# Patient Record
Sex: FEMALE | Race: WHITE | NOT HISPANIC OR LATINO | Employment: OTHER | URBAN - METROPOLITAN AREA
[De-identification: names, ages, dates, MRNs, and addresses within clinical notes are randomized per-mention and may not be internally consistent; named-entity substitution may affect disease eponyms.]

---

## 2017-01-04 ENCOUNTER — ALLSCRIPTS OFFICE VISIT (OUTPATIENT)
Dept: OTHER | Facility: OTHER | Age: 72
End: 2017-01-04

## 2017-01-05 ENCOUNTER — APPOINTMENT (OUTPATIENT)
Dept: LAB | Facility: HOSPITAL | Age: 72
End: 2017-01-05
Payer: MEDICARE

## 2017-01-05 DIAGNOSIS — N39.0 URINARY TRACT INFECTION: ICD-10-CM

## 2017-01-05 PROCEDURE — 87086 URINE CULTURE/COLONY COUNT: CPT

## 2017-01-06 LAB — BACTERIA UR CULT: NORMAL

## 2017-01-12 ENCOUNTER — GENERIC CONVERSION - ENCOUNTER (OUTPATIENT)
Dept: OTHER | Facility: OTHER | Age: 72
End: 2017-01-12

## 2017-02-28 ENCOUNTER — ALLSCRIPTS OFFICE VISIT (OUTPATIENT)
Dept: OTHER | Facility: OTHER | Age: 72
End: 2017-02-28

## 2017-02-28 DIAGNOSIS — E03.9 HYPOTHYROIDISM: ICD-10-CM

## 2017-02-28 DIAGNOSIS — M81.0 AGE-RELATED OSTEOPOROSIS WITHOUT CURRENT PATHOLOGICAL FRACTURE: ICD-10-CM

## 2017-02-28 DIAGNOSIS — G20 PARKINSON'S DISEASE (HCC): ICD-10-CM

## 2017-02-28 DIAGNOSIS — F06.30 MOOD DISORDER DUE TO KNOWN PHYSIOLOGICAL CONDITION: ICD-10-CM

## 2017-02-28 DIAGNOSIS — E78.5 HYPERLIPIDEMIA: ICD-10-CM

## 2017-03-08 ENCOUNTER — ALLSCRIPTS OFFICE VISIT (OUTPATIENT)
Dept: OTHER | Facility: OTHER | Age: 72
End: 2017-03-08

## 2017-04-04 ENCOUNTER — HOSPITAL ENCOUNTER (OUTPATIENT)
Dept: RADIOLOGY | Facility: MEDICAL CENTER | Age: 72
Discharge: HOME | End: 2017-04-04
Payer: MEDICARE

## 2017-04-04 ENCOUNTER — ALLSCRIPTS OFFICE VISIT (OUTPATIENT)
Dept: OTHER | Facility: OTHER | Age: 72
End: 2017-04-04

## 2017-04-04 ENCOUNTER — TRANSCRIBE ORDERS (OUTPATIENT)
Dept: ADMINISTRATIVE | Facility: HOSPITAL | Age: 72
End: 2017-04-04

## 2017-04-04 DIAGNOSIS — R05.9 COUGH: ICD-10-CM

## 2017-04-04 PROCEDURE — 71020 HB CHEST X-RAY 2VW FRONTAL&LATL: CPT

## 2017-04-06 ENCOUNTER — GENERIC CONVERSION - ENCOUNTER (OUTPATIENT)
Dept: OTHER | Facility: OTHER | Age: 72
End: 2017-04-06

## 2017-05-31 ENCOUNTER — ALLSCRIPTS OFFICE VISIT (OUTPATIENT)
Dept: OTHER | Facility: OTHER | Age: 72
End: 2017-05-31

## 2017-06-14 ENCOUNTER — GENERIC CONVERSION - ENCOUNTER (OUTPATIENT)
Dept: OTHER | Facility: OTHER | Age: 72
End: 2017-06-14

## 2017-09-25 ENCOUNTER — ALLSCRIPTS OFFICE VISIT (OUTPATIENT)
Dept: OTHER | Facility: OTHER | Age: 72
End: 2017-09-25

## 2017-09-26 DIAGNOSIS — R73.9 HYPERGLYCEMIA: ICD-10-CM

## 2017-09-26 DIAGNOSIS — G20 PARKINSON'S DISEASE (HCC): ICD-10-CM

## 2017-09-26 DIAGNOSIS — R04.2 HEMOPTYSIS: ICD-10-CM

## 2017-09-26 DIAGNOSIS — R26.9 ABNORMALITY OF GAIT AND MOBILITY: ICD-10-CM

## 2017-09-26 DIAGNOSIS — E78.5 HYPERLIPIDEMIA: ICD-10-CM

## 2017-09-26 DIAGNOSIS — J40 BRONCHITIS: ICD-10-CM

## 2017-09-26 DIAGNOSIS — M81.0 AGE-RELATED OSTEOPOROSIS WITHOUT CURRENT PATHOLOGICAL FRACTURE: ICD-10-CM

## 2017-09-26 DIAGNOSIS — E03.9 HYPOTHYROIDISM: ICD-10-CM

## 2017-09-26 DIAGNOSIS — R05.9 COUGH: ICD-10-CM

## 2017-09-26 DIAGNOSIS — J30.9 ALLERGIC RHINITIS: ICD-10-CM

## 2017-09-26 DIAGNOSIS — R06.2 WHEEZING: ICD-10-CM

## 2017-09-26 DIAGNOSIS — R32 URINARY INCONTINENCE: ICD-10-CM

## 2017-09-29 ENCOUNTER — HOSPITAL ENCOUNTER (OUTPATIENT)
Dept: RADIOLOGY | Facility: HOSPITAL | Age: 72
Discharge: HOME | End: 2017-09-29
Payer: MEDICARE

## 2017-09-29 ENCOUNTER — GENERIC CONVERSION - ENCOUNTER (OUTPATIENT)
Dept: FAMILY MEDICINE CLINIC | Facility: CLINIC | Age: 72
End: 2017-09-29

## 2017-09-29 ENCOUNTER — GENERIC CONVERSION - ENCOUNTER (OUTPATIENT)
Dept: OTHER | Facility: OTHER | Age: 72
End: 2017-09-29

## 2017-09-29 DIAGNOSIS — R04.2 HEMOPTYSIS: ICD-10-CM

## 2017-09-29 DIAGNOSIS — R05.9 COUGH: ICD-10-CM

## 2017-09-29 DIAGNOSIS — R06.2 WHEEZING: ICD-10-CM

## 2017-09-29 DIAGNOSIS — J40 BRONCHITIS: ICD-10-CM

## 2017-09-29 PROCEDURE — 71020 HB CHEST X-RAY 2VW FRONTAL&LATL: CPT

## 2017-10-03 ENCOUNTER — GENERIC CONVERSION - ENCOUNTER (OUTPATIENT)
Dept: OTHER | Facility: OTHER | Age: 72
End: 2017-10-03

## 2017-10-06 ENCOUNTER — TRANSCRIBE ORDERS (OUTPATIENT)
Dept: LAB | Facility: CLINIC | Age: 72
End: 2017-10-06

## 2017-10-06 ENCOUNTER — GENERIC CONVERSION - ENCOUNTER (OUTPATIENT)
Dept: OTHER | Facility: OTHER | Age: 72
End: 2017-10-06

## 2017-10-06 ENCOUNTER — APPOINTMENT (OUTPATIENT)
Dept: LAB | Facility: CLINIC | Age: 72
End: 2017-10-06
Payer: MEDICARE

## 2017-10-06 DIAGNOSIS — G20 PARKINSON'S DISEASE (HCC): ICD-10-CM

## 2017-10-06 DIAGNOSIS — J30.9 ALLERGIC RHINITIS: ICD-10-CM

## 2017-10-06 DIAGNOSIS — R26.9 ABNORMALITY OF GAIT AND MOBILITY: ICD-10-CM

## 2017-10-06 DIAGNOSIS — E78.5 HYPERLIPIDEMIA: ICD-10-CM

## 2017-10-06 DIAGNOSIS — R32 URINARY INCONTINENCE: ICD-10-CM

## 2017-10-06 DIAGNOSIS — M81.0 AGE-RELATED OSTEOPOROSIS WITHOUT CURRENT PATHOLOGICAL FRACTURE: ICD-10-CM

## 2017-10-06 DIAGNOSIS — E03.9 HYPOTHYROIDISM: ICD-10-CM

## 2017-10-06 DIAGNOSIS — R73.9 HYPERGLYCEMIA: ICD-10-CM

## 2017-10-06 DIAGNOSIS — R05.9 COUGH: ICD-10-CM

## 2017-10-06 LAB
25(OH)D3 SERPL-MCNC: 29.3 NG/ML (ref 30–100)
ALBUMIN SERPL BCP-MCNC: 3.5 G/DL (ref 3.5–5)
ALP SERPL-CCNC: 86 U/L (ref 46–116)
ALT SERPL W P-5'-P-CCNC: 32 U/L (ref 12–78)
ANION GAP SERPL CALCULATED.3IONS-SCNC: 6 MMOL/L (ref 4–13)
AST SERPL W P-5'-P-CCNC: 24 U/L (ref 5–45)
BILIRUB SERPL-MCNC: 0.39 MG/DL (ref 0.2–1)
BUN SERPL-MCNC: 15 MG/DL (ref 5–25)
CALCIUM SERPL-MCNC: 9.2 MG/DL (ref 8.3–10.1)
CHLORIDE SERPL-SCNC: 106 MMOL/L (ref 100–108)
CHOLEST SERPL-MCNC: 146 MG/DL (ref 50–200)
CO2 SERPL-SCNC: 29 MMOL/L (ref 21–32)
CREAT SERPL-MCNC: 0.69 MG/DL (ref 0.6–1.3)
ERYTHROCYTE [DISTWIDTH] IN BLOOD BY AUTOMATED COUNT: 13.8 % (ref 11.6–15.1)
EST. AVERAGE GLUCOSE BLD GHB EST-MCNC: 117 MG/DL
GFR SERPL CREATININE-BSD FRML MDRD: 86 ML/MIN/1.73SQ M
GLUCOSE P FAST SERPL-MCNC: 126 MG/DL (ref 65–99)
HBA1C MFR BLD: 5.7 % (ref 4.2–6.3)
HCT VFR BLD AUTO: 48.1 % (ref 34.8–46.1)
HDLC SERPL-MCNC: 62 MG/DL (ref 40–60)
HGB BLD-MCNC: 16.3 G/DL (ref 11.5–15.4)
LDLC SERPL CALC-MCNC: 57 MG/DL (ref 0–100)
MCH RBC QN AUTO: 31.8 PG (ref 26.8–34.3)
MCHC RBC AUTO-ENTMCNC: 33.9 G/DL (ref 31.4–37.4)
MCV RBC AUTO: 94 FL (ref 82–98)
PLATELET # BLD AUTO: 323 THOUSANDS/UL (ref 149–390)
PMV BLD AUTO: 10.9 FL (ref 8.9–12.7)
POTASSIUM SERPL-SCNC: 4 MMOL/L (ref 3.5–5.3)
PROT SERPL-MCNC: 7.4 G/DL (ref 6.4–8.2)
RBC # BLD AUTO: 5.12 MILLION/UL (ref 3.81–5.12)
SODIUM SERPL-SCNC: 141 MMOL/L (ref 136–145)
T4 FREE SERPL-MCNC: 1.09 NG/DL (ref 0.76–1.46)
TRIGL SERPL-MCNC: 134 MG/DL
TSH SERPL DL<=0.05 MIU/L-ACNC: 3.24 UIU/ML (ref 0.36–3.74)
WBC # BLD AUTO: 9.71 THOUSAND/UL (ref 4.31–10.16)

## 2017-10-06 PROCEDURE — 80061 LIPID PANEL: CPT

## 2017-10-06 PROCEDURE — 85027 COMPLETE CBC AUTOMATED: CPT

## 2017-10-06 PROCEDURE — 84439 ASSAY OF FREE THYROXINE: CPT

## 2017-10-06 PROCEDURE — 84443 ASSAY THYROID STIM HORMONE: CPT

## 2017-10-06 PROCEDURE — 80053 COMPREHEN METABOLIC PANEL: CPT

## 2017-10-06 PROCEDURE — 83036 HEMOGLOBIN GLYCOSYLATED A1C: CPT

## 2017-10-06 PROCEDURE — 82306 VITAMIN D 25 HYDROXY: CPT

## 2017-10-06 PROCEDURE — 36415 COLL VENOUS BLD VENIPUNCTURE: CPT

## 2017-10-07 ENCOUNTER — GENERIC CONVERSION - ENCOUNTER (OUTPATIENT)
Dept: OTHER | Facility: OTHER | Age: 72
End: 2017-10-07

## 2017-11-01 ENCOUNTER — ALLSCRIPTS OFFICE VISIT (OUTPATIENT)
Dept: OTHER | Facility: OTHER | Age: 72
End: 2017-11-01

## 2017-11-02 ENCOUNTER — GENERIC CONVERSION - ENCOUNTER (OUTPATIENT)
Dept: OTHER | Facility: OTHER | Age: 72
End: 2017-11-02

## 2017-11-07 ENCOUNTER — ALLSCRIPTS OFFICE VISIT (OUTPATIENT)
Dept: OTHER | Facility: OTHER | Age: 72
End: 2017-11-07

## 2017-11-07 ENCOUNTER — GENERIC CONVERSION - ENCOUNTER (OUTPATIENT)
Dept: OTHER | Facility: OTHER | Age: 72
End: 2017-11-07

## 2017-11-08 ENCOUNTER — GENERIC CONVERSION - ENCOUNTER (OUTPATIENT)
Dept: OTHER | Facility: OTHER | Age: 72
End: 2017-11-08

## 2017-11-30 ENCOUNTER — LAB REQUISITION (OUTPATIENT)
Dept: LAB | Facility: HOSPITAL | Age: 72
End: 2017-11-30
Payer: MEDICARE

## 2017-11-30 ENCOUNTER — ALLSCRIPTS OFFICE VISIT (OUTPATIENT)
Dept: OTHER | Facility: OTHER | Age: 72
End: 2017-11-30

## 2017-11-30 DIAGNOSIS — M54.9 DORSALGIA: ICD-10-CM

## 2017-11-30 LAB
BILIRUB UR QL STRIP: NEGATIVE
CLARITY UR: NORMAL
COLOR UR: YELLOW
GLUCOSE (HISTORICAL): NEGATIVE
HGB UR QL STRIP.AUTO: NORMAL
KETONES UR STRIP-MCNC: NEGATIVE MG/DL
LEUKOCYTE ESTERASE UR QL STRIP: NORMAL
NITRITE UR QL STRIP: POSITIVE
PH UR STRIP.AUTO: 6.5 [PH]
PROT UR STRIP-MCNC: 30 MG/DL
SP GR UR STRIP.AUTO: 1.02
UROBILINOGEN UR QL STRIP.AUTO: 0.2

## 2017-11-30 PROCEDURE — 87077 CULTURE AEROBIC IDENTIFY: CPT | Performed by: FAMILY MEDICINE

## 2017-11-30 PROCEDURE — 87086 URINE CULTURE/COLONY COUNT: CPT | Performed by: FAMILY MEDICINE

## 2017-11-30 PROCEDURE — 87186 SC STD MICRODIL/AGAR DIL: CPT | Performed by: FAMILY MEDICINE

## 2017-12-02 LAB — BACTERIA UR CULT: ABNORMAL

## 2017-12-05 NOTE — PROGRESS NOTES
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)    Plan  Acute UTI, SocHx: Never a smoker    · Ciprofloxacin HCl - 500 MG Oral Tablet; Take 1 every 12 hours  Backache, Hematuria, Urinary incontinence    · (1) URINE CULTURE; Source:Urine, Clean Catch; Status: In Progress - Specimen/Data  Collected,Retrospective By Protocol Authorization;   Done: 38RWZ1780  Urinary incontinence    · Urine Dip Automated- POC; Status:Complete - Retrospective By Protocol Authorization;    Done: 42ZZY2293 09:11AM    Discussion/Summary    1  Acute UTI: Started on ciprofloxacin 500 mg twice a day for 5 days  FOLLOW UP ON THE URINE CULTURE  Patient was asking for test of cure, explained to the patient is sound like a simple UTI, patient has no UTI for the last 12 months, if patient symptoms did not improved on the current medication then we may consider repeat urine dip and urine culture  Patient off her prophylaxis medication from the Urology  Patient was asking about Premarin cream that was suggested by her urologist before, explained to the patient and her  there is no need to use it right now since patient was not using it for many years, and her frequency of UTI is much less now, if there is more recurrent UTI then we may consider it on regular basis  2  Parkinson's disease: Patient had her DPs replaced, she has regular follow-up with Neurology  3  Mood disorder: Has regular follow-up with psychiatrist, she was started on Glycopyrrolate and patient  noticed that she is more drowsy and less active, explained to the patient to discuss with Dr Antonella Cardozo about decreasing the medication is slightly  Possible side effects of new medications were reviewed with the patient/guardian today  The treatment plan was reviewed with the patient/guardian  The patient/guardian understands and agrees with the treatment plan     Self Referrals: No Neuro      Chief Complaint  pt complaining of burning when urinating, and usually cant make it to the bathroom in the morning  History of Present Illness  HPI: Patient was urine frequency dysuria for the last 3 days, more incontinence, no fever no chills no flank pain no nausea or vomiting  Patient is more drowsy and sleepy more than usual lately after she started on this new medication by her psychiatrist       Review of Systems    Constitutional: No fever, no chills, feels well, no tiredness, no recent weight gain or loss  ENT: no ear ache, no loss of hearing, no nosebleeds or nasal discharge, no sore throat or hoarseness  Gastrointestinal: no complaints of abdominal pain, no constipation, no nausea or diarrhea, no vomiting, no bloody stools  Genitourinary: dysuria, but no pelvic pain, no incontinence and no dysmenorrhea  Musculoskeletal: no complaints of arthralgia, no myalgia, no joint swelling or stiffness, no limb pain or swelling  Neurological: no headache, no numbness, no tingling, no confusion, no dizziness and no fainting  Active Problems    1  Abnormal EKG (794 31) (R94 31)   2  Abnormal gait (781 2) (R26 9)   3  Acid reflux disease (530 81) (K21 9)   4  Allergic rhinitis (477 9) (J30 9)   5  Backache (724 5) (M54 9)   6  DYANA (generalized anxiety disorder) (300 02) (F41 1)   7  Hallucinations (780 1) (R44 3)   8  Hematuria (599 70) (R31 9)   9  Hyperglycemia (790 29) (R73 9)   10  Hyperlipidemia (272 4) (E78 5)   11  Hypothyroidism (244 9) (E03 9)   12  Lower back pain (724 2) (M54 5)   13  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)   14  Multiple joint pain (719 49) (M25 50)   15  Neoplasm of uncertain behavior of skin (238 2) (D48 5)   16  Osteoporosis (733 00) (M81 0)   17   Parkinson's disease (332 0) (Emily Evans) 18  Positive depression screening (796 4) (Z13 89)   19  Preoperative clearance (V72 84) (Z01 818)   20  Reactive confusion (298 2) (F44 89)   21  Sialorrhea (527 7) (K11 7)   22  Social phobia (300 23) (F40 10)   23  Spinal stenosis (724 00) (M48 00)   24  Urinary incontinence (788 30) (R32)    Past Medical History    1  History of Abdominal pain, acute, left lower quadrant (789 04,338 19) (R10 32)   2  History of Abdominal pain, suprapubic (789 09) (R10 2)   3  History of Acute UTI (599 0) (N39 0)   4  History of Ankle Sprain (845 00)   5  History of Anxiety (300 00) (F41 9)   6  History of Burning with urination (788 1) (R30 0)   7  History of Dysuria (788 1) (R30 0)   8  History of Foot Pain (Soft Tissue) (729 5)   9  History of Groin pain (789 09) (R10 30)   10  History of Hip pain, unspecified laterality   11  History of abscess of skin and subcutaneous tissue (V13 3) (Z87 2)   12  History of bursitis (V13 59) (Z87 39)   13  History of conjunctivitis (V12 49) (Z86 69)   14  History of constipation (V12 79) (Z87 19)   15  History of depression (V11 8) (Z86 59)   16  History of esophageal reflux (V12 79) (Z87 19)   17  History of falling (V15 88) (Z91 81)   18  History of hypothyroidism (V12 29) (Z86 39)   19  History of influenza (V12 09) (Z87 09)   20  History of influenza vaccination (V49 89) (Z92 29)   21  History of low back pain (V13 59) (Z87 39)   22  History of thyroid disease (V12 29) (Z86 39)   23  History of Leg abrasion (916 0) (S80 819A)   24  History of Neck pain (723 1) (M54 2)   25  Parkinson's disease (332 0) (G20)   26  History of Rectal pain (569 42) (K62 89)   27  History of Symptoms involving urinary system (788 99) (R39 9)   28  History of Urinary Tract Infection (V13 02)  Active Problems And Past Medical History Reviewed: The active problems and past medical history were reviewed and updated today  Family History  Mother    1   Family history of Acute Myocardial Infarction (V17 3) 2  Family history of diabetes mellitus (V18 0) (Z83 3)  Father    3  Family history of Stroke Syndrome (V17 1)  Family History    4  Family history of heart disease (V17 49) (Z82 49)   5  Family history of hypertension (V17 49) (Z82 49)   6  Family history of thyroid disease (V18 19) (Z83 49)  Family History Reviewed: The family history was reviewed and updated today  Social History    · Denied: History of Drug use   ·    · Never a smoker   · Never Drank Alcohol  The social history was reviewed and updated today  The social history was reviewed and is unchanged  Surgical History    1  History of Brain Surgery   2  History of Diagnostic Cystoscopy   3  History of Laminectomy Decompress, Facetectomy, Foraminotomy Lumbar Seg   4  History of Tonsillectomy   5  History of Total Hip Replacement  Surgical History Reviewed: The surgical history was reviewed and updated today  Current Meds   1  [employer] Vitamin C 1000 MG TABS; Take 1 tablet daily Recorded   2  All Day Calcium TB24 Recorded   3  ALPRAZolam 0 5 MG Oral Tablet; take 4 tablets daily as directed; Therapy: 95SIJ5129 to (Evaluate:30Apr2018)  Requested for: 16XTG3693; Last   Rx:01Nov2017 Ordered   4  Aspirin 81 MG TABS; Therapy: (Recorded:43Zem0454) to Recorded   5  Azelastine HCl - 0 15 % Nasal Solution; USE 2 SPRAYS IN EACH NOSTRIL ONCE   DAILY AT BEDTIME; Therapy: 02HAC1505 to (Last Rx:04Apr2017)  Requested for: 87Hrx4621 Ordered   6  BusPIRone HCl - 15 MG Oral Tablet; Take one half a tablet twice daily for one week and   then increase to a full tablet twice daily; Therapy: 17NZL5618 to (Last Rx:01Nov2017)  Requested for: 21BJW2468 Ordered   7  Carbidopa-Levodopa  MG Oral Tablet; one tab twice  a day; Therapy: 20Gra1296 to (Evaluate:24Apr2016)  Requested for: 19MHF3125; Last   Rx:25Jan2016 Ordered   8  Cranberry 125 MG Oral Tablet Recorded   9  [employer] Vitamin D CAPS Recorded   10   Daily Value Multivitamin TABS; Take 1 tablet daily Recorded   11  Donepezil HCl - 10 MG Oral Tablet; Take 1 tablet by mouth at bedtime  Requested for:    18SSS3997; Last Rx:93Lnm1391 Ordered   12  Doxycycline Hyclate 100 MG Oral Tablet; TAKE 1 TABLET Twice daily until gone; Therapy: 20DQY4893 to (Evaluate:10Oct2017)  Requested for: 79GDQ5702; Last    Rx:00Qxp7963 Ordered   13  DULoxetine HCl - 20 MG Oral Capsule Delayed Release Particles; TAKE 1 CAPSULE    ORALLY TWICE  DAILY (DEPRESSION) (DO NOT CRUSH) (DO NOT OPEN CAPSULE); Therapy: 92PMN9316 to 96 681145)  Requested for: 26SHY8708; Last    Rx:01Nov2017 Ordered   14  Glycopyrrolate 1 MG Oral Tablet; TAKE 1 TABLET 3 TIMES DAILY; Therapy: 81COW1413 to (22 063203)  Requested for: 47HLL5654; Last    Rx:44Ivi0141 Ordered   15  Levothyroxine Sodium 75 MCG Oral Tablet; take 1 tablet every day; Therapy: 25JCV1363 to (22 156654)  Requested for: 05ZEK6042; Last    Rx:23Njg8019 Ordered   16  Lidocaine Viscous 2 % Mouth/Throat Solution; Swish and spit 1-2 teaspoons 4 times a    day when necessary mouth and throat pain; Therapy: 70DNE1447 to (Last Rx:06Oct2017)  Requested for: 35SXI8703 Ordered   17  Montelukast Sodium 10 MG Oral Tablet; Take 1 tablet daily; Therapy: 18RPX0490 to (Last Carole Little)  Requested for: 43Zda8527 Ordered   18  Myrbetriq 25 MG Oral Tablet Extended Release 24 Hour; Therapy: 58KWP2571 to Recorded   19  Nystatin 994656 UNIT/ML Mouth/Throat Suspension; PLACE 1ML TO INSIDE OF EACH    CHEEK 4 TIMES DAILY; Therapy: 51HQO0425 to (Last Rx:20Srq1260)  Requested for: 60Gse1946 Ordered   20  Premarin 0 625 MG/GM Vaginal Cream; Insert one gram intra-vaginally every day for 2    weeks, then twice weekly Recorded   21  Promethazine HCl - 6 25 MG/5ML Oral Syrup; 1 teaspoon every 4 hours and 2 teaspoons    before bed as needed for cough; Therapy: 54GPA4570 to (Last Carole Little)  Requested for: 57Tsy2327 Ordered   22   RaNITidine HCl - 150 MG Oral Capsule; TAKE 1 CAPSULE AT BEDTIME Recorded   23  Simvastatin 20 MG Oral Tablet; Therapy: 49PFG2241 to (Last NM:00SAO7905)  Requested for: 82IEV4608 Ordered   24  Trospium Chloride ER 60 MG Oral Capsule Extended Release 24 Hour; Therapy: 15INI7276 to Recorded    The medication list was reviewed and updated today  Allergies    1  Gabapentin TABS   2  Erythromycin TABS   3  Levaquin TABS   4  Mirtazapine TABS   5  Paxil TABS   6  Sulfa Drugs   7  Tricyclic Antidepressants   8  Wellbutrin TABS   9  Keflex TABS    Vitals   Recorded: 73QJZ2524 09:00AM   Heart Rate 65   Systolic 623   Diastolic 70   Height 5 ft    Weight 156 lb    BMI Calculated 30 47   BSA Calculated 1 68     Physical Exam    Constitutional   General appearance: No acute distress, well appearing and well nourished  Eyes   Conjunctiva and lids: No swelling, erythema or discharge  Ears, Nose, Mouth, and Throat   External inspection of ears and nose: Normal     Abdomen   Abdomen: Non-tender, no masses  Liver and spleen: No hepatomegaly or splenomegaly  Musculoskeletal   Gait and station: Abnormal   In a wheelchair  Digits and nails: Normal without clubbing or cyanosis  Skin   Skin and subcutaneous tissue: Normal without rashes or lesions  Psychiatric   Orientation to person, place, and time: Normal     Mood and affect: Abnormal   Flat, sleepy, tired very slow          Results/Data  Urine Dip Automated- POC 26NNX4558 09:11AM Mira Kim     Test Name Result Flag Reference   Color Yellow     Clarity Transparent     Leukocytes moderate     Nitrite positive     Blood trace     Bilirubin negative     Urobilinogen 0 2     Protein 30     Ph 6 5     Specific Gravity 1 020     Ketone negative     Glucose negative         Future Appointments    Date/Time Provider Specialty Site   01/24/2018 03:30 PM Melisa Galeas MD Psychiatry [facility] [address] [place] [address]     Signatures   Electronically signed by : Marielos Ly MD; Nov 30 2017  9:45AM EST                       (Author)

## 2018-01-09 NOTE — MISCELLANEOUS
Message  Physical therapist from home care called and patient would benefit from speech therapy for some dysphagia   I gave him permission to get the speech therapist and OT and if needed      Signatures   Electronically signed by : Jenifer Menchaca DO; Dec  6 2016 12:33PM EST                       (Author)

## 2018-01-09 NOTE — MISCELLANEOUS
Message   Recorded as Task   Date: 08/24/2016 04:57 PM, Created By: Anitra Torres   Task Name: Call Back   Assigned To: Sergo Mccoy   Regarding Patient: Alejandro Garcia, Status: Active   CommentKyla Anna - 24 Aug 2016 4:57 PM     TASK CREATED    Pls call Wilma Ortega she has increased mental conditions, he does not know where this is heading  21  or [phone]   spoke with her   the past few days she has been very confused and disoriented with visual hallucinations and delusional thinking  I am recommending urinalysis and basic blood work  I referred her to Dr Paulina Choudhary for the test      Plan  Hypothyroidism    · Levothyroxine Sodium 75 MCG Oral Tablet;  Take 1 tablet daily    Signatures   Electronically signed by : Hermelindo Hernandez MD; Aug 24 2016  5:20PM EST                       (Author)

## 2018-01-09 NOTE — RESULT NOTES
Verified Results  (1) CBC/ PLT (NO DIFF) 22DBO2641 12:14PM Nicolás Sink Order Number: [id number]     Test Name Result Flag Reference   HEMATOCRIT 48 1 % H 34 8-46 1   HEMOGLOBIN 16 3 g/dL H 11 5-15 4   MCHC 33 9 g/dL  31 4-37 4   MCH 31 8 pg  26 8-34 3   MCV 94 fL  82-98   PLATELET COUNT 289 Thousands/uL  149-390   RBC COUNT 5 12 Million/uL  3 81-5 12   RDW 13 8 %  11 6-15 1   WBC COUNT 9 71 Thousand/uL  4 31-10 16   MPV 10 9 fL  8 9-12 7     (1) COMPREHENSIVE METABOLIC PANEL 19ZIF6103 71:74ST Nicolás Sink Order Number: [id number]     Test Name Result Flag Reference   SODIUM 141 mmol/L  136-145   POTASSIUM 4 0 mmol/L  3 5-5 3   CHLORIDE 106 mmol/L  100-108   CARBON DIOXIDE 29 mmol/L  21-32   ANION GAP (CALC) 6 mmol/L  4-13   BLOOD UREA NITROGEN 15 mg/dL  5-25   CREATININE 0 69 mg/dL  0 60-1 30   Standardized to IDMS reference method   CALCIUM 9 2 mg/dL  8 3-10 1   BILI, TOTAL 0 39 mg/dL  0 20-1 00   ALK PHOSPHATAS 86 U/L     ALT (SGPT) 32 U/L  12-78   Specimen collection should occur prior to Sulfasalazine and/or Sulfapyridine administration due to the potential for falsely depressed results  AST(SGOT) 24 U/L  5-45   Specimen collection should occur prior to Sulfasalazine administration due to the potential for falsely depressed results  ALBUMIN 3 5 g/dL  3 5-5 0   TOTAL PROTEIN 7 4 g/dL  6 4-8 2   eGFR 86 ml/min/1 73sq m     National Kidney Disease Education Program recommendations are as follows:  GFR calculation is accurate only with a steady state creatinine  Chronic Kidney disease less than 60 ml/min/1 73 sq  meters  Kidney failure less than 15 ml/min/1 73 sq  meters  GLUCOSE FASTING 126 mg/dL H 65-99   Specimen collection should occur prior to Sulfasalazine administration due to the potential for falsely depressed results  Specimen collection should occur prior to Sulfapyridine administration due to the potential for falsely elevated results       (1) HEMOGLOBIN A1C 18ART1368 12:14PM Renae Mcgee Order Number: [id number]     Test Name Result Flag Reference   HEMOGLOBIN A1C 5 7 %  4 2-6 3   EST  AVG  GLUCOSE 117 mg/dl       (1) LIPID PANEL, FASTING 28KQD3124 12:14PM Renae Mcgee Order Number: [id number]     Test Name Result Flag Reference   CHOLESTEROL 146 mg/dL     HDL,DIRECT 62 mg/dL H 40-60   Specimen collection should occur prior to Metamizole administration due to the potential for falsley depressed results  LDL CHOLESTEROL CALCULATED 57 mg/dL  0-100   Triglyceride:        Normal <150 mg/dl   Borderline High 150-199 mg/dl   High 200-499 mg/dl   Very High >499 mg/dl      Cholesterol:       Desirable <200 mg/dl    Borderline High 200-239 mg/dl    High >239 mg/dl      HDL Cholesterol:       High>59 mg/dL    Low <41 mg/dL      This screening LDL is a calculated result  It does not have the accuracy of the Direct Measured LDL in the monitoring of patients with hyperlipidemia and/or statin therapy  Direct Measure LDL (JGK679) must be ordered separately in these patients  TRIGLYCERIDES 134 mg/dL  <=150   Specimen collection should occur prior to N-Acetylcysteine or Metamizole administration due to the potential for falsely depressed results  (1) TSH 06Oct2017 12:14PM Renae Mcgee Order Number: [id number]     Test Name Result Flag Reference   TSH 3 240 uIU/mL  0 358-3 740   Patients undergoing fluorescein dye angiography may retain small amounts of fluorescein in the body for 48-72 hours post procedure  Samples containing fluorescein can produce falsely depressed TSH values  If the patient had this procedure,a specimen should be resubmitted post fluorescein clearance            The recommended reference ranges for TSH during pregnancy are as follows:  First trimester 0 1 to 2 5 uIU/mL  Second trimester  0 2 to 3 0 uIU/mL  Third trimester 0 3 to 3 0 uIU/m     (1) T4, FREE 61ZQE5390 12: 14PM [facility] Order Number: [id number]     Test Name Result Flag Reference   T4,FREE 1 09 ng/dL  0 76-1 46   Specimen collection should occur prior to Sulfasalazine administration due to the potential for falsely elevated results  (1) VITAMIN D 25-HYDROXY 06Oct2017 12:14PM [facility] Order Number: [id number]     Test Name Result Flag Reference   VIT D 25-HYDROX 29 3 ng/mL L 30 0-100 0   This assay is a certified procedure of the CDC Vitamin D Standardization Certification Program (VDSCP)     Deficiency <20ng/ml   Insufficiency 20-30ng/ml   Sufficient  ng/ml     *Patients undergoing fluorescein dye angiography may retain small amounts of fluorescein in the body for 48-72 hours post procedure  Samples containing fluorescein can produce falsely elevated Vitamin D values  If the patient had this procedure, a specimen should be resubmitted post fluorescein clearance

## 2018-01-10 NOTE — RESULT NOTES
Message   pt still has pseudomonas bacteria in her urine need another course of abx and need to fu with urology    need to be started on ciprofloxacin  500 mg bid x 7 days         Verified Results  (1) URINE CULTURE 99Mmb5268 01:30PM Fernando Curran   [facility] Order Number: [id number]     Test Name Result Flag Reference   CLINICAL REPORT (Report)     Test:        Urine culture  Specimen Type:   Urine  Specimen Date:   12/5/2016 1:30 PM  Result Date:    12/8/2016 9:09 AM  Result Status:   Final result  Resulting Lab:   [facility] [address] [place] [id number]            Tel: [phone]      CULTURE                                       ------------------                                   >100,000 cfu/ml Pseudomonas aeruginosa      SUSCEPTIBILITY                                   ------------------                                                    Pseudomonas aeruginosa  METHOD              TARYN  -------------------------------  -------------------------  AZTREONAM ($$$)          <=8 ug/ml   Susceptible  CEFEPIME ($)           <=8 ug/ml   Susceptible  CEFTAZIDIME ($$)         8 ug/ml    Susceptible  CIPROFLOXACIN ($)         <=1 00 ug/ml Susceptible  GENTAMICIN ($$)          <=4 ug/ml   Susceptible  IMIPENEM             <=4 ug/ml   Susceptible  LEVOFLOXACIN ($)         <=2 00 ug/ml Susceptible  MEROPENEM ($$)          <=4 00 ug/ml Susceptible  PIPERACILLIN + TAZOBACTAM ($$$)  <=16 ug/ml  Susceptible  TICARCILLIN/K CLAVULANATE     64 ug/ml   Susceptible  TOBRAMYCIN ($)          <=4 ug/ml   Susceptible       Plan  Acute UTI    · Amoxicillin-Pot Clavulanate 500-125 MG Oral Tablet

## 2018-01-10 NOTE — MISCELLANEOUS
Message   Recorded as Task   Date: 08/29/2016 10:08 AM, Created By: Juliette Ness   Task Name: Call Back   Assigned To:  Sergo Mccoy   Regarding Patient: Niki Contreras, Status: Active   CommentGeorges Mountain - 29 Aug 2016 10:08 AM     TASK CREATED    pt is getting worse, fell yesterday, pls call, she is also starting nitrofuratoin 100mg 2 xday for upcoming drs appt, pls check urine & labs         Signatures   Electronically signed by : Vi Cornelius MD; Aug 29 2016  5:06PM EST                       (Author)

## 2018-01-10 NOTE — PSYCH
Psych Med Mgmt    Appearance: adequate hygiene and grooming, demonstrated behavior psychomotor retardation and good eye contact  Observed mood: anxious  Observed mood: affect was flat  Speech:  hypophonic  Thought processes: coherent/organized  Hallucinations: visual hallucinations  Thought Content: no delusions  Abnormal Thoughts: The patient has no suicidal thoughts and no homicidal thoughts  Treatment Recommendations: same meds and RTO in 6 month  Risks, Benefits And Possible Side Effects Of Medications: Risks, benefits, and possible side effects of medications explained to patient and patient verbalizes understanding  She reports normal appetite, decreased energy, no weight change and normal number of sleep hours  The patient was seen for continuing care and pharmacotherapy accompanied by her   There has been no significant change in her clinical status and she continues to have anxiety and visual hallucinations  Her neurologist is started her on Aricept but after taking it for 1 or 2 nights, she complained of more anxiety and insomnia and has not been taking it  Assessment    1  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)    Plan    1  ALPRAZolam 0 5 MG Oral Tablet; take 4 tablets daily as directed    2  BuPROPion HCl ER (XL) 300 MG Oral Tablet Extended Release 24 Hour; take 1   tablet by mouth daily   3  DULoxetine HCl - 20 MG Oral Capsule Delayed Release Particles; TAKE 1   CAPSULE ORALLY TWICE  DAILY (DEPRESSION) (DO NOT CRUSH) (DO NOT OPEN   CAPSULE)    Review of Systems    Constitutional: feeling tired  Cardiovascular: no complaints of slow or fast heart rate, no chest pain, no palpitations  Respiratory: no complaints of shortness of breath, no wheezing, no dyspnea on exertion  Gastrointestinal: no complaints of abdominal pain, no constipation, no nausea, no diarrhea, no vomiting     Genitourinary: no complaints of dysuria, no incontinence, no pelvic pain, no urinary frequency  Integumentary: no complaints of skin rash, no itching, no dry skin  Neurological: confusion and muscle rigidity  Active Problems    1  Abdominal pain, acute, left lower quadrant (789 04,338 19) (R10 32)   2  Abdominal pain, suprapubic (789 09) (R10 2)   3  Abnormal gait (781 2) (R26 9)   4  Acid reflux disease (530 81) (K21 9)   5  Alopecia (704 00) (L65 9)   6  Anxiety (300 00) (F41 9)   7  Backache (724 5) (M54 9)   8  Chronic constipation (564 00) (K59 09)   9  Dysuria (788 1) (R30 0)   10  Foot Pain (Soft Tissue) (729 5)   11  Groin pain (789 09) (R10 30)   12  Hematuria (599 70) (R31 9)   13  History of urinary tract infection (V13 02) (Z87 440)   14  Hyperglycemia (790 29) (R73 9)   15  Hyperlipidemia (272 4) (E78 5)   16  Hypothyroidism (244 9) (E03 9)   17  Lower back pain (724 2) (M54 5)   18  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)   19  Multiple joint pain (719 49) (M25 50)   20  Osteoporosis (733 00) (M81 0)   21  Parkinson's disease (332 0) (Ayaan Cidra)   22  Reactive confusion (298 2) (F44 89)   23  Rectal pain (569 42) (K62 89)   24  Spinal stenosis (724 00) (M48 00)   25  Symptoms involving urinary system (788 99) (R39 9)   26  Tendonitis (726 90) (M77 9)   27  Urinary frequency (788 41) (R35 0)   28  Urinary hesitancy (788 64) (R39 11)   29  Urinary incontinence (788 30) (R32)   30  Urinary retention (788 20) (R33 9)   31  Urinary urgency (788 63) (R39 15)   32  Vertigo (780 4) (R42)    Past Medical History    1  History of Ankle Sprain (845 00)   2  History of Hip pain, unspecified laterality   3  History of bursitis (V13 59) (Z87 39)   4  History of conjunctivitis (V12 49) (Z86 69)   5  History of constipation (V12 79) (Z87 19)   6  History of falling (V15 88) (Z91 81)   7  History of hypothyroidism (V12 29) (Z86 39)   8  History of influenza (V12 09) (Z87 09)   9   History of urinary tract infection (V13 02) (Z87 440)   10  History of Neck pain (723 1) (M54 2)   11  Parkinson's disease (332 0) (G20)   12  History of Urinary Tract Infection (V13 02)    Allergies    1  Gabapentin TABS   2  Erythromycin TABS   3  Levaquin TABS   4  Mirtazapine TABS   5  Paxil TABS   6  Sulfa Drugs   7  Tricyclic Antidepressants   8  Wellbutrin TABS   9  Keflex TABS    Current Meds   1  All Day Calcium TB24 Recorded   2  ALPRAZolam 0 5 MG Oral Tablet; take 4 tablets daily as directed; Therapy: 89FNC6833 to (Evaluate:24Apr2016)  Requested for: 76SEL5118; Last   Rx:25Jan2016 Ordered   3  Aspirin 81 MG TABS; Therapy: (Recorded:69Mxq7645) to Recorded   4  BuPROPion HCl ER (XL) 300 MG Oral Tablet Extended Release 24 Hour; take 1 tablet by   mouth daily; Therapy: 51NAG7365 to (Evaluate:19Jan2017)  Requested for: 07CAK5183; Last   Rx:25Jan2016 Ordered   5  Carbidopa-Levodopa  MG Oral Tablet; one tab twice  a day; Therapy: 28Rld0467 to (Evaluate:24Apr2016)  Requested for: 12OEF4795; Last   Rx:25Jan2016 Ordered   6  [employer] Stool Softener CAPS Recorded   7  [employer] Vitamin D CAPS Recorded   8  Daily Value Multivitamin TABS; Take 1 tablet daily Recorded   9  DULoxetine HCl - 20 MG Oral Capsule Delayed Release Particles; TAKE 1 CAPSULE   ORALLY TWICE  DAILY (DEPRESSION) (DO NOT CRUSH) (DO NOT OPEN CAPSULE); Therapy: 41ZZR4389 to (Evaluate:19Jan2017)  Requested for: 38YOY9881; Last   Rx:25Jan2016 Ordered   10  Lactulose 10 GM/15ML Oral Solution; TAKE 1 TBSP Twice daily; Therapy: 47NHT7811 to (Ephraim Oh)  Requested for: 19ZWK6725; Last    Rx:31Mar2016 Ordered   11  Levothyroxine Sodium 75 MCG Oral Tablet; Take 1 tablet daily; Therapy: 91OKD8706 to (Evaluate:03Jul2016)  Requested for: 54WZP5244; Last    Rx:09Jul2015 Ordered   12  Methenamine Hippurate 1 GM Oral Tablet; Therapy: 06FJW1676 to (Evaluate:03Jan2015) Recorded   13  Omeprazole 20 MG Oral Capsule Delayed Release; take 1 capsule daily;     Therapy: 86DJF8832 to (Evaluate:15Nov2016)  Requested for: 60NZV1357; Last    Rx:72Nwj5557 Ordered   14  Pyridium 200 MG Oral Tablet; Take 1 tablet once daily; Therapy: 64SBE3737 to (Last Rx:06Jun2016)  Requested for: 06Jun2016 Ordered   15  Simvastatin 20 MG Oral Tablet; Therapy: 29XZX6545 to (Last DO:16DAQ6471)  Requested for: 78INF8622 Ordered    Family Psych History  Mother    1  Family history of Acute Myocardial Infarction (V17 3)   2  Family history of diabetes mellitus (V18 0) (Z83 3)  Father    3  Family history of Stroke Syndrome (V17 1)    Social History    · Never A Smoker   · Never Drank Alcohol    End of Encounter Meds    1  Omeprazole 20 MG Oral Capsule Delayed Release; take 1 capsule daily; Therapy: 57YFS5021 to (Evaluate:15Nov2016)  Requested for: 60DHX5609; Last   Rx:06Jdf1406 Ordered    2  ALPRAZolam 0 5 MG Oral Tablet; take 4 tablets daily as directed; Therapy: 01BIH2209 to (Evaluate:07Jan2017)  Requested for: 97LDN5263; Last   Rx:45Mir7752 Ordered    3  Lactulose 10 GM/15ML Oral Solution; TAKE 1 TBSP Twice daily; Therapy: 54TQI8545 to (Jet Robles)  Requested for: 28QIQ1256; Last   Rx:31Mar2016 Ordered    4  Pyridium 200 MG Oral Tablet; Take 1 tablet once daily; Therapy: 52GZV0149 to (Last Rx:06Jun2016)  Requested for: 06Jun2016 Ordered    5  Levothyroxine Sodium 75 MCG Oral Tablet; Take 1 tablet daily; Therapy: 04KJF3986 to (Evaluate:78Spu3760)  Requested for: 46ITS7874; Last   Rx:43Aqa0134 Ordered    6  BuPROPion HCl ER (XL) 300 MG Oral Tablet Extended Release 24 Hour; take 1 tablet by   mouth daily; Therapy: 09XRR8407 to (Evaluate:07Jan2017)  Requested for: 77DYN9837; Last   Rx:48Nhk7654 Ordered   7  DULoxetine HCl - 20 MG Oral Capsule Delayed Release Particles; TAKE 1 CAPSULE   ORALLY TWICE  DAILY (DEPRESSION) (DO NOT CRUSH) (DO NOT OPEN CAPSULE); Therapy: 20WMC8776 to (Evaluate:06Jul2017)  Requested for: 77APA8235; Last   Rx:01Mhg2907 Ordered    8   Carbidopa-Levodopa  MG Oral Tablet; one tab twice  a day; Therapy: 21Apr2011 to (Evaluate:24Apr2016)  Requested for: 19RUQ4710; Last   Rx:25Jan2016 Ordered    9  All Day Calcium TB24 Recorded   10  Aspirin 81 MG TABS; Therapy: (Recorded:19Ebm0784) to Recorded   11  [employer] Stool Softener CAPS Recorded   12  [employer] Vitamin D CAPS Recorded   13  Daily Value Multivitamin TABS; Take 1 tablet daily Recorded   14  Methenamine Hippurate 1 GM Oral Tablet; Therapy: 53PCY7634 to (Evaluate:03Jan2015) Recorded   15  Simvastatin 20 MG Oral Tablet;     Therapy: 49AGT1240 to (Last Rx:81Zlx4479)  Requested for: 43SRZ0121 Ordered    Signatures   Electronically signed by : Rodriguez Jane MD; Jul 11 2016  4:04PM EST                       (Author)

## 2018-01-10 NOTE — MISCELLANEOUS
Message  I spoke with the patient's  who informed me that the patient is doing better since she started nitrofurantoin that I prescribed on November 5  I informed them that urine culture was positive but sensitive to nitrofurantoin        Signatures   Electronically signed by : RENETTA Grissom ; Nov 10 2016 12:24PM EST                       (Author)

## 2018-01-10 NOTE — RESULT NOTES
Verified Results  (1) URINE CULTURE 08Apr2016 11:13AM Daniel Jimmy Order Number: [id number]     Test Name Result Flag Reference   CLINICAL REPORT (Report)     Test:        Urine culture  Specimen Source:  Urine, Unspecified Source  Specimen Type:   Urine  Specimen Date:   4/8/2016 11:13 AM  Result Date:    4/9/2016 4:13 PM  Result Status:   Final result  Resulting Lab:   [place] [address]            Tel: [phone]                 CULTURE                                       ------------------                                   50,000-59,000 cfu/ml Mixed Contaminants X4

## 2018-01-11 NOTE — RESULT NOTES
Message   NO GROWTH ON THE RECENT CULTURE      Verified Results  (1) URINE CULTURE 18WQO4439 09:23PM [facility] Order Number: [id number]     Test Name Result Flag Reference   CLINICAL REPORT (Report)     Test:        Urine culture  Specimen Type:   Urine  Specimen Date:   1/5/2017 9:23 PM  Result Date:    1/6/2017 5:55 PM  Result Status:   Final result  Resulting Lab:   [address] [address]            Tel: [phone]      CULTURE                                       ------------------                                   No Growth <1000 cfu/mL

## 2018-01-12 NOTE — PSYCH
Psych Med Mgmt    Appearance: was calm and cooperative, demonstrated behavior psychomotor retardation and good eye contact  Treatment Recommendations: The patient was started on buspirone  I also recommended glycopyrrolate for sialorrhea provided that her neurologist approves  I also recommended that the patient increases her level of physical activity due to progressive functional decline as a result of her Parkinson's disease  Increased socialization was also recommended  Risks, Benefits And Possible Side Effects Of Medications: Risks, benefits, and possible side effects of medications explained to patient and patient verbalizes understanding  She reports normal appetite, decreased energy, no weight change and normal number of sleep hours  seen for mood disorder  She has recently developed social anxiety and fear of having to go to bathroom while out of the house and is constantly worried about just about everything  Has had some Scientologist preoccupation  Generally sleeps well  Has had more functional decline due to PD  A very disturbing symptom is sialorrhea  Assessment    1  Social phobia (300 23) (F40 10)   2  DYANA (generalized anxiety disorder) (300 02) (F41 1)   3  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)   4  Parkinson's disease (332 0) (G20)   5  Sialorrhea (527 7) (K11 7)    Plan    1  BusPIRone HCl - 15 MG Oral Tablet; Take one half a tablet twice daily for one week   and then increase to a full tablet twice daily    2  DULoxetine HCl - 20 MG Oral Capsule Delayed Release Particles; TAKE 1   CAPSULE ORALLY TWICE  DAILY (DEPRESSION) (DO NOT CRUSH) (DO NOT OPEN   CAPSULE)    3  ALPRAZolam 0 5 MG Oral Tablet; take 4 tablets daily as directed    Review of Systems    Constitutional: feeling tired  Cardiovascular: no complaints of slow or fast heart rate, no chest pain, no palpitations     Respiratory: no complaints of shortness of breath, no wheezing, no dyspnea on exertion  Gastrointestinal: excessive drooling  Genitourinary: incontinence and urinary frequency  Musculoskeletal: no complaints of arthralgia, no myalgias, no limb pain, no joint stiffness  Integumentary: no complaints of skin rash, no itching, no dry skin  Neurological: as noted in HPI  Active Problems    1  Abnormal gait (781 2) (R26 9)   2  Acid reflux disease (530 81) (K21 9)   3  Acute cystitis with hematuria (595 0) (N30 01)   4  Allergic rhinitis (477 9) (J30 9)   5  Alopecia (704 00) (L65 9)   6  Backache (724 5) (M54 9)   7  Cough (786 2) (R05)   8  Flu vaccine need (V04 81) (Z23)   9  Hallucinations (780 1) (R44 3)   10  Hematuria (599 70) (R31 9)   11  History of urinary tract infection (V13 02) (Z87 440)   12  Hyperglycemia (790 29) (R73 9)   13  Hyperlipidemia (272 4) (E78 5)   14  Hypothyroidism (244 9) (E03 9)   15  Lower back pain (724 2) (M54 5)   16  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)   17  Multiple joint pain (719 49) (M25 50)   18  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,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,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)   19  Need for pneumococcal vaccine (V03 82) (Z23)   20  Neoplasm of uncertain behavior of skin (238 2) (D48 5)   21  Osteoporosis (733 00) (M81 0)   22  Parkinson's disease (332 0) (G20)   23  Reactive confusion (298 2) (F44 89)   24  Spinal stenosis (724 00) (M48 00)   25  Ulcer mouth (528 9) (K12 1)   26  Urinary incontinence (788 30) (R32)   27  Urinary urgency (788 63) (R39 15)   28  Vertigo (780 4) (R42)    Past Medical History    1  History of Abdominal pain, acute, left lower quadrant (789 04,338 19) (R10 32)   2  History of Abdominal pain, suprapubic (789 09) (R10 2)   3  History of Acute UTI (599 0) (N39 0)   4  History of Ankle Sprain (845 00)   5  History of Anxiety (300 00) (F41 9)   6  History of Burning with urination (788 1) (R30 0)   7  History of Dysuria (788 1) (R30 0)   8  History of Foot Pain (Soft Tissue) (729 5)   9   History of Groin pain (789 09) (R10 30)   10  History of Hip pain, unspecified laterality   11  History of abscess of skin and subcutaneous tissue (V13 3) (Z87 2)   12  History of bursitis (V13 59) (Z87 39)   13  History of conjunctivitis (V12 49) (Z86 69)   14  History of constipation (V12 79) (Z87 19)   15  History of depression (V11 8) (Z86 59)   16  History of esophageal reflux (V12 79) (Z87 19)   17  History of falling (V15 88) (Z91 81)   18  History of hypothyroidism (V12 29) (Z86 39)   19  History of influenza (V12 09) (Z87 09)   20  History of low back pain (V13 59) (Z87 39)   21  History of thyroid disease (V12 29) (Z86 39)   22  History of urinary tract infection (V13 02) (Z87 440)   23  History of Leg abrasion (916 0) (S80 819A)   24  History of Neck pain (723 1) (M54 2)   25  Parkinson's disease (332 0) (G20)   26  History of Rectal pain (569 42) (K62 89)   27  History of Symptoms involving urinary system (788 99) (R39 9)   28  History of Urinary Tract Infection (V13 02)    Allergies    1  Gabapentin TABS   2  Erythromycin TABS   3  Levaquin TABS   4  Mirtazapine TABS   5  Paxil TABS   6  Sulfa Drugs   7  Tricyclic Antidepressants   8  Wellbutrin TABS   9  Keflex TABS    Current Meds   1  [employer] Vitamin C 1000 MG TABS; Take 1 tablet daily Recorded   2  All Day Calcium TB24 Recorded   3  ALPRAZolam 0 5 MG Oral Tablet; take 4 tablets daily as directed; Therapy: 77TKB4128 to (Evaluate:27Nov2017)  Requested for: 75OHD2393; Last   Rx:59Wps1829 Ordered   4  Aspirin 81 MG TABS; Therapy: (Recorded:78Lvp9394) to Recorded   5  Azelastine HCl - 0 15 % Nasal Solution; USE 2 SPRAYS IN EACH NOSTRIL ONCE DAILY   AT BEDTIME; Therapy: 50TTZ8622 to (Last Rx:04Apr2017)  Requested for: 04Apr2017 Ordered   6  Carbidopa-Levodopa  MG Oral Tablet; one tab twice  a day; Therapy: 21Apr2011 to (Evaluate:24Apr2016)  Requested for: 65TVI5503; Last   Rx:25Jan2016 Ordered   7  Cranberry 125 MG Oral Tablet Recorded   8   [employer] Vitamin D CAPS Recorded   9  Daily Value Multivitamin TABS; Take 1 tablet daily Recorded   10  Donepezil HCl - 10 MG Oral Tablet; Take 1 tablet by mouth at bedtime  Requested for:    99CLE0722; Last Rx:62Cnr0038 Ordered   11  Doxycycline Hyclate 100 MG Oral Tablet; TAKE 1 TABLET Twice daily until gone; Therapy: 88RSD0211 to (Evaluate:10Oct2017)  Requested for: 58OGT5869; Last    Rx:76Rwp2022 Ordered   12  DULoxetine HCl - 20 MG Oral Capsule Delayed Release Particles; TAKE 1 CAPSULE    ORALLY TWICE  DAILY (DEPRESSION) (DO NOT CRUSH) (DO NOT OPEN CAPSULE); Therapy: 27DSO7386 to (Baldev Mccormack)  Requested for: 19QIM5499; Last    Rx:73Sml1981 Ordered   13  Levothyroxine Sodium 75 MCG Oral Tablet; take 1 tablet every day; Therapy: 23UVA6721 to [phone])  Requested for: 92HKU6451; Last    Rx:73Dre5582 Ordered   14  Lidocaine Viscous 2 % Mouth/Throat Solution; Swish and spit 1-2 teaspoons 4 times a    day when necessary mouth and throat pain; Therapy: 57SHZ9964 to (Last Rx:08Cuc6643)  Requested for: 76WAT5109 Ordered   15  Montelukast Sodium 10 MG Oral Tablet; Take 1 tablet daily; Therapy: 38CKD5649 to (Benji Yarbrough)  Requested for: 60JPG0673 Ordered   16  Myrbetriq 25 MG Oral Tablet Extended Release 24 Hour; Therapy: 95XNL3326 to Recorded   17  Nystatin 831950 UNIT/ML Mouth/Throat Suspension; PLACE 1ML TO INSIDE OF EACH    CHEEK 4 TIMES DAILY; Therapy: 97PWT0020 to (Last Rx:78Bik6404)  Requested for: 87Dos1902 Ordered   18  Premarin 0 625 MG/GM Vaginal Cream; Insert one gram intra-vaginally every day for 2    weeks, then twice weekly Recorded   19  Promethazine HCl - 6 25 MG/5ML Oral Syrup; 1 teaspoon every 4 hours and 2 teaspoons    before bed as needed for cough; Therapy: 36KRA0909 to (Last Elton Yarbrough)  Requested for: 53Dfz6064 Ordered   20  RaNITidine HCl - 150 MG Oral Capsule; TAKE 1 CAPSULE AT BEDTIME Recorded   21  Simvastatin 20 MG Oral Tablet;     Therapy: 46KTT1525 to (Last TP:10YIC4761)  Requested for: 95IOS3026 Ordered   22  Trospium Chloride ER 60 MG Oral Capsule Extended Release 24 Hour; Therapy: 04NEF2261 to Recorded    Family Psych History  Mother    1  Family history of Acute Myocardial Infarction (V17 3)   2  Family history of diabetes mellitus (V18 0) (Z83 3)  Father    3  Family history of Stroke Syndrome (V17 1)  Family History    4  Family history of heart disease (V17 49) (Z82 49)   5  Family history of hypertension (V17 49) (Z82 49)   6  Family history of thyroid disease (V18 19) (Z80 46)    Social History    · Denied: History of Drug use   ·    · Never A Smoker   · Never Drank Alcohol    End of Encounter Meds    1  Azelastine HCl - 0 15 % Nasal Solution (Astepro); USE 2 SPRAYS IN EACH NOSTRIL   ONCE DAILY AT BEDTIME; Therapy: 79SYA5825 to (Last Rx:04Apr2017)  Requested for: 88Ybf9917 Ordered   2  Montelukast Sodium 10 MG Oral Tablet (Singulair); Take 1 tablet daily; Therapy: 54IUU0185 to (Last Michael Alias)  Requested for: 63Lwg6071 Ordered    3  Promethazine HCl - 6 25 MG/5ML Oral Syrup; 1 teaspoon every 4 hours and 2 teaspoons   before bed as needed for cough; Therapy: 31ABH8712 to (Last Michael Alias)  Requested for: 68Wbw6166 Ordered    4  BusPIRone HCl - 15 MG Oral Tablet; Take one half a tablet twice daily for one week and   then increase to a full tablet twice daily; Therapy: 68MBA4078 to (Last Rx:01Nov2017)  Requested for: 40GWF4712 Ordered    5  Levothyroxine Sodium 75 MCG Oral Tablet; take 1 tablet every day; Therapy: 83CFH9800 to (Evaluate:07Jan2018)  Requested for: 04BUY7094; Last   Rx:57Kkm5452 Ordered    6  DULoxetine HCl - 20 MG Oral Capsule Delayed Release Particles; TAKE 1 CAPSULE   ORALLY TWICE  DAILY (DEPRESSION) (DO NOT CRUSH) (DO NOT OPEN CAPSULE); Therapy: 86PAN8107 to 04 00 14 32 96)  Requested for: 77ZMV8580; Last   Rx:01Nov2017 Ordered    7   Doxycycline Hyclate 100 MG Oral Tablet; TAKE 1 TABLET Twice daily until gone; Therapy: 76VCF6709 to (Evaluate:10Oct2017)  Requested for: 35RMN7159; Last   Rx:14Qzg8571 Ordered    8  Carbidopa-Levodopa  MG Oral Tablet; one tab twice  a day; Therapy: 41Mze6494 to (Evaluate:24Apr2016)  Requested for: 07YRF7734; Last   Rx:25Jan2016 Ordered   9  Donepezil HCl - 10 MG Oral Tablet; Take 1 tablet by mouth at bedtime  Requested for:   74SGL3324; Last Rx:70Lur5283 Ordered    10  ALPRAZolam 0 5 MG Oral Tablet; take 4 tablets daily as directed; Therapy: 25RVF6565 to (Evaluate:30Apr2018)  Requested for: 30SRY2713; Last    Rx:01Nov2017 Ordered    11  Nystatin 645516 UNIT/ML Mouth/Throat Suspension; PLACE 1ML TO INSIDE OF EACH    CHEEK 4 TIMES DAILY; Therapy: 61KFG7005 to (Last Rx:66Iqf4404)  Requested for: 19Szp4546 Ordered    12  Lidocaine Viscous 2 % Mouth/Throat Solution; Swish and spit 1-2 teaspoons 4 times a    day when necessary mouth and throat pain; Therapy: 86IQG8767 to (Last Rx:02Jbz9657)  Requested for: 15UUA4130 Ordered    13  [employer] Vitamin C 1000 MG TABS; Take 1 tablet daily Recorded   14  All Day Calcium TB24 Recorded   15  Aspirin 81 MG TABS; Therapy: (Recorded:94Cfu2819) to Recorded   16  Cranberry 125 MG Oral Tablet Recorded   17  [employer] Vitamin D CAPS Recorded   18  Daily Value Multivitamin TABS; Take 1 tablet daily Recorded   19  Myrbetriq 25 MG Oral Tablet Extended Release 24 Hour; Therapy: 63EVA6442 to Recorded   20  Premarin 0 625 MG/GM Vaginal Cream; Insert one gram intra-vaginally every day for 2    weeks, then twice weekly Recorded   21  RaNITidine HCl - 150 MG Oral Capsule; TAKE 1 CAPSULE AT BEDTIME Recorded   22  Simvastatin 20 MG Oral Tablet; Therapy: 68GQL4505 to (Last UL:83ZGW2809)  Requested for: 47QQS2855 Ordered   23  Trospium Chloride ER 60 MG Oral Capsule Extended Release 24 Hour;     Therapy: 94MFT1459 to Recorded    Future Appointments    Date/Time Provider Specialty Site   11/08/2017 02:00 PM Ganesh Kwon,  [facility]     Signatures   Electronically signed by : Farnaz Castillo MD; Nov 1 2017  3:55PM EST                       (Author)

## 2018-01-12 NOTE — MISCELLANEOUS
Message  The patient has had falls and is quite confused and disoriented and hallucinates  She has a urological procedure on Wednesday and after that her  would like her to be admitted for further evaluation   The blood work and urinalysis were negative      Signatures   Electronically signed by : Giovanni Brunner MD; Aug 29 2016  5:05PM EST                       (Author)

## 2018-01-12 NOTE — PSYCH
Psych Med Mgmt    Appearance: adequate hygiene and grooming, demonstrated behavior psychomotor retardation and good eye contact  Observed mood: was dysphoric and depressed  Observed mood: affect was flat  Speech: speech soft and garbled   muffled  Thought processes: poverty of thought was observed  Hallucinations: no hallucinations present  Thought Content: no delusions  Abnormal Thoughts: The patient has no suicidal thoughts  Orientation: The patient is oriented to time  Recent and Remote Memory: short term memory impaired and long term memory intact  Attention Span And Concentration: concentration impaired  Insight: Limited insight  Muscle Strength And Tone  non-ambulatory rigid with EPS  The patient is experiencing no localized pain  She reports normal appetite, decreased energy, no weight change and normal number of sleep hours  seen for depression with   She was recently admitted to [facility] for a few days and was taken off of Wellbutrin  She was experiencing vivid visual hallucinations  She did improve and has been back home and seems to be at her baseline  Her muffled and weak voice really bothers her because it affects her communication  Assessment    1  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)    Active Problems    1  Abdominal pain, acute, left lower quadrant (789 04,338 19) (R10 32)   2  Abdominal pain, suprapubic (789 09) (R10 2)   3  Abnormal gait (781 2) (R26 9)   4  Acid reflux disease (530 81) (K21 9)   5  Alopecia (704 00) (L65 9)   6  Anxiety (300 00) (F41 9)   7  Backache (724 5) (M54 9)   8  Chronic constipation (564 00) (K59 09)   9  Dysuria (788 1) (R30 0)   10  Foot Pain (Soft Tissue) (729 5)   11  Groin pain (789 09) (R10 30)   12  Hematuria (599 70) (R31 9)   13  History of urinary tract infection (V13 02) (Z87 440)   14  Hyperglycemia (790 29) (R73 9)   15  Hyperlipidemia (272 4) (E78 5)   16   Hypothyroidism (244 9) (E03 9)   17  Lower back pain (724 2) (M54 5)   18  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)   19  Multiple joint pain (719 49) (M25 50)   20  Osteoporosis (733 00) (M81 0)   21  Parkinson's disease (332 0) (Clifton Chroman)   22  Reactive confusion (298 2) (F44 89)   23  Rectal pain (569 42) (K62 89)   24  Spinal stenosis (724 00) (M48 00)   25  Symptoms involving urinary system (788 99) (R39 9)   26  Tendonitis (726 90) (M77 9)   27  Urinary frequency (788 41) (R35 0)   28  Urinary hesitancy (788 64) (R39 11)   29  Urinary incontinence (788 30) (R32)   30  Urinary retention (788 20) (R33 9)   31  Urinary urgency (788 63) (R39 15)   32  Vertigo (780 4) (R42)    Past Medical History    1  History of Ankle Sprain (845 00)   2  History of Hip pain, unspecified laterality   3  History of bursitis (V13 59) (Z87 39)   4  History of conjunctivitis (V12 49) (Z86 69)   5  History of constipation (V12 79) (Z87 19)   6  History of falling (V15 88) (Z91 81)   7  History of hypothyroidism (V12 29) (Z86 39)   8  History of influenza (V12 09) (Z87 09)   9  History of urinary tract infection (V13 02) (Z87 440)   10  History of Neck pain (723 1) (M54 2)   11  Parkinson's disease (332 0) (G20)   12  History of Urinary Tract Infection (V13 02)    Allergies    1  Gabapentin TABS   2  Erythromycin TABS   3  Levaquin TABS   4  Mirtazapine TABS   5  Paxil TABS   6  Sulfa Drugs   7  Tricyclic Antidepressants   8  Wellbutrin TABS   9  Keflex TABS    Current Meds   1  All Day Calcium TB24 Recorded   2  ALPRAZolam 0 5 MG Oral Tablet; take 4 tablets daily as directed; Therapy: 90MAM6889 to (Evaluate:07Jan2017)  Requested for: 46OUY6003; Last   Rx:54Qak9561 Ordered   3  Aspirin 81 MG TABS; Therapy: (Recorded:18Zmc6335) to Recorded   4  Carbidopa-Levodopa  MG Oral Tablet; one tab twice  a day; Therapy: 21Apr2011 to (Evaluate:24Apr2016)  Requested for: 46OLJ0429; Last   Rx:25Jan2016 Ordered   5   [employer] Stool Softener CAPS Recorded   6  [employer] Vitamin D CAPS Recorded   7  Daily Value Multivitamin TABS; Take 1 tablet daily Recorded   8  DULoxetine HCl - 20 MG Oral Capsule Delayed Release Particles; TAKE 1 CAPSULE   ORALLY TWICE  DAILY (DEPRESSION) (DO NOT CRUSH) (DO NOT OPEN CAPSULE); Therapy: 41QRM8251 to (Evaluate:54Fie0970)  Requested for: 09TPY5058; Last   Rx:74Cqd0849 Ordered   9  Lactulose 10 GM/15ML Oral Solution; TAKE 1 TBSP Twice daily; Therapy: 72WNU4705 to (60 124 37 75)  Requested for: 09NNY3557; Last   Rx:31Mar2016 Ordered   10  Levothyroxine Sodium 75 MCG Oral Tablet; Take 1 tablet daily; Therapy: 36ROU5067 to (Evaluate:30Oct2016)  Requested for: 92Eqc0492; Last    Rx:56Cht2109 Ordered   11  Omeprazole 20 MG Oral Capsule Delayed Release; take 1 capsule daily; Therapy: 81BFX5407 to (Evaluate:42Pqn0994)  Requested for: 01HGD7271; Last    Rx:98Jyo6217 Ordered   12  Pyridium 200 MG Oral Tablet; Take 1 tablet once daily; Therapy: 37WOT1174 to (Last Rx:06Jun2016)  Requested for: 06Jun2016 Ordered   13  Simvastatin 20 MG Oral Tablet; Therapy: 21EQI6703 to (Last VH:11LFJ8109)  Requested for: 79BYX3290 Ordered    Family Psych History  Mother    1  Family history of Acute Myocardial Infarction (V17 3)   2  Family history of diabetes mellitus (V18 0) (Z83 3)  Father    3  Family history of Stroke Syndrome (V17 1)    Social History    · Never A Smoker   · Never Drank Alcohol    End of Encounter Meds    1  Omeprazole 20 MG Oral Capsule Delayed Release; take 1 capsule daily; Therapy: 60YMW4751 to (Evaluate:85Sdz9607)  Requested for: 88DNZ2038; Last   Rx:32Zmu5828 Ordered    2  ALPRAZolam 0 5 MG Oral Tablet; take 4 tablets daily as directed; Therapy: 67WWF7391 to (Evaluate:07Jan2017)  Requested for: 69KXK7377; Last   Rx:03Mjn8815 Ordered    3  Lactulose 10 GM/15ML Oral Solution; TAKE 1 TBSP Twice daily; Therapy: 61ZIV7262 to (60 124 37 75)  Requested for: 85DCU1209; Last   Rx:31Mar2016 Ordered    4  Pyridium 200 MG Oral Tablet; Take 1 tablet once daily; Therapy: 92GLV4130 to (Last Rx:06Jun2016)  Requested for: 06Jun2016 Ordered    5  Levothyroxine Sodium 75 MCG Oral Tablet; Take 1 tablet daily; Therapy: 26MWF1904 to (Evaluate:30Oct2016)  Requested for: 26Apd4418; Last   Rx:57Ifx9209 Ordered    6  DULoxetine HCl - 20 MG Oral Capsule Delayed Release Particles; TAKE 1 CAPSULE   ORALLY TWICE  DAILY (DEPRESSION) (DO NOT CRUSH) (DO NOT OPEN CAPSULE); Therapy: 88ALR3376 to (Evaluate:81Nmq1921)  Requested for: 04SQX7121; Last   Rx:69Yif0836 Ordered    7  Carbidopa-Levodopa  MG Oral Tablet; one tab twice  a day; Therapy: 21Apr2011 to (Evaluate:24Apr2016)  Requested for: 56URX2733; Last   Rx:25Jan2016 Ordered    8  All Day Calcium TB24 Recorded   9  Aspirin 81 MG TABS; Therapy: (Recorded:65Efz2010) to Recorded   10  CVS Stool Softener CAPS Recorded   11  CVS Vitamin D CAPS Recorded   12  Daily Value Multivitamin TABS; Take 1 tablet daily Recorded   13  Simvastatin 20 MG Oral Tablet;     Therapy: 04DST0556 to (Last Rx:87Agr7244)  Requested for: 49HKV6881 Ordered    Future Appointments    Date/Time Provider Specialty Site   09/22/2016 01:00 PM Anastacia Kwon DO [address] [place] [address]   01/04/2017 02:30 PM Hillary Engle MD Psychiatry [address]     Signatures   Electronically signed by : Marco Schmid MD; Sep 20 2016  3:01PM EST                       (Author)

## 2018-01-12 NOTE — MISCELLANEOUS
Message  Message Free Text Note Form: Patient  called on Sunday his wife c/o of urine urgency and frequency and her urine looks cloudy and foul smelling and some mental confusion  pt start taking cipro 500 mg this morning   pt was taking cipro 250 mg daily as prophylaxis except for the last week since she had UTI and was placed on Macrobid 100 mg x 7 days  advice  to cont cipro 500 mg BID x 3 days and then fu in the office if sx worsen   pt to fu with Urology for this recurrent multi organisms resistant UTI        Signatures   Electronically signed by : Marielos Ly MD; May 31 2016  8:08AM EST                       (Author)

## 2018-01-12 NOTE — PSYCH
Psych Med Mgmt    Appearance: demonstrated behavior psychomotor retardation  Observed mood: euthymic  Observed mood: affect was flat  Speech: speech soft   muffled  Thought processes: coherent/organized  Hallucinations: visual hallucinations  Thought Content: no delusions  Abnormal Thoughts: The patient has no suicidal thoughts  Orientation: The patient is oriented to person, place and time  Recent and Remote Memory: short term memory impaired and long term memory intact  Attention Span And Concentration: concentration impaired  Insight: Limited insight  Judgment: Her judgment was intact  Fund of knowledge: Patient displays adequate knowledge of current events  The patient is experiencing no localized pain  Risks, Benefits And Possible Side Effects Of Medications: Risks, benefits, and possible side effects of medications explained to patient and patient verbalizes understanding  She reports normal appetite, decreased energy, no weight change and decrease in number of sleep hours   The patient was seen for continuing care and pharmacotherapy accompanied by her   According to her , her cognitive decline continues to progress  At times she confabulates  Her short-term memory is poor  Her anxiety response well to alprazolam  She also complains of visual hallucinations at times seeing a cat running around  Assessment    1  Depression (311) (F32 9)   2  Backache (724 5) (M54 9)    Plan    1  ALPRAZolam 0 5 MG Oral Tablet; take 4 tablets daily as directed    2  BuPROPion HCl ER (XL) 300 MG Oral Tablet Extended Release 24 Hour; take 1   tablet by mouth daily   3  DULoxetine HCl - 20 MG Oral Capsule Delayed Release Particles; TAKE 1   CAPSULE ORALLY TWICE  DAILY (DEPRESSION) (DO NOT CRUSH) (DO NOT OPEN   CAPSULE)    4   From  Carbidopa-Levodopa  MG Oral Tablet  To Carbidopa-Levodopa    MG Oral Tablet one tab twice  a day    Review of Systems    Constitutional: No fever, no chills, feels well, no tiredness, no recent weight gain or loss  Cardiovascular: no complaints of slow or fast heart rate, no chest pain, no palpitations  Respiratory: no complaints of shortness of breath, no wheezing, no dyspnea on exertion  Gastrointestinal: constipation  Genitourinary: dysuria  Musculoskeletal: no complaints of arthralgia, no myalgias, no limb pain, no joint stiffness  Integumentary: no complaints of skin rash, no itching, no dry skin  Neurological: no complaints of headache, no confusion, no numbness, no dizziness  Active Problems    1  Abdominal pain, suprapubic (789 09) (R10 30)   2  Abnormal gait (781 2) (R26 9)   3  Alopecia (704 00) (L65 9)   4  Anxiety (300 00) (F41 9)   5  Backache (724 5) (M54 9)   6  Chronic constipation (564 00) (K59 00)   7  Depression (311) (F32 9)   8  Esophageal reflux (530 81) (K21 9)   9  Foot Pain (Soft Tissue) (729 5)   10  Groin pain (789 09) (R10 30)   11  Hyperglycemia (790 29) (R73 9)   12  Hyperlipidemia (272 4) (E78 5)   13  Hypothyroidism (244 9) (E03 9)   14  Lower back pain (724 2) (M54 5)   15  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)   16  Multiple joint pain (719 49) (M25 50)   17  Osteoporosis (733 00) (M81 0)   18  Parkinson's disease (332 0) (G20)   19  Pseudomonas infection (041 7) (B96 5)   20  Reactive confusion (298 2) (F44 89)   21  Rectal pain (569 42) (K62 89)   22  Spinal stenosis (724 00) (M48 00)   23  Symptoms involving urinary system (788 99) (R39 9)   24  Tendonitis (726 90) (M77 9)   25  Urinary hesitancy (788 64) (R39 11)   26  Urinary incontinence (788 30) (R32)   27  Urinary retention (788 20) (R33 9)   28  Urinary tract infection (599 0) (N39 0)   29  Vertigo (780 4) (R42)    Past Medical History    1  History of Ankle Sprain (845 00)   2  History of Hip pain, unspecified laterality   3  History of bursitis (V13 59) (Z87 39)   4   History of conjunctivitis (V12 49) (Z86 69)   5  History of constipation (V12 79) (Z87 19)   6  History of falling (V15 88) (Z91 81)   7  History of hematuria (V13 09) (Z87 448)   8  History of influenza (V12 09) (Z87 09)   9  History of Neck pain (723 1) (M54 2)   10  History of Urinary tract infection (599 0) (N39 0)   11  History of Urinary Tract Infection (V13 02)    Allergies    1  Gabapentin TABS   2  Erythromycin TABS   3  Levaquin TABS   4  Mirtazapine TABS   5  Paxil TABS   6  Sulfa Drugs   7  Tricyclic Antidepressants   8  Wellbutrin TABS   9  Keflex TABS    Current Meds   1  All Day Calcium TB24 Recorded   2  ALPRAZolam 0 5 MG Oral Tablet; take 4 tablets daily as directed; Therapy: 18SNV2882 to (Ladi Dueñas)  Requested for: 80Wuu9088; Last   Rx:96Bnp8401 Ordered   3  Aspirin 81 MG Oral Tablet; Therapy: (Recorded:96Gvy4111) to Recorded   4  BuPROPion HCl ER (XL) 300 MG Oral Tablet Extended Release 24 Hour; Therapy: 82LIZ8099 to (Evaluate:03Jan2015) Recorded   5  Carbidopa-Levodopa  MG Oral Tablet; Therapy: 83Prd7858 to (Last Rx:21Apr2011)  Requested for: 80Drz5714 Ordered   6  CVS Stool Softener CAPS Recorded   7  CVS Vitamin D CAPS Recorded   8  Daily Value Multivitamin TABS; Take 1 tablet daily Recorded   9  DULoxetine HCl - 20 MG Oral Capsule Delayed Release Particles; TAKE 1 CAPSULE   ORALLY TWICE  DAILY (DEPRESSION) (DO NOT CRUSH) (DO NOT OPEN CAPSULE); Therapy: 22IFY2001 to (Evaluate:40Lar9120) Recorded   10  Levothyroxine Sodium 75 MCG Oral Tablet; Take 1 tablet daily; Therapy: 80UOM3855 to (Evaluate:12Bqp2645)  Requested for: 18IPX3017; Last    Rx:12Mbj5157 Ordered   11  Methenamine Hippurate 1 GM Oral Tablet; Therapy: 23OIW1848 to (Evaluate:03Jan2015) Recorded   12  Nitrofurantoin Monohyd Macro 100 MG Oral Capsule; TAKE 1 CAPSULE TWICE DAILY    WITH FOOD; Therapy: 94WUG1550 to (Evaluate:25Jan2016)  Requested for: 14ERS1231; Last    Rx:18Jan2016 Ordered   13   Omeprazole 20 MG Oral Capsule Delayed Release; take 1 capsule daily; Therapy: 96BRD7860 to (Evaluate:05Jun2016)  Requested for: 93ZOQ9459; Last    Rx:58Npr1617 Ordered   14  Psyllium POWD Recorded   15  Simvastatin 20 MG Oral Tablet; Therapy: 20WZT5894 to (Last NA:56KMZ2002)  Requested for: 55VGM9653 Ordered    Family Psych History    1  Family history of Acute Myocardial Infarction (V17 3)   2  Family history of diabetes mellitus (V18 0) (Z83 3)    3  Family history of Stroke Syndrome (V17 1)    Social History    · Never A Smoker   · Never Drank Alcohol    End of Encounter Meds    1  ALPRAZolam 0 5 MG Oral Tablet; take 4 tablets daily as directed; Therapy: 11HJO7797 to (Evaluate:24Apr2016)  Requested for: 96CRS0513; Last   Rx:25Jan2016 Ordered    2  BuPROPion HCl ER (XL) 300 MG Oral Tablet Extended Release 24 Hour; take 1 tablet by   mouth daily; Therapy: 94DYH6709 to (Evaluate:19Jan2017)  Requested for: 60LAX6632; Last   Rx:25Jan2016 Ordered   3  DULoxetine HCl - 20 MG Oral Capsule Delayed Release Particles; TAKE 1 CAPSULE   ORALLY TWICE  DAILY (DEPRESSION) (DO NOT CRUSH) (DO NOT OPEN CAPSULE); Therapy: 65ZJY9251 to (Evaluate:19Jan2017)  Requested for: 60APQ7041; Last   Rx:25Jan2016 Ordered    4  Omeprazole 20 MG Oral Capsule Delayed Release; take 1 capsule daily; Therapy: 08UZU1575 to (Evaluate:05Jun2016)  Requested for: 92AHX9456; Last   Rx:48Qeg2135 Ordered    5  Levothyroxine Sodium 75 MCG Oral Tablet; Take 1 tablet daily; Therapy: 60SOX8444 to (Evaluate:66Mpf5439)  Requested for: 89PUW3379; Last   Rx:05Bnu3491 Ordered    6  Carbidopa-Levodopa  MG Oral Tablet; one tab twice  a day; Therapy: 23Vpl7157 to (Evaluate:24Apr2016)  Requested for: 86JDV0965; Last   Rx:25Jan2016 Ordered    7  Nitrofurantoin Monohyd Macro 100 MG Oral Capsule; TAKE 1 CAPSULE TWICE DAILY   WITH FOOD; Therapy: 65KTK8597 to (Evaluate:25Jan2016)  Requested for: 63AKG7075; Last   Rx:18Jan2016 Ordered    8   All Day Calcium TB24 Recorded   9  Aspirin 81 MG Oral Tablet; Therapy: (Recorded:64Cfm8742) to Recorded   10  CVS Stool Softener CAPS Recorded   11  CVS Vitamin D CAPS Recorded   12  Daily Value Multivitamin TABS; Take 1 tablet daily Recorded   13  Methenamine Hippurate 1 GM Oral Tablet; Therapy: 76PFK0513 to (Evaluate:03Jan2015) Recorded   14  Psyllium POWD Recorded   15  Simvastatin 20 MG Oral Tablet; Therapy: 70AOE1115 to (Last QU:25KKE6156)  Requested for: 66ZRR4004 Ordered    Future Appointments    Date/Time Provider Specialty Site   04/12/2016 09:30 AM RENETTA Nava   [address]     Signatures   Electronically signed by : Nasrin Dodson MD; Jan 25 2016  4:25PM EST                       (Author)

## 2018-01-13 NOTE — RESULT NOTES
Verified Results  (1) URINE CULTURE 20Jan2016 05:26PM Bettina Brandt     Test Name Result Flag Reference   CLINICAL REPORT (Report)     Test:        Urine culture  Specimen Type:   Urine  Specimen Date:   1/18/2016 8:57 PM  Result Date:    1/20/2016 5:26 PM  Result Status:   Final result  Resulting Lab:   [facility] [address] [address]            Tel: [phone]                 CULTURE                                       ------------------                                   50,000-59,000 cfu/ml Escherichia coli      SUSCEPTIBILITY                                   ------------------                                                       Escherichia coli  METHOD                 TARYN  -------------------------------------  -------------------------  AMPICILLIN ($$)             <=8 00 ug/ml Susceptible  AZTREONAM ($$$)             <=8 ug/ml   Susceptible  CEFAZOLIN ($)              <=8 00 ug/ml Susceptible  CIPROFLOXACIN ($)            >2 00 ug/ml  Resistant  GENTAMICIN ($$)             <=4 ug/ml   Susceptible  LEVOFLOXACIN ($)            >4 00 ug/ml  Resistant  PIPERACILLIN + TAZOBACTAM ($$$)     <=16 ug/ml  Susceptible  TETRACYCLINE              <=4 ug/ml   Susceptible  TOBRAMYCIN ($)             <=4 ug/ml   Susceptible  TRIMETHOPRIM + SULFAMETHOXAZOLE ($$$)  <=2/38 ug/ml Susceptible

## 2018-01-13 NOTE — PSYCH
Psych Med Mgmt    Appearance: was calm and cooperative, adequate hygiene and grooming and good eye contact  Observed mood: euthymic  Observed mood: affect was flat  Speech: decreased volume, but garbled   Muffled and disarticulate and whispering like  Thought processes: poverty of thought was observed  Hallucinations: visual hallucinations   Are less frequent than before  Thought Content: no delusions  Abnormal Thoughts: The patient has no suicidal thoughts  Orientation: The patient is oriented to time  Recent and Remote Memory: short term memory impaired and long term memory intact  Attention Span And Concentration: concentration impaired  Insight: Poor insight  Judgment: Her judgment was limited  The patient is experiencing no localized pain  She reports normal appetite, decreased energy, no weight change and normal number of sleep hours  seen for depression with  present  She was seen by VNA and determined not to need their services  Also OT had nothing to offer  She had her DBS settings adjusted by her neurologist  Bouts of depression are less frequent and she is more cooperative with her  when he tries to help her  Hallucinations are less frequent   is pleased with her progress  Assessment    1  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)    Plan    1  ALPRAZolam 0 5 MG Oral Tablet; take 4 tablets daily as directed    2  DULoxetine HCl - 20 MG Oral Capsule Delayed Release Particles; TAKE 1   CAPSULE ORALLY TWICE  DAILY (DEPRESSION) (DO NOT CRUSH) (DO NOT OPEN   CAPSULE)    3  Donepezil HCl - 10 MG Oral Tablet; Take 1 tablet by mouth at bedtime    Review of Systems    Constitutional: No fever, no chills, feels well, no tiredness, no recent weight gain or loss  Cardiovascular: no complaints of slow or fast heart rate, no chest pain, no palpitations     Respiratory: no complaints of shortness of breath, no wheezing, no dyspnea on exertion  Gastrointestinal: no complaints of abdominal pain, no constipation, no nausea, no diarrhea, no vomiting  Genitourinary: no complaints of dysuria, no incontinence, no pelvic pain, no urinary frequency  Musculoskeletal: all related to PD  Active Problems    1  Abdominal pain, acute, left lower quadrant (789 04,338 19) (R10 32)   2  Abdominal pain, suprapubic (789 09) (R10 2)   3  Abnormal gait (781 2) (R26 9)   4  Acid reflux disease (530 81) (K21 9)   5  Alopecia (704 00) (L65 9)   6  Anxiety (300 00) (F41 9)   7  Backache (724 5) (M54 9)   8  Chronic constipation (564 00) (K59 09)   9  Dysuria (788 1) (R30 0)   10  Foot Pain (Soft Tissue) (729 5)   11  Groin pain (789 09) (R10 30)   12  Hallucinations (780 1) (R44 3)   13  Hematuria (599 70) (R31 9)   14  History of urinary tract infection (V13 02) (Z87 440)   15  Hyperglycemia (790 29) (R73 9)   16  Hyperlipidemia (272 4) (E78 5)   17  Hypothyroidism (244 9) (E03 9)   18  Leg abrasion (916 0) (S80 819A)   19  Lower back pain (724 2) (M54 5)   20  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)   21  Multiple joint pain (719 49) (M25 50)   22  Osteoporosis (733 00) (M81 0)   23  Parkinson's disease (332 0) (G20)   24  Reactive confusion (298 2) (F44 89)   25  Rectal pain (569 42) (K62 89)   26  Spinal stenosis (724 00) (M48 00)   27  Symptoms involving urinary system (788 99) (R39 9)   28  Tendonitis (726 90) (M77 9)   29  Urinary frequency (788 41) (R35 0)   30  Urinary hesitancy (788 64) (R39 11)   31  Urinary incontinence (788 30) (R32)   32  Urinary retention (788 20) (R33 9)   33  Urinary urgency (788 63) (R39 15)   34  Vertigo (780 4) (R42)    Past Medical History    1  History of Ankle Sprain (845 00)   2  History of Hip pain, unspecified laterality   3  History of bursitis (V13 59) (Z87 39)   4  History of conjunctivitis (V12 49) (Z86 69)   5  History of constipation (V12 79) (Z87 19)   6   History of falling (V15 88) (Z91 81)   7  History of hypothyroidism (V12 29) (Z86 39)   8  History of influenza (V12 09) (Z87 09)   9  History of urinary tract infection (V13 02) (Z87 440)   10  History of Neck pain (723 1) (M54 2)   11  Parkinson's disease (332 0) (G20)   12  History of Urinary Tract Infection (V13 02)    Allergies    1  Gabapentin TABS   2  Erythromycin TABS   3  Levaquin TABS   4  Mirtazapine TABS   5  Paxil TABS   6  Sulfa Drugs   7  Tricyclic Antidepressants   8  Wellbutrin TABS   9  Keflex TABS    Current Meds   1  [facility] Vitamin C 1000 MG TABS; Take 1 tablet daily Recorded   2  All Day Calcium TB24 Recorded   3  ALPRAZolam 0 5 MG Oral Tablet; take 4 tablets daily as directed; Therapy: 68WJM8828 to (Evaluate:07Jan2017)  Requested for: 15TTB2494; Last   Rx:53Kma3332 Ordered   4  Aspirin 81 MG TABS; Therapy: (Recorded:18Ktz2831) to Recorded   5  Carbidopa-Levodopa  MG Oral Tablet; one tab twice  a day; Therapy: 43Efv0854 to (Evaluate:24Apr2016)  Requested for: 09FUM0414; Last   Rx:25Jan2016 Ordered   6  [facility] Stool Softener CAPS Recorded   7  [facility] Vitamin D CAPS Recorded   8  Daily Value Multivitamin TABS; Take 1 tablet daily Recorded   9  Donepezil HCl - 5 MG Oral Tablet; Take 1 tablet daily as directed Recorded   10  DULoxetine HCl - 20 MG Oral Capsule Delayed Release Particles; TAKE 1 CAPSULE    ORALLY TWICE  DAILY (DEPRESSION) (DO NOT CRUSH) (DO NOT OPEN CAPSULE); Therapy: 85FWE7073 to (Evaluate:70Mtp7365)  Requested for: 79YUI5753; Last    Rx:60Hth0592 Ordered   11  Lactulose 10 GM/15ML Oral Solution; TAKE 1 TBSP Twice daily; Therapy: 43AMI4345 to (Maria Esther Melissa)  Requested for: 74WZJ3865; Last    Rx:31Mar2016 Ordered   12  Levothyroxine Sodium 75 MCG Oral Tablet; Take 1 tablet daily; Therapy: 50APZ5805 to (Evaluate:30Oct2016)  Requested for: 06Ato7605; Last    Rx:30Slh1377 Ordered   13  Methenamine Mandelate 1 GM Oral Tablet Recorded   14  Pyridium 200 MG Oral Tablet;  Take 1 tablet once daily; Therapy: 21YYV0613 to (Last Rx:06Jun2016)  Requested for: 06Jun2016 Ordered   15  Ranitidine HCl - 150 MG Oral Capsule; TAKE 1 CAPSULE AT BEDTIME Recorded   16  Simvastatin 20 MG Oral Tablet; Therapy: 52WGO5349 to (Last EZ:93UGG3837)  Requested for: 33KKD2835 Ordered   17  Toviaz 8 MG Oral Tablet Extended Release 24 Hour Recorded    Family Psych History  Mother    1  Family history of Acute Myocardial Infarction (V17 3)   2  Family history of diabetes mellitus (V18 0) (Z83 3)  Father    3  Family history of Stroke Syndrome (V17 1)    Social History    · Never A Smoker   · Never Drank Alcohol    End of Encounter Meds    1  ALPRAZolam 0 5 MG Oral Tablet; take 4 tablets daily as directed; Therapy: 29YGR3483 to (Evaluate:09Apr2017)  Requested for: 36PFE1442; Last   Rx:11Oct2016 Ordered    2  Lactulose 10 GM/15ML Oral Solution; TAKE 1 TBSP Twice daily; Therapy: 65HAZ4268 to ([phone])  Requested for: 04NTT0567; Last   Rx:31Mar2016 Ordered    3  Pyridium 200 MG Oral Tablet; Take 1 tablet once daily; Therapy: 65ZIF9706 to (Last Rx:06Jun2016)  Requested for: 06Jun2016 Ordered    4  Levothyroxine Sodium 75 MCG Oral Tablet; Take 1 tablet daily; Therapy: 30PUX1160 to (Evaluate:30Oct2016)  Requested for: 62Xbu5511; Last   Rx:98Wau1309 Ordered    5  DULoxetine HCl - 20 MG Oral Capsule Delayed Release Particles; TAKE 1 CAPSULE   ORALLY TWICE  DAILY (DEPRESSION) (DO NOT CRUSH) (DO NOT OPEN CAPSULE); Therapy: 90HIU1657 to (Rick Trujillo)  Requested for: 75LLP4537; Last   Rx:11Oct2016 Ordered    6  Carbidopa-Levodopa  MG Oral Tablet; one tab twice  a day; Therapy: 21Apr2011 to (Evaluate:24Apr2016)  Requested for: 46GML6081; Last   Rx:25Jan2016 Ordered   7  Donepezil HCl - 10 MG Oral Tablet; Take 1 tablet by mouth at bedtime  Requested for:   40DHE4024; Last Rx:11Oct2016 Ordered    8  [employer] Vitamin C 1000 MG TABS; Take 1 tablet daily Recorded   9  All Day Calcium TB24 Recorded   10  Aspirin 81 MG TABS; Therapy: (Recorded:79Vdq3349) to Recorded   11  [facility] Stool Softener CAPS Recorded   12  [facility]S Vitamin D CAPS Recorded   13  Daily Value Multivitamin TABS; Take 1 tablet daily Recorded   14  Methenamine Mandelate 1 GM Oral Tablet Recorded   15  Ranitidine HCl - 150 MG Oral Capsule; TAKE 1 CAPSULE AT BEDTIME Recorded   16  Simvastatin 20 MG Oral Tablet; Therapy: 36WHO7797 to (Last IY:64DKY6083)  Requested for: 46CHM4982 Ordered   17   Toviaz 8 MG Oral Tablet Extended Release 24 Hour Recorded    Future Appointments    Date/Time Provider Specialty Site   01/04/2017 02:30 PM Clara Villa MD Psychiatry [address] [place] [address]     Signatures   Electronically signed by : Sonya Aguilar MD; Oct 11 2016  5:02PM EST                       (Author)

## 2018-01-13 NOTE — MISCELLANEOUS
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)   3  Abnormal gait (781 2) (R26 9)   4  Hyperlipidemia (272 4) (E78 5)   5  Hyperglycemia (790 29) (R73 9)   6  Hypothyroidism (244 9) (E03 9)   7  Parkinson's disease (332 0) (G20)   8  Reactive confusion (298 2) (F44 89)   9  Osteoporosis (733 00) (M81 0)   10  Acid reflux disease (530 81) (K21 9)   11  Anxiety (300 00) (F41 9)   12  Leg abrasion (916 0) (S80 819A)   13  Encounter for preventive health examination (V70 0) (Z00 00)    Plan  Abnormal gait, Acid reflux disease, Anxiety, Hallucinations, Hyperglycemia,  Hyperlipidemia, Hypothyroidism, Mood disorder due to a general medical condition,  Osteoporosis, Parkinson's disease, Reactive confusion    · Continue with our present treatment plan ; Status:Complete;   Done: 39XGX2250   Ordered; For:Abnormal gait, Acid reflux disease, Anxiety, Hallucinations, Hyperglycemia, Hyperlipidemia, Hypothyroidism, Mood disorder due to a general medical condition, Osteoporosis, Parkinson's disease, Reactive confusion; Ordered By:Satnam Kwon;   · Eat a low fat and low cholesterol diet ; Status:Complete;   Done: 59VLV6207   Ordered; For:Abnormal gait, Acid reflux disease, Anxiety, Hallucinations, Hyperglycemia, Hyperlipidemia, Hypothyroidism, Mood disorder due to a general medical condition, Osteoporosis, Parkinson's disease, Reactive confusion; Ordered By:Satnam Kwon;   · Follow-up PRN Evaluation and Treatment  Follow-up  Status: Complete  Done:  53Iyq3671   Ordered;  For: Abnormal gait, Acid reflux disease, Anxiety, Hallucinations, Hyperglycemia, Hyperlipidemia, Hypothyroidism, Mood disorder due to a general medical condition, Osteoporosis, Parkinson's disease, Reactive confusion; Ordered By: Milta Severin Performed:  Due: 64KGR9035  Acid reflux disease    · Omeprazole 20 MG Oral Capsule Delayed Release   Rx By: Dameon Garrett; Dispense: 30 Days ; #:30 Capsule; Refill: 5; For: Acid reflux disease; JOSE ANGEL = N; Verified Transmission to [facility] #[id number] Last Updated By: Rodger Wong; 9/22/2016 1:02:48 PM  Health Maintenance    · Fluzone High-Dose 0 5 ML Intramuscular Suspension Prefilled Syringe;  INJECT 0 5  ML Intramuscular; To Be Done: 30EOO1193   For: Health Maintenance; Ordered By:Satnam Kwon; Effective Date:22Sep2016   · Pneumo (Pneumovax); give now; To Be Done: 31UNI8197   For: Health Maintenance; Ordered By:Satnam Kwon; Effective Date:22Sep2016  Leg abrasion    · Td; Given office; To Be Done: 78KJJ1621   For: Leg abrasion; Ordered By:Satnam Kwon; Effective Date:22Sep2016    Discussion/Summary  Discussion Summary:   #1  Tremor/Parkinson's status post hospitalization  Patient follows up with neurology  #2  Mental status change/hallucination/anxiety mood disorder patient follows up with psychiatry  #3  Hypothyroidism, stable continue present therapy  #4  Hyperlipidemia, stable continue present therapy  #5  Osteoporosis, stable continue present therapy  #6  Abnormal gait/patient is wheelchair-bound at the present time  #7  Patient return at scheduled appointment in January, we will see sooner if needed  #8  Health maintenance, patient received a high dose influenza and Pneumovax today  #9  Leg abrasion, tetanus immunization was given  Medication SE Review and Pt Understands Tx: Possible side effects of new medications were reviewed with the patient/guardian today  The treatment plan was reviewed with the patient/guardian  The patient/guardian understands and agrees with the treatment plan      Chief Complaint  Chief Complaint Free Text Note Form: pt here for a hospital f/u to her Parkinsons ie : hallucinations, decreased strength, not able to follow instructions  Pt was admitted 8/31/16 and D/C'd 9/15/16   ak      History of Present Illness  TCM Communication [facility]: The patient is being contacted for follow-up after hospitalization and 9/22/16 AT 1:00 PM Sharla Huitron 25, DO  [facility]  The dates of hospitalization:, date of admission: 08/31/2016, date of discharge: 09/15/2016  Diagnosis: EXTREMITY WEAKNESS  She was discharged to home  She scheduled a follow up appointment  Communication performed and completed by Zeny Baker, 9/15/2016   HPI: Patient comes in for evaluation after hospitalization patient [facility] 8/41/16 discharge date/16/16  Patient went in for tremors visual hallucination mental status change  Patient does follow-up with neurologist and with Dr wagner, psychiatrist  Is pleasantly confused and wheelchair-bound at the present time  Patient's  brought her in today for follow-up also patient will be going for nursing home care while the family is on vacation  Form is completed  In addition patient's  and family wish a DO NOT RESUSCITATE order this was completed for her  Patient's  states she did have a pneumonia vaccine approximate 5 years ago but we will do flu and tetanus today      Review of Systems  Complete-Female:   Constitutional: No fever, no chills, feels well, no tiredness, no recent weight gain or weight loss  Eyes: No complaints of eye pain, no red eyes, no eyesight problems, no discharge, no dry eyes, no itching of eyes  ENT: no complaints of earache, no loss of hearing, no nose bleeds, no nasal discharge, no sore throat, no hoarseness  Cardiovascular: No complaints of slow heart rate, no fast heart rate, no chest pain, no palpitations, no leg claudication, no lower extremity edema  Respiratory: No complaints of shortness of breath, no wheezing, no cough, no SOB on exertion, no orthopnea, no PND  Gastrointestinal: No complaints of abdominal pain, no constipation, no nausea or vomiting, no diarrhea, no bloody stools     Genitourinary: No complaints of dysuria, no incontinence, no pelvic pain, no dysmenorrhea, no vaginal discharge or bleeding  Musculoskeletal: as noted in HPI  Integumentary: Patient has a minor abrasion right anterior tibial area  Neurological: as noted in HPI  Psychiatric: as noted in HPI  Endocrine: No complaints of proptosis, no hot flashes, no muscle weakness, no deepening of the voice, no feelings of weakness  Hematologic/Lymphatic: No complaints of swollen glands, no swollen glands in the neck, does not bleed easily, does not bruise easily  Active Problems     1  Abdominal pain, acute, left lower quadrant (789 04,338 19) (R10 32)   2  Abdominal pain, suprapubic (789 09) (R10 2)   3  Abnormal gait (781 2) (R26 9)   4  Acid reflux disease (530 81) (K21 9)   5  Alopecia (704 00) (L65 9)   6  Anxiety (300 00) (F41 9)   7  Backache (724 5) (M54 9)   8  Chronic constipation (564 00) (K59 09)   9  Dysuria (788 1) (R30 0)   10  Foot Pain (Soft Tissue) (729 5)   11  Groin pain (789 09) (R10 30)   12  Hematuria (599 70) (R31 9)   13  History of urinary tract infection (V13 02) (Z87 440)   14  Hyperglycemia (790 29) (R73 9)   15  Hyperlipidemia (272 4) (E78 5)   16  Hypothyroidism (244 9) (E03 9)   17  Lower back pain (724 2) (M54 5)   18  Multiple joint pain (719 49) (M25 50)   19  Osteoporosis (733 00) (M81 0)   20  Reactive confusion (298 2) (F44 89)   21  Rectal pain (569 42) (K62 89)   22  Spinal stenosis (724 00) (M48 00)   23  Symptoms involving urinary system (788 99) (R39 9)   24  Tendonitis (726 90) (M77 9)   25  Urinary frequency (788 41) (R35 0)   26  Urinary hesitancy (788 64) (R39 11)   27  Urinary incontinence (788 30) (R32)   28  Urinary retention (788 20) (R33 9)   29  Urinary urgency (788 63) (R39 15)   30  Vertigo (780 4) (R42)    Parkinson's disease (332 0) (G20)       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)          Past Medical History     1  History of Ankle Sprain (845 00)   2  History of Hip pain, unspecified laterality   3  History of bursitis (V13 59) (Z87 39)   4  History of conjunctivitis (V12 49) (Z86 69)   5  History of constipation (V12 79) (Z87 19)   6  History of falling (V15 88) (Z91 81)   7  History of hypothyroidism (V12 29) (Z86 39)   8  History of influenza (V12 09) (Z87 09)   9  History of urinary tract infection (V13 02) (Z87 440)   10  History of Neck pain (723 1) (M54 2)   11  Parkinson's disease (332 0) (G20)   12  History of Urinary Tract Infection (V13 02)    Parkinson's disease (332 0) (G20)     - [facility] neuro          Surgical History    1  History of Brain Surgery   2  History of Diagnostic Cystoscopy   3  History of Laminectomy Decompress, Facetectomy, Foraminotomy Lumbar Seg   4  History of Total Hip Replacement    Family History  Mother    1  Family history of Acute Myocardial Infarction (V17 3)   2  Family history of diabetes mellitus (V18 0) (Z83 3)  Father    3  Family history of Stroke Syndrome (V17 1)  Family History Reviewed: The family history was reviewed and updated today  Social History    · Never A Smoker   · Never Drank Alcohol    Current Meds   1  [employer] Vitamin C 1000 MG TABS; Take 1 tablet daily Recorded   2  All Day Calcium TB24 Recorded   3  ALPRAZolam 0 5 MG Oral Tablet; take 4 tablets daily as directed; Therapy: 85NEY7185 to (Evaluate:07Jan2017)  Requested for: 62PLB1535; Last   Rx:96Aik3269 Ordered   4  Aspirin 81 MG TABS; Therapy: (Recorded:81Sbx1124) to Recorded   5  BuPROPion HCl ER (XL) 300 MG Oral Tablet Extended Release 24 Hour; take 1 tablet by   mouth daily; Therapy: 25CWG6921 to (Evaluate:07Jan2017)  Requested for: 02IQN9562; Last   Rx:91Wjc4737 Ordered   6  Carbidopa-Levodopa  MG Oral Tablet; one tab twice  a day; Therapy: 21Apr2011 to (Evaluate:24Apr2016)  Requested for: 35TMM2882; Last   Rx:25Jan2016 Ordered   7  [facility] Stool Softener CAPS Recorded   8  [facility] Vitamin D CAPS Recorded   9  Daily Value Multivitamin TABS; Take 1 tablet daily Recorded   10  Donepezil HCl - 5 MG Oral Tablet; Take 1 tablet daily as directed Recorded   11  DULoxetine HCl - 20 MG Oral Capsule Delayed Release Particles; TAKE 1 CAPSULE    ORALLY TWICE  DAILY (DEPRESSION) (DO NOT CRUSH) (DO NOT OPEN CAPSULE); Therapy: 81HEW5107 to (Evaluate:42Eml6706)  Requested for: 04RBF6827; Last    Rx:11Bob7412 Ordered   12  Lactulose 10 GM/15ML Oral Solution; TAKE 1 TBSP Twice daily; Therapy: 78VYJ1272 to (Yahir Bower)  Requested for: 36WXM3914; Last    Rx:31Mar2016 Ordered   13  Levothyroxine Sodium 75 MCG Oral Tablet; Take 1 tablet daily; Therapy: 32IHU1907 to (Evaluate:30Oct2016)  Requested for: 18Zxi3495; Last    Rx:87Gao9123 Ordered   14  Methenamine Mandelate 1 GM Oral Tablet Recorded   15  Omeprazole 20 MG Oral Capsule Delayed Release; take 1 capsule daily; Therapy: 41OOJ2080 to (Evaluate:64Hbu1208)  Requested for: 20EFL0289; Last    Rx:96Dxr7516 Ordered   16  Pyridium 200 MG Oral Tablet; Take 1 tablet once daily; Therapy: 62SKW2875 to (Last Rx:06Jun2016)  Requested for: 06Jun2016 Ordered   17  Ranitidine HCl - 150 MG Oral Capsule; TAKE 1 CAPSULE AT BEDTIME Recorded   18  Simvastatin 20 MG Oral Tablet; Therapy: 61WDT0643 to (Last OJ:20PKH7690)  Requested for: 06ZHQ7314 Ordered   19  Toviaz 8 MG Oral Tablet Extended Release 24 Hour Recorded    Allergies    1  Gabapentin TABS   2  Erythromycin TABS   3  Levaquin TABS   4  Mirtazapine TABS   5  Paxil TABS   6  Sulfa Drugs   7  Tricyclic Antidepressants   8  Wellbutrin TABS   9  Keflex TABS    Vitals  Signs   Recorded: 58SXD0195 64:35VX   Systolic: 875  Diastolic: 60  Heart Rate: 80  Height: 5 ft 1 in  Weight: 135 lb 6 08 oz  BMI Calculated: 25 58  BSA Calculated: 1 59    Physical Exam    Constitutional   General appearance: No acute distress, well appearing and well nourished  Eyes   Conjunctiva and lids: No swelling, erythema or discharge      Ears, Nose, Mouth, and Throat Mucous membranes are moist    Pulmonary Respiratory effort: No increased work of breathing or signs of respiratory distress  Auscultation of lungs: Clear to auscultation  Cardiovascular   Palpation of heart: Normal PMI, no thrills  Auscultation of heart: Normal rate and rhythm, normal S1 and S2, without murmurs  Examination of extremities for edema and/or varicosities: Normal     Carotid pulses: Normal     Abdomen Soft nontender  Lymphatic   Palpation of lymph nodes in neck: No lymphadenopathy  Musculoskeletal   Gait and station: Abnormal   Wheelchair-bound  Skin Warm and dry  Neurologic Negative facial droop  Psychiatric   Orientation to person, place, and time: Abnormal   Pleasantly confused     Mood and affect: Normal          Future Appointments    Date/Time Provider Specialty Site   10/25/2016 02:50 PM Sesar Barrera MD Psychiatry [facility]   01/04/2017 02:30 PM Sesar Barrera MD Psychiatry [address]     Signatures   Electronically signed by : Gayle Sims DO; Sep 22 2016  1:36PM EST                       (Author)

## 2018-01-13 NOTE — MISCELLANEOUS
Message   Recorded as Task   Date: 11/07/2017 12:35 PM, Created By: Marilu Naqvi   Task Name: Med Renewal Request   Assigned To: [facility]   Regarding Patient: Karl Hope, Status: Active   CommentJadyn Briceño - 07 Nov 2017 12:35 PM     TASK CREATED  Caller: [facility], Pharmacist; Renew Medication  [facility] to make sure it was ok by you for pt to be taking Buspar while also taking Azilect that together can increase blood pressure  [facility] would like [facility] call [facility] and can be reached at [phone]  Thank you   Marilu Naqvi - 08 Nov 2017 10:18 AM     TASK EDITED  Pts  is calling again  He stated that [facility] not [facility] Bus[facility] due to also being on Azilect as well and the two meds together can increase blood pressure  [facility] to comfirm that its ok to dispense med  [phone]  Also her  said they spoke to her Neuro Doctor Dr Rosie Cary and he said it is ok if you would like to prescribe Glycopyrrolate for her drooling due to her Parkinson Disease  Pts  Harriet File can be reached at [phone] if you have any questions  Thank you   I spoke with the patient's   According to him, the neurologist does not have a problem with starting glycopyrrolate  I called in a prescription for 1 mg 3 times daily   Furthermore, the patient has been off of Azilect for years and instructed the pharmacist to fill the prescription For [address]    · Glycopyrrolate 1 MG Oral Tablet; TAKE 1 TABLET 3 TIMES DAILY    Signatures   Electronically signed by : Jony Pemberton MD; Nov 8 2017  5:22PM EST                       (Author)

## 2018-01-14 VITALS
SYSTOLIC BLOOD PRESSURE: 100 MMHG | HEIGHT: 60 IN | BODY MASS INDEX: 30.63 KG/M2 | HEART RATE: 65 BPM | WEIGHT: 156 LBS | DIASTOLIC BLOOD PRESSURE: 70 MMHG

## 2018-01-14 VITALS
HEIGHT: 61 IN | SYSTOLIC BLOOD PRESSURE: 110 MMHG | BODY MASS INDEX: 28.7 KG/M2 | DIASTOLIC BLOOD PRESSURE: 60 MMHG | WEIGHT: 152 LBS | HEART RATE: 80 BPM

## 2018-01-14 VITALS
WEIGHT: 157 LBS | HEART RATE: 88 BPM | SYSTOLIC BLOOD PRESSURE: 132 MMHG | TEMPERATURE: 97.5 F | BODY MASS INDEX: 29.66 KG/M2 | DIASTOLIC BLOOD PRESSURE: 64 MMHG

## 2018-01-14 VITALS
RESPIRATION RATE: 16 BRPM | TEMPERATURE: 97 F | HEART RATE: 84 BPM | WEIGHT: 150 LBS | OXYGEN SATURATION: 97 % | DIASTOLIC BLOOD PRESSURE: 68 MMHG | SYSTOLIC BLOOD PRESSURE: 112 MMHG | HEIGHT: 61 IN | BODY MASS INDEX: 28.32 KG/M2

## 2018-01-14 NOTE — RESULT NOTES
Verified Results  * XR CHEST PA & LATERAL 56Pwb9036 10:45AM Esther Aguirre Order Number: [id number]     Test Name Result Flag Reference   XR CHEST PA & LATERAL (Report)     CHEST      INDICATION: Cough and bronchitis  COMPARISON: 4/4/2017  VIEWS: Frontal and lateral projections     IMAGES: 3     FINDINGS: Bilateral deep brain stimulators again noted with the battery packs overlying the hemithoraces  Cardiomediastinal silhouette appears unremarkable  The lungs are clear  No pneumothorax or pleural effusion  Evidence of posterior spinal fusion of the lumbar spine with several healed right-sided rib fractures stable  No acute osseous abdomen  IMPRESSION:     No active pulmonary disease         Workstation performed: [id number]     Signed by:   Zora Skinner MD   10/3/17

## 2018-01-15 NOTE — PSYCH
Psych Med Mgmt    Appearance: demonstrated behavior psychomotor retardation and good eye contact  Observed mood: depressed and anxious  Observed mood: affect was blunted   able to smile  Speech: speech soft and garbled   muffled and slow  Thought processes: coherent/organized  Thought Content: no delusions  Abnormal Thoughts: The patient has no suicidal thoughts and no homicidal thoughts  Recent and Remote Memory: short term memory impaired and long term memory intact  Attention Span And Concentration: concentration impaired  Insight: Limited insight  Judgment: Her judgment was intact  The patient is experiencing no localized pain  She reports normal appetite, decreased energy, no weight change and normal number of sleep hours  seen for depression accompanied by her   No change in clinical status  Normal sleep and appetite  Receives speech and physical therapy at home  Under treatment for UTI  No psychotic symptoms  She has a number of vague somatic complaints  No behavioral problems  Her  is pleased with her progress both physically and psychiatrically  Assessment    1  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)   2  Anxiety (300 00) (F41 9)    Plan    1  ALPRAZolam 0 5 MG Oral Tablet; take 4 tablets daily as directed    2  DULoxetine HCl - 20 MG Oral Capsule Delayed Release Particles; TAKE 1   CAPSULE ORALLY TWICE  DAILY (DEPRESSION) (DO NOT CRUSH) (DO NOT OPEN   CAPSULE)    3  Donepezil HCl - 10 MG Oral Tablet; Take 1 tablet by mouth at bedtime    Review of Systems    Constitutional: feeling tired  Cardiovascular: no complaints of slow or fast heart rate, no chest pain, no palpitations  Respiratory: no complaints of shortness of breath, no wheezing, no dyspnea on exertion  Gastrointestinal: no complaints of abdominal pain, no constipation, no nausea, no diarrhea, no vomiting     Genitourinary: no complaints of dysuria, no incontinence, no pelvic pain, no urinary frequency  Integumentary: no complaints of skin rash, no itching, no dry skin  Neurological: Excessive drooling  Active Problems    1  Abnormal gait (781 2) (R26 9)   2  Acid reflux disease (530 81) (K21 9)   3  Acute cystitis with hematuria (595 0) (N30 01)   4  Acute UTI (599 0) (N39 0)   5  Alopecia (704 00) (L65 9)   6  Anxiety (300 00) (F41 9)   7  Backache (724 5) (M54 9)   8  Burning with urination (788 1) (R30 0)   9  Dysuria (788 1) (R30 0)   10  Foot Pain (Soft Tissue) (729 5)   11  Groin pain (789 09) (R10 30)   12  Hallucinations (780 1) (R44 3)   13  Hematuria (599 70) (R31 9)   14  History of urinary tract infection (V13 02) (Z87 440)   15  Hyperglycemia (790 29) (R73 9)   16  Hyperlipidemia (272 4) (E78 5)   17  Hypothyroidism (244 9) (E03 9)   18  Leg abrasion (916 0) (S80 819A)   19  Lower back pain (724 2) (M54 5)   20  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)   21  Multiple joint pain (719 49) (M25 50)   22  Osteoporosis (733 00) (M81 0)   23  Parkinson's disease (332 0) (G20)   24  Reactive confusion (298 2) (F44 89)   25  Rectal pain (569 42) (K62 89)   26  Recurrent UTI (599 0) (N39 0)   27  Skin abscess (682 9) (L02 91)   28  Spinal stenosis (724 00) (M48 00)   29  Symptoms involving urinary system (788 99) (R39 9)   30  Tendonitis (726 90) (M77 9)   31  Urinary frequency (788 41) (R35 0)   32  Urinary hesitancy (788 64) (R39 11)   33  Urinary incontinence (788 30) (R32)   34  Urinary retention (788 20) (R33 9)   35  Urinary urgency (788 63) (R39 15)   36  Vertigo (780 4) (R42)    Past Medical History    1  History of Abdominal pain, acute, left lower quadrant (789 04,338 19) (R10 32)   2  History of Abdominal pain, suprapubic (789 09) (R10 2)   3  History of Ankle Sprain (845 00)   4  History of Hip pain, unspecified laterality   5  History of bursitis (V13 59) (Z87 39)   6  History of conjunctivitis (V12 49) (Z86 69)   7   History of constipation (V12 79) (Z87 19)   8  History of falling (V15 88) (Z91 81)   9  History of hypothyroidism (V12 29) (Z86 39)   10  History of influenza (V12 09) (Z87 09)   11  History of urinary tract infection (V13 02) (Z87 440)   12  History of Neck pain (723 1) (M54 2)   13  Parkinson's disease (332 0) (G20)   14  History of Urinary Tract Infection (V13 02)    Allergies    1  Gabapentin TABS   2  Erythromycin TABS   3  Levaquin TABS   4  Mirtazapine TABS   5  Paxil TABS   6  Sulfa Drugs   7  Tricyclic Antidepressants   8  Wellbutrin TABS   9  Keflex TABS    Current Meds   1  [employer] Vitamin C 1000 MG TABS; Take 1 tablet daily Recorded   2  All Day Calcium TB24 Recorded   3  ALPRAZolam 0 5 MG Oral Tablet; take 4 tablets daily as directed; Therapy: 81WBK9052 to (Evaluate:91Gzj0369)  Requested for: 92XTB3628; Last   Rx:43Olp2568 Ordered   4  Aspirin 81 MG TABS; Therapy: (Recorded:19Dor6056) to Recorded   5  Carbidopa-Levodopa  MG Oral Tablet; one tab twice  a day; Therapy: 67Pig9602 to (Evaluate:86Rna7411)  Requested for: 14QWO7522; Last   Rx:25Jan2016 Ordered   6  Ciprofloxacin HCl - 500 MG Oral Tablet; take 1 tablet twice daily until finished; Therapy: 27EKS0919 to (Last Rx:46Idi0268)  Requested for: 03Azp7095 Ordered   7  [employer] Stool Softener CAPS Recorded   8  [employer] Vitamin D CAPS Recorded   9  Daily Value Multivitamin TABS; Take 1 tablet daily Recorded   10  Donepezil HCl - 10 MG Oral Tablet; Take 1 tablet by mouth at bedtime  Requested for:    94ZTV0800; Last Rx:37Zyi1373 Ordered   11  DULoxetine HCl - 20 MG Oral Capsule Delayed Release Particles; TAKE 1 CAPSULE    ORALLY TWICE  DAILY (DEPRESSION) (DO NOT CRUSH) (DO NOT OPEN CAPSULE); Therapy: 83ZIH4655 to (05 12 73 93 30)  Requested for: 53TTI5271; Last    Rx:63Ygt4269 Ordered   12  Lactulose 10 GM/15ML Oral Solution; TAKE 1 TBSP Twice daily;     Therapy: 62UCP6835 to (Bennett Miller)  Requested for: 35BVS5337; Last    Rx:31Mar2016 Ordered   13  Levothyroxine Sodium 75 MCG Oral Tablet; take 1 tablet every day; Therapy: 92XLB4185 to (Evaluate:15Apr2017)  Requested for: 19UBX0838; Last    Rx:17Oct2016 Ordered   14  Methenamine Mandelate 1 GM Oral Tablet Recorded   15  Mupirocin Calcium 2 % External Cream; APPLY TO AFFECTED AREA(S) 4 TIMES DAILY; Therapy: 59BSU2338 to (Last Rx:09Nov2016)  Requested for: 78GJQ4586 Ordered   16  Nitrofurantoin Monohyd Macro 100 MG Oral Capsule; TAKE 1 CAPSULE TWICE DAILY    WITH MEALS; Therapy: 76Lyr3440 to (Last Rx:22Mti7957)  Requested for: 41Vkc1370 Ordered   17  Nystatin-Triamcinolone 726272-2 5 UNIT/GM-% External Cream;    Therapy: 20Oct2016 to (Evaluate:16Nov2016) Recorded   18  Premarin 0 625 MG/GM Vaginal Cream;    Therapy: 78ZDL6931 to (Evaluate:50Lyy4945) Recorded   19  Pyridium 200 MG Oral Tablet; Take 1 tablet once daily; Therapy: 85YJK8239 to (Last Rx:06Jun2016)  Requested for: 06Jun2016 Ordered   20  RaNITidine HCl - 150 MG Oral Capsule; TAKE 1 CAPSULE AT BEDTIME Recorded   21  Simvastatin 20 MG Oral Tablet; Therapy: 02KWD1808 to (Last EO:14ZMC7878)  Requested for: 07HUU4725 Ordered   22  Toviaz 8 MG Oral Tablet Extended Release 24 Hour Recorded    Family Psych History  Mother    1  Family history of Acute Myocardial Infarction (V17 3)   2  Family history of diabetes mellitus (V18 0) (Z83 3)  Father    3  Family history of Stroke Syndrome (V17 1)    Social History    · Never A Smoker   · Never Drank Alcohol    End of Encounter Meds    1  Ciprofloxacin HCl - 500 MG Oral Tablet; take 1 tablet twice daily until finished; Therapy: 12AZL8438 to (Last Rx:87Pyw6788)  Requested for: 30Blr8772 Ordered    2  ALPRAZolam 0 5 MG Oral Tablet; take 4 tablets daily as directed; Therapy: 07ZVK7526 to (Evaluate:68Kmw4373)  Requested for: 49VEU4712; Last   Rx:04Jan2017 Ordered    3  Pyridium 200 MG Oral Tablet; Take 1 tablet once daily;    Therapy: 98ZWU6499 to (Last Rx:06Jun2016)  Requested for: 75PYB8522 Ordered    4  Mupirocin Calcium 2 % External Cream; APPLY TO AFFECTED AREA(S) 4 TIMES DAILY; Therapy: 70JZY5711 to (Last Rx:09Nov2016)  Requested for: 30HOB5255 Ordered    5  Nitrofurantoin Monohyd Macro 100 MG Oral Capsule (Macrobid); TAKE 1 CAPSULE   TWICE DAILY WITH MEALS; Therapy: 49Rmh8439 to (Last Rx:82Eht9720)  Requested for: 25Xcr2306 Ordered    6  Levothyroxine Sodium 75 MCG Oral Tablet; take 1 tablet every day; Therapy: 84MZO9745 to (Evaluate:15Apr2017)  Requested for: 31JFT0273; Last   Rx:17Oct2016 Ordered    7  DULoxetine HCl - 20 MG Oral Capsule Delayed Release Particles; TAKE 1 CAPSULE   ORALLY TWICE  DAILY (DEPRESSION) (DO NOT CRUSH) (DO NOT OPEN CAPSULE); Therapy: 75SBL7488 to ([place])  Requested for: 89RUY2943; Last   Rx:04Jan2017; Status: ACTIVE - Transmit to Pharmacy - Awaiting Verification Ordered    8  Carbidopa-Levodopa  MG Oral Tablet; one tab twice  a day; Therapy: 21Apr2011 to (Evaluate:24Apr2016)  Requested for: 36SEP6487; Last   Rx:25Jan2016 Ordered   9  Donepezil HCl - 10 MG Oral Tablet; Take 1 tablet by mouth at bedtime  Requested for:   10VZP8286; Last Rx:04Jan2017; Status: ACTIVE - Transmit to [facility]   Verification Ordered    10  Lactulose 10 GM/15ML Oral Solution; TAKE 1 TBSP Twice daily; Therapy: 07NKI1394 to (Verner Hatchet)  Requested for: 74PRT9388; Last    Rx:31Mar2016 Ordered    11  [employer] Vitamin C 1000 MG TABS; Take 1 tablet daily Recorded   12  All Day Calcium TB24 Recorded   13  Aspirin 81 MG TABS; Therapy: (Recorded:17Wmh7935) to Recorded   14  [employer] Stool Softener CAPS Recorded   15  [employer] Vitamin D CAPS Recorded   16  Daily Value Multivitamin TABS; Take 1 tablet daily Recorded   17  Methenamine Mandelate 1 GM Oral Tablet Recorded   18  Nystatin-Triamcinolone 165455-4 5 UNIT/GM-% External Cream;    Therapy: 20Oct2016 to (Evaluate:16Nov2016) Recorded   19   Premarin 0 625 MG/GM Vaginal Cream;    Therapy: 42CNS3884 to (Evaluate:06Huk7150) Recorded   20  RaNITidine HCl - 150 MG Oral Capsule; TAKE 1 CAPSULE AT BEDTIME Recorded   21  Simvastatin 20 MG Oral Tablet; Therapy: 02QZQ3207 to (Last EW:10RQN3107)  Requested for: 99UNV8602 Ordered   22   Toviaz 8 MG Oral Tablet Extended Release 24 Hour Recorded    Future Appointments    Date/Time Provider Specialty Site   02/28/2017 02:00 PM Maco Anderson, [address] [address]     Signatures   Electronically signed by : Melyssa Galdamez MD; Jan 4 2017  2:52PM EST                       (Author)

## 2018-01-15 NOTE — PSYCH
Psych Med Mgmt    Appearance: was calm and cooperative and demonstrated behavior psychomotor retardation  Observed mood: depressed and anxious  Observed mood: affect was blunted and affect was constricted  Speech: slowed, but garbled  Thought processes: coherent/organized  Hallucinations: no hallucinations present  Thought Content: no delusions  Abnormal Thoughts: The patient has no suicidal thoughts and no homicidal thoughts  Orientation: The patient is oriented to person, place and time  Recent and Remote Memory: short term memory impaired and long term memory intact  Attention Span And Concentration: concentration impaired  Insight: Limited insight  Judgment: Her judgment was intact  Treatment Recommendations: follow up in 3 months; discussed sleep hygiene with patient  She reports normal appetite, decreased energy, no weight change and decrease in number of sleep hours   Patient presents for follow-up accompanied by her   She reports that she is anxious and depressed lately because she has a uro-gyn procedure tomorrow  Patient states that her speech has been worsening, and she is drooling more  Experiencing some difficulty falling asleep at night because she takes naps during the day  Sometimes has difficulty feeding herself/ cutting food, and requires assistance from   Assessment    1  Anxiety (300 00) (F41 9)   2  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)    Review of Systems    Constitutional: No fever, no chills, feels well, no tiredness, no recent weight gain or loss  Cardiovascular: no complaints of slow or fast heart rate, no chest pain, no palpitations  Respiratory: no complaints of shortness of breath, no wheezing, no dyspnea on exertion  Gastrointestinal: no complaints of abdominal pain, no constipation, no nausea, no diarrhea, no vomiting  Genitourinary: uro-gyn procedure 3/9/17 and urinary frequency  Musculoskeletal: no complaints of arthralgia, no myalgias, no limb pain, no joint stiffness  Neurological: DBS, drooling  Active Problems    1  Abnormal gait (781 2) (R26 9)   2  Acid reflux disease (530 81) (K21 9)   3  Acute cystitis with hematuria (595 0) (N30 01)   4  Allergic rhinitis (477 9) (J30 9)   5  Alopecia (704 00) (L65 9)   6  Anxiety (300 00) (F41 9)   7  Backache (724 5) (M54 9)   8  Hallucinations (780 1) (R44 3)   9  Hematuria (599 70) (R31 9)   10  History of urinary tract infection (V13 02) (Z87 440)   11  Hyperglycemia (790 29) (R73 9)   12  Hyperlipidemia (272 4) (E78 5)   13  Hypothyroidism (244 9) (E03 9)   14  Lower back pain (724 2) (M54 5)   15  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)   16  Multiple joint pain (719 49) (M25 50)   17  Oral thrush (112 0) (B37 0)   18  Osteoporosis (733 00) (M81 0)   19  Parkinson's disease (332 0) (G20)   20  Reactive confusion (298 2) (F44 89)   21  Recurrent UTI (599 0) (N39 0)   22  Spinal stenosis (724 00) (M48 00)   23  Tendonitis (726 90) (M77 9)   24  Urinary frequency (788 41) (R35 0)   25  Urinary hesitancy (788 64) (R39 11)   26  Urinary incontinence (788 30) (R32)   27  Urinary retention (788 20) (R33 9)   28  Urinary urgency (788 63) (R39 15)   29  Vertigo (780 4) (R42)    Past Medical History    1  History of Abdominal pain, acute, left lower quadrant (789 04,338 19) (R10 32)   2  History of Abdominal pain, suprapubic (789 09) (R10 2)   3  History of Acute UTI (599 0) (N39 0)   4  History of Ankle Sprain (845 00)   5  History of Burning with urination (788 1) (R30 0)   6  History of Dysuria (788 1) (R30 0)   7  History of Foot Pain (Soft Tissue) (729 5)   8  History of Groin pain (789 09) (R10 30)   9  History of Hip pain, unspecified laterality   10  History of abscess of skin and subcutaneous tissue (V13 3) (Z87 2)   11  History of bursitis (V13 59) (Z87 39)   12   History of conjunctivitis (V12 49) (Z86 69)   13  History of constipation (V12 79) (Z87 19)   14  History of falling (V15 88) (Z91 81)   15  History of hypothyroidism (V12 29) (Z86 39)   16  History of influenza (V12 09) (Z87 09)   17  History of urinary tract infection (V13 02) (Z87 440)   18  History of Leg abrasion (916 0) (S80 819A)   19  History of Neck pain (723 1) (M54 2)   20  Parkinson's disease (332 0) (G20)   21  History of Rectal pain (569 42) (K62 89)   22  History of Symptoms involving urinary system (788 99) (R39 9)   23  History of Urinary Tract Infection (V13 02)    Allergies    1  Gabapentin TABS   2  Erythromycin TABS   3  Levaquin TABS   4  Mirtazapine TABS   5  Paxil TABS   6  Sulfa Drugs   7  Tricyclic Antidepressants   8  Wellbutrin TABS   9  Keflex TABS    Current Meds   1  [employer] Vitamin C 1000 MG TABS; Take 1 tablet daily Recorded   2  All Day Calcium TB24 Recorded   3  ALPRAZolam 0 5 MG Oral Tablet; take 4 tablets daily as directed; Therapy: 10GHR4311 to (Evaluate:80Jro7883)  Requested for: 79OKX3476; Last   Rx:04Jan2017 Ordered   4  Aspirin 81 MG TABS; Therapy: (Recorded:31Sjm4611) to Recorded   5  Carbidopa-Levodopa  MG Oral Tablet; one tab twice  a day; Therapy: 42Hcf6716 to (Evaluate:99Qsi6701)  Requested for: 50JRZ0245; Last   Rx:25Jan2016 Ordered   6  Cranberry 125 MG Oral Tablet Recorded   7  [employer] Stool Softener CAPS Recorded   8  [employer] Vitamin D CAPS Recorded   9  Daily Value Multivitamin TABS; Take 1 tablet daily Recorded   10  Donepezil HCl - 10 MG Oral Tablet; Take 1 tablet by mouth at bedtime  Requested for:    00LTN4003; Last Rx:04Jan2017 Ordered   11  DULoxetine HCl - 20 MG Oral Capsule Delayed Release Particles; TAKE 1 CAPSULE    ORALLY TWICE  DAILY (DEPRESSION) (DO NOT CRUSH) (DO NOT OPEN CAPSULE); Therapy: 73CWG5462 to ([place])  Requested for: 40KBY5519; Last    Rx:04Jan2017 Ordered   12  Levothyroxine Sodium 75 MCG Oral Tablet; take 1 tablet every day;     Therapy: 05OGA6387 to (Evaluate:15Apr2017)  Requested for: 28CYZ2461; Last    Rx:17Oct2016 Ordered   13  Nystatin 175649 UNIT/ML Mouth/Throat Suspension; PLACE 1ML TO INSIDE OF EACH    CHEEK 4 TIMES DAILY; Therapy: 14AMC9631 to (Last Rx:28Feb2017)  Requested for: 28Feb2017 Ordered   14  Premarin 0 625 MG/GM Vaginal Cream; Insert one gram intra-vaginally every day for 2    weeks, then twice weekly Recorded   15  RaNITidine HCl - 150 MG Oral Capsule; TAKE 1 CAPSULE AT BEDTIME Recorded   16  Simvastatin 20 MG Oral Tablet; Therapy: 35DJC6548 to (Last DW:46OLV3928)  Requested for: 85AMX5737 Ordered    Family Psych History  Mother    1  Family history of Acute Myocardial Infarction (V17 3)   2  Family history of diabetes mellitus (V18 0) (Z83 3)  Father    3  Family history of Stroke Syndrome (V17 1)    Social History    · Never A Smoker   · Never Drank Alcohol    End of Encounter Meds    1  ALPRAZolam 0 5 MG Oral Tablet; take 4 tablets daily as directed; Therapy: 15KDZ8669 to (Evaluate:15Zfq6242)  Requested for: 85PJG6764; Last   Rx:04Jan2017 Ordered    2  Levothyroxine Sodium 75 MCG Oral Tablet; take 1 tablet every day; Therapy: 58VLO7276 to (Evaluate:50Hzs7361)  Requested for: 09QLW2145; Last   Rx:17Oct2016 Ordered    3  DULoxetine HCl - 20 MG Oral Capsule Delayed Release Particles; TAKE 1 CAPSULE   ORALLY TWICE  DAILY (DEPRESSION) (DO NOT CRUSH) (DO NOT OPEN CAPSULE); Therapy: 03KLF3022 to (Evaluate:12Itt9866)  Requested for: 04SQS2884; Last   Rx:04Jan2017 Ordered    4  Nystatin 499360 UNIT/ML Mouth/Throat Suspension; PLACE 1ML TO INSIDE OF EACH   CHEEK 4 TIMES DAILY; Therapy: 92HQC4523 to (Last Rx:28Feb2017)  Requested for: 28Feb2017 Ordered    5  Carbidopa-Levodopa  MG Oral Tablet; one tab twice  a day; Therapy: 44Nuh5966 to (Evaluate:15Tbq7549)  Requested for: 51TLP3633; Last   Rx:25Jan2016 Ordered   6  Donepezil HCl - 10 MG Oral Tablet;  Take 1 tablet by mouth at bedtime  Requested for:   95LGV1266; Last ES:37CLL2696 Ordered    7  [employer] Vitamin C 1000 MG TABS; Take 1 tablet daily Recorded   8  All Day Calcium TB24 Recorded   9  Aspirin 81 MG TABS; Therapy: (Recorded:09Gbc8061) to Recorded   10  Cranberry 125 MG Oral Tablet Recorded   11  [employer] Stool Softener CAPS Recorded   12  [employer] Vitamin D CAPS Recorded   13  Daily Value Multivitamin TABS; Take 1 tablet daily Recorded   14  Premarin 0 625 MG/GM Vaginal Cream; Insert one gram intra-vaginally every day for 2    weeks, then twice weekly Recorded   15  RaNITidine HCl - 150 MG Oral Capsule; TAKE 1 CAPSULE AT BEDTIME Recorded   16  Simvastatin 20 MG Oral Tablet;     Therapy: 15DUG1370 to (Last Rx:94Jbz9079)  Requested for: 44HHO7667 Ordered    Future Appointments    Date/Time Provider Specialty Site   07/11/2017 02:00 PM Phill Obando, [address]     Signatures   Electronically signed by : Leatha Gillespie MD; Mar  8 2017  4:52PM EST                       (Author)

## 2018-01-15 NOTE — CONSULTS
Chief Complaint  pre-op evaluation change battery at [facility] on 11-9-17 EKG and labs done      History of Present Illness  HPI: Patient is having her deep brain stimulator battery changed for her Parkinson's at the nurse Surgical Unit at [facility] by Dr Ronnie Caraballo  Preop blood work from 11/06/2017 a chest x-ray and EKG were reviewed with the patient  Admission blood work from this office from 10/06/2017 was reviewed  Review of Systems    Constitutional: No fever, no chills, feels well, no tiredness, no recent weight gain or weight loss  Eyes: No complaints of eye pain, no red eyes, no eyesight problems, no discharge, no dry eyes, no itching of eyes  ENT: no complaints of earache, no loss of hearing, no nose bleeds, no nasal discharge, no sore throat, no hoarseness  Cardiovascular: No complaints of slow heart rate, no fast heart rate, no chest pain, no palpitations, no leg claudication, no lower extremity edema  Respiratory: No complaints of shortness of breath, no wheezing, no cough, no SOB on exertion, no orthopnea, no PND  Gastrointestinal: No complaints of abdominal pain, no constipation, no nausea or vomiting, no diarrhea, no bloody stools  Genitourinary: No complaints of dysuria, no incontinence, no pelvic pain, no dysmenorrhea, no vaginal discharge or bleeding  Musculoskeletal: No complaints of arthralgias, no myalgias, no joint swelling or stiffness, no limb pain or swelling  Integumentary: No complaints of skin rash or lesions, no itching, no skin wounds, no breast pain or lump  Neurological: as noted in HPI  Psychiatric: Not suicidal, no sleep disturbance, no anxiety or depression, no change in personality, no emotional problems  Endocrine: No complaints of proptosis, no hot flashes, no muscle weakness, no deepening of the voice, no feelings of weakness     Hematologic/Lymphatic: No complaints of swollen glands, no swollen glands in the neck, does not bleed easily, does not bruise easily  Active Problems    1  Abnormal gait (781 2) (R26 9)   2  Acid reflux disease (530 81) (K21 9)   3  Allergic rhinitis (477 9) (J30 9)   4  Backache (724 5) (M54 9)   5  Flu vaccine need (V04 81) (Z23)   6  DYANA (generalized anxiety disorder) (300 02) (F41 1)   7  Hallucinations (780 1) (R44 3)   8  Hematuria (599 70) (R31 9)   9  Hyperglycemia (790 29) (R73 9)   10  Hyperlipidemia (272 4) (E78 5)   11  Hypothyroidism (244 9) (E03 9)   12  Lower back pain (724 2) (M54 5)   13  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)   14  Multiple joint pain (719 49) (M25 50)   15  Neoplasm of uncertain behavior of skin (238 2) (D48 5)   16  Osteoporosis (733 00) (M81 0)   17  Parkinson's disease (332 0) (G20)   18  Positive depression screening (796 4) (Z13 89)   19  Reactive confusion (298 2) (F44 89)   20  Sialorrhea (527 7) (K11 7)   21  Social phobia (300 23) (F40 10)   22  Spinal stenosis (724 00) (M48 00)   23   Urinary incontinence (788 30) (R32)    Past Medical History    · History of Abdominal pain, acute, left lower quadrant (789 04,338 19) (R10 32)   · History of Abdominal pain, suprapubic (789 09) (R10 2)   · History of Acute UTI (599 0) (N39 0)   · History of Ankle Sprain (845 00)   · History of Anxiety (300 00) (F41 9)   · History of Burning with urination (788 1) (R30 0)   · History of Dysuria (788 1) (R30 0)   · History of Foot Pain (Soft Tissue) (729 5)   · History of Groin pain (789 09) (R10 30)   · History of Hip pain, unspecified laterality   · History of abscess of skin and subcutaneous tissue (V13 3) (Z87 2)   · History of bursitis (V13 59) (Z87 39)   · History of conjunctivitis (V12 49) (Z86 69)   · History of constipation (V12 79) (Z87 19)   · History of depression (V11 8) (Z86 59)   · History of esophageal reflux (V12 79) (Z87 19)   · History of falling (V15 88) (Z91 81)   · History of hypothyroidism (V12 29) (Z86 39)   · History of influenza (V12 09) (Z87 09)   · History of low back pain (V13 59) (Z87 39)   · History of thyroid disease (V12 29) (Z86 39)   · History of Leg abrasion (916 0) (S80 819A)   · History of Neck pain (723 1) (M54 2)   · Parkinson's disease (332 0) (G20)   · History of Rectal pain (569 42) (K62 89)   · History of Symptoms involving urinary system (788 99) (R39 9)   · History of Urinary Tract Infection (V13 02)    Surgical History    · History of Brain Surgery   · History of Diagnostic Cystoscopy   · History of Laminectomy Decompress, Facetectomy, Foraminotomy Lumbar Seg   · History of Tonsillectomy   · History of Total Hip Replacement    The surgical history was reviewed and updated today  Family History    · Family history of Acute Myocardial Infarction (V17 3)   · Family history of diabetes mellitus (V18 0) (Z83 3)    · Family history of Stroke Syndrome (V17 1)    · Family history of heart disease (V17 49) (Z82 49)   · Family history of hypertension (V17 49) (Z82 49)   · Family history of thyroid disease (V18 19) (Z83 49)    The family history was reviewed and updated today  Social History    · Denied: History of Drug use   ·    · Never A Smoker   · Never Drank Alcohol  The social history was reviewed and updated today  Current Meds   1  [employer] Vitamin C 1000 MG TABS; Take 1 tablet daily Recorded   2  All Day Calcium TB24 Recorded   3  ALPRAZolam 0 5 MG Oral Tablet; take 4 tablets daily as directed; Therapy: 55PGW9502 to (Evaluate:30Apr2018)  Requested for: 50LPJ4849; Last   Rx:01Nov2017 Ordered   4  Aspirin 81 MG TABS; Therapy: (Recorded:42Hmu4787) to Recorded   5  Azelastine HCl - 0 15 % Nasal Solution; USE 2 SPRAYS IN EACH NOSTRIL ONCE DAILY   AT BEDTIME; Therapy: 08KTG8582 to (Last Rx:04Apr2017)  Requested for: 04Apr2017 Ordered   6  BusPIRone HCl - 15 MG Oral Tablet;  Take one half a tablet twice daily for one week and   then increase to a full tablet twice daily; Therapy: 87VIQ8087 to (Last Rx:01Nov2017)  Requested for: 12HBX5525 Ordered   7  Carbidopa-Levodopa  MG Oral Tablet; one tab twice  a day; Therapy: 37Yue3965 to (Evaluate:24Apr2016)  Requested for: 35IMV5425; Last   Rx:25Jan2016 Ordered   8  Cranberry 125 MG Oral Tablet Recorded   9  [employer] Vitamin D CAPS Recorded   10  Daily Value Multivitamin TABS; Take 1 tablet daily Recorded   11  Donepezil HCl - 10 MG Oral Tablet; Take 1 tablet by mouth at bedtime  Requested for:    71HNH4964; Last Rx:72Izg2451 Ordered   12  Doxycycline Hyclate 100 MG Oral Tablet; TAKE 1 TABLET Twice daily until gone; Therapy: 44IHK6904 to (Evaluate:10Oct2017)  Requested for: 58BKO5569; Last    Rx:25Gcl9566 Ordered   13  DULoxetine HCl - 20 MG Oral Capsule Delayed Release Particles; TAKE 1 CAPSULE    ORALLY TWICE  DAILY (DEPRESSION) (DO NOT CRUSH) (DO NOT OPEN CAPSULE); Therapy: 49YWN3961 to [phone])  Requested for: 28VII6439; Last    Rx:01Nov2017 Ordered   14  Levothyroxine Sodium 75 MCG Oral Tablet; take 1 tablet every day; Therapy: 14KKI3758 to ([phone])  Requested for: 04APU7999; Last    Rx:25Cst4005 Ordered   15  Lidocaine Viscous 2 % Mouth/Throat Solution; Swish and spit 1-2 teaspoons 4 times a    day when necessary mouth and throat pain; Therapy: 62HZH0815 to (Last Rx:06Oct2017)  Requested for: 88OEX2157 Ordered   16  Montelukast Sodium 10 MG Oral Tablet; Take 1 tablet daily; Therapy: 49MQB8941 to (Last Donnita Person)  Requested for: 26Mvb2929 Ordered   17  Myrbetriq 25 MG Oral Tablet Extended Release 24 Hour; Therapy: 32SBY6394 to Recorded   18  Nystatin 361708 UNIT/ML Mouth/Throat Suspension; PLACE 1ML TO INSIDE OF EACH    CHEEK 4 TIMES DAILY; Therapy: 29ZLN0544 to (Last Rx:47Abs9055)  Requested for: 09Xcr0771 Ordered   19  Premarin 0 625 MG/GM Vaginal Cream; Insert one gram intra-vaginally every day for 2    weeks, then twice weekly Recorded   20   Promethazine HCl - 6 25 MG/5ML Oral Syrup; 1 teaspoon every 4 hours and 2 teaspoons    before bed as needed for cough; Therapy: 72GOR0837 to (Last Tabitha Robes)  Requested for: 91TPS8170 Ordered   21  RaNITidine HCl - 150 MG Oral Capsule; TAKE 1 CAPSULE AT BEDTIME Recorded   22  Simvastatin 20 MG Oral Tablet; Therapy: 97FPU0580 to (Last NW:14YTT9487)  Requested for: 87KIA9093 Ordered   23  Trospium Chloride ER 60 MG Oral Capsule Extended Release 24 Hour; Therapy: 90FCV6579 to Recorded    The medication list was reviewed and updated today  Allergies    1  Gabapentin TABS   2  Erythromycin TABS   3  Levaquin TABS   4  Mirtazapine TABS   5  Paxil TABS   6  Sulfa Drugs   7  Tricyclic Antidepressants   8  Wellbutrin TABS   9  Keflex TABS    Vitals  Signs    Heart Rate: 64  Respiration: 16  Systolic: 443  Diastolic: 80  Height: 5 ft   Weight: 156 lb   BMI Calculated: 30 47  BSA Calculated: 1 68    Physical Exam    Constitutional   General appearance: No acute distress, well appearing and well nourished  Eyes   Conjunctiva and lids: No swelling, erythema or discharge  Pupils and irises: Equal, round and reactive to light  Ears, Nose, Mouth, and Throat   External inspection of ears and nose: Normal     Otoscopic examination: Tympanic membranes translucent with normal light reflex  Canals patent without erythema  Oropharynx: Normal with no erythema, edema, exudate or lesions  Pulmonary   Respiratory effort: No increased work of breathing or signs of respiratory distress  Auscultation of lungs: Clear to auscultation  Cardiovascular   Palpation of heart: Normal PMI, no thrills  Auscultation of heart: Normal rate and rhythm, normal S1 and S2, without murmurs  Abdomen Soft nontender positive bowel sounds  Lymphatic   Palpation of lymph nodes in neck: No lymphadenopathy  Musculoskeletal   Gait and station: Abnormal   Wheelchair-bound  Skin Warm dry  Neurologic Positive parkinsonian face     Psychiatric   Mood and affect: Normal        Results/Data  PHQ-9 Adult Depression Screening 99RWQ7866 12:37PM Ganesh Kwon     Test Name Result Flag Reference   PHQ-9 Adult Depression Score 19     Over the last two weeks, how often have you been bothered by any of the following problems? Little interest or pleasure in doing things: Nearly every day - 3  Feeling down, depressed, or hopeless: Nearly every day - 3  Trouble falling or staying asleep, or sleeping too much: Several days - 1  Feeling tired or having little energy: More than half the days - 2  Poor appetite or over eating: More than half the days - 2  Feeling bad about yourself - or that you are a failure or have let yourself or your family down: More than half the days - 2  Trouble concentrating on things, such as reading the newspaper or watching television: Nearly every day - 3  Moving or speaking so slowly that other people could have noticed  Or the opposite -  being so fidgety or restless that you have been moving around a lot more than usual: Nearly every day - 3  Thoughts that you would be better off dead, or of hurting yourself in some way: Not at all - 0   PHQ-9 Adult Depression Screening Positive     PHQ-9 Difficulty Level Very difficult     PHQ-9 Severity      Moderately Severe Depression     No acute ischemia  Rhythm and rate: normal sinus rhythm  P-waves: TN interval is normal, but the P wave is normal  Q-waves are present in lead(s) III, AVF  The findings are consistent with a myocardial infarction of the inferior wall  ST segment: the ST segments are normal    T waves: normal    Comparison to prior ECGs: Compared to EKG 01/16/2013, Q-waves are new   there was an interval change in the ECG  Assessment    1  Abnormal EKG (794 31) (R94 31)   2  Preoperative clearance (V72 84) (Z01 818)   3  Encounter for preventive health examination (V70 0) (Z00 00)   4  Parkinson's disease (332 0) (G20)   · [facility] neuro, Dr Di Davidson   5   Positive depression screening (796 4) (Z13 89)   · Follows with Psychiatry, Dr Suzy Cole   6  Hypothyroidism (244 9) (E03 9)   7  Hyperlipidemia (272 4) (E78 5)   8  Hyperglycemia (790 29) (R73 9)   9  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)   10  Osteoporosis (733 00) (M81 0)    Plan  Abnormal EKG, Hyperglycemia, Hyperlipidemia, Hypothyroidism, Mood disorder due to a  general medical condition, Osteoporosis, Parkinson's disease, Positive depression  screening, Preoperative clearance    · Continue with our present treatment plan ; Status:Complete;   Done: 87VGR7520   · Follow-up visit in 6 months Evaluation and Treatment  Follow-up  Status: Hold For -  Scheduling  Requested for: 12RON1951   · (1) CBC/ PLT (NO DIFF); Status:Active; Requested for:07May2018;    · (1) COMPREHENSIVE METABOLIC PANEL; Status:Active; Requested for:07May2018;    · (1) HEMOGLOBIN A1C; Status:Active; Requested for:07May2018;    · (1) LIPID PANEL, FASTING; Status:Active; Requested for:07May2018;    · (1) TSH WITH FT4 REFLEX; Status:Active; Requested for:07May2018;    · (1) URINALYSIS (will reflex a microscopy if leukocytes, occult blood, protein or nitrites are  not within normal limits); Status:Active; Requested for:07May2018;    · (1) VITAMIN D 25-HYDROXY; Status:Active; Requested for:07May2018; Abnormal EKG, Parkinson's disease, Preoperative clearance    · 3 Pranav Zayas MD, [facility] (Cardiology) Co-Management  *  Status: Hold For - Scheduling   Requested for: 35JKG3924  are Referring to a non- Preferred Provider : Established Patient  Care Summary provided  : Yes    Discussion/Summary  Surgical Clearance: She is at a MODERATE risk from a cardiovascular standpoint at this time without any additional cardiac testing  Reevaluation needed, if she should present with symptoms prior to surgery/procedure  Comments:   Addendum 11/08/2017 cardiac clearance was given by Cardiology, Dr Sophie Chrery from the [address] [place] [address] group at [facility]    Attachments: Resting EKG is abnormal unchanged from previous new Q-waves in 3 and AVS with probable old inferior wall  1  Preoperative clearance, PTT, PT, BMP, CBC, chest x-ray, and EKG were reviewed preop clearance is held at the present time patient has a new change in EKG needs cardiac clearance before medical clearance can be given  Addendum 11/08/2017 Dr Dung Bella from [facility] group gave cardiac clearance patient may proceed with upcoming surgery  2  Parkinson's for battery change of deep brain stimulator on 11/09/2017 at Neurosurgery Department at [facility] by Dr Kat Segovia  3  Abnormal EKG patient does see Cardiology  Patient's EKG from [facility] showed Q-waves however patient stated this was done seated  EKG was completed in office and Q-waves are present 3 in AVF possible age-indeterminate inferior wall MI  EKG was repeated in this office and still shows Q in 3 and AVF possible age-indeterminate inferior wall MI new since 2013 will refer  Addendum 11/08/2017  According to Dr Dung Bella no real change from their last EKG patient may proceed with upcoming surgery  4  Hypothyroidism, stable continue present therapy  5  Hyperlipidemia, stable continue present therapy  6  Positive depression screen/mood disorder patient should follow up with Psychiatry  7  Osteoporosis patient has declined any DEXA scan to treatment  8  Hyperglycemia, diet-controlled  9  Formal follow-up in 6 months for office visit blood work, sooner if needed  The treatment plan was reviewed with the patient/guardian  The patient/guardian understands and agrees with the treatment plan     Self Referrals: No      End of Encounter Meds    1  Azelastine HCl - 0 15 % Nasal Solution (Astepro); USE 2 SPRAYS IN EACH NOSTRIL   ONCE DAILY AT BEDTIME; Therapy: 56OKL0858 to (Last Rx:04Apr2017)  Requested for: 04Apr2017 Ordered   2  Montelukast Sodium 10 MG Oral Tablet (Singulair); Take 1 tablet daily;    Therapy: 32NZO6575 to (Last Jaydea Daring)  Requested for: 92Qen8475 Ordered    3  BusPIRone HCl - 15 MG Oral Tablet; Take one half a tablet twice daily for one week and   then increase to a full tablet twice daily; Therapy: 36WPZ3721 to (Last Rx:01Nov2017)  Requested for: 06NLO3988 Ordered    4  Levothyroxine Sodium 75 MCG Oral Tablet; take 1 tablet every day; Therapy: 39HWB1742 to (Evaluate:07Jan2018)  Requested for: 34MKS1245; Last   Rx:84Ifu9972 Ordered    5  DULoxetine HCl - 20 MG Oral Capsule Delayed Release Particles; TAKE 1 CAPSULE   ORALLY TWICE  DAILY (DEPRESSION) (DO NOT CRUSH) (DO NOT OPEN CAPSULE); Therapy: 63ICP4446 to [phone] 12 [phone])  Requested for: 95NCG7944; Last   Rx:01Nov2017 Ordered    6  Carbidopa-Levodopa  MG Oral Tablet; one tab twice  a day; Therapy: 77Cil9034 to (Evaluate:24Apr2016)  Requested for: 53PQV7428; Last   Rx:25Jan2016 Ordered   7  Donepezil HCl - 10 MG Oral Tablet; Take 1 tablet by mouth at bedtime  Requested for:   61MNT4318; Last Rx:49Fzl1460 Ordered    8  ALPRAZolam 0 5 MG Oral Tablet; take 4 tablets daily as directed; Therapy: 00XKO8297 to (Evaluate:30Apr2018)  Requested for: 58ADE3110; Last   Rx:01Nov2017 Ordered    9  Promethazine HCl - 6 25 MG/5ML Oral Syrup; 1 teaspoon every 4 hours and 2 teaspoons   before bed as needed for cough; Therapy: 35ABH5775 to (Last Lera Daring)  Requested for: 85UWS2828 Ordered    10  Doxycycline Hyclate 100 MG Oral Tablet; TAKE 1 TABLET Twice daily until gone; Therapy: 14IWQ2116 to (Evaluate:10Oct2017)  Requested for: 21CIC8410; Last    Rx:54Kms6653 Ordered    11  Nystatin 797631 UNIT/ML Mouth/Throat Suspension; PLACE 1ML TO INSIDE OF EACH    CHEEK 4 TIMES DAILY; Therapy: 03LRG3882 to (Last Rx:42Cti4021)  Requested for: 78Ons3859 Ordered    12  Lidocaine Viscous 2 % Mouth/Throat Solution; Swish and spit 1-2 teaspoons 4 times a    day when necessary mouth and throat pain;     Therapy: 94RAB8494 to (Last Rx:06Oct2017) Requested for: 17JVD8949 Ordered    13  [employer] Vitamin C 1000 MG TABS; Take 1 tablet daily Recorded   14  All Day Calcium TB24 Recorded   15  Aspirin 81 MG TABS; Therapy: (Recorded:98Txr4792) to Recorded   16  Cranberry 125 MG Oral Tablet Recorded   17  [employer] Vitamin D CAPS Recorded   18  Daily Value Multivitamin TABS; Take 1 tablet daily Recorded   19  Myrbetriq 25 MG Oral Tablet Extended Release 24 Hour; Therapy: 63DKC3010 to Recorded   20  Premarin 0 625 MG/GM Vaginal Cream; Insert one gram intra-vaginally every day for 2    weeks, then twice weekly Recorded   21  RaNITidine HCl - 150 MG Oral Capsule; TAKE 1 CAPSULE AT BEDTIME Recorded   22  Simvastatin 20 MG Oral Tablet; Therapy: 75GAG5924 to (Last AX:17NFM1869)  Requested for: 76YVI0960 Ordered   23  Trospium Chloride ER 60 MG Oral Capsule Extended Release 24 Hour;     Therapy: 57FZL4320 to Recorded    Signatures   Electronically signed by : Linden Bauer DO; Nov 8 2017  9:54AM EST                       (Author)

## 2018-01-17 NOTE — PROGRESS NOTES
Chief Complaint  Pt's  dropped off urine specimen  States Pt has abdominal pressure and burining with urination despite being on Cipro currently  She still has 2 more days left on Cipro  Per DM specimen sent for culture   notified and we will contact him when results are back  --bb      Active Problems    1  Abnormal gait (781 2) (R26 9)   2  Acid reflux disease (530 81) (K21 9)   3  Acute cystitis with hematuria (595 0) (N30 01)   4  Acute UTI (599 0) (N39 0)   5  Alopecia (704 00) (L65 9)   6  Anxiety (300 00) (F41 9)   7  Backache (724 5) (M54 9)   8  Burning with urination (788 1) (R30 0)   9  Dysuria (788 1) (R30 0)   10  Foot Pain (Soft Tissue) (729 5)   11  Groin pain (789 09) (R10 30)   12  Hallucinations (780 1) (R44 3)   13  Hematuria (599 70) (R31 9)   14  History of urinary tract infection (V13 02) (Z87 440)   15  Hyperglycemia (790 29) (R73 9)   16  Hyperlipidemia (272 4) (E78 5)   17  Hypothyroidism (244 9) (E03 9)   18  Leg abrasion (916 0) (S80 819A)   19  Lower back pain (724 2) (M54 5)   20  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)   21  Multiple joint pain (719 49) (M25 50)   22  Osteoporosis (733 00) (M81 0)   23  Parkinson's disease (332 0) (G20)   24  Reactive confusion (298 2) (F44 89)   25  Rectal pain (569 42) (K62 89)   26  Recurrent UTI (599 0) (N39 0)   27  Skin abscess (682 9) (L02 91)   28  Spinal stenosis (724 00) (M48 00)   29  Symptoms involving urinary system (788 99) (R39 9)   30  Tendonitis (726 90) (M77 9)   31  Urinary frequency (788 41) (R35 0)   32  Urinary hesitancy (788 64) (R39 11)   33  Urinary incontinence (788 30) (R32)   34  Urinary retention (788 20) (R33 9)   35  Urinary urgency (788 63) (R39 15)   36  Vertigo (780 4) (R42)    Current Meds   1  [facility] 1000 MG TABS; Take 1 tablet daily Recorded   2  All Day Calcium TB24 Recorded   3  ALPRAZolam 0 5 MG Oral Tablet; take 4 tablets daily as directed;    Therapy: 35IWV8615 to (Evaluate:09Apr2017)  Requested for: 96NKR1757; Last   Rx:11Oct2016 Ordered   4  Aspirin 81 MG TABS; Therapy: (Recorded:88Fpk1364) to Recorded   5  Carbidopa-Levodopa  MG Oral Tablet; one tab twice  a day; Therapy: 91Hag8534 to (Evaluate:76Ild4577)  Requested for: 30AQQ9232; Last   Rx:36Pfe8326 Ordered   6  Ciprofloxacin HCl - 500 MG Oral Tablet; take 1 tablet twice daily until finished; Therapy: 26IVL3848 to (Last Rx:37Tta1453)  Requested for: 31Czo3861 Ordered   7  [employer]S Stool Softener CAPS Recorded   8  [employer] Vitamin D CAPS Recorded   9  Daily Value Multivitamin TABS; Take 1 tablet daily Recorded   10  Donepezil HCl - 10 MG Oral Tablet; Take 1 tablet by mouth at bedtime  Requested for:    37DBP0988; Last Rx:11Oct2016 Ordered   11  DULoxetine HCl - 20 MG Oral Capsule Delayed Release Particles; TAKE 1 CAPSULE    ORALLY TWICE  DAILY (DEPRESSION) (DO NOT CRUSH) (DO NOT OPEN CAPSULE); Therapy: 23FCM9458 to (Georg Ormond)  Requested for: 58XJA4301; Last    Rx:11Oct2016 Ordered   12  Lactulose 10 GM/15ML Oral Solution; TAKE 1 TBSP Twice daily; Therapy: 04IPF3428 to (Sven Marie)  Requested for: 80RDE9657; Last    Rx:31Mar2016 Ordered   13  Levothyroxine Sodium 75 MCG Oral Tablet; take 1 tablet every day; Therapy: 67BBF6233 to (Evaluate:15Apr2017)  Requested for: 69JWD2940; Last    Rx:77Wja5548 Ordered   14  Methenamine Mandelate 1 GM Oral Tablet Recorded   15  Mupirocin Calcium 2 % External Cream; APPLY TO AFFECTED AREA(S) 4 TIMES    DAILY; Therapy: 84PZE3603 to (Last Rx:09Nov2016)  Requested for: 10MDJ6299 Ordered   16  Nystatin-Triamcinolone 399561-3 0 UNIT/GM-% External Cream;    Therapy: 20Oct2016 to (Evaluate:16Nov2016) Recorded   17  Premarin 0 625 MG/GM Vaginal Cream;    Therapy: 64EQJ7771 to (Evaluate:56Ysv3649) Recorded   18  Pyridium 200 MG Oral Tablet; Take 1 tablet once daily; Therapy: 18HMC4715 to (Last Rx:06Jun2016)  Requested for: 06Jun2016 Ordered   19  RaNITidine HCl - 150 MG Oral Capsule; TAKE 1 CAPSULE AT BEDTIME Recorded   20  Simvastatin 20 MG Oral Tablet; Therapy: 84INB4055 to (Last UM:65NBU6819)  Requested for: 22KEI9361 Ordered   21  Toviaz 8 MG Oral Tablet Extended Release 24 Hour Recorded    Allergies    1  Gabapentin TABS   2  Erythromycin TABS   3  Levaquin TABS   4  Mirtazapine TABS   5  Paxil TABS   6  Sulfa Drugs   7  Tricyclic Antidepressants   8  Wellbutrin TABS   9  Keflex TABS    Assessment    1  Burning with urination (788 1) (R30 0)    Plan  Burning with urination, Dysuria, Symptoms involving urinary system    · (1) URINE CULTURE; Source:Urine, Clean Catch; Status: In Progress - Specimen/Data  Collected,Retrospective Authorization;   Done:  In [employer]    Date/Time Provider Specialty Site   02/28/2017 11:00 AM Daniel Rosario MD [address] [place] [address]   01/04/2017 02:30 PM Darren Olvera MD Psychiatry ST [address] [place] [address]     Signatures   Electronically signed by : Zeke Mayen, ; Dec 14 2016  1:05PM EST                       (Author)    Electronically signed by : Anthony Le MD; Dec 14 2016  2:47PM EST                       (Author)

## 2018-01-17 NOTE — RESULT NOTES
Message   pt urine culture grew E coli that is resistant to the Cipro she was taking    if pt sx persist pt need to fu with urology and need to be treated with Macrobid at the mean time      Verified Results  (1) URINE CULTURE [id number] 12:56PM Scott Burlesno    Order Number: [id number]     Test Name Result Flag Reference   CLINICAL REPORT (Report)     Test:        Urine culture  Specimen Type:   Urine  Specimen Date:   12/14/2016 12:56 PM  Result Date:    12/16/2016 4:14 PM  Result Status:   Final result  Resulting Lab:   [facility] [address] [facility] [address]            Tel: [phone]      CULTURE                                       ------------------                                   50,000-59,000 cfu/ml Escherichia coli      SUSCEPTIBILITY                                   ------------------                                                       Escherichia coli  METHOD                 TARYN  -------------------------------------  -------------------------  AMPICILLIN ($$)             <=8 00 ug/ml Susceptible  AZTREONAM ($$$)             <=8 ug/ml   Susceptible  CEFAZOLIN ($)              <=8 00 ug/ml Susceptible  CIPROFLOXACIN ($)            >2 00 ug/ml  Resistant  GENTAMICIN ($$)             <=4 ug/ml   Susceptible  LEVOFLOXACIN ($)            >4 00 ug/ml  Resistant  NITROFURANTOIN             <=32 ug/ml  Susceptible  PIPERACILLIN + TAZOBACTAM ($$$)     <=16 ug/ml  Susceptible  TETRACYCLINE              <=4 ug/ml   Susceptible  TOBRAMYCIN ($)             <=4 ug/ml   Susceptible  TRIMETHOPRIM + SULFAMETHOXAZOLE ($$$)  <=2/38 ug/ml Susceptible

## 2018-01-17 NOTE — PSYCH
Psych Med Mgmt    Appearance: adequate hygiene and grooming, demonstrated behavior psychomotor retardation and good eye contact  Observed mood: was dysphoric and anxious  Observed mood: affect was flat  Speech: monotonous, but speech soft, dysarthric and garbled  Thought processes: coherent/organized  Hallucinations: uncertain hallucinations  Thought Content: no delusions  Abnormal Thoughts: The patient has no suicidal thoughts and no homicidal thoughts  Orientation: The patient is oriented to person, place and time  Recent and Remote Memory: short term memory impaired and long term memory intact  Attention Span And Concentration: concentration intact  Insight: Limited insight  Judgment: Her judgment was intact  Muscle Strength And Tone  rigid with cogwheeling  Language: no difficulty naming common objects and no difficulty repeating a phrase  Fund of knowledge: Patient displays adequate fund of knowledge regarding vocabulary  The patient is experiencing no localized pain  Treatment Recommendations: same meds  RTO in 3 months  She reports normal appetite, normal energy level, no weight change and normal number of sleep hours  seen with  for mood disorder  No change in clinical status  according to her , she seems to decline slowly  she is anxious  Assessment    1  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)   2  Parkinson's disease (332 0) (G20)    Plan    1  ALPRAZolam 0 5 MG Oral Tablet; take 4 tablets daily as directed    2  DULoxetine HCl - 20 MG Oral Capsule Delayed Release Particles; TAKE 1   CAPSULE ORALLY TWICE  DAILY (DEPRESSION) (DO NOT CRUSH) (DO NOT OPEN   CAPSULE)    3  Donepezil HCl - 10 MG Oral Tablet; Take 1 tablet by mouth at bedtime    Review of Systems    Constitutional: No fever, no chills, feels well, no tiredness, no recent weight gain or loss     Cardiovascular: no complaints of slow or fast heart rate, no chest pain, no palpitations  Respiratory: no complaints of shortness of breath, no wheezing, no dyspnea on exertion  Gastrointestinal: no complaints of abdominal pain, no constipation, no nausea, no diarrhea, no vomiting  Active Problems    1  Abnormal gait (781 2) (R26 9)   2  Acid reflux disease (530 81) (K21 9)   3  Acute cystitis with hematuria (595 0) (N30 01)   4  Allergic rhinitis (477 9) (J30 9)   5  Alopecia (704 00) (L65 9)   6  Anxiety (300 00) (F41 9)   7  Backache (724 5) (M54 9)   8  Cough (786 2) (R05)   9  Hallucinations (780 1) (R44 3)   10  Hematuria (599 70) (R31 9)   11  History of urinary tract infection (V13 02) (Z87 440)   12  Hyperglycemia (790 29) (R73 9)   13  Hyperlipidemia (272 4) (E78 5)   14  Hypothyroidism (244 9) (E03 9)   15  Lower back pain (724 2) (M54 5)   16  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)   17  Multiple joint pain (719 49) (M25 50)   18  Neoplasm of uncertain behavior of skin (238 2) (D48 5)   19  Oral thrush (112 0) (B37 0)   20  Osteoporosis (733 00) (M81 0)   21  Parkinson's disease (332 0) (Jaret Beagle)   22  Reactive confusion (298 2) (F44 89)   23  Recurrent UTI (599 0) (N39 0)   24  Spinal stenosis (724 00) (M48 00)   25  Tendonitis (726 90) (M77 9)   26  Urinary frequency (788 41) (R35 0)   27  Urinary hesitancy (788 64) (R39 11)   28  Urinary incontinence (788 30) (R32)   29  Urinary retention (788 20) (R33 9)   30  Urinary urgency (788 63) (R39 15)   31  Vertigo (780 4) (R42)    Past Medical History    1  History of Abdominal pain, acute, left lower quadrant (789 04,338 19) (R10 32)   2  History of Abdominal pain, suprapubic (789 09) (R10 2)   3  History of Acute UTI (599 0) (N39 0)   4  History of Ankle Sprain (845 00)   5  History of Burning with urination (788 1) (R30 0)   6  History of Dysuria (788 1) (R30 0)   7  History of Foot Pain (Soft Tissue) (729 5)   8  History of Groin pain (789 09) (R10 30)   9   History of Hip pain, unspecified laterality   10  History of abscess of skin and subcutaneous tissue (V13 3) (Z87 2)   11  History of bursitis (V13 59) (Z87 39)   12  History of conjunctivitis (V12 49) (Z86 69)   13  History of constipation (V12 79) (Z87 19)   14  History of falling (V15 88) (Z91 81)   15  History of hypothyroidism (V12 29) (Z86 39)   16  History of influenza (V12 09) (Z87 09)   17  History of urinary tract infection (V13 02) (Z87 440)   18  History of Leg abrasion (916 0) (S80 819A)   19  History of Neck pain (723 1) (M54 2)   20  Parkinson's disease (332 0) (G20)   21  History of Rectal pain (569 42) (K62 89)   22  History of Symptoms involving urinary system (788 99) (R39 9)   23  History of Urinary Tract Infection (V13 02)    Allergies    1  Gabapentin TABS   2  Erythromycin TABS   3  Levaquin TABS   4  Mirtazapine TABS   5  Paxil TABS   6  Sulfa Drugs   7  Tricyclic Antidepressants   8  Wellbutrin TABS   9  Keflex TABS    Current Meds   1  [employer] Vitamin C 1000 MG TABS; Take 1 tablet daily Recorded   2  All Day Calcium TB24 Recorded   3  ALPRAZolam 0 5 MG Oral Tablet; take 4 tablets daily as directed; Therapy: 10PUC4106 to (Evaluate:24Rfh0918)  Requested for: 88CXW4767; Last   Rx:04Jan2017 Ordered   4  Aspirin 81 MG TABS; Therapy: (Recorded:45Ish5349) to Recorded   5  Azelastine HCl - 0 15 % Nasal Solution; USE 2 SPRAYS IN EACH NOSTRIL ONCE DAILY   AT BEDTIME; Therapy: 49MCH9868 to (Last Rx:04Apr2017)  Requested for: 04Apr2017 Ordered   6  Carbidopa-Levodopa  MG Oral Tablet; one tab twice  a day; Therapy: 21Apr2011 to (Evaluate:24Apr2016)  Requested for: 75CEE4942; Last   Rx:25Jan2016 Ordered   7  Cranberry 125 MG Oral Tablet Recorded   8  [employer] Stool Softener CAPS Recorded   9  [employer] Vitamin D CAPS Recorded   10  Daily Value Multivitamin TABS; Take 1 tablet daily Recorded   11  Donepezil HCl - 10 MG Oral Tablet;  Take 1 tablet by mouth at bedtime  Requested for:    77TXN6644; Last QO:99QEI6307 Ordered   12  DULoxetine HCl - 20 MG Oral Capsule Delayed Release Particles; TAKE 1 CAPSULE    ORALLY TWICE  DAILY (DEPRESSION) (DO NOT CRUSH) (DO NOT OPEN CAPSULE); Therapy: 66MGC7303 to (Parmjit Ro)  Requested for: 39GSL7176; Last    Rx:04Jan2017 Ordered   13  Levothyroxine Sodium 75 MCG Oral Tablet; take 1 tablet every day; Therapy: 07RJL2964 to (Evaluate:49Gak0201)  Requested for: 27Apr2017; Last    Rx:26Apr2017 Ordered   14  Nystatin 074658 UNIT/ML Mouth/Throat Suspension; PLACE 1ML TO INSIDE OF EACH    CHEEK 4 TIMES DAILY; Therapy: 91CMR8671 to (Last Rx:63Zmf4542)  Requested for: 02Tks3058 Ordered   15  Premarin 0 625 MG/GM Vaginal Cream; Insert one gram intra-vaginally every day for 2    weeks, then twice weekly Recorded   16  Promethazine HCl - 6 25 MG/5ML Oral Syrup; 2 teaspoons at bedtime only; Therapy: 49QUD8100 to (Last Rx:04Apr2017)  Requested for: 04Apr2017 Ordered   17  RaNITidine HCl - 150 MG Oral Capsule; TAKE 1 CAPSULE AT BEDTIME Recorded   18  Simvastatin 20 MG Oral Tablet; Therapy: 65PEA6520 to (Last AK:72VYG9511)  Requested for: 15XEI8804 Ordered    Family Psych History  Mother    1  Family history of Acute Myocardial Infarction (V17 3)   2  Family history of diabetes mellitus (V18 0) (Z83 3)  Father    3  Family history of Stroke Syndrome (V17 1)    Social History    · Never A Smoker   · Never Drank Alcohol    End of Encounter Meds    1  Azelastine HCl - 0 15 % Nasal Solution (Astepro); USE 2 SPRAYS IN EACH NOSTRIL   ONCE DAILY AT BEDTIME; Therapy: 89DCT4203 to (Last Rx:04Apr2017)  Requested for: 04Apr2017 Ordered    2  ALPRAZolam 0 5 MG Oral Tablet; take 4 tablets daily as directed; Therapy: 30IHM0830 to (Evaluate:27Nov2017)  Requested for: 36APZ2550; Last   Rx:05Goh9663 Ordered    3  Promethazine HCl - 6 25 MG/5ML Oral Syrup; 2 teaspoons at bedtime only; Therapy: 56TPW8390 to (Last Rx:04Apr2017)  Requested for: 04Apr2017 Ordered    4   Levothyroxine Sodium 75 MCG Oral Tablet; take 1 tablet every day; Therapy: 81IVU7704 to (Evaluate:29Cgv0999)  Requested for: 63Ucq8087; Last   Rx:26Apr2017 Ordered    5  DULoxetine HCl - 20 MG Oral Capsule Delayed Release Particles; TAKE 1 CAPSULE   ORALLY TWICE  DAILY (DEPRESSION) (DO NOT CRUSH) (DO NOT OPEN CAPSULE); Therapy: 70PLC4345 to (Vannesa Gaby)  Requested for: 00SEK0475; Last   Rx:31May2017 Ordered    6  Nystatin 309645 UNIT/ML Mouth/Throat Suspension; PLACE 1ML TO INSIDE OF EACH   CHEEK 4 TIMES DAILY; Therapy: 53FAT9207 to (Last Rx:95Pvj9190)  Requested for: 66Qnm8316 Ordered    7  Carbidopa-Levodopa  MG Oral Tablet; one tab twice  a day; Therapy: 39Skd3835 to (Evaluate:46Ysp9788)  Requested for: 17QFZ5375; Last   Rx:25Jan2016 Ordered   8  Donepezil HCl - 10 MG Oral Tablet; Take 1 tablet by mouth at bedtime  Requested for:   60VRV0555; Last Rx:31May2017 Ordered    9  [employer] Vitamin C 1000 MG TABS; Take 1 tablet daily Recorded   10  All Day Calcium TB24 Recorded   11  Aspirin 81 MG TABS; Therapy: (Recorded:27Qzy0239) to Recorded   12  Cranberry 125 MG Oral Tablet Recorded   13  [employer] Stool Softener CAPS Recorded   14  [employer] Vitamin D CAPS Recorded   15  Daily Value Multivitamin TABS; Take 1 tablet daily Recorded   16  Premarin 0 625 MG/GM Vaginal Cream; Insert one gram intra-vaginally every day for 2    weeks, then twice weekly Recorded   17  RaNITidine HCl - 150 MG Oral Capsule; TAKE 1 CAPSULE AT BEDTIME Recorded   18  Simvastatin 20 MG Oral Tablet;     Therapy: 29QPC3438 to (Last Rx:64Cim1224)  Requested for: 56ZWX9197 Ordered    Future Appointments    Date/Time Provider Specialty Site   07/11/2017 02:00 PM Juan Rhodes, [address] [place] [address]     Signatures   Electronically signed by : Hill Fisher MD; May 31 2017  3:26PM EST                       (Author)

## 2018-01-18 NOTE — RESULT NOTES
Verified Results  [facility] 10Jun2016 08:40AM Eloy Hayes Order Number: [id number]     Test Name Result Flag Reference   [facility] (Report)     RENAL ULTRASOUND     INDICATION: Recurrent episodes of spurring micturition and increased frequency  COMPARISON: Ultrasound 10/15/2013     TECHNIQUE:  Ultrasound of the retroperitoneum was performed with a curvilinear transducer utilizing volumetric sweeps and still imaging techniques  FINDINGS:     KIDNEYS:   Symmetric and normal size  Right kidney: 10 3 x 4 0 cm  Normal echogenicity and contour  No suspicious masses detected  No hydronephrosis  No shadowing calculi  No perinephric fluid collections  Left kidney: 11 4 x 5 4 cm  Normal echogenicity and contour  No suspicious masses detected  No hydronephrosis  No shadowing calculi  No perinephric fluid collections  URETERS:   Nonvisualized  BLADDER:    Partially distended  No focal thickening or mass lesions  IMPRESSION:     Normal         Workstation performed: [id number]     Signed by:    Danii Sanchez MD   6/13/16

## 2018-01-18 NOTE — PROGRESS NOTES
Assessment    1  Urinary tract infection (599 0) (N39 0)    Plan  Urinary tract infection    · (1) URINE CULTURE; Source:Urine, Clean Catch; Status: In Progress - Specimen/Data  Collected,Retrospective By Protocol Authorization;   Done: In Office Collection    Discussion/Summary    #1  Urinary tract infection-patient was given a prescription for Macrobid 100 mg one twice a day #20  She is to drop off her urine in 2 weeks to recheck urine  Possible side effects of new medications were reviewed with the patient/guardian today  The treatment plan was reviewed with the patient/guardian  The patient/guardian understands and agrees with the treatment plan      Chief Complaint  burning with urination, frequency, odor to urine - started yesterday  - [facility]      History of Present Illness  HPI: This is a 58-year-old female who comes in with symptoms of burning upon urination, frequency of urination and a strong odor associated with urine  Symptoms began yesterday  She has had numerous urinary tract infections and has had a workup which was negative  Her blood pressure today is 110/64 and her temperature is 98 0  She does not have any back pain or vaginal discharge  Review of Systems    Constitutional: No fever, no chills, feels well, no tiredness, no recent weight gain or loss  Cardiovascular: no complaints of slow or fast heart rate, no chest pain, no palpitations, no leg claudication or lower extremity edema  Respiratory: no complaints of shortness of breath, no wheezing, no dyspnea on exertion, no orthopnea or PND  Gastrointestinal: no complaints of abdominal pain, no constipation, no nausea or diarrhea, no vomiting, no bloody stools     Genitourinary: dysuria and Frequency and burning upon urination, but no complaints of dysuria, no incontinence, no pelvic pain, no dysmenorrhea, no vaginal discharge or abnormal vaginal bleeding, as noted in HPI, no pelvic pain, no vaginal discharge, no incontinence, no dysmenorrhea and no unexplained vaginal bleeding  Active Problems    1  Abdominal pain, suprapubic (789 09) (R10 30)   2  Abnormal gait (781 2) (R26 9)   3  Alopecia (704 00) (L65 9)   4  Anxiety (300 00) (F41 9)   5  Backache (724 5) (M54 9)   6  Chronic constipation (564 00) (K59 00)   7  Depression (311) (F32 9)   8  Esophageal reflux (530 81) (K21 9)   9  Foot Pain (Soft Tissue) (729 5)   10  Groin pain (789 09) (R10 30)   11  Hyperglycemia (790 29) (R73 9)   12  Hyperlipidemia (272 4) (E78 5)   13  Hypothyroidism (244 9) (E03 9)   14  Lower back pain (724 2) (M54 5)   15  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)   16  Multiple joint pain (719 49) (M25 50)   17  Osteoporosis (733 00) (M81 0)   18  Parkinson's disease (332 0) (G20)   19  Pseudomonas infection (041 7) (B96 5)   20  Reactive confusion (298 2) (F44 89)   21  Rectal pain (569 42) (K62 89)   22  Spinal stenosis (724 00) (M48 00)   23  Symptoms involving urinary system (788 99) (R39 9)   24  Tendonitis (726 90) (M77 9)   25  Urinary hesitancy (788 64) (R39 11)   26  Urinary incontinence (788 30) (R32)   27  Urinary retention (788 20) (R33 9)   28  Urinary tract infection (599 0) (N39 0)   29  Vertigo (780 4) (R42)    Past Medical History    1  History of Ankle Sprain (845 00)   2  History of Hip pain, unspecified laterality   3  History of bursitis (V13 59) (Z87 39)   4  History of conjunctivitis (V12 49) (Z86 69)   5  History of constipation (V12 79) (Z87 19)   6  History of falling (V15 88) (Z91 81)   7  History of hematuria (V13 09) (Z87 448)   8  History of influenza (V12 09) (Z87 09)   9  History of Neck pain (723 1) (M54 2)   10  History of Urinary tract infection (599 0) (N39 0)   11  History of Urinary Tract Infection (V13 02)    Family History    1  Family history of Acute Myocardial Infarction (V17 3)   2  Family history of diabetes mellitus (V18 0) (Z83 3)    3   Family history of Stroke Syndrome (V17 1)    Social History    · Never A Smoker   · Never Drank Alcohol    Surgical History    1  History of Brain Surgery   2  History of Diagnostic Cystoscopy   3  History of Laminectomy Decompress, Facetectomy, Foraminotomy Lumbar Seg   4  History of Total Hip Replacement    Current Meds   1  All Day Calcium TB24 Recorded   2  ALPRAZolam 0 5 MG Oral Tablet; take 4 tablets daily as directed; Therapy: 42DAA9469 to (Haylee Silverio)  Requested for: 71Bjg4733; Last   Rx:19Grx1275 Ordered   3  Aspirin 81 MG Oral Tablet; Therapy: (Recorded:32Hwb5869) to Recorded   4  BuPROPion HCl ER (XL) 300 MG Oral Tablet Extended Release 24 Hour; Therapy: 53DHE5287 to (Evaluate:03Jan2015) Recorded   5  Carbidopa-Levodopa  MG Oral Tablet; Therapy: 42Seh7882 to (Last Rx:21Apr2011)  Requested for: 51Mkn8860 Ordered   6  [employer] Stool Softener CAPS Recorded   7  [employer] Vitamin D CAPS Recorded   8  Daily Value Multivitamin TABS; Take 1 tablet daily Recorded   9  DULoxetine HCl - 20 MG Oral Capsule Delayed Release Particles; TAKE 1 CAPSULE   ORALLY TWICE  DAILY (DEPRESSION) (DO NOT CRUSH) (DO NOT OPEN CAPSULE); Therapy: 41OMW9780 to (Evaluate:34Cln3804) Recorded   10  Levothyroxine Sodium 75 MCG Oral Tablet; Take 1 tablet daily; Therapy: 57YLN3724 to (Evaluate:23Tff2313)  Requested for: 29SCL9503; Last    Rx:47Dky1297 Ordered   11  Methenamine Hippurate 1 GM Oral Tablet; Therapy: 27QFD7225 to (Evaluate:03Jan2015) Recorded   12  Omeprazole 20 MG Oral Capsule Delayed Release; take 1 capsule daily; Therapy: 04BIS1404 to (Evaluate:05Jun2016)  Requested for: 96JHQ9566; Last    Rx:19Rby8871 Ordered   13  Psyllium POWD Recorded   14  Simvastatin 20 MG Oral Tablet; Therapy: 70QIG2721 to (Last HD:62ZSD6580)  Requested for: 78SMV8009 Ordered    Allergies    1  Gabapentin TABS   2  Erythromycin TABS   3  Levaquin TABS   4  Mirtazapine TABS   5  Paxil TABS   6  Sulfa Drugs   7  Tricyclic Antidepressants   8  Wellbutrin TABS   9  Keflex TABS    Vitals   Recorded: 35YOK3448 02:01PM   Temperature 98 F, Oral   Heart Rate 88, L Radial   Pulse Quality Normal, L Radial   Systolic 899   Diastolic 64   Height Unobtainable Yes   Weight Unobtainable Yes     Physical Exam    Constitutional   General appearance: No acute distress, well appearing and well nourished  Pulmonary   Auscultation of lungs: Clear to auscultation  Cardiovascular   Auscultation of heart: Normal rate and rhythm, normal S1 and S2, without murmurs  Examination of extremities for edema and/or varicosities: Normal     Abdomen   Abdomen: Non-tender, no masses  Lymphatic   Palpation of lymph nodes in neck: No lymphadenopathy  Psychiatric   Orientation to person, place, and time: Normal     Mood and affect: Normal          Results/Data  [employer] - [employer]cul[employer] 20QJW2117 02:00PM User, Ahs     Test Name Result Flag Reference   SBIRT Screen - Tobacco Screening Result Negative         Future Appointments    Date/Time Provider Specialty Site   04/12/2016 09:30 AM RENETTA Baker   [address] [place] [address]   01/25/2016 04:50 PM Lakesha Kenyon MD Psychiatry [address]     Signatures   Electronically signed by : Tania Calderón [facility]; Jan 18 2016  2:16PM EST                       (Author)    Electronically signed by : RENETTA Leach ; Jan 19 2016  7:14AM EST

## 2018-01-18 NOTE — RESULT NOTES
Verified Results  * XR CHEST PA & LATERAL 38YYT7385 01:29PM Ksenia Riley Order Number: [id number]     Test Name Result Flag Reference   XR CHEST PA & LATERAL (Report)     CHEST     INDICATION: 61-year-old female, productive cough, shortness of breath   COMPARISON: 8/31/2016 chest x-ray     VIEWS: Frontal and lateral projections; 2 images     FINDINGS:   Deep brain stimulator unit's overlie the mid chest bilaterally  Typical leads extend beyond the field-of-view in the neck     The lungs are clear  No pleural effusions  The cardiomediastinal silhouette is unremarkable     Old right-sided rib fractures   Multilevel degenerative spondylosis   Evidence of previous lumbar surgery     Findings are stable       IMPRESSION:     No acute cardiopulmonary disease, stable        Workstation performed: [id number]     Signed by:   Katie Lai MD   4/6/17

## 2018-01-18 NOTE — RESULT NOTES
Verified Results  (1) URINE CULTURE 23SQT3109 09:14PM Rossana Hernandez     Test Name Result Flag Reference   CLINICAL REPORT (Report)     Test:        Urine culture  Specimen Type:   Urine  Specimen Date:   2/1/2016 9:14 PM  Result Date:    2/3/2016 4:56 PM  Result Status:   Final result  Resulting Lab:   [facility] [address] [address]            Tel: [phone]                 CULTURE                                       ------------------                                   60,000-69,000 cfu/ml Mixed Contaminants X6

## 2018-01-22 VITALS
SYSTOLIC BLOOD PRESSURE: 134 MMHG | TEMPERATURE: 97.5 F | HEART RATE: 84 BPM | RESPIRATION RATE: 20 BRPM | DIASTOLIC BLOOD PRESSURE: 74 MMHG

## 2018-01-22 VITALS
TEMPERATURE: 95.8 F | SYSTOLIC BLOOD PRESSURE: 114 MMHG | HEART RATE: 76 BPM | RESPIRATION RATE: 20 BRPM | DIASTOLIC BLOOD PRESSURE: 70 MMHG

## 2018-01-22 VITALS
DIASTOLIC BLOOD PRESSURE: 70 MMHG | HEART RATE: 76 BPM | TEMPERATURE: 95.8 F | RESPIRATION RATE: 20 BRPM | SYSTOLIC BLOOD PRESSURE: 114 MMHG

## 2018-01-22 VITALS
DIASTOLIC BLOOD PRESSURE: 80 MMHG | HEART RATE: 64 BPM | WEIGHT: 156 LBS | RESPIRATION RATE: 16 BRPM | SYSTOLIC BLOOD PRESSURE: 104 MMHG | HEIGHT: 60 IN | BODY MASS INDEX: 30.63 KG/M2

## 2018-01-24 ENCOUNTER — OFFICE VISIT (OUTPATIENT)
Dept: PSYCHIATRY | Facility: CLINIC | Age: 73
End: 2018-01-24
Payer: MEDICARE

## 2018-01-24 DIAGNOSIS — F06.31 MOOD DISORDER WITH MAJOR DEPRESSIVE-LIKE EPISODE DUE TO GENERAL MEDICAL CONDITION: Chronic | ICD-10-CM

## 2018-01-24 DIAGNOSIS — K11.7 SIALORRHEA: Primary | ICD-10-CM

## 2018-01-24 PROCEDURE — 99213 OFFICE O/P EST LOW 20 MIN: CPT | Performed by: PSYCHIATRY & NEUROLOGY

## 2018-01-24 RX ORDER — ALPRAZOLAM 0.25 MG/1
1 TABLET ORAL AS NEEDED
COMMUNITY
End: 2018-01-24 | Stop reason: SDUPTHER

## 2018-01-24 RX ORDER — GLYCOPYRROLATE 1 MG/1
1 TABLET ORAL 3 TIMES DAILY
COMMUNITY
End: 2018-01-24 | Stop reason: SDUPTHER

## 2018-01-24 RX ORDER — GLYCOPYRROLATE 1 MG/1
1 TABLET ORAL 3 TIMES DAILY
Qty: 90 TABLET | Refills: 2 | Status: SHIPPED | OUTPATIENT
Start: 2018-01-24 | End: 2018-02-20 | Stop reason: SDUPTHER

## 2018-01-24 RX ORDER — DULOXETINE 40 MG/1
40 CAPSULE, DELAYED RELEASE ORAL DAILY
Qty: 60 CAPSULE | Refills: 5 | Status: SHIPPED | OUTPATIENT
Start: 2018-01-24 | End: 2018-12-18 | Stop reason: ALTCHOICE

## 2018-01-24 RX ORDER — ALPRAZOLAM 0.25 MG/1
0.75 TABLET ORAL
Qty: 30 TABLET | Refills: 0 | Status: SHIPPED | OUTPATIENT
Start: 2018-01-24 | End: 2018-01-24 | Stop reason: SDUPTHER

## 2018-01-24 RX ORDER — ALPRAZOLAM 0.25 MG/1
0.25 TABLET ORAL 3 TIMES DAILY
Qty: 150 TABLET | Refills: 2 | Status: SHIPPED | OUTPATIENT
Start: 2018-01-24 | End: 2018-06-07

## 2018-01-24 RX ORDER — ALPRAZOLAM 0.25 MG/1
0.25 TABLET ORAL 2 TIMES DAILY PRN
Qty: 30 TABLET | Refills: 0 | Status: SHIPPED | OUTPATIENT
Start: 2018-01-24 | End: 2018-06-07

## 2018-01-24 RX ORDER — DULOXETINE 40 MG/1
1 CAPSULE, DELAYED RELEASE ORAL DAILY
COMMUNITY
End: 2018-01-24

## 2018-01-24 RX ORDER — ALPRAZOLAM 0.25 MG/1
0.25 TABLET ORAL AS NEEDED
Qty: 30 TABLET | Refills: 0 | Status: SHIPPED | OUTPATIENT
Start: 2018-01-24 | End: 2018-06-07

## 2018-01-24 RX ORDER — ALPRAZOLAM 0.25 MG/1
0.75 TABLET ORAL
Qty: 30 TABLET | Refills: 0 | Status: SHIPPED | OUTPATIENT
Start: 2018-01-24 | End: 2018-06-07

## 2018-01-24 RX ORDER — ALPRAZOLAM 0.25 MG/1
0.25 TABLET ORAL 3 TIMES DAILY
Qty: 30 TABLET | Refills: 0 | Status: SHIPPED | OUTPATIENT
Start: 2018-01-24 | End: 2018-01-24 | Stop reason: SDUPTHER

## 2018-01-24 NOTE — PSYCH
Subjective: no longer anxious  Buspar     Patient ID: Astrid Bhatia is a 74 y o  female  HPI ROS Appetite Changes and Sleep: increased appetite and decreased energy    Review Of Systems:     Mood Normal   Behavior Normal    Thought Content Normal   General Normal    Personality Normal   Other Psych Symptoms Normal   Constitutional Normal   ENT Normal   Cardiovascular Normal    Respiratory Normal    Gastrointestinal Normal   Genitourinary Normal    Musculoskeletal Negative   Integumentary Normal    Neurological Normal    Endocrine Normal    Other Symptoms Normal              Laboratory Results: No results found for this or any previous visit  Substance Abuse History:  History   Drug Use No       Family Psychiatric History:   Family History   Problem Relation Age of Onset    No Known Problems Mother     No Known Problems Father     No Known Problems Sister     No Known Problems Brother     No Known Problems Maternal Aunt     No Known Problems Paternal Aunt     No Known Problems Maternal Uncle     No Known Problems Paternal Uncle     No Known Problems Maternal Grandfather     No Known Problems Maternal Grandmother     No Known Problems Paternal Grandfather     No Known Problems Paternal Grandmother     No Known Problems Cousin     ADD / ADHD Neg Hx     Alcohol abuse Neg Hx     Anxiety disorder Neg Hx     Bipolar disorder Neg Hx     Dementia Neg Hx     Depression Neg Hx     Drug abuse Neg Hx     OCD Neg Hx     Paranoid behavior Neg Hx     Schizophrenia Neg Hx     Seizures Neg Hx     Self-Injury Neg Hx     Suicide Attempts Neg Hx            Social History     Social History    Marital status: /Civil Union     Spouse name: N/A    Number of children: N/A    Years of education: N/A     Occupational History    Not on file       Social History Main Topics    Smoking status: Never Smoker    Smokeless tobacco: Not on file      Comment: n/a    Alcohol use 0 6 oz/week     1 Glasses of wine per week      Comment: drinks one fourth of a glass of white wine once a week     Drug use: No    Sexual activity: Not Currently     Other Topics Concern    Not on file     Social History Narrative    No narrative on file     Social History     Social History Narrative    No narrative on file       Objective:       Mental status:  Appearance calm and cooperative , adequate hygiene and grooming and good eye contact    Mood euthymic   Affect affect was flat   Speech decreased volume   Thought Processes normal thought processes   Hallucinations no hallucinations present    Thought Content no delusions   Abnormal Thoughts no suicidal thoughts    Orientation  oriented to person and oriented to place   Remote Memory short term memory impaired and long term memory intact   Attention Span concentration impaired   Intellect Not Formally Assessed   Insight Limited insight   Judgement judgment was intact   Muscle Strength Decreased muscle strength   Language not assessed   Fund of Knowledge adequate fund of knowledge regarding vocabulary    Pain none   Pain Scale 0       Assessment/Plan:       Diagnoses and all orders for this visit:    Sialorrhea  -     glycopyrrolate (ROBINUL) 1 mg tablet; Take 1 tablet by mouth 3 (three) times a day    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  -     Discontinue: ALPRAZolam (XANAX) 0 25 mg tablet; Take 1 tablet by mouth 3 (three) times a day  -     Discontinue: ALPRAZolam (XANAX) 0 25 mg tablet; Take 3 tablets by mouth daily at bedtime  -     ALPRAZolam (XANAX) 0 25 mg tablet; Take 1 tablet by mouth 2 (two) times a day as needed for anxiety 1-2 tablets a night as needed  -     ALPRAZolam (XANAX) 0 25 mg tablet; Take 1 tablet by mouth as needed for anxiety Take 1 tablet 3 times daily, 3 tablets at bedtime  May take 2 extra during the night  as necessary  -     DULoxetine HCl 40 MG CPEP;  Take 1 capsule by mouth daily  - ALPRAZolam (XANAX) 0 25 mg tablet; Take 1 tablet by mouth 3 (three) times a day Take 1 tablet 3 times a day  May take 1 or 2 extra tablets during the night  -     ALPRAZolam (XANAX) 0 25 mg tablet; Take 3 tablets by mouth daily at bedtime    Other orders  -     Discontinue: glycopyrrolate (ROBINUL) 1 mg tablet; Take 1 mg by mouth 3 (three) times a day  -     Discontinue: ALPRAZolam (XANAX) 0 25 mg tablet; Take 1 tablet by mouth as needed for anxiety Take 1 tablet 3 times daily, 3 tablets at bedtime  May take 2 extra during the night  as necessary  -     Discontinue: DULoxetine HCl 40 MG CPEP;  Take 1 capsule by mouth daily            Treatment Recommendations- Risks Benefits      Immediate Medical/Psychiatric/Psychotherapy Treatments and Any Precautions: continued pharmacotherapy      Psychotherapy Provided: no  Goals discussed in session:yes    Counseling provided: yes

## 2018-01-26 DIAGNOSIS — F41.1 GAD (GENERALIZED ANXIETY DISORDER): Primary | ICD-10-CM

## 2018-01-28 RX ORDER — BUSPIRONE HYDROCHLORIDE 15 MG/1
TABLET ORAL
Qty: 60 TABLET | Refills: 0 | Status: SHIPPED | OUTPATIENT
Start: 2018-01-28 | End: 2018-01-30 | Stop reason: SDUPTHER

## 2018-01-30 DIAGNOSIS — F41.1 GAD (GENERALIZED ANXIETY DISORDER): ICD-10-CM

## 2018-01-30 RX ORDER — BUSPIRONE HYDROCHLORIDE 15 MG/1
TABLET ORAL
Qty: 60 TABLET | Refills: 0 | Status: SHIPPED | OUTPATIENT
Start: 2018-01-30 | End: 2018-04-02 | Stop reason: SDUPTHER

## 2018-02-20 DIAGNOSIS — K11.7 SIALORRHEA: ICD-10-CM

## 2018-02-21 RX ORDER — GLYCOPYRROLATE 1 MG/1
TABLET ORAL
Qty: 90 TABLET | Refills: 1 | Status: SHIPPED | OUTPATIENT
Start: 2018-02-21 | End: 2018-04-18 | Stop reason: SDUPTHER

## 2018-03-25 DIAGNOSIS — F03.90 DEMENTIA (HCC): Primary | ICD-10-CM

## 2018-03-26 RX ORDER — DONEPEZIL HYDROCHLORIDE 10 MG/1
TABLET, FILM COATED ORAL
Qty: 90 TABLET | Refills: 1 | Status: SHIPPED | OUTPATIENT
Start: 2018-03-26 | End: 2018-09-21 | Stop reason: SDUPTHER

## 2018-04-02 ENCOUNTER — TELEPHONE (OUTPATIENT)
Dept: PSYCHIATRY | Facility: CLINIC | Age: 73
End: 2018-04-02

## 2018-04-02 DIAGNOSIS — F41.1 GAD (GENERALIZED ANXIETY DISORDER): ICD-10-CM

## 2018-04-02 RX ORDER — BUSPIRONE HYDROCHLORIDE 15 MG/1
15 TABLET ORAL 2 TIMES DAILY
Qty: 60 TABLET | Refills: 5 | Status: SHIPPED | OUTPATIENT
Start: 2018-04-02 | End: 2018-06-07 | Stop reason: SDUPTHER

## 2018-04-18 DIAGNOSIS — K11.7 SIALORRHEA: ICD-10-CM

## 2018-04-18 RX ORDER — GLYCOPYRROLATE 1 MG/1
TABLET ORAL
Qty: 90 TABLET | Refills: 1 | Status: SHIPPED | OUTPATIENT
Start: 2018-04-18 | End: 2018-06-10 | Stop reason: SDUPTHER

## 2018-05-07 DIAGNOSIS — Z13.89 ENCOUNTER FOR SCREENING FOR OTHER DISORDER: ICD-10-CM

## 2018-05-07 DIAGNOSIS — G20 PARKINSON'S DISEASE (HCC): ICD-10-CM

## 2018-05-07 DIAGNOSIS — F06.30 MOOD DISORDER DUE TO KNOWN PHYSIOLOGICAL CONDITION: ICD-10-CM

## 2018-05-07 DIAGNOSIS — Z01.818 ENCOUNTER FOR OTHER PREPROCEDURAL EXAMINATION: ICD-10-CM

## 2018-05-07 DIAGNOSIS — R94.31 ABNORMAL ELECTROCARDIOGRAM: ICD-10-CM

## 2018-05-07 DIAGNOSIS — E03.9 HYPOTHYROIDISM: ICD-10-CM

## 2018-05-07 DIAGNOSIS — E78.5 HYPERLIPIDEMIA: ICD-10-CM

## 2018-05-07 DIAGNOSIS — R73.9 HYPERGLYCEMIA: ICD-10-CM

## 2018-05-07 DIAGNOSIS — M81.0 AGE-RELATED OSTEOPOROSIS WITHOUT CURRENT PATHOLOGICAL FRACTURE: ICD-10-CM

## 2018-05-26 ENCOUNTER — OFFICE VISIT (OUTPATIENT)
Dept: FAMILY MEDICINE CLINIC | Facility: CLINIC | Age: 73
End: 2018-05-26
Payer: MEDICARE

## 2018-05-26 VITALS — DIASTOLIC BLOOD PRESSURE: 68 MMHG | SYSTOLIC BLOOD PRESSURE: 120 MMHG | HEART RATE: 88 BPM

## 2018-05-26 DIAGNOSIS — H61.23 BILATERAL IMPACTED CERUMEN: ICD-10-CM

## 2018-05-26 DIAGNOSIS — H60.312 ACUTE DIFFUSE OTITIS EXTERNA OF LEFT EAR: Primary | ICD-10-CM

## 2018-05-26 PROCEDURE — 99214 OFFICE O/P EST MOD 30 MIN: CPT | Performed by: PHYSICIAN ASSISTANT

## 2018-05-26 PROCEDURE — 69209 REMOVE IMPACTED EAR WAX UNI: CPT | Performed by: PHYSICIAN ASSISTANT

## 2018-05-26 RX ORDER — CIPROFLOXACIN 250 MG/1
250 TABLET, FILM COATED ORAL EVERY 12 HOURS SCHEDULED
Qty: 14 TABLET | Refills: 0 | Status: SHIPPED | OUTPATIENT
Start: 2018-05-26 | End: 2018-06-02

## 2018-05-26 NOTE — ASSESSMENT & PLAN NOTE
Because patient's ear canals are so narrow and the left has significant infection IM treating patient with oral antibiotic Cipro as this is 1 of the few antibiotic she is able to tolerate twice daily for 7 days

## 2018-05-26 NOTE — PROGRESS NOTES
Assessment/Plan:    Acute diffuse otitis externa of left ear  Because patient's ear canals are so narrow and the left has significant infection IM treating patient with oral antibiotic Cipro as this is 1 of the few antibiotic she is able to tolerate twice daily for 7 days  Bilateral impacted cerumen  Bilateral ear lavage performed with 100% success  Diagnoses and all orders for this visit:    Acute diffuse otitis externa of left ear  -     ciprofloxacin (CIPRO) 250 mg tablet; Take 1 tablet (250 mg total) by mouth every 12 (twelve) hours for 7 days    Bilateral impacted cerumen  -     Ear cerumen removal          Subjective: Pt c/o having trouble hearing  Patient ID: Tawny Cooper is a 74 y o  female   brings in wife who has Parkinson's and is unable to respond verbally for the most part and is in a wheelchair  She started putting things in her left ear yesterday stating that she could not hear and then he helped her to try to get some wax out as well however it did not improve and actually hearing got worse  They are here today to have this evaluated and most likely needs a flush  She denies any ear pain  The following portions of the patient's history were reviewed and updated as appropriate: allergies, current medications, past family history, past medical history, past social history, past surgical history and problem list     Review of Systems   Constitutional: Negative  HENT: Positive for hearing loss  Negative for ear pain  Eyes: Negative  Respiratory: Negative  Cardiovascular: Negative  Gastrointestinal: Negative  Endocrine: Negative  Genitourinary: Negative  Musculoskeletal: Negative  Skin: Negative  Allergic/Immunologic: Negative  Neurological: Negative  Hematological: Negative  Psychiatric/Behavioral: Negative            Objective:      /68   Pulse 88   Ear cerumen removal  Date/Time: 5/26/2018 10:40 AM  Performed by: Helena Burleson Brandon Tovar by: Kenia George     Patient location:  Clinic  Other Assisting Provider: No    Consent:     Consent obtained:  Verbal    Consent given by:  Patient and spouse    Risks discussed:  Incomplete removal, pain and dizziness    Alternatives discussed:  No treatment  Procedure details:     Local anesthetic:  None    Location:  L ear and R ear    Procedure type: irrigation      Approach:  Natural orifice  Post-procedure details:     Complication:  None    Hearing quality:  Improved    Patient tolerance of procedure: Tolerated well, no immediate complications  Comments:      Large blood blister left can now with moderate edema erythema to canal and TM all visualized status post flush  Physical Exam   Constitutional: She is oriented to person, place, and time  She appears well-developed and well-nourished  No distress  HENT:   Head: Normocephalic and atraumatic  Right Ear: Hearing, tympanic membrane, external ear and ear canal normal    Left Ear: Tympanic membrane is erythematous  Bilateral ears impacted with cerumen  Left canal with large blood blister and erythema in canal and TM once cerumen was removed  Right with mild canal irritation  Eyes: Conjunctivae are normal  Right eye exhibits no discharge  Left eye exhibits no discharge  Neck: Neck supple  Carotid bruit is not present  Cardiovascular: Normal rate and regular rhythm  Pulmonary/Chest: Effort normal and breath sounds normal  No respiratory distress  Neurological: She is alert and oriented to person, place, and time  Skin: Skin is warm and dry  She is not diaphoretic  Psychiatric: She has a normal mood and affect  Judgment normal    Nursing note and vitals reviewed

## 2018-05-26 NOTE — PATIENT INSTRUCTIONS
Problem List Items Addressed This Visit        Nervous and Auditory    Acute diffuse otitis externa of left ear - Primary     Because patient's ear canals are so narrow and the left has significant infection IM treating patient with oral antibiotic Cipro as this is 1 of the few antibiotic she is able to tolerate twice daily for 7 days  Relevant Medications    ciprofloxacin (CIPRO) 250 mg tablet    Bilateral impacted cerumen     Bilateral ear lavage performed with 100% success           Relevant Orders    Ear cerumen removal

## 2018-06-07 ENCOUNTER — OFFICE VISIT (OUTPATIENT)
Dept: PSYCHIATRY | Facility: CLINIC | Age: 73
End: 2018-06-07
Payer: MEDICARE

## 2018-06-07 DIAGNOSIS — F41.1 GAD (GENERALIZED ANXIETY DISORDER): ICD-10-CM

## 2018-06-07 PROCEDURE — 99213 OFFICE O/P EST LOW 20 MIN: CPT | Performed by: PSYCHIATRY & NEUROLOGY

## 2018-06-07 RX ORDER — ALPRAZOLAM 0.5 MG/1
TABLET ORAL
Qty: 75 TABLET | Refills: 3
Start: 2018-06-07 | End: 2018-07-08 | Stop reason: SDUPTHER

## 2018-06-07 RX ORDER — BUSPIRONE HYDROCHLORIDE 15 MG/1
7.5 TABLET ORAL 2 TIMES DAILY
Qty: 60 TABLET | Refills: 2
Start: 2018-06-07 | End: 2018-08-13 | Stop reason: SDUPTHER

## 2018-06-07 NOTE — PSYCH
Patient ID: Pierre Quevedo is a 74 y o  female  HPI ROS Appetite Changes and Sleep: normal appetite, decreased energy, no weight change and normal number of sleep hours    Review Of Systems:     Mood Normal   Thought Content Normal   General Decreased Functioning   Gastrointestinal Normal   Neurological Normal        Laboratory Results: No results found for this or any previous visit  Subjective:    Progressive functional and cognitive decline  Totally dependent on  with no new problems  She is currently sleeping through the night  Drooling has diminished significantly and her  is pleased with the decrease in level of anxiety      Objective:   stable    Mental status:  Appearance adequate hygiene and grooming, demonstrated behavior psychomotor retardation and poor eye contact    Mood euthymic   Affect affect was flat   Speech Soft, Sparse and garbled   Thought Processes Poverty of thoughts   Hallucinations no hallucinations present    Thought Content no delusional thoughts verbalized   Abnormal Thoughts no suicidal thoughts  and no homicidal thoughts    Orientation Oriented to Place and person   Memory function Not tested   Attention Span concentration impaired   Intellect Not Formally Assessed   Insight Limited insight   Judgement judgment was intact   Muscle Strength Decreased muscle strength   Language no difficulty naming common objects and no difficulty repeating a phrase    Pain none       Assessment/Plan:       Diagnoses and all orders for this visit:    DYANA (generalized anxiety disorder)  -     busPIRone (BUSPAR) 15 mg tablet;  Take 0 5 tablets (7 5 mg total) by mouth 2 (two) times a day  -     ALPRAZolam (XANAX) 0 5 mg tablet; 0 5 twice daily +1 5 tablet at bedtime            Treatment Recommendations- Risks Benefits      Risks, Benefits And Possible Side Effects Of Medications:  Risks, benefits, and possible side effects of medications explained to patient and patient verbalizes understanding and The risks and benefits of medications and proposed treatment plan were discussed with the patient's caregiver/POA/guardian    The individual understands and agrees

## 2018-06-10 DIAGNOSIS — K11.7 SIALORRHEA: ICD-10-CM

## 2018-06-11 RX ORDER — GLYCOPYRROLATE 1 MG/1
TABLET ORAL
Qty: 90 TABLET | Refills: 1 | Status: SHIPPED | OUTPATIENT
Start: 2018-06-11 | End: 2018-08-05 | Stop reason: SDUPTHER

## 2018-07-08 DIAGNOSIS — F41.1 GAD (GENERALIZED ANXIETY DISORDER): ICD-10-CM

## 2018-07-09 RX ORDER — ALPRAZOLAM 0.5 MG/1
TABLET ORAL
Qty: 360 TABLET | Refills: 1 | Status: SHIPPED | OUTPATIENT
Start: 2018-07-09 | End: 2018-12-18 | Stop reason: SDUPTHER

## 2018-08-05 DIAGNOSIS — K11.7 SIALORRHEA: ICD-10-CM

## 2018-08-05 RX ORDER — GLYCOPYRROLATE 1 MG/1
TABLET ORAL
Qty: 90 TABLET | Refills: 1 | Status: SHIPPED | OUTPATIENT
Start: 2018-08-05 | End: 2018-10-02 | Stop reason: SDUPTHER

## 2018-08-06 ENCOUNTER — TELEPHONE (OUTPATIENT)
Dept: PSYCHIATRY | Facility: CLINIC | Age: 73
End: 2018-08-06

## 2018-08-09 ENCOUNTER — TELEPHONE (OUTPATIENT)
Dept: PSYCHIATRY | Facility: CLINIC | Age: 73
End: 2018-08-09

## 2018-08-13 DIAGNOSIS — F41.1 GAD (GENERALIZED ANXIETY DISORDER): ICD-10-CM

## 2018-08-13 RX ORDER — BUSPIRONE HYDROCHLORIDE 15 MG/1
7.5 TABLET ORAL 2 TIMES DAILY
Qty: 180 TABLET | Refills: 1
Start: 2018-08-13 | End: 2018-09-28 | Stop reason: SDUPTHER

## 2018-09-21 DIAGNOSIS — F03.90 DEMENTIA (HCC): ICD-10-CM

## 2018-09-24 RX ORDER — DONEPEZIL HYDROCHLORIDE 10 MG/1
TABLET, FILM COATED ORAL
Qty: 90 TABLET | Refills: 1 | Status: SHIPPED | OUTPATIENT
Start: 2018-09-24 | End: 2018-12-18 | Stop reason: SDUPTHER

## 2018-09-28 ENCOUNTER — TELEPHONE (OUTPATIENT)
Dept: PSYCHIATRY | Facility: CLINIC | Age: 73
End: 2018-09-28

## 2018-09-28 DIAGNOSIS — F41.1 GAD (GENERALIZED ANXIETY DISORDER): ICD-10-CM

## 2018-09-28 RX ORDER — BUSPIRONE HYDROCHLORIDE 15 MG/1
7.5 TABLET ORAL 2 TIMES DAILY
Qty: 180 TABLET | Refills: 1 | Status: SHIPPED | OUTPATIENT
Start: 2018-09-28 | End: 2018-11-26 | Stop reason: SDUPTHER

## 2018-10-02 DIAGNOSIS — K11.7 SIALORRHEA: ICD-10-CM

## 2018-10-02 RX ORDER — GLYCOPYRROLATE 1 MG/1
TABLET ORAL
Qty: 90 TABLET | Refills: 1 | Status: SHIPPED | OUTPATIENT
Start: 2018-10-02 | End: 2018-11-26 | Stop reason: SDUPTHER

## 2018-10-10 ENCOUNTER — IMMUNIZATION (OUTPATIENT)
Dept: FAMILY MEDICINE CLINIC | Facility: CLINIC | Age: 73
End: 2018-10-10
Payer: MEDICARE

## 2018-10-10 DIAGNOSIS — Z23 NEED FOR INFLUENZA VACCINATION: Primary | ICD-10-CM

## 2018-10-10 PROCEDURE — G0008 ADMIN INFLUENZA VIRUS VAC: HCPCS

## 2018-10-10 PROCEDURE — 90662 IIV NO PRSV INCREASED AG IM: CPT

## 2018-11-26 ENCOUNTER — TELEPHONE (OUTPATIENT)
Dept: PSYCHIATRY | Facility: CLINIC | Age: 73
End: 2018-11-26

## 2018-11-26 DIAGNOSIS — F41.1 GAD (GENERALIZED ANXIETY DISORDER): ICD-10-CM

## 2018-11-26 DIAGNOSIS — K11.7 SIALORRHEA: ICD-10-CM

## 2018-11-26 RX ORDER — BUSPIRONE HYDROCHLORIDE 15 MG/1
7.5 TABLET ORAL 2 TIMES DAILY
Qty: 180 TABLET | Refills: 0 | Status: SHIPPED | OUTPATIENT
Start: 2018-11-26 | End: 2018-12-18 | Stop reason: SDUPTHER

## 2018-11-26 RX ORDER — GLYCOPYRROLATE 1 MG/1
1 TABLET ORAL 3 TIMES DAILY
Qty: 90 TABLET | Refills: 0 | Status: SHIPPED | OUTPATIENT
Start: 2018-11-26 | End: 2018-12-18 | Stop reason: SDUPTHER

## 2018-12-18 ENCOUNTER — OFFICE VISIT (OUTPATIENT)
Dept: PSYCHIATRY | Facility: CLINIC | Age: 73
End: 2018-12-18
Payer: MEDICARE

## 2018-12-18 DIAGNOSIS — F32.3 MAJOR DEPRESSION WITH PSYCHOTIC FEATURES (HCC): Primary | ICD-10-CM

## 2018-12-18 DIAGNOSIS — F02.80 DEMENTIA ASSOCIATED WITH OTHER UNDERLYING DISEASE WITHOUT BEHAVIORAL DISTURBANCE (HCC): ICD-10-CM

## 2018-12-18 DIAGNOSIS — K11.7 SIALORRHEA: ICD-10-CM

## 2018-12-18 DIAGNOSIS — F41.1 GAD (GENERALIZED ANXIETY DISORDER): ICD-10-CM

## 2018-12-18 PROCEDURE — 99214 OFFICE O/P EST MOD 30 MIN: CPT | Performed by: NURSE PRACTITIONER

## 2018-12-18 RX ORDER — GLYCOPYRROLATE 1 MG/1
1 TABLET ORAL 3 TIMES DAILY
Qty: 270 TABLET | Refills: 2 | Status: SHIPPED | OUTPATIENT
Start: 2018-12-18 | End: 2019-09-17 | Stop reason: SDUPTHER

## 2018-12-18 RX ORDER — ESCITALOPRAM OXALATE 10 MG/1
5 TABLET ORAL DAILY
Qty: 30 TABLET | Refills: 2 | Status: SHIPPED | OUTPATIENT
Start: 2018-12-18 | End: 2019-03-13

## 2018-12-18 RX ORDER — ALPRAZOLAM 0.5 MG/1
0.5 TABLET ORAL 4 TIMES DAILY PRN
Qty: 360 TABLET | Refills: 1 | Status: SHIPPED | OUTPATIENT
Start: 2018-12-18 | End: 2019-08-24 | Stop reason: SDUPTHER

## 2018-12-18 RX ORDER — DULOXETIN HYDROCHLORIDE 20 MG/1
20 CAPSULE, DELAYED RELEASE ORAL DAILY
Qty: 14 CAPSULE | Refills: 0 | Status: SHIPPED | OUTPATIENT
Start: 2018-12-18 | End: 2019-02-21 | Stop reason: ALTCHOICE

## 2018-12-18 RX ORDER — BUSPIRONE HYDROCHLORIDE 15 MG/1
7.5 TABLET ORAL 2 TIMES DAILY
Qty: 180 TABLET | Refills: 1 | Status: SHIPPED | OUTPATIENT
Start: 2018-12-18 | End: 2019-01-02 | Stop reason: SDUPTHER

## 2018-12-18 RX ORDER — DONEPEZIL HYDROCHLORIDE 10 MG/1
10 TABLET, FILM COATED ORAL
Qty: 90 TABLET | Refills: 2 | Status: SHIPPED | OUTPATIENT
Start: 2018-12-18 | End: 2019-12-05 | Stop reason: SDUPTHER

## 2018-12-18 NOTE — PSYCH
PROGRESS NOTE        [facility]      Name and Date of Birth:  Ranjeet Girard 74 y o  1945    Date of Visit: 12/18/18    SUBJECTIVE:    Madan Miller presents today with her   She does suffer from Parkinson's disease which is advancing  There are times though, where I do understand what she is saying and I believe that she is looking for something to help with her energy, her interest and motivation  Her  tells me that she used to be a fairly added reader and now she does not read at all  Whether this is disease or whether the depression were both not sure  So, we are going to be decreasing Cymbalta to 20 mg daily for 2 weeks and stopping  We are adding Lexapro 5 mg daily for now and then up to 10 mg daily to see if we can get better capture to help reduce her depressive symptoms and help improve her quality of life  Will follow up in 6 weeks or sooner if necessary  She denies suicidal ideation, intent or plan at present, has no suicidal ideation, intent or plan at present  She denies any auditory hallucinations and visual hallucinations, denies any other delusional thinking, denies any delusional thinking  She denies any side effects from medications    HPI ROS Appetite Changes and Sleep: normal appetite, normal sleep    Review Of Systems:      Constitutional Negative   ENT Negative   Cardiovascular Negative   Respiratory As Noted in HPI   Gastrointestinal Negative   Genitourinary Negative   Musculoskeletal Negative   Integumentary Negative   Neurological Negative   Endocrine Negative   Other Symptoms Negative and None       Laboratory Results: No results found for this or any previous visit      Substance Abuse History:    History   Drug Use No       Family Psychiatric History:     Family History   Problem Relation Age of Onset    Heart attack Mother         Acute Myocardial Infarction (MI)    Diabetes Mother         Diabetes Mellitus    Stroke Father         Syndrome    No Known Problems Brother     No Known Problems Maternal Aunt     No Known Problems Paternal Aunt     No Known Problems Maternal Uncle     No Known Problems Paternal Uncle     No Known Problems Maternal Grandfather     No Known Problems Maternal Grandmother     No Known Problems Paternal Grandfather     No Known Problems Paternal Grandmother     No Known Problems Cousin     Heart disease Family     Hypertension Family     Thyroid disease Family     ADD / ADHD Neg Hx     Alcohol abuse Neg Hx     Anxiety disorder Neg Hx     Bipolar disorder Neg Hx     Dementia Neg Hx     Depression Neg Hx     Drug abuse Neg Hx     OCD Neg Hx     Paranoid behavior Neg Hx     Schizophrenia Neg Hx     Seizures Neg Hx     Self-Injury Neg Hx     Suicide Attempts Neg Hx        The following portions of the patient's history were reviewed and updated as appropriate: past family history, past medical history, past social history, past surgical history and problem list     Social History     Social History    Marital status: /Civil Union     Spouse name: N/A    Number of children: N/A    Years of education: N/A     Occupational History    Not on file       Social History Main Topics    Smoking status: Never Smoker    Smokeless tobacco: Never Used      Comment: n/a    Alcohol use 0 6 oz/week     1 Glasses of wine per week      Comment: drinks one fourth of a glass of white wine once a week  (Never drank alcohol per [employer])    Drug use: No    Sexual activity: Not Currently     Other Topics Concern    Not on file     Social History Narrative    No narrative on file     Social History     Social History Narrative    No narrative on file        Social History     Tobacco History     Smoking Status  Never Smoker    Smokeless Tobacco Use  Never Used    Tobacco Comment  n/a          Alcohol History     Alcohol Use Status  Yes Drinks/Week  1 Glasses of wine per week Amount  0 6 oz alcohol/wk Comment  drinks one fourth of a glass of white wine once a week  (Never drank alcohol per [employer])          Drug Use     Drug Use Status  No          Sexual Activity     Sexually Active  Not Currently          Activities of Daily Living    Not Asked               Additional Substance Use Detail     Questions Responses    Substance Use Assessment Denies substance use within the past 12 months    Alcohol Use Frequency Denies use in past 12 months    Cannabis frequency Denies use in past 12 months    Heroin Frequency Denies use in past 12 months    Cocaine frequency Denies past use in past 12 months    Crack Cocaine Frequency Denies use in past 12 months    Methamphetamine Frequency Denies use in past 12 months    Crack Cocaine Method Other    Crack Cocaine 1st Use denies    Narcotic Frequency Denies use in past 12 months    Benzodiazepine Frequency Denies use in past 12 months    Amphetamine frequency Denies use in past 12 months    Barbituate Frequency Denies use use in past 12 months    Inhalant frequency Denies use in past 12 months    Hallucinogen frequency Denies use in past 12 months    Ecstasy frequency Denies use past 12 months    Other drug frequency Denies use in past 12 months    Opiate frequency Denies use in past 12 months    Not reviewed              OBJECTIVE:     Mental Status Evaluation:    Appearance age appropriate, casually dressed   Behavior pleasant, cooperative   Speech normal volume, normal pitch   Mood Depressed   Affect Blunted   Thought Processes logical   Associations intact associations   Thought Content Normal   Perceptual Disturbances: Visual Hallucinations at times the   Abnormal Thoughts  Risk Potential Suicidal ideation - None  Homicidal ideation - None  Potential for aggression - No   Orientation oriented to person, place, time/date and situation   Memory recent and remote memory With cognitive decline   Cosciousness alert and awake   Attention Span attention span and concentration are age appropriate   Intellect Appears to be of Average Intelligence   Insight age appropriate and improved   Judgement Limited   Muscle Strength and  Gait Use wc   Language no difficulty naming common objects   Fund of Knowledge displays adequate knowledge of current events   Pain none   Pain Scale 2       Assessment/Plan:       Diagnoses and all orders for this visit:    Major depression with psychotic features (HCC)  -     DULoxetine (CYMBALTA) 20 mg capsule; Take 1 capsule (20 mg total) by mouth daily Take at noon for 2 weeks and stop  -     escitalopram (LEXAPRO) 10 mg tablet; Take 0 5 tablets (5 mg total) by mouth daily Take 1/2 tab in AM for 2 weeks then whole tab in AM    DYANA (generalized anxiety disorder)  -     ALPRAZolam (XANAX) 0 5 mg tablet; Take 1 tablet (0 5 mg total) by mouth 4 (four) times a day as needed for anxiety  -     busPIRone (BUSPAR) 15 mg tablet; Take 0 5 tablets (7 5 mg total) by mouth 2 (two) times a day    Dementia associated with other underlying disease without behavioral disturbance  -     donepezil (ARICEPT) 10 mg tablet; Take 1 tablet (10 mg total) by mouth daily at bedtime    Sialorrhea  -     glycopyrrolate (ROBINUL) 1 mg tablet; Take 1 tablet (1 mg total) by mouth 3 (three) times a day          Treatment Recommendations/Precautions:    Continue current medications:  Cymbalta 20 mg daily for 2 weeks and stop  Lexapro 5 mg daily for 2 weeks and increase to 10 mg daily next Aricept 10 mg at bedtime , Xanax 0 5 mg q i d  P r n  Follow-up in 6 weeks or sooner if necessary  Risks/Benefits      Risks, Benefits And Possible Side Effects Of Medications:    Risks, benefits, and possible side effects of medications explained to patient and patient verbalizes understanding and agreement for treatment      Controlled Medication Discussion:     No overuse or Abuse    Psychotherapy Provided:     Individual psychotherapy provided: No

## 2019-01-02 DIAGNOSIS — F41.1 GAD (GENERALIZED ANXIETY DISORDER): ICD-10-CM

## 2019-01-02 RX ORDER — BUSPIRONE HYDROCHLORIDE 15 MG/1
7.5 TABLET ORAL 2 TIMES DAILY
Qty: 180 TABLET | Refills: 0 | Status: SHIPPED | OUTPATIENT
Start: 2019-01-02 | End: 2019-03-13

## 2019-01-22 DIAGNOSIS — E03.9 HYPOTHYROIDISM, UNSPECIFIED TYPE: Primary | Chronic | ICD-10-CM

## 2019-01-22 DIAGNOSIS — R73.9 HYPERGLYCEMIA: ICD-10-CM

## 2019-01-22 DIAGNOSIS — K21.9 GASTROESOPHAGEAL REFLUX DISEASE WITHOUT ESOPHAGITIS: Chronic | ICD-10-CM

## 2019-01-22 DIAGNOSIS — M81.0 OSTEOPOROSIS WITHOUT CURRENT PATHOLOGICAL FRACTURE, UNSPECIFIED OSTEOPOROSIS TYPE: ICD-10-CM

## 2019-01-22 DIAGNOSIS — E78.5 HYPERLIPIDEMIA, UNSPECIFIED HYPERLIPIDEMIA TYPE: Chronic | ICD-10-CM

## 2019-01-22 RX ORDER — LEVOTHYROXINE SODIUM 0.07 MG/1
TABLET ORAL
Qty: 90 TABLET | Refills: 3 | OUTPATIENT
Start: 2019-01-22

## 2019-01-23 PROBLEM — J30.9 ALLERGIC RHINITIS: Status: ACTIVE | Noted: 2017-02-28

## 2019-01-23 NOTE — TELEPHONE ENCOUNTER
Pt's  notified and has scheduled appts with you for pt and himself for 2/21/19  Do you want to order her bloodwork now so it can be done before the appt?

## 2019-02-11 ENCOUNTER — APPOINTMENT (OUTPATIENT)
Dept: LAB | Facility: MEDICAL CENTER | Age: 74
End: 2019-02-11
Payer: MEDICARE

## 2019-02-11 DIAGNOSIS — E03.9 HYPOTHYROIDISM, UNSPECIFIED TYPE: ICD-10-CM

## 2019-02-11 DIAGNOSIS — R73.9 HYPERGLYCEMIA: ICD-10-CM

## 2019-02-11 DIAGNOSIS — E78.5 HYPERLIPIDEMIA, UNSPECIFIED HYPERLIPIDEMIA TYPE: ICD-10-CM

## 2019-02-11 DIAGNOSIS — M81.0 OSTEOPOROSIS WITHOUT CURRENT PATHOLOGICAL FRACTURE, UNSPECIFIED OSTEOPOROSIS TYPE: ICD-10-CM

## 2019-02-11 DIAGNOSIS — K21.9 GASTROESOPHAGEAL REFLUX DISEASE WITHOUT ESOPHAGITIS: ICD-10-CM

## 2019-02-11 LAB
25(OH)D3 SERPL-MCNC: 42.9 NG/ML (ref 30–100)
ALBUMIN SERPL BCP-MCNC: 3.5 G/DL (ref 3.5–5)
ALP SERPL-CCNC: 84 U/L (ref 46–116)
ALT SERPL W P-5'-P-CCNC: 22 U/L (ref 12–78)
ANION GAP SERPL CALCULATED.3IONS-SCNC: 4 MMOL/L (ref 4–13)
AST SERPL W P-5'-P-CCNC: 33 U/L (ref 5–45)
BACTERIA UR QL AUTO: ABNORMAL /HPF
BILIRUB SERPL-MCNC: 0.63 MG/DL (ref 0.2–1)
BILIRUB UR QL STRIP: NEGATIVE
BUN SERPL-MCNC: 18 MG/DL (ref 5–25)
CALCIUM SERPL-MCNC: 9.2 MG/DL (ref 8.3–10.1)
CAOX CRY URNS QL MICRO: ABNORMAL /HPF
CHLORIDE SERPL-SCNC: 104 MMOL/L (ref 100–108)
CHOLEST SERPL-MCNC: 129 MG/DL (ref 50–200)
CLARITY UR: ABNORMAL
CO2 SERPL-SCNC: 30 MMOL/L (ref 21–32)
COLOR UR: YELLOW
CREAT SERPL-MCNC: 0.78 MG/DL (ref 0.6–1.3)
ERYTHROCYTE [DISTWIDTH] IN BLOOD BY AUTOMATED COUNT: 13.4 % (ref 11.6–15.1)
EST. AVERAGE GLUCOSE BLD GHB EST-MCNC: 128 MG/DL
GFR SERPL CREATININE-BSD FRML MDRD: 75 ML/MIN/1.73SQ M
GLUCOSE P FAST SERPL-MCNC: 115 MG/DL (ref 65–99)
GLUCOSE UR STRIP-MCNC: NEGATIVE MG/DL
HBA1C MFR BLD: 6.1 % (ref 4.2–6.3)
HCT VFR BLD AUTO: 49.6 % (ref 34.8–46.1)
HDLC SERPL-MCNC: 42 MG/DL (ref 40–60)
HGB BLD-MCNC: 16.1 G/DL (ref 11.5–15.4)
HGB UR QL STRIP.AUTO: NEGATIVE
KETONES UR STRIP-MCNC: NEGATIVE MG/DL
LDLC SERPL CALC-MCNC: 58 MG/DL (ref 0–100)
LEUKOCYTE ESTERASE UR QL STRIP: ABNORMAL
MCH RBC QN AUTO: 31.1 PG (ref 26.8–34.3)
MCHC RBC AUTO-ENTMCNC: 32.5 G/DL (ref 31.4–37.4)
MCV RBC AUTO: 96 FL (ref 82–98)
NITRITE UR QL STRIP: NEGATIVE
NON-SQ EPI CELLS URNS QL MICRO: ABNORMAL /HPF
PH UR STRIP.AUTO: 6 [PH] (ref 4.5–8)
PLATELET # BLD AUTO: 250 THOUSANDS/UL (ref 149–390)
PMV BLD AUTO: 10.8 FL (ref 8.9–12.7)
POTASSIUM SERPL-SCNC: 4 MMOL/L (ref 3.5–5.3)
PROT SERPL-MCNC: 7.4 G/DL (ref 6.4–8.2)
PROT UR STRIP-MCNC: ABNORMAL MG/DL
RBC # BLD AUTO: 5.17 MILLION/UL (ref 3.81–5.12)
RBC #/AREA URNS AUTO: ABNORMAL /HPF
SODIUM SERPL-SCNC: 138 MMOL/L (ref 136–145)
SP GR UR STRIP.AUTO: 1.03 (ref 1–1.03)
T4 SERPL-MCNC: 10.6 UG/DL (ref 4.7–13.3)
TRIGL SERPL-MCNC: 147 MG/DL
TSH SERPL DL<=0.05 MIU/L-ACNC: 2.26 UIU/ML (ref 0.36–3.74)
UROBILINOGEN UR QL STRIP.AUTO: 0.2 E.U./DL
WBC # BLD AUTO: 6.08 THOUSAND/UL (ref 4.31–10.16)
WBC #/AREA URNS AUTO: ABNORMAL /HPF

## 2019-02-11 PROCEDURE — 81001 URINALYSIS AUTO W/SCOPE: CPT

## 2019-02-11 PROCEDURE — 80061 LIPID PANEL: CPT

## 2019-02-11 PROCEDURE — 36415 COLL VENOUS BLD VENIPUNCTURE: CPT

## 2019-02-11 PROCEDURE — 84443 ASSAY THYROID STIM HORMONE: CPT

## 2019-02-11 PROCEDURE — 84436 ASSAY OF TOTAL THYROXINE: CPT

## 2019-02-11 PROCEDURE — 80053 COMPREHEN METABOLIC PANEL: CPT

## 2019-02-11 PROCEDURE — 85027 COMPLETE CBC AUTOMATED: CPT

## 2019-02-11 PROCEDURE — 82306 VITAMIN D 25 HYDROXY: CPT

## 2019-02-11 PROCEDURE — 83036 HEMOGLOBIN GLYCOSYLATED A1C: CPT

## 2019-02-21 ENCOUNTER — OFFICE VISIT (OUTPATIENT)
Dept: FAMILY MEDICINE CLINIC | Facility: CLINIC | Age: 74
End: 2019-02-21
Payer: MEDICARE

## 2019-02-21 VITALS — HEART RATE: 80 BPM | SYSTOLIC BLOOD PRESSURE: 142 MMHG | DIASTOLIC BLOOD PRESSURE: 80 MMHG

## 2019-02-21 DIAGNOSIS — E03.9 HYPOTHYROIDISM, UNSPECIFIED TYPE: Primary | Chronic | ICD-10-CM

## 2019-02-21 DIAGNOSIS — F41.1 ANXIETY, GENERALIZED: ICD-10-CM

## 2019-02-21 DIAGNOSIS — M81.0 OSTEOPOROSIS WITHOUT CURRENT PATHOLOGICAL FRACTURE, UNSPECIFIED OSTEOPOROSIS TYPE: ICD-10-CM

## 2019-02-21 DIAGNOSIS — R73.9 HYPERGLYCEMIA: ICD-10-CM

## 2019-02-21 DIAGNOSIS — G20 PARKINSON DISEASE (HCC): Chronic | ICD-10-CM

## 2019-02-21 DIAGNOSIS — E78.5 HYPERLIPIDEMIA, UNSPECIFIED HYPERLIPIDEMIA TYPE: Chronic | ICD-10-CM

## 2019-02-21 DIAGNOSIS — F06.31 MOOD DISORDER WITH MAJOR DEPRESSIVE-LIKE EPISODE DUE TO GENERAL MEDICAL CONDITION: Chronic | ICD-10-CM

## 2019-02-21 DIAGNOSIS — Z00.00 HEALTH CARE MAINTENANCE: ICD-10-CM

## 2019-02-21 DIAGNOSIS — F32.3 MAJOR DEPRESSION WITH PSYCHOTIC FEATURES (HCC): ICD-10-CM

## 2019-02-21 PROBLEM — H61.23 BILATERAL IMPACTED CERUMEN: Status: RESOLVED | Noted: 2018-05-26 | Resolved: 2019-02-21

## 2019-02-21 PROCEDURE — 99214 OFFICE O/P EST MOD 30 MIN: CPT | Performed by: FAMILY MEDICINE

## 2019-02-21 PROCEDURE — G0439 PPPS, SUBSEQ VISIT: HCPCS | Performed by: FAMILY MEDICINE

## 2019-02-21 RX ORDER — LEVOTHYROXINE SODIUM 0.07 MG/1
75 TABLET ORAL DAILY
Qty: 90 TABLET | Refills: 3 | Status: SHIPPED | OUTPATIENT
Start: 2019-02-21 | End: 2019-11-13 | Stop reason: SDUPTHER

## 2019-02-21 NOTE — PROGRESS NOTES
Assessment and Plan:  1  Hypothyroidism, stable continue present therapy  2  Parkinson's disease, stable patient follows with Neurology  3  Hyperlipidemia, stable continue present therapy  4  Hyperglycemia diet controlled  5  Mood disorder/depression/anxiety, stable patient follows with Psychiatry  6  Gait disorder patient's wheelchair-bound  7  Osteoporosis,  patient declines further DEXA scan  8  Health care maintenance, Medicare questionnaire discussed see 2nd note of today  9  Patient return in 6 months for office visit blood work     Problem List Items Addressed This Visit        Endocrine    Hypothyroidism - Primary (Chronic)     Stable refill of levothyroxine given         Relevant Medications    levothyroxine 75 mcg tablet       Nervous and Auditory    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    Major depression with psychotic features ([address] )    Osteoporosis without current pathological fracture, unspecified osteoporosis type    Hyperlipidemia, unspecified hyperlipidemia type    Hyperglycemia    Anxiety, generalized    Health care maintenance              Subjective:      Patient ID: Portia Pollack is a 74 y o  female  CC:    Chief Complaint   Patient presents with    Follow-up     to review bw results    Constipation     only goes in little balls       HPI:    Patient is stable patient is wheelchair-bound this is not changed  Blood work was discussed with the patient and her   Patient did complain of some tongue pain  However  says she does see the dentist for this  Weight and height were unavailable as patient is wheelchair-bound      The following portions of the patient's history were reviewed and updated as appropriate: allergies, current medications, past family history, past medical history, past social history, past surgical history and problem list       Review of Systems   Constitutional: Negative  HENT:        HPI   Eyes: Negative  Respiratory: Negative  Cardiovascular: Negative  Gastrointestinal:        Patient has had chronic constipation for 30 years without change  Patient  declined screening colonoscopy   Endocrine: Negative  Genitourinary: Negative  Musculoskeletal:        Wheelchair-bound   Skin: Negative  Allergic/Immunologic: Negative  Neurological:        Wheelchair-bound   Hematological: Negative  Psychiatric/Behavioral: Negative  Data to review:       Objective:    Vitals:    02/21/19 1250   BP: 142/80   Pulse: 80        Physical Exam   Constitutional: She appears well-developed and well-nourished  HENT:   Head: Normocephalic and atraumatic  Right Ear: External ear normal    Left Ear: External ear normal    Nose: Nose normal    Mouth/Throat: Oropharynx is clear and moist  No oropharyngeal exudate  Eyes: Pupils are equal, round, and reactive to light  Conjunctivae are normal  No scleral icterus  Neck: Normal range of motion  Neck supple  No JVD present  No tracheal deviation present  No thyromegaly present  Cardiovascular: Normal rate, regular rhythm and normal heart sounds     Pulmonary/Chest: Effort normal and breath sounds normal    Abdominal: Soft  Bowel sounds are normal  There is no tenderness  Musculoskeletal: She exhibits no edema  Wheelchair-bound   Lymphadenopathy:     She has no cervical adenopathy  Neurological: She is alert  Positive confused positive wheelchair-bound   Skin: Skin is warm and dry  No rash noted  Psychiatric: She has a normal mood and affect

## 2019-02-21 NOTE — PROGRESS NOTES
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)     Stable continue present therapy         Relevant Orders    CBC    Comprehensive metabolic panel    Hemoglobin A1C    Lipid Panel with Direct LDL reflex    T4    TSH, 3rd generation with Free T4 reflex    Urinalysis with reflex to microscopic    Vitamin D 25 hydroxy    Parkinson disease (HCC) (Chronic)     Patient follows with Neurology         Relevant Orders    CBC    Comprehensive metabolic panel    Hemoglobin A1C    Lipid Panel with Direct LDL reflex    T4    TSH, 3rd generation with Free T4 reflex    Urinalysis with reflex to microscopic    Vitamin D 25 hydroxy       Musculoskeletal and Integument    Osteoporosis      declines DEXA scan         Relevant Orders    CBC    Comprehensive metabolic panel    Hemoglobin A1C    Lipid Panel with Direct LDL reflex    T4    TSH, 3rd generation with Free T4 reflex    Urinalysis with reflex to microscopic    Vitamin D 25 hydroxy       Other    Hyperlipidemia (Chronic)     Stable continue present therapy         Relevant Orders    CBC    Comprehensive metabolic panel    Hemoglobin A1C    Lipid Panel with Direct LDL reflex    T4    TSH, 3rd generation with Free T4 reflex    Urinalysis with reflex to microscopic    Vitamin D 25 hydroxy    Anxiety, generalized    Relevant Orders    CBC    Comprehensive metabolic panel    Hemoglobin A1C    Lipid Panel with Direct LDL reflex    T4    TSH, 3rd generation with Free T4 reflex    Urinalysis with reflex to microscopic    Vitamin D 25 hydroxy    Hyperglycemia     Diet controlled         Relevant Orders    CBC    Comprehensive metabolic panel    Hemoglobin A1C    Lipid Panel with Direct LDL reflex    T4    TSH, 3rd generation with Free T4 reflex    Urinalysis with reflex to microscopic    Vitamin D 25 hydroxy    Major depression with psychotic features (HCC)    Relevant Orders    CBC    Comprehensive metabolic panel    Hemoglobin A1C    Lipid Panel with Direct LDL reflex    T4    TSH, 3rd generation with Free T4 reflex    Urinalysis with reflex to microscopic    Vitamin D 25 hydroxy    Health care maintenance     Medicare questionnaire completed with help from              Health Maintenance Due   Topic Date Due    Medicare Annual Wellness Visit (AWV)  1945    CRC Screening: Colonoscopy  1945    BMI: Adult  04/11/1961    DTaP,Tdap,and Td Vaccines (1 - Tdap) 09/23/2016         HPI:  Astrid Bhatia is a 74 y o  female here for her Subsequent Wellness Visit      Patient Active Problem List   Diagnosis    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    Hypothyroidism    Hyperlipidemia    Parkinson disease ([address] )    GERD (gastroesophageal reflux disease)    Sialorrhea    Acute diffuse otitis externa of left ear    Allergic rhinitis    Anxiety, generalized    Hyperglycemia    Osteoporosis    Major depression with psychotic features ([facility] [address] )   [facility]     Past Medical History:   Diagnosis Date    Abdominal pain, suprapubic 07/03/2014    Resolved:  November 22, 2016    Abscess of skin and subcutaneous tissue 11/21/2016    Resolved:  February 28, 2017    Anxiety     Burning with urination 12/14/2016    Resolved:  February 28, 2017    Conjunctivitis 06/10/2013    Depression     Depression     Dysuria 06/06/2016    Resolved:  February 28 2017    Falling 10/18/2012    GERD (gastroesophageal reflux disease)     Groin pain 04/16/2015    Resolved:  February 28, 2017    Hyperlipidemia     Hypothyroid     Influenza 04/05/2013    Leg abrasion 09/22/2016    Resolved:  February 28, 2017    Parkinson's disease [facility])     Psychiatric illness     Rectal pain 10/17/2013    Resolved:  February 28, 2017    Thyroid disease      Past Surgical History:   Procedure Laterality Date    APPENDECTOMY      Age 4 or 3    CLOSED REDUCTION NASAL FRACTURE      CYSTOSCOPY  01/28/2014    RENETTA Hendricks   (Diagnostic)    DEEP BRAIN STIMULATOR PLACEMENT      LAMINECTOMY      Decompress, Facetectomy, Foraminotomy Lumbar Seg    ORIF HIP FRACTURE Left     About 3 years ago    TONSILLECTOMY       Family History   Problem Relation Age of Onset    Heart attack Mother         Acute Myocardial Infarction (MI)    Diabetes Mother         Diabetes Mellitus    Stroke Father         Syndrome    No Known Problems Brother     No Known Problems Maternal Aunt     No Known Problems Paternal Aunt     No Known Problems Maternal Uncle     No Known Problems Paternal Uncle     No Known Problems Maternal Grandfather     No Known Problems Maternal Grandmother     No Known Problems Paternal Grandfather     No Known Problems Paternal Grandmother     No Known Problems Cousin     Heart disease Family     Hypertension Family     Thyroid disease Family     ADD / ADHD Neg Hx     Alcohol abuse Neg Hx     Anxiety disorder Neg Hx     Bipolar disorder Neg Hx     Dementia Neg Hx     Depression Neg Hx     Drug abuse Neg Hx     OCD Neg Hx     Paranoid behavior Neg Hx     Schizophrenia Neg Hx     Seizures Neg Hx     Self-Injury Neg Hx     Suicide Attempts Neg Hx      Social History     Tobacco Use   Smoking Status Never Smoker   Smokeless Tobacco Never Used   Tobacco Comment    n/a     Social History     Substance and Sexual Activity   Alcohol Use Yes    Alcohol/week: 0 6 oz    Types: 1 Glasses of wine per week    Comment: drinks one fourth of a glass of white wine once a week  (Never drank alcohol per [employer])      Social History     Substance and Sexual Activity   Drug Use No       Current Outpatient Medications   Medication Sig Dispense Refill    acetaminophen (TYLENOL) 500 mg tablet Take 500 mg by mouth  One throughout the night as needed      ALPRAZolam (XANAX) 0 5 mg tablet Take 1 tablet (0 5 mg total) by mouth 4 (four) times a day as needed for anxiety 360 tablet 1    ascorbic acid (VITAMIN C) 500 mg tablet Take 500 mg by mouth 2 (two) times a day   aspirin 81 MG tablet Take 81 mg by mouth  Three times a week M/W/F      busPIRone (BUSPAR) 15 mg tablet Take 0 5 tablets (7 5 mg total) by mouth 2 (two) times a day 180 tablet 0    carbidopa-levodopa (SINEMET)  mg per tablet Take 1 tablet by mouth 2 (two) times a day   donepezil (ARICEPT) 10 mg tablet Take 1 tablet (10 mg total) by mouth daily at bedtime 90 tablet 2    escitalopram (LEXAPRO) 10 mg tablet Take 0 5 tablets (5 mg total) by mouth daily Take 1/2 tab in AM for 2 weeks then whole tab in AM 30 tablet 2    glycopyrrolate (ROBINUL) 1 mg tablet Take 1 tablet (1 mg total) by mouth 3 (three) times a day 270 tablet 2    levothyroxine 75 mcg tablet Take 1 tablet (75 mcg total) by mouth daily 90 tablet 3    Multiple Vitamin (MULTIVITAMIN) tablet Take 1 tablet by mouth daily   NON FORMULARY Take 1 tablet by mouth 2 (two) times a day  Calcium- D3-600mg      phenazopyridine (PYRIDIUM) 200 mg tablet Take 200 mg by mouth as needed for bladder spasms   Probiotic Product (PROBIOTIC DAILY PO) Take 1 tablet by mouth daily   ranitidine (ZANTAC) 150 mg tablet Take 150 mg by mouth daily   simvastatin (ZOCOR) 20 mg tablet Take 20 mg by mouth daily after dinner  5:00/5:30      Vitamin D, Cholecalciferol, 1000 UNITS CAPS Take 1 tablet by mouth daily  No current facility-administered medications for this visit  Allergies   Allergen Reactions    Gabapentin      Other reaction(s): Hypotension    Bupropion GI Intolerance and Other (See Comments)     Other reaction(s): Tremor    Cephalexin     Duloxetine      Other reaction(s): Other (see comments)  unkown  unknown      Erythromycin GI Intolerance    Methadone Hallucinations    Mirtazapine GI Intolerance    Paroxetine GI Intolerance    Sulfa Antibiotics     Tricyclic Antidepressants      Other reaction(s): Other    Acetazolamide Rash    Levofloxacin Anxiety and Other (See Comments)     Immunization History   Administered Date(s) Administered    Influenza Quadrivalent Preservative Free 3 years and older IM 10/03/2014, 10/21/2015    Influenza Split High Dose Preservative Free IM 09/22/2016, 10/10/2017    Influenza TIV (IM) 10/18/2012, 09/25/2013    Influenza, high dose seasonal 0 5 mL 10/10/2018    Pneumococcal Conjugate 13-Valent 10/06/2017    Pneumococcal Polysaccharide PPV23 09/22/2016    Td (adult), adsorbed 09/22/2016       Patient Care Team:  Priyanka Reyes MD as PCP - General (Family Medicine)    Medicare Screening Tests and Risk Assessments:  Naty Espinoza is here for her Subsequent Wellness visit  Health Risk Assessment:  Patient rates overall health as fair  Patient feels that their physical health rating is Slightly worse  Eyesight was rated as Same  Hearing was rated as Same  Patient feels that their emotional and mental health rating is Same  Pain experienced by patient in the last 7 days has been Some  Patient's pain rating has been 2/10  Patient states that she has experienced no weight loss or gain in last 6 months  Emotional/Mental Health:  Patient has been feeling nervous/anxious  PHQ-9 Depression Screening:    Frequency of the following problems over the past two weeks:      1  Little interest or pleasure in doing things: 0 - not at all      2   Feeling down, depressed, or hopeless: 0 - not at all  PHQ-2 Score: 0          Broken Bones/Falls: Fall Risk Assessment:    In the past year, patient has experienced: No history of falling in past year          Bladder/Bowel:  Patient has not leaked urine accidently in the last six months  Patient reports no loss of bowel control  Immunizations:  Patient has had a flu vaccination within the last year  Patient has received a pneumonia shot  Patient has not received a shingles shot  Patient has not received tetanus/diphtheria shot  Home Safety:  Patient has trouble with stairs inside or outside of their home  Patient currently reports that there are no safety hazards present in home, working smoke alarms, no working carbon monoxide detectors  (Additional Comments: Patient's wheelchair-bound instructed to get working carbon monoxide ID doctors)    Nutrition:  Current diet: Low Cholesterol with servings of the following:    Medications:  Patient is not currently taking any over-the-counter supplements  Patient is not able to manage medications  (Additional Comments: Patient's  takes care patient's medications)Lifestyle Choices:  Patient reports no tobacco use  Patient reports no alcohol use  Patient does not drive a vehicle  Patient wears seat belt  (Additional Comments: Wheelchair-bound)    Activities of Daily Living:  Unable to get out of bed by his or her self, unable to dress self, unable to make own meals, unable to do own shopping, unable to bathe self, unable to do laundry/housekeeping, unable to manage own money and other related tasksAdditional Comments: Patient has Parkinson's as wheelchair-bound patient's  helps her with all ADLs    Advanced Directives:  Patient has decided on a power of   Patient has spoken to designated power of   Patient has completed advanced directive          Preventative Screening/Counseling:      Cardiovascular:      General: Screening Current          Diabetes:      General: Screening Current          Colorectal Cancer:      General: Patient Declines      Comments: Offered colonoscopy patient  declined        Breast Cancer:      General: Patient Declines      Comments: I offered mammography patient  declined        Osteoporosis:      General: Patient Declines      Comments: Offer DEXA scan patient  declined        AAA:      General: Patient Declines          Glaucoma:      General: Patient Declines          HIV:      General: Patient Declines          Hepatitis C:      General: Patient Declines        Advanced Directives:   Patient has living will for healthcare, has durable POA for healthcare, patient has an advanced directive  Information on ACP and/or AD provided  No 5 wishes given  End of life assessment reviewed with patient  Provider agrees with end of life decisions

## 2019-03-13 ENCOUNTER — OFFICE VISIT (OUTPATIENT)
Dept: PSYCHIATRY | Facility: CLINIC | Age: 74
End: 2019-03-13
Payer: MEDICARE

## 2019-03-13 DIAGNOSIS — F41.1 GAD (GENERALIZED ANXIETY DISORDER): ICD-10-CM

## 2019-03-13 DIAGNOSIS — F32.3 MAJOR DEPRESSION WITH PSYCHOTIC FEATURES (HCC): ICD-10-CM

## 2019-03-13 PROCEDURE — 99214 OFFICE O/P EST MOD 30 MIN: CPT | Performed by: NURSE PRACTITIONER

## 2019-03-13 RX ORDER — BUSPIRONE HYDROCHLORIDE 15 MG/1
7.5 TABLET ORAL DAILY
Qty: 180 TABLET | Refills: 0 | OUTPATIENT
Start: 2019-03-13 | End: 2019-03-29 | Stop reason: SDUPTHER

## 2019-03-13 RX ORDER — ESCITALOPRAM OXALATE 10 MG/1
TABLET ORAL
Qty: 180 TABLET | Refills: 2 | Status: SHIPPED | OUTPATIENT
Start: 2019-03-13 | End: 2019-04-22

## 2019-03-13 NOTE — PSYCH
PROGRESS NOTE        Troy Gutiérrez      Name and Date of Birth:  Jennifer Blackman 74 y o  1945    Date of Visit: 03/13/19    SUBJECTIVE:    David Jeff continues with Parkinson's disease which is advancing  She comes here for treatment of major depressive disorder recurrent  Her  is asking if there is something we can give her to give her a little more energy and open her eyes during the day  I told him that the Lexapro that we just started is to help with depression and hopefully give her a little bit of energy but he is not seeing any improvement as of yet  So we will increase the dose up to 20 mg per day which I told him is the maximum and will eliminate some of the daytime medication which may be contributing to her  lethargy  We will reduce the dose of BuSpar and the dose of alprazolam and monitor  Will have her come back in 6 weeks or sooner if necessary  She denies suicidal ideation, intent or plan at present, has no suicidal ideation, intent or plan at present  She denies any auditory hallucinations and visual hallucinations, denies any other delusional thinking, denies any delusional thinking  She denies any side effects from medications    HPI ROS Appetite Changes and Sleep: normal appetite, normal sleep    Review Of Systems:      Constitutional Negative   ENT Negative   Cardiovascular Negative   Respiratory As Noted in HPI   Gastrointestinal Negative   Genitourinary Negative   Musculoskeletal Negative   Integumentary Negative   Neurological Negative   Endocrine Negative   Other Symptoms Negative and None       Laboratory Results: No results found for this or any previous visit      Substance Abuse History:    Social History     Substance and Sexual Activity   Drug Use No       Family Psychiatric History:     Family History   Problem Relation Age of Onset    Heart attack Mother         Acute Myocardial Infarction (MI)    Diabetes Mother         Diabetes Mellitus    Stroke Father         Syndrome    No Known Problems Brother     No Known Problems Maternal Aunt     No Known Problems Paternal Aunt     No Known Problems Maternal Uncle     No Known Problems Paternal Uncle     No Known Problems Maternal Grandfather     No Known Problems Maternal Grandmother     No Known Problems Paternal Grandfather     No Known Problems Paternal Grandmother     No Known Problems Cousin     Heart disease Family     Hypertension Family     Thyroid disease Family     ADD / ADHD Neg Hx     Alcohol abuse Neg Hx     Anxiety disorder Neg Hx     Bipolar disorder Neg Hx     Dementia Neg Hx     Depression Neg Hx     Drug abuse Neg Hx     OCD Neg Hx     Paranoid behavior Neg Hx     Schizophrenia Neg Hx     Seizures Neg Hx     Self-Injury Neg Hx     Suicide Attempts Neg Hx        The following portions of the patient's history were reviewed and updated as appropriate: past family history, past medical history, past social history, past surgical history and problem list     Social History     Socioeconomic History    Marital status: /Civil Union     Spouse name: Not on file    Number of children: Not on file    Years of education: Not on file    Highest education level: Not on file   Occupational History    Not on file   Social Needs    Financial resource strain: Not on file    Food insecurity:     Worry: Not on file     Inability: Not on file    Transportation needs:     Medical: Not on file     Non-medical: Not on file   Tobacco Use    Smoking status: Never Smoker    Smokeless tobacco: Never Used    Tobacco comment: n/a   Substance and Sexual Activity    Alcohol use:  Yes     Alcohol/week: 0 6 oz     Types: 1 Glasses of wine per week     Comment: drinks one fourth of a glass of white wine once a week  (Never drank alcohol per [employer])    Drug use: No    Sexual activity: Not Currently   Lifestyle    Physical activity:     Days per week: Not on file     Minutes per session: Not on file    Stress: Not on file   Relationships    Social connections:     Talks on phone: Not on file     Gets together: Not on file     Attends Ten[employer] service: Not on file     Active member of club or organization: Not on file     Attends meetings of clubs or organizations: Not on file     Relationship status: Not on file    Intimate partner violence:     Fear of current or ex partner: Not on file     Emotionally abused: Not on file     Physically abused: Not on file     Forced sexual activity: Not on file   Other Topics Concern    Not on file   Social History Narrative    Not on file     Social History     Social History Narrative    Not on file        Social History     Tobacco History     Smoking Status  Never Smoker    Smokeless Tobacco Use  Never Used    Tobacco Comment  n/a          Alcohol History     Alcohol Use Status  Yes Drinks/Week  1 Glasses of wine per week Amount  0 6 oz alcohol/wk Comment  drinks one fourth of a glass of white wine once a week  (Never drank alcohol per [employer])          Drug Use     Drug Use Status  No          Sexual Activity     Sexually Active  Not Currently          Activities of Daily Living    Not Asked               Additional Substance Use Detail     Questions Responses    Substance Use Assessment Denies substance use within the past 12 months    Alcohol Use Frequency Denies use in past 12 months    Cannabis frequency Denies use in past 12 months    Heroin Frequency Denies use in past 12 months    Cocaine frequency Denies past use in past 12 months    Crack Cocaine Frequency Denies use in past 12 months    Methamphetamine Frequency Denies use in past 12 months    Crack Cocaine Method Other    Crack Cocaine 1st Use denies    Narcotic Frequency Denies use in past 12 months    Benzodiazepine Frequency Denies use in past 12 months    Amphetamine frequency Denies use in past 12 months    Barbituate Frequency Denies use use in past 12 months    Inhalant frequency Denies use in past 12 months    Hallucinogen frequency Denies use in past 12 months    Ecstasy frequency Denies use past 12 months    Other drug frequency Denies use in past 12 months    Opiate frequency Denies use in past 12 months    Not reviewed  OBJECTIVE:     Mental Status Evaluation:    Appearance age appropriate, casually dressed   Behavior pleasant, cooperative   Speech normal volume, normal pitch   Mood Depressed   Affect Dull, Masklike   Thought Processes Slow   Associations Poor   Thought Content No hallucinations   Perceptual Disturbances: None   Abnormal Thoughts  Risk Potential Suicidal ideation - None  Homicidal ideation - None  Potential for aggression - No   Orientation oriented to person   Memory Poor   Cosciousness alert and awake   Attention Span Poor   Intellect Appears to be of Average Intelligence   Insight Limited    Judgement Limited   Muscle Strength and  Gait Poor  Has advanced Parkinson's                   Assessment/Plan:       Diagnoses and all orders for this visit:    Major depression with psychotic features ([facility] [address] )  -     escitalopram (LEXAPRO) 10 mg tablet; 1 BID 9AM and Noon)    DYANA (generalized anxiety disorder)          Treatment Recommendations/Precautions:    Continue current medications:  Increase Lexapro to 20 mg daily  Reduce BuSpar to 7 5 mg daily  Continue Aricept 10 mg daily  Reduce Alprazolam mostly to bedtime only    Risks/Benefits      Risks, Benefits And Possible Side Effects Of Medications:    Risks, benefits, and possible side effects of medications explained to patient and patient verbalizes understanding and agreement for treatment      Controlled Medication Discussion:     Not applicable    Psychotherapy Provided:     Individual psychotherapy provided: No

## 2019-03-17 DIAGNOSIS — F32.3 MAJOR DEPRESSION WITH PSYCHOTIC FEATURES (HCC): ICD-10-CM

## 2019-03-19 RX ORDER — ESCITALOPRAM OXALATE 10 MG/1
TABLET ORAL
Qty: 30 TABLET | Refills: 2 | OUTPATIENT
Start: 2019-03-19

## 2019-03-29 DIAGNOSIS — F41.1 GAD (GENERALIZED ANXIETY DISORDER): ICD-10-CM

## 2019-03-29 RX ORDER — BUSPIRONE HYDROCHLORIDE 15 MG/1
7.5 TABLET ORAL DAILY
Qty: 180 TABLET | Refills: 1 | Status: SHIPPED | OUTPATIENT
Start: 2019-03-29 | End: 2019-03-30 | Stop reason: SDUPTHER

## 2019-03-30 DIAGNOSIS — F41.1 GAD (GENERALIZED ANXIETY DISORDER): ICD-10-CM

## 2019-03-30 RX ORDER — BUSPIRONE HYDROCHLORIDE 7.5 MG/1
TABLET ORAL
Qty: 180 TABLET | Refills: 0 | OUTPATIENT
Start: 2019-03-30

## 2019-03-30 RX ORDER — BUSPIRONE HYDROCHLORIDE 15 MG/1
7.5 TABLET ORAL DAILY
Qty: 135 TABLET | Refills: 1 | Status: SHIPPED | OUTPATIENT
Start: 2019-03-30 | End: 2019-08-29

## 2019-04-22 ENCOUNTER — OFFICE VISIT (OUTPATIENT)
Dept: PSYCHIATRY | Facility: CLINIC | Age: 74
End: 2019-04-22
Payer: MEDICARE

## 2019-04-22 DIAGNOSIS — F32.3 MAJOR DEPRESSION WITH PSYCHOTIC FEATURES (HCC): ICD-10-CM

## 2019-04-22 PROCEDURE — 99213 OFFICE O/P EST LOW 20 MIN: CPT | Performed by: NURSE PRACTITIONER

## 2019-04-22 RX ORDER — ESCITALOPRAM OXALATE 20 MG/1
TABLET ORAL
Qty: 30 TABLET | Refills: 0
Start: 2019-04-22 | End: 2019-12-15 | Stop reason: SDUPTHER

## 2019-08-19 ENCOUNTER — APPOINTMENT (OUTPATIENT)
Dept: LAB | Facility: CLINIC | Age: 74
End: 2019-08-19
Payer: MEDICARE

## 2019-08-19 DIAGNOSIS — G20 PARKINSON DISEASE (HCC): ICD-10-CM

## 2019-08-19 DIAGNOSIS — E03.9 HYPOTHYROIDISM, UNSPECIFIED TYPE: ICD-10-CM

## 2019-08-19 DIAGNOSIS — F06.31 MOOD DISORDER WITH MAJOR DEPRESSIVE-LIKE EPISODE DUE TO GENERAL MEDICAL CONDITION: ICD-10-CM

## 2019-08-19 DIAGNOSIS — M81.0 OSTEOPOROSIS WITHOUT CURRENT PATHOLOGICAL FRACTURE, UNSPECIFIED OSTEOPOROSIS TYPE: ICD-10-CM

## 2019-08-19 DIAGNOSIS — F41.1 ANXIETY, GENERALIZED: ICD-10-CM

## 2019-08-19 DIAGNOSIS — R73.9 HYPERGLYCEMIA: ICD-10-CM

## 2019-08-19 DIAGNOSIS — E78.5 HYPERLIPIDEMIA, UNSPECIFIED HYPERLIPIDEMIA TYPE: ICD-10-CM

## 2019-08-19 DIAGNOSIS — F32.3 MAJOR DEPRESSION WITH PSYCHOTIC FEATURES (HCC): ICD-10-CM

## 2019-08-19 LAB
25(OH)D3 SERPL-MCNC: 33.4 NG/ML (ref 30–100)
ALBUMIN SERPL BCP-MCNC: 3.3 G/DL (ref 3.5–5)
ALP SERPL-CCNC: 84 U/L (ref 46–116)
ALT SERPL W P-5'-P-CCNC: 26 U/L (ref 12–78)
ANION GAP SERPL CALCULATED.3IONS-SCNC: 6 MMOL/L (ref 4–13)
AST SERPL W P-5'-P-CCNC: 29 U/L (ref 5–45)
BACTERIA UR QL AUTO: ABNORMAL /HPF
BILIRUB SERPL-MCNC: 0.62 MG/DL (ref 0.2–1)
BILIRUB UR QL STRIP: NEGATIVE
BUN SERPL-MCNC: 16 MG/DL (ref 5–25)
CALCIUM SERPL-MCNC: 9.3 MG/DL (ref 8.3–10.1)
CAOX CRY URNS QL MICRO: ABNORMAL /HPF
CHLORIDE SERPL-SCNC: 105 MMOL/L (ref 100–108)
CHOLEST SERPL-MCNC: 121 MG/DL (ref 50–200)
CLARITY UR: ABNORMAL
CO2 SERPL-SCNC: 28 MMOL/L (ref 21–32)
COLOR UR: ABNORMAL
CREAT SERPL-MCNC: 0.84 MG/DL (ref 0.6–1.3)
ERYTHROCYTE [DISTWIDTH] IN BLOOD BY AUTOMATED COUNT: 13.6 % (ref 11.6–15.1)
EST. AVERAGE GLUCOSE BLD GHB EST-MCNC: 137 MG/DL
GFR SERPL CREATININE-BSD FRML MDRD: 68 ML/MIN/1.73SQ M
GLUCOSE P FAST SERPL-MCNC: 162 MG/DL (ref 65–99)
GLUCOSE UR STRIP-MCNC: NEGATIVE MG/DL
HBA1C MFR BLD: 6.4 % (ref 4.2–6.3)
HCT VFR BLD AUTO: 49.4 % (ref 34.8–46.1)
HDLC SERPL-MCNC: 31 MG/DL (ref 40–60)
HGB BLD-MCNC: 16.3 G/DL (ref 11.5–15.4)
HGB UR QL STRIP.AUTO: NEGATIVE
KETONES UR STRIP-MCNC: NEGATIVE MG/DL
LDLC SERPL CALC-MCNC: 42 MG/DL (ref 0–100)
LEUKOCYTE ESTERASE UR QL STRIP: ABNORMAL
MCH RBC QN AUTO: 32.1 PG (ref 26.8–34.3)
MCHC RBC AUTO-ENTMCNC: 33 G/DL (ref 31.4–37.4)
MCV RBC AUTO: 97 FL (ref 82–98)
NITRITE UR QL STRIP: NEGATIVE
NON-SQ EPI CELLS URNS QL MICRO: ABNORMAL /HPF
PH UR STRIP.AUTO: 5.5 [PH]
PLATELET # BLD AUTO: 239 THOUSANDS/UL (ref 149–390)
PMV BLD AUTO: 11 FL (ref 8.9–12.7)
POTASSIUM SERPL-SCNC: 3.9 MMOL/L (ref 3.5–5.3)
PROT SERPL-MCNC: 7.2 G/DL (ref 6.4–8.2)
PROT UR STRIP-MCNC: ABNORMAL MG/DL
RBC # BLD AUTO: 5.08 MILLION/UL (ref 3.81–5.12)
RBC #/AREA URNS AUTO: ABNORMAL /HPF
SODIUM SERPL-SCNC: 139 MMOL/L (ref 136–145)
SP GR UR STRIP.AUTO: 1.02 (ref 1–1.03)
T4 SERPL-MCNC: 5.3 UG/DL (ref 4.7–13.3)
TRIGL SERPL-MCNC: 240 MG/DL
TSH SERPL DL<=0.05 MIU/L-ACNC: 2.79 UIU/ML (ref 0.36–3.74)
UROBILINOGEN UR QL STRIP.AUTO: 0.2 E.U./DL
WBC # BLD AUTO: 7.38 THOUSAND/UL (ref 4.31–10.16)
WBC #/AREA URNS AUTO: ABNORMAL /HPF
WBC CLUMPS # UR AUTO: PRESENT /UL

## 2019-08-19 PROCEDURE — 83036 HEMOGLOBIN GLYCOSYLATED A1C: CPT

## 2019-08-19 PROCEDURE — 82306 VITAMIN D 25 HYDROXY: CPT

## 2019-08-19 PROCEDURE — 36415 COLL VENOUS BLD VENIPUNCTURE: CPT

## 2019-08-19 PROCEDURE — 85027 COMPLETE CBC AUTOMATED: CPT

## 2019-08-19 PROCEDURE — 80053 COMPREHEN METABOLIC PANEL: CPT

## 2019-08-19 PROCEDURE — 84443 ASSAY THYROID STIM HORMONE: CPT

## 2019-08-19 PROCEDURE — 80061 LIPID PANEL: CPT

## 2019-08-19 PROCEDURE — 84436 ASSAY OF TOTAL THYROXINE: CPT

## 2019-08-19 PROCEDURE — 81001 URINALYSIS AUTO W/SCOPE: CPT

## 2019-08-23 DIAGNOSIS — F41.1 GAD (GENERALIZED ANXIETY DISORDER): ICD-10-CM

## 2019-08-24 DIAGNOSIS — F41.1 GAD (GENERALIZED ANXIETY DISORDER): ICD-10-CM

## 2019-08-24 RX ORDER — ALPRAZOLAM 0.5 MG/1
0.5 TABLET ORAL 4 TIMES DAILY PRN
Qty: 360 TABLET | OUTPATIENT
Start: 2019-08-24

## 2019-08-24 RX ORDER — ALPRAZOLAM 0.5 MG/1
0.5 TABLET ORAL 3 TIMES DAILY PRN
Qty: 270 TABLET | Refills: 0 | Status: ON HOLD | OUTPATIENT
Start: 2019-08-24 | End: 2020-02-04 | Stop reason: SDUPTHER

## 2019-08-29 ENCOUNTER — OFFICE VISIT (OUTPATIENT)
Dept: FAMILY MEDICINE CLINIC | Facility: CLINIC | Age: 74
End: 2019-08-29
Payer: MEDICARE

## 2019-08-29 VITALS
HEART RATE: 76 BPM | RESPIRATION RATE: 16 BRPM | WEIGHT: 160 LBS | BODY MASS INDEX: 31.25 KG/M2 | SYSTOLIC BLOOD PRESSURE: 122 MMHG | DIASTOLIC BLOOD PRESSURE: 64 MMHG

## 2019-08-29 DIAGNOSIS — E03.9 HYPOTHYROIDISM, UNSPECIFIED TYPE: Primary | Chronic | ICD-10-CM

## 2019-08-29 DIAGNOSIS — J30.9 ALLERGIC RHINITIS, UNSPECIFIED SEASONALITY, UNSPECIFIED TRIGGER: ICD-10-CM

## 2019-08-29 DIAGNOSIS — G20 PARKINSON DISEASE (HCC): Chronic | ICD-10-CM

## 2019-08-29 DIAGNOSIS — R05.3 CHRONIC COUGH: ICD-10-CM

## 2019-08-29 DIAGNOSIS — R73.9 HYPERGLYCEMIA: ICD-10-CM

## 2019-08-29 DIAGNOSIS — Z74.09 DECREASED AMBULATION STATUS: ICD-10-CM

## 2019-08-29 DIAGNOSIS — F06.31 MOOD DISORDER WITH MAJOR DEPRESSIVE-LIKE EPISODE DUE TO GENERAL MEDICAL CONDITION: Chronic | ICD-10-CM

## 2019-08-29 DIAGNOSIS — M81.0 OSTEOPOROSIS WITHOUT CURRENT PATHOLOGICAL FRACTURE, UNSPECIFIED OSTEOPOROSIS TYPE: ICD-10-CM

## 2019-08-29 DIAGNOSIS — E78.5 HYPERLIPIDEMIA, UNSPECIFIED HYPERLIPIDEMIA TYPE: Chronic | ICD-10-CM

## 2019-08-29 DIAGNOSIS — F32.3 MAJOR DEPRESSION WITH PSYCHOTIC FEATURES (HCC): ICD-10-CM

## 2019-08-29 PROBLEM — H60.312 ACUTE DIFFUSE OTITIS EXTERNA OF LEFT EAR: Status: RESOLVED | Noted: 2018-05-26 | Resolved: 2019-08-29

## 2019-08-29 PROCEDURE — 99214 OFFICE O/P EST MOD 30 MIN: CPT | Performed by: FAMILY MEDICINE

## 2019-08-29 NOTE — ASSESSMENT & PLAN NOTE
Patient borderline diabetic with hemoglobin A1c is 6 4  Peers 6 1  Patient to decrease her intake of sugar and carbohydrates

## 2019-08-29 NOTE — PROGRESS NOTES
Assessment and Plan:   1  Hypothyroid, stable continue present therapy  2  Allergic rhinitis /chronic cough  Check chest x-ray  Atrovent nasal spray as ordered  If not improved in 1-2 weeks return to office consideration be for pulmonary consultation   3  Mood disorder / depression, stable patient follows with Psychiatry  4  Parkinson's disease, wheelchair-bound, patient follows with Neurology  5  Hyperglycemia discussed with patient  hemoglobin A1c is 6 4   6 5 patient would be diabetic patient must decrease the intake of carbohydrates and sugar she will be come diabetic  6  Osteoporosis vitamin-D was normal   7   Patient to return in 6 months for office visit , blood work, and a [address] sooner if needed    Problem List Items Addressed This Visit        Endocrine    Hypothyroidism - Primary (Chronic)      Stable continue present therapy         Relevant Orders    CBC    Comprehensive metabolic panel    Hemoglobin A1C    Lipid Panel with Direct LDL reflex    T4    TSH, 3rd generation with Free T4 reflex    Urinalysis with reflex to microscopic       Respiratory    Allergic rhinitis      Atrovent nasal spray ordered         Relevant Orders    CBC    Comprehensive metabolic panel    Hemoglobin A1C    Lipid Panel with Direct LDL reflex    T4    TSH, 3rd generation with Free T4 reflex    Urinalysis with reflex to microscopic       Nervous and Auditory    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)      Follow-up with Psychiatry         Relevant Orders    CBC    Comprehensive metabolic panel    Hemoglobin A1C    Lipid Panel with Direct LDL reflex    T4    TSH, 3rd generation with Free T4 reflex    Urinalysis with reflex to microscopic    Parkinson disease (HCC) (Chronic)      Patient follows with neurologist         Relevant Orders    CBC    Comprehensive metabolic panel    Hemoglobin A1C    Lipid Panel with Direct LDL reflex    T4    TSH, 3rd generation with Free T4 reflex    Urinalysis with reflex to microscopic       Musculoskeletal and Integument    Osteoporosis       DEXA scan declined,  Vitamin-D normal            Other    Hyperlipidemia (Chronic)      Stable continue present therapy         Relevant Orders    CBC    Comprehensive metabolic panel    Hemoglobin A1C    Lipid Panel with Direct LDL reflex    T4    TSH, 3rd generation with Free T4 reflex    Urinalysis with reflex to microscopic    Hyperglycemia      Patient borderline diabetic with hemoglobin A1c is 6 4  Peers 6 1  Patient to decrease her intake of sugar and carbohydrates  Relevant Orders    CBC    Comprehensive metabolic panel    Hemoglobin A1C    Lipid Panel with Direct LDL reflex    T4    TSH, 3rd generation with Free T4 reflex    Urinalysis with reflex to microscopic    Major depression with psychotic features [facility])      Patient follow up with Psychiatry         Relevant Orders    CBC    Comprehensive metabolic panel    Hemoglobin A1C    Lipid Panel with Direct LDL reflex    T4    TSH, 3rd generation with Free T4 reflex    Urinalysis with reflex to microscopic    Chronic cough      Atrovent ordered as above  Chest x-ray ordered  Decreased ambulation status      Wheelchair-bound                      Diagnoses and all orders for this visit:    Hypothyroidism, unspecified type  -     CBC; Future  -     Comprehensive metabolic panel; Future  -     Hemoglobin A1C; Future  -     Lipid Panel with Direct LDL reflex; Future  -     T4; Future  -     TSH, 3rd generation with Free T4 reflex; Future  -     Urinalysis with reflex to microscopic; Future  -     Cancel: Vitamin D 25 hydroxy; Future    Allergic rhinitis, unspecified seasonality, unspecified trigger  -     CBC; Future  -     Comprehensive metabolic panel; Future  -     Hemoglobin A1C; Future  -     Lipid Panel with Direct LDL reflex; Future  -     T4; Future  -     TSH, 3rd generation with Free T4 reflex;  Future  -     Urinalysis with reflex to microscopic; Future  -     Cancel: Vitamin D 25 hydroxy; Future    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  -     CBC; Future  -     Comprehensive metabolic panel; Future  -     Hemoglobin A1C; Future  -     Lipid Panel with Direct LDL reflex; Future  -     T4; Future  -     TSH, 3rd generation with Free T4 reflex; Future  -     Urinalysis with reflex to microscopic; Future  -     Cancel: Vitamin D 25 hydroxy; Future    Parkinson disease ([place] [address] )  -     CBC; Future  -     Comprehensive metabolic panel; Future  -     Hemoglobin A1C; Future  -     Lipid Panel with Direct LDL reflex; Future  -     T4; Future  -     TSH, 3rd generation with Free T4 reflex; Future  -     Urinalysis with reflex to microscopic; Future  -     Cancel: Vitamin D 25 hydroxy; Future    Hyperlipidemia, unspecified hyperlipidemia type  -     CBC; Future  -     Comprehensive metabolic panel; Future  -     Hemoglobin A1C; Future  -     Lipid Panel with Direct LDL reflex; Future  -     T4; Future  -     TSH, 3rd generation with Free T4 reflex; Future  -     Urinalysis with reflex to microscopic; Future  -     Cancel: Vitamin D 25 hydroxy; Future    Major depression with psychotic features ([place] [address] )  -     CBC; Future  -     Comprehensive metabolic panel; Future  -     Hemoglobin A1C; Future  -     Lipid Panel with Direct LDL reflex; Future  -     T4; Future  -     TSH, 3rd generation with Free T4 reflex; Future  -     Urinalysis with reflex to microscopic; Future  -     Cancel: Vitamin D 25 hydroxy; Future    Hyperglycemia  -     CBC; Future  -     Comprehensive metabolic panel; Future  -     Hemoglobin A1C; Future  -     Lipid Panel with Direct LDL reflex; Future  -     T4; Future  -     TSH, 3rd generation with Free T4 reflex; Future  -     Urinalysis with reflex to microscopic; Future  -     Cancel: Vitamin D 25 hydroxy;  Future    Osteoporosis without current pathological fracture, unspecified osteoporosis type    Chronic cough    Decreased ambulation status              Subjective:      Patient ID: Mima Lane is a 66 y o  female  CC:    Chief Complaint   Patient presents with    Follow-up     pt here for a follow up and to review lab results  IAM Castellanos       HPI:     Patient has been stable with no new medical complaints or concerns  Patient is being seen with her   Patient's  states that she has a cough off and on for the past 3-4 months  He did bring this up with her neurologist   Neurologist hot was Parkinson's but no further workup has been done  Do patient and  deny chest pain shortness with wheeze or hemoptysis negative fever chills  No medication has been taken  Blood work was discussed with the patient and       The following portions of the patient's history were reviewed and updated as appropriate: allergies, current medications, past family history, past medical history, past social history, past surgical history and problem list       Review of Systems   Constitutional:         HPI   HENT: Negative  Eyes: Negative  Respiratory:         HPI   Cardiovascular: Negative  Gastrointestinal: Negative  Endocrine: Negative  Genitourinary: Negative  Musculoskeletal: Negative  Skin: Negative  Allergic/Immunologic: Positive for environmental allergies  Neurological:         Patient's wheelchair-bound, chronically   Hematological: Negative  Psychiatric/Behavioral: Negative  Data to review:       Objective:    Vitals:    08/29/19 1303   BP: 122/64   BP Location: Left arm   Patient Position: Sitting   Cuff Size: Large   Pulse: 76   Resp: 16   Weight: 72 6 kg (160 lb)        Physical Exam   Constitutional: She is oriented to person, place, and time  She appears well-developed and well-nourished  HENT:   Head: Normocephalic and atraumatic     Right Ear: External ear normal    Left Ear: External ear normal    Mouth/Throat: No oropharyngeal exudate  Positive allergic turbinates positive clear postnasal drip negative pharyngeal injection or exudate   Eyes: Pupils are equal, round, and reactive to light  Conjunctivae and EOM are normal  No scleral icterus  Neck: Neck supple  No JVD present  Cardiovascular: Normal rate, regular rhythm and normal heart sounds  Pulmonary/Chest: Effort normal and breath sounds normal    Abdominal: Soft  Bowel sounds are normal  There is no tenderness  Musculoskeletal: She exhibits no edema  Lymphadenopathy:     She has no cervical adenopathy  Neurological: She is alert and oriented to person, place, and time  No cranial nerve deficit  Wheelchair-bound   Skin: Skin is warm and dry  Psychiatric: She has a normal mood and affect

## 2019-08-29 NOTE — PATIENT INSTRUCTIONS
Complete chest x-ray, if cough not improved resolved 1-2 weeks return to office  Otherwise return in 6 months for office visit blood work    Patient to follow low sugar low carbohydrate diet

## 2019-09-13 ENCOUNTER — APPOINTMENT (OUTPATIENT)
Dept: RADIOLOGY | Facility: MEDICAL CENTER | Age: 74
End: 2019-09-13
Payer: MEDICARE

## 2019-09-13 DIAGNOSIS — J30.9 ALLERGIC RHINITIS, UNSPECIFIED SEASONALITY, UNSPECIFIED TRIGGER: ICD-10-CM

## 2019-09-13 DIAGNOSIS — R05.3 CHRONIC COUGH: ICD-10-CM

## 2019-09-13 PROCEDURE — 71046 X-RAY EXAM CHEST 2 VIEWS: CPT

## 2019-09-17 DIAGNOSIS — K11.7 SIALORRHEA: ICD-10-CM

## 2019-09-17 RX ORDER — GLYCOPYRROLATE 1 MG/1
1 TABLET ORAL 3 TIMES DAILY
Qty: 270 TABLET | Refills: 2 | Status: SHIPPED | OUTPATIENT
Start: 2019-09-17 | End: 2020-09-01

## 2019-10-21 ENCOUNTER — OFFICE VISIT (OUTPATIENT)
Dept: PSYCHIATRY | Facility: CLINIC | Age: 74
End: 2019-10-21
Payer: MEDICARE

## 2019-10-21 DIAGNOSIS — F33.1 MODERATE EPISODE OF RECURRENT MAJOR DEPRESSIVE DISORDER (HCC): Primary | ICD-10-CM

## 2019-10-21 PROCEDURE — 99213 OFFICE O/P EST LOW 20 MIN: CPT | Performed by: NURSE PRACTITIONER

## 2019-10-21 NOTE — PSYCH
PROGRESS NOTE        [facility]      Name and Date of Birth:  Karla Segura 66 y o  1945    Date of Visit: 10/21/19    SUBJECTIVE:    Lori Menezes presents today accompanied by her   She remains wheelchair-bound secondary to advanced Parkinson's disease  She still is able to communicate her wants and needs but most times, she answers with I do not know   I have encouraged her to make choices regarding her food choices waswhile she is able to do so  There are certain foods that she does not care for and certain foods that she really likes and when her  ask her which 1 she would like, she usually answers I do not know   So hopefully, she will start to advocate for her own wants and needs  Both she and her  are reporting that there is some daytime sedation noted  So this could be coming from the morning dose of Lexapro so we will move the morning dose to bedtime  Will have them return after the completion of winter since she is in a wheelchair all of the time  We will see her again in April  They can call in the meantime if needed  She denies suicidal ideation, intent or plan at present, has no suicidal ideation, intent or plan at present  She denies any auditory hallucinations and visual hallucinations, denies any other delusional thinking, denies any delusional thinking  She denies any side effects from medications    HPI ROS Appetite Changes and Sleep: normal appetite, normal sleep    Review Of Systems:      Constitutional Negative   ENT Negative   Cardiovascular Negative   Respiratory As Noted in HPI   Gastrointestinal Negative   Genitourinary Negative   Musculoskeletal Negative   Integumentary Negative   Neurological Negative   Endocrine Negative   Other Symptoms Negative and None       Laboratory Results: No results found for this or any previous visit      Substance Abuse History:    Social History Substance and Sexual Activity   Drug Use No       Family Psychiatric History:     Family History   Problem Relation Age of Onset    Heart attack Mother         Acute Myocardial Infarction (MI)    Diabetes Mother         Diabetes Mellitus    Stroke Father         Syndrome    No Known Problems Brother     No Known Problems Maternal Aunt     No Known Problems Paternal Aunt     No Known Problems Maternal Uncle     No Known Problems Paternal Uncle     No Known Problems Maternal Grandfather     No Known Problems Maternal Grandmother     No Known Problems Paternal Grandfather     No Known Problems Paternal Grandmother     No Known Problems Cousin     Heart disease Family     Hypertension Family     Thyroid disease Family     ADD / ADHD Neg Hx     Alcohol abuse Neg Hx     Anxiety disorder Neg Hx     Bipolar disorder Neg Hx     Dementia Neg Hx     Depression Neg Hx     Drug abuse Neg Hx     OCD Neg Hx     Paranoid behavior Neg Hx     Schizophrenia Neg Hx     Seizures Neg Hx     Self-Injury Neg Hx     Suicide Attempts Neg Hx        The following portions of the patient's history were reviewed and updated as appropriate: past family history, past medical history, past social history, past surgical history and problem list     Social History     Socioeconomic History    Marital status: /Civil Union     Spouse name: Not on file    Number of children: Not on file    Years of education: Not on file    Highest education level: Not on file   Occupational History    Not on file   Social Needs    Financial resource strain: Not on file    Food insecurity:     Worry: Not on file     Inability: Not on file    Transportation needs:     Medical: Not on file     Non-medical: Not on file   Tobacco Use    Smoking status: Never Smoker    Smokeless tobacco: Never Used    Tobacco comment: n/a   Substance and Sexual Activity    Alcohol use:  Yes     Alcohol/week: 1 0 standard drinks     Types: 1 Glasses of wine per week     Comment: drinks one fourth of a glass of white wine once a week  (Never drank alcohol per [employer])    Drug use: No    Sexual activity: Not Currently   Lifestyle    Physical activity:     Days per week: Not on file     Minutes per session: Not on file    Stress: Not on file   Relationships    Social connections:     Talks on phone: Not on file     Gets together: Not on file     Attends Buddhism service: Not on file     Active member of club or organization: Not on file     Attends meetings of clubs or organizations: Not on file     Relationship status: Not on file    Intimate partner violence:     Fear of current or ex partner: Not on file     Emotionally abused: Not on file     Physically abused: Not on file     Forced sexual activity: Not on file   Other Topics Concern    Not on file   Social History Narrative    Not on file     Social History     Social History Narrative    Not on file        Social History     Tobacco History     Smoking Status  Never Smoker    Smokeless Tobacco Use  Never Used    Tobacco Comment  n/a          Alcohol History     Alcohol Use Status  Yes Drinks/Week  1 Glasses of wine per week Amount  1 0 standard drinks of alcohol/wk Comment  drinks one fourth of a glass of white wine once a week  (Never drank alcohol per [employer])          Drug Use     Drug Use Status  No          Sexual Activity     Sexually Active  Not Currently          Activities of Daily Living    Not Asked               Additional Substance Use Detail     Questions Responses    Substance Use Assessment Denies substance use within the past 12 months    Alcohol Use Frequency Denies use in past 12 months    Cannabis frequency Denies use in past 12 months    Heroin Frequency Denies use in past 12 months    Cocaine frequency Denies past use in past 12 months    Crack Cocaine Frequency Denies use in past 12 months    Methamphetamine Frequency Denies use in past 12 months    Crack Cocaine Method Other    Crack Cocaine 1st Use denies    Narcotic Frequency Denies use in past 12 months    Benzodiazepine Frequency Denies use in past 12 months    Amphetamine frequency Denies use in past 12 months    Barbituate Frequency Denies use use in past 12 months    Inhalant frequency Denies use in past 12 months    Hallucinogen frequency Denies use in past 12 months    Ecstasy frequency Denies use past 12 months    Other drug frequency Denies use in past 12 months    Opiate frequency Denies use in past 12 months    Not reviewed  OBJECTIVE:     Mental Status Evaluation:    Appearance age appropriate, casually dressed   Behavior pleasant, cooperative   Speech normal volume, normal pitch   Mood Stable   Affect Constricted   Thought Processes Slowed   Associations More limited   Thought Content Normal   Perceptual Disturbances: None   Abnormal Thoughts  Risk Potential Suicidal ideation - None  Homicidal ideation - None  Potential for aggression - No   Orientation oriented to person, place, time/date and situation   Memory Fair   Cosciousness alert and awake   Attention Span Varies   Intellect Appears to be of Average Intelligence   Insight Limited   Judgement Limited   Muscle Strength and  Gait muscle strength and tone were normal   Language no difficulty naming common objects   Fund of Knowledge displays adequate knowledge of current events   Pain None   Pain Scale 0       Assessment/Plan:       There are no diagnoses linked to this encounter  Treatment Recommendations/Precautions:    Continue current medications:  Continue Lexapro 10 mg b i d  Noon and HS  Aricept 10 mg daily  Xanax 0 5 mg b i d  P r n  for anxiety  Most of the times, this is given at bedtime to help with sleep      Risks/Benefits      Risks, Benefits And Possible Side Effects Of Medications:    Risks, benefits, and possible side effects of medications explained to patient and patient verbalizes understanding and agreement for treatment      Controlled Medication Discussion:     Not applicable    Psychotherapy Provided:     Individual psychotherapy provided: No

## 2019-10-30 ENCOUNTER — IMMUNIZATIONS (OUTPATIENT)
Dept: FAMILY MEDICINE CLINIC | Facility: CLINIC | Age: 74
End: 2019-10-30
Payer: MEDICARE

## 2019-10-30 DIAGNOSIS — Z23 NEED FOR INFLUENZA VACCINATION: Primary | ICD-10-CM

## 2019-10-30 PROCEDURE — 90662 IIV NO PRSV INCREASED AG IM: CPT

## 2019-10-30 PROCEDURE — G0008 ADMIN INFLUENZA VIRUS VAC: HCPCS

## 2019-11-13 DIAGNOSIS — E03.9 HYPOTHYROIDISM, UNSPECIFIED TYPE: Chronic | ICD-10-CM

## 2019-11-13 RX ORDER — LEVOTHYROXINE SODIUM 0.07 MG/1
TABLET ORAL
Qty: 90 TABLET | Refills: 0 | Status: SHIPPED | OUTPATIENT
Start: 2019-11-13 | End: 2020-03-05

## 2019-12-05 DIAGNOSIS — F02.80 DEMENTIA ASSOCIATED WITH OTHER UNDERLYING DISEASE WITHOUT BEHAVIORAL DISTURBANCE (HCC): ICD-10-CM

## 2019-12-05 RX ORDER — DONEPEZIL HYDROCHLORIDE 10 MG/1
10 TABLET, FILM COATED ORAL
Qty: 90 TABLET | Refills: 2 | Status: SHIPPED | OUTPATIENT
Start: 2019-12-05 | End: 2020-02-04 | Stop reason: HOSPADM

## 2019-12-15 DIAGNOSIS — F32.3 MAJOR DEPRESSION WITH PSYCHOTIC FEATURES (HCC): ICD-10-CM

## 2019-12-15 RX ORDER — ESCITALOPRAM OXALATE 20 MG/1
TABLET ORAL
Qty: 90 TABLET | Refills: 2 | Status: SHIPPED | OUTPATIENT
Start: 2019-12-15 | End: 2020-04-15

## 2020-01-30 ENCOUNTER — APPOINTMENT (EMERGENCY)
Dept: CT IMAGING | Facility: HOSPITAL | Age: 75
DRG: 065 | End: 2020-01-30
Payer: MEDICARE

## 2020-01-30 ENCOUNTER — APPOINTMENT (INPATIENT)
Dept: CT IMAGING | Facility: HOSPITAL | Age: 75
DRG: 065 | End: 2020-01-30
Payer: MEDICARE

## 2020-01-30 ENCOUNTER — APPOINTMENT (EMERGENCY)
Dept: RADIOLOGY | Facility: HOSPITAL | Age: 75
DRG: 065 | End: 2020-01-30
Payer: MEDICARE

## 2020-01-30 ENCOUNTER — HOSPITAL ENCOUNTER (INPATIENT)
Facility: HOSPITAL | Age: 75
LOS: 5 days | Discharge: NURSING, REHAB OR LONG-TERM CARE | DRG: 065 | End: 2020-02-04
Attending: EMERGENCY MEDICINE | Admitting: HOSPITALIST
Payer: MEDICARE

## 2020-01-30 DIAGNOSIS — R53.1 ACUTE LEFT-SIDED WEAKNESS: ICD-10-CM

## 2020-01-30 DIAGNOSIS — R53.1 WEAKNESS: Primary | ICD-10-CM

## 2020-01-30 DIAGNOSIS — G20 PARKINSON DISEASE (HCC): Chronic | ICD-10-CM

## 2020-01-30 DIAGNOSIS — F41.1 GAD (GENERALIZED ANXIETY DISORDER): ICD-10-CM

## 2020-01-30 DIAGNOSIS — Z00.00 HEALTH CARE MAINTENANCE: ICD-10-CM

## 2020-01-30 DIAGNOSIS — R13.10 TROUBLE SWALLOWING: ICD-10-CM

## 2020-01-30 PROBLEM — K56.1 INTUSSUSCEPTION (HCC): Status: ACTIVE | Noted: 2020-01-30

## 2020-01-30 PROBLEM — R05.9 COUGH: Status: ACTIVE | Noted: 2020-01-30

## 2020-01-30 LAB
ALBUMIN SERPL BCP-MCNC: 3.3 G/DL (ref 3.5–5)
ALP SERPL-CCNC: 77 U/L (ref 46–116)
ALT SERPL W P-5'-P-CCNC: 13 U/L (ref 12–78)
ANION GAP SERPL CALCULATED.3IONS-SCNC: 7 MMOL/L (ref 4–13)
APTT PPP: 31 SECONDS (ref 23–37)
AST SERPL W P-5'-P-CCNC: 42 U/L (ref 5–45)
ATRIAL RATE: 73 BPM
BASOPHILS # BLD AUTO: 0.09 THOUSANDS/ΜL (ref 0–0.1)
BASOPHILS NFR BLD AUTO: 1 % (ref 0–1)
BILIRUB SERPL-MCNC: 0.7 MG/DL (ref 0.2–1)
BILIRUB UR QL STRIP: NEGATIVE
BUN SERPL-MCNC: 17 MG/DL (ref 5–25)
CALCIUM SERPL-MCNC: 9.5 MG/DL (ref 8.3–10.1)
CHLORIDE SERPL-SCNC: 103 MMOL/L (ref 100–108)
CLARITY UR: CLEAR
CO2 SERPL-SCNC: 30 MMOL/L (ref 21–32)
COLOR UR: YELLOW
CREAT SERPL-MCNC: 0.86 MG/DL (ref 0.6–1.3)
EOSINOPHIL # BLD AUTO: 0.08 THOUSAND/ΜL (ref 0–0.61)
EOSINOPHIL NFR BLD AUTO: 1 % (ref 0–6)
ERYTHROCYTE [DISTWIDTH] IN BLOOD BY AUTOMATED COUNT: 12.9 % (ref 11.6–15.1)
GFR SERPL CREATININE-BSD FRML MDRD: 66 ML/MIN/1.73SQ M
GLUCOSE SERPL-MCNC: 158 MG/DL (ref 65–140)
GLUCOSE UR STRIP-MCNC: NEGATIVE MG/DL
HCT VFR BLD AUTO: 48 % (ref 34.8–46.1)
HGB BLD-MCNC: 15.9 G/DL (ref 11.5–15.4)
HGB UR QL STRIP.AUTO: NEGATIVE
IMM GRANULOCYTES # BLD AUTO: 0.02 THOUSAND/UL (ref 0–0.2)
IMM GRANULOCYTES NFR BLD AUTO: 0 % (ref 0–2)
INR PPP: 1.04 (ref 0.84–1.19)
KETONES UR STRIP-MCNC: NEGATIVE MG/DL
LEUKOCYTE ESTERASE UR QL STRIP: NEGATIVE
LYMPHOCYTES # BLD AUTO: 2.01 THOUSANDS/ΜL (ref 0.6–4.47)
LYMPHOCYTES NFR BLD AUTO: 25 % (ref 14–44)
MCH RBC QN AUTO: 32.4 PG (ref 26.8–34.3)
MCHC RBC AUTO-ENTMCNC: 33.1 G/DL (ref 31.4–37.4)
MCV RBC AUTO: 98 FL (ref 82–98)
MONOCYTES # BLD AUTO: 0.62 THOUSAND/ΜL (ref 0.17–1.22)
MONOCYTES NFR BLD AUTO: 8 % (ref 4–12)
NEUTROPHILS # BLD AUTO: 5.35 THOUSANDS/ΜL (ref 1.85–7.62)
NEUTS SEG NFR BLD AUTO: 65 % (ref 43–75)
NITRITE UR QL STRIP: NEGATIVE
NRBC BLD AUTO-RTO: 0 /100 WBCS
NT-PROBNP SERPL-MCNC: 32 PG/ML
P AXIS: 62 DEGREES
PH UR STRIP.AUTO: 6.5 [PH]
PLATELET # BLD AUTO: 254 THOUSANDS/UL (ref 149–390)
PMV BLD AUTO: 10.9 FL (ref 8.9–12.7)
POTASSIUM SERPL-SCNC: 4.6 MMOL/L (ref 3.5–5.3)
PROCALCITONIN SERPL-MCNC: 0.07 NG/ML
PROT SERPL-MCNC: 7.4 G/DL (ref 6.4–8.2)
PROT UR STRIP-MCNC: NEGATIVE MG/DL
PROTHROMBIN TIME: 13.7 SECONDS (ref 11.6–14.5)
QRS AXIS: 35 DEGREES
QRSD INTERVAL: 66 MS
QT INTERVAL: 368 MS
QTC INTERVAL: 405 MS
RBC # BLD AUTO: 4.91 MILLION/UL (ref 3.81–5.12)
SODIUM SERPL-SCNC: 140 MMOL/L (ref 136–145)
SP GR UR STRIP.AUTO: 1.02 (ref 1–1.03)
T WAVE AXIS: 49 DEGREES
TROPONIN I SERPL-MCNC: <0.02 NG/ML
TSH SERPL DL<=0.05 MIU/L-ACNC: 2.99 UIU/ML (ref 0.36–3.74)
UROBILINOGEN UR QL STRIP.AUTO: 0.2 E.U./DL
VENTRICULAR RATE: 73 BPM
WBC # BLD AUTO: 8.17 THOUSAND/UL (ref 4.31–10.16)

## 2020-01-30 PROCEDURE — 99232 SBSQ HOSP IP/OBS MODERATE 35: CPT | Performed by: PHYSICIAN ASSISTANT

## 2020-01-30 PROCEDURE — 84484 ASSAY OF TROPONIN QUANT: CPT | Performed by: EMERGENCY MEDICINE

## 2020-01-30 PROCEDURE — 74177 CT ABD & PELVIS W/CONTRAST: CPT

## 2020-01-30 PROCEDURE — 99285 EMERGENCY DEPT VISIT HI MDM: CPT | Performed by: EMERGENCY MEDICINE

## 2020-01-30 PROCEDURE — 1123F ACP DISCUSS/DSCN MKR DOCD: CPT | Performed by: PSYCHIATRY & NEUROLOGY

## 2020-01-30 PROCEDURE — 81003 URINALYSIS AUTO W/O SCOPE: CPT | Performed by: EMERGENCY MEDICINE

## 2020-01-30 PROCEDURE — 99223 1ST HOSP IP/OBS HIGH 75: CPT | Performed by: INTERNAL MEDICINE

## 2020-01-30 PROCEDURE — 83880 ASSAY OF NATRIURETIC PEPTIDE: CPT | Performed by: EMERGENCY MEDICINE

## 2020-01-30 PROCEDURE — 74176 CT ABD & PELVIS W/O CONTRAST: CPT

## 2020-01-30 PROCEDURE — 99285 EMERGENCY DEPT VISIT HI MDM: CPT

## 2020-01-30 PROCEDURE — 99223 1ST HOSP IP/OBS HIGH 75: CPT | Performed by: PHYSICIAN ASSISTANT

## 2020-01-30 PROCEDURE — 36415 COLL VENOUS BLD VENIPUNCTURE: CPT | Performed by: EMERGENCY MEDICINE

## 2020-01-30 PROCEDURE — 71045 X-RAY EXAM CHEST 1 VIEW: CPT

## 2020-01-30 PROCEDURE — 80053 COMPREHEN METABOLIC PANEL: CPT | Performed by: EMERGENCY MEDICINE

## 2020-01-30 PROCEDURE — 93010 ELECTROCARDIOGRAM REPORT: CPT | Performed by: INTERNAL MEDICINE

## 2020-01-30 PROCEDURE — 93005 ELECTROCARDIOGRAM TRACING: CPT

## 2020-01-30 PROCEDURE — 85610 PROTHROMBIN TIME: CPT | Performed by: EMERGENCY MEDICINE

## 2020-01-30 PROCEDURE — 96361 HYDRATE IV INFUSION ADD-ON: CPT

## 2020-01-30 PROCEDURE — 84145 PROCALCITONIN (PCT): CPT | Performed by: INTERNAL MEDICINE

## 2020-01-30 PROCEDURE — 96360 HYDRATION IV INFUSION INIT: CPT

## 2020-01-30 PROCEDURE — 85730 THROMBOPLASTIN TIME PARTIAL: CPT | Performed by: EMERGENCY MEDICINE

## 2020-01-30 PROCEDURE — 70450 CT HEAD/BRAIN W/O DYE: CPT

## 2020-01-30 PROCEDURE — 85025 COMPLETE CBC W/AUTO DIFF WBC: CPT | Performed by: EMERGENCY MEDICINE

## 2020-01-30 PROCEDURE — 84443 ASSAY THYROID STIM HORMONE: CPT | Performed by: INTERNAL MEDICINE

## 2020-01-30 RX ORDER — SACCHAROMYCES BOULARDII 250 MG
250 CAPSULE ORAL 2 TIMES DAILY
Status: DISCONTINUED | OUTPATIENT
Start: 2020-01-30 | End: 2020-02-04 | Stop reason: HOSPADM

## 2020-01-30 RX ORDER — CARBIDOPA AND LEVODOPA 25; 100 MG/1; MG/1
0.5 TABLET, EXTENDED RELEASE ORAL 2 TIMES DAILY
Status: DISCONTINUED | OUTPATIENT
Start: 2020-01-30 | End: 2020-02-04 | Stop reason: HOSPADM

## 2020-01-30 RX ORDER — ESCITALOPRAM OXALATE 10 MG/1
10 TABLET ORAL DAILY
Status: DISCONTINUED | OUTPATIENT
Start: 2020-01-31 | End: 2020-02-04 | Stop reason: HOSPADM

## 2020-01-30 RX ORDER — HEPARIN SODIUM 5000 [USP'U]/ML
5000 INJECTION, SOLUTION INTRAVENOUS; SUBCUTANEOUS EVERY 8 HOURS SCHEDULED
Status: DISCONTINUED | OUTPATIENT
Start: 2020-01-30 | End: 2020-02-04 | Stop reason: HOSPADM

## 2020-01-30 RX ORDER — DONEPEZIL HYDROCHLORIDE 10 MG/1
10 TABLET, FILM COATED ORAL
Status: DISCONTINUED | OUTPATIENT
Start: 2020-01-30 | End: 2020-02-03

## 2020-01-30 RX ORDER — PRAVASTATIN SODIUM 40 MG
40 TABLET ORAL
Status: DISCONTINUED | OUTPATIENT
Start: 2020-01-30 | End: 2020-02-04 | Stop reason: HOSPADM

## 2020-01-30 RX ORDER — ALPRAZOLAM 0.5 MG/1
0.5 TABLET ORAL 3 TIMES DAILY PRN
Status: DISCONTINUED | OUTPATIENT
Start: 2020-01-30 | End: 2020-02-04 | Stop reason: HOSPADM

## 2020-01-30 RX ORDER — DEXTROSE, SODIUM CHLORIDE, AND POTASSIUM CHLORIDE 5; .9; .15 G/100ML; G/100ML; G/100ML
75 INJECTION INTRAVENOUS CONTINUOUS
Status: DISCONTINUED | OUTPATIENT
Start: 2020-01-30 | End: 2020-02-02

## 2020-01-30 RX ORDER — GLYCOPYRROLATE 1 MG/1
1 TABLET ORAL 3 TIMES DAILY
Status: DISCONTINUED | OUTPATIENT
Start: 2020-01-30 | End: 2020-02-04 | Stop reason: HOSPADM

## 2020-01-30 RX ORDER — FAMOTIDINE 20 MG/1
20 TABLET, FILM COATED ORAL
Status: DISCONTINUED | OUTPATIENT
Start: 2020-01-31 | End: 2020-02-04 | Stop reason: HOSPADM

## 2020-01-30 RX ORDER — LEVOTHYROXINE SODIUM 0.07 MG/1
75 TABLET ORAL
Status: DISCONTINUED | OUTPATIENT
Start: 2020-01-31 | End: 2020-02-04 | Stop reason: HOSPADM

## 2020-01-30 RX ORDER — ASPIRIN 81 MG/1
81 TABLET, CHEWABLE ORAL DAILY
Status: DISCONTINUED | OUTPATIENT
Start: 2020-01-31 | End: 2020-02-03

## 2020-01-30 RX ORDER — ACETAMINOPHEN 325 MG/1
500 TABLET ORAL EVERY 6 HOURS PRN
Status: DISCONTINUED | OUTPATIENT
Start: 2020-01-30 | End: 2020-02-04 | Stop reason: HOSPADM

## 2020-01-30 RX ADMIN — HEPARIN SODIUM 5000 UNITS: 5000 INJECTION INTRAVENOUS; SUBCUTANEOUS at 21:05

## 2020-01-30 RX ADMIN — PRAVASTATIN SODIUM 40 MG: 40 TABLET ORAL at 17:31

## 2020-01-30 RX ADMIN — Medication 250 MG: at 17:31

## 2020-01-30 RX ADMIN — SODIUM CHLORIDE 1000 ML: 0.9 INJECTION, SOLUTION INTRAVENOUS at 09:37

## 2020-01-30 RX ADMIN — GLYCOPYRROLATE 1 MG: 1 TABLET ORAL at 21:05

## 2020-01-30 RX ADMIN — IOHEXOL 100 ML: 350 INJECTION, SOLUTION INTRAVENOUS at 12:22

## 2020-01-30 RX ADMIN — HEPARIN SODIUM 5000 UNITS: 5000 INJECTION INTRAVENOUS; SUBCUTANEOUS at 17:37

## 2020-01-30 RX ADMIN — DONEPEZIL HYDROCHLORIDE 10 MG: 10 TABLET, FILM COATED ORAL at 21:05

## 2020-01-30 RX ADMIN — CARBIDOPA AND LEVODOPA 0.5 TABLET: 25; 100 TABLET, EXTENDED RELEASE ORAL at 21:05

## 2020-01-30 RX ADMIN — DEXTROSE, SODIUM CHLORIDE, AND POTASSIUM CHLORIDE 75 ML/HR: 5; .9; .15 INJECTION INTRAVENOUS at 18:19

## 2020-01-30 NOTE — H&P
H&P- Pasqual Nest 1945, 66 y o  female MRN: [id number]    [facility] [id number] Encounter: [id number]    Primary Care Provider: Azael Cruz DO   Date and time admitted to hospital: 1/30/2020  8:28 AM    * Weakness  Assessment & Plan  Generalized weakness for the last 4 days associated with cough, difficulty swallowing and shortness of breath  Rule out aspiration  NPO for now  Check swallow study  Check procalcitonin  Check TSH  Gentle IV hydration  Monitor CBC and vital signs closely  Dysphagia  Assessment & Plan  Patient with advanced Parkinson disease presents with dysphagia  Check swallow study  Appreciate Neuro eval   Continue same dose Sinemet for now  Cough  Assessment & Plan  Cough associated with shortness of breath for the last few days  Likely aspiration pneumonia in the setting of dysphagia  Parkinson disease [facility])  Assessment & Plan  Patient with advanced Parkinson's on Sinemet 5 times daily  Continue medication  Appreciate neurology consult  Intussusception [facility])  Assessment & Plan  CT evidence of small-bowel intussusception  Patient has normal bowel movement and passes gas freely  Continue to monitor  Patient NPO now for dysphagia  Awaiting surgical evaluation  Hypothyroidism  Assessment & Plan  Continue home dose Synthroid  Check TSH  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  Assessment & Plan  Continue citalopram 10 mg daily    VTE Prophylaxis: Heparin  / sequential compression device   Code Status:  Full code  POLST: There is no POLST form on file for this patient (pre-hospital)    Anticipated Length of Stay:  Patient will be admitted on an Inpatient basis with an anticipated length of stay of  greater than 2 midnights  Justification for Hospital Stay:  Generalized weakness    Total Time for Visit, including Counseling / Coordination of Care: 45 minutes    Greater than 50% of this total time spent on direct patient counseling and coordination of care  History of Present Illness:    Erin Callaway is a 66 y o  female who presents with generalized weakness and dysphagia for the last 4 days  Patient with known advanced Parkinson's disease presents to the ER with difficulty of swallowing and generalized body weakness associated with slurred speech for the last 4 days  Patient in the past had similar episodes associated with urinary tract infection  Patient also complains of cough associated with shortness of breath; cough is worse during feeding  Denies fever, chills, abdominal pain, dysuria, frequency, urgency, change in urinary color, bowel habit change  Past medical history also significant for hypothyroidism, depression and anxiety  Denies any recent change in medication, fall, loss of consciousness, seizure, focal neurologic deficit  Review of Systems:    Review of Systems   Constitutional: Positive for activity change, appetite change and fatigue  HENT: Negative  Eyes: Negative  Respiratory: Negative  Cardiovascular: Negative  Gastrointestinal: Negative  Endocrine: Negative  Genitourinary: Negative  Musculoskeletal: Negative  Neurological: Positive for weakness  Negative for dizziness, tremors, seizures, syncope, facial asymmetry, speech difficulty, light-headedness, numbness and headaches  Psychiatric/Behavioral: Negative          Past Medical and Surgical History:     Past Medical History:   Diagnosis Date    Abdominal pain, suprapubic 07/03/2014    Resolved:  November 22, 2016    Abscess of skin and subcutaneous tissue 11/21/2016    Resolved:  February 28, 2017    Anxiety     Burning with urination 12/14/2016    Resolved:  February 28, 2017    Conjunctivitis 06/10/2013    Depression     Depression     Dysuria 06/06/2016    Resolved:  February 28 2017    Falling 10/18/2012    GERD (gastroesophageal reflux disease)     Groin pain 04/16/2015    Resolved: February 28, 2017    Hyperlipidemia     Hypothyroid     Influenza 04/05/2013    Leg abrasion 09/22/2016    Resolved:  February 28, 2017    Parkinson's disease [facility])     Psychiatric illness     Rectal pain 10/17/2013    Resolved:  February 28, 2017    Thyroid disease        Past Surgical History:   Procedure Laterality Date    APPENDECTOMY      Age 4 or 3    CLOSED REDUCTION NASAL FRACTURE      CYSTOSCOPY  01/28/2014    RENETTA Limon   (Diagnostic)    DEEP BRAIN STIMULATOR PLACEMENT      LAMINECTOMY      Decompress, Facetectomy, Foraminotomy Lumbar Seg    ORIF HIP FRACTURE Left     About 3 years ago    TONSILLECTOMY         Meds/Allergies:    Prior to Admission medications    Medication Sig Start Date End Date Taking? Authorizing Provider   acetaminophen (TYLENOL) 500 mg tablet Take 500 mg by mouth  One throughout the night as needed    Historical Provider, MD   ALPCARLOS ENRIQUEZolam Ruth Lazo) 0 5 mg tablet Take 1 tablet (0 5 mg total) by mouth 3 (three) times a day as needed for anxiety 8/24/19   YOLANDA Manning   ascorbic acid (VITAMIN C) 500 mg tablet Take 500 mg by mouth 2 (two) times a day  Historical Provider, MD   aspirin 81 MG tablet Take 81 mg by mouth  Three times a week M/W/F    Historical Provider, MD   carbidopa-levodopa (SINEMET)  mg per tablet Take 1 tablet by mouth 2 (two) times a day  Historical Provider, MD   donepezil (ARICEPT) 10 mg tablet TAKE 1 TABLET (10 MG TOTAL) BY MOUTH DAILY AT BEDTIME 12/5/19   YOLANDA Rob   escitalopram (LEXAPRO) 20 mg tablet TAKE 1/2 TABLET BY MOUTH TWICE A DAY 12/15/19   YOLANDA Rob   glycopyrrolate (ROBINUL) 1 mg tablet TAKE 1 TABLET (1 MG TOTAL) BY MOUTH 3 (THREE) TIMES A DAY 9/17/19   YOLANDA Rob   levothyroxine 75 mcg tablet TAKE 1 TABLET EVERY DAY 11/13/19   Satnam Kwon DO   Multiple Vitamin (MULTIVITAMIN) tablet Take 1 tablet by mouth daily      Historical Provider, MD   NON FORMULARY Take 1 tablet by mouth 2 (two) times a day  Calcium- D3-600mg    Historical Provider, MD   phenazopyridine (PYRIDIUM) 200 mg tablet Take 200 mg by mouth as needed for bladder spasms  Historical Provider, MD   Probiotic Product (PROBIOTIC DAILY PO) Take 1 tablet by mouth daily  Historical Provider, MD   ranitidine (ZANTAC) 150 mg tablet Take 150 mg by mouth daily  Historical Provider, MD   simvastatin (ZOCOR) 20 mg tablet Take 20 mg by mouth daily after dinner  5:00/5:30    Historical Provider, MD   Vitamin D, Cholecalciferol, 1000 UNITS CAPS Take 1 tablet by mouth daily  Historical Provider, MD     I have reviewed home medications with patient personally  Allergies: Allergies   Allergen Reactions    Gabapentin      Other reaction(s): Hypotension    Bupropion GI Intolerance and Other (See Comments)     Other reaction(s): Tremor    Cephalexin     Duloxetine      Other reaction(s): Other (see comments)  unkown  unknown      Erythromycin GI Intolerance    Methadone Hallucinations    Mirtazapine GI Intolerance    Paroxetine GI Intolerance    Sulfa Antibiotics     Tricyclic Antidepressants      Other reaction(s):  Other    Acetazolamide Rash    Levofloxacin Anxiety and Other (See Comments)       Social History:     Substance Use History:   Social History     Substance and Sexual Activity   Alcohol Use Yes    Alcohol/week: 1 0 standard drinks    Types: 1 Glasses of wine per week    Comment: drinks one fourth of a glass of white wine once a week  (Never drank alcohol per [employer])     Social History     Tobacco Use   Smoking Status Never Smoker   Smokeless Tobacco Never Used   Tobacco Comment    n/a     Social History     Substance and Sexual Activity   Drug Use No       Family History:    non-contributory    Physical Exam:     Vitals:   Blood Pressure: 160/72 (01/30/20 1557)  Pulse: 81 (01/30/20 1557)  Temperature: 99 1 °F (37 3 °C) (01/30/20 0831)  Temp Source: Oral (01/30/20 5648)  Respirations: 18 (01/30/20 1557)  Weight - Scale: 88 6 kg (195 lb 5 2 oz) (01/30/20 0831)  SpO2: 95 % (01/30/20 1557)    Physical Exam    General appearance: alert, fatigued and cooperative  Skin: Skin color, texture, turgor normal  No rashes or lesions  Head: Normocephalic, without obvious abnormality, atraumatic  Eyes: conjunctivae/corneas clear  PERRL, EOM's intact  Lungs: clear to auscultation bilaterally  Heart: regular rate and rhythm, S1, S2 normal, no murmur, click, rub or gallop  Abdomen: soft, non-tender; bowel sounds normal; no masses,  no organomegaly  Back: symmetric, no curvature  ROM normal  No CVA tenderness  Extremities: extremities normal, atraumatic, no cyanosis or edema  Neurologic: Mental status: Alert, oriented, thought content appropriate  Psychiatric:  Flat affect    Additional Data:     Lab Results: I have personally reviewed pertinent reports  Results from last 7 days   Lab Units 01/30/20  0932   WBC Thousand/uL 8 17   HEMOGLOBIN g/dL 15 9*   HEMATOCRIT % 48 0*   PLATELETS Thousands/uL 254   NEUTROS PCT % 65   LYMPHS PCT % 25   MONOS PCT % 8   EOS PCT % 1     Results from last 7 days   Lab Units 01/30/20  0932   SODIUM mmol/L 140   POTASSIUM mmol/L 4 6   CHLORIDE mmol/L 103   CO2 mmol/L 30   BUN mg/dL 17   CREATININE mg/dL 0 86   ANION GAP mmol/L 7   CALCIUM mg/dL 9 5   ALBUMIN g/dL 3 3*   TOTAL BILIRUBIN mg/dL 0 70   ALK PHOS U/L 77   ALT U/L 13   AST U/L 42   GLUCOSE RANDOM mg/dL 158*     Results from last 7 days   Lab Units 01/30/20  1025   INR  1 04                   Imaging: I have personally reviewed pertinent reports  CT abdomen pelvis with contrast   ED Interpretation by Everardo Ramos DO (01/30 6434)   See below      Final Result by Bipin Park MD (01/30 1410)         1  Entero-enteral intussusception involving the jejunum, without evidence for obstruction    This most likely represents a transient phenomenon but appropriate clinical correlation and follow-up is advised  2   Diffuse hepatic steatosis  3   Diverticulosis without evidence for diverticulitis  4   Additional findings as noted  The [facility] for [facility] notification  Workstation performed: [id number]         XR chest 1 view portable   ED Interpretation by Gianluca Cameron DO (01/30 1135)   Abnormal   Increased markings - underinspired      CT head without contrast   ED Interpretation by Gianluca Cameron DO (01/30 1016)   See below      Final Result by Cleve Rodríguez MD (01/30 1011)      No acute intracranial abnormality  Deep brain stimulator leads are unremarkable in appearance  Cerebellar greater than cerebral volume loss  Mild cerebral chronic microangiopathic disease  Workstation performed: [id number]             EKG, Pathology, and Other Studies Reviewed on Admission: yes    [employer] / Epic Records Reviewed: Yes     ** Please Note: This note has been constructed using a voice recognition system   **

## 2020-01-30 NOTE — ASSESSMENT & PLAN NOTE
Patient with advanced Parkinson disease presents with dysphagia  Check swallow study  Appreciate Neuro eval   Continue same dose Sinemet for now

## 2020-01-30 NOTE — ASSESSMENT & PLAN NOTE
Patient with advanced Parkinson's on Sinemet 5 times daily  Continue medication  Appreciate neurology consult

## 2020-01-30 NOTE — CONSULTS
Consultation - Neurology   Ap Gaston 66 y o  female MRN: [id number]  Unit/Bed#: ED 22 Encounter: [id number]      Assessment/Plan   Assessment:  66year old female with Parkinson's disease status post deep brain stimulator placement, anxiety depression, GERD, hypothyroidism, hyperlipidemia who presents with generalized weakness, difficulty swallowing and complaints of shortness of breath  Plan:  1  Generalized weakness/fatigue/slurred speech and dysphagia    - Unclear etiology, although patient does appear more alert than she was for previous examiners per discussion with ED staff and review of chart     - No focal deficit to indicate stroke on exam  Would hold off on additional neuro-imaging at this time     - Recommend monitor for metabolic/infectious derangements  - Would continue with Sinemet 25/100 mg 1 tab BID as per home regimen  - Encourage patient's family to follow-up with primary neurologist      2  Possible entero-enteral intussusception    - Defer further work-up to primary team  ? Related to difficulty with appetite/swallowing recently  3  SOB    - Defer further work-up to primary team      History of Present Illness     Reason for Consult / Principal Problem: Parkinson's disease  Hx and PE limited by: Mental status, difficulty speaking  HPI: Ap Gaston is a 66 y o   female with Parkinson's disease s/p DBS placement, anxiety/depression, GERD, hypothyroid, HLD who presents with generalized weakness, difficulty swallowing  Per discussion with ED staff as well as chart review, patient has been having difficulty swallowing and also has been spitting out food  Per review of chart, patient has been wheelchair bound for about 10 years and is minimally verbal at baseline due to Parkinson's disease  Per discussion with patient's , the patient's baseline involves difficulty speaking, she is able to stand but cannot walk due to difficulty with balance   She is generally able to stand to transfer but over the past few days has had difficulty with transferring  Over the past 2-3 days has become very weak and poorly responsive  He notes that her speech had become so slurred that family could barely understand her  She does take Sinemet but he is unsure of the dosing  Per review of records, Sinemet 25/100 1 tab BID  Patient follows with neurology team in [place]  The patient notes upon this examiner entering the room that she has shortness of breath and that she feels as though she is not able to take a breath  She notes that she has had Parkinson's disease for many years and has been wheelchair-bound for about 10 years  Consult to neurology  Consult performed by: Aldo Page PA-C  Consult ordered by: Nile Butt DO          Review of Systems   Constitutional: Positive for fatigue  HENT: Positive for trouble swallowing  Respiratory: Positive for shortness of breath  Cardiovascular: Negative for chest pain  Neurological: Positive for weakness  Negative for dizziness and headaches  Psychiatric/Behavioral: Negative for confusion  Limited secondary to patient having some difficulty with speaking       Historical Information   Past Medical History:   Diagnosis Date    Abdominal pain, suprapubic 07/03/2014    Resolved:  November 22, 2016    Abscess of skin and subcutaneous tissue 11/21/2016    Resolved:  February 28, 2017    Anxiety     Burning with urination 12/14/2016    Resolved:  February 28, 2017    Conjunctivitis 06/10/2013    Depression     Depression     Dysuria 06/06/2016    Resolved:  February 28 2017    Falling 10/18/2012    GERD (gastroesophageal reflux disease)     Groin pain 04/16/2015    Resolved:  February 28, 2017    Hyperlipidemia     Hypothyroid     Influenza 04/05/2013    Leg abrasion 09/22/2016    Resolved:  February 28, 2017    Parkinson's disease [facility])     Psychiatric illness     Rectal pain 10/17/2013    Resolved:  February 28, 2017    Thyroid disease      Past Surgical History:   Procedure Laterality Date    APPENDECTOMY      Age 4 or 3    CLOSED REDUCTION NASAL FRACTURE      CYSTOSCOPY  01/28/2014    [facility] [place]   (Diagnostic)    DEEP BRAIN STIMULATOR PLACEMENT      LAMINECTOMY      Decompress, Facetectomy, Foraminotomy Lumbar Seg    ORIF HIP FRACTURE Left     About 3 years ago    TONSILLECTOMY       Social History   Social History     Substance and Sexual Activity   Alcohol Use Yes    Alcohol/week: 1 0 standard drinks    Types: 1 Glasses of wine per week    Comment: drinks one fourth of a glass of white wine once a week  (Never drank alcohol per [employer])     Social History     Substance and Sexual Activity   Drug Use No     Social History     Tobacco Use   Smoking Status Never Smoker   Smokeless Tobacco Never Used   Tobacco Comment    n/a     Family History:   Family History   Problem Relation Age of Onset    Heart attack Mother         Acute Myocardial Infarction (MI)    Diabetes Mother         Diabetes Mellitus    Stroke Father         Syndrome    No Known Problems Brother     No Known Problems Maternal Aunt     No Known Problems Paternal Aunt     No Known Problems Maternal Uncle     No Known Problems Paternal Uncle     No Known Problems Maternal Grandfather     No Known Problems Maternal Grandmother     No Known Problems Paternal Grandfather     No Known Problems Paternal Grandmother     No Known Problems Cousin     Heart disease Family     Hypertension Family     Thyroid disease Family     ADD / ADHD Neg Hx     Alcohol abuse Neg Hx     Anxiety disorder Neg Hx     Bipolar disorder Neg Hx     Dementia Neg Hx     Depression Neg Hx     Drug abuse Neg Hx     OCD Neg Hx     Paranoid behavior Neg Hx     Schizophrenia Neg Hx     Seizures Neg Hx     Self-Injury Neg Hx     Suicide Attempts Neg Hx        Review of previous medical records was completed   Patient with a history of Parkinson's disease s/p DBS placement  Per review of records, patient has been wheelchair-bound for about 10 years and follows with Neurology team in [place]  No prior neurology notes available for review  Meds/Allergies   Scheduled Meds:  Continuous Infusions:  No current facility-administered medications for this encounter  PRN Meds:  Allergies   Allergen Reactions    Gabapentin      Other reaction(s): Hypotension    Bupropion GI Intolerance and Other (See Comments)     Other reaction(s): Tremor    Cephalexin     Duloxetine      Other reaction(s): Other (see comments)  unkown  unknown      Erythromycin GI Intolerance    Methadone Hallucinations    Mirtazapine GI Intolerance    Paroxetine GI Intolerance    Sulfa Antibiotics     Tricyclic Antidepressants      Other reaction(s): Other    Acetazolamide Rash    Levofloxacin Anxiety and Other (See Comments)       Objective   Vitals:Blood pressure 145/85, pulse 80, temperature 99 1 °F (37 3 °C), temperature source Oral, resp  rate 18, weight 88 6 kg (195 lb 5 2 oz), SpO2 95 %  ,Body mass index is 38 15 kg/m²  No intake or output data in the 24 hours ending 01/30/20 1416    Invasive Devices: Invasive Devices     Peripheral Intravenous Line            Peripheral IV 01/30/20 Left Forearm less than 1 day                Physical Exam   Constitutional: She is oriented to person, place, and time  She appears well-developed and well-nourished  No distress  HENT:   Head: Normocephalic and atraumatic  Moderate hypoimimia noted  Eyes: Pupils are equal, round, and reactive to light  Conjunctivae are normal  Right eye exhibits no discharge  Left eye exhibits no discharge  No scleral icterus  Neck: Normal range of motion  Neck supple  Cardiovascular: Normal rate and regular rhythm  Pulmonary/Chest: Effort normal    Diffuse wheezes   Abdominal: Soft  She exhibits no distension  Musculoskeletal: She exhibits no edema     Neurological: She is alert and oriented to person, place, and time  Reflex Scores:       Bicep reflexes are 2+ on the right side and 3+ on the left side  Brachioradialis reflexes are 2+ on the right side and 3+ on the left side  Patellar reflexes are 1+ on the right side and 1+ on the left side  Achilles reflexes are 0 on the right side and 0 on the left side  Skin: Skin is warm and dry  No rash noted  She is not diaphoretic  No erythema  No pallor  Psychiatric: She has a normal mood and affect  Her behavior is normal      Neurologic Exam     Mental Status   Oriented to person, place, and time  Oriented to person  Oriented to place  Oriented to time  Speech dysarthric and mumbling at times but generally able to understand patient  She is alert and oriented to person, place, time  She is able to discuss her diagnosis of Parkinson's disease and able to make this examiner aware of her shortness of breath  Cranial Nerves     CN III, IV, VI   Pupils are equal, round, and reactive to light  Right pupil: Size: 3 mm  Shape: regular  Reactivity: brisk  Consensual response: intact  Left pupil: Size: 3 mm  Shape: regular  Reactivity: brisk  Consensual response: intact  Conjugate gaze: present    CN VII   Right facial weakness: none  Left facial weakness: none    CN XII   Tongue: not atrophic  Fasciculations: absent  Tongue deviation: none    Motor Exam   Muscle bulk: normal  Overall muscle tone: increased  Right arm tone: increased  Left arm tone: increased  Right leg tone: normal  Left leg tone: normalAble to move all extremities and follow commands  Able to lift B/L LE antigravity off the bed and maintain strength without drifting        Sensory Exam   Right arm light touch: normal  Left arm light touch: normal  Right leg light touch: normal  Left leg light touch: normal  Limited assessment secondary to mental status     Gait, Coordination, and Reflexes     Tremor   Resting tremor: present  Intention tremor: absent  Action tremor: absent    Reflexes   Right brachioradialis: 2+  Left brachioradialis: 3+  Right biceps: 2+  Left biceps: 3+  Right patellar: 1+  Left patellar: 1+  Right achilles: 0  Left achilles: 0  Right plantar: normal  Left plantar: normalGait deferred as patient is nonambulatory for many years          Lab Results:   Recent Results (from the past 24 hour(s))   CBC and differential    Collection Time: 01/30/20  9:32 AM   Result Value Ref Range    WBC 8 17 4 31 - 10 16 Thousand/uL    RBC 4 91 3 81 - 5 12 Million/uL    Hemoglobin 15 9 (H) 11 5 - 15 4 g/dL    Hematocrit 48 0 (H) 34 8 - 46 1 %    MCV 98 82 - 98 fL    MCH 32 4 26 8 - 34 3 pg    MCHC 33 1 31 4 - 37 4 g/dL    RDW 12 9 11 6 - 15 1 %    MPV 10 9 8 9 - 12 7 fL    Platelets 090 506 - 542 Thousands/uL    nRBC 0 /100 WBCs    Neutrophils Relative 65 43 - 75 %    Immat GRANS % 0 0 - 2 %    Lymphocytes Relative 25 14 - 44 %    Monocytes Relative 8 4 - 12 %    Eosinophils Relative 1 0 - 6 %    Basophils Relative 1 0 - 1 %    Neutrophils Absolute 5 35 1 85 - 7 62 Thousands/µL    Immature Grans Absolute 0 02 0 00 - 0 20 Thousand/uL    Lymphocytes Absolute 2 01 0 60 - 4 47 Thousands/µL    Monocytes Absolute 0 62 0 17 - 1 22 Thousand/µL    Eosinophils Absolute 0 08 0 00 - 0 61 Thousand/µL    Basophils Absolute 0 09 0 00 - 0 10 Thousands/µL   Comprehensive metabolic panel    Collection Time: 01/30/20  9:32 AM   Result Value Ref Range    Sodium 140 136 - 145 mmol/L    Potassium 4 6 3 5 - 5 3 mmol/L    Chloride 103 100 - 108 mmol/L    CO2 30 21 - 32 mmol/L    ANION GAP 7 4 - 13 mmol/L    BUN 17 5 - 25 mg/dL    Creatinine 0 86 0 60 - 1 30 mg/dL    Glucose 158 (H) 65 - 140 mg/dL    Calcium 9 5 8 3 - 10 1 mg/dL    AST 42 5 - 45 U/L    ALT 13 12 - 78 U/L    Alkaline Phosphatase 77 46 - 116 U/L    Total Protein 7 4 6 4 - 8 2 g/dL    Albumin 3 3 (L) 3 5 - 5 0 g/dL    Total Bilirubin 0 70 0 20 - 1 00 mg/dL    eGFR 66 ml/min/1 73sq m   Troponin I    Collection Time: 01/30/20  9:32 AM   Result Value Ref Range    Troponin I <0 02 <=0 04 ng/mL   NT-BNP PRO    Collection Time: 01/30/20  9:32 AM   Result Value Ref Range    NT-proBNP 32 <450 pg/mL   ECG 12 lead    Collection Time: 01/30/20  9:33 AM   Result Value Ref Range    Ventricular Rate 73 BPM    Atrial Rate 73 BPM    OH Interval  ms    QRSD Interval 66 ms    QT Interval 368 ms    QTC Interval 405 ms    P Axis 62 degrees    QRS Axis 35 degrees    T Wave [place] 49 degrees   UA (URINE) with reflex to Scope    Collection Time: 01/30/20 10:17 AM   Result Value Ref Range    Color, UA Yellow     Clarity, UA Clear     Specific Gravity, UA 1 020 1 003 - 1 030    pH, UA 6 5 4 5, 5 0, 5 5, 6 0, 6 5, 7 0, 7 5, 8 0    Leukocytes, UA Negative Negative    Nitrite, UA Negative Negative    Protein, UA Negative Negative mg/dl    Glucose, UA Negative Negative mg/dl    Ketones, UA Negative Negative mg/dl    Urobilinogen, UA 0 2 0 2, 1 0 E U /dl E U /dl    Bilirubin, UA Negative Negative    Blood, UA Negative Negative   Protime-INR    Collection Time: 01/30/20 10:25 AM   Result Value Ref Range    Protime 13 7 11 6 - 14 5 seconds    INR 1 04 0 84 - 1 19   APTT    Collection Time: 01/30/20 10:25 AM   Result Value Ref Range    PTT 31 23 - 37 seconds       Imaging Studies: I have personally reviewed pertinent reports  and I have personally reviewed pertinent films in PACS  CTH- No acute intracranial abnormality  Deep brain stimulator leads are unremarkable in appearance  Cerebellar greater than cerebral volume loss  Mild cerebral chronic microangiopathic disease  CT abdomen and pelvis with contrast- Entero-enteral intussusception involving the jejunum, without evidence for obstruction  This most likely represents a transient phenomenon but appropriate clinical correlation and follow-up is advised  Diffuse hepatic steatosis  Diverticulosis without evidence for diverticulitis

## 2020-01-30 NOTE — ASSESSMENT & PLAN NOTE
CT evidence of small-bowel intussusception  Patient has normal bowel movement and passes gas freely  Continue to monitor  Patient NPO now for dysphagia  Awaiting surgical evaluation

## 2020-01-30 NOTE — ED PROVIDER NOTES
History  Chief Complaint   Patient presents with    Weakness - Generalized     Per ems pt  has been having generalized weakness for the passed three days  Per ems pt  is not very verbal and only asnwers yes and no questions  Pt  has parkinsons  biba for weakness   told EMS pt has been declining for a few days and today  was unable to get her to stand she was so weak  Has noted that she seems to be having trouble swallowing and has been spitting out food   noted the last time she was like this she had a UTI  No reported f/c/s, no cough/congestion, no v/d, no changes in urination  +anorexia  Has had some medication changes  Family has been decreasing her dose of alprazolam      History provided by:  Patient, spouse and EMS personnel   used: No    Malaise - 7 years or greater   Severity:  Severe  Onset quality:  Gradual  Timing:  Constant  Progression:  Worsening  Chronicity:  Recurrent  Context: change in medication    Context: not recent infection and not stress    Relieved by:  Nothing  Worsened by:  Nothing  Ineffective treatments:  None tried  Associated symptoms: anorexia and difficulty walking    Associated symptoms: no cough, no diarrhea, no fever, no foul-smelling urine and no shortness of breath    Risk factors: neurologic disease        Prior to Admission Medications   Prescriptions Last Dose Informant Patient Reported? Taking? ALPRAZolam (XANAX) 0 5 mg tablet   No No   Sig: Take 1 tablet (0 5 mg total) by mouth 3 (three) times a day as needed for anxiety   Multiple Vitamin (MULTIVITAMIN) tablet  Spouse/Significant Other Yes No   Sig: Take 1 tablet by mouth daily  NON FORMULARY  Spouse/Significant Other Yes No   Sig: Take 1 tablet by mouth 2 (two) times a day  Calcium- D3-600mg   Probiotic Product (PROBIOTIC DAILY PO)  Spouse/Significant Other Yes No   Sig: Take 1 tablet by mouth daily     Vitamin D, Cholecalciferol, 1000 UNITS CAPS Spouse/Significant Other Yes No   Sig: Take 1 tablet by mouth daily  acetaminophen (TYLENOL) 500 mg tablet  Spouse/Significant Other Yes No   Sig: Take 500 mg by mouth  One throughout the night as needed   ascorbic acid (VITAMIN C) 500 mg tablet  Spouse/Significant Other Yes No   Sig: Take 500 mg by mouth 2 (two) times a day  aspirin 81 MG tablet  Spouse/Significant Other Yes No   Sig: Take 81 mg by mouth  Three times a week M/W/F   carbidopa-levodopa (SINEMET)  mg per tablet  Spouse/Significant Other Yes No   Sig: Take 1 tablet by mouth 2 (two) times a day  donepezil (ARICEPT) 10 mg tablet   No No   Sig: TAKE 1 TABLET (10 MG TOTAL) BY MOUTH DAILY AT BEDTIME   escitalopram (LEXAPRO) 20 mg tablet   No No   Sig: TAKE 1/2 TABLET BY MOUTH TWICE A DAY   glycopyrrolate (ROBINUL) 1 mg tablet   No No   Sig: TAKE 1 TABLET (1 MG TOTAL) BY MOUTH 3 (THREE) TIMES A DAY   levothyroxine 75 mcg tablet   No No   Sig: TAKE 1 TABLET EVERY DAY   phenazopyridine (PYRIDIUM) 200 mg tablet  Spouse/Significant Other Yes No   Sig: Take 200 mg by mouth as needed for bladder spasms  ranitidine (ZANTAC) 150 mg tablet  Spouse/Significant Other Yes No   Sig: Take 150 mg by mouth daily  simvastatin (ZOCOR) 20 mg tablet  Spouse/Significant Other Yes No   Sig: Take 20 mg by mouth daily after dinner   5:00/5:30      Facility-Administered Medications: None       Past Medical History:   Diagnosis Date    Abdominal pain, suprapubic 07/03/2014    Resolved:  November 22, 2016    Abscess of skin and subcutaneous tissue 11/21/2016    Resolved:  February 28, 2017    Anxiety     Burning with urination 12/14/2016    Resolved:  February 28, 2017    Conjunctivitis 06/10/2013    Depression     Depression     Dysuria 06/06/2016    Resolved:  February 28 2017    Falling 10/18/2012    GERD (gastroesophageal reflux disease)     Groin pain 04/16/2015    Resolved:  February 28, 2017    Hyperlipidemia     Hypothyroid     Influenza 04/05/2013  Leg abrasion 09/22/2016    Resolved:  February 28, 2017    [facility]'s disease [facility])     Psychiatric illness     Rectal pain 10/17/2013    Resolved:  February 28, 2017    Thyroid disease        Past Surgical History:   Procedure Laterality Date    APPENDECTOMY      Age 4 or 3    BACK SURGERY      BRAIN SURGERY      CLOSED REDUCTION NASAL FRACTURE      CYSTOSCOPY  01/28/2014    [facility] [facility]  RENETTA Goode   (Diagnostic)    DEEP BRAIN STIMULATOR PLACEMENT      EYE SURGERY      FRACTURE SURGERY      JOINT REPLACEMENT      LAMINECTOMY      Decompress, Facetectomy, Foraminotomy Lumbar Seg    ORIF HIP FRACTURE Left     About 3 years ago    TONSILLECTOMY         Family History   Problem Relation Age of Onset    Heart attack Mother         Acute Myocardial Infarction (MI)    Diabetes Mother         Diabetes Mellitus    Stroke Father         Syndrome    No Known Problems Brother     No Known Problems Maternal Aunt     No Known Problems Paternal Aunt     No Known Problems Maternal Uncle     No Known Problems Paternal Uncle     No Known Problems Maternal Grandfather     No Known Problems Maternal Grandmother     No Known Problems Paternal Grandfather     No Known Problems Paternal Grandmother     No Known Problems Cousin     Heart disease Family     Hypertension Family     Thyroid disease Family     ADD / ADHD Neg Hx     Alcohol abuse Neg Hx     Anxiety disorder Neg Hx     Bipolar disorder Neg Hx     Dementia Neg Hx     Depression Neg Hx     Drug abuse Neg Hx     OCD Neg Hx     Paranoid behavior Neg Hx     Schizophrenia Neg Hx     Seizures Neg Hx     Self-Injury Neg Hx     Suicide Attempts Neg Hx      I have reviewed and agree with the history as documented      Social History     Tobacco Use    Smoking status: Never Smoker    Smokeless tobacco: Never Used    Tobacco comment: n/a   Substance Use Topics    Alcohol use: Never     Frequency: Never     Comment: drinks one fourth of a glass of white wine once a week  (Never drank alcohol per [employer])    Drug use: No        Review of Systems   Constitutional: Negative for fever  Respiratory: Negative for cough and shortness of breath  Gastrointestinal: Positive for anorexia  Negative for diarrhea  All other systems reviewed and are negative  Physical Exam  Physical Exam   Constitutional: She appears well-developed and well-nourished  HENT:   Nose: Nose normal    Mouth/Throat: Oropharynx is clear and moist    Eyes: Conjunctivae are normal    Neck: Neck supple  Cardiovascular: Normal rate and regular rhythm  Pulmonary/Chest: Effort normal and breath sounds normal    Abdominal: Soft  There is no tenderness  Musculoskeletal: She exhibits no deformity  Neurological: She is alert  GCS eye subscore is 4  GCS verbal subscore is 2  GCS motor subscore is 6  Grossly non-focal   Skin: Skin is warm  Psychiatric: She has a normal mood and affect  Nursing note and vitals reviewed        Vital Signs  ED Triage Vitals [01/30/20 0831]   Temperature Pulse Respirations Blood Pressure SpO2   99 1 °F (37 3 °C) 78 18 125/73 96 %      Temp Source Heart Rate Source Patient Position - Orthostatic VS BP Location FiO2 (%)   Oral Monitor Lying Right arm --      Pain Score       No Pain           Vitals:    01/30/20 2325 01/31/20 0707 01/31/20 0834 01/31/20 1431   BP: 125/51 103/70 148/67 143/68   Pulse: 75 73 75 76   Patient Position - Orthostatic VS:  Lying  Lying         Visual Acuity  Visual Acuity      Most Recent Value   L Pupil Size (mm)  3   R Pupil Size (mm)  3   L Pupil Shape  Round   R Pupil Shape  Round          ED Medications  Medications   acetaminophen (TYLENOL) tablet 488 mg (has no administration in time range)   ALPRAZolam (XANAX) tablet 0 5 mg (has no administration in time range)   aspirin chewable tablet 81 mg (81 mg Oral Given 1/31/20 0922)   carbidopa-levodopa (SINEMET)  mg per tablet 1 tablet (1 tablet Oral Given 1/31/20 1339)   donepezil (ARICEPT) tablet 10 mg (10 mg Oral Given 1/30/20 2105)   escitalopram (LEXAPRO) tablet 10 mg (10 mg Oral Given 1/31/20 0922)   glycopyrrolate (ROBINUL) tablet 1 mg (1 mg Oral Given 1/31/20 1720)   levothyroxine tablet 75 mcg (75 mcg Oral Given 1/31/20 0515)   saccharomyces boulardii (FLORASTOR) capsule 250 mg (250 mg Oral Given 1/31/20 1720)   famotidine (PEPCID) tablet 20 mg (20 mg Oral Given 1/31/20 0515)   pravastatin (PRAVACHOL) tablet 40 mg (40 mg Oral Given 1/31/20 1721)   carbidopa-levodopa (SINEMET CR)  mg per ER tablet 0 5 tablet (0 5 tablets Oral Given 1/31/20 1721)   heparin (porcine) subcutaneous injection 5,000 Units (5,000 Units Subcutaneous Given 1/31/20 1339)   dextrose 5 % and sodium chloride 0 9 % with KCl 20 mEq/L infusion (premix) (75 mL/hr Intravenous New Bag 1/31/20 0841)   sodium chloride 0 9 % bolus 1,000 mL (0 mL Intravenous Stopped 1/30/20 1450)   iohexol (OMNIPAQUE) 350 MG/ML injection (MULTI-DOSE) 100 mL (100 mL Intravenous Given 1/30/20 1222)   iohexol (OMNIPAQUE) 240 MG/ML solution 50 mL (50 mL Oral Given 1/31/20 1659)   barium sulfate 98 % oral suspension 50 mL (50 mL Oral Given 1/31/20 1659)       Diagnostic Studies  Results Reviewed     Procedure Component Value Units Date/Time    Procalcitonin [[id number]]  (Normal) Collected:  01/30/20 1516    Lab Status:  Final result Specimen:  Blood from Arm, Left Updated:  01/30/20 2004     Procalcitonin 0 07 ng/ml     NT-BNP PRO [[id number]]  (Normal) Collected:  01/30/20 0932    Lab Status:  Final result Specimen:  Blood from Arm, Left Updated:  01/30/20 1100     NT-proBNP 32 pg/mL     UA (URINE) with reflex to Scope [[id number]] Collected:  01/30/20 1017    Lab Status:  Final result Specimen:  Urine, Straight Cath Updated:  01/30/20 1050     Color, UA Yellow     Clarity, UA Clear     Specific [place], UA 1 020     pH, UA 6 5     Leukocytes, UA Negative     Nitrite, UA Negative     Protein, UA Negative mg/dl Glucose, UA Negative mg/dl      Ketones, UA Negative mg/dl      Urobilinogen, UA 0 2 E U /dl      Bilirubin, UA Negative     Blood, UA Negative    Protime-INR [[id number]]  (Normal) Collected:  01/30/20 1025    Lab Status:  Final result Specimen:  Blood from Arm, Right Updated:  01/30/20 1045     Protime 13 7 seconds      INR 1 04    APTT [[id number]]  (Normal) Collected:  01/30/20 1025    Lab Status:  Final result Specimen:  Blood from Arm, Right Updated:  01/30/20 1045     PTT 31 seconds     Comprehensive metabolic panel [[id number]]  (Abnormal) Collected:  01/30/20 0932    Lab Status:  Final result Specimen:  Blood from Arm, Left Updated:  01/30/20 1008     Sodium 140 mmol/L      Potassium 4 6 mmol/L      Chloride 103 mmol/L      CO2 30 mmol/L      ANION GAP 7 mmol/L      BUN 17 mg/dL      Creatinine 0 86 mg/dL      Glucose 158 mg/dL      Calcium 9 5 mg/dL      AST 42 U/L      ALT 13 U/L      Alkaline Phosphatase 77 U/L      Total Protein 7 4 g/dL      Albumin 3 3 g/dL      Total Bilirubin 0 70 mg/dL      eGFR 66 ml/min/1 73sq m     Narrative:       [facility] guidelines for Chronic Kidney Disease (CKD):     Stage 1 with normal or high GFR (GFR > 90 mL/min/1 73 square meters)    Stage 2 Mild CKD (GFR = 60-89 mL/min/1 73 square meters)    Stage 3A Moderate CKD (GFR = 45-59 mL/min/1 73 square meters)    Stage 3B Moderate CKD (GFR = 30-44 mL/min/1 73 square meters)    Stage 4 Severe CKD (GFR = 15-29 mL/min/1 73 square meters)    Stage 5 End Stage CKD (GFR <15 mL/min/1 73 square meters)  Note: GFR calculation is accurate only with a steady state creatinine    Troponin I [[id number]]  (Normal) Collected:  01/30/20 0932    Lab Status:  Final result Specimen:  Blood from Arm, Left Updated:  01/30/20 1004     Troponin I <0 02 ng/mL     CBC and differential [[id number]]  (Abnormal) Collected:  01/30/20 0932    Lab Status:  Final result Specimen:  Blood from Arm, Left Updated:  01/30/20 0609 WBC 8 17 Thousand/uL      RBC 4 91 Million/uL      Hemoglobin 15 9 g/dL      Hematocrit 48 0 %      MCV 98 fL      MCH 32 4 pg      MCHC 33 1 g/dL      RDW 12 9 %      MPV 10 9 fL      Platelets 955 Thousands/uL      nRBC 0 /100 WBCs      Neutrophils Relative 65 %      Immat GRANS % 0 %      Lymphocytes Relative 25 %      Monocytes Relative 8 %      Eosinophils Relative 1 %      Basophils Relative 1 %      Neutrophils Absolute 5 35 Thousands/µL      Immature Grans Absolute 0 02 Thousand/uL      Lymphocytes Absolute 2 01 Thousands/µL      Monocytes Absolute 0 62 Thousand/µL      Eosinophils Absolute 0 08 Thousand/µL      Basophils Absolute 0 09 Thousands/µL                  CT head wo contrast   Final Result by Marquis Armando MD (01/31 1708)      No acute intracranial abnormality  Microangiopathic changes  Workstation performed: [id number]         FL barium swallow video w speech   Final Result by ANGELIA SAN (01/31 1659)      CT abdomen pelvis wo contrast   Final Result by Cherie Pate MD (01/31 1003)      1  Resolution of previous intussusception consistent with a transient intussusception  No acute abnormality identified  2   Left breast nodule, indeterminate  Follow-up mammography recommended  3   Diffuse hepatic steatosis  4   Diverticulosis coli       I personally discussed this study with Dheeraj Brice on 1/31/2020 at 10:02 AM                    Workstation performed: [id number]         CT abdomen pelvis with contrast   ED Interpretation by Dameon Marinelli DO (01/30 2983)   See below      Final Result by Gee Milton MD (01/30 1410)         1  Entero-enteral intussusception involving the jejunum, without evidence for obstruction  This most likely represents a transient phenomenon but appropriate clinical correlation and follow-up is advised  2   Diffuse hepatic steatosis  3   Diverticulosis without evidence for diverticulitis     4  Additional findings as noted  The study was marked in [facility] for immediate notification  Workstation performed: [id number]         XR chest 1 view portable   ED Interpretation by Magdalene Coley DO (01/30 1135)   Abnormal   Increased markings - underinspired      Final Result by Maricarmen Cain MD (01/30 1743)      Mild pulmonary vascular congestion  Workstation performed: [id number]         CT head without contrast   ED Interpretation by Magdalene Coley DO (01/30 1016)   See below      Final Result by Young Chong MD (01/30 1011)      No acute intracranial abnormality  Deep brain stimulator leads are unremarkable in appearance  Cerebellar greater than cerebral volume loss  Mild cerebral chronic microangiopathic disease  Workstation performed: [id number]                    Procedures  ECG 12 Lead Documentation Only  Date/Time: 1/30/2020 9:35 AM  Performed by: Magdalene Coley DO  Authorized by: Magdalene Coley DO     ECG reviewed by me, the ED Provider: yes    Patient location:  ED  Interpretation:     Interpretation: non-specific    Rate:     ECG rate assessment: normal    Rhythm:     Rhythm: sinus rhythm    ST segments:     ST segments:  Non-specific  T waves:     T waves: non-specific               ED Course  ED Course as of Jan 31 2113   Thu Jan 30, 2020   1135 Contacted to Neuro to discuss to see if pt admitted here would they be able to make adjustments to DBS      1148 D/w pt and family lab/rad  No findings to explain weakness and trouble swallowing    Will get CT A/P d/t tenderness on exam                                   MDM  Number of Diagnoses or Management Options  Parkinson disease ([facility] [address] ): new and requires workup  Trouble swallowing: new and requires workup  Weakness: new and requires workup     Amount and/or Complexity of Data Reviewed  Clinical lab tests: reviewed and ordered  Tests in the radiology section of CPT®: reviewed and ordered  Tests in the medicine section of CPT®: reviewed and ordered  Independent visualization of images, tracings, or specimens: yes          Disposition  Final diagnoses:   Parkinson disease ([place] [address] )   Weakness   Trouble swallowing     Time reflects when diagnosis was documented in both MDM as applicable and the Disposition within this note     Time User Action Codes Description Comment    1/30/2020  1:55 PM Mary Ambriz Parkinson disease ([facility] )     1/30/2020  1:55 PM Richard Ambrizky L Modify [G20] Parkinson disease ([place] [address] )     1/30/2020  1:55 PM Lazansky Nanci L Modify [G20] Parkinson disease ([place] [address] )     1/30/2020  3:36 PM Richard Ambrizky L Add [R53 1] Weakness     1/30/2020  3:36 PM Lazadeky Nanci L Modify [G20] Parkinson disease ([place] [address] )     1/30/2020  3:36 PM LazRichard friasky L Modify [R53 1] Weakness     1/30/2020  3:37 PM FlaquitakyRichardky L Add [R13 10] Trouble swallowing       ED Disposition     ED Disposition Condition Date/Time Comment    Admit Stable Thu Jan 30, 2020  3:36 PM Case was discussed with Dr Jasson Tovar and the patient's admission status was agreed to be Admission Status: inpatient status to the service of Dr Jasson Tovar   Follow-up Information     Follow up With Specialties Details Why Lniden Lehman MD General Surgery Schedule an appointment as soon as possible for a visit in 1 week(s) Call to make an appointment for follow-up of finding of left breast nodule on CT scan  [address] [place] [address]             Current Discharge Medication List      CONTINUE these medications which have NOT CHANGED    Details   acetaminophen (TYLENOL) 500 mg tablet Take 500 mg by mouth  One throughout the night as needed      ALPRAZolam (XANAX) 0 5 mg tablet Take 1 tablet (0 5 mg total) by mouth 3 (three) times a day as needed for anxiety  Qty: 270 tablet, Refills: 0    Comments:  This request is for a new prescription for a controlled substance as required by [facility]  Associated Diagnoses: DYANA (generalized anxiety disorder)      ascorbic acid (VITAMIN C) 500 mg tablet Take 500 mg by mouth 2 (two) times a day  aspirin 81 MG tablet Take 81 mg by mouth  Three times a week M/W/F      carbidopa-levodopa (SINEMET)  mg per tablet Take 1 tablet by mouth 2 (two) times a day  donepezil (ARICEPT) 10 mg tablet TAKE 1 TABLET (10 MG TOTAL) BY MOUTH DAILY AT BEDTIME  Qty: 90 tablet, Refills: 2    Associated Diagnoses: Dementia associated with other underlying disease without behavioral disturbance ([facility])      escitalopram (LEXAPRO) 20 mg tablet TAKE 1/2 TABLET BY MOUTH TWICE A DAY  Qty: 90 tablet, Refills: 2    Associated Diagnoses: Major depression with psychotic features ([facility])      glycopyrrolate (ROBINUL) 1 mg tablet TAKE 1 TABLET (1 MG TOTAL) BY MOUTH 3 (THREE) TIMES A DAY  Qty: 270 tablet, Refills: 2    Associated Diagnoses: Sialorrhea      levothyroxine 75 mcg tablet TAKE 1 TABLET EVERY DAY  Qty: 90 tablet, Refills: 0    Associated Diagnoses: Hypothyroidism, unspecified type      Multiple Vitamin (MULTIVITAMIN) tablet Take 1 tablet by mouth daily  NON FORMULARY Take 1 tablet by mouth 2 (two) times a day  Calcium- D3-600mg      phenazopyridine (PYRIDIUM) 200 mg tablet Take 200 mg by mouth as needed for bladder spasms  Probiotic Product (PROBIOTIC DAILY PO) Take 1 tablet by mouth daily  ranitidine (ZANTAC) 150 mg tablet Take 150 mg by mouth daily  simvastatin (ZOCOR) 20 mg tablet Take 20 mg by mouth daily after dinner  5:00/5:30      Vitamin D, Cholecalciferol, 1000 UNITS CAPS Take 1 tablet by mouth daily  No discharge procedures on file      ED Provider  Electronically Signed by           Charo Patricia DO  01/31/20 6502

## 2020-01-30 NOTE — ASSESSMENT & PLAN NOTE
Cough associated with shortness of breath for the last few days  Likely aspiration pneumonia in the setting of dysphagia

## 2020-01-30 NOTE — CONSULTS
Consultation - General Surgery   Kendall Magana 66 y o  female MRN: [id number]  Unit/Bed#: ED 22 Encounter: [id number]    Assessment/Plan     Assessment:  12-year-old female with multiple medical comorbidities presenting with family for generalized weakness and mental status change, noted to have abdominal tenderness on exam in the ED with CT finding of entero-enteral intussusception involving the jejunum without obstruction    Plan:    1  Entero-enteral intussusception  -Likely just transient finding  Patient's abdominal exam is relatively benign  Abdomen is soft, with some distention   -Patient's Family states that she has been having regular bowel movements and states she has not had any episodes of emesis   -Rescan to ensure no presence of true intussusception with PO contrast  -Monitor electrolytes, IV fluids  -Discussed with attending    2  Weakness  -Unclear etiology  -Work-up for metabolic/infectious etiology per SLIM and Neurology    History of Present Illness     HPI:  Kendall Magana is a 66 y o  female who presents with her family who is concerned because the patient has had generalized weakness and mental status change over the past few days  Patient was unable to answer my questions secondary to mental status change  Patient's family provided majority of the history  Patient's family states that she has been having regular bowel movements  They deny any episodes of emesis  States that she has had less of an appetite than normal   Denies any fevers or chills  States that she has not complained of any abdominal pain  Consult to surgery general  Consult performed by: Francisca Barrientos PA-C  Consult ordered by: Nick Schmidt DO          Review of Systems   Reason unable to perform ROS: mental status change         Historical Information   Past Medical History:   Diagnosis Date    Abdominal pain, suprapubic 07/03/2014    Resolved:  November 22, 2016    Abscess of skin and subcutaneous tissue 11/21/2016    Resolved:  February 28, 2017    Anxiety     Burning with urination 12/14/2016    Resolved:  February 28, 2017    Conjunctivitis 06/10/2013    Depression     Depression     Dysuria 06/06/2016    Resolved:  February 28 2017    Falling 10/18/2012    GERD (gastroesophageal reflux disease)     Groin pain 04/16/2015    Resolved:  February 28, 2017    Hyperlipidemia     Hypothyroid     Influenza 04/05/2013    Leg abrasion 09/22/2016    Resolved:  February 28, 2017    Parkinson's disease [facility])     Psychiatric illness     Rectal pain 10/17/2013    Resolved:  February 28, 2017    Thyroid disease      Past Surgical History:   Procedure Laterality Date    APPENDECTOMY      Age 4 or 3    CLOSED REDUCTION NASAL FRACTURE      CYSTOSCOPY  01/28/2014    [facility] [place]   (Diagnostic)    DEEP BRAIN STIMULATOR PLACEMENT      LAMINECTOMY      Decompress, Facetectomy, Foraminotomy Lumbar Seg    ORIF HIP FRACTURE Left     About 3 years ago    TONSILLECTOMY       Social History   Social History     Substance and Sexual Activity   Alcohol Use Yes    Alcohol/week: 1 0 standard drinks    Types: 1 Glasses of wine per week    Comment: drinks one fourth of a glass of white wine once a week  (Never drank alcohol per [employer])     Social History     Substance and Sexual Activity   Drug Use No     Social History     Tobacco Use   Smoking Status Never Smoker   Smokeless Tobacco Never Used   Tobacco Comment    n/a     Family History:   Family History   Problem Relation Age of Onset    Heart attack Mother         Acute Myocardial Infarction (MI)    Diabetes Mother         Diabetes Mellitus    Stroke Father         Syndrome    No Known Problems Brother     No Known Problems Maternal Aunt     No Known Problems Paternal Aunt     No Known Problems Maternal Uncle     No Known Problems Paternal Uncle     No Known Problems Maternal Grandfather     No Known Problems Maternal Grandmother     No Known Problems Paternal Grandfather     No Known Problems Paternal Grandmother     No Known Problems Cousin     Heart disease Family     Hypertension Family     Thyroid disease Family     ADD / ADHD Neg Hx     Alcohol abuse Neg Hx     Anxiety disorder Neg Hx     Bipolar disorder Neg Hx     Dementia Neg Hx     Depression Neg Hx     Drug abuse Neg Hx     OCD Neg Hx     Paranoid behavior Neg Hx     Schizophrenia Neg Hx     Seizures Neg Hx     Self-Injury Neg Hx     Suicide Attempts Neg Hx        Meds/Allergies   current meds:   No current facility-administered medications for this encounter  Allergies   Allergen Reactions    Gabapentin      Other reaction(s): Hypotension    Bupropion GI Intolerance and Other (See Comments)     Other reaction(s): Tremor    Cephalexin     Duloxetine      Other reaction(s): Other (see comments)  unkown  unknown      Erythromycin GI Intolerance    Methadone Hallucinations    Mirtazapine GI Intolerance    Paroxetine GI Intolerance    Sulfa Antibiotics     Tricyclic Antidepressants      Other reaction(s):  Other    Acetazolamide Rash    Levofloxacin Anxiety and Other (See Comments)       Objective   First Vitals:   Blood Pressure: 125/73 (01/30/20 0831)  Pulse: 78 (01/30/20 0831)  Temperature: 99 1 °F (37 3 °C) (01/30/20 0831)  Temp Source: Oral (01/30/20 0831)  Respirations: 18 (01/30/20 0831)  Weight - Scale: 88 6 kg (195 lb 5 2 oz) (01/30/20 0831)  SpO2: 96 % (01/30/20 0831)    Current Vitals:   Blood Pressure: 145/85 (01/30/20 1300)  Pulse: 80 (01/30/20 1300)  Temperature: 99 1 °F (37 3 °C) (01/30/20 0831)  Temp Source: Oral (01/30/20 0831)  Respirations: 18 (01/30/20 1300)  Weight - Scale: 88 6 kg (195 lb 5 2 oz) (01/30/20 0831)  SpO2: 95 % (01/30/20 1300)      Intake/Output Summary (Last 24 hours) at 1/30/2020 1541  Last data filed at 1/30/2020 1450  Gross per 24 hour   Intake 1000 ml   Output    Net 1000 ml       Invasive Devices     Peripheral Intravenous Line            Peripheral IV 01/30/20 Left Forearm less than 1 day                Physical Exam   Constitutional: No distress  HENT:   Head: Normocephalic and atraumatic  Neck: Normal range of motion  Cardiovascular: Normal rate and regular rhythm  Pulmonary/Chest: Effort normal and breath sounds normal  No respiratory distress  Abdominal: Soft  Bowel sounds are normal  She exhibits distension  There is no tenderness  There is no guarding  Skin: Skin is warm and dry  She is not diaphoretic  Lab Results:   CBC:   Lab Results   Component Value Date    WBC 8 17 01/30/2020    HGB 15 9 (H) 01/30/2020    HCT 48 0 (H) 01/30/2020    MCV 98 01/30/2020     01/30/2020    MCH 32 4 01/30/2020    MCHC 33 1 01/30/2020    RDW 12 9 01/30/2020    MPV 10 9 01/30/2020    NRBC 0 01/30/2020   , CMP:   Lab Results   Component Value Date    SODIUM 140 01/30/2020    K 4 6 01/30/2020     01/30/2020    CO2 30 01/30/2020    BUN 17 01/30/2020    CREATININE 0 86 01/30/2020    CALCIUM 9 5 01/30/2020    AST 42 01/30/2020    ALT 13 01/30/2020    ALKPHOS 77 01/30/2020    EGFR 66 01/30/2020     Imaging: I have personally reviewed pertinent reports  EKG, Pathology, and Other Studies: I have personally reviewed pertinent reports

## 2020-01-30 NOTE — ASSESSMENT & PLAN NOTE
Generalized weakness for the last 4 days associated with cough, difficulty swallowing and shortness of breath  Rule out aspiration  NPO for now  Check swallow study  Check procalcitonin  Check TSH  Gentle IV hydration  Monitor CBC and vital signs closely

## 2020-01-31 ENCOUNTER — APPOINTMENT (INPATIENT)
Dept: CT IMAGING | Facility: HOSPITAL | Age: 75
DRG: 065 | End: 2020-01-31
Payer: MEDICARE

## 2020-01-31 ENCOUNTER — APPOINTMENT (INPATIENT)
Dept: RADIOLOGY | Facility: HOSPITAL | Age: 75
DRG: 065 | End: 2020-01-31
Payer: MEDICARE

## 2020-01-31 PROBLEM — N63.0 BREAST NODULE: Status: ACTIVE | Noted: 2020-01-31

## 2020-01-31 LAB
ALBUMIN SERPL BCP-MCNC: 3 G/DL (ref 3.5–5)
ALP SERPL-CCNC: 71 U/L (ref 46–116)
ALT SERPL W P-5'-P-CCNC: 25 U/L (ref 12–78)
ANION GAP SERPL CALCULATED.3IONS-SCNC: 7 MMOL/L (ref 4–13)
AST SERPL W P-5'-P-CCNC: 25 U/L (ref 5–45)
BASOPHILS # BLD AUTO: 0.06 THOUSANDS/ΜL (ref 0–0.1)
BASOPHILS NFR BLD AUTO: 1 % (ref 0–1)
BILIRUB SERPL-MCNC: 0.82 MG/DL (ref 0.2–1)
BUN SERPL-MCNC: 10 MG/DL (ref 5–25)
CALCIUM SERPL-MCNC: 8.5 MG/DL (ref 8.3–10.1)
CHLORIDE SERPL-SCNC: 106 MMOL/L (ref 100–108)
CO2 SERPL-SCNC: 28 MMOL/L (ref 21–32)
CREAT SERPL-MCNC: 0.79 MG/DL (ref 0.6–1.3)
EOSINOPHIL # BLD AUTO: 0.04 THOUSAND/ΜL (ref 0–0.61)
EOSINOPHIL NFR BLD AUTO: 0 % (ref 0–6)
ERYTHROCYTE [DISTWIDTH] IN BLOOD BY AUTOMATED COUNT: 12.7 % (ref 11.6–15.1)
GFR SERPL CREATININE-BSD FRML MDRD: 73 ML/MIN/1.73SQ M
GLUCOSE SERPL-MCNC: 156 MG/DL (ref 65–140)
HCT VFR BLD AUTO: 46 % (ref 34.8–46.1)
HGB BLD-MCNC: 15.2 G/DL (ref 11.5–15.4)
IMM GRANULOCYTES # BLD AUTO: 0.03 THOUSAND/UL (ref 0–0.2)
IMM GRANULOCYTES NFR BLD AUTO: 0 % (ref 0–2)
LYMPHOCYTES # BLD AUTO: 2.52 THOUSANDS/ΜL (ref 0.6–4.47)
LYMPHOCYTES NFR BLD AUTO: 24 % (ref 14–44)
MAGNESIUM SERPL-MCNC: 1.8 MG/DL (ref 1.6–2.6)
MCH RBC QN AUTO: 32.1 PG (ref 26.8–34.3)
MCHC RBC AUTO-ENTMCNC: 33 G/DL (ref 31.4–37.4)
MCV RBC AUTO: 97 FL (ref 82–98)
MONOCYTES # BLD AUTO: 1.05 THOUSAND/ΜL (ref 0.17–1.22)
MONOCYTES NFR BLD AUTO: 10 % (ref 4–12)
NEUTROPHILS # BLD AUTO: 6.82 THOUSANDS/ΜL (ref 1.85–7.62)
NEUTS SEG NFR BLD AUTO: 65 % (ref 43–75)
NRBC BLD AUTO-RTO: 0 /100 WBCS
PHOSPHATE SERPL-MCNC: 2.4 MG/DL (ref 2.3–4.1)
PLATELET # BLD AUTO: 233 THOUSANDS/UL (ref 149–390)
PMV BLD AUTO: 10.3 FL (ref 8.9–12.7)
POTASSIUM SERPL-SCNC: 3.5 MMOL/L (ref 3.5–5.3)
PROCALCITONIN SERPL-MCNC: 0.05 NG/ML
PROT SERPL-MCNC: 6.7 G/DL (ref 6.4–8.2)
RBC # BLD AUTO: 4.74 MILLION/UL (ref 3.81–5.12)
SODIUM SERPL-SCNC: 141 MMOL/L (ref 136–145)
WBC # BLD AUTO: 10.52 THOUSAND/UL (ref 4.31–10.16)

## 2020-01-31 PROCEDURE — 80053 COMPREHEN METABOLIC PANEL: CPT | Performed by: INTERNAL MEDICINE

## 2020-01-31 PROCEDURE — 84145 PROCALCITONIN (PCT): CPT | Performed by: PHYSICIAN ASSISTANT

## 2020-01-31 PROCEDURE — 99233 SBSQ HOSP IP/OBS HIGH 50: CPT | Performed by: PHYSICIAN ASSISTANT

## 2020-01-31 PROCEDURE — NC001 PR NO CHARGE: Performed by: PHYSICIAN ASSISTANT

## 2020-01-31 PROCEDURE — 84100 ASSAY OF PHOSPHORUS: CPT | Performed by: INTERNAL MEDICINE

## 2020-01-31 PROCEDURE — 85025 COMPLETE CBC W/AUTO DIFF WBC: CPT | Performed by: INTERNAL MEDICINE

## 2020-01-31 PROCEDURE — 92611 MOTION FLUOROSCOPY/SWALLOW: CPT

## 2020-01-31 PROCEDURE — 92526 ORAL FUNCTION THERAPY: CPT

## 2020-01-31 PROCEDURE — 74230 X-RAY XM SWLNG FUNCJ C+: CPT

## 2020-01-31 PROCEDURE — 83735 ASSAY OF MAGNESIUM: CPT | Performed by: INTERNAL MEDICINE

## 2020-01-31 PROCEDURE — 70450 CT HEAD/BRAIN W/O DYE: CPT

## 2020-01-31 PROCEDURE — 92610 EVALUATE SWALLOWING FUNCTION: CPT

## 2020-01-31 RX ADMIN — ESCITALOPRAM OXALATE 10 MG: 10 TABLET ORAL at 09:22

## 2020-01-31 RX ADMIN — Medication 250 MG: at 17:20

## 2020-01-31 RX ADMIN — CARBIDOPA AND LEVODOPA 1 TABLET: 25; 100 TABLET ORAL at 09:22

## 2020-01-31 RX ADMIN — GLYCOPYRROLATE 1 MG: 1 TABLET ORAL at 22:15

## 2020-01-31 RX ADMIN — CARBIDOPA AND LEVODOPA 1 TABLET: 25; 100 TABLET ORAL at 13:39

## 2020-01-31 RX ADMIN — FAMOTIDINE 20 MG: 20 TABLET, FILM COATED ORAL at 05:15

## 2020-01-31 RX ADMIN — CARBIDOPA AND LEVODOPA 0.5 TABLET: 25; 100 TABLET, EXTENDED RELEASE ORAL at 17:21

## 2020-01-31 RX ADMIN — CARBIDOPA AND LEVODOPA 1 TABLET: 25; 100 TABLET ORAL at 05:15

## 2020-01-31 RX ADMIN — HEPARIN SODIUM 5000 UNITS: 5000 INJECTION INTRAVENOUS; SUBCUTANEOUS at 22:18

## 2020-01-31 RX ADMIN — HEPARIN SODIUM 5000 UNITS: 5000 INJECTION INTRAVENOUS; SUBCUTANEOUS at 13:39

## 2020-01-31 RX ADMIN — DEXTROSE, SODIUM CHLORIDE, AND POTASSIUM CHLORIDE 75 ML/HR: 5; .9; .15 INJECTION INTRAVENOUS at 08:41

## 2020-01-31 RX ADMIN — PRAVASTATIN SODIUM 40 MG: 40 TABLET ORAL at 17:21

## 2020-01-31 RX ADMIN — Medication 250 MG: at 09:22

## 2020-01-31 RX ADMIN — GLYCOPYRROLATE 1 MG: 1 TABLET ORAL at 17:20

## 2020-01-31 RX ADMIN — HEPARIN SODIUM 5000 UNITS: 5000 INJECTION INTRAVENOUS; SUBCUTANEOUS at 05:15

## 2020-01-31 RX ADMIN — LEVOTHYROXINE SODIUM 75 MCG: 75 TABLET ORAL at 05:15

## 2020-01-31 RX ADMIN — GLYCOPYRROLATE 1 MG: 1 TABLET ORAL at 09:22

## 2020-01-31 RX ADMIN — IOHEXOL 50 ML: 240 INJECTION, SOLUTION INTRATHECAL; INTRAVASCULAR; INTRAVENOUS; ORAL at 16:59

## 2020-01-31 RX ADMIN — ASPIRIN 81 MG 81 MG: 81 TABLET ORAL at 09:22

## 2020-01-31 RX ADMIN — DONEPEZIL HYDROCHLORIDE 10 MG: 10 TABLET, FILM COATED ORAL at 22:14

## 2020-01-31 RX ADMIN — CARBIDOPA AND LEVODOPA 0.5 TABLET: 25; 100 TABLET, EXTENDED RELEASE ORAL at 22:15

## 2020-01-31 NOTE — PROGRESS NOTES
Progress Note - Neurology   Josseline Celaya 66 y o  female [id number]  Unit/Bed#: [place] [facility] 2 /[place] 2 M*    Assessment:  3 66year-old with Parkinson's disease s/p DBS (wheelchair bound at baseline), depression, HLD, and hypothyroidism who is admitted with generalized weakness, dysphagia, dysarthria, and shortness of breath  CT head showed no acute intracranial abnormality  Examination revealed left hand weakness  Will obtain repeat CT head to rule out acute infarction  Plan:  - Repeat CT head pending  - Unable to obtain MRI brain at [facility] due to DBS  Recommend outpatient MRI brain   - Continue ASA 81mg  - Continue pravastatin 40mg  - Continue Sinemet  - Continue donepezil  - Neuro checks  - Lipid panel and HgbA1c pending  - PT/OT/ST  - Notify Neurology with any changes in neuro examination  - Medical management and supportive care as per primary team    Results:  1  CT head: No acute intracranial abnormality  Deep brain stimulator leads are unremarkable in appearance  Cerebellar greater than cerebral volume loss  Mild cerebral chronic microangiopathic disease  Subjective:   Patient is drowsy on examination, but slightly arousable with verbal and tactile stimuli  Patient was able to follow most simple commands  Patient did not respond to most questions, however, when she did respond her speech was mumbled and non-comprehensible  Patient was able to keep all four extremities lifted against gravity, however, she was not able to make purposeful movements in her left hand  At baseline, patient's  states that her speech is mumbled, but you can typically make out what she is saying  He also reports that she is very fatigued and sleeps most of the day and night  It was reported by family that patient has been experiencing left sided weakness for four days      Past Medical History:   Diagnosis Date    Abdominal pain, suprapubic 07/03/2014    Resolved:  November 22, 2016    Abscess of skin and subcutaneous tissue 11/21/2016    Resolved:  February 28, 2017    Anxiety     Burning with urination 12/14/2016    Resolved:  February 28, 2017    Conjunctivitis 06/10/2013    Depression     Depression     Dysuria 06/06/2016    Resolved:  February 28 2017    Falling 10/18/2012    GERD (gastroesophageal reflux disease)     Groin pain 04/16/2015    Resolved:  February 28, 2017    Hyperlipidemia     Hypothyroid     Influenza 04/05/2013    Leg abrasion 09/22/2016    Resolved:  February 28, 2017    Parkinson's disease [facility])     Psychiatric illness     Rectal pain 10/17/2013    Resolved:  February 28, 2017    Thyroid disease      Past Surgical History:   Procedure Laterality Date    APPENDECTOMY      Age 4 or 5    BACK SURGERY      BRAIN SURGERY      CLOSED REDUCTION NASAL FRACTURE      CYSTOSCOPY  01/28/2014    [facility] [place]   (Diagnostic)    DEEP BRAIN STIMULATOR PLACEMENT      EYE SURGERY      FRACTURE SURGERY      JOINT REPLACEMENT      LAMINECTOMY      Decompress, Facetectomy, Foraminotomy Lumbar Seg    ORIF HIP FRACTURE Left     About 3 years ago    TONSILLECTOMY       Family History   Problem Relation Age of Onset    Heart attack Mother         Acute Myocardial Infarction (MI)    Diabetes Mother         Diabetes Mellitus    Stroke Father         Syndrome    No Known Problems Brother     No Known Problems Maternal Aunt     No Known Problems Paternal Aunt     No Known Problems Maternal Uncle     No Known Problems Paternal Uncle     No Known Problems Maternal Grandfather     No Known Problems Maternal Grandmother     No Known Problems Paternal Grandfather     No Known Problems Paternal Grandmother     No Known Problems Cousin     Heart disease Family     Hypertension Family     Thyroid disease Family     ADD / ADHD Neg Hx     Alcohol abuse Neg Hx     Anxiety disorder Neg Hx     Bipolar disorder Neg Hx     Dementia Neg Hx     Depression Neg Hx     Drug abuse Neg Hx     OCD Neg Hx     Paranoid behavior Neg Hx     Schizophrenia Neg Hx     Seizures Neg Hx     Self-Injury Neg Hx     Suicide Attempts Neg Hx      Social History     Socioeconomic History    Marital status: /Civil Union     Spouse name: None    Number of children: None    Years of education: None    Highest education level: None   Occupational History    None   Social Needs    Financial resource strain: None    Food insecurity:     Worry: None     Inability: None    Transportation needs:     Medical: None     Non-medical: None   Tobacco Use    Smoking status: Never Smoker    Smokeless tobacco: Never Used    Tobacco comment: n/a   Substance and Sexual Activity    Alcohol use: Never     Frequency: Never     Comment: drinks one fourth of a glass of white wine once a week  (Never drank alcohol per [employer])    Drug use: No    Sexual activity: Not Currently   Lifestyle    Physical activity:     Days per week: None     Minutes per session: None    Stress: None   Relationships    Social connections:     Talks on phone: None     Gets together: None     Attends Hinduism service: None     Active member of club or organization: None     Attends meetings of clubs or organizations: None     Relationship status: None    Intimate partner violence:     Fear of current or ex partner: None     Emotionally abused: None     Physically abused: None     Forced sexual activity: None   Other Topics Concern    None   Social History Narrative    None     Medications:   All current active meds have been reviewed and current meds:  Scheduled Meds:  Current Facility-Administered Medications:  acetaminophen 488 mg Oral Q6H PRN Dora Ghotra MD    ALPRAZolam 0 5 mg Oral TID PRN Dora Ghotra MD    aspirin 81 mg Oral Daily Dora Ghotra MD    carbidopa-levodopa 0 5 tablet Oral BID Dora Ghotra MD    carbidopa-levodopa 1 tablet Oral TID Dora Ghotra MD    dextrose 5 % and sodium chloride 0 9 % with KCl 20 mEq/L 75 mL/hr Intravenous Continuous Anastasia Ventura MD Last Rate: 75 mL/hr (01/31/20 0841)   donepezil 10 mg Oral HS Anastasia Ventura MD    escitalopram 10 mg Oral Daily Anastasia Ventura MD    famotidine 20 mg Oral Early Morning Anastasia Ventura MD    glycopyrrolate 1 mg Oral TID Anastasia Ventura MD    heparin (porcine) 5,000 Units Subcutaneous [facility] Anastasia Ventura MD    iohexol 50 mL Oral Once in imaging Camron Saucedo PA-C    levothyroxine 75 mcg Oral Early Morning Anastasia Ventura MD    pravastatin 40 mg Oral Daily With Aggie Hodgson MD    saccharomyces boulardii 250 mg Oral BID Anastasia Ventura MD      Continuous Infusions:  dextrose 5 % and sodium chloride 0 9 % with KCl 20 mEq/L 75 mL/hr Last Rate: 75 mL/hr (01/31/20 0841)     PRN Meds:   acetaminophen    ALPRAZolam    iohexol     ROS:   Review of Systems   Reason unable to perform ROS: altered mental status  Vitals:   /67   Pulse 75   Temp 99 5 °F (37 5 °C)   Resp 19   Wt 88 6 kg (195 lb 5 2 oz)   SpO2 (!) 89%   Breastfeeding No   BMI 38 15 kg/m²     Physical Exam:   Physical Exam   Constitutional: No distress  HENT:   Head: Normocephalic and atraumatic  Right Ear: External ear normal    Left Ear: External ear normal    Nose: Nose normal    Pulmonary/Chest: No respiratory distress  Neurological:   Reflex Scores:       Bicep reflexes are 2+ on the right side and 2+ on the left side  Brachioradialis reflexes are 2+ on the right side and 1+ on the left side  Patellar reflexes are 2+ on the right side and 1+ on the left side  Achilles reflexes are 1+ on the right side and 0 on the left side  Skin: Skin is warm and dry  She is not diaphoretic       Neurologic Exam     Mental Status   Patient was drowsy, but slightly arousable with verbal and tactile stimuli  Patient was able to follow most simple commands such as show me two fingers and give me thumbs up  Patient did not respond to most mental status questioning, when patient would attempt to speak her speech is noncomprehensible     Cranial Nerves     CN VIII   Hearing impaired: Grossly intact  CN XII   Tongue deviation: left  Patient actively resists when examiner attempts to open her eyes  No overt facial asymmetry     Motor Exam Patient is able to lift all four extremities against gravity  Good right  strength  Good bilateral dorsiflexion and plantarflexion strength   Patient was unable to  or move her left hand during examination     Sensory Exam   Patient withdrawals in bilateral lower extremities to tactile stimuli     Gait, Coordination, and Reflexes     Reflexes   Right brachioradialis: 2+  Left brachioradialis: 1+  Right biceps: 2+  Left biceps: 2+  Right patellar: 2+  Left patellar: 1+  Right achilles: 1+  Left achilles: 0  Intermittent mild rest tremor noted in right hand     Labs: I have personally reviewed pertinent reports     Recent Results (from the past 24 hour(s))   Procalcitonin    Collection Time: 01/30/20  3:16 PM   Result Value Ref Range    Procalcitonin 0 07 <=0 25 ng/ml   TSH, 3rd generation    Collection Time: 01/30/20  5:45 PM   Result Value Ref Range    TSH 3RD GENERATON 2 990 0 358 - 3 740 uIU/mL   CBC and differential    Collection Time: 01/31/20  5:32 AM   Result Value Ref Range    WBC 10 52 (H) 4 31 - 10 16 Thousand/uL    RBC 4 74 3 81 - 5 12 Million/uL    Hemoglobin 15 2 11 5 - 15 4 g/dL    Hematocrit 46 0 34 8 - 46 1 %    MCV 97 82 - 98 fL    MCH 32 1 26 8 - 34 3 pg    MCHC 33 0 31 4 - 37 4 g/dL    RDW 12 7 11 6 - 15 1 %    MPV 10 3 8 9 - 12 7 fL    Platelets 610 296 - 105 Thousands/uL    nRBC 0 /100 WBCs    Neutrophils Relative 65 43 - 75 %    Immat GRANS % 0 0 - 2 %    Lymphocytes Relative 24 14 - 44 %    Monocytes Relative 10 4 - 12 %    Eosinophils Relative 0 0 - 6 %    Basophils Relative 1 0 - 1 %    Neutrophils Absolute 6 82 1 85 - 7 62 Thousands/µL    Immature Grans Absolute 0 03 0 00 - 0 20 Thousand/uL    Lymphocytes Absolute 2 52 0 60 - 4 47 Thousands/µL Monocytes Absolute 1 05 0 17 - 1 22 Thousand/µL    Eosinophils Absolute 0 04 0 00 - 0 61 Thousand/µL    Basophils Absolute 0 06 0 00 - 0 10 Thousands/µL   Comprehensive metabolic panel    Collection Time: 01/31/20  5:33 AM   Result Value Ref Range    Sodium 141 136 - 145 mmol/L    Potassium 3 5 3 5 - 5 3 mmol/L    Chloride 106 100 - 108 mmol/L    CO2 28 21 - 32 mmol/L    ANION GAP 7 4 - 13 mmol/L    BUN 10 5 - 25 mg/dL    Creatinine 0 79 0 60 - 1 30 mg/dL    Glucose 156 (H) 65 - 140 mg/dL    Calcium 8 5 8 3 - 10 1 mg/dL    AST 25 5 - 45 U/L    ALT 25 12 - 78 U/L    Alkaline Phosphatase 71 46 - 116 U/L    Total Protein 6 7 6 4 - 8 2 g/dL    Albumin 3 0 (L) 3 5 - 5 0 g/dL    Total Bilirubin 0 82 0 20 - 1 00 mg/dL    eGFR 73 ml/min/1 73sq m   Magnesium    Collection Time: 01/31/20  5:33 AM   Result Value Ref Range    Magnesium 1 8 1 6 - 2 6 mg/dL   Phosphorus    Collection Time: 01/31/20  5:33 AM   Result Value Ref Range    Phosphorus 2 4 2 3 - 4 1 mg/dL     Imaging: I have personally reviewed pertinent imaging and I have personally reviewed PACS reports  EKG, Pathology, and Other Studies: I have personally reviewed pertinent reports  VTE Prophylaxis: Sequential compression device (Venodyne)  and Heparin    Total time spent today 35 minutes  Greater than 50% of total time was spent with the patient and / or family counseling and / or coordination of care  A description of the counseling / coordination of care: Discussed with patient, family, and IM: upcoming testing including CT head, unable to obtain MRI due to DBS, medication regimen including aspirin, and plans of care

## 2020-01-31 NOTE — ASSESSMENT & PLAN NOTE
Generalized weakness for the last 4 days w/worsening swallow problems/cough/sob  It appears LUE weakness is out of proportion to RUE and LE strength and is 2/5-3/5 compared to 4/5 strength in RUE  This may be stuttering s/sx but concern is for CVA  CT head negative for ischemia hemorrhage/midline shift  Given 4 days of s/sx likely do not need permissive htn/stroke pathway  D/w neurology given DBS will need delta CT head, check flp and hgb a1c  Started on baby asa this admission  Start neurochecks q 4h  Will need nonemergent MRI in op setting w/primarily neurologist for confirmation of repeat CT negative    D/w pt's daughter/

## 2020-01-31 NOTE — PROGRESS NOTES
Progress Note - General Surgery   Zac Perkins 66 y o  female MRN: [id number]  Unit/Bed#: [place] [address] -01 Encounter: [id number]    Assessment:  57-year-old female with multiple medical comorbidities presented to the ED with weakness and mental status change, admitted to abdominal tenderness on exam, CT findings of enteroenteral intussusception  Plan:  1  Entero-enteral intussusception  -Afebrile, all other vital signs stable  -Abdominal exam benign today, soft, without distention, without nausea or vomiting  -Repeat CT scan with official read of resolution of previous intussusception consistent with a transient finding  New finding of left breast nodule, will require workup as outpatient  -Continue IV fluids  -no acute surgical intervention warranted at this time, will be available as needed    2  Weakness  -Unable to follow commands in moving left upper extremity and left lower extremity, new findings on physical exam discussed with Dr Wood Galdamez and Dr Joesph Banegas  -Dr Royal Brown evaluated at bedside, with no new acute findings or changes  -Unclear etiology, continue workup per Medicine and Neurology    Subjective/Objective   Chief Complaint:  Unobtainable secondary to patient mental status    Subjective:  Unobtainable as noted above    Objective:     Blood pressure 148/67, pulse 75, temperature 99 5 °F (37 5 °C), resp  rate 19, weight 88 6 kg (195 lb 5 2 oz), SpO2 (!) 89 %, not currently breastfeeding  ,Body mass index is 38 15 kg/m²  Intake/Output Summary (Last 24 hours) at 1/31/2020 0850  Last data filed at 1/30/2020 1450  Gross per 24 hour   Intake 1000 ml   Output    Net 1000 ml       Invasive Devices     Peripheral Intravenous Line            Peripheral IV 01/30/20 Left Forearm less than 1 day              Physical Exam:    General: Pt is alert, mumbles, unable to answer any questions, lying down in bed in NAD     HEENT:  normocephalic, no scleral icterus,  dry mucous membranes   Neck: Supple, non-tender, no JVD, ROM intact, no tracheal deviation   CV: RRR, no murmurs, gallops, rubs  S1 and S2  Resp: Lung sounds clear to auscultation B/L, poor respiratory effort no  wheezes, rhonchi, rhales   Abd: Soft, with no tenderness, non-distended, non-tympanitic  Normoactive  bowelsounds all 4 quadrants  No rebound or guarding  Ext: Warm with no cyanosis, no edema, no deformities  ROM intact   Skin: No rashes, bruises, ulcers  Neuro:  No facial weakness noted  Unable to move left upper extremity and left lower extremity on command  Able to move right upper extremity and right lower extremity and follow commands  Sensory evaluation limited secondary to mental status      Lab, Imaging and other studies:  CBC:   Lab Results   Component Value Date    WBC 10 52 (H) 01/31/2020    HGB 15 2 01/31/2020    HCT 46 0 01/31/2020    MCV 97 01/31/2020     01/31/2020    MCH 32 1 01/31/2020    MCHC 33 0 01/31/2020    RDW 12 7 01/31/2020    MPV 10 3 01/31/2020    NRBC 0 01/31/2020   , CMP:   Lab Results   Component Value Date    SODIUM 141 01/31/2020    K 3 5 01/31/2020     01/31/2020    CO2 28 01/31/2020    BUN 10 01/31/2020    CREATININE 0 79 01/31/2020    CALCIUM 8 5 01/31/2020    AST 25 01/31/2020    ALT 25 01/31/2020    ALKPHOS 71 01/31/2020    EGFR 73 01/31/2020   , Coagulation:   Lab Results   Component Value Date    INR 1 04 01/30/2020   , Urinalysis:   Lab Results   Component Value Date    COLORU Yellow 01/30/2020    CLARITYU Clear 01/30/2020    SPECGRAV 1 020 01/30/2020    PHUR 6 5 01/30/2020    LEUKOCYTESUR Negative 01/30/2020    NITRITE Negative 01/30/2020    GLUCOSEU Negative 01/30/2020    KETONESU Negative 01/30/2020    BILIRUBINUR Negative 01/30/2020    BLOODU Negative 01/30/2020   , Amylase: No results found for: AMYLASE, Lipase: No results found for: LIPASE  VTE Pharmacologic Prophylaxis: Heparin  VTE Mechanical Prophylaxis: sequential compression device     Marita Sánchez PA-C

## 2020-01-31 NOTE — PLAN OF CARE
Problem: Potential for Falls  Goal: Patient will remain free of falls  Description  INTERVENTIONS:  - Assess patient frequently for physical needs  -  Identify cognitive and physical deficits and behaviors that affect risk of falls    -  [place] fall precautions as indicated by assessment   - Educate patient/family on patient safety including physical limitations  - Instruct patient to call for assistance with activity based on assessment  - Modify environment to reduce risk of injury  - Consider OT/PT consult to assist with strengthening/mobility  Outcome: Progressing     Problem: Prexisting or High Potential for Compromised Skin Integrity  Goal: Skin integrity is maintained or improved  Description  INTERVENTIONS:  - Identify patients at risk for skin breakdown  - Assess and monitor skin integrity  - Assess and monitor nutrition and hydration status  - Monitor labs   - Assess for incontinence   - Turn and reposition patient  - Assist with mobility/ambulation  - Relieve pressure over bony prominences  - Avoid friction and shearing  - Provide appropriate hygiene as needed including keeping skin clean and dry  - Evaluate need for skin moisturizer/barrier cream  - Collaborate with interdisciplinary team   - Patient/family teaching  - Consider wound care consult   Outcome: Progressing     Problem: PAIN - ADULT  Goal: Verbalizes/displays adequate comfort level or baseline comfort level  Description  Interventions:  - Encourage patient to monitor pain and request assistance  - Assess pain using appropriate pain scale  - Administer analgesics based on type and severity of pain and evaluate response  - Implement non-pharmacological measures as appropriate and evaluate response  - Consider cultural and social influences on pain and pain management  - Notify physician/advanced practitioner if interventions unsuccessful or patient reports new pain  Outcome: Progressing     Problem: INFECTION - ADULT  Goal: Absence or prevention of progression during hospitalization  Description  INTERVENTIONS:  - Assess and monitor for signs and symptoms of infection  - Monitor lab/diagnostic results  - Monitor all insertion sites, i e  indwelling lines, tubes, and drains  - Monitor endotracheal if appropriate and nasal secretions for changes in amount and color  - [place] appropriate cooling/warming therapies per order  - Administer medications as ordered  - Instruct and encourage patient and family to use good hand hygiene technique  - Identify and instruct in appropriate isolation precautions for identified infection/condition  Outcome: Progressing     Problem: SAFETY ADULT  Goal: Maintain or return to baseline ADL function  Description  INTERVENTIONS:  -  Assess patient's ability to carry out ADLs; assess patient's baseline for ADL function and identify physical deficits which impact ability to perform ADLs (bathing, care of mouth/teeth, toileting, grooming, dressing, etc )  - Assess/evaluate cause of self-care deficits   - Assess range of motion  - Assess patient's mobility; develop plan if impaired  - Assess patient's need for assistive devices and provide as appropriate  - Encourage maximum independence but intervene and supervise when necessary  - Involve family in performance of ADLs  - Assess for home care needs following discharge   - Consider OT consult to assist with ADL evaluation and planning for discharge  - Provide patient education as appropriate  Outcome: Progressing  Goal: Maintain or return mobility status to optimal level  Description  INTERVENTIONS:  - Assess patient's baseline mobility status (ambulation, transfers, stairs, etc )    - Identify cognitive and physical deficits and behaviors that affect mobility  - Identify mobility aids required to assist with transfers and/or ambulation (gait belt, sit-to-stand, lift, walker, cane, etc )  - [place] fall precautions as indicated by assessment  - Record patient progress and toleration of activity level on Mobility SBAR; progress patient to next Phase/Stage  - Instruct patient to call for assistance with activity based on assessment  - Consider rehabilitation consult to assist with strengthening/weightbearing, etc   Outcome: Progressing     Problem: DISCHARGE PLANNING  Goal: Discharge to home or other facility with appropriate resources  Description  INTERVENTIONS:  - Identify barriers to discharge w/patient and caregiver  - Arrange for needed discharge resources and transportation as appropriate  - Identify discharge learning needs (meds, wound care, etc )  - Arrange for interpretive services to assist at discharge as needed  - Refer to Case Management Department for coordinating discharge planning if the patient needs post-hospital services based on physician/advanced practitioner order or complex needs related to functional status, cognitive ability, or social support system  Outcome: Progressing

## 2020-01-31 NOTE — ASSESSMENT & PLAN NOTE
Incidental 0 7 by 1 3 cm left breast nodule  Per daughter pt has had surgery on left breast for multiple fibrotic breast densities but no known hx of breast cancer and stopped receiving mammograms years ago  D/w  pt and daughter for f/u with pcp to discuss whether they would want to consider op mammography at this time as they aren't certain they would want to consider treatment if found to be malignant

## 2020-01-31 NOTE — PLAN OF CARE
Problem: Potential for Falls  Goal: Patient will remain free of falls  Description  INTERVENTIONS:  - Assess patient frequently for physical needs  -  Identify cognitive and physical deficits and behaviors that affect risk of falls    -  [place] fall precautions as indicated by assessment   - Educate patient/family on patient safety including physical limitations  - Instruct patient to call for assistance with activity based on assessment  - Modify environment to reduce risk of injury  - Consider OT/PT consult to assist with strengthening/mobility  Outcome: Progressing     Problem: Prexisting or High Potential for Compromised Skin Integrity  Goal: Skin integrity is maintained or improved  Description  INTERVENTIONS:  - Identify patients at risk for skin breakdown  - Assess and monitor skin integrity  - Assess and monitor nutrition and hydration status  - Monitor labs   - Assess for incontinence   - Turn and reposition patient  - Assist with mobility/ambulation  - Relieve pressure over bony prominences  - Avoid friction and shearing  - Provide appropriate hygiene as needed including keeping skin clean and dry  - Evaluate need for skin moisturizer/barrier cream  - Collaborate with interdisciplinary team   - Patient/family teaching  - Consider wound care consult   Outcome: Progressing     Problem: PAIN - ADULT  Goal: Verbalizes/displays adequate comfort level or baseline comfort level  Description  Interventions:  - Encourage patient to monitor pain and request assistance  - Assess pain using appropriate pain scale  - Administer analgesics based on type and severity of pain and evaluate response  - Implement non-pharmacological measures as appropriate and evaluate response  - Consider cultural and social influences on pain and pain management  - Notify physician/advanced practitioner if interventions unsuccessful or patient reports new pain  Outcome: Progressing     Problem: INFECTION - ADULT  Goal: Absence or prevention of progression during hospitalization  Description  INTERVENTIONS:  - Assess and monitor for signs and symptoms of infection  - Monitor lab/diagnostic results  - Monitor all insertion sites, i e  indwelling lines, tubes, and drains  - Monitor endotracheal if appropriate and nasal secretions for changes in amount and color  - [place] appropriate cooling/warming therapies per order  - Administer medications as ordered  - Instruct and encourage patient and family to use good hand hygiene technique  - Identify and instruct in appropriate isolation precautions for identified infection/condition  Outcome: Progressing     Problem: SAFETY ADULT  Goal: Maintain or return to baseline ADL function  Description  INTERVENTIONS:  -  Assess patient's ability to carry out ADLs; assess patient's baseline for ADL function and identify physical deficits which impact ability to perform ADLs (bathing, care of mouth/teeth, toileting, grooming, dressing, etc )  - Assess/evaluate cause of self-care deficits   - Assess range of motion  - Assess patient's mobility; develop plan if impaired  - Assess patient's need for assistive devices and provide as appropriate  - Encourage maximum independence but intervene and supervise when necessary  - Involve family in performance of ADLs  - Assess for home care needs following discharge   - Consider OT consult to assist with ADL evaluation and planning for discharge  - Provide patient education as appropriate  Outcome: Progressing  Goal: Maintain or return mobility status to optimal level  Description  INTERVENTIONS:  - Assess patient's baseline mobility status (ambulation, transfers, stairs, etc )    - Identify cognitive and physical deficits and behaviors that affect mobility  - Identify mobility aids required to assist with transfers and/or ambulation (gait belt, sit-to-stand, lift, walker, cane, etc )  - [place] fall precautions as indicated by assessment  - Record patient progress and toleration of activity level on Mobility SBAR; progress patient to next Phase/Stage  - Instruct patient to call for assistance with activity based on assessment  - Consider rehabilitation consult to assist with strengthening/weightbearing, etc   Outcome: Progressing     Problem: DISCHARGE PLANNING  Goal: Discharge to home or other facility with appropriate resources  Description  INTERVENTIONS:  - Identify barriers to discharge w/patient and caregiver  - Arrange for needed discharge resources and transportation as appropriate  - Identify discharge learning needs (meds, wound care, etc )  - Arrange for interpretive services to assist at discharge as needed  - Refer to Case Management Department for coordinating discharge planning if the patient needs post-hospital services based on physician/advanced practitioner order or complex needs related to functional status, cognitive ability, or social support system  Outcome: Progressing

## 2020-01-31 NOTE — ASSESSMENT & PLAN NOTE
CT evidence of small-bowel intussusception  Patient has normal bowel movement and passes gas freely  This  has spontaneously resolved without intervention on repeat imaging  Appreciate surgery recommendations   No interventions planned at this time

## 2020-01-31 NOTE — ASSESSMENT & PLAN NOTE
Cough associated with shortness of breath for the last few days  Likely aspiration without clear evidence of pneumonitis/pneumonia in the setting of dysphagia    cxr demonstrates vascular congestion however pt's lungs are clear w/o LE edema and normal nt pro bnp  Initial procalcitonin negative, and no fevers/s/sx of sepsis  Repeat procalcitonin, monitor off abx, treat dysphagia as below

## 2020-01-31 NOTE — ASSESSMENT & PLAN NOTE
Patient with advanced Parkinson disease presents with dysphagia  May be worsened given concern for LUE weakness concerning for cva with acute on chronic dysphagia issues  -appreciate bedside swallow study will down grade to pureed with HTL  Recommended for VBS as well  Will order

## 2020-01-31 NOTE — PROCEDURES
Video Swallow Study      Patient Name: Rod Dehspande  [id number] Date: 1/31/2020        Past Medical History  Past Medical History:   Diagnosis Date    Abdominal pain, suprapubic 07/03/2014    Resolved:  November 22, 2016    Abscess of skin and subcutaneous tissue 11/21/2016    Resolved:  February 28, 2017    Anxiety     Burning with urination 12/14/2016    Resolved:  February 28, 2017    Conjunctivitis 06/10/2013    Depression     Depression     Dysuria 06/06/2016    Resolved:  February 28 2017    Falling 10/18/2012    GERD (gastroesophageal reflux disease)     Groin pain 04/16/2015    Resolved:  February 28, 2017    Hyperlipidemia     Hypothyroid     Influenza 04/05/2013    Leg abrasion 09/22/2016    Resolved:  February 28, 2017    Parkinson's disease [facility])     Psychiatric illness     Rectal pain 10/17/2013    Resolved:  February 28, 2017    Thyroid disease         Past Surgical History  Past Surgical History:   Procedure Laterality Date    APPENDECTOMY      Age 4 or 3    BACK SURGERY      BRAIN SURGERY      CLOSED REDUCTION NASAL FRACTURE      CYSTOSCOPY  01/28/2014    [place] Fall   (Diagnostic)    DEEP BRAIN STIMULATOR PLACEMENT      EYE SURGERY      FRACTURE SURGERY      JOINT REPLACEMENT      LAMINECTOMY      Decompress, Facetectomy, Foraminotomy Lumbar Seg    ORIF HIP FRACTURE Left     About 3 years ago    TONSILLECTOMY       Please refer to bedside swallow for admit and additional info  Video Barium Swallow Study    Summary:  Oral stage moderate  No mastication of soft solid  Eventually cleared w/ multiple sips of liquids  Bolus formation and control were best w/ purees/honey thick liquids, mild w/ thin and nectar  Transfer time varied from prompt to moderately delayed  Overall pt was more alert and held her head up better for the study than she did earlier at the bedside, but still had periods where she seemed to doze off  Pharyngeal stage was mild/mod impaired  Initial swallows were prompt to mildly delayed  Secondary swallows (on retention) were delayed or absent  Epiglottic inversion was inconsistent  Pharyngeal constriction was mildly reduced, laryngeal rise was mild/mod reduced  Per gross esophageal screen: slower clearance, some retropulsion/? Reflux  observed at the end of the study  Images are on PACS for review  Recommendations:  Diet: puree (will trial soft solids if cognition/alertness improve)  Liquids: honey thick for now  ST to trial thin and nectar at the bedside (need consistent alertness and swallow function over consecutive sessions)  Meds:crush  Strategies: feed  Only when fully alert, tactile and verbal prompts as needed, gustatory stim as needed  Head at midline, alternate w/ liquids  Upright position  F/u ST tx: yes  Therapy Prognosis: favorable  Seems to be improving  Prognosis considerations: h/o progressive dz  Multiple meds  Full Supervision  Feed as needed  Aspiration Precautions  Reflux Precautions  Consider consult with: nutrition  Results reviewed with: pt, PA  Aspiration precautions posted  Patient's goal:  Still not verbal, though smiled a few times  Goals: 1  Pt will tolerate least restrictive diet w/out s/s aspiration or oral/pharyngeal difficulties  2 Pt will will effectively masticate and transfer soft solids w/ no more than minimal residue and w/out s/s dysphagia/aspiratrion  3 Pt will tolerate nectar ---->thin liquids w/out s/s aspiration  Pt will tolerate puree and honey thick liquids w/ no more than minimal oral residue and w/out s/s aspiration  Pt is a 74yof referred for vbs  Please see bedside eval done earlier today for additional info      Previous VBS:  none  Current Diet:  Npo, then placed on puree w/ honey thick after bedside by ST   Premorbid diet:  "regular"  Dentition:  Partial natural  O2 requirement:  none  Oral mech:  Strength and ROM:  Mild/mod reduced  Vocal Quality/Speech:  nonverbal this study  Cognitive status:  Eyes open more this study than at bedside earlier, but dozes off occasionally w/ po in her oral cavity requiring verbal and tactile stim    Consistencies administered: Barium laden applesauce, soft solid x 1, honey thick, nectar thick, thin liquids, Liquids were administered by tsp, cup and straw  Pt was seated laterally at 90 degrees  Oral stage:  MOD  Lip closure: reduced, inconsistently able to suck from straw  Oral spill w/ cup  wfl w/ spoon  Mastication: NONE  Bolus formation: adequate/midlly reduced  Bolus control:mildly reduced  Transfer:delayed, variable but more prompt overall than earlier this afternoon  Intermittent lingual pump  Residue: max w/ soft solid, needed multiple sips to clear   Otherwise, minimal     Pharyngeal stage:  Mild/mod  Swallow promptness:prompt to mild for initial swallow, delayed or absent for secondary swallow of retention  Spill to valleculae: liquids + down ae folds  Epiglottic inversion:inconsistent  Laryngeal excursion:reduced  Pharyngeal constriction: mildly reduced  Vallecular retention:mild to mod  Pyriform retention:min/none  CP prominence:mild  Retropulsion from prominence:none  Transient penetration: thin,nectar  Penetration:no, ? trace  Aspiration:none visualized  Strategies:secondary swallow (when able) cleared retention    Screening of Esophageal stage:  ? Mild   Dysmotility  Tertiary contractions:  Reflux: mild observed at end of study  Retention:cleared w/ liquids

## 2020-01-31 NOTE — PROGRESS NOTES
Progress Note - Aleida Bermudez 1945, 66 y o  female MRN: [id number]    Unit[facility]Bed#: [facility] [id number] Encounter: [id number]    Primary Care Provider: José Miguel Lewis DO   Date and time admitted to hospital: 1/30/2020  8:28 AM        * Weakness  Assessment & Plan  Generalized weakness for the last 4 days w/worsening swallow problems/cough/sob  It appears LUE weakness is out of proportion to RUE and LE strength and is 2/5-3/5 compared to 4/5 strength in RUE  This may be stuttering s/sx but concern is for CVA  CT head negative for ischemia hemorrhage/midline shift  Given 4 days of s/sx likely do not need permissive htn/stroke pathway  D/w neurology given DBS will need delta CT head, check flp and hgb a1c  Started on baby asa this admission  Start neurochecks q 4h  Will need nonemergent MRI in op setting w/primarily neurologist for confirmation of repeat CT negative  D/w pt's daughter/      Breast nodule  Assessment & Plan  Incidental 0 7 by 1 3 cm left breast nodule  Per daughter pt has had surgery on left breast for multiple fibrotic breast densities but no known hx of breast cancer and stopped receiving mammograms years ago  D/w  pt and daughter for f/u with pcp to discuss whether they would want to consider op mammography at this time as they aren't certain they would want to consider treatment if found to be malignant      Cough  Assessment & Plan  Cough associated with shortness of breath for the last few days  Likely aspiration without clear evidence of pneumonitis/pneumonia in the setting of dysphagia  cxr demonstrates vascular congestion however pt's lungs are clear w/o LE edema and normal nt pro bnp  Initial procalcitonin negative, and no fevers/s/sx of sepsis  Repeat procalcitonin, monitor off abx, treat dysphagia as below    Intussusception [facility])  Assessment & Plan  CT evidence of small-bowel intussusception  Patient has normal bowel movement and passes gas freely    This has spontaneously resolved without intervention on repeat imaging  Appreciate surgery recommendations  No interventions planned at this time    Dysphagia  Assessment & Plan  Patient with advanced Parkinson disease presents with dysphagia  May be worsened given concern for LUE weakness concerning for cva with acute on chronic dysphagia issues  -appreciate bedside swallow study will down grade to pureed with HTL  Recommended for VBS as well  Will order  Parkinson disease [facility])  Assessment & Plan  W/DBS and sinemet  Continue op sinemet regimen      Hypothyroidism  Assessment & Plan  Continue home dose Synthroid  Check TSH  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  Assessment & Plan  Continue citalopram 10 mg daily    VTE Pharmacologic Prophylaxis:   Pharmacologic: Heparin  Mechanical VTE Prophylaxis in Place: Yes    Patient Centered Rounds: I have performed bedside rounds with nursing staff today  Discussions with Specialists or Other Care Team Provider: Dr Pierre Kuo and Ender Aquino at length  Conversation w/speech pathologist as well for coordination of care and workup  Education and Discussions with Family / Patient: dispo workup w/pt  and daughter    Time Spent for Care: 40 minutes  More than 50% of total time spent on counseling and coordination of care as described above  Current Length of Stay: 1 day(s)    Current Patient Status: Inpatient   Certification Statement: The patient will continue to require additional inpatient hospital stay due to dysphagia, weakness    Discharge Plan: pending vbs and delta CT  Will need pt/ot eval for possible placement    Code Status: Level 1 - Full Code      Subjective:   Pt seen/examined  No events overnight  Pt's speech is extremely garbled and subjective obtained by discussion w/pt and pt's daughter/ at bedside  Pt notes some cough and some sob  Cough is loose but nonproductive  No cp    No sore throat fevers/chills  She feels weak  She denies numbness or headache  She does note some blurry vision  She notes some anorexia  Per  s/sx started 4 days pta with difficulty lifting left arm up to grab bar for transfers as she is wheelchair bound  He also notes that her speech is garbled at baseline but worse over last 4 days with swallowing problems  Sleepiness is a subacute issue over last several weeks  No LE edema  No complaints of n/v/abd pain  Objective:     Vitals:   Temp (24hrs), Av 9 °F (37 2 °C), Min:97 9 °F (36 6 °C), Max:99 6 °F (37 6 °C)    Temp:  [97 9 °F (36 6 °C)-99 6 °F (37 6 °C)] 98 8 °F (37 1 °C)  HR:  [73-83] 76  Resp:  [18-20] 19  BP: (103-170)/(51-95) 143/68  SpO2:  [89 %-95 %] 95 %  Body mass index is 38 15 kg/m²  Input and Output Summary (last 24 hours):     No intake or output data in the 24 hours ending 20 1266    Physical Exam:     Physical Exam   Constitutional: She appears well-developed  No distress  HENT:   Head: Normocephalic and atraumatic  Right Ear: External ear normal    Left Ear: External ear normal    Eyes: Conjunctivae are normal    Neck: Normal range of motion  Cardiovascular: Normal rate, regular rhythm and normal heart sounds  Pulmonary/Chest: Effort normal  No respiratory distress  She has no wheezes  Abdominal: Soft  She exhibits no distension and no mass  There is no tenderness  There is no guarding  Musculoskeletal: She exhibits no edema  Neurological: She is alert  She exhibits abnormal muscle tone (decreased strength in LUE  compared to RUE and patient w/no effort against gravity in LUE  LLE 4/5 with hip flexion/extension compared to RLE 5/5  RUE 5/5)  Oriented to person, hospital setting year  Speech is very garbled and intermittently incomprehensible  Bradyphrenia noted  Left arm without effort against gravity on pronator drift   Skin: Skin is dry  She is not diaphoretic     Psychiatric:   Flat affect/masked facies     (  Be Sure to Include Physical Exam: Delete this entire line when you have entered your exam)    Additional Data:     Labs:    Results from last 7 days   Lab Units 01/31/20  0532   WBC Thousand/uL 10 52*   HEMOGLOBIN g/dL 15 2   HEMATOCRIT % 46 0   PLATELETS Thousands/uL 233   NEUTROS PCT % 65   LYMPHS PCT % 24   MONOS PCT % 10   EOS PCT % 0     Results from last 7 days   Lab Units 01/31/20  0533   SODIUM mmol/L 141   POTASSIUM mmol/L 3 5   CHLORIDE mmol/L 106   CO2 mmol/L 28   BUN mg/dL 10   CREATININE mg/dL 0 79   ANION GAP mmol/L 7   CALCIUM mg/dL 8 5   ALBUMIN g/dL 3 0*   TOTAL BILIRUBIN mg/dL 0 82   ALK PHOS U/L 71   ALT U/L 25   AST U/L 25   GLUCOSE RANDOM mg/dL 156*     Results from last 7 days   Lab Units 01/30/20  1025   INR  1 04             Results from last 7 days   Lab Units 01/30/20  1516   PROCALCITONIN ng/ml 0 07           * I Have Reviewed All Lab Data Listed Above  * Additional Pertinent Lab Tests Reviewed:  All Labs Within Last 24 Hours Reviewed    Imaging:    Imaging Reports Reviewed Today Include:   Imaging Personally Reviewed by Myself Includes:      Recent Cultures (last 7 days):           Last 24 Hours Medication List:     Current Facility-Administered Medications:  acetaminophen 488 mg Oral Q6H PRN Boni Cockayne, MD    ALPRAZolam 0 5 mg Oral TID PRN Boni Cockayne, MD    aspirin 81 mg Oral Daily Boni Cockayne, MD    carbidopa-levodopa 0 5 tablet Oral BID Boni Cockayne, MD    carbidopa-levodopa 1 tablet Oral TID Boni Cockayne, MD    dextrose 5 % and sodium chloride 0 9 % with KCl 20 mEq/L 75 mL/hr Intravenous Continuous Boni Cockayne, MD Last Rate: 75 mL/hr (01/31/20 0841)   donepezil 10 mg Oral HS Boni Cockayne, MD    escitalopram 10 mg Oral Daily Boni Cockayne, MD    famotidine 20 mg Oral Early Morning Boni Cockayne, MD    glycopyrrolate 1 mg Oral TID Boni Cockayne, MD    heparin (porcine) 5,000 Units Subcutaneous [facility] Boni Cockayne, MD    iohexol 50 mL Oral Once in imaging Ashlie Velazquez PA-C levothyroxine 75 mcg Oral Early Morning Marvel Martinez MD    pravastatin 40 mg Oral Daily With Clary Haddad MD    saccharomyces boulardii 250 mg Oral BID Marvel Martinez MD         Today, Patient Was Seen By: Lexa Maria PA-C    ** Please Note: Dictation voice to text software may have been used in the creation of this document   **

## 2020-01-31 NOTE — SPEECH THERAPY NOTE
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    Hypothyroidism    Parkinson disease ([facility] [address] )    Dysphagia    Intussusception ([facility] [address] )    Cough    Past Medical History  Past Medical History:   Diagnosis Date    Abdominal pain, suprapubic 07/03/2014    Resolved:  November 22, 2016    Abscess of skin and subcutaneous tissue 11/21/2016    Resolved:  February 28, 2017    Anxiety     Burning with urination 12/14/2016    Resolved:  February 28, 2017    Conjunctivitis 06/10/2013    Depression     Depression     Dysuria 06/06/2016    Resolved:  February 28 2017    Falling 10/18/2012    GERD (gastroesophageal reflux disease)     Groin pain 04/16/2015    Resolved:  February 28, 2017    Hyperlipidemia     Hypothyroid     Influenza 04/05/2013    Leg abrasion 09/22/2016    Resolved:  February 28, 2017    Parkinson's disease [facility])     Psychiatric illness     Rectal pain 10/17/2013    Resolved:  February 28, 2017    Thyroid disease      Past Surgical History  Past Surgical History:   Procedure Laterality Date    APPENDECTOMY      Age 4 or 3    BACK SURGERY      BRAIN SURGERY      CLOSED REDUCTION NASAL FRACTURE      CYSTOSCOPY  01/28/2014    RENETTA Lea   (Diagnostic)    DEEP BRAIN STIMULATOR PLACEMENT      EYE SURGERY      FRACTURE SURGERY      JOINT REPLACEMENT      LAMINECTOMY      Decompress, Facetectomy, Foraminotomy Lumbar Seg    ORIF HIP FRACTURE Left     About 3 years ago    TONSILLECTOMY        Bedside Swallow Evaluation:    Summary:  Pt presents w/ moderate oral/pharyngeal dysphagia at this time  Oral transfer was as slow as 36 seconds (x 1), better w/ cold/flavorful juice and pressure from spoon  Lingual pumping observed  Pt also dozed off periodically   states she is doing better today than yesterday, but this is not her "norm"  Also noted her tongue deviated to the left   stated he did not know if this was new or not   reported he dbs was recently modified  Not sure if this may have affected swallow function  Recommendations:  Diet: puree  Liquid:honeythick by tsp  Meds:crush  Supervision:full  Must be fed  Positioning:Upright  Strategies: Pt to take PO/Meds only when fully alert and upright  Make sure she swallows after each bite  Oral care: at least 3x/day  Aspiration precautions  Reflux precautions    Therapy Prognosis: unknown at this time  Prognosis considerations: advanced parkinsons  Frequency: 3-5x/week  D/w PA, will do vbs when pt is closer to baseline    Goal(s):  Pt will tolerate least restrictive diet w/out s/s aspiration or oral/pharyngeal difficulties  Pt will tolerate puree and honey thick liquids w/out s/s aspiration  Ongoing education w/ family  Patient's goal: unable to state    Consider consult w/:  Neurology following  Nutrition    Reason for consult:  R/o aspiration  Determine safest and least restrictive diet  Failed nursing dysphagia assessment  Change in mental status  H/o neurological disease  Current diet:  npo  Premorbid diet[de-identified]  "regular food" per   He reported coughing w/ PO and that pt always had food left in her mouth  He brushed her teeth 2x/day/   Previous VBS:  None  No h/o speech/swallow w/u  O2 requirement:  none  Voice/Speech:  Min vocalizations, no true words  Social:  Home w/   Follows commands:  Some basic one step but poorly                       Cognitive Status:  Dozed off occasionally  Oral mech exam:  Dentition:natural  Labial strength and ROM:mild/mod weak  Lingual strength and ROM: mod weak  Mandibular strength and ROM: minimal jaw rom  Secretion management:drooling  Volitional cough: weak, almost absent    stated "oh she can cough"  Volitional swallow: unable  Oral care: suctioned periodically throughout session  Items administered:  Puree, honey  Thick    Oral stage: mod  Lip closure:fair/adequate  Mastication:na  Bolus formation:fair  Bolus control:fair  Transfer:weak, mild to severe delay  Oral residue:intermittent, suspect posterior lingual  Pocketing: trace in buccal cavity    Pharyngeal stage: mod  Swallow promptness: mild to mod, ? >  Laryngeal rise:weak  Wet voice:no  Throat clear:no  Cough: x 3  Secondary swallows:none  Audible swallows:minimal    Esophageal stage:  GERD    Aspiration precautions posted    Results d/w:  Pt, nursing, family, PA    11:58-12:58 tx/education  Pt/family educated on typical characteristics of swallowing w/ parkinsons and progression  They have not had any previous education on this (per family)  I asked if the pt would want a feeding tube if she was unable to eat safely, They stated they never thought about it but didn't know there was another choice  In agreement w/ current plan to start modified diet and do VBS as pt gets closer to baseline  For now, also educated to make sure pt swallows after each bite, to use flavorful more stimulating foods (likes ice cream) and apply downward pressure w/ spoon on tongue     Per neuro:  Reason for Consult / Principal Problem: Parkinson's disease  Hx and PE limited by: Mental status, difficulty speaking  HPI: Berkley Claudia is a 66 y o   female with Parkinson's disease s/p DBS placement, anxiety/depression, GERD, hypothyroid, HLD who presents with generalized weakness, difficulty swallowing  Per discussion with ED staff as well as chart review, patient has been having difficulty swallowing and also has been spitting out food  Per review of chart, patient has been wheelchair bound for about 10 years and is minimally verbal at baseline due to Parkinson's disease   Per discussion with patient's , the patient's baseline involves difficulty speaking, she is able to stand but cannot walk due to difficulty with balance  She is generally able to stand to transfer but over the past few days has had difficulty with transferring  Over the past 2-3 days has become very weak and poorly responsive  He notes that her speech had become so slurred that family could barely understand her  She does take Sinemet but he is unsure of the dosing  Per review of records, Sinemet 25/100 1 tab BID  Patient follows with neurology team in [place]  The patient notes upon this examiner entering the room that she has shortness of breath and that she feels as though she is not able to take a breath  She notes that she has had Parkinson's disease for many years and has been wheelchair-bound for about 10 years  History of Present Illness: nacho tea  Gavin Merrill is a 66 y o  female who presents with generalized weakness and dysphagia for the last 4 days  Patient with known advanced Parkinson's disease presents to the ER with difficulty of swallowing and generalized body weakness associated with slurred speech for the last 4 days  Patient in the past had similar episodes associated with urinary tract infection  Patient also complains of cough associated with shortness of breath; cough is worse during feeding  Denies fever, chills, abdominal pain, dysuria, frequency, urgency, change in urinary color, bowel habit change  Past medical history also significant for hypothyroidism, depression and anxiety  Denies any recent change in medication, fall, loss of consciousness, seizure, focal neurologic deficit

## 2020-02-01 PROBLEM — R53.83 LETHARGY: Status: ACTIVE | Noted: 2020-02-01

## 2020-02-01 LAB
ANION GAP SERPL CALCULATED.3IONS-SCNC: 9 MMOL/L (ref 4–13)
BASOPHILS # BLD AUTO: 0.07 THOUSANDS/ΜL (ref 0–0.1)
BASOPHILS NFR BLD AUTO: 1 % (ref 0–1)
BUN SERPL-MCNC: 10 MG/DL (ref 5–25)
CALCIUM SERPL-MCNC: 8.3 MG/DL (ref 8.3–10.1)
CHLORIDE SERPL-SCNC: 107 MMOL/L (ref 100–108)
CHOLEST SERPL-MCNC: 111 MG/DL (ref 50–200)
CO2 SERPL-SCNC: 26 MMOL/L (ref 21–32)
CREAT SERPL-MCNC: 0.77 MG/DL (ref 0.6–1.3)
EOSINOPHIL # BLD AUTO: 0.08 THOUSAND/ΜL (ref 0–0.61)
EOSINOPHIL NFR BLD AUTO: 1 % (ref 0–6)
ERYTHROCYTE [DISTWIDTH] IN BLOOD BY AUTOMATED COUNT: 12.7 % (ref 11.6–15.1)
EST. AVERAGE GLUCOSE BLD GHB EST-MCNC: 143 MG/DL
GFR SERPL CREATININE-BSD FRML MDRD: 75 ML/MIN/1.73SQ M
GLUCOSE SERPL-MCNC: 150 MG/DL (ref 65–140)
HBA1C MFR BLD: 6.6 % (ref 4.2–6.3)
HCT VFR BLD AUTO: 46.5 % (ref 34.8–46.1)
HDLC SERPL-MCNC: 37 MG/DL
HGB BLD-MCNC: 15.2 G/DL (ref 11.5–15.4)
IMM GRANULOCYTES # BLD AUTO: 0.04 THOUSAND/UL (ref 0–0.2)
IMM GRANULOCYTES NFR BLD AUTO: 0 % (ref 0–2)
LDLC SERPL CALC-MCNC: 53 MG/DL (ref 0–100)
LYMPHOCYTES # BLD AUTO: 2.46 THOUSANDS/ΜL (ref 0.6–4.47)
LYMPHOCYTES NFR BLD AUTO: 23 % (ref 14–44)
MCH RBC QN AUTO: 32 PG (ref 26.8–34.3)
MCHC RBC AUTO-ENTMCNC: 32.7 G/DL (ref 31.4–37.4)
MCV RBC AUTO: 98 FL (ref 82–98)
MONOCYTES # BLD AUTO: 1.18 THOUSAND/ΜL (ref 0.17–1.22)
MONOCYTES NFR BLD AUTO: 11 % (ref 4–12)
NEUTROPHILS # BLD AUTO: 7 THOUSANDS/ΜL (ref 1.85–7.62)
NEUTS SEG NFR BLD AUTO: 64 % (ref 43–75)
NONHDLC SERPL-MCNC: 74 MG/DL
NRBC BLD AUTO-RTO: 0 /100 WBCS
PLATELET # BLD AUTO: 236 THOUSANDS/UL (ref 149–390)
PMV BLD AUTO: 10.6 FL (ref 8.9–12.7)
POTASSIUM SERPL-SCNC: 3.4 MMOL/L (ref 3.5–5.3)
RBC # BLD AUTO: 4.75 MILLION/UL (ref 3.81–5.12)
SODIUM SERPL-SCNC: 142 MMOL/L (ref 136–145)
TRIGL SERPL-MCNC: 107 MG/DL
WBC # BLD AUTO: 10.83 THOUSAND/UL (ref 4.31–10.16)

## 2020-02-01 PROCEDURE — 83036 HEMOGLOBIN GLYCOSYLATED A1C: CPT | Performed by: PHYSICIAN ASSISTANT

## 2020-02-01 PROCEDURE — 85025 COMPLETE CBC W/AUTO DIFF WBC: CPT | Performed by: PHYSICIAN ASSISTANT

## 2020-02-01 PROCEDURE — 80061 LIPID PANEL: CPT | Performed by: PHYSICIAN ASSISTANT

## 2020-02-01 PROCEDURE — 99232 SBSQ HOSP IP/OBS MODERATE 35: CPT | Performed by: PHYSICIAN ASSISTANT

## 2020-02-01 PROCEDURE — 97163 PT EVAL HIGH COMPLEX 45 MIN: CPT

## 2020-02-01 PROCEDURE — 97530 THERAPEUTIC ACTIVITIES: CPT

## 2020-02-01 PROCEDURE — 80048 BASIC METABOLIC PNL TOTAL CA: CPT | Performed by: PHYSICIAN ASSISTANT

## 2020-02-01 PROCEDURE — 97167 OT EVAL HIGH COMPLEX 60 MIN: CPT

## 2020-02-01 RX ADMIN — GLYCOPYRROLATE 1 MG: 1 TABLET ORAL at 09:06

## 2020-02-01 RX ADMIN — CARBIDOPA AND LEVODOPA 0.5 TABLET: 25; 100 TABLET, EXTENDED RELEASE ORAL at 21:43

## 2020-02-01 RX ADMIN — HEPARIN SODIUM 5000 UNITS: 5000 INJECTION INTRAVENOUS; SUBCUTANEOUS at 13:29

## 2020-02-01 RX ADMIN — HEPARIN SODIUM 5000 UNITS: 5000 INJECTION INTRAVENOUS; SUBCUTANEOUS at 21:43

## 2020-02-01 RX ADMIN — GLYCOPYRROLATE 1 MG: 1 TABLET ORAL at 21:43

## 2020-02-01 RX ADMIN — CARBIDOPA AND LEVODOPA 1 TABLET: 25; 100 TABLET ORAL at 09:06

## 2020-02-01 RX ADMIN — LEVOTHYROXINE SODIUM 75 MCG: 75 TABLET ORAL at 05:55

## 2020-02-01 RX ADMIN — FAMOTIDINE 20 MG: 20 TABLET, FILM COATED ORAL at 05:55

## 2020-02-01 RX ADMIN — ESCITALOPRAM OXALATE 10 MG: 10 TABLET ORAL at 09:06

## 2020-02-01 RX ADMIN — ASPIRIN 81 MG 81 MG: 81 TABLET ORAL at 09:06

## 2020-02-01 RX ADMIN — PRAVASTATIN SODIUM 40 MG: 40 TABLET ORAL at 17:14

## 2020-02-01 RX ADMIN — CARBIDOPA AND LEVODOPA 1 TABLET: 25; 100 TABLET ORAL at 05:55

## 2020-02-01 RX ADMIN — GLYCOPYRROLATE 1 MG: 1 TABLET ORAL at 15:56

## 2020-02-01 RX ADMIN — Medication 250 MG: at 09:06

## 2020-02-01 RX ADMIN — CARBIDOPA AND LEVODOPA 0.5 TABLET: 25; 100 TABLET, EXTENDED RELEASE ORAL at 15:56

## 2020-02-01 RX ADMIN — HEPARIN SODIUM 5000 UNITS: 5000 INJECTION INTRAVENOUS; SUBCUTANEOUS at 05:55

## 2020-02-01 RX ADMIN — DONEPEZIL HYDROCHLORIDE 10 MG: 10 TABLET, FILM COATED ORAL at 21:43

## 2020-02-01 RX ADMIN — DEXTROSE, SODIUM CHLORIDE, AND POTASSIUM CHLORIDE 75 ML/HR: 5; .9; .15 INJECTION INTRAVENOUS at 14:53

## 2020-02-01 RX ADMIN — Medication 250 MG: at 17:14

## 2020-02-01 RX ADMIN — DEXTROSE, SODIUM CHLORIDE, AND POTASSIUM CHLORIDE 75 ML/HR: 5; .9; .15 INJECTION INTRAVENOUS at 01:47

## 2020-02-01 RX ADMIN — CARBIDOPA AND LEVODOPA 1 TABLET: 25; 100 TABLET ORAL at 12:36

## 2020-02-01 NOTE — PLAN OF CARE
Problem: Potential for Falls  Goal: Patient will remain free of falls  Description  INTERVENTIONS:  - Assess patient frequently for physical needs  -  Identify cognitive and physical deficits and behaviors that affect risk of falls    -  [place] fall precautions as indicated by assessment   - Educate patient/family on patient safety including physical limitations  - Instruct patient to call for assistance with activity based on assessment  - Modify environment to reduce risk of injury  - Consider OT/PT consult to assist with strengthening/mobility  Outcome: Progressing     Problem: Prexisting or High Potential for Compromised Skin Integrity  Goal: Skin integrity is maintained or improved  Description  INTERVENTIONS:  - Identify patients at risk for skin breakdown  - Assess and monitor skin integrity  - Assess and monitor nutrition and hydration status  - Monitor labs   - Assess for incontinence   - Turn and reposition patient  - Assist with mobility/ambulation  - Relieve pressure over bony prominences  - Avoid friction and shearing  - Provide appropriate hygiene as needed including keeping skin clean and dry  - Evaluate need for skin moisturizer/barrier cream  - Collaborate with interdisciplinary team   - Patient/family teaching  - Consider wound care consult   Outcome: Progressing     Problem: PAIN - ADULT  Goal: Verbalizes/displays adequate comfort level or baseline comfort level  Description  Interventions:  - Encourage patient to monitor pain and request assistance  - Assess pain using appropriate pain scale  - Administer analgesics based on type and severity of pain and evaluate response  - Implement non-pharmacological measures as appropriate and evaluate response  - Consider cultural and social influences on pain and pain management  - Notify physician/advanced practitioner if interventions unsuccessful or patient reports new pain  Outcome: Progressing     Problem: INFECTION - ADULT  Goal: Absence or prevention of progression during hospitalization  Description  INTERVENTIONS:  - Assess and monitor for signs and symptoms of infection  - Monitor lab/diagnostic results  - Monitor all insertion sites, i e  indwelling lines, tubes, and drains  - Monitor endotracheal if appropriate and nasal secretions for changes in amount and color  - [place] appropriate cooling/warming therapies per order  - Administer medications as ordered  - Instruct and encourage patient and family to use good hand hygiene technique  - Identify and instruct in appropriate isolation precautions for identified infection/condition  Outcome: Progressing     Problem: SAFETY ADULT  Goal: Maintain or return to baseline ADL function  Description  INTERVENTIONS:  -  Assess patient's ability to carry out ADLs; assess patient's baseline for ADL function and identify physical deficits which impact ability to perform ADLs (bathing, care of mouth/teeth, toileting, grooming, dressing, etc )  - Assess/evaluate cause of self-care deficits   - Assess range of motion  - Assess patient's mobility; develop plan if impaired  - Assess patient's need for assistive devices and provide as appropriate  - Encourage maximum independence but intervene and supervise when necessary  - Involve family in performance of ADLs  - Assess for home care needs following discharge   - Consider OT consult to assist with ADL evaluation and planning for discharge  - Provide patient education as appropriate  Outcome: Progressing  Goal: Maintain or return mobility status to optimal level  Description  INTERVENTIONS:  - Assess patient's baseline mobility status (ambulation, transfers, stairs, etc )    - Identify cognitive and physical deficits and behaviors that affect mobility  - Identify mobility aids required to assist with transfers and/or ambulation (gait belt, sit-to-stand, lift, walker, cane, etc )  - [place] fall precautions as indicated by assessment  - Record patient progress and toleration of activity level on Mobility SBAR; progress patient to next Phase/Stage  - Instruct patient to call for assistance with activity based on assessment  - Consider rehabilitation consult to assist with strengthening/weightbearing, etc   Outcome: Progressing     Problem: DISCHARGE PLANNING  Goal: Discharge to home or other facility with appropriate resources  Description  INTERVENTIONS:  - Identify barriers to discharge w/patient and caregiver  - Arrange for needed discharge resources and transportation as appropriate  - Identify discharge learning needs (meds, wound care, etc )  - Arrange for interpretive services to assist at discharge as needed  - Refer to Case Management Department for coordinating discharge planning if the patient needs post-hospital services based on physician/advanced practitioner order or complex needs related to functional status, cognitive ability, or social support system  Outcome: Progressing     Problem: Nutrition/Hydration-ADULT  Goal: Nutrient/Hydration intake appropriate for improving, restoring or maintaining nutritional needs  Description  Monitor and assess patient's nutrition/hydration status for malnutrition  Collaborate with interdisciplinary team and initiate plan and interventions as ordered  Monitor patient's weight and dietary intake as ordered or per policy  Utilize nutrition screening tool and intervene as necessary  Determine patient's food preferences and provide high-protein, high-caloric foods as appropriate       INTERVENTIONS:  - Monitor oral intake, urinary output, labs, and treatment plans  - Assess nutrition and hydration status and recommend course of action  - Evaluate amount of meals eaten  - Assist patient with eating if necessary   - Allow adequate time for meals  - Recommend/ encourage appropriate diets, oral nutritional supplements, and vitamin/mineral supplements  - Order, calculate, and assess calorie counts as needed  - Recommend, monitor, and adjust tube feedings and TPN/PPN based on assessed needs  - Assess need for intravenous fluids  - Provide specific nutrition/hydration education as appropriate  - Include patient/family/caregiver in decisions related to nutrition  Outcome: Progressing

## 2020-02-01 NOTE — ASSESSMENT & PLAN NOTE
Maybe multifactorial from PD, suspected CVA  -will start low dose seroquel 12 5mg qhs for sleep wake cycle maintenance  -no s/sx of acute infection  -continue to monitor/reorient

## 2020-02-01 NOTE — ASSESSMENT & PLAN NOTE
Generalized weakness for the last 4 days w/worsening swallow problems/cough/sob  It appears LUE weakness is out of proportion to RUE and LE strength and is 2/5-3/5 compared to 4/5 strength in RUE  This may be stuttering s/sx but concern is for CVA  CT head negative for ischemia hemorrhage/midline shift  Given 4 days of s/sx likely do not need permissive htn/stroke pathway  D/w neurology given DBS received delta CT which is negative    Recommended op MRI per neurology  flp well controlled, hgb a1c pending  Continue asa 81mg po daily, pravachol  Start neurochecks q 4h  Will need nonemergent MRI in op setting w/primary neurologist for confirmation

## 2020-02-01 NOTE — PLAN OF CARE
Problem: PHYSICAL THERAPY ADULT  Goal: Performs mobility at highest level of function for planned discharge setting  See evaluation for individualized goals  Description  Treatment/Interventions: Functional transfer training, LE strengthening/ROM, Therapeutic exercise, Endurance training, Patient/family training, Equipment eval/education, Bed mobility, Compensatory technique education, Continued evaluation, Spoke to nursing, OT, Spoke to advanced practitioner, Family  Equipment Recommended: Wheelchair(tilt in space wc)       See flowsheet documentation for full assessment, interventions and recommendations  Note:   Prognosis: Fair  Problem List: Decreased strength, Decreased range of motion, Decreased endurance, Impaired balance, Decreased mobility, Decreased cognition, Decreased coordination  Assessment: Radha Tellez is a 66 y o  female admitted to [address] on 1/30/2020 for [facility] to pending ddx including ? CVA  Pt  has a past medical history of  Anxiety, Depression, GERD (gastroesophageal reflux disease), Hyperlipidemia, Hypothyroid, Parkinson's disease ([facility] [address] ), Psychiatric illness  PT was consulted and pt was seen on 2/1/2020 for mobility assessment and d/c planning  Pt presents high fall risk precautions, monitoring abn lab, PIV, continuous telemetry  Pt spouse present and able to assist w social hx given pt non verbal at baseline w inconsistent command following  At baseline, pt is wc bound (transport chair), require A for propulsion and negotiating steps in wc, dependent ADLs and IADLs  Pt  reports she was able to transf bed<>wc w A as well as pull to stand wc<>raised toilet  Pt is currently functioning at a maximum assistance x2 level for bed mobility, maximum assistance x2 level for transfers  Pt demonstrated L sided weakness during IE, w poor sitting balance and R lean noted despite cuing, inconsistent follow through of commands    Pt will benefit from continued skilled IP PT to address the above mentioned impairments  in order to maximize recovery and increase functional independence when completing mobility and ADLs  Currently PT recommendations for DME include tilt in space wc, given pt currently utilizing transport chair and not actual wc, as well as pt  reporting that pt often slides forward out of chair and requires A and seat belt to maintain upright  At this time PT recommendations for d/c are STR vs home w 24/7 supervision and HHPT pending progress  Barriers to Discharge: Decreased caregiver support     Recommendation: Short-term skilled PT, Home with family support, 24 hour supervision/assist, Home PT     PT - OK to Discharge: Yes    See flowsheet documentation for full assessment

## 2020-02-01 NOTE — OCCUPATIONAL THERAPY NOTE
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    Hypothyroidism    Parkinson disease ([facility] [address] )    Dysphagia    Intussusception ([facility] [address] )    Cough    Breast nodule    Lethargy    Past Medical History  Past Medical History:   Diagnosis Date    Abdominal pain, suprapubic 07/03/2014    Resolved:  November 22, 2016    Abscess of skin and subcutaneous tissue 11/21/2016    Resolved:  February 28, 2017    Anxiety     Burning with urination 12/14/2016    Resolved:  February 28, 2017    Conjunctivitis 06/10/2013    Depression     Depression     Dysuria 06/06/2016    Resolved:  February 28 2017    Falling 10/18/2012    GERD (gastroesophageal reflux disease)     Groin pain 04/16/2015    Resolved:  February 28, 2017    Hyperlipidemia     Hypothyroid     Influenza 04/05/2013    Leg abrasion 09/22/2016    Resolved:  February 28, 2017    Parkinson's disease [facility])     Psychiatric illness     Rectal pain 10/17/2013    Resolved:  February 28, 2017    Thyroid disease      Past Surgical History  Past Surgical History:   Procedure Laterality Date    APPENDECTOMY      Age 4 or 5    BACK SURGERY      BRAIN SURGERY      CLOSED REDUCTION NASAL FRACTURE      CYSTOSCOPY  01/28/2014    RENETTA Pavon   (Diagnostic)    DEEP BRAIN STIMULATOR PLACEMENT      EYE SURGERY      FRACTURE SURGERY      JOINT REPLACEMENT      LAMINECTOMY      Decompress, Facetectomy, Foraminotomy Lumbar Seg    ORIF HIP FRACTURE Left     About 3 years ago    TONSILLECTOMY             02/01/20 1426   Note Type   Note type Eval/Treat   Restrictions/Precautions   Weight Bearing Precautions Per Order No   Other Precautions Cognitive; Chair Alarm; Bed Alarm; Fall Risk;Multiple lines   Pain Assessment   Pain Assessment FLACC   Pain Rating: FLACC (Rest) - Face 0   Pain Rating: FLACC (Rest) - Legs 0   Pain Rating: FLACC (Rest) - Activity 0   Pain Rating: FLACC (Rest) - Cry 0   Pain Rating: FLACC (Rest) - Consolability 0   Score: FLACC (Rest) 0   Pain Rating: FLACC (Activity) - Face 0   Pain Rating: FLACC (Activity) - Legs 0   Pain Rating: FLACC (Activity) - Activity 0   Pain Rating: FLACC (Activity) - Cry 0   Pain Rating: FLACC (Activity) - Consolability 0   Score: FLACC (Activity) 0   Home Living   Type of Home House   Home Layout Multi-level; Able to live on main level with bedroom/bathroom; Performs ADLs on one level  (1 small lip to enter w/ 1 step into main living area)   Urban Gentleman   (Sponge bathes only)   Bathroom Toilet Raised   Bathroom Equipment Toilet raiser;Grab bars around toilet   Bathroom Accessibility Accessible via wheelchair   [address] Other (Comment)  (Transport chair)   Additional Comments Pt's spouse reports pt (-) home alone   Prior Function   Level of [place] Needs assistance with ADLs and functional mobility; Needs assistance with IADLs   Lives With Spouse   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   ADL Assistance Needs assistance   IADLs Needs assistance   Falls in the last 6 months 0   Vocational Retired   Comments At baseline, pt required assist w/ ADLs, IADLs, and transfers (SPTs only w/ hand held assistance), (-) , and reports 0 falls PTA  Pt's spouse reports pt does SPTs only with hand held assistance, unable to take any steps  Lifestyle   Autonomy At baseline, pt required assist w/ ADLs, IADLs, and transfers (SPTs only w/ hand held assistance), (-) , and reports 0 falls PTA     Reciprocal Relationships Spouse   Service to Others Retired   Psychosocial   Psychosocial (WDL) X   Patient Behaviors/Mood [place] affect   ADL   Where Assessed Chair   Eating Assistance 3  Moderate Assistance   Grooming Assistance 3  Moderate Assistance   UB Bathing Assistance 2  Maximal Assistance   LB Bathing Assistance 1  Total Assistance   UB Dressing Assistance 2  Maximal Assistance   LB Dressing Assistance 1  Total [address]  1  Total Assistance   Functional Assistance 2  Maximal Assistance   Bed Mobility   Supine to Sit 2  Maximal assistance   Additional items Assist x 2;HOB elevated; Bedrails; Increased time required;Verbal cues;LE management   Sit to Supine Unable to assess   Additional Comments Pt seated OOB in chair at end of session with chair alarm activated  Spouse present  Call bell and phone within reach  All needs met and pt reports no further questions for OT at this time   Transfers   Sit to Stand 2  Maximal assistance   Additional items Assist x 2; Increased time required;Verbal cues   Stand to Sit 2  Maximal assistance   Additional items Assist x 2; Increased time required;Verbal cues   Additional Comments Cues for safe technique and hand placement  Pt's standing tolerance: approx  1 min   Functional Mobility   Additional Comments Not appropriate   Pt non-ambulatory at baseline, unable to take steps at this time   Balance   Static Sitting Poor +   Dynamic Sitting Poor   Static Standing Poor -   Ambulatory Zero   Activity Tolerance   Activity Tolerance Patient limited by fatigue;Treatment limited secondary to medical complications (Comment)   Medical Staff Made Aware [address], PT   Nurse Made Aware yes; Magi Boggs RN   RUE Assessment   RUE Assessment [facility]   RUE Strength   RUE Overall Strength Unable to assess;Due to cognitive deficits  (inconsistent effort during MMT)   LUE Assessment   LUE Assessment X  (inconsistently following commands for L UE assessment)   LUE Overall PROM   L Shoulder Flexion WFL   L Shoulder Extension WFL   L Elbow Flexion WFL   L Elbow Extension WFL   Hand Function   Gross Motor Coordination   (R=functional, L=impaired)   Fine Motor Coordination   (R=functional, L=impaired)   Proprioception   Proprioception Partial deficits in the LUE   Vision-Basic Assessment   Current Vision Wears glasses all the time   Perception   Inattention/Neglect Cues to attend to left side of body   Cognition   Overall Cognitive Status Impaired   Arousal/Participation Arousable   Attention Difficulty attending to directions   Orientation Level Oriented to person  (responds to name)   Memory Unable to assess   Following Commands Follows one step commands inconsistently   Comments Pt non-verbal during OT evaluation; Pt's spouse reports pt responds to yes/no questions at baseline   Assessment   Limitation Decreased ADL status; Decreased UE ROM; Decreased UE strength;Decreased Safe judgement during ADL;Decreased cognition;Decreased endurance;Decreased fine motor control;Decreased self-care trans;Decreased high-level ADLs   Prognosis Fair   Assessment Pt is a 66 y o  female seen for OT evaluation s/p adm to [address] on 1/30/2020 w/ generalized weakness and dysphagia x4 days  CT Head 1/30/20 (-) for acute intracranial abnormality  Increased L UE weakness noted 1/31/20, repeat CT Head (-) for acute intracranial abnormality  Comorbidities affecting pts functional performance include a significant PMH of Anxiety, Depression, Dysuria, HLD, Hypothyroid, Parkinson's disease, and  psychiatric illness  Pt with active OT orders and activity orders for Activity as tolerated  Pt lives w/ spouse in a multi-level house with 1 small lip to enter through garage and additional step to main living area  Pt (-) home alone  At baseline, pt required assist w/ ADLs, IADLs, and transfers (SPTs only w/ hand held assistance), (-) , and reports 0 falls PTA   Upon evaluation, pt currently requires Max A for UB ADLs, Total A for LB ADLs, Total A for toileting, Max A of 2 for bed mobility, and Max A of 2 for transfers 2* the following deficits impacting occupational performance: weakness, decreased ROM, decreased strength, decreased balance, decreased tolerance, impaired [address], impaired [address], impaired memory, impaired problem solving and decreased safety awareness  These impairments, as well at pts difficulty performing ADLS, limited insight into deficits and decreased initiation and engagement  limit pts ability to safely engage in all baseline areas of occupation  Pt scored overall 20/100 on the Barthel Index  Based on the aforementioned OT evaluation, functional performance deficits, and assessments, pt has been identified as a High complexity evaluation  Pt to continue to benefit from continued acute OT services during hospital stay to address defined deficits and to maximize level of functional independence in the following Occupational Performance areas: eating, grooming, bathing/shower, toilet hygiene, dressing, socialization, health maintenance, functional mobility and clothing management  From OT standpoint, recommend STR upon D/C  OT will continue to follow pt 3-5x/wk to address the following goals to  w/in 10-14 days:   Goals   Patient Goals None stated by pt 2* cognitive deficits    LTG Time Frame 10-   Long Term Goal Please refer to LTGs listed below   Plan   Treatment Interventions ADL retraining;Functional transfer training;UE strengthening/ROM; Endurance training;Patient/family training;Cognitive reorientation;Equipment evaluation/education; Fine motor coordination activities; Compensatory technique education;Continued evaluation; Energy conservation; Activityengagement; Neuromuscular reeducation   Goal Expiration Date 02/15/20   OT Treatment Day 1   OT Frequency 3-5x/wk   Additional Treatment Session   Start Time 1290   End Time 1426   Treatment Assessment Pt seen for OT treatment session focusing on functional activity tolerance, ADLs, and transfer trials  Pt seated EOB after completion of initial evaluation  During initial eval, pt noted to be incontinent of bowels  Sit>stand transfer completed w/ Max A of 2 w/ max cues for safe technique and hand placement   Max A required for toileting tasks 2* pt requiring BUE support when standing for perineal hygiene  Pt unable to take steps during initial eval (non-ambulatory at baseline), therefore trial w/ Quick Move completed  3rd person present during Quick Move trial for optimal pt safety as pt w/ decreased [address] on L UE when grasping support bar on Quick Move  Max A of 2 required for [facility] transfer from EOB>Bedside chair  Pt seated OOB in chair at end of session with call bell and phone within reach  L UE elevated and supported  Spouse present  All needs met  Continue to recommend STR vs Home w/ Home OT and 24 hour Supervision/assistance when medically cleared  OT to follow pt on caseload      Additional Treatment Day 1   Recommendation   OT Discharge Recommendation Short Term Rehab  (vs Home OT w/ 24 hour Supervision/assistance)   OT - OK to Discharge Yes  (when medically cleared to rehab)   Barthel Index   Feeding 5   Bathing 0   Grooming Score 0   Dressing Score 0   Bladder Score 5   Bowels Score 5   Toilet Use Score 0   Transfers (Bed/Chair) Score 5   Mobility (Level Surface) Score 0   Stairs Score 0   Barthel Index Score 20   Modified [place] Scale   Modified [place] Scale 4        GOALS    1) Pt will improve activity tolerance to G for min 30 min txment sessions for increase engagement in functional tasks    2) Pt will complete bed mobility at a Min A level w/ G balance/safety demonstrated to decrease caregiver assistance required     3) Pt will complete UB dressing/self care w/ Supervision using adaptive device and DME as needed    4) Pt will complete LB dressing/self care w/ min A using adaptive device and DME as needed    5) Pt will complete toileting w/ min A w/ G hygiene/thoroughness using DME as needed    6) Pt will improve functional transfers to Min A on/off all surfaces using DME as needed w/ G balance/safety     7) Pt will increase BUE strength by 1MM grade via AROM/AAROM/PROM exercises to increase independence in ADLs and transfers    8) Pt will be attentive 100% of the time during ongoing cognitive assessment w/ G participation to assist w/ safe d/c planning/recommendations    9) Pt will demonstrate G carryover of pt/caregiver education and training as appropriate w/o cues w/ good tolerance to increase safety during functional tasks    Tadeo Parr, OTR/L

## 2020-02-01 NOTE — PROGRESS NOTES
Progress Note - Rod Deshpande 1945, 66 y o  female MRN: [id number]    Unit/Bed#: [address] Encounter: [id number]    Primary Care Provider: Melany Jeans, DO   Date and time admitted to hospital: 1/30/2020  8:28 AM        * Weakness  Assessment & Plan  Generalized weakness for the last 4 days w/worsening swallow problems/cough/sob  It appears LUE weakness is out of proportion to RUE and LE strength and is 2/5-3/5 compared to 4/5 strength in RUE  This may be stuttering s/sx but concern is for CVA  CT head negative for ischemia hemorrhage/midline shift  Given 4 days of s/sx likely do not need permissive htn/stroke pathway  D/w neurology given DBS received delta CT which is negative  Recommended op MRI per neurology  flp well controlled, hgb a1c pending  Continue asa 81mg po daily, pravachol  Start neurochecks q 4h  Will need nonemergent MRI in op setting w/primary neurologist for confirmation      Lethargy  Assessment & Plan  Maybe multifactorial from PD, suspected CVA  -will start low dose seroquel 12 5mg qhs for sleep wake cycle maintenance  -no s/sx of acute infection  -continue to monitor/reorient    Breast nodule  Assessment & Plan  Incidental 0 7 by 1 3 cm left breast nodule  Per daughter pt has had surgery on left breast for multiple fibrotic breast densities but no known hx of breast cancer and stopped receiving mammograms years ago  D/w  pt and daughter for f/u with pcp to discuss whether they would want to consider op mammography at this time as they aren't certain they would want to consider treatment if found to be malignant      Cough  Assessment & Plan  Cough associated with shortness of breath for the last few days  Likely aspiration without clear evidence of pneumonitis/pneumonia in the setting of dysphagia    cxr demonstrates vascular congestion however pt's lungs are clear w/o LE edema and normal nt pro bnp  Initial procalcitonin negative, and no fevers/s/sx of sepsis  Serial procalcitonins are negative monitor off abx, treat dysphagia as below    Intussusception [facility])  Assessment & Plan  CT evidence of small-bowel intussusception  Patient has normal bowel movement and passes gas freely  This  has spontaneously resolved without intervention on repeat imaging  Appreciate surgery recommendations  No interventions planned at this time    Dysphagia  Assessment & Plan  Patient with advanced Parkinson disease presents with dysphagia  May be worsened given concern for LUE weakness concerning for cva with acute on chronic dysphagia issues  -  VBS demonstrates intermittent failure for epiglottic inversion and moderate oral dyphagia and mild to moderate pharyngeal dysphagia  - transitioned to pureed with HTL    Parkinson disease ([facility] [address] )  Assessment & Plan  W/DBS and sinemet  Continue op sinemet regimen      Hypothyroidism  Assessment & Plan  Continue home dose Synthroid  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  Assessment & Plan  Continue citalopram 10 mg daily        VTE Pharmacologic Prophylaxis:   Pharmacologic: Heparin  Mechanical VTE Prophylaxis in Place: Yes    Patient Centered Rounds: I have performed bedside rounds with nursing staff today  Discussions with Specialists or Other Care Team Provider:     Education and Discussions with Family / Patient: dispo workup w/pt's  at bedside    Time Spent for Care: 20 minutes  More than 50% of total time spent on counseling and coordination of care as described above  Current Length of Stay: 2 day(s)    Current Patient Status: Inpatient   Certification Statement: The patient will continue to require additional inpatient hospital stay due to weakness and worsening ambulatory dysfunction, likely cva    Discharge Plan: pending pt/ot eval for placement  Code Status: Level 1 - Full Code      Subjective:   Pt seen/examined  No events overnight    Pt is somewhat alert and mumbles speech which is incomprehensible  She nods yes or no  She notes she has no cp  She is comfortable, she has no sob  She is sleepy   notes she had a coughing spell this AM during treatment for breakfast  Objective:     Vitals:   Temp (24hrs), Av 9 °F (37 2 °C), Min:98 8 °F (37 1 °C), Max:99 °F (37 2 °C)    Temp:  [98 8 °F (37 1 °C)-99 °F (37 2 °C)] 99 °F (37 2 °C)  HR:  [72-79] 72  Resp:  [19-20] 19  BP: (140-160)/(68-77) 140/73  SpO2:  [91 %-95 %] 91 %  Body mass index is 38 15 kg/m²  Input and Output Summary (last 24 hours): Intake/Output Summary (Last 24 hours) at 2020 1351  Last data filed at 2020 0147  Gross per 24 hour   Intake 2360 ml   Output    Net 2360 ml       Physical Exam:     Physical Exam   Constitutional: She appears well-developed and well-nourished  No distress  HENT:   Head: Normocephalic and atraumatic  Right Ear: External ear normal    Left Ear: External ear normal    Eyes: Conjunctivae are normal    Neck: Normal range of motion  Cardiovascular: Normal rate, regular rhythm and normal heart sounds  Exam reveals no gallop and no friction rub  No murmur heard  Pulmonary/Chest: Effort normal  No respiratory distress  She has no wheezes  She has no rales  Abdominal: Soft  She exhibits no distension and no mass  There is no tenderness  There is no guarding  Musculoskeletal: She exhibits no edema  Neurological: She is alert  Left arm weakness and  strength but able to provide effort against gravity in left arm/leg   Skin: Skin is warm and dry  She is not diaphoretic  Psychiatric: She has a normal mood and affect  Vitals reviewed      (  Be Sure to Include Physical Exam: Delete this entire line when you have entered your exam)    Additional Data:     Labs:    Results from last 7 days   Lab Units 20  0547   WBC Thousand/uL 10 83*   HEMOGLOBIN g/dL 15 2   HEMATOCRIT % 46 5*   PLATELETS Thousands/uL 236   NEUTROS PCT % 64   LYMPHS PCT % 23 MONOS PCT % 11   EOS PCT % 1     Results from last 7 days   Lab Units 02/01/20  0547 01/31/20  0533   SODIUM mmol/L 142 141   POTASSIUM mmol/L 3 4* 3 5   CHLORIDE mmol/L 107 106   CO2 mmol/L 26 28   BUN mg/dL 10 10   CREATININE mg/dL 0 77 0 79   ANION GAP mmol/L 9 7   CALCIUM mg/dL 8 3 8 5   ALBUMIN g/dL  --  3 0*   TOTAL BILIRUBIN mg/dL  --  0 82   ALK PHOS U/L  --  71   ALT U/L  --  25   AST U/L  --  25   GLUCOSE RANDOM mg/dL 150* 156*     Results from last 7 days   Lab Units 01/30/20  1025   INR  1 04         Results from last 7 days   Lab Units 02/01/20  0547   HEMOGLOBIN A1C % 6 6*     Results from last 7 days   Lab Units 01/31/20  1305 01/30/20  1516   PROCALCITONIN ng/ml 0 05 0 07           * I Have Reviewed All Lab Data Listed Above  * Additional Pertinent Lab Tests Reviewed:  All Labs Within Last 24 Hours Reviewed    Imaging:    Imaging Reports Reviewed Today Include:   Imaging Personally Reviewed by Myself Includes:      Recent Cultures (last 7 days):           Last 24 Hours Medication List:     Current Facility-Administered Medications:  acetaminophen 488 mg Oral Q6H PRN Theo Blocker, MD    ALPRAZolam 0 5 mg Oral TID PRN Theo Blocker, MD    aspirin 81 mg Oral Daily Theo Blocker, MD    carbidopa-levodopa 0 5 tablet Oral BID Theo Blocker, MD    carbidopa-levodopa 1 tablet Oral TID Theo Blocker, MD    dextrose 5 % and sodium chloride 0 9 % with KCl 20 mEq/L 75 mL/hr Intravenous Continuous Theo Blocker, MD Last Rate: 75 mL/hr (02/01/20 0147)   donepezil 10 mg Oral HS Theo Blocker, MD    escitalopram 10 mg Oral Daily Theo Blocker, MD    famotidine 20 mg Oral Early Morning Theo Blocker, MD    glycopyrrolate 1 mg Oral TID Theo Blocker, MD    heparin (porcine) 5,000 Units Subcutaneous [facility] Theo Blocker, MD    levothyroxine 75 mcg Oral Early Morning Theo Blocker, MD    pravastatin 40 mg Oral Daily With Savannah Carrillo MD    saccharomyces boulardii 250 mg Oral BID Theo Blocker, MD         Today, Patient Was Seen By: Irvin Hayes ELENO Navarrete    ** Please Note: Dictation voice to text software may have been used in the creation of this document   **

## 2020-02-01 NOTE — ASSESSMENT & PLAN NOTE
Patient with advanced Parkinson disease presents with dysphagia  May be worsened given concern for LUE weakness concerning for cva with acute on chronic dysphagia issues  -    VBS demonstrates intermittent failure for epiglottic inversion and moderate oral dyphagia and mild to moderate pharyngeal dysphagia  - transitioned to pureed with HTL

## 2020-02-01 NOTE — PLAN OF CARE
Problem: OCCUPATIONAL THERAPY ADULT  Goal: Performs self-care activities at highest level of function for planned discharge setting  See evaluation for individualized goals  Description  Treatment Interventions: ADL retraining, Functional transfer training, UE strengthening/ROM, Endurance training, Patient/family training, Cognitive reorientation, Equipment evaluation/education, Fine motor coordination activities, Compensatory technique education, Continued evaluation, Energy conservation, Activityengagement, Neuromuscular reeducation          See flowsheet documentation for full assessment, interventions and recommendations  Outcome: Progressing  Note:   Limitation: Decreased ADL status, Decreased UE ROM, Decreased UE strength, Decreased Safe judgement during ADL, Decreased cognition, Decreased endurance, Decreased fine motor control, Decreased self-care trans, Decreased high-level ADLs  Prognosis: Fair  Assessment: Pt is a 66 y o  female seen for OT evaluation s/p adm to [address] on 1/30/2020 w/ generalized weakness and dysphagia x4 days  CT Head 1/30/20 (-) for acute intracranial abnormality  Increased L UE weakness noted 1/31/20, repeat CT Head (-) for acute intracranial abnormality  Comorbidities affecting pts functional performance include a significant PMH of Anxiety, Depression, Dysuria, HLD, Hypothyroid, Parkinson's disease, and  psychiatric illness  Pt with active OT orders and activity orders for Activity as tolerated  Pt lives w/ spouse in a multi-level house with 1 small lip to enter through garage and additional step to main living area  Pt (-) home alone  At baseline, pt required assist w/ ADLs, IADLs, and transfers (SPTs only w/ hand held assistance), (-) , and reports 0 falls PTA   Upon evaluation, pt currently requires Max A for UB ADLs, Total A for LB ADLs, Total A for toileting, Max A of 2 for bed mobility, and Max A of 2 for transfers 2* the following deficits impacting occupational performance: weakness, decreased ROM, decreased strength, decreased balance, decreased tolerance, impaired [address], impaired [address], impaired memory, impaired problem solving and decreased safety awareness  These impairments, as well at pts difficulty performing ADLS, limited insight into deficits and decreased initiation and engagement  limit pts ability to safely engage in all baseline areas of occupation  Pt scored overall 20/100 on the Barthel Index  Based on the aforementioned OT evaluation, functional performance deficits, and assessments, pt has been identified as a High complexity evaluation  Pt to continue to benefit from continued acute OT services during hospital stay to address defined deficits and to maximize level of functional independence in the following Occupational Performance areas: eating, grooming, bathing/shower, toilet hygiene, dressing, socialization, health maintenance, functional mobility and clothing management  From OT standpoint, recommend STR upon D/C   OT will continue to follow pt 3-5x/wk to address the following goals to  w/in 10-14 days:     OT Discharge Recommendation: Short Term Rehab(vs Home OT w/ 24 hour Supervision/assistance)  OT - OK to Discharge: Yes(when medically cleared to rehab)

## 2020-02-01 NOTE — PLAN OF CARE
Problem: Potential for Falls  Goal: Patient will remain free of falls  Description  INTERVENTIONS:  - Assess patient frequently for physical needs  -  Identify cognitive and physical deficits and behaviors that affect risk of falls    -  [place] fall precautions as indicated by assessment   - Educate patient/family on patient safety including physical limitations  - Instruct patient to call for assistance with activity based on assessment  - Modify environment to reduce risk of injury  - Consider OT/PT consult to assist with strengthening/mobility  Outcome: Progressing     Problem: Prexisting or High Potential for Compromised Skin Integrity  Goal: Skin integrity is maintained or improved  Description  INTERVENTIONS:  - Identify patients at risk for skin breakdown  - Assess and monitor skin integrity  - Assess and monitor nutrition and hydration status  - Monitor labs   - Assess for incontinence   - Turn and reposition patient  - Assist with mobility/ambulation  - Relieve pressure over bony prominences  - Avoid friction and shearing  - Provide appropriate hygiene as needed including keeping skin clean and dry  - Evaluate need for skin moisturizer/barrier cream  - Collaborate with interdisciplinary team   - Patient/family teaching  - Consider wound care consult   Outcome: Progressing     Problem: PAIN - ADULT  Goal: Verbalizes/displays adequate comfort level or baseline comfort level  Description  Interventions:  - Encourage patient to monitor pain and request assistance  - Assess pain using appropriate pain scale  - Administer analgesics based on type and severity of pain and evaluate response  - Implement non-pharmacological measures as appropriate and evaluate response  - Consider cultural and social influences on pain and pain management  - Notify physician/advanced practitioner if interventions unsuccessful or patient reports new pain  Outcome: Progressing     Problem: INFECTION - ADULT  Goal: Absence or prevention of progression during hospitalization  Description  INTERVENTIONS:  - Assess and monitor for signs and symptoms of infection  - Monitor lab/diagnostic results  - Monitor all insertion sites, i e  indwelling lines, tubes, and drains  - Monitor endotracheal if appropriate and nasal secretions for changes in amount and color  - [place] appropriate cooling/warming therapies per order  - Administer medications as ordered  - Instruct and encourage patient and family to use good hand hygiene technique  - Identify and instruct in appropriate isolation precautions for identified infection/condition  Outcome: Progressing     Problem: SAFETY ADULT  Goal: Maintain or return to baseline ADL function  Description  INTERVENTIONS:  -  Assess patient's ability to carry out ADLs; assess patient's baseline for ADL function and identify physical deficits which impact ability to perform ADLs (bathing, care of mouth/teeth, toileting, grooming, dressing, etc )  - Assess/evaluate cause of self-care deficits   - Assess range of motion  - Assess patient's mobility; develop plan if impaired  - Assess patient's need for assistive devices and provide as appropriate  - Encourage maximum independence but intervene and supervise when necessary  - Involve family in performance of ADLs  - Assess for home care needs following discharge   - Consider OT consult to assist with ADL evaluation and planning for discharge  - Provide patient education as appropriate  Outcome: Progressing  Goal: Maintain or return mobility status to optimal level  Description  INTERVENTIONS:  - Assess patient's baseline mobility status (ambulation, transfers, stairs, etc )    - Identify cognitive and physical deficits and behaviors that affect mobility  - Identify mobility aids required to assist with transfers and/or ambulation (gait belt, sit-to-stand, lift, walker, cane, etc )  - [place] fall precautions as indicated by assessment  - Record patient progress and toleration of activity level on Mobility SBAR; progress patient to next Phase/Stage  - Instruct patient to call for assistance with activity based on assessment  - Consider rehabilitation consult to assist with strengthening/weightbearing, etc   Outcome: Progressing     Problem: DISCHARGE PLANNING  Goal: Discharge to home or other facility with appropriate resources  Description  INTERVENTIONS:  - Identify barriers to discharge w/patient and caregiver  - Arrange for needed discharge resources and transportation as appropriate  - Identify discharge learning needs (meds, wound care, etc )  - Arrange for interpretive services to assist at discharge as needed  - Refer to Case Management Department for coordinating discharge planning if the patient needs post-hospital services based on physician/advanced practitioner order or complex needs related to functional status, cognitive ability, or social support system  Outcome: Progressing     Problem: Nutrition/Hydration-ADULT  Goal: Nutrient/Hydration intake appropriate for improving, restoring or maintaining nutritional needs  Description  Monitor and assess patient's nutrition/hydration status for malnutrition  Collaborate with interdisciplinary team and initiate plan and interventions as ordered  Monitor patient's weight and dietary intake as ordered or per policy  Utilize nutrition screening tool and intervene as necessary  Determine patient's food preferences and provide high-protein, high-caloric foods as appropriate       INTERVENTIONS:  - Monitor oral intake, urinary output, labs, and treatment plans  - Assess nutrition and hydration status and recommend course of action  - Evaluate amount of meals eaten  - Assist patient with eating if necessary   - Allow adequate time for meals  - Recommend/ encourage appropriate diets, oral nutritional supplements, and vitamin/mineral supplements  - Order, calculate, and assess calorie counts as needed  - Recommend, monitor, and adjust tube feedings and TPN/PPN based on assessed needs  - Assess need for intravenous fluids  - Provide specific nutrition/hydration education as appropriate  - Include patient/family/caregiver in decisions related to nutrition  Outcome: Progressing

## 2020-02-01 NOTE — PHYSICAL THERAPY NOTE
PHYSICAL THERAPY EVALUATION      Patient Name: Radha Cisneros  QJRSO'O Date: 2/1/2020   66 y o     [id number]    Parkinson disease ([facility] [address] ) [place] Dys  Weakness [R53 1]  Trouble swallowing [R13 10]    Past Medical History:   Diagnosis Date    Abdominal pain, suprapubic 07/03/2014    Resolved:  November 22, 2016    Abscess of skin and subcutaneous tissue 11/21/2016    Resolved:  February 28, 2017    Anxiety     Burning with urination 12/14/2016    Resolved:  February 28, 2017    Conjunctivitis 06/10/2013    Depression     Depression     Dysuria 06/06/2016    Resolved:  February 28 2017    Falling 10/18/2012    GERD (gastroesophageal reflux disease)     Groin pain 04/16/2015    Resolved:  February 28, 2017    Hyperlipidemia     Hypothyroid     Influenza 04/05/2013    Leg abrasion 09/22/2016    Resolved:  February 28, 2017    Parkinson's disease [facility])     Psychiatric illness     Rectal pain 10/17/2013    Resolved:  February 28, 2017    Thyroid disease      Past Surgical History:   Procedure Laterality Date    APPENDECTOMY      Age 4 or 3    BACK SURGERY      BRAIN SURGERY      CLOSED REDUCTION NASAL FRACTURE      CYSTOSCOPY  01/28/2014    RENETTA Pavon   (Diagnostic)   [facility]      FRACTURE SURGERY      JOINT REPLACEMENT      LAMINECTOMY      Decompress, Facetectomy, Foraminotomy Lumbar Seg    ORIF HIP FRACTURE Left     About 3 years ago    TONSILLECTOMY          02/01/20 1411   Note Type   Note type Eval/Treat   Pain Assessment   Pain Assessment FLACC   Pain Score No Pain   Pain Rating: FLACC (Rest) - Face 0   Pain Rating: FLACC (Rest) - Legs 0   Pain Rating: FLACC (Rest) - Activity 0   Pain Rating: FLACC (Rest) - Cry 0   Pain Rating: FLACC (Rest) - Consolability 0   Score: FLACC (Rest) 0   Pain Rating: FLACC (Activity) - Face 0   Pain Rating: FLACC (Activity) - Legs 0   Pain Rating: FLACC (Activity) - Activity 0   Pain Rating: FLACC (Activity) - Cry 0   Pain Rating: FLACC (Activity) - Consolability 0   Score: FLACC (Activity) 0   Home Living   Type of Home House   Home Layout Multi-level;Performs ADLs on one level; Able to live on main level with bedroom/bathroom;Stairs to enter without rails   Bathroom Shower/Tub   (sponge bathes)   Bathroom Toilet Raised   Bathroom Equipment Grab bars around toilet   Bathroom Accessibility Accessible via wheelchair   [address] Other (Comment)  (transport chair)   Additional Comments per pt spouse, pt is wc bound, 1-2 small "jaskaran" to enter house- pt require A of  to negotiate stairs in wc   Prior Function   Level of [place] Needs assistance with ADLs and functional mobility; Needs assistance with IADLs   Lives With Spouse   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   ADL Assistance Needs assistance   IADLs Needs assistance   Falls in the last 6 months 0   Vocational Retired   Comments pt is wc bound at baseline, needs A for propulsion  pt able to stand piv bed<>wc with max A from , as well as pull to stand from wc to bathroom toilet w use of grab bar  pt dependent for ADLs and IADLs at baseline  Restrictions/Precautions   Other Precautions Cognitive; Chair Alarm; Bed Alarm;Multiple lines;Telemetry; Fall Risk  (per  pt is nonverbal)   General   Additional Pertinent History pt admitted 1/30/20 for weakness  pt has hx of PD w DBS and resultant dysphagia  per , pt is non verbal however able to engage in transf stand piv   is caretaker and provides 24/7 supervision and A   Family/Caregiver Present Yes   Cognition   Overall Cognitive Status Impaired   Arousal/Participation Alert   Orientation Level Oriented to person   Memory Unable to assess   Following Commands Follows one step commands inconsistently   Comments pt inconsistently follow commands to perform mobility deficits    present to assist w social hx- reports pt is mostly non verbal at baseline w inconsistent command following however able to follow routine of transf from bed<>wc and pull to  bathroom normally   RLE Assessment   RLE Assessment X   Strength RLE   RLE Overall Strength 3/5   LLE Assessment   LLE Assessment X   Strength LLE   LLE Overall Strength 3-/5   L Ankle Dorsiflexion 0/5  (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)   Coordination   Movements are Fluid and Coordinated 0   Coordination and Movement Description R lean, decreased coordination w RUE to target (dysmetria)  decreased AROM of LUE, LLE   Bed Mobility   Supine to Sit 2  Maximal assistance   Additional items Assist x 2;HOB elevated; Increased time required;Verbal cues;LE management; Other  (trunk control)   Transfers   Sit to Stand 2  Maximal assistance   Additional items Assist x 2; Increased time required;Verbal cues   Stand to Sit 2  Maximal assistance   Additional items Assist x 2; Increased time required;Verbal cues   Additional Comments cues for technique, performed x2 standing trial, pt able to hold first stand about 1 min  pt require second standing trial for personal hygiene needs 2* to bowel incontinence; pt impulsive to sit 2* to fatigue   Ambulation/Elevation   Gait pattern Not appropriate  (non ambulatory at baseline)   Balance   Static Sitting Poor +  (noted R lean)   Dynamic Sitting Poor  (pt able to reach across midline w RUE however +LOB)   Static Standing Zero  (Ax2)   Endurance Deficit   Endurance Deficit Yes   Endurance Deficit Description fatigue, weakness   Activity Tolerance   Activity Tolerance Patient limited by fatigue   Medical Staff Kalee Person   Nurse Made John Vanegas RN   Assessment   Prognosis Fair   Problem List Decreased strength;Decreased range of motion;Decreased endurance; Impaired balance;Decreased mobility; Decreased cognition;Decreased coordination   Assessment Rick Soares is a 66 y o  female admitted to [facility] on 1/30/2020 for [facility] to pending ddx including ? CVA  Pt  has a past medical history of  Anxiety, Depression, GERD (gastroesophageal reflux disease), Hyperlipidemia, Hypothyroid, Parkinson's disease ([facility] [address] ), Psychiatric illness  PT was consulted and pt was seen on 2/1/2020 for mobility assessment and d/c planning  Pt presents high fall risk precautions, monitoring abn lab, PIV, continuous telemetry  Pt spouse present and able to assist w social hx given pt non verbal at baseline w inconsistent command following  At baseline, pt is wc bound (transport chair), require A for propulsion and negotiating steps in wc, dependent ADLs and IADLs  Pt  reports she was able to transf bed<>wc w A as well as pull to stand wc<>raised toilet  Pt is currently functioning at a maximum assistance x2 level for bed mobility, maximum assistance x2 level for transfers  Pt demonstrated L sided weakness during IE, w poor sitting balance and R lean noted despite cuing, inconsistent follow through of commands  Pt will benefit from continued skilled IP PT to address the above mentioned impairments  in order to maximize recovery and increase functional independence when completing mobility and ADLs  Currently PT recommendations for DME include tilt in space wc, given pt currently utilizing transport chair and not actual wc, as well as pt  reporting that pt often slides forward out of chair and requires A and seat belt to maintain upright  At this time PT recommendations for d/c are STR vs home w 24/7 supervision and HHPT pending progress  Barriers to Discharge Decreased caregiver support   Goals   Patient Goals none stated 2* to cognition   STG Expiration Date 02/22/20   Short Term Goal #1 1)  Pt will perform bed mobility with min Ax1 demonstrating appropriate technique 100% of the time in order to improve function and decrease caregiver burden   2)  Perform all transfers with min Ax1 demonstrating safe and appropriate technique 100% of the time in order to improve ability to negotiate safely in home environment and decrease caregiver burden  3)Increase activity tolerance to 45 minutes in order to improve endurance to functional tasks  4) PT for ongoing patient and family/caregiver education, DME needs and d/c planning in order to promote highest level of function in least restrictive environment  PT Treatment Day 1   Plan   Treatment/Interventions Functional transfer training;LE strengthening/ROM; Therapeutic exercise; Endurance training;Patient/family training;Equipment eval/education; Bed mobility; Compensatory technique education;Continued evaluation;Spoke to nursing;OT;Spoke to advanced practitioner;Family   PT Frequency Other (Comment)  (3-5/wk)   Recommendation   Recommendation Short-term skilled PT; Home with family support;24 hour supervision/assist;Home PT   Equipment Recommended Wheelchair  (tilt in space wc)   PT - OK to Discharge Yes   Additional Comments to STR when medically stable   Modified [place] Scale   Modified [place] Scale 4   Barthel Index   Feeding 5   Bathing 0   Grooming Score 0   Dressing Score 0   Bladder Score 5   Bowels Score 5   Toilet Use Score 0   Transfers (Bed/Chair) Score 5   Mobility (Level Surface) Score 0   Stairs Score 0   Barthel Index Score 20   History: co - morbidities, age, non ambulatory at baseline w A for mobility, dependent for adl's, cognition, multiple lines, non verbal and inconsistent command following, 24/7 supervision at baseline  Exam: impairments in systems including musculoskeletal (ROM, strength, posture), neuromuscular (balance, transfers, motor function and coordination), cardiopulmonary, cognition, barthel 20  Clinical: unstable/unpredictable  Complexity:high      Avelino Hamilton, PT     PT Progress Note  Time In: 1411 Time Out: 1427  Additional time needed to A pt w OOB mobility EOB>bedside chair   Utilized use of quick move to transf stand piv to chair; pt able to utilize RUE to A in pull to stand however cont to require max Ax2 for sit<>stand  End of session pt repositioned in chair, w pillows to maintain neutral position  Nsg updated to pt mobility status  See PT IE for POC and d/c recommendations

## 2020-02-01 NOTE — ASSESSMENT & PLAN NOTE
Cough associated with shortness of breath for the last few days  Likely aspiration without clear evidence of pneumonitis/pneumonia in the setting of dysphagia    cxr demonstrates vascular congestion however pt's lungs are clear w/o LE edema and normal nt pro bnp  Initial procalcitonin negative, and no fevers/s/sx of sepsis  Serial procalcitonins are negative monitor off abx, treat dysphagia as below

## 2020-02-02 PROBLEM — E11.9 DIABETES MELLITUS (HCC): Status: ACTIVE | Noted: 2020-02-02

## 2020-02-02 PROCEDURE — 99232 SBSQ HOSP IP/OBS MODERATE 35: CPT | Performed by: PHYSICIAN ASSISTANT

## 2020-02-02 RX ORDER — NYSTATIN 100000 [USP'U]/G
POWDER TOPICAL 2 TIMES DAILY PRN
Status: DISCONTINUED | OUTPATIENT
Start: 2020-02-02 | End: 2020-02-04 | Stop reason: HOSPADM

## 2020-02-02 RX ADMIN — GLYCOPYRROLATE 1 MG: 1 TABLET ORAL at 15:40

## 2020-02-02 RX ADMIN — HEPARIN SODIUM 5000 UNITS: 5000 INJECTION INTRAVENOUS; SUBCUTANEOUS at 05:27

## 2020-02-02 RX ADMIN — LEVOTHYROXINE SODIUM 75 MCG: 75 TABLET ORAL at 05:26

## 2020-02-02 RX ADMIN — CARBIDOPA AND LEVODOPA 0.5 TABLET: 25; 100 TABLET, EXTENDED RELEASE ORAL at 21:26

## 2020-02-02 RX ADMIN — CARBIDOPA AND LEVODOPA 1 TABLET: 25; 100 TABLET ORAL at 05:27

## 2020-02-02 RX ADMIN — DONEPEZIL HYDROCHLORIDE 10 MG: 10 TABLET, FILM COATED ORAL at 21:27

## 2020-02-02 RX ADMIN — CARBIDOPA AND LEVODOPA 1 TABLET: 25; 100 TABLET ORAL at 08:53

## 2020-02-02 RX ADMIN — HEPARIN SODIUM 5000 UNITS: 5000 INJECTION INTRAVENOUS; SUBCUTANEOUS at 21:27

## 2020-02-02 RX ADMIN — HEPARIN SODIUM 5000 UNITS: 5000 INJECTION INTRAVENOUS; SUBCUTANEOUS at 13:20

## 2020-02-02 RX ADMIN — Medication 250 MG: at 08:53

## 2020-02-02 RX ADMIN — Medication 250 MG: at 17:40

## 2020-02-02 RX ADMIN — ESCITALOPRAM OXALATE 10 MG: 10 TABLET ORAL at 08:53

## 2020-02-02 RX ADMIN — ASPIRIN 81 MG 81 MG: 81 TABLET ORAL at 08:53

## 2020-02-02 RX ADMIN — GLYCOPYRROLATE 1 MG: 1 TABLET ORAL at 08:53

## 2020-02-02 RX ADMIN — FAMOTIDINE 20 MG: 20 TABLET, FILM COATED ORAL at 05:27

## 2020-02-02 RX ADMIN — CARBIDOPA AND LEVODOPA 1 TABLET: 25; 100 TABLET ORAL at 12:38

## 2020-02-02 RX ADMIN — GLYCOPYRROLATE 1 MG: 1 TABLET ORAL at 21:27

## 2020-02-02 RX ADMIN — CARBIDOPA AND LEVODOPA 0.5 TABLET: 25; 100 TABLET, EXTENDED RELEASE ORAL at 15:41

## 2020-02-02 RX ADMIN — PRAVASTATIN SODIUM 40 MG: 40 TABLET ORAL at 16:53

## 2020-02-02 NOTE — PROGRESS NOTES
Progress Note - Merline Letters 1945, 66 y o  female MRN: [id number]    Unit/Bed#: [facility] [address] 2 [address] [id number] Encounter: [id number]    Primary Care Provider: Colin Pelletier DO   Date and time admitted to hospital: 1/30/2020  8:28 AM        * Weakness  Assessment & Plan  Generalized weakness for the last 4 days w/worsening swallow problems/cough/sob  It appears LUE weakness is out of proportion to RUE and LE strength and is 2/5-3/5 compared to 4/5 strength in RUE  This may be stuttering s/sx but concern is for CVA  CT head negative for ischemia hemorrhage/midline shift  Given 4 days of s/sx likely do not need permissive htn/stroke pathway  D/w neurology given DBS received delta CT which is negative  Recommended op MRI per neurology  flp well controlled, hgb a1c w/mild DM with hgb a1c 6 6%  Continue asa 81mg po daily, pravachol  Continue neurochecks  Will need nonemergent MRI in op setting w/primary neurologist for confirmation in op setting  Awaiting placement to [facility]      Diabetes mellitus [facility])  Assessment & Plan  Lab Results   Component Value Date    HGBA1C 6 6 (H) 02/01/2020       No results for input(s): POCGLU in the last 72 hours  Blood Sugar Average: Last 72 hrs:  encourage diet control    Lethargy  Assessment & Plan  Maybe multifactorial from PD, suspected CVA    Improved w/low dose seroquel 12 5mg qhs   -no s/sx of acute infection  -continue to monitor/reorient    Breast nodule  Assessment & Plan  Incidental 0 7 by 1 3 cm left breast nodule  Per daughter pt has had surgery on left breast for multiple fibrotic breast densities but no known hx of breast cancer and stopped receiving mammograms years ago  D/w  pt and daughter for f/u with pcp to discuss whether they would want to consider op mammography at this time as they aren't certain they would want to consider treatment if found to be malignant      Cough  Assessment & Plan  Cough associated with shortness of breath for the last few days   Likely aspiration without clear evidence of pneumonitis/pneumonia in the setting of dysphagia  cxr demonstrates vascular congestion however pt's lungs are clear w/o LE edema and normal nt pro bnp  Initial procalcitonin negative, and no fevers/s/sx of sepsis  Serial procalcitonins are negative monitor off abx, treat dysphagia as below    Intussusception [facility])  Assessment & Plan  CT evidence of small-bowel intussusception  Patient has normal bowel movement and passes gas freely  This  has spontaneously resolved without intervention on repeat imaging  Appreciate surgery recommendations  No interventions planned at this time    Dysphagia  Assessment & Plan  Patient with advanced Parkinson disease presents with dysphagia  May be worsened given concern for LUE weakness concerning for cva with acute on chronic dysphagia issues  -  VBS demonstrates intermittent failure for epiglottic inversion and moderate oral dyphagia and mild to moderate pharyngeal dysphagia  - transitioned to pureed with HTL, will need slp f/u at rehab    Parkinson disease [facility])  Assessment & Plan  W/DBS and sinemet  Continue op sinemet regimen      Hypothyroidism  Assessment & Plan  Continue home dose Synthroid  VTE Pharmacologic Prophylaxis:   Pharmacologic: Heparin  Mechanical VTE Prophylaxis in Place: Yes    Patient Centered Rounds: I have performed bedside rounds with nursing staff today  Discussions with Specialists or Other Care Team Provider:     Education and Discussions with Family / Patient: dispo workup w/pt    Time Spent for Care: 20 minutes  More than 50% of total time spent on counseling and coordination of care as described above  Current Length of Stay: 3 day(s)    Current Patient Status: Inpatient   Certification Statement: The patient will continue to require additional inpatient hospital stay due to cva    Discharge Plan:     Code Status: Level 1 - Full Code      Subjective:   Pt seen/examined    No events overnight  Pt is alert and continues to be nonverbal   She shakes her head yes or no  She denies cp/sob presently  No further coughing paroxysms w/nursing during meals  Is more awake despite being nonverbal per daughter  Objective:     Vitals:   Temp (24hrs), Av 5 °F (36 9 °C), Min:98 3 °F (36 8 °C), Max:98 8 °F (37 1 °C)    Temp:  [98 3 °F (36 8 °C)-98 8 °F (37 1 °C)] 98 3 °F (36 8 °C)  HR:  [74-84] 75  Resp:  [18-20] 20  BP: (136-154)/(66-82) 136/69  SpO2:  [93 %-95 %] 95 %  Body mass index is 38 15 kg/m²  Input and Output Summary (last 24 hours): Intake/Output Summary (Last 24 hours) at 2020 1403  Last data filed at 2020 0700  Gross per 24 hour   Intake 1208 75 ml   Output    Net 1208 75 ml       Physical Exam:     Physical Exam   Constitutional: She appears well-developed and well-nourished  No distress  HENT:   Head: Normocephalic and atraumatic  Right Ear: External ear normal    Left Ear: External ear normal    Eyes: Conjunctivae are normal    Neck: Normal range of motion  Cardiovascular: Normal rate, regular rhythm and normal heart sounds  Pulmonary/Chest: Effort normal  No respiratory distress  She has no wheezes  She has rales (RLL fine rales)  Abdominal: Soft  She exhibits no distension and no mass  There is no tenderness  There is no guarding  Musculoskeletal: She exhibits no edema  Neurological: She is alert  Skin: Skin is warm and dry  She is not diaphoretic  Psychiatric:   Masked facies  Alert and follows voice/conversation  nonverbal   Vitals reviewed      (  Be Sure to Include Physical Exam: Delete this entire line when you have entered your exam)    Additional Data:     Labs:    Results from last 7 days   Lab Units 20  0547   WBC Thousand/uL 10 83*   HEMOGLOBIN g/dL 15 2   HEMATOCRIT % 46 5*   PLATELETS Thousands/uL 236   NEUTROS PCT % 64   LYMPHS PCT % 23   MONOS PCT % 11   EOS PCT % 1     Results from last 7 days   Lab Units 20  0547 01/31/20  0533   SODIUM mmol/L 142 141   POTASSIUM mmol/L 3 4* 3 5   CHLORIDE mmol/L 107 106   CO2 mmol/L 26 28   BUN mg/dL 10 10   CREATININE mg/dL 0 77 0 79   ANION GAP mmol/L 9 7   CALCIUM mg/dL 8 3 8 5   ALBUMIN g/dL  --  3 0*   TOTAL BILIRUBIN mg/dL  --  0 82   ALK PHOS U/L  --  71   ALT U/L  --  25   AST U/L  --  25   GLUCOSE RANDOM mg/dL 150* 156*     Results from last 7 days   Lab Units 01/30/20  1025   INR  1 04         Results from last 7 days   Lab Units 02/01/20  0547   HEMOGLOBIN A1C % 6 6*     Results from last 7 days   Lab Units 01/31/20  1305 01/30/20  1516   PROCALCITONIN ng/ml 0 05 0 07           * I Have Reviewed All Lab Data Listed Above  * Additional Pertinent Lab Tests Reviewed:  All Labs Within Last 24 Hours Reviewed    Imaging:    Imaging Reports Reviewed Today Include:   Imaging Personally Reviewed by Myself Includes:      Recent Cultures (last 7 days):           Last 24 Hours Medication List:     Current Facility-Administered Medications:  acetaminophen 488 mg Oral Q6H PRN Shannon Bush MD    ALPRAZolam 0 5 mg Oral TID PRN Shannon Bush MD    aspirin 81 mg Oral Daily Shannon Bush MD    carbidopa-levodopa 0 5 tablet Oral BID Shannon Bush MD    carbidopa-levodopa 1 tablet Oral TID Shannon Bush MD    dextrose 5 % and sodium chloride 0 9 % with KCl 20 mEq/L 75 mL/hr Intravenous Continuous Shannon Bush MD Last Rate: 75 mL/hr (02/01/20 1453)   donepezil 10 mg Oral HS Shannon Bush MD    escitalopram 10 mg Oral Daily Shannon Bush MD    famotidine 20 mg Oral Early Morning Shannon Bush MD    glycopyrrolate 1 mg Oral TID Shannon Bush MD    heparin (porcine) 5,000 Units Subcutaneous [facility] Shannon Bush MD    levothyroxine 75 mcg Oral Early Morning Shannon Bush MD    pravastatin 40 mg Oral Daily With Papo Fajardo MD    saccharomyces boulardii 250 mg Oral BID Shannon Bush MD         Today, Patient Was Seen By: Bienvenido Gill PA-C    ** Please Note: Dictation voice to text software may have been used in the creation of this document   **

## 2020-02-02 NOTE — ASSESSMENT & PLAN NOTE
Lab Results   Component Value Date    HGBA1C 6 6 (H) 02/01/2020       No results for input(s): POCGLU in the last 72 hours      Blood Sugar Average: Last 72 hrs:  encourage diet control

## 2020-02-02 NOTE — ASSESSMENT & PLAN NOTE
Generalized weakness for the last 4 days w/worsening swallow problems/cough/sob  It appears LUE weakness is out of proportion to RUE and LE strength and is 2/5-3/5 compared to 4/5 strength in RUE  This may be stuttering s/sx but concern is for CVA  CT head negative for ischemia hemorrhage/midline shift  Given 4 days of s/sx likely do not need permissive htn/stroke pathway  D/w neurology given DBS received delta CT which is negative    Recommended op MRI per neurology  flp well controlled, hgb a1c w/mild DM with hgb a1c 6 6%  Continue asa 81mg po daily, pravachol  Continue neurochecks  Will need nonemergent MRI in op setting w/primary neurologist for confirmation in op setting  Awaiting placement to [facility]

## 2020-02-02 NOTE — PLAN OF CARE
Problem: Potential for Falls  Goal: Patient will remain free of falls  Description  INTERVENTIONS:  - Assess patient frequently for physical needs  -  Identify cognitive and physical deficits and behaviors that affect risk of falls    -  [place] fall precautions as indicated by assessment   - Educate patient/family on patient safety including physical limitations  - Instruct patient to call for assistance with activity based on assessment  - Modify environment to reduce risk of injury  - Consider OT/PT consult to assist with strengthening/mobility  Outcome: Progressing     Problem: Prexisting or High Potential for Compromised Skin Integrity  Goal: Skin integrity is maintained or improved  Description  INTERVENTIONS:  - Identify patients at risk for skin breakdown  - Assess and monitor skin integrity  - Assess and monitor nutrition and hydration status  - Monitor labs   - Assess for incontinence   - Turn and reposition patient  - Assist with mobility/ambulation  - Relieve pressure over bony prominences  - Avoid friction and shearing  - Provide appropriate hygiene as needed including keeping skin clean and dry  - Evaluate need for skin moisturizer/barrier cream  - Collaborate with interdisciplinary team   - Patient/family teaching  - Consider wound care consult   Outcome: Progressing     Problem: PAIN - ADULT  Goal: Verbalizes/displays adequate comfort level or baseline comfort level  Description  Interventions:  - Encourage patient to monitor pain and request assistance  - Assess pain using appropriate pain scale  - Administer analgesics based on type and severity of pain and evaluate response  - Implement non-pharmacological measures as appropriate and evaluate response  - Consider cultural and social influences on pain and pain management  - Notify physician/advanced practitioner if interventions unsuccessful or patient reports new pain  Outcome: Progressing     Problem: INFECTION - ADULT  Goal: Absence or prevention of progression during hospitalization  Description  INTERVENTIONS:  - Assess and monitor for signs and symptoms of infection  - Monitor lab/diagnostic results  - Monitor all insertion sites, i e  indwelling lines, tubes, and drains  - Monitor endotracheal if appropriate and nasal secretions for changes in amount and color  - [place] appropriate cooling/warming therapies per order  - Administer medications as ordered  - Instruct and encourage patient and family to use good hand hygiene technique  - Identify and instruct in appropriate isolation precautions for identified infection/condition  Outcome: Progressing     Problem: SAFETY ADULT  Goal: Maintain or return to baseline ADL function  Description  INTERVENTIONS:  -  Assess patient's ability to carry out ADLs; assess patient's baseline for ADL function and identify physical deficits which impact ability to perform ADLs (bathing, care of mouth/teeth, toileting, grooming, dressing, etc )  - Assess/evaluate cause of self-care deficits   - Assess range of motion  - Assess patient's mobility; develop plan if impaired  - Assess patient's need for assistive devices and provide as appropriate  - Encourage maximum independence but intervene and supervise when necessary  - Involve family in performance of ADLs  - Assess for home care needs following discharge   - Consider OT consult to assist with ADL evaluation and planning for discharge  - Provide patient education as appropriate  Outcome: Progressing  Goal: Maintain or return mobility status to optimal level  Description  INTERVENTIONS:  - Assess patient's baseline mobility status (ambulation, transfers, stairs, etc )    - Identify cognitive and physical deficits and behaviors that affect mobility  - Identify mobility aids required to assist with transfers and/or ambulation (gait belt, sit-to-stand, lift, walker, cane, etc )  - [place] fall precautions as indicated by assessment  - Record patient progress and toleration of activity level on Mobility SBAR; progress patient to next Phase/Stage  - Instruct patient to call for assistance with activity based on assessment  - Consider rehabilitation consult to assist with strengthening/weightbearing, etc   Outcome: Progressing     Problem: DISCHARGE PLANNING  Goal: Discharge to home or other facility with appropriate resources  Description  INTERVENTIONS:  - Identify barriers to discharge w/patient and caregiver  - Arrange for needed discharge resources and transportation as appropriate  - Identify discharge learning needs (meds, wound care, etc )  - Arrange for interpretive services to assist at discharge as needed  - Refer to Case Management Department for coordinating discharge planning if the patient needs post-hospital services based on physician/advanced practitioner order or complex needs related to functional status, cognitive ability, or social support system  Outcome: Progressing     Problem: Nutrition/Hydration-ADULT  Goal: Nutrient/Hydration intake appropriate for improving, restoring or maintaining nutritional needs  Description  Monitor and assess patient's nutrition/hydration status for malnutrition  Collaborate with interdisciplinary team and initiate plan and interventions as ordered  Monitor patient's weight and dietary intake as ordered or per policy  Utilize nutrition screening tool and intervene as necessary  Determine patient's food preferences and provide high-protein, high-caloric foods as appropriate       INTERVENTIONS:  - Monitor oral intake, urinary output, labs, and treatment plans  - Assess nutrition and hydration status and recommend course of action  - Evaluate amount of meals eaten  - Assist patient with eating if necessary   - Allow adequate time for meals  - Recommend/ encourage appropriate diets, oral nutritional supplements, and vitamin/mineral supplements  - Order, calculate, and assess calorie counts as needed  - Recommend, monitor, and adjust tube feedings and TPN/PPN based on assessed needs  - Assess need for intravenous fluids  - Provide specific nutrition/hydration education as appropriate  - Include patient/family/caregiver in decisions related to nutrition  Outcome: Progressing

## 2020-02-02 NOTE — ASSESSMENT & PLAN NOTE
Patient with advanced Parkinson disease presents with dysphagia  May be worsened given concern for LUE weakness concerning for cva with acute on chronic dysphagia issues  -    VBS demonstrates intermittent failure for epiglottic inversion and moderate oral dyphagia and mild to moderate pharyngeal dysphagia  - transitioned to pureed with HTL, will need slp f/u at rehab

## 2020-02-02 NOTE — ASSESSMENT & PLAN NOTE
Maybe multifactorial from PD, suspected CVA    Improved w/low dose seroquel 12 5mg qhs   -no s/sx of acute infection  -continue to monitor/reorient

## 2020-02-03 ENCOUNTER — APPOINTMENT (INPATIENT)
Dept: NON INVASIVE DIAGNOSTICS | Facility: HOSPITAL | Age: 75
DRG: 065 | End: 2020-02-03
Payer: MEDICARE

## 2020-02-03 ENCOUNTER — APPOINTMENT (INPATIENT)
Dept: CT IMAGING | Facility: HOSPITAL | Age: 75
DRG: 065 | End: 2020-02-03
Payer: MEDICARE

## 2020-02-03 PROBLEM — R29.90 MULTIPLE NEUROLOGICAL SYMPTOMS: Status: ACTIVE | Noted: 2020-01-30

## 2020-02-03 LAB
ANION GAP SERPL CALCULATED.3IONS-SCNC: 8 MMOL/L (ref 4–13)
BUN SERPL-MCNC: 10 MG/DL (ref 5–25)
CALCIUM SERPL-MCNC: 8.3 MG/DL (ref 8.3–10.1)
CHLORIDE SERPL-SCNC: 106 MMOL/L (ref 100–108)
CO2 SERPL-SCNC: 26 MMOL/L (ref 21–32)
CREAT SERPL-MCNC: 0.64 MG/DL (ref 0.6–1.3)
GFR SERPL CREATININE-BSD FRML MDRD: 87 ML/MIN/1.73SQ M
GLUCOSE SERPL-MCNC: 122 MG/DL (ref 65–140)
POTASSIUM SERPL-SCNC: 4.6 MMOL/L (ref 3.5–5.3)
SODIUM SERPL-SCNC: 140 MMOL/L (ref 136–145)

## 2020-02-03 PROCEDURE — 99232 SBSQ HOSP IP/OBS MODERATE 35: CPT | Performed by: INTERNAL MEDICINE

## 2020-02-03 PROCEDURE — 92526 ORAL FUNCTION THERAPY: CPT

## 2020-02-03 PROCEDURE — 99232 SBSQ HOSP IP/OBS MODERATE 35: CPT | Performed by: PSYCHIATRY & NEUROLOGY

## 2020-02-03 PROCEDURE — 80048 BASIC METABOLIC PNL TOTAL CA: CPT | Performed by: PHYSICIAN ASSISTANT

## 2020-02-03 PROCEDURE — 70498 CT ANGIOGRAPHY NECK: CPT

## 2020-02-03 PROCEDURE — 70496 CT ANGIOGRAPHY HEAD: CPT

## 2020-02-03 RX ORDER — CLOPIDOGREL BISULFATE 75 MG/1
300 TABLET ORAL ONCE
Status: COMPLETED | OUTPATIENT
Start: 2020-02-03 | End: 2020-02-03

## 2020-02-03 RX ORDER — CILOSTAZOL 50 MG/1
100 TABLET ORAL
Status: DISCONTINUED | OUTPATIENT
Start: 2020-02-03 | End: 2020-02-04 | Stop reason: HOSPADM

## 2020-02-03 RX ORDER — CLOPIDOGREL BISULFATE 75 MG/1
75 TABLET ORAL DAILY
Status: DISCONTINUED | OUTPATIENT
Start: 2020-02-04 | End: 2020-02-04 | Stop reason: HOSPADM

## 2020-02-03 RX ADMIN — HEPARIN SODIUM 5000 UNITS: 5000 INJECTION INTRAVENOUS; SUBCUTANEOUS at 14:46

## 2020-02-03 RX ADMIN — PRAVASTATIN SODIUM 40 MG: 40 TABLET ORAL at 16:03

## 2020-02-03 RX ADMIN — IOHEXOL 85 ML: 350 INJECTION, SOLUTION INTRAVENOUS at 20:03

## 2020-02-03 RX ADMIN — CARBIDOPA AND LEVODOPA 0.5 TABLET: 25; 100 TABLET, EXTENDED RELEASE ORAL at 22:22

## 2020-02-03 RX ADMIN — CLOPIDOGREL BISULFATE 300 MG: 75 TABLET ORAL at 09:43

## 2020-02-03 RX ADMIN — CILOSTAZOL 100 MG: 50 TABLET ORAL at 16:27

## 2020-02-03 RX ADMIN — ASPIRIN 81 MG 81 MG: 81 TABLET ORAL at 08:28

## 2020-02-03 RX ADMIN — GLYCOPYRROLATE 1 MG: 1 TABLET ORAL at 08:28

## 2020-02-03 RX ADMIN — FAMOTIDINE 20 MG: 20 TABLET, FILM COATED ORAL at 04:56

## 2020-02-03 RX ADMIN — CARBIDOPA AND LEVODOPA 0.5 TABLET: 25; 100 TABLET, EXTENDED RELEASE ORAL at 16:03

## 2020-02-03 RX ADMIN — HEPARIN SODIUM 5000 UNITS: 5000 INJECTION INTRAVENOUS; SUBCUTANEOUS at 22:21

## 2020-02-03 RX ADMIN — CARBIDOPA AND LEVODOPA 1 TABLET: 25; 100 TABLET ORAL at 12:20

## 2020-02-03 RX ADMIN — CARBIDOPA AND LEVODOPA 1 TABLET: 25; 100 TABLET ORAL at 08:29

## 2020-02-03 RX ADMIN — HEPARIN SODIUM 5000 UNITS: 5000 INJECTION INTRAVENOUS; SUBCUTANEOUS at 04:56

## 2020-02-03 RX ADMIN — NYSTATIN: 100000 POWDER TOPICAL at 14:46

## 2020-02-03 RX ADMIN — GLYCOPYRROLATE 1 MG: 1 TABLET ORAL at 16:03

## 2020-02-03 RX ADMIN — Medication 250 MG: at 17:08

## 2020-02-03 RX ADMIN — CARBIDOPA AND LEVODOPA 1 TABLET: 25; 100 TABLET ORAL at 04:56

## 2020-02-03 RX ADMIN — ESCITALOPRAM OXALATE 10 MG: 10 TABLET ORAL at 08:28

## 2020-02-03 RX ADMIN — LEVOTHYROXINE SODIUM 75 MCG: 75 TABLET ORAL at 04:56

## 2020-02-03 RX ADMIN — Medication 250 MG: at 08:28

## 2020-02-03 RX ADMIN — GLYCOPYRROLATE 1 MG: 1 TABLET ORAL at 22:21

## 2020-02-03 NOTE — DISCHARGE INSTR - DIET
2/3/20 Puree w/ honey thick liquids by tsp  Allow thin cold water by straw ONLY following oral care, not while eating or is pt has PO remnants in mouth, and only if fully alert  Pt sucks from straw best if placed on R side  Allow ice cream      Video Barium Swallow Study 1/31/20     Summary:  Oral stage moderate  No mastication of soft solid  Eventually cleared w/ multiple sips of liquids  Bolus formation and control were best w/ purees/honey thick liquids, mild w/ thin and nectar  Transfer time varied from prompt to moderately delayed  Overall pt was more alert and held her head up better for the study than she did earlier at the bedside, but still had periods where she seemed to doze off  Pharyngeal stage was mild/mod impaired  Initial swallows were prompt to mildly delayed  Secondary swallows (on retention) were delayed or absent  Epiglottic inversion was inconsistent  Pharyngeal constriction was mildly reduced, laryngeal rise was mild/mod reduced  Per gross esophageal screen: slower clearance, some retropulsion/? Reflux  observed at the end of the study  Images are on PACS for review        Recommendations:  Diet: puree (will trial soft solids if cognition/alertness improve)  Liquids: honey thick for now  ST to trial thin and nectar at the bedside (need consistent alertness and swallow function over consecutive sessions)  Meds:crush  Strategies: feed  Only when fully alert, tactile and verbal prompts as needed, gustatory stim as needed  Head at midline, alternate w/ liquids  Upright position  F/u ST tx: yes  Therapy Prognosis: favorable  Seems to be improving  Prognosis considerations: h/o progressive dz  Multiple meds  Full Supervision  Feed as needed  Aspiration Precautions  Reflux Precautions  Consider consult with: nutrition  Results reviewed with: pt, PA  Aspiration precautions posted      Patient's goal:  Still not verbal, though smiled a few times       Goals: 1  Pt will tolerate least restrictive diet w/out s/s aspiration or oral/pharyngeal difficulties  2 Pt will will effectively masticate and transfer soft solids w/ no more than minimal residue and w/out s/s dysphagia/aspiratrion  3 Pt will tolerate nectar ---->thin liquids w/out s/s aspiration  Pt will tolerate puree and honey thick liquids w/ no more than minimal oral residue and w/out s/s aspiration       Pt is a 74yof referred for vbs  Please see bedside eval done earlier today for additional info      Previous VBS:  none  Current Diet:  Npo, then placed on puree w/ honey thick after bedside by ST   Premorbid diet:  "regular"  Dentition:  Partial natural  O2 requirement:  none  Oral mech:  Strength and ROM:  Mild/mod reduced  Vocal Quality/Speech:  nonverbal this study  Cognitive status:  Eyes open more this study than at bedside earlier, but dozes off occasionally w/ po in her oral cavity requiring verbal and tactile stim     Consistencies administered: Barium laden applesauce, soft solid x 1, honey thick, nectar thick, thin liquids, Liquids were administered by tsp, cup and straw       Pt was seated laterally at 90 degrees       Oral stage:  MOD  Lip closure: reduced, inconsistently able to suck from straw  Oral spill w/ cup  wfl w/ spoon  Mastication: NONE  Bolus formation: adequate/midlly reduced  Bolus control:mildly reduced  Transfer:delayed, variable but more prompt overall than earlier this afternoon  Intermittent lingual pump  Residue: max w/ soft solid, needed multiple sips to clear   Otherwise, minimal      Pharyngeal stage:  Mild/mod  Swallow promptness:prompt to mild for initial swallow, delayed or absent for secondary swallow of retention  Spill to valleculae: liquids + down ae folds  Epiglottic inversion:inconsistent  Laryngeal excursion:reduced  Pharyngeal constriction: mildly reduced  Vallecular retention:mild to mod  Pyriform retention:min/none  CP prominence:mild  Retropulsion from prominence:none  Transient penetration: thin,nectar  Penetration:no, ? trace  Aspiration:none visualized  Strategies:secondary swallow (when able) cleared retention     Screening of Esophageal stage:  ? Mild   Dysmotility  Tertiary contractions:  Reflux: mild observed at end of study  Retention:cleared w/ liquids

## 2020-02-03 NOTE — ASSESSMENT & PLAN NOTE
Lab Results   Component Value Date    HGBA1C 6 6 (H) 02/01/2020       No results for input(s): POCGLU in the last 72 hours      Blood Sugar Average: Last 72 hrs:  Diet control  A1c acceptable for age and comorbidities

## 2020-02-03 NOTE — PLAN OF CARE
Problem: Potential for Falls  Goal: Patient will remain free of falls  Description  INTERVENTIONS:  - Assess patient frequently for physical needs  -  Identify cognitive and physical deficits and behaviors that affect risk of falls    -  [place] fall precautions as indicated by assessment   - Educate patient/family on patient safety including physical limitations  - Instruct patient to call for assistance with activity based on assessment  - Modify environment to reduce risk of injury  - Consider OT/PT consult to assist with strengthening/mobility  Outcome: Progressing     Problem: Prexisting or High Potential for Compromised Skin Integrity  Goal: Skin integrity is maintained or improved  Description  INTERVENTIONS:  - Identify patients at risk for skin breakdown  - Assess and monitor skin integrity  - Assess and monitor nutrition and hydration status  - Monitor labs   - Assess for incontinence   - Turn and reposition patient  - Assist with mobility/ambulation  - Relieve pressure over bony prominences  - Avoid friction and shearing  - Provide appropriate hygiene as needed including keeping skin clean and dry  - Evaluate need for skin moisturizer/barrier cream  - Collaborate with interdisciplinary team   - Patient/family teaching  - Consider wound care consult   Outcome: Progressing     Problem: PAIN - ADULT  Goal: Verbalizes/displays adequate comfort level or baseline comfort level  Description  Interventions:  - Encourage patient to monitor pain and request assistance  - Assess pain using appropriate pain scale  - Administer analgesics based on type and severity of pain and evaluate response  - Implement non-pharmacological measures as appropriate and evaluate response  - Consider cultural and social influences on pain and pain management  - Notify physician/advanced practitioner if interventions unsuccessful or patient reports new pain  Outcome: Progressing     Problem: INFECTION - ADULT  Goal: Absence or prevention of progression during hospitalization  Description  INTERVENTIONS:  - Assess and monitor for signs and symptoms of infection  - Monitor lab/diagnostic results  - Monitor all insertion sites, i e  indwelling lines, tubes, and drains  - Monitor endotracheal if appropriate and nasal secretions for changes in amount and color  - [place] appropriate cooling/warming therapies per order  - Administer medications as ordered  - Instruct and encourage patient and family to use good hand hygiene technique  - Identify and instruct in appropriate isolation precautions for identified infection/condition  Outcome: Progressing     Problem: SAFETY ADULT  Goal: Maintain or return to baseline ADL function  Description  INTERVENTIONS:  -  Assess patient's ability to carry out ADLs; assess patient's baseline for ADL function and identify physical deficits which impact ability to perform ADLs (bathing, care of mouth/teeth, toileting, grooming, dressing, etc )  - Assess/evaluate cause of self-care deficits   - Assess range of motion  - Assess patient's mobility; develop plan if impaired  - Assess patient's need for assistive devices and provide as appropriate  - Encourage maximum independence but intervene and supervise when necessary  - Involve family in performance of ADLs  - Assess for home care needs following discharge   - Consider OT consult to assist with ADL evaluation and planning for discharge  - Provide patient education as appropriate  Outcome: Progressing  Goal: Maintain or return mobility status to optimal level  Description  INTERVENTIONS:  - Assess patient's baseline mobility status (ambulation, transfers, stairs, etc )    - Identify cognitive and physical deficits and behaviors that affect mobility  - Identify mobility aids required to assist with transfers and/or ambulation (gait belt, sit-to-stand, lift, walker, cane, etc )  - [place] fall precautions as indicated by assessment  - Record patient progress and toleration of activity level on Mobility SBAR; progress patient to next Phase/Stage  - Instruct patient to call for assistance with activity based on assessment  - Consider rehabilitation consult to assist with strengthening/weightbearing, etc   Outcome: Progressing     Problem: DISCHARGE PLANNING  Goal: Discharge to home or other facility with appropriate resources  Description  INTERVENTIONS:  - Identify barriers to discharge w/patient and caregiver  - Arrange for needed discharge resources and transportation as appropriate  - Identify discharge learning needs (meds, wound care, etc )  - Arrange for interpretive services to assist at discharge as needed  - Refer to Case Management Department for coordinating discharge planning if the patient needs post-hospital services based on physician/advanced practitioner order or complex needs related to functional status, cognitive ability, or social support system  Outcome: Progressing     Problem: Nutrition/Hydration-ADULT  Goal: Nutrient/Hydration intake appropriate for improving, restoring or maintaining nutritional needs  Description  Monitor and assess patient's nutrition/hydration status for malnutrition  Collaborate with interdisciplinary team and initiate plan and interventions as ordered  Monitor patient's weight and dietary intake as ordered or per policy  Utilize nutrition screening tool and intervene as necessary  Determine patient's food preferences and provide high-protein, high-caloric foods as appropriate       INTERVENTIONS:  - Monitor oral intake, urinary output, labs, and treatment plans  - Assess nutrition and hydration status and recommend course of action  - Evaluate amount of meals eaten  - Assist patient with eating if necessary   - Allow adequate time for meals  - Recommend/ encourage appropriate diets, oral nutritional supplements, and vitamin/mineral supplements  - Order, calculate, and assess calorie counts as needed  - Recommend, monitor, and adjust tube feedings and TPN/PPN based on assessed needs  - Assess need for intravenous fluids  - Provide specific nutrition/hydration education as appropriate  - Include patient/family/caregiver in decisions related to nutrition  Outcome: Progressing

## 2020-02-03 NOTE — SPEECH THERAPY NOTE
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    Hypothyroidism    Parkinson disease ([facility] )    Dysphagia    Intussusception ([facility] )    Breast nodule    Diabetes mellitus ([facility] )       Past Medical History  Past Medical History:   Diagnosis Date    Abdominal pain, suprapubic 07/03/2014    Resolved:  November 22, 2016    Abscess of skin and subcutaneous tissue 11/21/2016    Resolved:  February 28, 2017    Anxiety     Burning with urination 12/14/2016    Resolved:  February 28, 2017    Conjunctivitis 06/10/2013    Depression     Depression     Diabetes mellitus ([facility] ) 2/2/2020    Dysuria 06/06/2016    Resolved:  February 28 2017    Falling 10/18/2012    GERD (gastroesophageal reflux disease)     Groin pain 04/16/2015    Resolved:  February 28, 2017    Hyperlipidemia     Hypothyroid     Influenza 04/05/2013    Leg abrasion 09/22/2016    Resolved:  February 28, 2017    Parkinson's disease [facility])     Psychiatric illness     Rectal pain 10/17/2013    Resolved:  February 28, 2017    Thyroid disease         Past Surgical History  Past Surgical History:   Procedure Laterality Date    APPENDECTOMY      Age 4 or 3    BACK SURGERY      BRAIN SURGERY      CLOSED REDUCTION NASAL FRACTURE      CYSTOSCOPY  01/28/2014    RENETTA Garcia   (Diagnostic)    DEEP BRAIN STIMULATOR PLACEMENT      EYE SURGERY      FRACTURE SURGERY      JOINT REPLACEMENT      LAMINECTOMY      Decompress, Facetectomy, Foraminotomy Lumbar Seg    ORIF HIP FRACTURE Left     About 3 years ago    TONSILLECTOMY           Subjective:  More alert today, eyes wider, though  states she was lethargic not too long ago  Objective:  Pt on puree and honey thick liquids   Reviewed VBS findings w/   Repositioned in bed and seen for potential upgrade s/p vbs 1/31   reported pt did well yesterday but had a coughing episode on Saturday  Today she was able to suck from straw when placed on her R side  (he stated at home this was  inconsistent)Sip size controlled by me  Trialed nectar juice and also thin water   does not feel she is drinking enough, and is no longer on IV fluids  Swallows were fairly prompt on both, even w/ consecutive sips, w/ no overt s/s aspiration   asking if pt can have ice cream    Assessment:  More alert today  Transfer and swallow response more prompt  This does not appear to be consistent however  Plan/Recommendations:  Allow thin water ONLY following completion of oral care (NOT during a meal or if pt has po remnants in her mouth) and only if pt is fully alert and swallowing promptly  He states pt mostly drank water at home and he is concerned (as I ) w/ dehydration  He also asked if pt could have ice cream  I told him yes, again make sure she is fully alert and swallowing  Spoke w/ manager and asked him to allow it  Also spoke w/ dr Rupal Campo re same  Posted sign on white board re thin water and oral care  D/w nurse

## 2020-02-03 NOTE — PROGRESS NOTES
Progress Note - Zac Perkins 1945, 66 y o  female MRN: [id number]    Unit/Bed#: [facility] [address] [id number] Encounter: [id number]    Primary Care Provider: Jaswinder Pagan DO   Date and time admitted to hospital: 1/30/2020  8:28 AM        * Acute left-sided weakness  Assessment & Plan  · Admitted for left sided weakness associated with cough, dysphagia and aphasia  · CT head with Right MCA hypodensity suspicious for stroke  · Unable to perform MRI due to DBS  · Neurology recommendations reviewed  · Await echo and CTA results  · She was started on Plavix and Pletal in place of aspirin    Parkinson disease ([facility] )  Assessment & Plan  With deep brain stimulator in place  Continue Sinemet t i d  Outpatient follow-up with primary Neurology  Anticipate short-term rehab placement      Dysphagia  Assessment & Plan  Related to Parkinson's disease and possible new stroke  Continue dysphagia diet  Aspiration precaution    Diabetes mellitus ([facility] )  Assessment & Plan  Lab Results   Component Value Date    HGBA1C 6 6 (H) 02/01/2020       No results for input(s): POCGLU in the last 72 hours  Blood Sugar Average: Last 72 hrs:  Diet control  A1c acceptable for age and comorbidities    Breast nodule  Assessment & Plan  · Incidental 0 7 by 1 3 cm left breast nodule  · Discussions were done with daughter and  by Madai Olivas   "Per daughter pt has had surgery on left breast for multiple fibrotic breast densities but no known hx of breast cancer and stopped receiving mammograms years ago"  "D/w  pt and daughter for f/u with pcp to discuss whether they would want to consider op mammography at this time as they aren't certain they would want to consider treatment if found to be malignant"      Intussusception [facility])  Assessment & Plan  CT evidence of small-bowel intussusception  Patient has normal bowel movement and passes gas freely    This  has spontaneously resolved without intervention on repeat imaging  Appreciate surgery recommendations  No interventions planned at this time    Hypothyroidism  Assessment & Plan  Continue home dose Synthroid 75 mcg daily    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  Assessment & Plan  Continue Lexapro 10 mg daily          VTE Pharmacologic Prophylaxis:   Pharmacologic: Heparin  Mechanical VTE Prophylaxis in Place: Yes    Patient Centered Rounds: I have performed bedside rounds with nursing staff today  Discussions with Specialists or Other Care Team Provider: Hospital course reviewed    Education and Discussions with Family / Patient:     Time Spent for Care: 20 minutes  More than 50% of total time spent on counseling and coordination of care as described above  Current Length of Stay: 4 day(s)    Current Patient Status: Inpatient   Certification Statement: The patient will continue to require additional inpatient hospital stay due to suspected right MCA stroke    Discharge Plan: str    Code Status: Level 1 - Full Code      Subjective:   Persistent left arm and leg weakness  Expressive aphasia  Wheel chair bound and dependent with adls at baseline    Objective:     Vitals:   Temp (24hrs), Av 8 °F (37 1 °C), Min:98 7 °F (37 1 °C), Max:98 9 °F (37 2 °C)    Temp:  [98 7 °F (37 1 °C)-98 9 °F (37 2 °C)] 98 7 °F (37 1 °C)  HR:  [79-80] 79  Resp:  [18] 18  BP: (147-154)/(79-85) 147/85  SpO2:  [91 %-94 %] 94 %  Body mass index is 38 15 kg/m²  Input and Output Summary (last 24 hours):     No intake or output data in the 24 hours ending 20 1509    Physical Exam:     Physical Exam   Constitutional: She appears well-developed  No distress  HENT:   Head: Normocephalic and atraumatic  Eyes: No scleral icterus  Neck: No JVD present  Cardiovascular: Regular rhythm  Pulmonary/Chest: No stridor  No respiratory distress  She has no wheezes  Poor effort   Abdominal: Soft     Musculoskeletal: She exhibits no deformity  Physically deconditioned   Neurological:   Bradykinetic  Flat affect  Resting tremors  Unable to lift left arm and leg against gravity   Skin: Skin is warm and dry  She is not diaphoretic  Psychiatric:   Unable to assess       Additional Data:     Labs:    Results from last 7 days   Lab Units 02/01/20  0547   WBC Thousand/uL 10 83*   HEMOGLOBIN g/dL 15 2   HEMATOCRIT % 46 5*   PLATELETS Thousands/uL 236   NEUTROS PCT % 64   LYMPHS PCT % 23   MONOS PCT % 11   EOS PCT % 1     Results from last 7 days   Lab Units 02/03/20  0452  01/31/20  0533   SODIUM mmol/L 140   < > 141   POTASSIUM mmol/L 4 6   < > 3 5   CHLORIDE mmol/L 106   < > 106   CO2 mmol/L 26   < > 28   BUN mg/dL 10   < > 10   CREATININE mg/dL 0 64   < > 0 79   ANION GAP mmol/L 8   < > 7   CALCIUM mg/dL 8 3   < > 8 5   ALBUMIN g/dL  --   --  3 0*   TOTAL BILIRUBIN mg/dL  --   --  0 82   ALK PHOS U/L  --   --  71   ALT U/L  --   --  25   AST U/L  --   --  25   GLUCOSE RANDOM mg/dL 122   < > 156*    < > = values in this interval not displayed  Results from last 7 days   Lab Units 01/30/20  1025   INR  1 04         Results from last 7 days   Lab Units 02/01/20  0547   HEMOGLOBIN A1C % 6 6*     Results from last 7 days   Lab Units 01/31/20  1305 01/30/20  1516   PROCALCITONIN ng/ml 0 05 0 07           * I Have Reviewed All Lab Data Listed Above  * Additional Pertinent Lab Tests Reviewed:  All Labs Within Last 24 Hours Reviewed    Imaging:    Imaging Reports Reviewed Today Include: ct head  Imaging Personally Reviewed by Myself Includes:  Ct head    Recent Cultures (last 7 days):           Last 24 Hours Medication List:     Current Facility-Administered Medications:  acetaminophen 488 mg Oral Q6H PRN Anastasia Ventura MD   ALPRAZolam 0 5 mg Oral TID PRN Anastasia Ventura MD   carbidopa-levodopa 0 5 tablet Oral BID Anastasia Ventura MD   carbidopa-levodopa 1 tablet Oral TID Anastasia Ventura MD   cilostazol 100 mg Oral BID AC Jose Enrique Arango MD   [START ON 2/4/2020] clopidogrel 75 mg Oral Daily Talisha Otto MD   escitalopram 10 mg Oral Daily Hodan Cash MD   famotidine 20 mg Oral Early Morning Hodan Cash MD   glycopyrrolate 1 mg Oral TID Hodan Cash MD   heparin (porcine) 5,000 Units Subcutaneous [facility] Hodan Cash MD   levothyroxine 75 mcg Oral Early Morning Hodan Cash MD   nystatin  Topical BID PRN Raiza Rocha,    pravastatin 40 mg Oral Daily With Aylni Pritchard MD   saccharomyces boulardii 250 mg Oral BID Hodan Cash MD        Today, Patient Was Seen By: Jeremiah Langston MD    ** Please Note: Dictation voice to text software may have been used in the creation of this document   **

## 2020-02-03 NOTE — ASSESSMENT & PLAN NOTE
With deep brain stimulator in place  Continue Sinemet t i d    Outpatient follow-up with primary Neurology  Anticipate short-term rehab placement

## 2020-02-03 NOTE — SOCIAL WORK
Pt admitted with dysphagia worsening- has h/o of PD with OP MRI needed to assess neurostimulator  Pt lives with  in a multilevel home with ability to remain on living level where bed/bath are  She sponge bathes at baseline with assistance from , uses a wheelchair needing propulsion asst and transfers with asst of  from bed to chair or chair to toilet  There are 1-2 small KARLA with  pushing w/c into home  DME: Transport chair, w/c, brab bars, raised toilet seat  Denies legal issues, has h/o Depression/Anxiety r/t PD stable on meds provided by [email or web address]; no D&A, no current in home services nor being in an SNF in last 60 days  Advance Care Planning     Pt has POA (copy within system dated 9/22/2016 with  Werner Bailey being primary POA and daughter's Christiana Maria as 1st alternative and Satish Haney as secondary alternative (no phone numbers available for daughters)      PCP is Dr Savanna Louise, she sees [id number] [address] [facility] for neurologist   She uses [facility] for immediate need medications and [facility] delivery for maintenance meds  Asked by Neurology to check on cost of [employer] patches vs pills with generic rivastigmine 4 6mg/24hr patch for 90 day supply costing $378 50- no pill form available- would need to use donepezil which pt is currently on prescribed by her psychiatrist and per  is ineffective- will attempt to do a tier exception and follow determination if approved with pt's psychiatrist, Dr Kate Buckner aware of same and agreeable to plan  A post acute care recommendation was made by your care team for STR  Discussed Freedom of Choice with both patient and caregiver/POA  List of facilities given to both patient and caregiver/POA via in person  both patient and caregiver/ POAaware the list is custom filtered for them by zip code location and that [facility] post acute providers are designated    1st choice is [facility] (has been there in past and located close to their home) 2nd is [facility] (who has already accepted pt)  Will await response from [facility] before officially accepting a bed elsewhere  Pt will need BLS transport arranged as she is nonambulatory and requires max asst of 2 to transfer using a stand lift  Pt/ agreeable to same  Will continue to follow to assist with dc poc

## 2020-02-03 NOTE — ASSESSMENT & PLAN NOTE
· Admitted for left sided weakness associated with cough, dysphagia and aphasia  · CT head with Right MCA hypodensity suspicious for stroke    · Unable to perform MRI due to DBS  · Neurology recommendations reviewed  · Await echo and CTA results  · She was started on Plavix and Pletal in place of aspirin

## 2020-02-03 NOTE — ASSESSMENT & PLAN NOTE
· Incidental 0 7 by 1 3 cm left breast nodule  · Discussions were done with daughter and  by Ayse Quinn   "Per daughter pt has had surgery on left breast for multiple fibrotic breast densities but no known hx of breast cancer and stopped receiving mammograms years ago"  "D/w  pt and daughter for f/u with pcp to discuss whether they would want to consider op mammography at this time as they aren't certain they would want to consider treatment if found to be malignant"

## 2020-02-03 NOTE — PROGRESS NOTES
Progress Note - Neurology   Radha Tellez 66 y o  female [id number]  Unit/Bed#: [facility] 68 2 /South 2 M*    Assessment:  Radha Tellez is a 66 y o  female with Parkinson's disease s/p DBS (wheelchair-bound at baseline), hypothyroidism, HLD, DM 2, and depression who was admitted on 1/30/2020 for left-sided weakness, dysphagia, dysarthria, and shortness of breath  CT head revealed no acute intracranial abnormalities  Repeat CT head was read as stable, however when reviewed with attending Neurologist, there appears to be a hypodensity in the right MCA territory  Plan:  - Unable to obtain MRI brain at [facility] due to DBS  Discussed the case with [employer] rep and awaiting response; recommend outpatient MRI brain  - CTA head and neck pending  - Echo pending  - Discontinue aspirin 81 mg daily  - Start Pletal 100 mg BID (continue indefinitely)  - Give Plavix load of 300 mg today and then continue Plavix 75 mg indefinitely  - Continue pravastatin 40 mg daily  - Continue Sinemet  mg 1 tablet TID (5 AM, 8 AM, 12 PM) and 0 5 tablets BID (3 PM, 9 PM)  - Discontinue donepezil 10 mg daily and ideally start Exelon- Case management is trying to determine how much this will cost (patient's  states that donepezil has not helped with the patient's memory)  - Frequent neuro checks  Continue to monitor and notify Neurology for any changes   - PT/OT/ST  - Medical management and supportive care per primary team   Correction of any metabolic or infectious disturbances   - Outpatient follow-up with patient's Neurologist, Dr Josseline Jewell in [place]      Results:  - CT head 1/30/2020: No acute intracranial abnormality  Deep brain stimulator leads are unremarkable in appearance  Cerebellar greater than cerebral volume loss  Mild cerebral chronic microangiopathic disease   - Repeat CT head 1/31/2020: No acute intracranial abnormality  Microangiopathic changes   However, when reviewed with attending Neurologist, there appears to be a hypodensity not previously seen in the right MCA territory  - lipid panel:  LDL 53, cholesterol 111, HDL 37, triglycerides 107  - hemoglobin A1c:  6 6      Subjective:   Patient seen examined at bedside  Patient is nonverbal, so was unable to assess orientation  Patient follows some simple commands such as attempting to show two fingers and sticking out her tongue  Spontaneous movement on the right side, but no spontaneous movement in the left UE or LE  Able to maintain antigravity in all 4 extremities  Per nursing staff and prior notes, patient has had decreased strength on the left side  No acute changes every night  On reassessment with attending neurologist,  was at bedside   states that donepezil has not been helping with the patient's memory, and is agreeable to trying a different medication  Patient lives at home with her   He is concerned that due to the left upper extremity weakness, he will have greater difficulty transferring her from bed to commode  Patient follows with a neurologist in [place] and also has a cardiologist, but no history of stroke or heart attack          Past Medical History:   Diagnosis Date    Abdominal pain, suprapubic 07/03/2014    Resolved:  November 22, 2016    Abscess of skin and subcutaneous tissue 11/21/2016    Resolved:  February 28, 2017    Anxiety     Burning with urination 12/14/2016    Resolved:  February 28, 2017    Conjunctivitis 06/10/2013    Depression     Depression     [facility]    Dysuria 06/06/2016    Resolved:  February 28 2017    Falling 10/18/2012    GERD (gastroesophageal reflux disease)     Groin pain 04/16/2015    Resolved:  February 28, 2017    Hyperlipidemia     Hypothyroid     Influenza 04/05/2013    Leg abrasion 09/22/2016    Resolved:  February 28, 2017    Parkinson's disease [facility])     Psychiatric illness     Rectal pain 10/17/2013    Resolved:  February 28, 2017    Thyroid disease      Past Surgical History:   Procedure Laterality Date    APPENDECTOMY      Age 4 or 3    BACK SURGERY      BRAIN SURGERY      CLOSED REDUCTION NASAL FRACTURE      CYSTOSCOPY  01/28/2014    [facility]   (Diagnostic)    DEEP BRAIN STIMULATOR PLACEMENT      EYE SURGERY      FRACTURE SURGERY      JOINT REPLACEMENT      LAMINECTOMY      Decompress, Facetectomy, Foraminotomy Lumbar Seg    ORIF HIP FRACTURE Left     About 3 years ago    TONSILLECTOMY       Family History   Problem Relation Age of Onset    Heart attack Mother         Acute Myocardial Infarction (MI)    Diabetes Mother         Diabetes Mellitus    Stroke Father         Syndrome    No Known Problems Brother     No Known Problems Maternal Aunt     No Known Problems Paternal Aunt     No Known Problems Maternal Uncle     No Known Problems Paternal Uncle     No Known Problems Maternal Grandfather     No Known Problems Maternal Grandmother     No Known Problems Paternal Grandfather     No Known Problems Paternal Grandmother     No Known Problems Cousin     Heart disease Family     Hypertension Family     Thyroid disease Family     ADD / ADHD Neg Hx     Alcohol abuse Neg Hx     Anxiety disorder Neg Hx     Bipolar disorder Neg Hx     Dementia Neg Hx     Depression Neg Hx     Drug abuse Neg Hx     OCD Neg Hx     Paranoid behavior Neg Hx     Schizophrenia Neg Hx     Seizures Neg Hx     Self-Injury Neg Hx     Suicide Attempts Neg Hx      Social History     Socioeconomic History    Marital status: /Civil Union     Spouse name: None    Number of children: None    Years of education: None    Highest education level: None   Occupational History    None   Social Needs    Financial resource strain: None    Food insecurity:     Worry: None     Inability: None    Transportation needs:     Medical: None     Non-medical: None   Tobacco Use    Smoking status: Never Smoker    Smokeless tobacco: Never Used    Tobacco comment: n/a   Substance and Sexual Activity    Alcohol use: Never     Frequency: Never     Comment: drinks one fourth of a glass of white wine once a week  (Never drank alcohol per [employer])    Drug use: No    Sexual activity: Not Currently   Lifestyle    Physical activity:     Days per week: None     Minutes per session: None    Stress: None   Relationships    Social connections:     Talks on phone: None     Gets together: None     Attends [employer] service: None     Active member of club or organization: None     Attends meetings of clubs or organizations: None     Relationship status: None    Intimate partner violence:     Fear of current or ex partner: None     Emotionally abused: None     Physically abused: None     Forced sexual activity: None   Other Topics Concern    None   Social History Narrative    None         Medications:   All current active meds have been reviewed and current meds:  Scheduled Meds:  Current Facility-Administered Medications:  acetaminophen 488 mg Oral Q6H PRN Anastasia Ventura MD   ALPRAZolam 0 5 mg Oral TID PRN Anastasia Ventura, MD   aspirin 81 mg Oral Daily Anastasia Ventura MD   carbidopa-levodopa 0 5 tablet Oral BID Anastasia Ventura MD   carbidopa-levodopa 1 tablet Oral TID Anastasia Ventura, MD   donepezil 10 mg Oral HS Anastasia Ventura MD   escitalopram 10 mg Oral Daily Anastasia Ventura MD   famotidine 20 mg Oral Early Morning Anastasia Ventura MD   glycopyrrolate 1 mg Oral TID Anastasia Ventura MD   heparin (porcine) 5,000 Units Subcutaneous [facility] Anastasia Ventura MD   levothyroxine 75 mcg Oral Early Morning Anastasia Ventura MD   nystatin  Topical BID PRN Arabella Whitmore,    pravastatin 40 mg Oral Daily With Aggie Hodgson MD   saccharomyces boulardii 250 mg Oral BID Anastasia Ventura MD     Continuous Infusions:   PRN Meds:   acetaminophen    ALPRAZolam    nystatin       ROS:   Review of Systems   Unable to perform ROS: Patient nonverbal             Vitals:   /85   Pulse 79   Temp 98 7 °F (37 1 °C) (Temporal)   Resp 18   Ht 5' (1 524 m)   Wt 88 6 kg (195 lb 5 2 oz)   SpO2 94%   Breastfeeding No   BMI 38 15 kg/m²     Physical Exam:   Physical Exam   Constitutional: She appears well-developed and well-nourished  No distress  Elderly female lying comfortably in bed  HENT:   Head: Normocephalic and atraumatic  Right Ear: External ear normal    Left Ear: External ear normal    Nose: Nose normal    Mouth/Throat: Oropharynx is clear and moist  No oropharyngeal exudate  Eyes: Pupils are equal, round, and reactive to light  Neck: Normal range of motion  Neck supple  Cardiovascular: Normal rate and regular rhythm  Pulmonary/Chest: Effort normal    Abdominal: Soft  Musculoskeletal:   Decreased range of motion in the left upper and lower extremity   Neurological: She is alert  Reflex Scores:       Bicep reflexes are 2+ on the right side and 2+ on the left side  Brachioradialis reflexes are 2+ on the right side and 2+ on the left side  Patellar reflexes are 2+ on the right side and 1+ on the left side  Achilles reflexes are 1+ on the right side and 1+ on the left side  Skin: Skin is warm and dry  Capillary refill takes less than 2 seconds  She is not diaphoretic  Psychiatric:   Unable to assess secondary to patient being nonverbal     Neurologic Exam     Mental Status   Patient alert  Patient did not attempt to answer any questions, so unable to assess orientation  Patient able to follow simple commands, including showing two fingers and sticking out tongue  Cranial Nerves     CN III, IV, VI   Pupils are equal, round, and reactive to light  Pupils 3 mm, round, reactive to light  Patient tracks examiner throughout the room  No nystagmus noted  Patient blinks to threat  Slight right facial droop around the mouth  Tongue is midline without fasciculations    Unable to assess palate, facial sensation, or sternocleidomastoid/ trapezius strength secondary to mental status  Motor Exam   Muscle bulk: normal  Overall muscle tone: normal  5/5 strength in right upper extremity and right lower extremity  No spontaneous movement of left upper extremity  Able to maintain antigravity in all extremities  Able to wiggle toes on the left  No dorsiflexion or plantarflexion noted on the left  Sensory Exam   No withdrawal to noxious stimuli in left upper extremity  Withdrawal to noxious stimuli in right upper extremity, right lower extremity, and left lower extremity  Gait, Coordination, and Reflexes     Gait  Gait: (Deferred per patient's safety)    Tremor   Resting tremor: absent    Reflexes   Right brachioradialis: 2+  Left brachioradialis: 2+  Right biceps: 2+  Left biceps: 2+  Right patellar: 2+  Left patellar: 1+  Right achilles: 1+  Left achilles: 1+  Right plantar: normal  Left plantar reflex: Mute  Right ankle clonus: absent  Left ankle clonus: absent          Labs: I have personally reviewed pertinent reports  Recent Results (from the past 24 hour(s))   Basic metabolic panel    Collection Time: 02/03/20  4:52 AM   Result Value Ref Range    Sodium 140 136 - 145 mmol/L    Potassium 4 6 3 5 - 5 3 mmol/L    Chloride 106 100 - 108 mmol/L    CO2 26 21 - 32 mmol/L    ANION GAP 8 4 - 13 mmol/L    BUN 10 5 - 25 mg/dL    Creatinine 0 64 0 60 - 1 30 mg/dL    Glucose 122 65 - 140 mg/dL    Calcium 8 3 8 3 - 10 1 mg/dL    eGFR 87 ml/min/1 73sq m       Imaging: I have personally reviewed pertinent imaging in PACS, including CT head, chest x-ray, CT abdomen pelvis, video barium swallow, repeat CT head,  and I have personally reviewed PACS reports  EKG, Pathology, and Other Studies: I have personally reviewed pertinent reports  VTE Prophylaxis: Sequential compression device (Venodyne)  and Heparin      Counseling / Coordination of Care  Total time spent today 20 minutes    Greater than 50% of total time was spent with the patient and/or family counseling and/or coordination of care  A description of the counseling/coordination of care:  Patient was seen and evaluated  Discussed with attending  Chart reviewed thoroughly including laboratory and imaging studies    Plan of care discussed with patient and primary team

## 2020-02-03 NOTE — PLAN OF CARE
Problem: Potential for Falls  Goal: Patient will remain free of falls  Description  INTERVENTIONS:  - Assess patient frequently for physical needs  -  Identify cognitive and physical deficits and behaviors that affect risk of falls    -  Hazen fall precautions as indicated by assessment   - Educate patient/family on patient safety including physical limitations  - Instruct patient to call for assistance with activity based on assessment  - Modify environment to reduce risk of injury  - Consider OT/PT consult to assist with strengthening/mobility  Outcome: Progressing     Problem: Prexisting or High Potential for Compromised Skin Integrity  Goal: Skin integrity is maintained or improved  Description  INTERVENTIONS:  - Identify patients at risk for skin breakdown  - Assess and monitor skin integrity  - Assess and monitor nutrition and hydration status  - Monitor labs   - Assess for incontinence   - Turn and reposition patient  - Assist with mobility/ambulation  - Relieve pressure over bony prominences  - Avoid friction and shearing  - Provide appropriate hygiene as needed including keeping skin clean and dry  - Evaluate need for skin moisturizer/barrier cream  - Collaborate with interdisciplinary team   - Patient/family teaching  - Consider wound care consult   Outcome: Progressing     Problem: PAIN - ADULT  Goal: Verbalizes/displays adequate comfort level or baseline comfort level  Description  Interventions:  - Encourage patient to monitor pain and request assistance  - Assess pain using appropriate pain scale  - Administer analgesics based on type and severity of pain and evaluate response  - Implement non-pharmacological measures as appropriate and evaluate response  - Consider cultural and social influences on pain and pain management  - Notify physician/advanced practitioner if interventions unsuccessful or patient reports new pain  Outcome: Progressing     Problem: INFECTION - ADULT  Goal: Absence or prevention of progression during hospitalization  Description  INTERVENTIONS:  - Assess and monitor for signs and symptoms of infection  - Monitor lab/diagnostic results  - Monitor all insertion sites, i e  indwelling lines, tubes, and drains  - Monitor endotracheal if appropriate and nasal secretions for changes in amount and color  - Simpson appropriate cooling/warming therapies per order  - Administer medications as ordered  - Instruct and encourage patient and family to use good hand hygiene technique  - Identify and instruct in appropriate isolation precautions for identified infection/condition  Outcome: Progressing     Problem: SAFETY ADULT  Goal: Maintain or return to baseline ADL function  Description  INTERVENTIONS:  -  Assess patient's ability to carry out ADLs; assess patient's baseline for ADL function and identify physical deficits which impact ability to perform ADLs (bathing, care of mouth/teeth, toileting, grooming, dressing, etc )  - Assess/evaluate cause of self-care deficits   - Assess range of motion  - Assess patient's mobility; develop plan if impaired  - Assess patient's need for assistive devices and provide as appropriate  - Encourage maximum independence but intervene and supervise when necessary  - Involve family in performance of ADLs  - Assess for home care needs following discharge   - Consider OT consult to assist with ADL evaluation and planning for discharge  - Provide patient education as appropriate  Outcome: Progressing  Goal: Maintain or return mobility status to optimal level  Description  INTERVENTIONS:  - Assess patient's baseline mobility status (ambulation, transfers, stairs, etc )    - Identify cognitive and physical deficits and behaviors that affect mobility  - Identify mobility aids required to assist with transfers and/or ambulation (gait belt, sit-to-stand, lift, walker, cane, etc )  - Simpson fall precautions as indicated by assessment  - Record patient progress and toleration of activity level on Mobility SBAR; progress patient to next Phase/Stage  - Instruct patient to call for assistance with activity based on assessment  - Consider rehabilitation consult to assist with strengthening/weightbearing, etc   Outcome: Progressing     Problem: DISCHARGE PLANNING  Goal: Discharge to home or other facility with appropriate resources  Description  INTERVENTIONS:  - Identify barriers to discharge w/patient and caregiver  - Arrange for needed discharge resources and transportation as appropriate  - Identify discharge learning needs (meds, wound care, etc )  - Arrange for interpretive services to assist at discharge as needed  - Refer to Case Management Department for coordinating discharge planning if the patient needs post-hospital services based on physician/advanced practitioner order or complex needs related to functional status, cognitive ability, or social support system  Outcome: Progressing     Problem: Nutrition/Hydration-ADULT  Goal: Nutrient/Hydration intake appropriate for improving, restoring or maintaining nutritional needs  Description  Monitor and assess patient's nutrition/hydration status for malnutrition  Collaborate with interdisciplinary team and initiate plan and interventions as ordered  Monitor patient's weight and dietary intake as ordered or per policy  Utilize nutrition screening tool and intervene as necessary  Determine patient's food preferences and provide high-protein, high-caloric foods as appropriate       INTERVENTIONS:  - Monitor oral intake, urinary output, labs, and treatment plans  - Assess nutrition and hydration status and recommend course of action  - Evaluate amount of meals eaten  - Assist patient with eating if necessary   - Allow adequate time for meals  - Recommend/ encourage appropriate diets, oral nutritional supplements, and vitamin/mineral supplements  - Order, calculate, and assess calorie counts as needed  - Recommend, monitor, and adjust tube feedings and TPN/PPN based on assessed needs  - Assess need for intravenous fluids  - Provide specific nutrition/hydration education as appropriate  - Include patient/family/caregiver in decisions related to nutrition  Outcome: Progressing

## 2020-02-03 NOTE — ASSESSMENT & PLAN NOTE
Related to Parkinson's disease and possible new stroke  Continue dysphagia diet  Aspiration precaution

## 2020-02-04 ENCOUNTER — APPOINTMENT (INPATIENT)
Dept: NON INVASIVE DIAGNOSTICS | Facility: HOSPITAL | Age: 75
DRG: 065 | End: 2020-02-04
Payer: MEDICARE

## 2020-02-04 VITALS
OXYGEN SATURATION: 92 % | HEIGHT: 60 IN | WEIGHT: 195.33 LBS | RESPIRATION RATE: 18 BRPM | SYSTOLIC BLOOD PRESSURE: 149 MMHG | DIASTOLIC BLOOD PRESSURE: 76 MMHG | TEMPERATURE: 99.1 F | BODY MASS INDEX: 38.35 KG/M2 | HEART RATE: 90 BPM

## 2020-02-04 PROBLEM — K56.1 INTUSSUSCEPTION (HCC): Status: RESOLVED | Noted: 2020-01-30 | Resolved: 2020-02-04

## 2020-02-04 PROCEDURE — 99239 HOSP IP/OBS DSCHRG MGMT >30: CPT | Performed by: INTERNAL MEDICINE

## 2020-02-04 PROCEDURE — 93306 TTE W/DOPPLER COMPLETE: CPT

## 2020-02-04 PROCEDURE — C8929 TTE W OR WO FOL WCON,DOPPLER: HCPCS

## 2020-02-04 PROCEDURE — 99233 SBSQ HOSP IP/OBS HIGH 50: CPT | Performed by: PSYCHIATRY & NEUROLOGY

## 2020-02-04 RX ORDER — CLOPIDOGREL BISULFATE 75 MG/1
75 TABLET ORAL DAILY
Refills: 0
Start: 2020-02-05 | End: 2020-03-18 | Stop reason: SDUPTHER

## 2020-02-04 RX ORDER — ALPRAZOLAM 0.5 MG/1
0.5 TABLET ORAL 3 TIMES DAILY PRN
Qty: 15 TABLET | Refills: 0 | Status: SHIPPED | OUTPATIENT
Start: 2020-02-04 | End: 2020-04-15 | Stop reason: DRUGHIGH

## 2020-02-04 RX ORDER — CARBIDOPA AND LEVODOPA 25; 100 MG/1; MG/1
0.5 TABLET, EXTENDED RELEASE ORAL 2 TIMES DAILY
Refills: 0
Start: 2020-02-04 | End: 2020-07-27

## 2020-02-04 RX ORDER — CILOSTAZOL 100 MG/1
100 TABLET ORAL
Refills: 0
Start: 2020-02-04 | End: 2020-03-18 | Stop reason: SDUPTHER

## 2020-02-04 RX ORDER — ATORVASTATIN CALCIUM 40 MG/1
40 TABLET, FILM COATED ORAL DAILY
Refills: 0
Start: 2020-02-04 | End: 2021-12-22

## 2020-02-04 RX ORDER — ASCORBIC ACID 500 MG
500 TABLET ORAL DAILY
Refills: 0
Start: 2020-02-04

## 2020-02-04 RX ADMIN — LEVOTHYROXINE SODIUM 75 MCG: 75 TABLET ORAL at 05:56

## 2020-02-04 RX ADMIN — CARBIDOPA AND LEVODOPA 1 TABLET: 25; 100 TABLET ORAL at 05:56

## 2020-02-04 RX ADMIN — PERFLUTREN 0.6 ML/MIN: 6.52 INJECTION, SUSPENSION INTRAVENOUS at 11:23

## 2020-02-04 RX ADMIN — CILOSTAZOL 100 MG: 50 TABLET ORAL at 08:32

## 2020-02-04 RX ADMIN — CARBIDOPA AND LEVODOPA 0.5 TABLET: 25; 100 TABLET, EXTENDED RELEASE ORAL at 15:17

## 2020-02-04 RX ADMIN — CLOPIDOGREL BISULFATE 75 MG: 75 TABLET ORAL at 08:32

## 2020-02-04 RX ADMIN — FAMOTIDINE 20 MG: 20 TABLET, FILM COATED ORAL at 05:56

## 2020-02-04 RX ADMIN — CARBIDOPA AND LEVODOPA 1 TABLET: 25; 100 TABLET ORAL at 12:44

## 2020-02-04 RX ADMIN — HEPARIN SODIUM 5000 UNITS: 5000 INJECTION INTRAVENOUS; SUBCUTANEOUS at 05:56

## 2020-02-04 RX ADMIN — CARBIDOPA AND LEVODOPA 1 TABLET: 25; 100 TABLET ORAL at 08:32

## 2020-02-04 RX ADMIN — Medication 250 MG: at 08:32

## 2020-02-04 RX ADMIN — ESCITALOPRAM OXALATE 10 MG: 10 TABLET ORAL at 08:32

## 2020-02-04 RX ADMIN — GLYCOPYRROLATE 1 MG: 1 TABLET ORAL at 08:32

## 2020-02-04 RX ADMIN — HEPARIN SODIUM 5000 UNITS: 5000 INJECTION INTRAVENOUS; SUBCUTANEOUS at 12:46

## 2020-02-04 NOTE — NURSING NOTE
AVS reviewed with and faxed to [facility]  Report called to Chandan Quintero at [facility]    Patient discharged to [facility] with all belongings via transport team

## 2020-02-04 NOTE — PLAN OF CARE
Problem: Potential for Falls  Goal: Patient will remain free of falls  Description  INTERVENTIONS:  - Assess patient frequently for physical needs  -  Identify cognitive and physical deficits and behaviors that affect risk of falls    -  [place] fall precautions as indicated by assessment   - Educate patient/family on patient safety including physical limitations  - Instruct patient to call for assistance with activity based on assessment  - Modify environment to reduce risk of injury  - Consider OT/PT consult to assist with strengthening/mobility  Outcome: Progressing     Problem: Prexisting or High Potential for Compromised Skin Integrity  Goal: Skin integrity is maintained or improved  Description  INTERVENTIONS:  - Identify patients at risk for skin breakdown  - Assess and monitor skin integrity  - Assess and monitor nutrition and hydration status  - Monitor labs   - Assess for incontinence   - Turn and reposition patient  - Assist with mobility/ambulation  - Relieve pressure over bony prominences  - Avoid friction and shearing  - Provide appropriate hygiene as needed including keeping skin clean and dry  - Evaluate need for skin moisturizer/barrier cream  - Collaborate with interdisciplinary team   - Patient/family teaching  - Consider wound care consult   Outcome: Progressing     Problem: PAIN - ADULT  Goal: Verbalizes/displays adequate comfort level or baseline comfort level  Description  Interventions:  - Encourage patient to monitor pain and request assistance  - Assess pain using appropriate pain scale  - Administer analgesics based on type and severity of pain and evaluate response  - Implement non-pharmacological measures as appropriate and evaluate response  - Consider cultural and social influences on pain and pain management  - Notify physician/advanced practitioner if interventions unsuccessful or patient reports new pain  Outcome: Progressing     Problem: INFECTION - ADULT  Goal: Absence or prevention of progression during hospitalization  Description  INTERVENTIONS:  - Assess and monitor for signs and symptoms of infection  - Monitor lab/diagnostic results  - Monitor all insertion sites, i e  indwelling lines, tubes, and drains  - Monitor endotracheal if appropriate and nasal secretions for changes in amount and color  - [place] appropriate cooling/warming therapies per order  - Administer medications as ordered  - Instruct and encourage patient and family to use good hand hygiene technique  - Identify and instruct in appropriate isolation precautions for identified infection/condition  Outcome: Progressing     Problem: SAFETY ADULT  Goal: Maintain or return to baseline ADL function  Description  INTERVENTIONS:  -  Assess patient's ability to carry out ADLs; assess patient's baseline for ADL function and identify physical deficits which impact ability to perform ADLs (bathing, care of mouth/teeth, toileting, grooming, dressing, etc )  - Assess/evaluate cause of self-care deficits   - Assess range of motion  - Assess patient's mobility; develop plan if impaired  - Assess patient's need for assistive devices and provide as appropriate  - Encourage maximum independence but intervene and supervise when necessary  - Involve family in performance of ADLs  - Assess for home care needs following discharge   - Consider OT consult to assist with ADL evaluation and planning for discharge  - Provide patient education as appropriate  Outcome: Progressing  Goal: Maintain or return mobility status to optimal level  Description  INTERVENTIONS:  - Assess patient's baseline mobility status (ambulation, transfers, stairs, etc )    - Identify cognitive and physical deficits and behaviors that affect mobility  - Identify mobility aids required to assist with transfers and/or ambulation (gait belt, sit-to-stand, lift, walker, cane, etc )  - [place] fall precautions as indicated by assessment  - Record patient progress and toleration of activity level on Mobility SBAR; progress patient to next Phase/Stage  - Instruct patient to call for assistance with activity based on assessment  - Consider rehabilitation consult to assist with strengthening/weightbearing, etc   Outcome: Progressing     Problem: DISCHARGE PLANNING  Goal: Discharge to home or other facility with appropriate resources  Description  INTERVENTIONS:  - Identify barriers to discharge w/patient and caregiver  - Arrange for needed discharge resources and transportation as appropriate  - Identify discharge learning needs (meds, wound care, etc )  - Arrange for interpretive services to assist at discharge as needed  - Refer to Case Management Department for coordinating discharge planning if the patient needs post-hospital services based on physician/advanced practitioner order or complex needs related to functional status, cognitive ability, or social support system  Outcome: Progressing     Problem: Nutrition/Hydration-ADULT  Goal: Nutrient/Hydration intake appropriate for improving, restoring or maintaining nutritional needs  Description  Monitor and assess patient's nutrition/hydration status for malnutrition  Collaborate with interdisciplinary team and initiate plan and interventions as ordered  Monitor patient's weight and dietary intake as ordered or per policy  Utilize nutrition screening tool and intervene as necessary  Determine patient's food preferences and provide high-protein, high-caloric foods as appropriate       INTERVENTIONS:  - Monitor oral intake, urinary output, labs, and treatment plans  - Assess nutrition and hydration status and recommend course of action  - Evaluate amount of meals eaten  - Assist patient with eating if necessary   - Allow adequate time for meals  - Recommend/ encourage appropriate diets, oral nutritional supplements, and vitamin/mineral supplements  - Order, calculate, and assess calorie counts as needed  - Recommend, monitor, and adjust tube feedings and TPN/PPN based on assessed needs  - Assess need for intravenous fluids  - Provide specific nutrition/hydration education as appropriate  - Include patient/family/caregiver in decisions related to nutrition  Outcome: Progressing

## 2020-02-04 NOTE — SOCIAL WORK
LATE ENTRY 2/4/2020 [id number]  F[facility] authorizing tier exception for rivastigmine 4 6mg/24hr patch  Notified [address] of same as well as faxed copy of authorization to Dr Eli Mathew office   called and informed of same

## 2020-02-04 NOTE — ASSESSMENT & PLAN NOTE
· Incidental 0 7 by 1 3 cm left breast nodule  · Discussions were done with daughter and  by Vinicio Garcia   "Per daughter pt has had surgery on left breast for multiple fibrotic breast densities but no known hx of breast cancer and stopped receiving mammograms years ago"  "D/w  pt and daughter for f/u with pcp to discuss whether they would want to consider op mammography at this time as they aren't certain they would want to consider treatment if found to be malignant"    · Discussed with  today  He reported that she has had abnormal mammograms before due to fibrous tissue  However she has not had one in a while  Discussed follow up with Dr Herrera Zaragoza for further evaluation  Info placed in dc papers

## 2020-02-04 NOTE — ASSESSMENT & PLAN NOTE
With deep brain stimulator in place  Continue regular Sinemet t i d   And Sinemet ER BID  Outpatient follow-up with primary Neurology

## 2020-02-04 NOTE — ASSESSMENT & PLAN NOTE
· Admitted for left sided weakness associated with cough, dysphagia and aphasia  · CT head with Right MCA consistent with stroke    · MRI deferred due to presence of DBS  · Neurology recommendations reviewed  · She was started on Plavix and Pletal in place of aspirin  · Switch zocor to lipitor on discharge  · Transfer to [facility] for STR  · Op follow up with primary neurologist

## 2020-02-04 NOTE — SOCIAL WORK
Pt accepted at their 1st choice of [id number] [facility] with BLS transport arranged via [address] for a 1530   CMN completed and in pt's folder  Attending/RN/Facility/Pt and pt's  aware of same  Determination of tier exception for rivastigmine outstanding  Will continue to follow up with same after pt's d/c and notify both Psychiatrist as well as pt's  of determination  No further d/c needs identified at this time

## 2020-02-04 NOTE — DISCHARGE INSTR - AVS FIRST PAGE
She was admitted for left sided weakness  She was found to have right mca stroke while on aspirin  Aspirin was stopped  She was started on plavix/pletal/lipitor  Follow up with Dr Jacquelyn Parkinson regarding alternative medication for aricept which was not helping  Follow up with Dr Dale Moore PD  Follow up with Dr Barb Celestin re: incidental breast nodule    Sinemet dosing:  Continue home Sinemet 25/100 mg, 1 tab TID (5am, 8am, 12 pm) and 0 5 mg tabs BID (3pm and 9pm

## 2020-02-04 NOTE — PLAN OF CARE
Problem: Potential for Falls  Goal: Patient will remain free of falls  Description  INTERVENTIONS:  - Assess patient frequently for physical needs  -  Identify cognitive and physical deficits and behaviors that affect risk of falls    -  [place] fall precautions as indicated by assessment   - Educate patient/family on patient safety including physical limitations  - Instruct patient to call for assistance with activity based on assessment  - Modify environment to reduce risk of injury  - Consider OT/PT consult to assist with strengthening/mobility  2/4/2020 1539 by Sunil Fernandez RN  Outcome: Adequate for Discharge  2/4/2020 1022 by Sunil Fernandez RN  Outcome: Progressing     Problem: Prexisting or High Potential for Compromised Skin Integrity  Goal: Skin integrity is maintained or improved  Description  INTERVENTIONS:  - Identify patients at risk for skin breakdown  - Assess and monitor skin integrity  - Assess and monitor nutrition and hydration status  - Monitor labs   - Assess for incontinence   - Turn and reposition patient  - Assist with mobility/ambulation  - Relieve pressure over bony prominences  - Avoid friction and shearing  - Provide appropriate hygiene as needed including keeping skin clean and dry  - Evaluate need for skin moisturizer/barrier cream  - Collaborate with interdisciplinary team   - Patient/family teaching  - Consider wound care consult   2/4/2020 1539 by Sunil Fernandez RN  Outcome: Adequate for Discharge  2/4/2020 1022 by Sunil Fernandez RN  Outcome: Progressing     Problem: PAIN - ADULT  Goal: Verbalizes/displays adequate comfort level or baseline comfort level  Description  Interventions:  - Encourage patient to monitor pain and request assistance  - Assess pain using appropriate pain scale  - Administer analgesics based on type and severity of pain and evaluate response  - Implement non-pharmacological measures as appropriate and evaluate response  - Consider cultural and social influences on pain and pain management  - Notify physician/advanced practitioner if interventions unsuccessful or patient reports new pain  2/4/2020 1539 by Hazel Pedro RN  Outcome: Adequate for Discharge  2/4/2020 1022 by Hazel Pedro RN  Outcome: Progressing     Problem: INFECTION - ADULT  Goal: Absence or prevention of progression during hospitalization  Description  INTERVENTIONS:  - Assess and monitor for signs and symptoms of infection  - Monitor lab/diagnostic results  - Monitor all insertion sites, i e  indwelling lines, tubes, and drains  - Monitor endotracheal if appropriate and nasal secretions for changes in amount and color  - [place] appropriate cooling/warming therapies per order  - Administer medications as ordered  - Instruct and encourage patient and family to use good hand hygiene technique  - Identify and instruct in appropriate isolation precautions for identified infection/condition  2/4/2020 1539 by Hazel Pedro RN  Outcome: Adequate for Discharge  2/4/2020 1022 by Hazel Pedro RN  Outcome: Progressing     Problem: SAFETY ADULT  Goal: Maintain or return to baseline ADL function  Description  INTERVENTIONS:  -  Assess patient's ability to carry out ADLs; assess patient's baseline for ADL function and identify physical deficits which impact ability to perform ADLs (bathing, care of mouth/teeth, toileting, grooming, dressing, etc )  - Assess/evaluate cause of self-care deficits   - Assess range of motion  - Assess patient's mobility; develop plan if impaired  - Assess patient's need for assistive devices and provide as appropriate  - Encourage maximum independence but intervene and supervise when necessary  - Involve family in performance of ADLs  - Assess for home care needs following discharge   - Consider OT consult to assist with ADL evaluation and planning for discharge  - Provide patient education as appropriate  2/4/2020 1539 by Hazel Pedro RN  Outcome: Adequate for Discharge  2/4/2020 1022 by Hazel Pedro RN  Outcome: Progressing  Goal: Maintain or return mobility status to optimal level  Description  INTERVENTIONS:  - Assess patient's baseline mobility status (ambulation, transfers, stairs, etc )    - Identify cognitive and physical deficits and behaviors that affect mobility  - Identify mobility aids required to assist with transfers and/or ambulation (gait belt, sit-to-stand, lift, walker, cane, etc )  - [place] fall precautions as indicated by assessment  - Record patient progress and toleration of activity level on Mobility SBAR; progress patient to next Phase/Stage  - Instruct patient to call for assistance with activity based on assessment  - Consider rehabilitation consult to assist with strengthening/weightbearing, etc   2/4/2020 1539 by Hazel Pedro RN  Outcome: Adequate for Discharge  2/4/2020 1022 by Hazel Pedro RN  Outcome: Progressing     Problem: DISCHARGE PLANNING  Goal: Discharge to home or other facility with appropriate resources  Description  INTERVENTIONS:  - Identify barriers to discharge w/patient and caregiver  - Arrange for needed discharge resources and transportation as appropriate  - Identify discharge learning needs (meds, wound care, etc )  - Arrange for interpretive services to assist at discharge as needed  - Refer to Case Management Department for coordinating discharge planning if the patient needs post-hospital services based on physician/advanced practitioner order or complex needs related to functional status, cognitive ability, or social support system  2/4/2020 1539 by Hazel Pedro RN  Outcome: Adequate for Discharge  2/4/2020 1022 by Hazel Pedro RN  Outcome: Progressing     Problem: Nutrition/Hydration-ADULT  Goal: Nutrient/Hydration intake appropriate for improving, restoring or maintaining nutritional needs  Description  Monitor and assess patient's nutrition/hydration status for malnutrition   Collaborate with interdisciplinary team and initiate plan and interventions as ordered  Monitor patient's weight and dietary intake as ordered or per policy  Utilize nutrition screening tool and intervene as necessary  Determine patient's food preferences and provide high-protein, high-caloric foods as appropriate       INTERVENTIONS:  - Monitor oral intake, urinary output, labs, and treatment plans  - Assess nutrition and hydration status and recommend course of action  - Evaluate amount of meals eaten  - Assist patient with eating if necessary   - Allow adequate time for meals  - Recommend/ encourage appropriate diets, oral nutritional supplements, and vitamin/mineral supplements  - Order, calculate, and assess calorie counts as needed  - Recommend, monitor, and adjust tube feedings and TPN/PPN based on assessed needs  - Assess need for intravenous fluids  - Provide specific nutrition/hydration education as appropriate  - Include patient/family/caregiver in decisions related to nutrition  2/4/2020 1539 by Nicole Harrington RN  Outcome: Adequate for Discharge  2/4/2020 1022 by Nicole Harrington, RN  Outcome: Progressing

## 2020-02-04 NOTE — DISCHARGE SUMMARY
Discharge- April Leaver 1945, 66 y o  female MRN: [id number]    [facility] [address] [id number] Encounter: [id number]    Primary Care Provider: Nilsa Qureshi DO   Date and time admitted to hospital: 1/30/2020  8:28 AM        * Acute left-sided weakness  Assessment & Plan  · Admitted for left sided weakness associated with cough, dysphagia and aphasia  · CT head with Right MCA consistent with stroke  · MRI deferred due to presence of DBS  · Neurology recommendations reviewed  · She was started on Plavix and Pletal in place of aspirin  · Switch zocor to lipitor on discharge  · Transfer to [facility] for STR  · Op follow up with primary neurologist     Parkinson disease [facility])  Assessment & Plan  With deep brain stimulator in place  Continue regular Sinemet t i d  And Sinemet ER BID  Outpatient follow-up with primary Neurology      Dysphagia  Assessment & Plan  Related to Parkinson's disease and right MCA stroke  Continue dysphagia diet  Aspiration precaution    Diabetes mellitus ([facility] [address] )  Assessment & Plan  Lab Results   Component Value Date    HGBA1C 6 6 (H) 02/01/2020       No results for input(s): POCGLU in the last 72 hours  Blood Sugar Average: Last 72 hrs:  Diet control  A1c acceptable for age and comorbidities    Breast nodule  Assessment & Plan  · Incidental 0 7 by 1 3 cm left breast nodule  · Discussions were done with daughter and  by Alfonso George   "Per daughter pt has had surgery on left breast for multiple fibrotic breast densities but no known hx of breast cancer and stopped receiving mammograms years ago"  "D/w  pt and daughter for f/u with pcp to discuss whether they would want to consider op mammography at this time as they aren't certain they would want to consider treatment if found to be malignant"    · Discussed with  today  He reported that she has had abnormal mammograms before due to fibrous tissue  However she has not had one in a while  Discussed follow up with Dr Peewee Tejeda for further evaluation  Info placed in dc papers  Hypothyroidism  Assessment & Plan  Continue home dose Synthroid 75 mcg daily    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  Assessment & Plan  Continue Lexapro 10 mg daily      Intussusception (HCC)resolved as of 2/4/2020  Assessment & Plan  CT evidence of small-bowel intussusception  Patient has normal bowel movement and passes gas freely  This  has spontaneously resolved without intervention on repeat imaging  Appreciate surgery recommendations  No interventions planned at this time          Discharging Physician / Practitioner: Briana Larry MD  PCP: Judie Rojas DO  Admission Date:   Admission Orders (From admission, onward)     Ordered        01/30/20 1537  Inpatient Admission  Once                   Discharge Date: 02/04/20    Resolved Problems  Date Reviewed: 2/4/2020          Resolved    Intussusception ([address] ) 2/4/2020     Resolved by  Briana Larry MD          Consultations During Hospital Stay:  · Neurology    Procedures Performed:   · none    Significant Findings / Test Results:   · CT head- right mca/corona radiata stroke    Incidental Findings:   · none     Test Results Pending at Discharge (will require follow up):   · none     Outpatient Tests Requested:  · none    Complications:  none    Reason for Admission: left sided weakness    Hospital Course:     Karla Segura is a 66 y o  female patient who originally presented to the hospital on 1/30/2020 due to left-sided weakness, dysphagia, dysarthria  She has known history of Parkinson's disease with deep brain stimulators in place  She underwent a CT of the head which was initially read as negative for acute stroke  On further evaluation by Neurology there was a prominent hypodensity in the right MCA/corona radiata which was felt to be due to a new stroke    She was on aspirin prior to admission so this represented a failure off aspirin  She was placed on Plavix and Pletal and her Zocor was switched to Lipitor for secondary stroke prevention  She was evaluated by PT, OT, speech therapy  She was placed on a pureed diet with honey thickened liquids  She continued to have left-sided weakness so she will need short-term rehabilitation  She was transferred to [facility] for short-term rehab  During her hospitalization she remained on Sinemet regular does 3 times a day and Sinemet ER twice a day which is her home regimen  She was taken off Aricept which was placed for memory  According to her  she has been on this for 1 year without any improvement so she was taken off Aricept  Case management tried to look into Exelon patch which is currently too expensive  On admission she also had an abnormal CT of the abdomen on 01/30/2020 which showed entero enteral intussusception without obstruction  This prompted a surgery consult  Repeat imaging the following day on 01/31/2020 showed resolution of the previous intussusception with no other acute abnormality  She did not require any surgical interventions  She was also noted to have a small breast nodule  This was discussed with her   She reported that she has had abnormal mammogram before due to fibrous tissue  However she has not had 1 in a while  So we discussed follow-up with Dr Tania Silva  Information was placed on her discharge paperwork      Please see above list of diagnoses and related plan for additional information  Condition at Discharge: good     Discharge Day Visit / Exam:     Subjective:  Continued left-sided weakness  Tolerating current diet  No events overnight    Vitals: Blood Pressure: 149/76 (02/04/20 0659)  Pulse: 90 (02/04/20 0659)  Temperature: 99 1 °F (37 3 °C) (02/04/20 0659)  Temp Source: Temporal (02/03/20 1515)  Respirations: 18 (02/04/20 0659)  Height: 5' (152 4 cm) (01/31/20 1506)  Weight - Scale: 88 6 kg (195 lb 5 2 oz) (01/30/20 0831)  SpO2: 92 % (02/04/20 0700)  Exam:   Physical Exam   Constitutional: She appears well-developed  No distress  HENT:   Head: Normocephalic and atraumatic  Eyes: No scleral icterus  Neck: No JVD present  Cardiovascular: Regular rhythm  Exam reveals no gallop and no friction rub  No murmur heard  Pulmonary/Chest: Effort normal  No stridor  No respiratory distress  She has no wheezes  Abdominal: Soft  She exhibits no distension  There is no tenderness  There is no guarding  Neurological: She is alert  Left arm weakness  Left lower facial droop  Slurred speech   Skin: Skin is warm and dry  She is not diaphoretic  Psychiatric: She has a normal mood and affect  Her behavior is normal      Discussed with       Discharge instructions/Information to patient and family:   See after visit summary for information provided to patient and family  Provisions for Follow-Up Care:  See after visit summary for information related to follow-up care and any pertinent home health orders  Disposition:     Other: [facility]    Planned Readmission: no     Discharge Statement:  I spent >30 minutes discharging the patient  This time was spent on the day of discharge  I had direct contact with the patient on the day of discharge  Greater than 50% of the total time was spent examining patient, answering all patient questions, arranging and discussing plan of care with patient as well as directly providing post-discharge instructions  Additional time then spent on discharge activities  Discharge Medications:  See after visit summary for reconciled discharge medications provided to patient and family        ** Please Note: This note has been constructed using a voice recognition system **

## 2020-02-04 NOTE — PROGRESS NOTES
Progress Note - Neurology   pAril Luevanor 66 y o  female MRN: [id number]  Unit/Bed#: [place] 2 -01 Encounter: [id number]        Assessment/Plan :  65 yo female with Parkinson's disease s/p DBS, hypothyroidism, depression and vascular risk factors including HLD and DM type 2 admitted on 1/30/20 with left sided weakness, dysphagia, dysarthria and SOB  Initial CT Head with no acute intracranial abnormality  Repeat CT Head reviewed by Attending Neurologist which reveals hypodensity in right MCA territory  CTA H/N demonstrating right corona radiata frontoparietal infarct and multifocal stenosis B/L PCA's  Plan:  - deferred MRI Brain at [facility] due to DBS  CTA hea  - Echocardiogram pending  - telemetry monitoring for arrhythmia  - continue secondary stroke prevention   - recommend continue DAPT: Plavix 75 mg daily and petal 100 mg BID  Patient with multifocal b/l PCA stenosis and suspect infarct newer and occurred while patient on ASA   - continue statin, LDL 53   - recommend normotensive BP    - recommend euglycemia, HgA1c 6 6   - stroke education  -  PT/OT/SL following  - continue home Sinemet 25/100 mg, 1 tab TID (5am, 8am, 12 pm) and 0 5 mg tabs BID (3pm and 9pm)  - recommend discontinue donepezil 10 mg daily  Patient's spouse reports medication is not working for [employer]  Recommend initiation of Ex[employer]  Case management to determine cost   - recommend patient follow up with Neurologist Dr Andrew Patricio in [facility]      Results reviewed:  - labs:  LDL 53, HDL 37, triglycerides 107, HgA1c 6 6  - 2/3/20 CTA head and neck: per report: No evidence of acute intracranial hemorrhage  Bilateral DBS  with old right corona radiata frontoparietal infarct    Multifocal stenosis bilateral PCAs with suspect focal severe stenosis/occlusion left PCA as described  - 1/31/20 CT head wo contrast: per report:  No acute intracranial abnormality  Microangiopathic changes    - 1/30/20 CT Head wo contrast: per report: No acute intracranial abnormality  Deep brain stimulator leads are unremarkable in appearance  Cerebellar greater than cerebral volume loss  Mild cerebral chronic microangiopathic disease     Subjective:   Patient rubbing right eye on arrival  Asked her if it was itchy and she nods yes  Helped her clean her eyes with a warm wash cloth  Speech with incomprehensible sounds  She is alert, answers yes/no questions and follows 1 step commands  Asked if she needs anything after exam completed  She pantomimes with RUE to mouth a drink  Then nods yes when asked if she is thirsty  Patient care tech at bedside to assist patient with needs  Review of Systems - unable to assess due to patient non-verbal        Vitals: Blood pressure 149/76, pulse 90, temperature 99 1 °F (37 3 °C), resp  rate 18, height 5' (1 524 m), weight 88 6 kg (195 lb 5 2 oz), SpO2 (!) 86 %, not currently breastfeeding  ,Body mass index is 38 15 kg/m²  MEDS:    Current Facility-Administered Medications:  acetaminophen 488 mg Oral Q6H PRN Dheeraj Espinoza MD   ALPRAZolam 0 5 mg Oral TID PRN Dheeraj Espinoza MD   carbidopa-levodopa 0 5 tablet Oral BID Dheeraj Espinoza MD   carbidopa-levodopa 1 tablet Oral TID Dheeraj Espinoza MD   cilostazol 100 mg Oral BID AC Mag Puente MD   clopidogrel 75 mg Oral Daily Mag Puente MD   escitalopram 10 mg Oral Daily Dheeraj Espinoza MD   famotidine 20 mg Oral Early Morning Dheeraj Espinoza MD   glycopyrrolate 1 mg Oral TID Dheeraj Espinoza MD   heparin (porcine) 5,000 Units Subcutaneous [facility] Dheeraj Espinoza MD   levothyroxine 75 mcg Oral Early Morning Dheeraj Espinoza MD   nystatin  Topical BID PRN Yony Whitmore,    pravastatin 40 mg Oral Daily With River Centeno MD   saccharomyces boulardii 250 mg Oral BID Dheeraj Espinoza MD     Physical Exam   Constitutional: No distress  Acutely ill appearing, lying in bed, rubbing her left eye   HENT:   Head: Normocephalic and atraumatic     Mouth/Throat: Oropharynx is clear and moist    Eyes: Right eye exhibits discharge  Left eye exhibits discharge  Redness left eye with patient rubbing  dry yellow discharge bilaterally  Cardiovascular: Normal rate, regular rhythm and normal heart sounds  Exam reveals no friction rub  No murmur heard  Pulmonary/Chest: Effort normal and breath sounds normal  No respiratory distress  Abdominal: Soft  Bowel sounds are normal  She exhibits no distension  There is no tenderness  Neurological: She is alert  Reflex Scores:       Bicep reflexes are 2+ on the right side and 2+ on the left side  Brachioradialis reflexes are 2+ on the right side and 2+ on the left side  Patellar reflexes are 1+ on the right side and 1+ on the left side  Skin: Skin is warm and dry  She is not diaphoretic  Neurologic Exam     Mental Status   Alert, able to answer yes/no question  Some incomprehensible sounds attempting to answer questions, but mouths words  Pantomimes she wants a drink with right hand gesturing to mouth  Follows 1 step commands but needs time to process: shows 2 fingers, sticks out tongue, wiggles toes and  with RUE hand     Cranial Nerves   Blinks to threat, tracks to examiner voice with eye movements  Trace eft facial droop at rest  Hypomimia  No tongue deviation appreciated  Unable to assess palate      Motor Exam   Right arm tone: normal  Left arm tone: increased  Right leg tone: normal  Left leg tone: increased    Strength   Strength 5/5 except as noted  Full strength RUE/RLE  LUE   0, bicep/tricep 4, deltoid 3  LLE lifts off bed but can not sustain, plantar/dorsi flexion 3     Gait, Coordination, and Reflexes     Reflexes   Right brachioradialis: 2+  Left brachioradialis: 2+  Right biceps: 2+  Left biceps: 2+  Right patellar: 1+  Left patellar: 1+  Right plantar: equivocal  Left plantar: equivocal      Lab Results:   I have personally reviewed pertinent reports      CBC:   Results from last 7 days   Lab Units 02/01/20  0547 01/31/20  0543 01/30/20  0932   WBC Thousand/uL 10 83* 10 52* 8 17   RBC Million/uL 4 75 4 74 4 91   HEMOGLOBIN g/dL 15 2 15 2 15 9*   HEMATOCRIT % 46 5* 46 0 48 0*   MCV fL 98 97 98   PLATELETS Thousands/uL 236 233 254   BMP/CMP:   Results from last 7 days   Lab Units 02/03/20  0452 02/01/20  0547 01/31/20  0533 01/30/20  0932   SODIUM mmol/L 140 142 141 140   POTASSIUM mmol/L 4 6 3 4* 3 5 4 6   CHLORIDE mmol/L 106 107 106 103   CO2 mmol/L 26 26 28 30   BUN mg/dL 10 10 10 17   CREATININE mg/dL 0 64 0 77 0 79 0 86   CALCIUM mg/dL 8 3 8 3 8 5 9 5   AST U/L  --   --  25 42   ALT U/L  --   --  25 13   ALK PHOS U/L  --   --  71 77   EGFR ml/min/1 73sq m 87 75 73 66   HgBA1C:   Results from last 7 days   Lab Units 02/01/20  0547   HEMOGLOBIN A1C % 6 6*   TSH:   Results from last 7 days   Lab Units 01/30/20  1745   TSH 3RD GENERATON uIU/mL 2 990   Coagulation:   Results from last 7 days   Lab Units 01/30/20  1025   INR  1 04   Lipid Profile:   Results from last 7 days   Lab Units 02/01/20  0547   HDL mg/dL 37*   LDL CALC mg/dL 53   TRIGLYCERIDES mg/dL 107   Urinalysis:   Results from last 7 days   Lab Units 01/30/20  1017   COLOR UA  Yellow   CLARITY UA  Clear   SPEC GRAV UA  1 020   PH UA  6 5   LEUKOCYTES UA  Negative   NITRITE UA  Negative   GLUCOSE UA mg/dl Negative   KETONES UA mg/dl Negative   BILIRUBIN UA  Negative   BLOOD UA  Negative     Imaging Studies: I have personally reviewed pertinent reports  and I have personally reviewed pertinent films in PACS     EEG, Pathology, and Other Studies: I have personally reviewed pertinent reports  and I have personally reviewed pertinent films in PACS    VTE Prophylaxis: Sequential compression device (Venodyne)  and Enoxaparin (Lovenox)     Counseling / Coordination of Care  Total time spent today 35 minutes  Greater than 50% of total time was spent with the patient and / or family counseling and / or coordination of care   A description of the counseling / coordination of care: coordination of care with SLIM Attending and RN  Discussion of diagnostic results with patient and plan of care for medication regimen

## 2020-02-05 ENCOUNTER — TELEPHONE (OUTPATIENT)
Dept: INTERVENTIONAL RADIOLOGY/VASCULAR | Facility: HOSPITAL | Age: 75
End: 2020-02-05

## 2020-02-05 NOTE — TELEPHONE ENCOUNTER
Patient's family member answered and she is at [facility]  Will follow up with them in regards to iv extravasation

## 2020-02-07 ENCOUNTER — PATIENT OUTREACH (OUTPATIENT)
Dept: CASE MANAGEMENT | Facility: OTHER | Age: 75
End: 2020-02-07

## 2020-02-07 NOTE — PROGRESS NOTES
Inbasket notification received for new bundle  Email with bundle communication tool sent to facility with update on new bundle, DRG, and ELOS  Facility informed of patient enrollment in SARITA / CKD   This care manager will continue to monitor via chart and CarePort review throughout bundle episode

## 2020-02-19 ENCOUNTER — PATIENT OUTREACH (OUTPATIENT)
Dept: CASE MANAGEMENT | Facility: OTHER | Age: 75
End: 2020-02-19

## 2020-02-21 ENCOUNTER — TELEPHONE (OUTPATIENT)
Dept: NEUROLOGY | Facility: CLINIC | Age: 75
End: 2020-02-21

## 2020-02-21 ENCOUNTER — PATIENT OUTREACH (OUTPATIENT)
Dept: CASE MANAGEMENT | Facility: OTHER | Age: 75
End: 2020-02-21

## 2020-02-21 NOTE — TELEPHONE ENCOUNTER
----- Message from Tracey Dean PA-C sent at 2/19/2020  3:40 PM EST -----  Regarding: Outpatient MRI brain not completed  Patient has not had MRI brain as an outpatient (DBS in place and was not compatible for MRI at [facility] while inpatient)  Follows with Neurologist in [facility] (Dr Linda Gutierrez)        ----- Message -----  From: SYSTEM  Sent: 2/18/2020  12:10 AM EST  To: Tracey Dean PA-C

## 2020-02-21 NOTE — TELEPHONE ENCOUNTER
Jacek 36 Juliana Settler  Female, 66 y o , 1945  MRN:   [id number]  Phone:   [phone] (H)  PCP:   Colin Pelletier DO  Primary Cv [place] [facility]  Next Appt  With Family Medicine  2020 at 1:00 PM  RE: Outpatient MRI brain not completed   Received: Yesterday   Message Contents   ELENO Moreno, RN             Yes, she can continue to follow with her outpatient Neurologist and disregard the MRI  Previous Messages            MRI brain wo contrast (Order [id number])   Imaging   CANCELED   Date: 2/3/2020 Department: [address] [place] [address] [address] Ordering/Authorizing: Humberto Real PA-C   Order Information     Order Name Ordering Provider Cosigning Provider   MRI BRAIN WO CONTRAST [[id number]] Humberto Real, [place] Talisha Otto MD     The link below is only for use if the above order is AMB REFERRAL TO [facility] 35   Face to Face Certification Statement (for [address] Only)   This Order Has Been Canceled     Order Status Reason By On   Canceled Other Humberto Real PA-C 20 1558   Patient following with out of network neurologist  Caryn Khan cancel this MRI order and defer to primary neurologist            Order Questions     Question Answer Comment   What is the patient's sedation requirement? Unknown    Metallic implants?   DBS   Note:  Answer Yes or No          Future Order Information     Expected Previous Expected Date Expires Previous Expiration Date   3/19/2020 (Approximate) 2020 2/3/2024           Order History   Outpatient   Date/Time Action Taken User Additional Information   20 [address]4 [address] Rohit Montiel    20 2[address], MD    20 [address] [place] [address] Dr [place], PA-C Reason: Other (Patient following with out of network neurologist  Caryn Khan cancel this MRI order and defer to primary neurologist )   Order Details     Frequency Duration Priority Order Class   None None Routine Ancillary Performed   MRI brain wo contrast   Order: [id number]   Status:   Discontinued (Other)   Visible to patient:  No (Not Released) Next appt:  03/05/2020 at 01:00 PM in Family Medicine (Colin Pelletier DO) Dx:  Acute left-sided weakness                  Order Details       View Encounter       Lab and Collection Details       Routing       Result History               Ordering Comments        Reason for Exam     Neuro deficit, acute, stroke suspected   Dx: Acute left-sided weakness [R53 1 (ICD-10-CM)]   Screening Form Questions     No questions have been answered for this form  Scheduling Instructions     There is no preparation for this test  Please leave your jewelry and valuables at home, wedding rings are the exception  Please bring your insurance cards, a form of photo ID and a list of your medications with you  Arrive 15 minutes prior to your appointment time in order to register  Please bring any prior CT or MRI studies of this area that were not performed at a [facility]  To schedule this appointment, please contact Central Scheduling at [phone]           Cosign Order Info     Action Created on Responsible Provider Signed by Signed on   Ordering 02/03/20 [address] [place], [place] [address]MD Talisha MD 02/03/20 2114   Associated Diagnoses     Acute left-sided weakness [R53 1]       Reprint Order Requisition     MRI brain wo contrast (Order #[id number]) on 2/3/20   Encounter     View Encounter           Order-Level Documents: There are no order-level documents  Encounter-Level Documents:     Other Facility Document - Scan on 2/5/2020 3:19 PM:    Medicare Documents - Scan on 2/5/2020 9:04 AM:    Medications - Home - Scan on 1/31/2020 3:03 PM:    Insurance Cards - Scan on 1/31/2020 3:03 PM:    EMS Form - Scan on 1/31/2020 10:40 AM:            Order Provider Info         Office phone Pager E-mail   Ordering User Humberto Real PA-C [phone] -- [email or web address] Authorizing Provider Dave Haynes PA-C [phone] -- --   Attending Provider When Farhana Rashid [phone] -- --   Ordering Provider Dave Haynes PA-C [phone] -- --   Cosigned By (on 02/03/2020 2118) Margarita Gomez [phone] -- [email or web address]   Linked Charges     Charge Code Quantity Modifiers Diagnosis   HB MRI BRAIN STEM W/O DYE  [id number]      Radiation Exposure Data     MRI brain wo contrast   Accession #:   No radiation information documented for this order   Radiation Dose Estimates     Anatomical Region  Radiation Type  Dose   Abdomen Radiation Dose    1471 mGy-cm      Pelvis Radiation Dose    1471 mGy-cm      Chest Radiation Dose    108 8 mGy      Neck Radiation Dose   Radiation Dose    108 8 mGy   1257 mGy-cm      Head Radiation Dose    2955 mGy-cm      Hip Radiation Dose    1471 mGy-cm      C-spine Radiation Dose    1257 mGy-cm      Vascular Radiation Dose    1257 mGy-cm      Tracking Reports      Cosign Tracking Order Transmittal Tracking   Invasive Procedure Worksheet     Link to Invasive Procedure Worksheet   Chart Review Routing History     Recipient Method Report Sent By [employer] [facility]   [employer] PROFESSIONAL BILLING EXTRACT    In Basket IP Auto Routed Notes Nayeli Galdamez DO [[id number]] 9/8/2016 11:36 AM 09/08/2016   Additional Information     Associated Reports   View Encounter   Priority and Order Details

## 2020-02-21 NOTE — PROGRESS NOTES
CM spoke with facility  who confirmed patient remains at facility  Left VM with name, call back number and detailed message regarding this patient  Requested call back  CM also followed up with sending email to facility requesting update

## 2020-02-26 ENCOUNTER — PATIENT OUTREACH (OUTPATIENT)
Dept: CASE MANAGEMENT | Facility: OTHER | Age: 75
End: 2020-02-26

## 2020-02-26 NOTE — PROGRESS NOTES
Facility nurse navigator left  2/24/20 for this CM stating that patient's therapy is being pushed back 1 week and discharge date to home is 3/4/20  Navigator did not indicate what [facility] agency but did state the patient will receive SN/PT/OT /HHA  This care manager will continue to monitor via chart review throughout bundle episode

## 2020-03-05 ENCOUNTER — PATIENT OUTREACH (OUTPATIENT)
Dept: CASE MANAGEMENT | Facility: OTHER | Age: 75
End: 2020-03-05

## 2020-03-05 ENCOUNTER — CLINICAL SUPPORT (OUTPATIENT)
Dept: FAMILY MEDICINE CLINIC | Facility: CLINIC | Age: 75
End: 2020-03-05
Payer: MEDICARE

## 2020-03-05 DIAGNOSIS — R39.9 UTI SYMPTOMS: Primary | ICD-10-CM

## 2020-03-05 DIAGNOSIS — E03.9 HYPOTHYROIDISM, UNSPECIFIED TYPE: Chronic | ICD-10-CM

## 2020-03-05 DIAGNOSIS — E13.9 DIABETES MELLITUS OF OTHER TYPE WITHOUT COMPLICATION, UNSPECIFIED WHETHER LONG TERM INSULIN USE (HCC): Primary | ICD-10-CM

## 2020-03-05 DIAGNOSIS — N39.0 RECURRENT UTI: Primary | ICD-10-CM

## 2020-03-05 LAB
SL AMB  POCT GLUCOSE, UA: NEGATIVE
SL AMB LEUKOCYTE ESTERASE,UA: NORMAL
SL AMB POCT BILIRUBIN,UA: NEGATIVE
SL AMB POCT BLOOD,UA: NEGATIVE
SL AMB POCT CLARITY,UA: NORMAL
SL AMB POCT COLOR,UA: NORMAL
SL AMB POCT KETONES,UA: NORMAL
SL AMB POCT NITRITE,UA: POSITIVE
SL AMB POCT PH,UA: 5.5
SL AMB POCT SPECIFIC GRAVITY,UA: 1.02
SL AMB POCT URINE PROTEIN: 30
SL AMB POCT UROBILINOGEN: 0.2

## 2020-03-05 PROCEDURE — 87077 CULTURE AEROBIC IDENTIFY: CPT | Performed by: FAMILY MEDICINE

## 2020-03-05 PROCEDURE — 87086 URINE CULTURE/COLONY COUNT: CPT | Performed by: FAMILY MEDICINE

## 2020-03-05 PROCEDURE — 87186 SC STD MICRODIL/AGAR DIL: CPT | Performed by: FAMILY MEDICINE

## 2020-03-05 PROCEDURE — 81002 URINALYSIS NONAUTO W/O SCOPE: CPT

## 2020-03-05 RX ORDER — LEVOTHYROXINE SODIUM 0.07 MG/1
TABLET ORAL
Qty: 90 TABLET | Refills: 0 | Status: SHIPPED | OUTPATIENT
Start: 2020-03-05 | End: 2020-05-18

## 2020-03-05 RX ORDER — DOXYCYCLINE HYCLATE 100 MG/1
CAPSULE ORAL
Qty: 7 CAPSULE | Refills: 0 | Status: SHIPPED | OUTPATIENT
Start: 2020-03-05 | End: 2020-03-12

## 2020-03-05 NOTE — PROGRESS NOTES
Pt's daughter walked in with a urine specimen from pt  States pt was just discharged from [facility] and feels she may have a urinary tract infection  Requesting urine dip be done  Per TS, ok to dip urine  Results sent to him  Rx was sent to pt's pharmacy and specimen sent for culture  --bb

## 2020-03-05 NOTE — PROGRESS NOTES
Email received from facility with  discharge information for patient  Patient discharged 3/4/20 with [facility] for SN/PT/OT/HHA  Facility nurse navigator reports that PCP office would not allow navigator to schedule appointment until after patient was discharged  Navigator also reports that orders were written for [facility] to draw BMP 3/5/20  SNF smart form completed  Discharge paperwork from facility attached to this encounter  Creatinine drawn 2/10/20 was 0 85  BPCI form and Care Coordination note updated  Removed self from care team and sent Inbasket message to ARCHANA Brooks and ZOILA Echevarria regarding patient handoff

## 2020-03-05 NOTE — TELEPHONE ENCOUNTER
I renewed patient's levothyroxine but she needs appointment within the next month for routine follow-up of her diabetes and thyroid

## 2020-03-06 ENCOUNTER — PATIENT OUTREACH (OUTPATIENT)
Dept: FAMILY MEDICINE CLINIC | Facility: CLINIC | Age: 75
End: 2020-03-06

## 2020-03-06 NOTE — PROGRESS NOTES
This CM spoke to patient's  Ernie (patient cannot speak due to Parkinson's), who declined [place] service but accepted CM contact number and was informed he could call with any concerns

## 2020-03-08 LAB
BACTERIA UR CULT: ABNORMAL
BACTERIA UR CULT: ABNORMAL

## 2020-03-09 ENCOUNTER — TELEPHONE (OUTPATIENT)
Dept: FAMILY MEDICINE CLINIC | Facility: CLINIC | Age: 75
End: 2020-03-09

## 2020-03-09 NOTE — TELEPHONE ENCOUNTER
Lorraine saw her today from d/c from [facility] following a stroke  She wanted us to know she could not get her blood to run a BMP that was ordered at discharge  She was asking if we can get a lab for [facility]  Also, she is currently on Doxycycline for a UTI  Her urine is still very concentrated and odor  She took a urine sample to the lab today for a culture

## 2020-03-14 LAB
25(OH)D3 SERPL-MCNC: 39 NG/ML (ref 30–100)
ALBUMIN SERPL-MCNC: 3.7 G/DL (ref 3.6–5.1)
ALBUMIN/GLOB SERPL: 1.2 (CALC) (ref 1–2.5)
ALP SERPL-CCNC: 78 U/L (ref 37–153)
ALT SERPL-CCNC: 34 U/L (ref 6–29)
AST SERPL-CCNC: 40 U/L (ref 10–35)
BASOPHILS # BLD AUTO: 81 CELLS/UL (ref 0–200)
BASOPHILS NFR BLD AUTO: 1.5 %
BILIRUB SERPL-MCNC: 0.8 MG/DL (ref 0.2–1.2)
BUN SERPL-MCNC: 10 MG/DL (ref 7–25)
BUN/CREAT SERPL: ABNORMAL (CALC) (ref 6–22)
CALCIUM SERPL-MCNC: 9.4 MG/DL (ref 8.6–10.4)
CHLORIDE SERPL-SCNC: 102 MMOL/L (ref 98–110)
CHOLEST SERPL-MCNC: 130 MG/DL
CHOLEST/HDLC SERPL: 3.7 (CALC)
CO2 SERPL-SCNC: 28 MMOL/L (ref 20–32)
CREAT SERPL-MCNC: 0.71 MG/DL (ref 0.6–0.93)
EOSINOPHIL # BLD AUTO: 173 CELLS/UL (ref 15–500)
EOSINOPHIL NFR BLD AUTO: 3.2 %
ERYTHROCYTE [DISTWIDTH] IN BLOOD BY AUTOMATED COUNT: 13.2 % (ref 11–15)
GLOBULIN SER CALC-MCNC: 3.1 G/DL (CALC) (ref 1.9–3.7)
GLUCOSE SERPL-MCNC: 161 MG/DL (ref 65–99)
HBA1C MFR BLD: 6.5 % OF TOTAL HGB
HCT VFR BLD AUTO: 46.2 % (ref 35–45)
HDLC SERPL-MCNC: 35 MG/DL
HGB BLD-MCNC: 15.8 G/DL (ref 11.7–15.5)
LDLC SERPL CALC-MCNC: 69 MG/DL (CALC)
LYMPHOCYTES # BLD AUTO: 1393 CELLS/UL (ref 850–3900)
LYMPHOCYTES NFR BLD AUTO: 25.8 %
MCH RBC QN AUTO: 32.5 PG (ref 27–33)
MCHC RBC AUTO-ENTMCNC: 34.2 G/DL (ref 32–36)
MCV RBC AUTO: 95.1 FL (ref 80–100)
MONOCYTES # BLD AUTO: 443 CELLS/UL (ref 200–950)
MONOCYTES NFR BLD AUTO: 8.2 %
NEUTROPHILS # BLD AUTO: 3310 CELLS/UL (ref 1500–7800)
NEUTROPHILS NFR BLD AUTO: 61.3 %
NONHDLC SERPL-MCNC: 95 MG/DL (CALC)
PLATELET # BLD AUTO: 260 THOUSAND/UL (ref 140–400)
PMV BLD REES-ECKER: 10.7 FL (ref 7.5–12.5)
POTASSIUM SERPL-SCNC: 4.2 MMOL/L (ref 3.5–5.3)
PROT SERPL-MCNC: 6.8 G/DL (ref 6.1–8.1)
RBC # BLD AUTO: 4.86 MILLION/UL (ref 3.8–5.1)
SL AMB EGFR AFRICAN AMERICAN: 96 ML/MIN/1.73M2
SL AMB EGFR NON AFRICAN AMERICAN: 83 ML/MIN/1.73M2
SODIUM SERPL-SCNC: 138 MMOL/L (ref 135–146)
T4 SERPL-MCNC: 8.2 MCG/DL (ref 5.1–11.9)
TRIGL SERPL-MCNC: 185 MG/DL
TSH SERPL-ACNC: 3.85 MIU/L (ref 0.4–4.5)
WBC # BLD AUTO: 5.4 THOUSAND/UL (ref 3.8–10.8)

## 2020-03-18 DIAGNOSIS — R53.1 ACUTE LEFT-SIDED WEAKNESS: ICD-10-CM

## 2020-03-18 RX ORDER — CILOSTAZOL 100 MG/1
100 TABLET ORAL
Qty: 180 TABLET | Refills: 1 | Status: SHIPPED | OUTPATIENT
Start: 2020-03-18 | End: 2020-08-12

## 2020-03-18 RX ORDER — CLOPIDOGREL BISULFATE 75 MG/1
75 TABLET ORAL DAILY
Qty: 90 TABLET | Refills: 1
Start: 2020-03-18 | End: 2020-03-20 | Stop reason: SDUPTHER

## 2020-03-18 NOTE — TELEPHONE ENCOUNTER
Patient's blood tolerant Plavix reordered    However patient needs office visit within the next month for routine follow-up for diabetes and other medical conditions

## 2020-03-20 ENCOUNTER — TELEPHONE (OUTPATIENT)
Dept: FAMILY MEDICINE CLINIC | Facility: CLINIC | Age: 75
End: 2020-03-20

## 2020-03-20 DIAGNOSIS — R53.1 ACUTE LEFT-SIDED WEAKNESS: ICD-10-CM

## 2020-03-20 RX ORDER — CLOPIDOGREL BISULFATE 75 MG/1
75 TABLET ORAL DAILY
Qty: 90 TABLET | Refills: 1 | Status: SHIPPED | OUTPATIENT
Start: 2020-03-20 | End: 2020-08-12

## 2020-03-20 NOTE — TELEPHONE ENCOUNTER
JAI FROM [facility] STATED PT'S INSURANCE IS REQUESTING A PRIOR AUTH FOR GENERIC FOR PLAVIX HE STATES THEY ARE OVERLAPPING EACH OTHER SO THE INSURANCE NEEDS TO APPROVAL   PLEASE CALL JAI BACK [phone] OPTION 2

## 2020-04-15 ENCOUNTER — TELEMEDICINE (OUTPATIENT)
Dept: PSYCHIATRY | Facility: CLINIC | Age: 75
End: 2020-04-15
Payer: MEDICARE

## 2020-04-15 DIAGNOSIS — R41.89 COGNITIVE DECLINE: ICD-10-CM

## 2020-04-15 DIAGNOSIS — F33.1 MODERATE EPISODE OF RECURRENT MAJOR DEPRESSIVE DISORDER (HCC): Primary | ICD-10-CM

## 2020-04-15 DIAGNOSIS — F41.1 GENERALIZED ANXIETY DISORDER: ICD-10-CM

## 2020-04-15 PROCEDURE — 99443 PR PHYS/QHP TELEPHONE EVALUATION 21-30 MIN: CPT | Performed by: NURSE PRACTITIONER

## 2020-04-15 RX ORDER — ALPRAZOLAM 0.5 MG/1
0.5 TABLET ORAL 3 TIMES DAILY PRN
Qty: 90 TABLET | Refills: 2 | Status: SHIPPED | OUTPATIENT
Start: 2020-04-15 | End: 2020-07-15 | Stop reason: SDUPTHER

## 2020-04-15 RX ORDER — ESCITALOPRAM OXALATE 20 MG/1
TABLET ORAL
Qty: 90 TABLET | Refills: 2
Start: 2020-04-15 | End: 2021-03-08

## 2020-04-15 RX ORDER — ESCITALOPRAM OXALATE 20 MG/1
TABLET ORAL
Qty: 90 TABLET | Refills: 2 | Status: SHIPPED | OUTPATIENT
Start: 2020-04-15 | End: 2020-04-15

## 2020-04-15 RX ORDER — DONEPEZIL HYDROCHLORIDE 10 MG/1
10 TABLET, FILM COATED ORAL
Qty: 30 TABLET | Refills: 2
Start: 2020-04-15 | End: 2020-05-11 | Stop reason: SDUPTHER

## 2020-05-04 ENCOUNTER — PATIENT OUTREACH (OUTPATIENT)
Dept: FAMILY MEDICINE CLINIC | Facility: CLINIC | Age: 75
End: 2020-05-04

## 2020-05-11 DIAGNOSIS — R41.89 COGNITIVE DECLINE: ICD-10-CM

## 2020-05-11 RX ORDER — DONEPEZIL HYDROCHLORIDE 10 MG/1
10 TABLET, FILM COATED ORAL
Qty: 30 TABLET | Refills: 2
Start: 2020-05-11 | End: 2020-05-13 | Stop reason: SDUPTHER

## 2020-05-13 DIAGNOSIS — R41.89 COGNITIVE DECLINE: ICD-10-CM

## 2020-05-13 RX ORDER — DONEPEZIL HYDROCHLORIDE 10 MG/1
10 TABLET, FILM COATED ORAL
Qty: 30 TABLET | Refills: 2
Start: 2020-05-13 | End: 2020-05-18 | Stop reason: SDUPTHER

## 2020-05-18 DIAGNOSIS — E03.9 HYPOTHYROIDISM, UNSPECIFIED TYPE: Chronic | ICD-10-CM

## 2020-05-18 DIAGNOSIS — R41.89 COGNITIVE DECLINE: ICD-10-CM

## 2020-05-18 RX ORDER — DONEPEZIL HYDROCHLORIDE 10 MG/1
10 TABLET, FILM COATED ORAL
Qty: 30 TABLET | Refills: 2 | Status: CANCELLED
Start: 2020-05-18

## 2020-05-18 RX ORDER — DONEPEZIL HYDROCHLORIDE 10 MG/1
10 TABLET, FILM COATED ORAL
Qty: 30 TABLET | Refills: 11 | Status: SHIPPED | OUTPATIENT
Start: 2020-05-18 | End: 2021-04-03

## 2020-05-18 RX ORDER — LEVOTHYROXINE SODIUM 0.07 MG/1
TABLET ORAL
Qty: 90 TABLET | Refills: 0 | Status: SHIPPED | OUTPATIENT
Start: 2020-05-18 | End: 2020-08-12

## 2020-07-14 ENCOUNTER — TELEPHONE (OUTPATIENT)
Dept: FAMILY MEDICINE CLINIC | Facility: CLINIC | Age: 75
End: 2020-07-14

## 2020-07-14 DIAGNOSIS — N63.20 BREAST MASS, LEFT: Primary | ICD-10-CM

## 2020-07-14 NOTE — TELEPHONE ENCOUNTER
Today, 07/14/2020, I received notification from [address] [place] [address] radiology  She had a CT in patient on 01/30/2020  It should she had a left breast nodule atrophy is recommended  To date this is not been completed and I was notified    I will have my office set up diagnostic mammography and ultrasound of breast

## 2020-07-15 ENCOUNTER — OFFICE VISIT (OUTPATIENT)
Dept: PSYCHIATRY | Facility: CLINIC | Age: 75
End: 2020-07-15
Payer: MEDICARE

## 2020-07-15 DIAGNOSIS — F41.1 GENERALIZED ANXIETY DISORDER: ICD-10-CM

## 2020-07-15 DIAGNOSIS — F33.1 MODERATE EPISODE OF RECURRENT MAJOR DEPRESSIVE DISORDER (HCC): Primary | ICD-10-CM

## 2020-07-15 PROCEDURE — 99214 OFFICE O/P EST MOD 30 MIN: CPT | Performed by: NURSE PRACTITIONER

## 2020-07-15 PROCEDURE — 4040F PNEUMOC VAC/ADMIN/RCVD: CPT | Performed by: NURSE PRACTITIONER

## 2020-07-15 PROCEDURE — 1160F RVW MEDS BY RX/DR IN RCRD: CPT | Performed by: NURSE PRACTITIONER

## 2020-07-15 PROCEDURE — 3044F HG A1C LEVEL LT 7.0%: CPT | Performed by: NURSE PRACTITIONER

## 2020-07-15 PROCEDURE — 1036F TOBACCO NON-USER: CPT | Performed by: NURSE PRACTITIONER

## 2020-07-15 RX ORDER — ALPRAZOLAM 0.5 MG/1
0.25 TABLET ORAL
Qty: 90 TABLET | Refills: 2
Start: 2020-07-15 | End: 2021-04-22

## 2020-07-15 NOTE — PSYCH
PROGRESS NOTE        [facility]      Name and Date of Birth:  Kim Reyez 66 y o  1945    Date of Visit: 07/15/20    SUBJECTIVE:    Noemy Pringle presents today for treatment of major depressive disorder and generalized anxiety disorder  She is accompanied by her   She suffers from Parkinson's disease and she had a stroke back in January  She is wheelchair bound  Her  is the main caregiver although they do have some help coming in during the day from outside services to help with bathing  Overall, psychiatrically, she is doing well  She tells me she feels good   She says I am okay   Sometimes, during the day when she has her meals, she tends to close her eyes during the meal and then opens them again when she is watching TV  I am asking her that if she could keep her eyes open while she is eating to enjoy her meal and enjoy the time when she is awakened with her  and conversation  She agrees  She is going to try it  Overall, given her significant health issues, I believe the couple is doing fairly well  Will continue her medications and follow-up with me will be in 6 months or sooner if necessary  She denies suicidal ideation, intent or plan at present, has no suicidal ideation, intent or plan at present  She denies any auditory hallucinations and visual hallucinations, denies any other delusional thinking, denies any delusional thinking  She denies any side effects from medications      HPI ROS Appetite Changes and Sleep: normal appetite, normal sleep    Review Of Systems:      Constitutional Negative   ENT Negative   Cardiovascular Negative   Respiratory As Noted in HPI   Gastrointestinal Negative   Genitourinary Negative   Musculoskeletal Negative   Integumentary Negative   Neurological Negative   Endocrine Negative   Other Symptoms Negative and None       Laboratory Results: No results found for this or any previous visit      Substance Abuse History:    Social History     Substance and Sexual Activity   Drug Use No       Family Psychiatric History:     Family History   Problem Relation Age of Onset    Heart attack Mother         Acute Myocardial Infarction (MI)    Diabetes Mother         Diabetes Mellitus    Stroke Father         Syndrome    No Known Problems Brother     No Known Problems Maternal Aunt     No Known Problems Paternal Aunt     No Known Problems Maternal Uncle     No Known Problems Paternal Uncle     No Known Problems Maternal Grandfather     No Known Problems Maternal Grandmother     No Known Problems Paternal Grandfather     No Known Problems Paternal Grandmother     No Known Problems Cousin     Heart disease Family     Hypertension Family     Thyroid disease Family     ADD / ADHD Neg Hx     Alcohol abuse Neg Hx     Anxiety disorder Neg Hx     Bipolar disorder Neg Hx     Dementia Neg Hx     Depression Neg Hx     Drug abuse Neg Hx     OCD Neg Hx     Paranoid behavior Neg Hx     Schizophrenia Neg Hx     Seizures Neg Hx     Self-Injury Neg Hx     Suicide Attempts Neg Hx        The following portions of the patient's history were reviewed and updated as appropriate: past family history, past medical history, past social history, past surgical history and problem list     Social History     Socioeconomic History    Marital status: /Civil Union     Spouse name: Not on file    Number of children: Not on file    Years of education: Not on file    Highest education level: Not on file   Occupational History    Not on file   Social Needs    Financial resource strain: Not on file    Food insecurity:     Worry: Not on file     Inability: Not on file    Transportation needs:     Medical: Not on file     Non-medical: Not on file   Tobacco Use    Smoking status: Never Smoker    Smokeless tobacco: Never Used    Tobacco comment: n/a   Substance and Sexual Activity    Alcohol use: Never     Frequency: Never     Comment: drinks one fourth of a glass of white wine once a week  (Never drank alcohol per [employer])    Drug use: No    Sexual activity: Not Currently   Lifestyle    Physical activity:     Days per week: Not on file     Minutes per session: Not on file    Stress: Not on file   Relationships    Social connections:     Talks on phone: Not on file     Gets together: Not on file     Attends Pentecostal service: Not on file     Active member of club or organization: Not on file     Attends meetings of clubs or organizations: Not on file     Relationship status: Not on file    Intimate partner violence:     Fear of current or ex partner: Not on file     Emotionally abused: Not on file     Physically abused: Not on file     Forced sexual activity: Not on file   Other Topics Concern    Not on file   Social History Narrative    Not on file     Social History     Social History Narrative    Not on file        Social History     Tobacco History     Smoking Status  Never Smoker    Smokeless Tobacco Use  Never Used    Tobacco Comment  n/a          Alcohol History     Alcohol Use Status  Never Comment  drinks one fourth of a glass of white wine once a week  (Never drank alcohol per [employer])          Drug Use     Drug Use Status  No          Sexual Activity     Sexually Active  Not Currently          Activities of Daily Living    Not Asked               Additional Substance Use Detail     Questions Responses    Substance Use Assessment Denies substance use within the past 12 months    Alcohol Use Frequency Denies use in past 12 months    Cannabis frequency Denies use in past 12 months    Heroin Frequency Denies use in past 12 months    Cocaine frequency Denies past use in past 12 months    Crack Cocaine Frequency Denies use in past 12 months    Methamphetamine Frequency Denies use in past 12 months    Crack Cocaine Method Other    Crack Cocaine 1st Use denies    Narcotic Frequency Denies use in past 12 months    Benzodiazepine Frequency Denies use in past 12 months    Amphetamine frequency Denies use in past 12 months    Barbituate Frequency Denies use use in past 12 months    Inhalant frequency Denies use in past 12 months    Hallucinogen frequency Denies use in past 12 months    Ecstasy frequency Denies use past 12 months    Other drug frequency Denies use in past 12 months    Opiate frequency Denies use in past 12 months    Not reviewed  OBJECTIVE:     Mental Status Evaluation:    Appearance age appropriate, casually dressed   Behavior pleasant, cooperative   Speech Limited, barely audible   Mood Stable   Affect Flat and constricted   Thought Processes Limited   Associations intact associations   Thought Content Less anxious   Perceptual Disturbances: None   Abnormal Thoughts  Risk Potential Suicidal ideation - None  Homicidal ideation - None  Potential for aggression - No   Orientation Difficult to assess   Memory Difficult   To assess alert and awake   Attention Span attention span and concentration are age appropriate   Limited Appears to be of Average Intelligence   Insight Limited   Judgement Limited   Muscle Strength and  Gait Advanced Parkinson's disease uses a wheelchair   Language Cognitive decline noted   Fund of Knowledge Cognitive decline noted   Pain Denies   Pain Scale 0       Assessment/Plan:       Diagnoses and all orders for this visit:    Moderate episode of recurrent major depressive disorder (HCC)    Generalized anxiety disorder  -     ALPRAZolam (XANAX) 0 5 mg tablet; Take 0 5 tablets (0 25 mg total) by mouth daily at bedtime as needed for anxiety          Treatment Recommendations/Precautions:    Continue current medications:  Xanax has been reduced to 0 25 mg HS  Lexapro 10 mg b i d    Follow-up in 6 months    Risks/Benefits      Risks, Benefits And Possible Side Effects Of Medications:    Risks, benefits, and possible side effects of medications explained to patient and patient verbalizes understanding and agreement for treatment      Controlled Medication Discussion:     Not applicable    Psychotherapy Provided:     Individual psychotherapy provided: No

## 2020-07-16 NOTE — TELEPHONE ENCOUNTER
I spoke to pt's  and he states these nodules have been there for MANY years and he has taken her for the follow up in the past and is not interested in doing the Diagnostic Mammo or the U/S again b/c he states they did this years ago and were told no follow up would be needed

## 2020-07-22 PROBLEM — N17.9 ACUTE KIDNEY FAILURE, UNSPECIFIED (HCC): Status: ACTIVE | Noted: 2020-07-22

## 2020-07-22 PROBLEM — E11.65 TYPE 2 DIABETES MELLITUS WITH HYPERGLYCEMIA (HCC): Status: ACTIVE | Noted: 2020-02-02

## 2020-07-27 ENCOUNTER — OFFICE VISIT (OUTPATIENT)
Dept: FAMILY MEDICINE CLINIC | Facility: CLINIC | Age: 75
End: 2020-07-27
Payer: MEDICARE

## 2020-07-27 VITALS
RESPIRATION RATE: 16 BRPM | BODY MASS INDEX: 31.41 KG/M2 | HEIGHT: 60 IN | TEMPERATURE: 99.4 F | WEIGHT: 160 LBS | SYSTOLIC BLOOD PRESSURE: 120 MMHG | HEART RATE: 88 BPM | DIASTOLIC BLOOD PRESSURE: 70 MMHG

## 2020-07-27 DIAGNOSIS — G20 PARKINSON DISEASE (HCC): ICD-10-CM

## 2020-07-27 DIAGNOSIS — E11.65 TYPE 2 DIABETES MELLITUS WITH HYPERGLYCEMIA, WITHOUT LONG-TERM CURRENT USE OF INSULIN (HCC): Primary | ICD-10-CM

## 2020-07-27 DIAGNOSIS — N17.9 ACUTE RENAL FAILURE, UNSPECIFIED ACUTE RENAL FAILURE TYPE (HCC): ICD-10-CM

## 2020-07-27 DIAGNOSIS — Z00.00 HEALTH CARE MAINTENANCE: ICD-10-CM

## 2020-07-27 DIAGNOSIS — F32.3 MAJOR DEPRESSION WITH PSYCHOTIC FEATURES (HCC): ICD-10-CM

## 2020-07-27 DIAGNOSIS — R53.1 ACUTE LEFT-SIDED WEAKNESS: ICD-10-CM

## 2020-07-27 DIAGNOSIS — E78.5 HYPERLIPIDEMIA, UNSPECIFIED HYPERLIPIDEMIA TYPE: Chronic | ICD-10-CM

## 2020-07-27 DIAGNOSIS — G31.83 DEMENTIA WITH LEWY BODIES (CODE) (HCC): ICD-10-CM

## 2020-07-27 DIAGNOSIS — Z74.09 DECREASED AMBULATION STATUS: ICD-10-CM

## 2020-07-27 DIAGNOSIS — E03.4 HYPOTHYROIDISM DUE TO ACQUIRED ATROPHY OF THYROID: ICD-10-CM

## 2020-07-27 DIAGNOSIS — I63.81 RIGHT-SIDED LACUNAR INFARCTION (HCC): ICD-10-CM

## 2020-07-27 DIAGNOSIS — N63.20 BREAST MASS, LEFT: ICD-10-CM

## 2020-07-27 PROBLEM — F32.A DEPRESSION: Status: ACTIVE | Noted: 2020-07-27

## 2020-07-27 PROBLEM — Z96.89 S/P DEEP BRAIN STIMULATOR PLACEMENT: Status: ACTIVE | Noted: 2020-07-27

## 2020-07-27 PROBLEM — G20.A1 PARKINSON'S DISEASE: Status: ACTIVE | Noted: 2020-07-27

## 2020-07-27 PROBLEM — G25.5 CHOREA: Status: ACTIVE | Noted: 2020-07-27

## 2020-07-27 PROBLEM — G20.A1 PARKINSON'S DISEASE: Status: RESOLVED | Noted: 2020-07-27 | Resolved: 2020-07-27

## 2020-07-27 PROCEDURE — 1160F RVW MEDS BY RX/DR IN RCRD: CPT | Performed by: FAMILY MEDICINE

## 2020-07-27 PROCEDURE — G0439 PPPS, SUBSEQ VISIT: HCPCS | Performed by: FAMILY MEDICINE

## 2020-07-27 PROCEDURE — 3066F NEPHROPATHY DOC TX: CPT | Performed by: FAMILY MEDICINE

## 2020-07-27 PROCEDURE — 1036F TOBACCO NON-USER: CPT | Performed by: FAMILY MEDICINE

## 2020-07-27 PROCEDURE — 4040F PNEUMOC VAC/ADMIN/RCVD: CPT | Performed by: FAMILY MEDICINE

## 2020-07-27 PROCEDURE — 1170F FXNL STATUS ASSESSED: CPT | Performed by: FAMILY MEDICINE

## 2020-07-27 PROCEDURE — 99215 OFFICE O/P EST HI 40 MIN: CPT | Performed by: FAMILY MEDICINE

## 2020-07-27 PROCEDURE — 1125F AMNT PAIN NOTED PAIN PRSNT: CPT | Performed by: FAMILY MEDICINE

## 2020-07-27 PROCEDURE — 3008F BODY MASS INDEX DOCD: CPT | Performed by: FAMILY MEDICINE

## 2020-07-27 PROCEDURE — 3044F HG A1C LEVEL LT 7.0%: CPT | Performed by: FAMILY MEDICINE

## 2020-07-27 RX ORDER — GLYCOPYRROLATE 1 MG/1
TABLET ORAL 3 TIMES DAILY
COMMUNITY
End: 2020-07-27

## 2020-07-27 RX ORDER — ASPIRIN 81 MG/1
TABLET ORAL EVERY 24 HOURS
COMMUNITY
End: 2020-07-27

## 2020-07-27 RX ORDER — ESCITALOPRAM OXALATE 20 MG/1
TABLET ORAL
COMMUNITY
End: 2020-07-27

## 2020-07-27 RX ORDER — CILOSTAZOL 100 MG/1
TABLET ORAL EVERY 12 HOURS
COMMUNITY
End: 2020-07-27

## 2020-07-27 RX ORDER — DONEPEZIL HYDROCHLORIDE 10 MG/1
TABLET, FILM COATED ORAL EVERY 24 HOURS
COMMUNITY
End: 2020-07-27

## 2020-07-27 NOTE — PATIENT INSTRUCTIONS
Complete blood work for diabetes this week  Follow all specialist per their instructions  Patient to return in 4 months for office visit blood work

## 2020-07-27 NOTE — ASSESSMENT & PLAN NOTE
Lab Results   Component Value Date    HGBA1C 6 5 (H) 03/13/2020   Foot exam completed    Blood work is ordered referred back to ophthalmology for diabetic eye exam

## 2020-07-27 NOTE — PROGRESS NOTES
Assessment and Plan:     Problem List Items Addressed This Visit     None           Preventive health issues were discussed with patient, and age appropriate screening tests were ordered as noted in patient's After Visit Summary  Personalized health advice and appropriate referrals for health education or preventive services given if needed, as noted in patient's After Visit Summary       History of Present Illness:     Patient presents for Medicare Annual Wellness visit    Patient Care Team:  Wing Rayray DO as PCP - General (Family Medicine)     Problem List:     Patient Active Problem List   Diagnosis    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    Hypothyroidism    Hyperlipidemia    Parkinson disease ([facility] [address] [facility]    GERD (gastroesophageal reflux disease)    Sialorrhea    Allergic rhinitis    Anxiety, generalized    Hyperglycemia    Osteoporosis    Major depression with psychotic features ([facility] [address] [facility]    Health care maintenance    Chronic cough    Decreased ambulation status    Acute left-sided weakness    Dysphagia    Breast mass, left    Type 2 diabetes mellitus with hyperglycemia ([facility] [address] )    Recurrent UTI    Acute kidney failure, unspecified ([facility] [address] [facility]    Chorea    Dementia with Lewy bodies (CODE) ([facility] [address] [facility]    Depression    Right-sided lacunar infarction ([facility] [address] [facility]    S/P deep brain stimulator placement    Parkinson's disease ([facility] [address] )      Past Medical and Surgical History:     Past Medical History:   Diagnosis Date    Abdominal pain, suprapubic 07/03/2014    Resolved:  November 22, 2016    Abscess of skin and subcutaneous tissue 11/21/2016    Resolved:  February 28, 2017    Anxiety     Burning with urination 12/14/2016    Resolved:  February 28, 2017    Conjunctivitis 06/10/2013    Depression     Depression     Diabetes mellitus ([facility] [address] ) 2/2/2020    Dysuria 06/06/2016    Resolved:  February 28 2017    Falling 10/18/2012    GERD (gastroesophageal reflux disease)  Groin pain 04/16/2015    Resolved:  February 28, 2017    Hyperlipidemia     Hypothyroid     Influenza 04/05/2013    Leg abrasion 09/22/2016    Resolved:  February 28, 2017    [facility]'s disease [facility])     Psychiatric illness     Rectal pain 10/17/2013    Resolved:  February 28, 2017    Thyroid disease      Past Surgical History:   Procedure Laterality Date    APPENDECTOMY      Age 4 or 3    BACK SURGERY      BRAIN SURGERY      CLOSED REDUCTION NASAL FRACTURE      CYSTOSCOPY  01/28/2014    RENETTA Sherman   (Diagnostic)    DEEP BRAIN STIMULATOR PLACEMENT      EYE SURGERY      FRACTURE SURGERY      JOINT REPLACEMENT      LAMINECTOMY      Decompress, Facetectomy, Foraminotomy Lumbar Seg    ORIF HIP FRACTURE Left     About 3 years ago    TONSILLECTOMY        Family History:     Family History   Problem Relation Age of Onset    Heart attack Mother         Acute Myocardial Infarction (MI)    Diabetes Mother         Diabetes Mellitus    Stroke Father         Syndrome    No Known Problems Brother     No Known Problems Maternal Aunt     No Known Problems Paternal Aunt     No Known Problems Maternal Uncle     No Known Problems Paternal Uncle     No Known Problems Maternal Grandfather     No Known Problems Maternal Grandmother     No Known Problems Paternal Grandfather     No Known Problems Paternal Grandmother     No Known Problems Cousin     Heart disease Family     Hypertension Family     Thyroid disease Family     ADD / ADHD Neg Hx     Alcohol abuse Neg Hx     Anxiety disorder Neg Hx     Bipolar disorder Neg Hx     Dementia Neg Hx     Depression Neg Hx     Drug abuse Neg Hx     OCD Neg Hx     Paranoid behavior Neg Hx     Schizophrenia Neg Hx     Seizures Neg Hx     Self-Injury Neg Hx     Suicide Attempts Neg Hx       Social History:        Social History     Socioeconomic History    Marital status: /Civil Union     Spouse name: Not on file    Number of children: Not on file    Years of education: Not on file    Highest education level: Not on file   Occupational History    Not on file   Social Needs    Financial resource strain: Not on file    Food insecurity:     Worry: Not on file     Inability: Not on file    Transportation needs:     Medical: Not on file     Non-medical: Not on file   Tobacco Use    Smoking status: Never Smoker    Smokeless tobacco: Never Used    Tobacco comment: n/a   Substance and Sexual Activity    Alcohol use: Never     Frequency: Never     Comment: drinks one fourth of a glass of white wine once a week  (Never drank alcohol per [employer])    Drug use: No    Sexual activity: Not Currently   Lifestyle    Physical activity:     Days per week: Not on file     Minutes per session: Not on file    Stress: Not on file   Relationships    Social connections:     Talks on phone: Not on file     Gets together: Not on file     Attends Sikhism service: Not on file     Active member of club or organization: Not on file     Attends meetings of clubs or organizations: Not on file     Relationship status: Not on file    Intimate partner violence:     Fear of current or ex partner: Not on file     Emotionally abused: Not on file     Physically abused: Not on file     Forced sexual activity: Not on file   Other Topics Concern    Not on file   Social History Narrative    Not on file      Medications and Allergies:     Current Outpatient Medications   Medication Sig Dispense Refill    acetaminophen (TYLENOL) 500 mg tablet Take 500 mg by mouth   One throughout the night as needed      ALPRAZolam (XANAX) 0 5 mg tablet Take 0 5 tablets (0 25 mg total) by mouth daily at bedtime as needed for anxiety 90 tablet 2    ascorbic acid (VITAMIN C) 500 mg tablet Take 1 tablet (500 mg total) by mouth daily  0    atorvastatin (LIPITOR) 40 mg tablet Take 1 tablet (40 mg total) by mouth daily  0    carbidopa-levodopa (SINEMET)  mg per tablet Take 1 tablet by mouth 3 (three) times a day  0    cilostazol (PLETAL) 100 mg tablet Take 1 tablet (100 mg total) by mouth 2 (two) times a day before meals 180 tablet 1    clopidogrel (PLAVIX) 75 mg tablet Take 1 tablet (75 mg total) by mouth daily 90 tablet 1    donepezil (ARICEPT) 10 mg tablet Take 1 tablet (10 mg total) by mouth daily at bedtime 30 tablet 11    escitalopram (LEXAPRO) 20 mg tablet TAKE 1/2 TABLET BY MOUTH TWICE A DAY 90 tablet 2    glycopyrrolate (ROBINUL) 1 mg tablet TAKE 1 TABLET (1 MG TOTAL) BY MOUTH 3 (THREE) TIMES A  tablet 2    levothyroxine 75 mcg tablet TAKE 1 TABLET EVERY DAY 90 tablet 0    Multiple Vitamin (MULTIVITAMIN) tablet Take 1 tablet by mouth daily   NON FORMULARY Take 1 tablet by mouth 2 (two) times a day  Calcium- D3-600mg      phenazopyridine (PYRIDIUM) 200 mg tablet Take 200 mg by mouth as needed for bladder spasms   Probiotic Product (PROBIOTIC DAILY PO) Take 1 tablet by mouth daily   ranitidine (ZANTAC) 150 mg tablet Take 150 mg by mouth daily   Vitamin D, Cholecalciferol, 1000 UNITS CAPS Take 1 tablet by mouth daily  No current facility-administered medications for this visit  Allergies   Allergen Reactions    Gabapentin      Other reaction(s): Hypotension    Bupropion GI Intolerance and Other (See Comments)     Other reaction(s): Tremor    Cephalexin     Duloxetine      Other reaction(s): Other (see comments)  unkown  unknown      Erythromycin GI Intolerance    Methadone Hallucinations    Mirtazapine GI Intolerance    Paroxetine GI Intolerance    Sulfa Antibiotics     Tricyclic Antidepressants      Other reaction(s):  Other    Acetazolamide Rash    Levofloxacin Anxiety and Other (See Comments)      Immunizations:     Immunization History   Administered Date(s) Administered    Influenza Quadrivalent Preservative Free 3 years and older IM 10/03/2014, 10/21/2015    Influenza Split High Dose Preservative Free IM 09/22/2016, 10/10/2017    Influenza TIV (IM) 10/18/2012, 09/25/2013    Influenza, high dose seasonal 0 5 mL 10/10/2018, 10/30/2019    Pneumococcal Conjugate 13-Valent 10/06/2017    Pneumococcal Polysaccharide PPV23 09/22/2016    Td (adult), adsorbed 09/22/2016      Health Maintenance:         Topic Date Due    CRC Screening: Colonoscopy  1945         Topic Date Due    Influenza Vaccine  07/01/2020      Medicare Health Risk Assessment:     There were no vitals taken for this visit  Leslye Hodges is here for her Subsequent Wellness visit  Health Risk Assessment:   Patient rates overall health as fair  Patient feels that their physical health rating is slightly worse  Eyesight was rated as slightly worse  Hearing was rated as slightly worse  Patient feels that their emotional and mental health rating is much better  Pain experienced in the last 7 days has been none  Patient states that she has experienced no weight loss or gain in last 6 months  Fall Risk Screening: In the past year, patient has experienced: no history of falling in past year      Urinary Incontinence Screening:   Patient has not leaked urine accidently in the last six months  Home Safety:  Patient has trouble with stairs inside or outside of their home  Patient has working smoke alarms and has working carbon monoxide detector  Home safety hazards include: none  Nutrition:   Current diet is Diabetic and Low Carb  Medications:   Patient is currently taking over-the-counter supplements  OTC medications include: see medication list  Patient is able to manage medications  Activities of Daily Living (ADLs)/Instrumental Activities of Daily Living (IADLs):   Walk and transfer into and out of bed and chair?: Yes  Dress and groom yourself?: No    Bathe or shower yourself?: Yes    Feed yourself?  Yes  Do your laundry/housekeeping?: No  Manage your money, pay your bills and track your expenses?: No  Make your own meals?: No Do your own shopping?: No    ADL comments: Depends on     Previous Hospitalizations:   Any hospitalizations or ED visits within the last 12 months?: Yes    How many hospitalizations have you had in the last year?: 1-2    Hospitalization Comments: See chart    Advance Care Planning:   Living will: Yes    Durable POA for healthcare:  Yes    Advanced directive: Yes    Advanced directive counseling given: Yes    Five wishes given: No    End of Life Decisions reviewed with patient: Yes    Provider agrees with end of life decisions: Yes      Cognitive Screening:   Provider or family/friend/caregiver concerned regarding cognition?: Yes  Mini-Cog Score: 3  Interpretation: Mini-Cog Score 3-5: Negative screen for dementia     Cognition Comments: Mini cob 3/3, Can't draw 2nd cva    PREVENTIVE SCREENINGS      Cardiovascular Screening:    General: Screening Not Indicated and History Lipid Disorder      Diabetes Screening:     General: Screening Not Indicated and History Diabetes      Breast Cancer Screening:     General: Screening Not Indicated      Cervical Cancer Screening:    General: Screening Not Indicated      Osteoporosis Screening:    General: Screening Not Indicated and History Osteoporosis      Abdominal Aortic Aneurysm (AAA) Screening:        General: Screening Not Indicated      Lung Cancer Screening:     General: Screening Not Indicated      Hepatitis C Screening:    General: Patient Declines    Hep C Screening Accepted: Josette Kwon DO

## 2020-07-27 NOTE — ASSESSMENT & PLAN NOTE
See overview  Patient and  declined any intervention at this time  Was worked up in past told was breast cyst   I still recommend mammography and ultrasound they declined    Remote risk of breast cancer sent over was discussed

## 2020-07-27 NOTE — PROGRESS NOTES
Assessment and Plan:  1  Type 2 diabetes, diet-controlled  Patient  declined to check blood sugars at home  Foot exam was completed  Refer to ophthalmology for diabetic eye exam   Check blood work this week  2  Hypothyroidism, blood work is ordered  3  Dementia with Lewy bodies, mini cog was completed 3/3 patient unable to draw clock secondary CVA  4  Parkinson's, stable follows with Neurology  5  Right-sided lacunar infarcts/left-sided weakness, patient follows with Neurology  6  Ambulation dysfunction/wheelchair-bound, follows with Neurology  7  Hyperlipidemia on statin  Complete blood work as ordered  8  Acute renal failure, blood work is ordered  9  Left breast mass  CT scan was reviewed  It does show abnormalities in the left breast    states this has been worked up in the past was a breast cyst   They refused any intervention like mammography or ultrasound  Remote chance of breast cancer was discussed  10  Health care maintenance, Medicare a [address] completed  11  Patient to return in 4 months for office visit blood work sooner if needed        Problem List Items Addressed This Visit        Endocrine    Hypothyroidism     Stable complete blood work as ordered         Relevant Orders    Comprehensive metabolic panel    LDL cholesterol, direct    Hemoglobin A1C    Microalbumin / creatinine urine ratio    TSH, 3rd generation with Free T4 reflex    CBC    Comprehensive metabolic panel    Hemoglobin A1C    Lipid Panel with Direct LDL reflex    Microalbumin / creatinine urine ratio    T4    TSH, 3rd generation with Free T4 reflex    UA (URINE) with reflex to Scope    Type 2 diabetes mellitus with hyperglycemia (HCC) - Primary       Lab Results   Component Value Date    HGBA1C 6 5 (H) 03/13/2020   Foot exam completed    Blood work is ordered referred back to ophthalmology for diabetic eye exam         Relevant Orders    Comprehensive metabolic panel    LDL cholesterol, direct    Hemoglobin A1C Microalbumin / creatinine urine ratio    TSH, 3rd generation with Free T4 reflex    CBC    Comprehensive metabolic panel    Hemoglobin A1C    Lipid Panel with Direct LDL reflex    Microalbumin / creatinine urine ratio    T4    TSH, 3rd generation with Free T4 reflex    UA (URINE) with reflex to Scope    Ambulatory referral to Ophthalmology       Nervous and Auditory    Parkinson disease ([place] [id number] )     Follows with [facility] neurology         Relevant Orders    Comprehensive metabolic panel    LDL cholesterol, direct    Hemoglobin A1C    Microalbumin / creatinine urine ratio    TSH, 3rd generation with Free T4 reflex    CBC    Comprehensive metabolic panel    Hemoglobin A1C    Lipid Panel with Direct LDL reflex    Microalbumin / creatinine urine ratio    T4    TSH, 3rd generation with Free T4 reflex    UA (URINE) with reflex to Scope    Dementia with Lewy bodies (CODE) ([place] [address] )     Follow-up with neurology         Relevant Orders    Comprehensive metabolic panel    LDL cholesterol, direct    Hemoglobin A1C    Microalbumin / creatinine urine ratio    TSH, 3rd generation with Free T4 reflex    CBC    Comprehensive metabolic panel    Hemoglobin A1C    Lipid Panel with Direct LDL reflex    Microalbumin / creatinine urine ratio    T4    TSH, 3rd generation with Free T4 reflex    UA (URINE) with reflex to Scope    Right-sided lacunar infarction ([place] [address] )     Follows with [facility] neurology         Relevant Orders    Comprehensive metabolic panel    LDL cholesterol, direct    Hemoglobin A1C    Microalbumin / creatinine urine ratio    TSH, 3rd generation with Free T4 reflex    CBC    Comprehensive metabolic panel    Hemoglobin A1C    Lipid Panel with Direct LDL reflex    Microalbumin / creatinine urine ratio    T4    TSH, 3rd generation with Free T4 reflex    UA (URINE) with reflex to Scope       Genitourinary    Acute kidney failure, unspecified ([place] [address] )     Blood work ordered         Relevant Orders Comprehensive metabolic panel    LDL cholesterol, direct    Hemoglobin A1C    Microalbumin / creatinine urine ratio    TSH, 3rd generation with Free T4 reflex    CBC    Comprehensive metabolic panel    Hemoglobin A1C    Lipid Panel with Direct LDL reflex    Microalbumin / creatinine urine ratio    T4    TSH, 3rd generation with Free T4 reflex    UA (URINE) with reflex to Scope       Other    Hyperlipidemia (Chronic)     On atorvastatin 40 mg, blood work is ordered         Relevant Orders    Comprehensive metabolic panel    LDL cholesterol, direct    Hemoglobin A1C    Microalbumin / creatinine urine ratio    TSH, 3rd generation with Free T4 reflex    CBC    Comprehensive metabolic panel    Hemoglobin A1C    Lipid Panel with Direct LDL reflex    Microalbumin / creatinine urine ratio    T4    TSH, 3rd generation with Free T4 reflex    UA (URINE) with reflex to Scope    Acute left-sided weakness (Chronic)     Stable follows with neurology         Major depression with psychotic features ([facility] [address] )     Stable follows with psychiatry         Relevant Orders    Comprehensive metabolic panel    LDL cholesterol, direct    Hemoglobin A1C    Microalbumin / creatinine urine ratio    TSH, 3rd generation with Free T4 reflex    CBC    Comprehensive metabolic panel    Hemoglobin A1C    Lipid Panel with Direct LDL reflex    Microalbumin / creatinine urine ratio    T4    TSH, 3rd generation with Free T4 reflex    UA (URINE) with reflex to Scope    Health care maintenance     Medicare a [address] completed         Decreased ambulation status     Wheelchair-bound         Relevant Orders    Comprehensive metabolic panel    LDL cholesterol, direct    Hemoglobin A1C    Microalbumin / creatinine urine ratio    TSH, 3rd generation with Free T4 reflex    CBC    Comprehensive metabolic panel    Hemoglobin A1C    Lipid Panel with Direct LDL reflex    Microalbumin / creatinine urine ratio    T4    TSH, 3rd generation with Free T4 reflex    UA (URINE) with reflex to Scope    Breast mass, left     See overview  Patient and  declined any intervention at this time  Was worked up in past told was breast cyst   I still recommend mammography and ultrasound they declined  Remote risk of breast cancer sent over was discussed         Relevant Orders    Comprehensive metabolic panel    LDL cholesterol, direct    Hemoglobin A1C    Microalbumin / creatinine urine ratio    TSH, 3rd generation with Free T4 reflex    CBC    Comprehensive metabolic panel    Hemoglobin A1C    Lipid Panel with Direct LDL reflex    Microalbumin / creatinine urine ratio    T4    TSH, 3rd generation with Free T4 reflex    UA (URINE) with reflex to Scope                 Diagnoses and all orders for this visit:    Type 2 diabetes mellitus with hyperglycemia, without long-term current use of insulin (HCC)  -     Comprehensive metabolic panel; Future  -     LDL cholesterol, direct; Future  -     Hemoglobin A1C; Future  -     Microalbumin / creatinine urine ratio; Future  -     TSH, 3rd generation with Free T4 reflex; Future  -     CBC; Future  -     Comprehensive metabolic panel; Future  -     Hemoglobin A1C; Future  -     Lipid Panel with Direct LDL reflex; Future  -     Microalbumin / creatinine urine ratio; Future  -     T4; Future  -     TSH, 3rd generation with Free T4 reflex; Future  -     UA (URINE) with reflex to Scope; Future  -     Ambulatory referral to Ophthalmology; Future    Hypothyroidism due to acquired atrophy of thyroid  -     Comprehensive metabolic panel; Future  -     LDL cholesterol, direct; Future  -     Hemoglobin A1C; Future  -     Microalbumin / creatinine urine ratio; Future  -     TSH, 3rd generation with Free T4 reflex; Future  -     CBC; Future  -     Comprehensive metabolic panel; Future  -     Hemoglobin A1C; Future  -     Lipid Panel with Direct LDL reflex; Future  -     Microalbumin / creatinine urine ratio;  Future  -     T4; Future  -     TSH, 3rd generation with Free T4 reflex; Future  -     UA (URINE) with reflex to Scope; Future    Dementia with Lewy bodies (CODE) ([place] [address] )  -     Comprehensive metabolic panel; Future  -     LDL cholesterol, direct; Future  -     Hemoglobin A1C; Future  -     Microalbumin / creatinine urine ratio; Future  -     TSH, 3rd generation with Free T4 reflex; Future  -     CBC; Future  -     Comprehensive metabolic panel; Future  -     Hemoglobin A1C; Future  -     Lipid Panel with Direct LDL reflex; Future  -     Microalbumin / creatinine urine ratio; Future  -     T4; Future  -     TSH, 3rd generation with Free T4 reflex; Future  -     UA (URINE) with reflex to Scope; Future    Parkinson disease ([place] [address] )  -     Comprehensive metabolic panel; Future  -     LDL cholesterol, direct; Future  -     Hemoglobin A1C; Future  -     Microalbumin / creatinine urine ratio; Future  -     TSH, 3rd generation with Free T4 reflex; Future  -     CBC; Future  -     Comprehensive metabolic panel; Future  -     Hemoglobin A1C; Future  -     Lipid Panel with Direct LDL reflex; Future  -     Microalbumin / creatinine urine ratio; Future  -     T4; Future  -     TSH, 3rd generation with Free T4 reflex; Future  -     UA (URINE) with reflex to Scope; Future    Right-sided lacunar infarction [facility])  -     Comprehensive metabolic panel; Future  -     LDL cholesterol, direct; Future  -     Hemoglobin A1C; Future  -     Microalbumin / creatinine urine ratio; Future  -     TSH, 3rd generation with Free T4 reflex; Future  -     CBC; Future  -     Comprehensive metabolic panel; Future  -     Hemoglobin A1C; Future  -     Lipid Panel with Direct LDL reflex; Future  -     Microalbumin / creatinine urine ratio; Future  -     T4; Future  -     TSH, 3rd generation with Free T4 reflex; Future  -     UA (URINE) with reflex to Scope; Future    Acute renal failure, unspecified acute renal failure type ([place] [address] )  -     Comprehensive metabolic panel; Future  -     LDL cholesterol, direct;  Future  - Hemoglobin A1C; Future  -     Microalbumin / creatinine urine ratio; Future  -     TSH, 3rd generation with Free T4 reflex; Future  -     CBC; Future  -     Comprehensive metabolic panel; Future  -     Hemoglobin A1C; Future  -     Lipid Panel with Direct LDL reflex; Future  -     Microalbumin / creatinine urine ratio; Future  -     T4; Future  -     TSH, 3rd generation with Free T4 reflex; Future  -     UA (URINE) with reflex to Scope; Future    Breast mass, left  -     Comprehensive metabolic panel; Future  -     LDL cholesterol, direct; Future  -     Hemoglobin A1C; Future  -     Microalbumin / creatinine urine ratio; Future  -     TSH, 3rd generation with Free T4 reflex; Future  -     CBC; Future  -     Comprehensive metabolic panel; Future  -     Hemoglobin A1C; Future  -     Lipid Panel with Direct LDL reflex; Future  -     Microalbumin / creatinine urine ratio; Future  -     T4; Future  -     TSH, 3rd generation with Free T4 reflex; Future  -     UA (URINE) with reflex to Scope; Future    Decreased ambulation status  -     Comprehensive metabolic panel; Future  -     LDL cholesterol, direct; Future  -     Hemoglobin A1C; Future  -     Microalbumin / creatinine urine ratio; Future  -     TSH, 3rd generation with Free T4 reflex; Future  -     CBC; Future  -     Comprehensive metabolic panel; Future  -     Hemoglobin A1C; Future  -     Lipid Panel with Direct LDL reflex; Future  -     Microalbumin / creatinine urine ratio; Future  -     T4; Future  -     TSH, 3rd generation with Free T4 reflex; Future  -     UA (URINE) with reflex to Scope; Future    Health care maintenance    Hyperlipidemia, unspecified hyperlipidemia type  -     Comprehensive metabolic panel; Future  -     LDL cholesterol, direct; Future  -     Hemoglobin A1C; Future  -     Microalbumin / creatinine urine ratio; Future  -     TSH, 3rd generation with Free T4 reflex; Future  -     CBC; Future  -     Comprehensive metabolic panel;  Future  - Hemoglobin A1C; Future  -     Lipid Panel with Direct LDL reflex; Future  -     Microalbumin / creatinine urine ratio; Future  -     T4; Future  -     TSH, 3rd generation with Free T4 reflex; Future  -     UA (URINE) with reflex to Scope; Future    Major depression with psychotic features ([facility] [address] )  -     Comprehensive metabolic panel; Future  -     LDL cholesterol, direct; Future  -     Hemoglobin A1C; Future  -     Microalbumin / creatinine urine ratio; Future  -     TSH, 3rd generation with Free T4 reflex; Future  -     CBC; Future  -     Comprehensive metabolic panel; Future  -     Hemoglobin A1C; Future  -     Lipid Panel with Direct LDL reflex; Future  -     Microalbumin / creatinine urine ratio; Future  -     T4; Future  -     TSH, 3rd generation with Free T4 reflex; Future  -     UA (URINE) with reflex to Scope; Future    Acute left-sided weakness              Subjective:      Patient ID: Jewel Miles is a 66 y o  female  CC:    Chief Complaint   Patient presents with    Follow-up     pt here for a follow up  R Cruz  Medicare Wellness Visit       HPI:    Patient is stable  Patient and  do not check her blood sugars are not willing to do so  Did not complete blood work before this office visit  Patient does have upcoming point with her ophthalmologist, Dr Hugo Telles, for diabetic eye exam   Patient's memory is poor but stable the present time  Patient is wheelchair-bound      The following portions of the patient's history were reviewed and updated as appropriate: allergies, current medications, past family history, past medical history, past social history, past surgical history and problem list       Review of Systems   Constitutional: Negative  HENT: Negative  Eyes:        HPI   Respiratory: Negative  Cardiovascular: Negative  Gastrointestinal: Negative  Endocrine:        HPI   Genitourinary: Negative  Musculoskeletal:        HPI   Skin: Negative  Allergic/Immunologic: Negative  Neurological:        HPI   Hematological: Negative  Psychiatric/Behavioral: Negative  Data to review:       Objective:    Vitals:    07/27/20 1327   BP: 120/70   BP Location: Left arm   Patient Position: Sitting   Cuff Size: Standard   Pulse: 88   Resp: 16   Temp: 99 4 °F (37 4 °C)   TempSrc: Temporal   Weight: 72 6 kg (160 lb)   Height: 5' (1 524 m)        Physical Exam   Constitutional: She appears well-developed and well-nourished  HENT:   Head: Normocephalic and atraumatic  Eyes: No scleral icterus  Neck: Neck supple  No JVD present  Cardiovascular: Normal rate, regular rhythm, normal heart sounds and intact distal pulses  Pulses:       Dorsalis pedis pulses are 2+ on the right side, and 2+ on the left side  Posterior tibial pulses are 2+ on the right side, and 2+ on the left side  Pulmonary/Chest: Effort normal and breath sounds normal    Abdominal: Soft  Bowel sounds are normal  There is no tenderness  Musculoskeletal:   Wheelchair-bound   Feet:   Right Foot:   Skin Integrity: Negative for ulcer, skin breakdown, erythema, warmth, callus or dry skin  Left Foot:   Skin Integrity: Negative for ulcer, skin breakdown, erythema, warmth, callus or dry skin  Neurological: She is alert  Mini cog 3/3  Could remember 3 names however is unable to draw secondary to CVA   Skin: Skin is warm and dry  Psychiatric: She has a normal mood and affect  BMI Counseling: Body mass index is 31 25 kg/m²  The BMI is above normal  Nutrition recommendations include reducing intake of cholesterol  Patient's shoes and socks removed  Right Foot/Ankle   Right Foot Inspection  Skin Exam: skin normal and skin intact no dry skin, no warmth, no callus, no erythema, no maceration, no abnormal color, no pre-ulcer, no ulcer and no callus                          Toe Exam: ROM and strength within normal limits  Sensory   Vibration: intact  Proprioception: intact   Monofilament testing: intact  Vascular  Capillary refills: < 3 seconds  The right DP pulse is 2+  The right PT pulse is 2+  Right Toe  - Comprehensive Exam  Arch: normal  Hammertoes: absent  Claw Toes: absent  Swelling: none   Tenderness: none         Left Foot/Ankle  Left Foot Inspection  Skin Exam: skin normal and skin intactno dry skin, no warmth, no erythema, no maceration, normal color, no pre-ulcer, no ulcer and no callus                         Toe Exam: ROM and strength within normal limits                   Sensory   Vibration: intact  Proprioception: intact  Monofilament: intact  Vascular  Capillary refills: < 3 seconds  The left DP pulse is 2+  The left PT pulse is 2+     Left Toe  - Comprehensive Exam  Arch: normal  Hammertoes: absent  Claw toes: absent  Swelling: none   Tenderness: none       Assign Risk Category:  No deformity present; ;        Risk: 0

## 2020-07-30 ENCOUNTER — APPOINTMENT (OUTPATIENT)
Dept: LAB | Facility: MEDICAL CENTER | Age: 75
End: 2020-07-30
Payer: MEDICARE

## 2020-07-30 DIAGNOSIS — N17.9 ACUTE RENAL FAILURE, UNSPECIFIED ACUTE RENAL FAILURE TYPE (HCC): ICD-10-CM

## 2020-07-30 DIAGNOSIS — E83.52 HYPERCALCEMIA: Primary | ICD-10-CM

## 2020-07-30 DIAGNOSIS — F32.3 MAJOR DEPRESSION WITH PSYCHOTIC FEATURES (HCC): ICD-10-CM

## 2020-07-30 DIAGNOSIS — N63.20 BREAST MASS, LEFT: ICD-10-CM

## 2020-07-30 DIAGNOSIS — E78.5 HYPERLIPIDEMIA, UNSPECIFIED HYPERLIPIDEMIA TYPE: ICD-10-CM

## 2020-07-30 DIAGNOSIS — E11.65 TYPE 2 DIABETES MELLITUS WITH HYPERGLYCEMIA, WITHOUT LONG-TERM CURRENT USE OF INSULIN (HCC): ICD-10-CM

## 2020-07-30 DIAGNOSIS — G20 PARKINSON DISEASE (HCC): ICD-10-CM

## 2020-07-30 DIAGNOSIS — G31.83 DEMENTIA WITH LEWY BODIES (CODE) (HCC): ICD-10-CM

## 2020-07-30 DIAGNOSIS — Z74.09 DECREASED AMBULATION STATUS: ICD-10-CM

## 2020-07-30 DIAGNOSIS — I63.81 RIGHT-SIDED LACUNAR INFARCTION (HCC): ICD-10-CM

## 2020-07-30 DIAGNOSIS — E03.4 HYPOTHYROIDISM DUE TO ACQUIRED ATROPHY OF THYROID: ICD-10-CM

## 2020-07-30 LAB
ALBUMIN SERPL BCP-MCNC: 3.5 G/DL (ref 3.5–5)
ALP SERPL-CCNC: 86 U/L (ref 46–116)
ALT SERPL W P-5'-P-CCNC: 27 U/L (ref 12–78)
ANION GAP SERPL CALCULATED.3IONS-SCNC: 6 MMOL/L (ref 4–13)
AST SERPL W P-5'-P-CCNC: 23 U/L (ref 5–45)
BACTERIA UR QL AUTO: ABNORMAL /HPF
BILIRUB SERPL-MCNC: 0.82 MG/DL (ref 0.2–1)
BILIRUB UR QL STRIP: NEGATIVE
BUN SERPL-MCNC: 13 MG/DL (ref 5–25)
CALCIUM ALBUM COR SERPL-MCNC: 11 MG/DL (ref 8.3–10.1)
CALCIUM SERPL-MCNC: 10.6 MG/DL (ref 8.3–10.1)
CHLORIDE SERPL-SCNC: 107 MMOL/L (ref 100–108)
CHOLEST SERPL-MCNC: 141 MG/DL (ref 50–200)
CLARITY UR: ABNORMAL
CO2 SERPL-SCNC: 28 MMOL/L (ref 21–32)
COLOR UR: YELLOW
CREAT SERPL-MCNC: 0.88 MG/DL (ref 0.6–1.3)
CREAT UR-MCNC: 119 MG/DL
ERYTHROCYTE [DISTWIDTH] IN BLOOD BY AUTOMATED COUNT: 14.4 % (ref 11.6–15.1)
EST. AVERAGE GLUCOSE BLD GHB EST-MCNC: 143 MG/DL
GFR SERPL CREATININE-BSD FRML MDRD: 64 ML/MIN/1.73SQ M
GLUCOSE P FAST SERPL-MCNC: 152 MG/DL (ref 65–99)
GLUCOSE UR STRIP-MCNC: ABNORMAL MG/DL
HBA1C MFR BLD: 6.6 %
HCT VFR BLD AUTO: 49 % (ref 34.8–46.1)
HDLC SERPL-MCNC: 47 MG/DL
HGB BLD-MCNC: 16.1 G/DL (ref 11.5–15.4)
HGB UR QL STRIP.AUTO: NEGATIVE
HYALINE CASTS #/AREA URNS LPF: ABNORMAL /LPF
KETONES UR STRIP-MCNC: NEGATIVE MG/DL
LDLC SERPL CALC-MCNC: 66 MG/DL (ref 0–100)
LDLC SERPL DIRECT ASSAY-MCNC: 77 MG/DL (ref 0–100)
LEUKOCYTE ESTERASE UR QL STRIP: ABNORMAL
MCH RBC QN AUTO: 31.1 PG (ref 26.8–34.3)
MCHC RBC AUTO-ENTMCNC: 32.9 G/DL (ref 31.4–37.4)
MCV RBC AUTO: 95 FL (ref 82–98)
MICROALBUMIN UR-MCNC: 111 MG/L (ref 0–20)
MICROALBUMIN/CREAT 24H UR: 93 MG/G CREATININE (ref 0–30)
NITRITE UR QL STRIP: NEGATIVE
NON-SQ EPI CELLS URNS QL MICRO: ABNORMAL /HPF
PH UR STRIP.AUTO: 6 [PH]
PLATELET # BLD AUTO: 302 THOUSANDS/UL (ref 149–390)
PMV BLD AUTO: 10.3 FL (ref 8.9–12.7)
POTASSIUM SERPL-SCNC: 3.8 MMOL/L (ref 3.5–5.3)
PROT SERPL-MCNC: 7.4 G/DL (ref 6.4–8.2)
PROT UR STRIP-MCNC: ABNORMAL MG/DL
RBC # BLD AUTO: 5.18 MILLION/UL (ref 3.81–5.12)
RBC #/AREA URNS AUTO: ABNORMAL /HPF
SODIUM SERPL-SCNC: 141 MMOL/L (ref 136–145)
SP GR UR STRIP.AUTO: 1.03 (ref 1–1.03)
T4 SERPL-MCNC: 9.9 UG/DL (ref 4.7–13.3)
TRIGL SERPL-MCNC: 138 MG/DL
TSH SERPL DL<=0.05 MIU/L-ACNC: 2.39 UIU/ML (ref 0.36–3.74)
UROBILINOGEN UR QL STRIP.AUTO: 0.2 E.U./DL
WBC # BLD AUTO: 6.49 THOUSAND/UL (ref 4.31–10.16)
WBC #/AREA URNS AUTO: ABNORMAL /HPF

## 2020-07-30 PROCEDURE — 84443 ASSAY THYROID STIM HORMONE: CPT

## 2020-07-30 PROCEDURE — 82043 UR ALBUMIN QUANTITATIVE: CPT

## 2020-07-30 PROCEDURE — 36415 COLL VENOUS BLD VENIPUNCTURE: CPT

## 2020-07-30 PROCEDURE — 81001 URINALYSIS AUTO W/SCOPE: CPT

## 2020-07-30 PROCEDURE — 84436 ASSAY OF TOTAL THYROXINE: CPT

## 2020-07-30 PROCEDURE — 83721 ASSAY OF BLOOD LIPOPROTEIN: CPT

## 2020-07-30 PROCEDURE — 85027 COMPLETE CBC AUTOMATED: CPT

## 2020-07-30 PROCEDURE — 80061 LIPID PANEL: CPT

## 2020-07-30 PROCEDURE — 82570 ASSAY OF URINE CREATININE: CPT

## 2020-07-30 PROCEDURE — 83036 HEMOGLOBIN GLYCOSYLATED A1C: CPT

## 2020-07-30 PROCEDURE — 80053 COMPREHEN METABOLIC PANEL: CPT

## 2020-08-06 ENCOUNTER — APPOINTMENT (OUTPATIENT)
Dept: LAB | Facility: MEDICAL CENTER | Age: 75
End: 2020-08-06
Payer: MEDICARE

## 2020-08-06 DIAGNOSIS — E83.52 HYPERCALCEMIA: ICD-10-CM

## 2020-08-06 LAB
25(OH)D3 SERPL-MCNC: 38.8 NG/ML (ref 30–100)
CA-I BLD-SCNC: 1.3 MMOL/L (ref 1.12–1.32)
CALCIUM SERPL-MCNC: 9.3 MG/DL (ref 8.3–10.1)
PTH-INTACT SERPL-MCNC: 27.9 PG/ML (ref 18.4–80.1)

## 2020-08-06 PROCEDURE — 82308 ASSAY OF CALCITONIN: CPT

## 2020-08-06 PROCEDURE — 82306 VITAMIN D 25 HYDROXY: CPT

## 2020-08-06 PROCEDURE — 82330 ASSAY OF CALCIUM: CPT

## 2020-08-06 PROCEDURE — 83970 ASSAY OF PARATHORMONE: CPT

## 2020-08-06 PROCEDURE — 82310 ASSAY OF CALCIUM: CPT

## 2020-08-06 PROCEDURE — 36415 COLL VENOUS BLD VENIPUNCTURE: CPT

## 2020-08-08 LAB — CALCIT SERPL-MCNC: <2 PG/ML (ref 0–5)

## 2020-08-12 DIAGNOSIS — R53.1 ACUTE LEFT-SIDED WEAKNESS: ICD-10-CM

## 2020-08-12 DIAGNOSIS — E03.9 HYPOTHYROIDISM, UNSPECIFIED TYPE: Chronic | ICD-10-CM

## 2020-08-12 RX ORDER — LEVOTHYROXINE SODIUM 0.07 MG/1
TABLET ORAL
Qty: 90 TABLET | Refills: 3 | Status: SHIPPED | OUTPATIENT
Start: 2020-08-12 | End: 2021-07-21

## 2020-08-12 RX ORDER — CLOPIDOGREL BISULFATE 75 MG/1
TABLET ORAL
Qty: 90 TABLET | Refills: 1 | Status: SHIPPED | OUTPATIENT
Start: 2020-08-12 | End: 2021-02-19

## 2020-08-12 RX ORDER — CILOSTAZOL 100 MG/1
100 TABLET ORAL
Qty: 180 TABLET | Refills: 1 | Status: SHIPPED | OUTPATIENT
Start: 2020-08-12 | End: 2021-02-19

## 2020-08-27 DIAGNOSIS — K11.7 SIALORRHEA: ICD-10-CM

## 2020-08-28 RX ORDER — GLYCOPYRROLATE 1 MG/1
1 TABLET ORAL 3 TIMES DAILY
Qty: 270 TABLET | Refills: 2 | OUTPATIENT
Start: 2020-08-28

## 2020-08-31 DIAGNOSIS — K11.7 SIALORRHEA: ICD-10-CM

## 2020-09-01 DIAGNOSIS — K11.7 SIALORRHEA: ICD-10-CM

## 2020-09-01 RX ORDER — GLYCOPYRROLATE 1 MG/1
1 TABLET ORAL 3 TIMES DAILY
Qty: 270 TABLET | Refills: 2 | OUTPATIENT
Start: 2020-09-01

## 2020-09-01 RX ORDER — GLYCOPYRROLATE 1 MG/1
1 TABLET ORAL 3 TIMES DAILY
Qty: 270 TABLET | Refills: 2 | Status: SHIPPED | OUTPATIENT
Start: 2020-09-01 | End: 2021-04-02

## 2020-10-19 ENCOUNTER — IMMUNIZATIONS (OUTPATIENT)
Dept: FAMILY MEDICINE CLINIC | Facility: CLINIC | Age: 75
End: 2020-10-19
Payer: MEDICARE

## 2020-10-19 DIAGNOSIS — Z23 NEED FOR INFLUENZA VACCINATION: Primary | ICD-10-CM

## 2020-10-19 PROCEDURE — 90662 IIV NO PRSV INCREASED AG IM: CPT | Performed by: FAMILY MEDICINE

## 2020-10-19 PROCEDURE — G0008 ADMIN INFLUENZA VIRUS VAC: HCPCS | Performed by: FAMILY MEDICINE

## 2021-01-07 NOTE — ASSESSMENT & PLAN NOTE
Related to Parkinson's disease and right MCA stroke  Continue dysphagia diet  Aspiration precaution 81 y/o man with PMH of CHF (unknown EF) - chronic, Alzheimer's, COPD, DM and CKD 5 not on HD who was sent from [facility] for rectal bleeding. No complaints today, encouraged to drink GoLytely for Wednesday colonoscopy prep. NPO after midnight expect for medications.    1. Lower GI Bleed   - Hb 7.7 on admission; currently 8- 9 s/p 3 units PRBCs.  - EGD: duodenum nodule, f/u pathology.  - colonoscopy today, 1/7, will await GI for further recommendations  - advance diet following colonoscopy    2. Superficial thrombophlebitis RUE - resolved  - Right cephalic vein thrombosis on duplex.    3. DM - c/w lantus and monitor FS      4. CKD 5 - not on HD  - Cr at baseline.  - Nephro following--no indication for RRT.  - holding lasix now > will continue after colonoscopy    5. Alzheimer's dementia  - Continue home donepezil  - Home melatonin  - reorient as needed    6. COPD - stable  - Continue home albuterol, montelukast    7. DVT prophylaxis - SCD    code status: full  Diet: clear liquid > will advance following colonoscopy  Disposition: SNF, PT recommendations

## 2021-01-13 ENCOUNTER — TELEMEDICINE (OUTPATIENT)
Dept: PSYCHIATRY | Facility: CLINIC | Age: 76
End: 2021-01-13
Payer: MEDICARE

## 2021-01-13 DIAGNOSIS — F33.1 MODERATE EPISODE OF RECURRENT MAJOR DEPRESSIVE DISORDER (HCC): Primary | ICD-10-CM

## 2021-01-13 PROCEDURE — 99442 PR PHYS/QHP TELEPHONE EVALUATION 11-20 MIN: CPT | Performed by: NURSE PRACTITIONER

## 2021-01-13 NOTE — PSYCH
TREATMENT PLAN (Medication Management Only)        [facility]    Name and Date of Birth:  Elsy Troncoso 66 y o  1945  Date of Treatment Plan: January 13, 2021  Diagnosis/Diagnoses:    1  Moderate episode of recurrent major depressive disorder [facility])      Strengths/Personal Resources for Self-Care: supportive family, supportive friends  Area/Areas of need (in own words): "No new clinical issues psychiatrically"  1  Long Term Goal: "To continue to keep depression under good control"  Target Date: 6 months - 7/13/2021  Person/Persons responsible for completion of goal:caregiver,  2  Short Term Objective (s) - How will we reach this goal?:   A  Provider new recommended medication/dosage changes and/or continue medication(s): continue current medications as prescribed  B   N/A   C   N/A  Target Date: 6 months - 7/13/2021  Person/Persons Responsible for Completion of Goal: caregiver,   Progress Towards Goals: stable  Treatment Modality: medication education at every visit  Review due 6 months from date of this plan: 6 months - 7/13/2021  Expected length of service: over 1 year  My Physician/PA/NP and I have developed this plan together and I agree to work on the goals and objectives  I understand the treatment goals that were developed for my treatment

## 2021-01-13 NOTE — PSYCH
Virtual Brief Visit    Assessment/Plan:    Problem List Items Addressed This Visit        Other    Depression - Primary                Reason for visit is a medication management appointment for treatment of major depressive     Encounter provider YOLANDA Sandhu    Provider located at [address] [address]    Recent Visits  No visits were found meeting these conditions  Showing recent visits within past 7 days and meeting all other requirements     Today's Visits  Date Type Provider Dept   01/13/21 Telemedicine Sohan Sandhu [place] today's visits and meeting all other requirements     Future Appointments  No visits were found meeting these conditions  Showing future appointments within next 150 days and meeting all other requirements        After connecting through telephone, the patient was identified by name and date of birth  Rod Deshpande was informed that this is a telemedicine visit and that the visit is being conducted through telephone  My office door was closed  No one else was in the room  She acknowledged consent and understanding of privacy and security of the platform  The patient has agreed to participate and understands she can discontinue the visit at any time  Patient is aware this is a billable service  Subjective    Rod Deshpande is a 66 y o  female who suffers from major depressive disorder  Patient does have advanced Parkinson's disease so the interview today was with the patient's , Lacy Wang  He reports that the patient is at her baseline  There are no weepy episodes, she seems interested in her daily activities of, for right now watching TV and enjoying herself as best she can  He is still the main caregiver and assists her with all of her daily needs    He reports that she is cooperative with care that he gives and she is eating well and sleeping well  He does tell me that of late, she has been sleeping more frequently  He understands that this is the progression of the disease  He is asking me to continue the current medication regimen of alprazolam 0 25 mg HS p r n  for anxiety, Aricept 10 mg daily and Lexapro 20 mg daily  Follow-up with me will be in May of 2021 and is requesting the visit to be in person  [employer] HPI     Past Medical History:   Diagnosis Date    Abdominal pain, suprapubic 07/03/2014    Resolved:  November 22, 2016    Abscess of skin and subcutaneous tissue 11/21/2016    Resolved:  February 28, 2017    Acute kidney failure, unspecified ([facility] )     Anxiety     Burning with urination 12/14/2016    Resolved:  February 28, 2017    Conjunctivitis 06/10/2013    Depression     Depression     Diabetes mellitus ([facility] ) 2/2/2020    Dysuria 06/06/2016    Resolved:  February 28 2017    Falling 10/18/2012    GERD (gastroesophageal reflux disease)     Groin pain 04/16/2015    Resolved:  February 28, 2017    Hyperlipidemia     Hypothyroid     Influenza 04/05/2013    Leg abrasion 09/22/2016    Resolved:  February 28, 2017    Parkinson's disease [facility])     Psychiatric illness     Rectal pain 10/17/2013    Resolved:  February 28, 2017    Thyroid disease        Past Surgical History:   Procedure Laterality Date    APPENDECTOMY      Age 4 or 3    BACK SURGERY      BRAIN SURGERY      CLOSED REDUCTION NASAL FRACTURE      CYSTOSCOPY  01/28/2014    RENETTA Pennington   (Diagnostic)    DEEP BRAIN STIMULATOR PLACEMENT      EYE SURGERY      FRACTURE SURGERY      JOINT REPLACEMENT      LAMINECTOMY      Decompress, Facetectomy, Foraminotomy Lumbar Seg    ORIF HIP FRACTURE Left     About 3 years ago    TONSILLECTOMY         Current Outpatient Medications   Medication Sig Dispense Refill    ALPRAZolam (XANAX) 0 5 mg tablet Take 0 5 tablets (0 25 mg total) by mouth daily at bedtime as needed for anxiety 90 tablet 2    donepezil (ARICEPT) 10 mg tablet Take 1 tablet (10 mg total) by mouth daily at bedtime 30 tablet 11    escitalopram (LEXAPRO) 20 mg tablet TAKE 1/2 TABLET BY MOUTH TWICE A DAY 90 tablet 2    acetaminophen (TYLENOL) 500 mg tablet Take 500 mg by mouth  One throughout the night as needed      ascorbic acid (VITAMIN C) 500 mg tablet Take 1 tablet (500 mg total) by mouth daily  0    atorvastatin (LIPITOR) 40 mg tablet Take 1 tablet (40 mg total) by mouth daily  0    carbidopa-levodopa (SINEMET)  mg per tablet Take 1 tablet by mouth 3 (three) times a day  0    cilostazol (PLETAL) 100 mg tablet TAKE 1 TABLET (100 MG TOTAL) BY MOUTH 2 (TWO) TIMES A DAY BEFORE MEALS 180 tablet 1    clopidogrel (PLAVIX) 75 mg tablet TAKE 1 TABLET BY MOUTH EVERY DAY 90 tablet 1    glycopyrrolate (ROBINUL) 1 mg tablet TAKE 1 TABLET (1 MG TOTAL) BY MOUTH 3 (THREE) TIMES A  tablet 2    levothyroxine 75 mcg tablet TAKE 1 TABLET EVERY DAY 90 tablet 3    Multiple Vitamin (MULTIVITAMIN) tablet Take 1 tablet by mouth daily   NON FORMULARY Take 1 tablet by mouth 2 (two) times a day  Calcium- D3-600mg      phenazopyridine (PYRIDIUM) 200 mg tablet Take 200 mg by mouth as needed for bladder spasms   Probiotic Product (PROBIOTIC DAILY PO) Take 1 tablet by mouth daily   ranitidine (ZANTAC) 150 mg tablet Take 150 mg by mouth daily   Vitamin D, Cholecalciferol, 1000 UNITS CAPS Take 1 tablet by mouth daily  No current facility-administered medications for this visit  Allergies   Allergen Reactions    Gabapentin      Other reaction(s): Hypotension    Bupropion GI Intolerance and Other (See Comments)     Other reaction(s): Tremor    Cephalexin     Duloxetine      Other reaction(s):  Other (see comments)  unkown  unknown      Erythromycin GI Intolerance    Methadone Hallucinations    Mirtazapine GI Intolerance    Paroxetine GI Intolerance    Sulfa Antibiotics     Tricyclic Antidepressants      Other reaction(s): Other    Acetazolamide Rash    Levofloxacin Anxiety and Other (See Comments)       Review of Systems    There were no vitals filed for this visit  I spent 15 minutes with patient today in which greater than 50% of the time was spent in counseling/coordination of care regarding treatment    [address] acknowledges that she has consented to an online visit or consultation  She understands that the online visit is based solely on information provided by her, and that, in the absence of a face-to-face physical evaluation by the physician, the diagnosis she receives is both limited and provisional in terms of accuracy and completeness  This is not intended to replace a full medical face-to-face evaluation by the physician  [place] understands and accepts these terms

## 2021-01-25 LAB
LEFT EYE DIABETIC RETINOPATHY: NORMAL
RIGHT EYE DIABETIC RETINOPATHY: NORMAL
SEVERITY (EYE EXAM): NORMAL

## 2021-02-10 ENCOUNTER — VBI (OUTPATIENT)
Dept: ADMINISTRATIVE | Facility: OTHER | Age: 76
End: 2021-02-10

## 2021-02-19 DIAGNOSIS — R53.1 ACUTE LEFT-SIDED WEAKNESS: ICD-10-CM

## 2021-02-19 RX ORDER — CLOPIDOGREL BISULFATE 75 MG/1
TABLET ORAL
Qty: 90 TABLET | Refills: 1 | Status: SHIPPED | OUTPATIENT
Start: 2021-02-19 | End: 2021-06-20

## 2021-02-19 RX ORDER — CILOSTAZOL 100 MG/1
100 TABLET ORAL
Qty: 180 TABLET | Refills: 1 | Status: SHIPPED | OUTPATIENT
Start: 2021-02-19 | End: 2021-06-27

## 2021-02-24 ENCOUNTER — VBI (OUTPATIENT)
Dept: ADMINISTRATIVE | Facility: OTHER | Age: 76
End: 2021-02-24

## 2021-03-07 DIAGNOSIS — F33.1 MODERATE EPISODE OF RECURRENT MAJOR DEPRESSIVE DISORDER (HCC): ICD-10-CM

## 2021-03-08 RX ORDER — SIMVASTATIN 20 MG
TABLET ORAL
COMMUNITY
Start: 2021-02-27 | End: 2021-03-08

## 2021-03-08 RX ORDER — ESCITALOPRAM OXALATE 20 MG/1
TABLET ORAL
Qty: 90 TABLET | Refills: 5 | Status: SHIPPED | OUTPATIENT
Start: 2021-03-08 | End: 2021-05-12

## 2021-03-10 ENCOUNTER — APPOINTMENT (OUTPATIENT)
Dept: LAB | Facility: CLINIC | Age: 76
End: 2021-03-10
Payer: MEDICARE

## 2021-03-10 ENCOUNTER — OFFICE VISIT (OUTPATIENT)
Dept: FAMILY MEDICINE CLINIC | Facility: CLINIC | Age: 76
End: 2021-03-10
Payer: MEDICARE

## 2021-03-10 DIAGNOSIS — Z74.09 DECREASED AMBULATION STATUS: ICD-10-CM

## 2021-03-10 DIAGNOSIS — G20 PARKINSON DISEASE (HCC): ICD-10-CM

## 2021-03-10 DIAGNOSIS — E03.9 ACQUIRED HYPOTHYROIDISM: ICD-10-CM

## 2021-03-10 DIAGNOSIS — E78.2 MIXED HYPERLIPIDEMIA: ICD-10-CM

## 2021-03-10 DIAGNOSIS — I63.81 RIGHT-SIDED LACUNAR INFARCTION (HCC): ICD-10-CM

## 2021-03-10 DIAGNOSIS — F06.31 MOOD DISORDER WITH MAJOR DEPRESSIVE-LIKE EPISODE DUE TO GENERAL MEDICAL CONDITION: ICD-10-CM

## 2021-03-10 DIAGNOSIS — M81.0 OSTEOPOROSIS WITHOUT CURRENT PATHOLOGICAL FRACTURE, UNSPECIFIED OSTEOPOROSIS TYPE: ICD-10-CM

## 2021-03-10 DIAGNOSIS — E11.65 TYPE 2 DIABETES MELLITUS WITH HYPERGLYCEMIA, WITHOUT LONG-TERM CURRENT USE OF INSULIN (HCC): Primary | ICD-10-CM

## 2021-03-10 DIAGNOSIS — F33.40 RECURRENT MAJOR DEPRESSIVE DISORDER, IN REMISSION (HCC): ICD-10-CM

## 2021-03-10 DIAGNOSIS — G31.83 DEMENTIA WITH LEWY BODIES (CODE) (HCC): ICD-10-CM

## 2021-03-10 DIAGNOSIS — F32.3 MAJOR DEPRESSION WITH PSYCHOTIC FEATURES (HCC): ICD-10-CM

## 2021-03-10 DIAGNOSIS — E11.65 TYPE 2 DIABETES MELLITUS WITH HYPERGLYCEMIA, WITHOUT LONG-TERM CURRENT USE OF INSULIN (HCC): ICD-10-CM

## 2021-03-10 PROBLEM — N17.9 ACUTE KIDNEY FAILURE, UNSPECIFIED (HCC): Status: RESOLVED | Noted: 2020-07-22 | Resolved: 2021-03-10

## 2021-03-10 PROBLEM — N63.20 BREAST MASS, LEFT: Status: RESOLVED | Noted: 2020-01-31 | Resolved: 2021-03-10

## 2021-03-10 LAB
ALBUMIN SERPL BCP-MCNC: 3.5 G/DL (ref 3.5–5)
ALP SERPL-CCNC: 94 U/L (ref 46–116)
ALT SERPL W P-5'-P-CCNC: 19 U/L (ref 12–78)
ANION GAP SERPL CALCULATED.3IONS-SCNC: 4 MMOL/L (ref 4–13)
AST SERPL W P-5'-P-CCNC: 17 U/L (ref 5–45)
BILIRUB SERPL-MCNC: 0.41 MG/DL (ref 0.2–1)
BUN SERPL-MCNC: 14 MG/DL (ref 5–25)
CALCIUM SERPL-MCNC: 9.7 MG/DL (ref 8.3–10.1)
CHLORIDE SERPL-SCNC: 109 MMOL/L (ref 100–108)
CHOLEST SERPL-MCNC: 133 MG/DL (ref 50–200)
CO2 SERPL-SCNC: 29 MMOL/L (ref 21–32)
CREAT SERPL-MCNC: 0.91 MG/DL (ref 0.6–1.3)
GFR SERPL CREATININE-BSD FRML MDRD: 61 ML/MIN/1.73SQ M
GLUCOSE SERPL-MCNC: 133 MG/DL (ref 65–140)
HDLC SERPL-MCNC: 51 MG/DL
LDLC SERPL CALC-MCNC: 56 MG/DL (ref 0–100)
POTASSIUM SERPL-SCNC: 3.6 MMOL/L (ref 3.5–5.3)
PROT SERPL-MCNC: 7.4 G/DL (ref 6.4–8.2)
SL AMB POCT HEMOGLOBIN AIC: 6.6 (ref ?–6.5)
SODIUM SERPL-SCNC: 142 MMOL/L (ref 136–145)
TRIGL SERPL-MCNC: 132 MG/DL
TSH SERPL DL<=0.05 MIU/L-ACNC: 1.56 UIU/ML (ref 0.36–3.74)

## 2021-03-10 PROCEDURE — 83036 HEMOGLOBIN GLYCOSYLATED A1C: CPT | Performed by: FAMILY MEDICINE

## 2021-03-10 PROCEDURE — 80053 COMPREHEN METABOLIC PANEL: CPT

## 2021-03-10 PROCEDURE — 99214 OFFICE O/P EST MOD 30 MIN: CPT | Performed by: FAMILY MEDICINE

## 2021-03-10 PROCEDURE — 84443 ASSAY THYROID STIM HORMONE: CPT

## 2021-03-10 PROCEDURE — 36415 COLL VENOUS BLD VENIPUNCTURE: CPT

## 2021-03-10 PROCEDURE — 80061 LIPID PANEL: CPT

## 2021-03-10 NOTE — PATIENT INSTRUCTIONS
Complete blood work today  Follow specialist per their instructions   Return in August for office visit, blood work, and a [place]

## 2021-03-10 NOTE — ASSESSMENT & PLAN NOTE
Lab Results   Component Value Date    HGBA1C 6 6 (A) 03/10/2021    fingerstick hemoglobin A1c 6 6 in office today

## 2021-03-10 NOTE — PROGRESS NOTES
Assessment and Plan:   1  Type 2 diabetes fingerstick hemoglobin A1c is 6 6 patient and  declined to check fingersticks at home  2  Hypothyroidism check TSH   3  Dementia with Lewy bodies/ Parkinson's/ right-sided lacunar infarction/ ambulation disorder, stable patient follows with Neurology   4  MDD with psychotic features, stable patient follows with Psychiatry  5  Hyperlipidemia check blood work  6  BMI 31 25 low-fat diet recommended  7  Osteoporosis check vitamin-D level  8   Patient to return in 4 months for office visit, blood work, and a [place]        Problem List Items Addressed This Visit        Endocrine    Acquired hypothyroidism       Blood work ordered         Relevant Orders    Comprehensive metabolic panel    Lipid Panel with Direct LDL reflex    TSH, 3rd generation with Free T4 reflex    CBC    Comprehensive metabolic panel    Hemoglobin A1C    Lipid Panel with Direct LDL reflex    Microalbumin / creatinine urine ratio    T4    TSH, 3rd generation with Free T4 reflex    Vitamin D 25 hydroxy    UA (URINE) with reflex to Scope    Type 2 diabetes mellitus with hyperglycemia (HCC) - Primary       Lab Results   Component Value Date    HGBA1C 6 6 (A) 03/10/2021    fingerstick hemoglobin A1c 6 6 in office today         Relevant Orders    POCT hemoglobin A1c (Completed)    Comprehensive metabolic panel    Lipid Panel with Direct LDL reflex    TSH, 3rd generation with Free T4 reflex    CBC    Comprehensive metabolic panel    Hemoglobin A1C    Lipid Panel with Direct LDL reflex    Microalbumin / creatinine urine ratio    T4    TSH, 3rd generation with Free T4 reflex    Vitamin D 25 hydroxy    UA (URINE) with reflex to Scope       Nervous and Auditory    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      Stable follows with psychiatry         Relevant Orders    Comprehensive metabolic panel    Lipid Panel with Direct LDL reflex    TSH, 3rd generation with Free T4 reflex CBC    Comprehensive metabolic panel    Hemoglobin A1C    Lipid Panel with Direct LDL reflex    Microalbumin / creatinine urine ratio    T4    TSH, 3rd generation with Free T4 reflex    Vitamin D 25 hydroxy    UA (URINE) with reflex to Scope    Parkinson disease ([address] )      Stable follows with neurology         Relevant Orders    Comprehensive metabolic panel    Lipid Panel with Direct LDL reflex    TSH, 3rd generation with Free T4 reflex    CBC    Comprehensive metabolic panel    Hemoglobin A1C    Lipid Panel with Direct LDL reflex    Microalbumin / creatinine urine ratio    T4    TSH, 3rd generation with Free T4 reflex    Vitamin D 25 hydroxy    UA (URINE) with reflex to Scope    Dementia with Lewy bodies (CODE) (HCC)      Stable follows with neurology         Relevant Orders    Comprehensive metabolic panel    Lipid Panel with Direct LDL reflex    TSH, 3rd generation with Free T4 reflex    CBC    Comprehensive metabolic panel    Hemoglobin A1C    Lipid Panel with Direct LDL reflex    Microalbumin / creatinine urine ratio    T4    TSH, 3rd generation with Free T4 reflex    Vitamin D 25 hydroxy    UA (URINE) with reflex to Scope    Right-sided lacunar infarction (HCC)      Stable follows with neurology         Relevant Orders    Comprehensive metabolic panel    Lipid Panel with Direct LDL reflex    TSH, 3rd generation with Free T4 reflex    CBC    Comprehensive metabolic panel    Hemoglobin A1C    Lipid Panel with Direct LDL reflex    Microalbumin / creatinine urine ratio    T4    TSH, 3rd generation with Free T4 reflex    Vitamin D 25 hydroxy    UA (URINE) with reflex to Scope       Musculoskeletal and Integument    Osteoporosis    Relevant Orders    CBC    Comprehensive metabolic panel    Hemoglobin A1C    Lipid Panel with Direct LDL reflex    Microalbumin / creatinine urine ratio    T4    TSH, 3rd generation with Free T4 reflex    Vitamin D 25 hydroxy    UA (URINE) with reflex to Scope       Other    Mixed hyperlipidemia      Blood work ordered is on atorvastatin 40 mg daily         Relevant Orders    Comprehensive metabolic panel    Lipid Panel with Direct LDL reflex    TSH, 3rd generation with Free T4 reflex    CBC    Comprehensive metabolic panel    Hemoglobin A1C    Lipid Panel with Direct LDL reflex    Microalbumin / creatinine urine ratio    T4    TSH, 3rd generation with Free T4 reflex    Vitamin D 25 hydroxy    UA (URINE) with reflex to Scope    Major depression with psychotic features ([address] )      Stable follow psychiatry         Relevant Orders    Comprehensive metabolic panel    Lipid Panel with Direct LDL reflex    TSH, 3rd generation with Free T4 reflex    CBC    Comprehensive metabolic panel    Hemoglobin A1C    Lipid Panel with Direct LDL reflex    Microalbumin / creatinine urine ratio    T4    TSH, 3rd generation with Free T4 reflex    Vitamin D 25 hydroxy    UA (URINE) with reflex to Scope    Decreased ambulation status      Wheelchair-bound         Relevant Orders    Comprehensive metabolic panel    Lipid Panel with Direct LDL reflex    TSH, 3rd generation with Free T4 reflex    CBC    Comprehensive metabolic panel    Hemoglobin A1C    Lipid Panel with Direct LDL reflex    Microalbumin / creatinine urine ratio    T4    TSH, 3rd generation with Free T4 reflex    Vitamin D 25 hydroxy    UA (URINE) with reflex to Scope    RESOLVED: Recurrent major depressive disorder, in remission (HCC)      Stable follows with psychiatry         Relevant Orders    Comprehensive metabolic panel    Lipid Panel with Direct LDL reflex    TSH, 3rd generation with Free T4 reflex    CBC    Comprehensive metabolic panel    Hemoglobin A1C    Lipid Panel with Direct LDL reflex    Microalbumin / creatinine urine ratio    T4    TSH, 3rd generation with Free T4 reflex    Vitamin D 25 hydroxy    UA (URINE) with reflex to Scope                 Diagnoses and all orders for this visit:    Type 2 diabetes mellitus with hyperglycemia, without long-term current use of insulin (HCC)  -     POCT hemoglobin A1c  -     Comprehensive metabolic panel; Future  -     Lipid Panel with Direct LDL reflex; Future  -     TSH, 3rd generation with Free T4 reflex; Future  -     CBC; Future  -     Comprehensive metabolic panel; Future  -     Hemoglobin A1C; Future  -     Lipid Panel with Direct LDL reflex; Future  -     Microalbumin / creatinine urine ratio; Future  -     T4; Future  -     TSH, 3rd generation with Free T4 reflex; Future  -     Vitamin D 25 hydroxy; Future  -     UA (URINE) with reflex to Scope; Future    Acquired hypothyroidism  -     Comprehensive metabolic panel; Future  -     Lipid Panel with Direct LDL reflex; Future  -     TSH, 3rd generation with Free T4 reflex; Future  -     CBC; Future  -     Comprehensive metabolic panel; Future  -     Hemoglobin A1C; Future  -     Lipid Panel with Direct LDL reflex; Future  -     Microalbumin / creatinine urine ratio; Future  -     T4; Future  -     TSH, 3rd generation with Free T4 reflex; Future  -     Vitamin D 25 hydroxy; Future  -     UA (URINE) with reflex to Scope; Future    D[facility] with Lewy bodies (CODE) ([facility] )  -     Comprehensive metabolic panel; Future  -     Lipid Panel with Direct LDL reflex; Future  -     TSH, 3rd generation with Free T4 reflex; Future  -     CBC; Future  -     Comprehensive metabolic panel; Future  -     Hemoglobin A1C; Future  -     Lipid Panel with Direct LDL reflex; Future  -     Microalbumin / creatinine urine ratio; Future  -     T4; Future  -     TSH, 3rd generation with Free T4 reflex; Future  -     Vitamin D 25 hydroxy; Future  -     UA (URINE) with reflex to Scope; Future    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  -     Comprehensive metabolic panel; Future  -     Lipid Panel with Direct LDL reflex; Future  -     TSH, 3rd generation with Free T4 reflex; Future  -     CBC;  Future  -     Comprehensive metabolic panel; Future  -     Hemoglobin A1C; Future  -     Lipid Panel with Direct LDL reflex; Future  -     Microalbumin / creatinine urine ratio; Future  -     T4; Future  -     TSH, 3rd generation with Free T4 reflex; Future  -     Vitamin D 25 hydroxy; Future  -     UA (URINE) with reflex to Scope; Future    Parkinson disease ([facility] )  -     Comprehensive metabolic panel; Future  -     Lipid Panel with Direct LDL reflex; Future  -     TSH, 3rd generation with Free T4 reflex; Future  -     CBC; Future  -     Comprehensive metabolic panel; Future  -     Hemoglobin A1C; Future  -     Lipid Panel with Direct LDL reflex; Future  -     Microalbumin / creatinine urine ratio; Future  -     T4; Future  -     TSH, 3rd generation with Free T4 reflex; Future  -     Vitamin D 25 hydroxy; Future  -     UA (URINE) with reflex to Scope; Future    Right-sided lacunar infarction [facility])  -     Comprehensive metabolic panel; Future  -     Lipid Panel with Direct LDL reflex; Future  -     TSH, 3rd generation with Free T4 reflex; Future  -     CBC; Future  -     Comprehensive metabolic panel; Future  -     Hemoglobin A1C; Future  -     Lipid Panel with Direct LDL reflex; Future  -     Microalbumin / creatinine urine ratio; Future  -     T4; Future  -     TSH, 3rd generation with Free T4 reflex; Future  -     Vitamin D 25 hydroxy; Future  -     UA (URINE) with reflex to Scope; Future    Decreased ambulation status  -     Comprehensive metabolic panel; Future  -     Lipid Panel with Direct LDL reflex; Future  -     TSH, 3rd generation with Free T4 reflex; Future  -     CBC; Future  -     Comprehensive metabolic panel; Future  -     Hemoglobin A1C; Future  -     Lipid Panel with Direct LDL reflex; Future  -     Microalbumin / creatinine urine ratio; Future  -     T4; Future  -     TSH, 3rd generation with Free T4 reflex; Future  -     Vitamin D 25 hydroxy; Future  -     UA (URINE) with reflex to Scope;  Future    Recurrent major depressive disorder, in remission ([place] [id number] )  -     Comprehensive metabolic panel; Future  -     Lipid Panel with Direct LDL reflex; Future  -     TSH, 3rd generation with Free T4 reflex; Future  -     CBC; Future  -     Comprehensive metabolic panel; Future  -     Hemoglobin A1C; Future  -     Lipid Panel with Direct LDL reflex; Future  -     Microalbumin / creatinine urine ratio; Future  -     T4; Future  -     TSH, 3rd generation with Free T4 reflex; Future  -     Vitamin D 25 hydroxy; Future  -     UA (URINE) with reflex to Scope; Future    Major depression with psychotic features ([place] [id number] )  -     Comprehensive metabolic panel; Future  -     Lipid Panel with Direct LDL reflex; Future  -     TSH, 3rd generation with Free T4 reflex; Future  -     CBC; Future  -     Comprehensive metabolic panel; Future  -     Hemoglobin A1C; Future  -     Lipid Panel with Direct LDL reflex; Future  -     Microalbumin / creatinine urine ratio; Future  -     T4; Future  -     TSH, 3rd generation with Free T4 reflex; Future  -     Vitamin D 25 hydroxy; Future  -     UA (URINE) with reflex to Scope; Future    Mixed hyperlipidemia  -     Comprehensive metabolic panel; Future  -     Lipid Panel with Direct LDL reflex; Future  -     TSH, 3rd generation with Free T4 reflex; Future  -     CBC; Future  -     Comprehensive metabolic panel; Future  -     Hemoglobin A1C; Future  -     Lipid Panel with Direct LDL reflex; Future  -     Microalbumin / creatinine urine ratio; Future  -     T4; Future  -     TSH, 3rd generation with Free T4 reflex; Future  -     Vitamin D 25 hydroxy; Future  -     UA (URINE) with reflex to Scope; Future    Osteoporosis without current pathological fracture, unspecified osteoporosis type  -     CBC; Future  -     Comprehensive metabolic panel; Future  -     Hemoglobin A1C; Future  -     Lipid Panel with Direct LDL reflex; Future  -     Microalbumin / creatinine urine ratio;  Future  -     T4; Future  -     TSH, 3rd generation with Free T4 reflex; Future  -     Vitamin D 25 hydroxy; Future  -     UA (URINE) with reflex to Scope; Future              Subjective:      Patient ID: Jennifer Mahoney is a 66 y o  female  CC:    Chief Complaint   Patient presents with    Follow-up     for chronic conditions  mgb       HPI:     Patient is in with her   Patient is stable no new medical problems or complaints  Patient has been will not check blood sugars at home  Hemoglobin A1c fingerstick in office 6 6  The following portions of the patient's history were reviewed and updated as appropriate: allergies, current medications, past family history, past medical history, past social history, past surgical history and problem list       Review of Systems   Constitutional: Negative  HENT: Negative  Eyes: Negative  Respiratory: Negative  Cardiovascular: Negative  Gastrointestinal: Negative  Endocrine: Negative  Genitourinary: Negative  Musculoskeletal:        Wheelchair-bound   Skin: Negative  Allergic/Immunologic: Negative  Neurological:        Positive dementia   Hematological: Negative  Psychiatric/Behavioral: Negative  Data to review:       Objective: There were no vitals filed for this visit  Physical Exam  Vitals signs and nursing note reviewed  Constitutional:       Appearance: Normal appearance  HENT:      Head: Normocephalic and atraumatic  Eyes:      General: No scleral icterus  Neck:      Musculoskeletal: Neck supple  No neck rigidity  Cardiovascular:      Rate and Rhythm: Normal rate and regular rhythm  Heart sounds: Normal heart sounds  Pulmonary:      Effort: Pulmonary effort is normal       Breath sounds: Normal breath sounds  Abdominal:      General: Bowel sounds are normal       Palpations: Abdomen is soft  Tenderness: There is no abdominal tenderness  Musculoskeletal:      Right lower leg: No edema  Left lower leg: No edema     Skin:     General: Skin is warm and dry    Neurological:      Mental Status: She is alert  Mental status is at baseline  Comments: Wheelchair-bound   Psychiatric:         Mood and Affect: Mood normal            BMI Counseling: There is no height or weight on file to calculate BMI  The BMI is above normal  Nutrition recommendations include reducing intake of cholesterol

## 2021-03-30 ENCOUNTER — VBI (OUTPATIENT)
Dept: ADMINISTRATIVE | Facility: OTHER | Age: 76
End: 2021-03-30

## 2021-04-02 DIAGNOSIS — K11.7 SIALORRHEA: ICD-10-CM

## 2021-04-02 DIAGNOSIS — R41.89 COGNITIVE DECLINE: ICD-10-CM

## 2021-04-02 RX ORDER — GLYCOPYRROLATE 1 MG/1
1 TABLET ORAL 3 TIMES DAILY
Qty: 270 TABLET | Refills: 1 | Status: SHIPPED | OUTPATIENT
Start: 2021-04-02 | End: 2021-09-27

## 2021-04-03 RX ORDER — DONEPEZIL HYDROCHLORIDE 10 MG/1
TABLET, FILM COATED ORAL
Qty: 90 TABLET | Refills: 3 | Status: SHIPPED | OUTPATIENT
Start: 2021-04-03 | End: 2022-03-31 | Stop reason: SDUPTHER

## 2021-04-22 DIAGNOSIS — F41.1 GENERALIZED ANXIETY DISORDER: ICD-10-CM

## 2021-04-22 RX ORDER — ALPRAZOLAM 0.5 MG/1
TABLET ORAL
Qty: 90 TABLET | Refills: 2 | Status: ON HOLD | OUTPATIENT
Start: 2021-04-22 | End: 2022-01-25 | Stop reason: SDUPTHER

## 2021-05-12 ENCOUNTER — OFFICE VISIT (OUTPATIENT)
Dept: PSYCHIATRY | Facility: CLINIC | Age: 76
End: 2021-05-12
Payer: MEDICARE

## 2021-05-12 DIAGNOSIS — F33.1 MODERATE EPISODE OF RECURRENT MAJOR DEPRESSIVE DISORDER (HCC): ICD-10-CM

## 2021-05-12 PROCEDURE — 99214 OFFICE O/P EST MOD 30 MIN: CPT | Performed by: NURSE PRACTITIONER

## 2021-05-12 RX ORDER — ESCITALOPRAM OXALATE 20 MG/1
TABLET ORAL
Qty: 90 TABLET | Refills: 5
Start: 2021-05-12 | End: 2021-11-11

## 2021-05-12 NOTE — PSYCH
MEDICATION MANAGEMENT NOTE        [place] [address] [facility]      Name and Date of Birth:  Jewel Miles 64 y o  1945 MRN: [id number]    Date of Visit: May 12, 2021    Reason for Visit:   A medication management appointment for treatment of major depressive disorder  SUBJECTIVE:  Patient is a 43-year-old female who is here for medication management appointment  She is accompanied by her , Juve Stanton  Patient has advanced Parkinson's disease  She has difficulty expressing how she is feeling because of how the disease has affected her speech  She is confined to a wheelchair  Her  provides all the assistance for her  He tells me that most times, she is sitting in her wheelchair and he will see her nod her head down and close her eyes and appeared to be sleeping  She is able to be more arousable later on in the day  So today I suggested we cut back on the Lexapro only taking half tab daily  I believe that reducing her medications would be better than increasing  Adding   agrees  Otherwise, she is sleeping fairly well and she is eating well  She has a total feed at this time given her illness  He is still managing to give the care at home which is commendable  Patient will continue on:  Aricept 10 mg daily, alprazolam, 0 5 mg taking that half a tab b i d  only if needed for severe anxiety  Lexapro has been reduced to 10 mg daily and follow-up will be in 6 months or sooner if necessary    She denies suicidal ideation, intent or plan at present; denies homicidal ideation, intent or plan at present  She denies auditory hallucinations, denies visual hallucinations, denies overt delusions  She denies any side effects from medications  HPI ROS Appetite Changes and Sleep:     She reports increased sleep, normal appetite, low energy    Current Rating Scores:     None completed today      Review Of Systems:      Constitutional negative   ENT negative   Cardiovascular negative   Respiratory negative   Gastrointestinal negative   Genitourinary negative   Musculoskeletal negative   Integumentary negative   Neurological Severe Parkinson's disease  Patient utilizes wheelchair  Endocrine negative   Other Symptoms none, all other systems are negative       Past Medical History:    Past Medical History:   Diagnosis Date    Abdominal pain, suprapubic 07/03/2014    Resolved:  November 22, 2016    Abscess of skin and subcutaneous tissue 11/21/2016    Resolved:  February 28, 2017    Acute kidney failure, unspecified ([place] [address] )     Anxiety     Burning with urination 12/14/2016    Resolved:  February 28, 2017    Conjunctivitis 06/10/2013    Depression     Depression     Diabetes mellitus ([place] [address] ) 2/2/2020    Dysuria 06/06/2016    Resolved:  February 28 2017    Falling 10/18/2012    GERD (gastroesophageal reflux disease)     Groin pain 04/16/2015    Resolved:  February 28, 2017    Hyperlipidemia     Hypothyroid     Influenza 04/05/2013    Leg abrasion 09/22/2016    Resolved:  February 28, 2017    Parkinson's disease [facility])     Psychiatric illness     Rectal pain 10/17/2013    Resolved:  February 28, 2017    Thyroid disease      Past Medical History Pertinent Negatives:   Diagnosis Date Noted    Arthritis 01/30/2020    Asthma 01/30/2020    CHF (congestive heart failure) ([place] [address] ) 01/30/2020    COPD (chronic obstructive pulmonary disease) ([place] [address] ) 01/30/2020    Coronary artery disease 01/30/2020    History of transfusion 01/30/2020    Hypertension 01/30/2020    Pressure injury of skin 01/30/2020    Psychiatric disorder 01/30/2020    Stroke ([place] [address] ) 01/30/2020    TIA (transient ischemic attack) 01/30/2020     Past Surgical History:   Procedure Laterality Date    APPENDECTOMY      Age 6 or 3    BACK SURGERY      BRAIN SURGERY      CLOSED REDUCTION NASAL FRACTURE      CYSTOSCOPY  01/28/2014    RENETTA Crum   (Diagnostic)   [facility] STIMULATOR PLACEMENT      EYE SURGERY      FRACTURE SURGERY      JOINT REPLACEMENT      LAMINECTOMY      Decompress, Facetectomy, Foraminotomy Lumbar Seg    ORIF HIP FRACTURE Left     About 3 years ago    TONSILLECTOMY       Allergies   Allergen Reactions    Gabapentin      Other reaction(s): Hypotension    Bupropion GI Intolerance and Other (See Comments)     Other reaction(s): Tremor    Cephalexin     Duloxetine      Other reaction(s): Other (see comments)  unkown  unknown      Erythromycin GI Intolerance    Methadone Hallucinations    Mirtazapine GI Intolerance    Paroxetine GI Intolerance    Sulfa Antibiotics     Tricyclic Antidepressants      Other reaction(s):  Other    Acetazolamide Rash    Levofloxacin Anxiety and Other (See Comments)       Substance Abuse History:    Social History     Substance and Sexual Activity   Alcohol Use Never    Frequency: Never    Comment: drinks one fourth of a glass of white wine once a week  (Never drank alcohol per [employer])     Social History     Substance and Sexual Activity   Drug Use No       Social History:    Social History     Socioeconomic History    Marital status: /Civil Union     Spouse name: Not on file    Number of children: Not on file    Years of education: Not on file    Highest education level: Not on file   Occupational History    Not on file   Social Needs    Financial resource strain: Not on file    Food insecurity     Worry: Not on file     Inability: Not on file    Transportation needs     Medical: Not on file     Non-medical: Not on file   Tobacco Use    Smoking status: Never Smoker    Smokeless tobacco: Never Used    Tobacco comment: n/a   Substance and Sexual Activity    Alcohol use: Never     Frequency: Never     Comment: drinks one fourth of a glass of white wine once a week  (Never drank alcohol per [employer])    Drug use: No    Sexual activity: Not Currently   Lifestyle    Physical activity     Days per week: Not on file     Minutes per session: Not on file    Stress: Not on file   Relationships    Social connections     Talks on phone: Not on file     Gets together: Not on file     Attends Yazdanism service: Not on file     Active member of club or organization: Not on file     Attends meetings of clubs or organizations: Not on file     Relationship status: Not on file    Intimate partner violence     Fear of current or ex partner: Not on file     Emotionally abused: Not on file     Physically abused: Not on file     Forced sexual activity: Not on file   Other Topics Concern    Not on file   Social History Narrative    Not on file       Family Psychiatric History:     Family History   Problem Relation Age of Onset    Heart attack Mother         Acute Myocardial Infarction (MI)    Diabetes Mother         Diabetes Mellitus    Stroke Father         Syndrome    No Known Problems Brother     No Known Problems Maternal Aunt     No Known Problems Paternal Aunt     No Known Problems Maternal Uncle     No Known Problems Paternal Uncle     No Known Problems Maternal Grandfather     No Known Problems Maternal Grandmother     No Known Problems Paternal Grandfather     No Known Problems Paternal Grandmother     No Known Problems Cousin     Heart disease Family     Hypertension Family     Thyroid disease Family     ADD / ADHD Neg Hx     Alcohol abuse Neg Hx     Anxiety disorder Neg Hx     Bipolar disorder Neg Hx     Dementia Neg Hx     Depression Neg Hx     Drug abuse Neg Hx     OCD Neg Hx     Paranoid behavior Neg Hx     Schizophrenia Neg Hx     Seizures Neg Hx     Self-Injury Neg Hx     Suicide Attempts Neg Hx        History Review:   Chart reviewed         OBJECTIVE:     Vital signs in last 24 hours: There were no vitals filed for this visit      Mental Status Evaluation:    Appearance age appropriate, casually dressed   Behavior cooperative, calm   Speech normal rate, normal volume, normal pitch, Poorly verbal   Severe dysarthria   Mood euthymic   Affect blunted   Thought Processes disorganized, slowing of thoughts   Associations thought blocking   Thought Content no overt delusions   Perceptual Disturbances: no auditory hallucinations, no visual hallucinations   Abnormal Thoughts  Risk Potential Suicidal ideation - None  Homicidal ideation - None  Potential for aggression - No   Orientation Person   Memory short term memory moderately impaired   Consciousness alert and awake   Attention Span Concentration Span decreased attention span  decreased concentration   Intellect appears to be of average intelligence   Insight poor   Judgement poor   Muscle Strength and  Gait uses wheelchair   Motor activity Parkinson's disease   Language limited comprehension   Fund of Knowledge Limited ability   Pain Denies   Pain Scale 0       Laboratory Results: I have personally reviewed all pertinent laboratory/tests results    Recent Labs (last 2 months):   No visits with results within 2 Month(s) from this visit     Latest known visit with results is:   Appointment on 03/10/2021   Component Date Value    Sodium 03/10/2021 142     Potassium 03/10/2021 3 6     Chloride 03/10/2021 109*    CO2 03/10/2021 29     ANION GAP 03/10/2021 4     BUN 03/10/2021 14     Creatinine 03/10/2021 0 91     Glucose 03/10/2021 133     Calcium 03/10/2021 9 7     AST 03/10/2021 17     ALT 03/10/2021 19     Alkaline Phosphatase 03/10/2021 94     Total Protein 03/10/2021 7 4     Albumin 03/10/2021 3 5     Total Bilirubin 03/10/2021 0 41     eGFR 03/10/2021 61     Cholesterol 03/10/2021 133     Triglycerides 03/10/2021 132     HDL, Direct 03/10/2021 51     LDL Calculated 03/10/2021 56     TSH 3RD GENERATON 03/10/2021 1 560        Suicide/Homicide Risk Assessment:    Risk of Harm to Self:  Based on today's assessment, Kiran Godwin presents the following risk of harm to self: none    Risk of Harm to Others:  Based on today's assessment, Daysi Salinas presents the following risk of harm to others: none    The following interventions are recommended: no intervention changes needed    Assessment/Plan:       Diagnoses and all orders for this visit:    Moderate episode of recurrent major depressive disorder (HCC)  -     escitalopram (LEXAPRO) 20 mg tablet; 1/2 tab at noon          Treatment Recommendations/Precautions:     Aricept 10 mg daily, alprazolam 0 5 mg uses rarely, Lexapro reduced to 10 mg a day  Follow-up in 6 months  Aware of 24 hour and weekend coverage for urgent situations accessed by calling [facility] main practice number    Medications Risks/Benefits      Risks, Benefits And Possible Side Effects Of Medications:    Risks, benefits, and possible side effects of medications explained to Anjali's ,Shane    Controlled Medication Discussion:     Daysi Salinas has been filling controlled prescriptions on time as prescribed according to [place] Prescription Drug Monitoring Program    Psychotherapy Provided:     Individual psychotherapy provided: No     Treatment Plan:    Completed and signed during the session: Yes - Treatment Plan done but not signed at time of office visit due to:  Plan reviewed in person and verbal consent given due to [place] social distancing    Note Share: This note was shared with patient      YOLANDA Freedman 05/12/21

## 2021-05-22 DIAGNOSIS — R53.1 ACUTE LEFT-SIDED WEAKNESS: ICD-10-CM

## 2021-05-23 RX ORDER — CLOPIDOGREL BISULFATE 75 MG/1
TABLET ORAL
Qty: 90 TABLET | Refills: 1 | OUTPATIENT
Start: 2021-05-23

## 2021-05-26 NOTE — TELEPHONE ENCOUNTER
According to the Pharmacist @ [facility], Pts Rx for Plavix 75 mg does not need a P  A

## 2021-06-18 DIAGNOSIS — R53.1 ACUTE LEFT-SIDED WEAKNESS: ICD-10-CM

## 2021-06-20 RX ORDER — CLOPIDOGREL BISULFATE 75 MG/1
TABLET ORAL
Qty: 90 TABLET | Refills: 1 | Status: SHIPPED | OUTPATIENT
Start: 2021-06-20 | End: 2021-12-17 | Stop reason: SDUPTHER

## 2021-06-26 DIAGNOSIS — R53.1 ACUTE LEFT-SIDED WEAKNESS: ICD-10-CM

## 2021-06-27 RX ORDER — CILOSTAZOL 100 MG/1
100 TABLET ORAL
Qty: 180 TABLET | Refills: 1 | Status: SHIPPED | OUTPATIENT
Start: 2021-06-27 | End: 2021-06-30

## 2021-06-28 ENCOUNTER — TELEPHONE (OUTPATIENT)
Dept: FAMILY MEDICINE CLINIC | Facility: CLINIC | Age: 76
End: 2021-06-28

## 2021-06-28 RX ORDER — SIMVASTATIN 20 MG
TABLET ORAL
COMMUNITY
Start: 2021-05-13 | End: 2021-06-28

## 2021-06-28 NOTE — TELEPHONE ENCOUNTER
Pt's insurance will not fill the cilostazol (PLETAL) 100 mg b/c she is on Plavix  Is this something she still needs to be on? If so we need to reach out to [facility] and give them the OK or maybe a PA is needed  Please advise

## 2021-06-30 DIAGNOSIS — R53.1 ACUTE LEFT-SIDED WEAKNESS: ICD-10-CM

## 2021-06-30 RX ORDER — CILOSTAZOL 100 MG/1
100 TABLET ORAL
Qty: 180 TABLET | Refills: 1 | Status: SHIPPED | OUTPATIENT
Start: 2021-06-30 | End: 2021-10-18

## 2021-07-21 DIAGNOSIS — E03.9 HYPOTHYROIDISM, UNSPECIFIED TYPE: Chronic | ICD-10-CM

## 2021-07-21 RX ORDER — LEVOTHYROXINE SODIUM 0.07 MG/1
TABLET ORAL
Qty: 90 TABLET | Refills: 3 | Status: SHIPPED | OUTPATIENT
Start: 2021-07-21

## 2021-08-05 ENCOUNTER — APPOINTMENT (OUTPATIENT)
Dept: LAB | Facility: MEDICAL CENTER | Age: 76
End: 2021-08-05
Payer: MEDICARE

## 2021-08-05 DIAGNOSIS — G31.83 DEMENTIA WITH LEWY BODIES (CODE) (HCC): ICD-10-CM

## 2021-08-05 DIAGNOSIS — I63.81 RIGHT-SIDED LACUNAR INFARCTION (HCC): ICD-10-CM

## 2021-08-05 DIAGNOSIS — E03.9 ACQUIRED HYPOTHYROIDISM: ICD-10-CM

## 2021-08-05 DIAGNOSIS — F33.40 RECURRENT MAJOR DEPRESSIVE DISORDER, IN REMISSION (HCC): ICD-10-CM

## 2021-08-05 DIAGNOSIS — F06.31 MOOD DISORDER WITH MAJOR DEPRESSIVE-LIKE EPISODE DUE TO GENERAL MEDICAL CONDITION: ICD-10-CM

## 2021-08-05 DIAGNOSIS — E78.2 MIXED HYPERLIPIDEMIA: ICD-10-CM

## 2021-08-05 DIAGNOSIS — F32.3 MAJOR DEPRESSION WITH PSYCHOTIC FEATURES (HCC): ICD-10-CM

## 2021-08-05 DIAGNOSIS — Z74.09 DECREASED AMBULATION STATUS: ICD-10-CM

## 2021-08-05 DIAGNOSIS — M81.0 OSTEOPOROSIS WITHOUT CURRENT PATHOLOGICAL FRACTURE, UNSPECIFIED OSTEOPOROSIS TYPE: ICD-10-CM

## 2021-08-05 DIAGNOSIS — E11.65 TYPE 2 DIABETES MELLITUS WITH HYPERGLYCEMIA, WITHOUT LONG-TERM CURRENT USE OF INSULIN (HCC): ICD-10-CM

## 2021-08-05 DIAGNOSIS — G20 PARKINSON DISEASE (HCC): ICD-10-CM

## 2021-08-05 LAB
25(OH)D3 SERPL-MCNC: 47.8 NG/ML (ref 30–100)
ALBUMIN SERPL BCP-MCNC: 3.2 G/DL (ref 3.5–5)
ALP SERPL-CCNC: 80 U/L (ref 46–116)
ALT SERPL W P-5'-P-CCNC: 21 U/L (ref 12–78)
ANION GAP SERPL CALCULATED.3IONS-SCNC: 8 MMOL/L (ref 4–13)
AST SERPL W P-5'-P-CCNC: 20 U/L (ref 5–45)
BACTERIA UR QL AUTO: ABNORMAL /HPF
BILIRUB SERPL-MCNC: 0.63 MG/DL (ref 0.2–1)
BILIRUB UR QL STRIP: NEGATIVE
BUN SERPL-MCNC: 13 MG/DL (ref 5–25)
CALCIUM ALBUM COR SERPL-MCNC: 9.9 MG/DL (ref 8.3–10.1)
CALCIUM SERPL-MCNC: 9.3 MG/DL (ref 8.3–10.1)
CAOX CRY URNS QL MICRO: ABNORMAL /HPF
CHLORIDE SERPL-SCNC: 105 MMOL/L (ref 100–108)
CHOLEST SERPL-MCNC: 132 MG/DL (ref 50–200)
CLARITY UR: ABNORMAL
CO2 SERPL-SCNC: 26 MMOL/L (ref 21–32)
COLOR UR: YELLOW
CREAT SERPL-MCNC: 0.78 MG/DL (ref 0.6–1.3)
CREAT UR-MCNC: 141 MG/DL
ERYTHROCYTE [DISTWIDTH] IN BLOOD BY AUTOMATED COUNT: 13.9 % (ref 11.6–15.1)
EST. AVERAGE GLUCOSE BLD GHB EST-MCNC: 140 MG/DL
GFR SERPL CREATININE-BSD FRML MDRD: 73 ML/MIN/1.73SQ M
GLUCOSE P FAST SERPL-MCNC: 151 MG/DL (ref 65–99)
GLUCOSE UR STRIP-MCNC: NEGATIVE MG/DL
HBA1C MFR BLD: 6.5 %
HCT VFR BLD AUTO: 48.9 % (ref 34.8–46.1)
HDLC SERPL-MCNC: 50 MG/DL
HGB BLD-MCNC: 16.1 G/DL (ref 11.5–15.4)
HGB UR QL STRIP.AUTO: ABNORMAL
KETONES UR STRIP-MCNC: NEGATIVE MG/DL
LDLC SERPL CALC-MCNC: 56 MG/DL (ref 0–100)
LEUKOCYTE ESTERASE UR QL STRIP: ABNORMAL
MCH RBC QN AUTO: 31.7 PG (ref 26.8–34.3)
MCHC RBC AUTO-ENTMCNC: 32.9 G/DL (ref 31.4–37.4)
MCV RBC AUTO: 96 FL (ref 82–98)
MICROALBUMIN UR-MCNC: 151 MG/L (ref 0–20)
MICROALBUMIN/CREAT 24H UR: 107 MG/G CREATININE (ref 0–30)
NITRITE UR QL STRIP: POSITIVE
NON-SQ EPI CELLS URNS QL MICRO: ABNORMAL /HPF
PH UR STRIP.AUTO: 6 [PH]
PLATELET # BLD AUTO: 301 THOUSANDS/UL (ref 149–390)
PMV BLD AUTO: 10.3 FL (ref 8.9–12.7)
POTASSIUM SERPL-SCNC: 3.6 MMOL/L (ref 3.5–5.3)
PROT SERPL-MCNC: 7.2 G/DL (ref 6.4–8.2)
PROT UR STRIP-MCNC: ABNORMAL MG/DL
RBC # BLD AUTO: 5.08 MILLION/UL (ref 3.81–5.12)
RBC #/AREA URNS AUTO: ABNORMAL /HPF
SODIUM SERPL-SCNC: 139 MMOL/L (ref 136–145)
SP GR UR STRIP.AUTO: 1.03 (ref 1–1.03)
T4 SERPL-MCNC: 9.1 UG/DL (ref 4.7–13.3)
TRIGL SERPL-MCNC: 131 MG/DL
TSH SERPL DL<=0.05 MIU/L-ACNC: 3.19 UIU/ML (ref 0.36–3.74)
UROBILINOGEN UR QL STRIP.AUTO: 0.2 E.U./DL
WBC # BLD AUTO: 6.13 THOUSAND/UL (ref 4.31–10.16)
WBC #/AREA URNS AUTO: ABNORMAL /HPF

## 2021-08-05 PROCEDURE — 80053 COMPREHEN METABOLIC PANEL: CPT

## 2021-08-05 PROCEDURE — 82043 UR ALBUMIN QUANTITATIVE: CPT

## 2021-08-05 PROCEDURE — 80061 LIPID PANEL: CPT

## 2021-08-05 PROCEDURE — 84443 ASSAY THYROID STIM HORMONE: CPT

## 2021-08-05 PROCEDURE — 82306 VITAMIN D 25 HYDROXY: CPT

## 2021-08-05 PROCEDURE — 82570 ASSAY OF URINE CREATININE: CPT

## 2021-08-05 PROCEDURE — 36415 COLL VENOUS BLD VENIPUNCTURE: CPT

## 2021-08-05 PROCEDURE — 81001 URINALYSIS AUTO W/SCOPE: CPT

## 2021-08-05 PROCEDURE — 84436 ASSAY OF TOTAL THYROXINE: CPT

## 2021-08-05 PROCEDURE — 83036 HEMOGLOBIN GLYCOSYLATED A1C: CPT

## 2021-08-05 PROCEDURE — 85027 COMPLETE CBC AUTOMATED: CPT

## 2021-08-11 ENCOUNTER — OFFICE VISIT (OUTPATIENT)
Dept: FAMILY MEDICINE CLINIC | Facility: CLINIC | Age: 76
End: 2021-08-11
Payer: MEDICARE

## 2021-08-11 VITALS — HEART RATE: 84 BPM | DIASTOLIC BLOOD PRESSURE: 74 MMHG | SYSTOLIC BLOOD PRESSURE: 142 MMHG

## 2021-08-11 DIAGNOSIS — N39.0 RECURRENT UTI: ICD-10-CM

## 2021-08-11 DIAGNOSIS — Z00.00 HEALTH CARE MAINTENANCE: ICD-10-CM

## 2021-08-11 DIAGNOSIS — F06.31 MOOD DISORDER WITH MAJOR DEPRESSIVE-LIKE EPISODE DUE TO GENERAL MEDICAL CONDITION: ICD-10-CM

## 2021-08-11 DIAGNOSIS — K21.9 GASTROESOPHAGEAL REFLUX DISEASE WITHOUT ESOPHAGITIS: Chronic | ICD-10-CM

## 2021-08-11 DIAGNOSIS — E78.2 MIXED HYPERLIPIDEMIA: ICD-10-CM

## 2021-08-11 DIAGNOSIS — E03.9 ACQUIRED HYPOTHYROIDISM: ICD-10-CM

## 2021-08-11 DIAGNOSIS — G20 PARKINSON DISEASE (HCC): ICD-10-CM

## 2021-08-11 DIAGNOSIS — B37.83 PERLECHE WITH CANDIDIASIS: ICD-10-CM

## 2021-08-11 DIAGNOSIS — G31.83 DEMENTIA WITH LEWY BODIES (CODE) (HCC): ICD-10-CM

## 2021-08-11 DIAGNOSIS — I63.81 RIGHT-SIDED LACUNAR INFARCTION (HCC): ICD-10-CM

## 2021-08-11 DIAGNOSIS — F32.3 MAJOR DEPRESSION WITH PSYCHOTIC FEATURES (HCC): ICD-10-CM

## 2021-08-11 DIAGNOSIS — E11.65 TYPE 2 DIABETES MELLITUS WITH HYPERGLYCEMIA, WITHOUT LONG-TERM CURRENT USE OF INSULIN (HCC): Primary | ICD-10-CM

## 2021-08-11 DIAGNOSIS — Z96.89 S/P DEEP BRAIN STIMULATOR PLACEMENT: ICD-10-CM

## 2021-08-11 DIAGNOSIS — Z74.09 DECREASED AMBULATION STATUS: ICD-10-CM

## 2021-08-11 DIAGNOSIS — M81.0 OSTEOPOROSIS WITHOUT CURRENT PATHOLOGICAL FRACTURE, UNSPECIFIED OSTEOPOROSIS TYPE: ICD-10-CM

## 2021-08-11 PROBLEM — I49.3 PVC (PREMATURE VENTRICULAR CONTRACTION): Status: ACTIVE | Noted: 2021-08-11

## 2021-08-11 PROBLEM — R13.10 DYSPHAGIA: Status: RESOLVED | Noted: 2020-01-30 | Resolved: 2021-08-11

## 2021-08-11 PROCEDURE — G0439 PPPS, SUBSEQ VISIT: HCPCS | Performed by: FAMILY MEDICINE

## 2021-08-11 PROCEDURE — 1123F ACP DISCUSS/DSCN MKR DOCD: CPT | Performed by: FAMILY MEDICINE

## 2021-08-11 PROCEDURE — 99214 OFFICE O/P EST MOD 30 MIN: CPT | Performed by: FAMILY MEDICINE

## 2021-08-11 RX ORDER — DOXYCYCLINE 100 MG/1
CAPSULE ORAL
Qty: 10 CAPSULE | Refills: 0 | Status: SHIPPED | OUTPATIENT
Start: 2021-08-11 | End: 2021-08-16

## 2021-08-11 RX ORDER — DOXYCYCLINE HYCLATE 100 MG/1
CAPSULE ORAL
Qty: 10 CAPSULE | Refills: 0 | Status: SHIPPED | OUTPATIENT
Start: 2021-08-11 | End: 2021-08-11

## 2021-08-11 RX ORDER — MINOCYCLINE HYDROCHLORIDE 100 MG/1
CAPSULE ORAL
Refills: 0 | OUTPATIENT
Start: 2021-08-11

## 2021-08-11 RX ORDER — KETOCONAZOLE 20 MG/G
CREAM TOPICAL 2 TIMES DAILY
Qty: 15 G | Refills: 0 | Status: SHIPPED | OUTPATIENT
Start: 2021-08-11 | End: 2021-09-27

## 2021-08-11 NOTE — PATIENT INSTRUCTIONS
Follow all specialist per their instructions  Use ketoconazole cream to  Corners of mouth twice a day    Return in 1-2 weeks if still symptoms   Finish antibiotic   Repeat urinalysis in 3 weeks   Return in 6 months for office visit blood work sooner if needed

## 2021-08-11 NOTE — PROGRESS NOTES
Assessment and Plan:     Problem List Items Addressed This Visit     None           Preventive health issues were discussed with patient, and age appropriate screening tests were ordered as noted in patient's After Visit Summary  Personalized health advice and appropriate referrals for health education or preventive services given if needed, as noted in patient's After Visit Summary       History of Present Illness:     Patient presents for Medicare Annual Wellness visit    Patient Care Team:  Johnathan Hughes DO as PCP - General (Family Medicine)     Problem List:     Patient Active Problem List   Diagnosis    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    Acquired hypothyroidism    Mixed hyperlipidemia    Parkinson disease ([facility] [address]    GERD (gastroesophageal reflux disease)    Sialorrhea    Allergic rhinitis    Anxiety, generalized    Osteoporosis    Major depression with psychotic features ([facility] [address] )    Health care maintenance    Chronic cough    Decreased ambulation status    Acute left-sided weakness    Dysphagia    Type 2 diabetes mellitus with hyperglycemia ([facility] [address] )    Recurrent UTI    Chorea    [facility] with Lewy bodies ([facility]) [facility]    Right-sided lacunar infarction ([facility] [facility] )    S/P deep brain stimulator placement    PVC (premature ventricular contraction)      Past Medical and Surgical History:     Past Medical History:   Diagnosis Date    Abdominal pain, suprapubic 07/03/2014    Resolved:  November 22, 2016    Abscess of skin and subcutaneous tissue 11/21/2016    Resolved:  February 28, 2017    Acute kidney failure, unspecified ([facility] )     Anxiety     Burning with urination 12/14/2016    Resolved:  February 28, 2017    Conjunctivitis 06/10/2013    Depression     Depression     Diabetes mellitus ([facility] ) 2/2/2020    Dysuria 06/06/2016    Resolved:  February 28 2017    Falling 10/18/2012    GERD (gastroesophageal reflux disease)     Groin pain 04/16/2015    Resolved:  February 28, 2017    Hyperlipidemia     Hypothyroid     Influenza 04/05/2013    Leg abrasion 09/22/2016    Resolved:  February 28, 2017    Parkinson's disease [facility])     Psychiatric illness     Rectal pain 10/17/2013    Resolved:  February 28, 2017    Thyroid disease      Past Surgical History:   Procedure Laterality Date    APPENDECTOMY      Age 4 or 3    BACK SURGERY      BRAIN SURGERY      CLOSED REDUCTION NASAL FRACTURE      CYSTOSCOPY  01/28/2014    RENETTA Hansen   (Diagnostic)    DEEP BRAIN STIMULATOR PLACEMENT      EYE SURGERY      FRACTURE SURGERY      JOINT REPLACEMENT      LAMINECTOMY      Decompress, Facetectomy, Foraminotomy Lumbar Seg    ORIF HIP FRACTURE Left     About 3 years ago    TONSILLECTOMY        Family History:     Family History   Problem Relation Age of Onset    Heart attack Mother         Acute Myocardial Infarction (MI)    Diabetes Mother         Diabetes Mellitus    Stroke Father         Syndrome    No Known Problems Brother     No Known Problems Maternal Aunt     No Known Problems Paternal Aunt     No Known Problems Maternal Uncle     No Known Problems Paternal Uncle     No Known Problems Maternal Grandfather     No Known Problems Maternal Grandmother     No Known Problems Paternal Grandfather     No Known Problems Paternal Grandmother     No Known Problems Cousin     Heart disease Family     Hypertension Family     Thyroid disease Family     ADD / ADHD Neg Hx     Alcohol abuse Neg Hx     Anxiety disorder Neg Hx     Bipolar disorder Neg Hx     Dementia Neg Hx     Depression Neg Hx     Drug abuse Neg Hx     OCD Neg Hx     Paranoid behavior Neg Hx     Schizophrenia Neg Hx     Seizures Neg Hx     Self-Injury Neg Hx     Suicide Attempts Neg Hx       Social History:     Social History     Socioeconomic History    Marital status: /Civil Union     Spouse name: None    Number of children: None    Years of education: None    Highest education level: None   Occupational History    None   Tobacco Use    Smoking status: Never Smoker    Smokeless tobacco: Never Used    Tobacco comment: n/a   Substance and Sexual Activity    Alcohol use: Yes     Alcohol/week: 1 0 standard drinks     Types: 1 Glasses of wine per week     Comment: drinks one fourth of a glass of white wine once a week  (Never drank alcohol per [employer])    Drug use: No    Sexual activity: Not Currently   Other Topics Concern    None   Social History Narrative    None     Social Determinants of Health     Financial Resource Strain:     Difficulty of Paying Living Expenses:    Food Insecurity:     Worried About Running Out of Food in the Last Year:     Ran Out of Food in the Last Year:    Transportation Needs:     Lack of Transportation (Medical):  Lack of Transportation (Non-Medical):    Physical Activity:     Days of Exercise per Week:     Minutes of Exercise per Session:    Stress:     Feeling of Stress :    Social Connections:     Frequency of Communication with Friends and Family:     Frequency of Social Gatherings with Friends and Family:     Attends Holiness Services:     Active Member of Clubs or Organizations:     Attends Club or Organization Meetings:     Marital Status:    Intimate Partner Violence:     Fear of Current or Ex-Partner:     Emotionally Abused:     Physically Abused:     Sexually Abused:       Medications and Allergies:     Current Outpatient Medications   Medication Sig Dispense Refill    acetaminophen (TYLENOL) 500 mg tablet Take 500 mg by mouth   One throughout the night as needed      ALPRAZolam (XANAX) 0 5 mg tablet TAKE 1 TABLET (0 5 MG TOTAL) BY MOUTH 3 (THREE) TIMES A DAY AS NEEDED FOR ANXIETY  (Patient taking differently: daily at bedtime as needed ) 90 tablet 2    ascorbic acid (VITAMIN C) 500 mg tablet Take 1 tablet (500 mg total) by mouth daily  0    atorvastatin (LIPITOR) 40 mg tablet Take 1 tablet (40 mg total) by mouth daily  0    carbidopa-levodopa (SINEMET)  mg per tablet Take 1 tablet by mouth 3 (three) times a day  0    cilostazol (PLETAL) 100 mg tablet TAKE 1 TABLET (100 MG TOTAL) BY MOUTH 2 (TWO) TIMES A DAY BEFORE MEALS 180 tablet 1    clopidogrel (PLAVIX) 75 mg tablet TAKE 1 TABLET BY MOUTH EVERY DAY 90 tablet 1    donepezil (ARICEPT) 10 mg tablet TAKE 1 TABLET BY MOUTH DAILY AT BEDTIME 90 tablet 3    escitalopram (LEXAPRO) 20 mg tablet 1/2 tab at noon 90 tablet 5    glycopyrrolate (ROBINUL) 1 mg tablet TAKE 1 TABLET (1 MG TOTAL) BY MOUTH 3 (THREE) TIMES A  tablet 1    levothyroxine 75 mcg tablet TAKE 1 TABLET EVERY DAY 90 tablet 3    Multiple Vitamin (MULTIVITAMIN) tablet Take 1 tablet by mouth daily   NON FORMULARY Take 1 tablet by mouth 2 (two) times a day  Calcium- D3-600mg      Probiotic Product (PROBIOTIC DAILY PO) Take 1 tablet by mouth daily   ranitidine (ZANTAC) 150 mg tablet Take 150 mg by mouth daily   Vitamin D, Cholecalciferol, 1000 UNITS CAPS Take 1 tablet by mouth daily   phenazopyridine (PYRIDIUM) 200 mg tablet Take 200 mg by mouth as needed for bladder spasms  (Patient not taking: Reported on 8/11/2021)       No current facility-administered medications for this visit  Allergies   Allergen Reactions    Gabapentin      Other reaction(s): Hypotension    Bupropion GI Intolerance and Other (See Comments)     Other reaction(s): Tremor    Cephalexin     Duloxetine      Other reaction(s): Other (see comments)  unkown  unknown      Erythromycin GI Intolerance    Methadone Hallucinations    Mirtazapine GI Intolerance    Paroxetine GI Intolerance    Sulfa Antibiotics     Tricyclic Antidepressants      Other reaction(s):  Other    Acetazolamide Rash    Levofloxacin Anxiety and Other (See Comments)      Immunizations:     Immunization History   Administered Date(s) Administered    Influenza Quadrivalent Preservative Free 3 years and older IM 10/03/2014, 10/21/2015    Influenza Split High Dose Preservative Free IM 09/22/2016, 10/10/2017    Influenza, high dose seasonal 0 7 mL 10/10/2018, 10/30/2019, 10/19/2020    Influenza, seasonal, injectable 10/18/2012, 09/25/2013    Pneumococcal Conjugate 13-Valent 10/06/2017    Pneumococcal Polysaccharide PPV23 09/22/2016    SARS-CoV-2 / COVID-19 mRNA IM ([facility]) 01/26/2021, 03/08/2021    Td (adult), adsorbed 09/22/2016      Health Maintenance:         Topic Date Due    Hepatitis C Screening  Never done         Topic Date Due    DTaP,Tdap,and Td Vaccines (1 - Tdap) 04/11/1966    Influenza Vaccine (1) 09/01/2021      Medicare Health Risk Assessment:     /74 (BP Location: Right arm)   Pulse 84      Lor Pal is here for her Subsequent Wellness visit  Health Risk Assessment:   Patient rates overall health as poor  Patient feels that their physical health rating is slightly worse  Patient is satisfied with their life  Eyesight was rated as same  Hearing was rated as same  Patient feels that their emotional and mental health rating is same  Patients states they are never, rarely angry  Patient states they are often unusually tired/fatigued  Pain experienced in the last 7 days has been none  Patient states that she has experienced no weight loss or gain in last 6 months  Depression Screening:   PHQ-2 Score: 0  PHQ-9 Score: 0      Fall Risk Screening: In the past year, patient has experienced: history of falling in past year    Number of falls: 1  Injured during fall?: No    Feels unsteady when standing or walking?: Yes    Worried about falling?: Yes      Urinary Incontinence Screening:   Patient has leaked urine accidently in the last six months  Home Safety:  Patient has trouble with stairs inside or outside of their home  Patient has working smoke alarms and has no working carbon monoxide detector  Home safety hazards include: none       Nutrition:   Current diet is Regular  Medications:   Patient is currently taking over-the-counter supplements  OTC medications include: see medication list  Patient is not able to manage medications  Activities of Daily Living (ADLs)/Instrumental Activities of Daily Living (IADLs):   Walk and transfer into and out of bed and chair?: No  Dress and groom yourself?: No    Bathe or shower yourself?: No    Feed yourself? No  Do your laundry/housekeeping?: No  Manage your money, pay your bills and track your expenses?: No  Make your own meals?: No    Do your own shopping?: No    Previous Hospitalizations:   Any hospitalizations or ED visits within the last 12 months?: No      Advance Care Planning:   Living will: Yes    Durable POA for healthcare:  Yes    Advanced directive: Yes    Five wishes given: Yes    Patient declined ACP directive: No    End of Life Decisions reviewed with patient: Yes    Provider agrees with end of life decisions: Yes      Cognitive Screening:   Provider or family/friend/caregiver concerned regarding cognition?: Yes  Mini-Cog Score: 0  Interpretation: Mini-Cog Score 0-2: Positive screen for dementia    PREVENTIVE SCREENINGS      Cardiovascular Screening:    General: Screening Not Indicated and History Lipid Disorder      Diabetes Screening:     General: Screening Not Indicated and History Diabetes      Colorectal Cancer Screening:     General: Screening Not Indicated      Breast Cancer Screening:     General: Screening Not Indicated      Cervical Cancer Screening:    General: Screening Not Indicated      Osteoporosis Screening:    General: Screening Not Indicated and History Osteoporosis      Abdominal Aortic Aneurysm (AAA) Screening:        General: Screening Not Indicated      Lung Cancer Screening:     General: Screening Not Indicated      Hepatitis C Screening:    General: Screening Not Indicated    Screening, Brief Intervention, and Referral to Treatment (SBIRT)    Screening  Typical number of drinks in a day: 0  Typical number of drinks in a week: 1  Interpretation: Low risk drinking behavior      Single Item Drug Screening:  How often have you used an illegal drug (including marijuana) or a prescription medication for non-medical reasons in the past year? never    Single Item Drug Screen Score: 0  Interpretation: Negative screen for possible drug use disorder      Satnam Kwon, DO

## 2021-08-11 NOTE — ASSESSMENT & PLAN NOTE
Lab Results   Component Value Date    HGBA1C 6 5 (H) 08/05/2021    diet controlled, family declines home blood sugar monitor, foot exam was completed

## 2021-08-11 NOTE — PROGRESS NOTES
Assessment and Plan:   1  Type 2 diabetes, diet-controlled foot exam completed  Will obtain eye exam report from her ophthalmologist, Dr Florinda Fernandez  2  Hypothyroidism, stable continue present therapy  3  GERD, stable continue present therapy  4  Dementia / Parkinson's/blue could her infarct /ambulation dysfunction / deep brain stimulator implantation  Patient is stable at baseline follows with [facility] of [address] [facility] neurology Department  5  Osteoporosis, DEXA scan has been declined   6  Health care maintenance, Medicare a [facility] completed  7  MDD /mood disorder, stable follows with Psychiatry  8  Recurrent UTI, doxycycline is ordered  Repeat UA with culture in 3 weeks   9  Perleche, yeast infection, use ketoconazole to cortisone my twice daily return in 1-2 weeks if still symptoms  10  Family wishes to come in only in 7 months    So they will return in 6 months for office visit blood work    Problem List Items Addressed This Visit        Digestive    GERD (gastroesophageal reflux disease) (Chronic)      Stable continue present therapy         Relevant Orders    CBC    Comprehensive metabolic panel    Hemoglobin A1C    Lipid Panel with Direct LDL reflex    Microalbumin / creatinine urine ratio    TSH, 3rd generation with Free T4 reflex    Vitamin D 25 hydroxy       Endocrine    Acquired hypothyroidism      Stable continue present therapy         Relevant Orders    CBC    Comprehensive metabolic panel    Hemoglobin A1C    Lipid Panel with Direct LDL reflex    Microalbumin / creatinine urine ratio    TSH, 3rd generation with Free T4 reflex    Vitamin D 25 hydroxy    Type 2 diabetes mellitus with hyperglycemia (HCC) - Primary       Lab Results   Component Value Date    HGBA1C 6 5 (H) 08/05/2021    diet controlled, family declines home blood sugar monitor, foot exam was completed         Relevant Orders    CBC    Comprehensive metabolic panel    Hemoglobin A1C    Lipid Panel with Direct LDL reflex    Microalbumin / creatinine urine ratio    TSH, 3rd generation with Free T4 reflex    Vitamin D 25 hydroxy       Nervous and Auditory    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      Follows with psychiatry         Relevant Orders    CBC    Comprehensive metabolic panel    Hemoglobin A1C    Lipid Panel with Direct LDL reflex    Microalbumin / creatinine urine ratio    TSH, 3rd generation with Free T4 reflex    Vitamin D 25 hydroxy    Parkinson disease ([address] )      Follows with Neurology at [address]         Relevant Orders    CBC    Comprehensive metabolic panel    Hemoglobin A1C    Lipid Panel with Direct LDL reflex    Microalbumin / creatinine urine ratio    TSH, 3rd generation with Free T4 reflex    Vitamin D 25 hydroxy    Dementia with Lewy bodies (CODE) (HCC)      At baseline follows with neurology [facility]         Relevant Orders    CBC    Comprehensive metabolic panel    Hemoglobin A1C    Lipid Panel with Direct LDL reflex    Microalbumin / creatinine urine ratio    TSH, 3rd generation with Free T4 reflex    Vitamin D 25 hydroxy    Right-sided lacunar infarction [facility])      Follows with Neurology         Relevant Orders    CBC    Comprehensive metabolic panel    Hemoglobin A1C    Lipid Panel with Direct LDL reflex    Microalbumin / creatinine urine ratio    TSH, 3rd generation with Free T4 reflex    Vitamin D 25 hydroxy       Musculoskeletal and Integument    Osteoporosis      DEXA scan declined         Relevant Orders    CBC    Comprehensive metabolic panel    Hemoglobin A1C    Lipid Panel with Direct LDL reflex    Microalbumin / creatinine urine ratio    TSH, 3rd generation with Free T4 reflex    Vitamin D 25 hydroxy       Genitourinary    Recurrent UTI      Doxycycline ordered    Photo toxicity discussed repeat UA 3 weeks         Relevant Medications    doxycycline hyclate (VIBRAMYCIN) 100 mg capsule    Other Relevant Orders    UA (URINE) with reflex to Scope    Urine culture    CBC    Comprehensive metabolic panel    Hemoglobin A1C    Lipid Panel with Direct LDL reflex    Microalbumin / creatinine urine ratio    TSH, 3rd generation with Free T4 reflex    Vitamin D 25 hydroxy       Other    Mixed hyperlipidemia      Stable continue present therapy         Relevant Orders    CBC    Comprehensive metabolic panel    Hemoglobin A1C    Lipid Panel with Direct LDL reflex    Microalbumin / creatinine urine ratio    TSH, 3rd generation with Free T4 reflex    Vitamin D 25 hydroxy    Major depression with psychotic features (HCC)      Stable follows with psychiatry         Relevant Orders    CBC    Comprehensive metabolic panel    Hemoglobin A1C    Lipid Panel with Direct LDL reflex    Microalbumin / creatinine urine ratio    TSH, 3rd generation with Free T4 reflex    Vitamin D 25 hydroxy    Health care maintenance      Medicare a [address] completed         Decreased ambulation status      Wheelchair-bound         Relevant Orders    CBC    Comprehensive metabolic panel    Hemoglobin A1C    Lipid Panel with Direct LDL reflex    Microalbumin / creatinine urine ratio    TSH, 3rd generation with Free T4 reflex    Vitamin D 25 hydroxy    S/P deep brain stimulator placement      Follows with [facility] neurology         Relevant Orders    CBC    Comprehensive metabolic panel    Hemoglobin A1C    Lipid Panel with Direct LDL reflex    Microalbumin / creatinine urine ratio    TSH, 3rd generation with Free T4 reflex    Vitamin D 25 hydroxy      Other Visit Diagnoses     Perleche with candidiasis        Relevant Medications    ketoconazole (NIZORAL) 2 % cream    Other Relevant Orders    CBC    Comprehensive metabolic panel    Hemoglobin A1C    Lipid Panel with Direct LDL reflex    Microalbumin / creatinine urine ratio    TSH, 3rd generation with Free T4 reflex    Vitamin D 25 hydroxy                 Diagnoses and all orders for this visit:    Type 2 diabetes mellitus with hyperglycemia, without long-term current use of insulin (HCC)  -     CBC; Future  -     Comprehensive metabolic panel; Future  -     Hemoglobin A1C; Future  -     Lipid Panel with Direct LDL reflex; Future  -     Microalbumin / creatinine urine ratio; Future  -     TSH, 3rd generation with Free T4 reflex; Future  -     Vitamin D 25 hydroxy; Future    Acquired hypothyroidism  -     CBC; Future  -     Comprehensive metabolic panel; Future  -     Hemoglobin A1C; Future  -     Lipid Panel with Direct LDL reflex; Future  -     Microalbumin / creatinine urine ratio; Future  -     TSH, 3rd generation with Free T4 reflex; Future  -     Vitamin D 25 hydroxy; Future    Gastroesophageal reflux disease without esophagitis  -     CBC; Future  -     Comprehensive metabolic panel; Future  -     Hemoglobin A1C; Future  -     Lipid Panel with Direct LDL reflex; Future  -     Microalbumin / creatinine urine ratio; Future  -     TSH, 3rd generation with Free T4 reflex; Future  -     Vitamin D 25 hydroxy; Future    Dementia with Lewy bodies (CODE) ([facility] )  -     CBC; Future  -     Comprehensive metabolic panel; Future  -     Hemoglobin A1C; Future  -     Lipid Panel with Direct LDL reflex; Future  -     Microalbumin / creatinine urine ratio; Future  -     TSH, 3rd generation with Free T4 reflex; Future  -     Vitamin D 25 hydroxy; Future    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  -     CBC; Future  -     Comprehensive metabolic panel; Future  -     Hemoglobin A1C; Future  -     Lipid Panel with Direct LDL reflex; Future  -     Microalbumin / creatinine urine ratio; Future  -     TSH, 3rd generation with Free T4 reflex; Future  -     Vitamin D 25 hydroxy; Future    Parkinson disease ([facility] )  -     CBC; Future  -     Comprehensive metabolic panel; Future  -     Hemoglobin A1C; Future  -     Lipid Panel with Direct LDL reflex; Future  -     Microalbumin / creatinine urine ratio;  Future  -     TSH, 3rd generation with Free T4 reflex; Future  -     Vitamin D 25 hydroxy; Future    Right-sided lacunar infarction ([facility] )  -     CBC; Future  -     Comprehensive metabolic panel; Future  -     Hemoglobin A1C; Future  -     Lipid Panel with Direct LDL reflex; Future  -     Microalbumin / creatinine urine ratio; Future  -     TSH, 3rd generation with Free T4 reflex; Future  -     Vitamin D 25 hydroxy; Future    Osteoporosis without current pathological fracture, unspecified osteoporosis type  -     CBC; Future  -     Comprehensive metabolic panel; Future  -     Hemoglobin A1C; Future  -     Lipid Panel with Direct LDL reflex; Future  -     Microalbumin / creatinine urine ratio; Future  -     TSH, 3rd generation with Free T4 reflex; Future  -     Vitamin D 25 hydroxy; Future    Decreased ambulation status  -     CBC; Future  -     Comprehensive metabolic panel; Future  -     Hemoglobin A1C; Future  -     Lipid Panel with Direct LDL reflex; Future  -     Microalbumin / creatinine urine ratio; Future  -     TSH, 3rd generation with Free T4 reflex; Future  -     Vitamin D 25 hydroxy; Future    Health care maintenance    Major depression with psychotic features ([place] [address] )  -     CBC; Future  -     Comprehensive metabolic panel; Future  -     Hemoglobin A1C; Future  -     Lipid Panel with Direct LDL reflex; Future  -     Microalbumin / creatinine urine ratio; Future  -     TSH, 3rd generation with Free T4 reflex; Future  -     Vitamin D 25 hydroxy; Future    Mixed hyperlipidemia  -     CBC; Future  -     Comprehensive metabolic panel; Future  -     Hemoglobin A1C; Future  -     Lipid Panel with Direct LDL reflex; Future  -     Microalbumin / creatinine urine ratio; Future  -     TSH, 3rd generation with Free T4 reflex; Future  -     Vitamin D 25 hydroxy; Future    S/P deep brain stimulator placement  -     CBC; Future  -     Comprehensive metabolic panel;  Future  -     Hemoglobin A1C; Future  -     Lipid Panel with Direct LDL reflex; Future  -     Microalbumin / creatinine urine ratio; Future  -     TSH, 3rd generation with Free T4 reflex; Future  -     Vitamin D 25 hydroxy; Future    Recurrent UTI  -     doxycycline hyclate (VIBRAMYCIN) 100 mg capsule; Take 1 capsule twice daily for 5 days for bladder infection  -     UA (URINE) with reflex to Scope; Future  -     Urine culture; Future  -     CBC; Future  -     Comprehensive metabolic panel; Future  -     Hemoglobin A1C; Future  -     Lipid Panel with Direct LDL reflex; Future  -     Microalbumin / creatinine urine ratio; Future  -     TSH, 3rd generation with Free T4 reflex; Future  -     Vitamin D 25 hydroxy; Future    Perleche with candidiasis  -     ketoconazole (NIZORAL) 2 % cream; Apply topically 2 (two) times a day  -     CBC; Future  -     Comprehensive metabolic panel; Future  -     Hemoglobin A1C; Future  -     Lipid Panel with Direct LDL reflex; Future  -     Microalbumin / creatinine urine ratio; Future  -     TSH, 3rd generation with Free T4 reflex; Future  -     Vitamin D 25 hydroxy; Future              Subjective:      Patient ID: Tawny Cooper is a 64 y o  female  CC:    Chief Complaint   Patient presents with    Follow-up    Results    Medicare Wellness Visit     mjs       HPI:     Patient is stable  Patient will answer yes and no questions otherwise nonverbal however  provides history   is noted patient does have increased urination UA shows possible UTI will treat this for her  In addition patient has sore "corners of mouth"  They tell me she has seen Ophthalmology, Dr Ray Mendoza,  Within the last year for diabetic eye exam      The following portions of the patient's history were reviewed and updated as appropriate: allergies, current medications, past family history, past medical history, past social history, past surgical history and problem list       Review of Systems   Constitutional: Negative      HENT:        HPI   Eyes:        HPI Respiratory: Negative  Cardiovascular: Negative  Gastrointestinal: Negative  Endocrine:        Declined to check blood sugars at home   Genitourinary: Negative  Musculoskeletal: Negative  Skin: Negative  Allergic/Immunologic: Negative  Neurological: Negative  Hematological: Negative  Psychiatric/Behavioral: Negative  Data to review:       Objective:    Vitals:    08/11/21 1421   BP: 142/74   BP Location: Right arm   Pulse: 84        Physical Exam  Vitals and nursing note reviewed  Constitutional:       Appearance: Normal appearance  HENT:      Head: Normocephalic and atraumatic  Mouth/Throat:      Comments: Positive perleche  corners of mouth  Eyes:      General: No scleral icterus  Neck:      Vascular: No carotid bruit  Cardiovascular:      Rate and Rhythm: Normal rate and regular rhythm  Pulses: Normal pulses  Dorsalis pedis pulses are 2+ on the right side and 2+ on the left side  Posterior tibial pulses are 2+ on the right side and 2+ on the left side  Heart sounds: Normal heart sounds  Pulmonary:      Effort: Pulmonary effort is normal       Breath sounds: Normal breath sounds  Abdominal:      General: Bowel sounds are normal       Palpations: Abdomen is soft  Tenderness: There is no abdominal tenderness  Musculoskeletal:      Cervical back: Neck supple  Right lower leg: No edema  Left lower leg: No edema  Feet:      Right foot:      Skin integrity: No ulcer, skin breakdown, erythema, warmth, callus or dry skin  Left foot:      Skin integrity: No ulcer, skin breakdown, erythema, warmth, callus or dry skin  Skin:     General: Skin is warm and dry  Neurological:      Mental Status: She is alert  Mental status is at baseline        Gait: Gait abnormal       Comments: Patient centrally nonverbal but will answer yes and no questions  Wheelchair-bound   Psychiatric:         Mood and Affect: Mood normal  Patient's shoes and socks removed  Right Foot/Ankle   Right Foot Inspection  Skin Exam: skin normal and skin intact no dry skin, no warmth, no callus, no erythema, no maceration, no abnormal color, no pre-ulcer, no ulcer and no callus                          Toe Exam: ROM and strength within normal limits  Sensory   Vibration: intact  Proprioception: intact   Monofilament testing: intact  Vascular  Capillary refills: < 3 seconds  The right DP pulse is 2+  The right PT pulse is 2+  Right Toe  - Comprehensive Exam  Arch: normal  Hammertoes: absent  Claw Toes: absent  Swelling: none   Tenderness: none         Left Foot/Ankle  Left Foot Inspection  Skin Exam: skin normal and skin intactno dry skin, no warmth, no erythema, no maceration, normal color, no pre-ulcer, no ulcer and no callus                         Toe Exam: ROM and strength within normal limits                   Sensory   Vibration: intact  Proprioception: intact  Monofilament: intact  Vascular  Capillary refills: < 3 seconds  The left DP pulse is 2+  The left PT pulse is 2+     Left Toe  - Comprehensive Exam  Arch: normal  Hammertoes: absent  Claw toes: absent  Swelling: none   Tenderness: none       Assign Risk Category:  No deformity present; ;        Risk: 0

## 2021-08-12 ENCOUNTER — TELEPHONE (OUTPATIENT)
Dept: ADMINISTRATIVE | Facility: OTHER | Age: 76
End: 2021-08-12

## 2021-08-12 NOTE — TELEPHONE ENCOUNTER
----- Message from Abraham Vernon sent at 8/11/2021  3:52 PM EDT -----  08/11/21 3:53 PM    Hello, our patient Vleia Cabral has had Diabetic Eye Exam completed/performed  Please assist in updating the patient chart by making an External outreach to Dr Nuñez [facility] facility located in [place]  The date of service is 2021      Thank you,  Abraham Vernon  [facility] PRIMARY [facility]ARE

## 2021-08-12 NOTE — LETTER
Diabetic Eye Exam Form    Date Requested: 21  Patient: Pierre Service  Patient : 1945   Referring Provider: Lisette Potter, DO    Dilated Retinal Exam, Optomap-Iris Exam, or Fundus Photography Done         Yes ([place] one above)         No     Date of Diabetic Eye Exam ______________________________  Left Eye      Exam did show retinopathy    Exam did not show retinopathy         Mild       Moderate       None       Proliferative       Severe     Right Eye     Exam did show retinopathy    Exam did not show retinopathy         Mild       Moderate       None       Proliferative       Severe     Comments __________________________________________________________    Practice Providing Exam ______________________________________________    Exam Performed By (print name) _______________________________________      Provider Signature ___________________________________________________      These reports are needed for  compliance    Please fax this completed form and a copy of the Diabetic Eye Exam report to our office located at [place] [address] as soon as possible to [phone] karsten Barros [facility]: Phone [phone]    We thank you for your assistance in treating our mutual patient

## 2021-08-12 NOTE — TELEPHONE ENCOUNTER
Upon review of the In Basket request and the patient's chart, initial outreach has been made via fax, please see Contacts section for details       Thank you  Adam Quevedo MA

## 2021-08-24 NOTE — TELEPHONE ENCOUNTER
As a follow-up, a second attempt has been made for outreach via fax, please see Contacts section for details      Thank you  Arlene Castellanos MA

## 2021-08-30 ENCOUNTER — APPOINTMENT (OUTPATIENT)
Dept: LAB | Facility: CLINIC | Age: 76
End: 2021-08-30
Payer: MEDICARE

## 2021-08-30 DIAGNOSIS — N39.0 RECURRENT UTI: ICD-10-CM

## 2021-08-30 LAB
BACTERIA UR QL AUTO: ABNORMAL /HPF
BILIRUB UR QL STRIP: NEGATIVE
CLARITY UR: ABNORMAL
COLOR UR: YELLOW
GLUCOSE UR STRIP-MCNC: NEGATIVE MG/DL
HGB UR QL STRIP.AUTO: NEGATIVE
KETONES UR STRIP-MCNC: NEGATIVE MG/DL
LEUKOCYTE ESTERASE UR QL STRIP: ABNORMAL
NITRITE UR QL STRIP: POSITIVE
NON-SQ EPI CELLS URNS QL MICRO: ABNORMAL /HPF
PH UR STRIP.AUTO: 6.5 [PH]
PROT UR STRIP-MCNC: ABNORMAL MG/DL
RBC #/AREA URNS AUTO: ABNORMAL /HPF
SP GR UR STRIP.AUTO: 1.02 (ref 1–1.03)
UROBILINOGEN UR QL STRIP.AUTO: 0.2 E.U./DL
WBC #/AREA URNS AUTO: ABNORMAL /HPF

## 2021-08-30 PROCEDURE — 81001 URINALYSIS AUTO W/SCOPE: CPT

## 2021-08-30 PROCEDURE — 87077 CULTURE AEROBIC IDENTIFY: CPT

## 2021-08-30 PROCEDURE — 87186 SC STD MICRODIL/AGAR DIL: CPT

## 2021-08-30 PROCEDURE — 87086 URINE CULTURE/COLONY COUNT: CPT

## 2021-09-03 LAB
BACTERIA UR CULT: ABNORMAL
BACTERIA UR CULT: ABNORMAL

## 2021-09-07 NOTE — TELEPHONE ENCOUNTER
As a final attempt, a third outreach has been made via telephone call  Please see Contacts section for details  This encounter will be closed and completed by end of day  Should we receive the requested information because of previous outreach attempts, the requested patient's chart will be updated appropriately        Facility is send over eye exam today 9/7    Thank you  Nino Boas, [place]

## 2021-09-08 NOTE — TELEPHONE ENCOUNTER
Upon review of the In Basket request we were able to locate, review, and update the patient chart as requested for Diabetic Eye Exam     Any additional questions or concerns should be emailed to the Practice Liaisons via [email or web address] email, please do not reply via In Basket      Thank you  Niki Lanier MA

## 2021-09-25 DIAGNOSIS — B37.83 PERLECHE WITH CANDIDIASIS: ICD-10-CM

## 2021-09-25 DIAGNOSIS — K11.7 SIALORRHEA: ICD-10-CM

## 2021-09-27 RX ORDER — KETOCONAZOLE 20 MG/G
CREAM TOPICAL
Qty: 15 G | Refills: 0 | Status: SHIPPED | OUTPATIENT
Start: 2021-09-27 | End: 2022-01-27 | Stop reason: HOSPADM

## 2021-09-27 RX ORDER — GLYCOPYRROLATE 1 MG/1
1 TABLET ORAL 3 TIMES DAILY
Qty: 270 TABLET | Refills: 1 | Status: SHIPPED | OUTPATIENT
Start: 2021-09-27 | End: 2021-12-09 | Stop reason: HOSPADM

## 2021-10-17 DIAGNOSIS — R53.1 ACUTE LEFT-SIDED WEAKNESS: ICD-10-CM

## 2021-10-18 RX ORDER — CILOSTAZOL 100 MG/1
100 TABLET ORAL
Qty: 180 TABLET | Refills: 1 | Status: SHIPPED | OUTPATIENT
Start: 2021-10-18 | End: 2022-03-31

## 2021-10-26 ENCOUNTER — CLINICAL SUPPORT (OUTPATIENT)
Dept: FAMILY MEDICINE CLINIC | Facility: CLINIC | Age: 76
End: 2021-10-26
Payer: MEDICARE

## 2021-10-26 DIAGNOSIS — Z23 ENCOUNTER FOR IMMUNIZATION: Primary | ICD-10-CM

## 2021-10-26 PROCEDURE — G0008 ADMIN INFLUENZA VIRUS VAC: HCPCS

## 2021-10-26 PROCEDURE — 90662 IIV NO PRSV INCREASED AG IM: CPT

## 2021-11-11 ENCOUNTER — OFFICE VISIT (OUTPATIENT)
Dept: PSYCHIATRY | Facility: CLINIC | Age: 76
End: 2021-11-11
Payer: MEDICARE

## 2021-11-11 DIAGNOSIS — F33.1 MODERATE EPISODE OF RECURRENT MAJOR DEPRESSIVE DISORDER (HCC): Primary | ICD-10-CM

## 2021-11-11 PROCEDURE — 99214 OFFICE O/P EST MOD 30 MIN: CPT | Performed by: NURSE PRACTITIONER

## 2021-11-11 RX ORDER — ESCITALOPRAM OXALATE 5 MG/1
TABLET ORAL
Qty: 30 TABLET | Refills: 0 | Status: SHIPPED | OUTPATIENT
Start: 2021-11-11 | End: 2021-12-07

## 2021-11-19 ENCOUNTER — CLINICAL SUPPORT (OUTPATIENT)
Dept: FAMILY MEDICINE CLINIC | Facility: CLINIC | Age: 76
End: 2021-11-19
Payer: MEDICARE

## 2021-11-19 DIAGNOSIS — N39.0 RECURRENT UTI: Primary | ICD-10-CM

## 2021-11-19 DIAGNOSIS — R31.9 URINARY TRACT INFECTION WITH HEMATURIA, SITE UNSPECIFIED: Primary | ICD-10-CM

## 2021-11-19 DIAGNOSIS — N39.0 URINARY TRACT INFECTION WITH HEMATURIA, SITE UNSPECIFIED: Primary | ICD-10-CM

## 2021-11-19 LAB
SL AMB  POCT GLUCOSE, UA: ABNORMAL
SL AMB LEUKOCYTE ESTERASE,UA: ABNORMAL
SL AMB POCT BILIRUBIN,UA: ABNORMAL
SL AMB POCT BLOOD,UA: ABNORMAL
SL AMB POCT CLARITY,UA: ABNORMAL
SL AMB POCT COLOR,UA: YELLOW
SL AMB POCT KETONES,UA: ABNORMAL
SL AMB POCT NITRITE,UA: POSITIVE
SL AMB POCT PH,UA: 5.5
SL AMB POCT SPECIFIC GRAVITY,UA: 1.03
SL AMB POCT URINE PROTEIN: ABNORMAL
SL AMB POCT UROBILINOGEN: 0.2

## 2021-11-19 PROCEDURE — 87077 CULTURE AEROBIC IDENTIFY: CPT | Performed by: PHYSICIAN ASSISTANT

## 2021-11-19 PROCEDURE — 87086 URINE CULTURE/COLONY COUNT: CPT | Performed by: PHYSICIAN ASSISTANT

## 2021-11-19 PROCEDURE — 87186 SC STD MICRODIL/AGAR DIL: CPT | Performed by: PHYSICIAN ASSISTANT

## 2021-11-19 PROCEDURE — 81002 URINALYSIS NONAUTO W/O SCOPE: CPT

## 2021-11-19 RX ORDER — NITROFURANTOIN 25; 75 MG/1; MG/1
100 CAPSULE ORAL 2 TIMES DAILY
Qty: 14 CAPSULE | Refills: 0 | Status: SHIPPED | OUTPATIENT
Start: 2021-11-19 | End: 2021-11-26

## 2021-11-21 LAB
BACTERIA UR CULT: ABNORMAL
BACTERIA UR CULT: ABNORMAL

## 2021-12-06 ENCOUNTER — APPOINTMENT (EMERGENCY)
Dept: CT IMAGING | Facility: HOSPITAL | Age: 76
DRG: 689 | End: 2021-12-06
Payer: MEDICARE

## 2021-12-06 ENCOUNTER — HOSPITAL ENCOUNTER (INPATIENT)
Facility: HOSPITAL | Age: 76
LOS: 2 days | Discharge: NURSING, REHAB OR LONG-TERM CARE | DRG: 689 | End: 2021-12-09
Attending: EMERGENCY MEDICINE | Admitting: INTERNAL MEDICINE
Payer: MEDICARE

## 2021-12-06 ENCOUNTER — APPOINTMENT (EMERGENCY)
Dept: RADIOLOGY | Facility: HOSPITAL | Age: 76
DRG: 689 | End: 2021-12-06
Payer: MEDICARE

## 2021-12-06 DIAGNOSIS — G20 PARKINSON DISEASE (HCC): ICD-10-CM

## 2021-12-06 DIAGNOSIS — R41.82 ALTERED MENTAL STATUS: ICD-10-CM

## 2021-12-06 DIAGNOSIS — R53.1 GENERALIZED WEAKNESS: Primary | ICD-10-CM

## 2021-12-06 DIAGNOSIS — N39.0 RECURRENT UTI: ICD-10-CM

## 2021-12-06 DIAGNOSIS — F06.31 MOOD DISORDER WITH MAJOR DEPRESSIVE-LIKE EPISODE DUE TO GENERAL MEDICAL CONDITION: ICD-10-CM

## 2021-12-06 LAB
2HR DELTA HS TROPONIN: 1 NG/L
ALBUMIN SERPL BCP-MCNC: 3.2 G/DL (ref 3.5–5)
ALP SERPL-CCNC: 79 U/L (ref 46–116)
ALT SERPL W P-5'-P-CCNC: 22 U/L (ref 12–78)
ANION GAP SERPL CALCULATED.3IONS-SCNC: 8 MMOL/L (ref 4–13)
APTT PPP: 30 SECONDS (ref 23–37)
AST SERPL W P-5'-P-CCNC: 26 U/L (ref 5–45)
ATRIAL RATE: 90 BPM
BACTERIA UR QL AUTO: ABNORMAL /HPF
BASOPHILS # BLD AUTO: 0.08 THOUSANDS/ΜL (ref 0–0.1)
BASOPHILS NFR BLD AUTO: 1 % (ref 0–1)
BILIRUB SERPL-MCNC: 0.86 MG/DL (ref 0.2–1)
BILIRUB UR QL STRIP: NEGATIVE
BUN SERPL-MCNC: 14 MG/DL (ref 5–25)
CALCIUM ALBUM COR SERPL-MCNC: 9.9 MG/DL (ref 8.3–10.1)
CALCIUM SERPL-MCNC: 9.3 MG/DL (ref 8.3–10.1)
CARDIAC TROPONIN I PNL SERPL HS: 3 NG/L
CARDIAC TROPONIN I PNL SERPL HS: 4 NG/L
CHLORIDE SERPL-SCNC: 104 MMOL/L (ref 100–108)
CLARITY UR: ABNORMAL
CO2 SERPL-SCNC: 27 MMOL/L (ref 21–32)
COLOR UR: YELLOW
CREAT SERPL-MCNC: 0.88 MG/DL (ref 0.6–1.3)
EOSINOPHIL # BLD AUTO: 0.08 THOUSAND/ΜL (ref 0–0.61)
EOSINOPHIL NFR BLD AUTO: 1 % (ref 0–6)
ERYTHROCYTE [DISTWIDTH] IN BLOOD BY AUTOMATED COUNT: 13.6 % (ref 11.6–15.1)
FINE GRAN CASTS URNS QL MICRO: ABNORMAL /LPF
FLUAV RNA RESP QL NAA+PROBE: NEGATIVE
FLUBV RNA RESP QL NAA+PROBE: NEGATIVE
GFR SERPL CREATININE-BSD FRML MDRD: 63 ML/MIN/1.73SQ M
GLUCOSE SERPL-MCNC: 143 MG/DL (ref 65–140)
GLUCOSE SERPL-MCNC: 160 MG/DL (ref 65–140)
GLUCOSE SERPL-MCNC: 171 MG/DL (ref 65–140)
GLUCOSE SERPL-MCNC: 177 MG/DL (ref 65–140)
GLUCOSE SERPL-MCNC: 194 MG/DL (ref 65–140)
GLUCOSE UR STRIP-MCNC: NEGATIVE MG/DL
HCT VFR BLD AUTO: 46.1 % (ref 34.8–46.1)
HGB BLD-MCNC: 15.9 G/DL (ref 11.5–15.4)
HGB UR QL STRIP.AUTO: ABNORMAL
IMM GRANULOCYTES # BLD AUTO: 0.03 THOUSAND/UL (ref 0–0.2)
IMM GRANULOCYTES NFR BLD AUTO: 0 % (ref 0–2)
INR PPP: 1.16 (ref 0.84–1.19)
KETONES UR STRIP-MCNC: ABNORMAL MG/DL
LEUKOCYTE ESTERASE UR QL STRIP: ABNORMAL
LYMPHOCYTES # BLD AUTO: 0.97 THOUSANDS/ΜL (ref 0.6–4.47)
LYMPHOCYTES NFR BLD AUTO: 12 % (ref 14–44)
MCH RBC QN AUTO: 32.5 PG (ref 26.8–34.3)
MCHC RBC AUTO-ENTMCNC: 34.5 G/DL (ref 31.4–37.4)
MCV RBC AUTO: 94 FL (ref 82–98)
MONOCYTES # BLD AUTO: 0.69 THOUSAND/ΜL (ref 0.17–1.22)
MONOCYTES NFR BLD AUTO: 9 % (ref 4–12)
MUCOUS THREADS UR QL AUTO: ABNORMAL
NEUTROPHILS # BLD AUTO: 6.01 THOUSANDS/ΜL (ref 1.85–7.62)
NEUTS SEG NFR BLD AUTO: 77 % (ref 43–75)
NITRITE UR QL STRIP: NEGATIVE
NON-SQ EPI CELLS URNS QL MICRO: ABNORMAL /HPF
NRBC BLD AUTO-RTO: 0 /100 WBCS
OTHER STN SPEC: ABNORMAL
P AXIS: 32 DEGREES
PH UR STRIP.AUTO: 7 [PH] (ref 4.5–8)
PLATELET # BLD AUTO: 247 THOUSANDS/UL (ref 149–390)
PLATELET # BLD AUTO: 288 THOUSANDS/UL (ref 149–390)
PMV BLD AUTO: 10.3 FL (ref 8.9–12.7)
PMV BLD AUTO: 9.9 FL (ref 8.9–12.7)
POTASSIUM SERPL-SCNC: 4.3 MMOL/L (ref 3.5–5.3)
PR INTERVAL: 200 MS
PROT SERPL-MCNC: 6.8 G/DL (ref 6.4–8.2)
PROT UR STRIP-MCNC: ABNORMAL MG/DL
PROTHROMBIN TIME: 14.6 SECONDS (ref 11.6–14.5)
QRS AXIS: 35 DEGREES
QRSD INTERVAL: 64 MS
QT INTERVAL: 346 MS
QTC INTERVAL: 423 MS
RBC # BLD AUTO: 4.89 MILLION/UL (ref 3.81–5.12)
RBC #/AREA URNS AUTO: ABNORMAL /HPF
RSV RNA RESP QL NAA+PROBE: NEGATIVE
SARS-COV-2 RNA RESP QL NAA+PROBE: NEGATIVE
SODIUM SERPL-SCNC: 139 MMOL/L (ref 136–145)
SP GR UR STRIP.AUTO: 1.02 (ref 1–1.03)
T WAVE AXIS: 72 DEGREES
TSH SERPL DL<=0.05 MIU/L-ACNC: 2.86 UIU/ML (ref 0.36–3.74)
URINE COMMENT: ABNORMAL
UROBILINOGEN UR QL STRIP.AUTO: 0.2 E.U./DL
VENTRICULAR RATE: 90 BPM
WBC # BLD AUTO: 7.86 THOUSAND/UL (ref 4.31–10.16)
WBC #/AREA URNS AUTO: ABNORMAL /HPF

## 2021-12-06 PROCEDURE — 71045 X-RAY EXAM CHEST 1 VIEW: CPT

## 2021-12-06 PROCEDURE — 84443 ASSAY THYROID STIM HORMONE: CPT | Performed by: EMERGENCY MEDICINE

## 2021-12-06 PROCEDURE — 83036 HEMOGLOBIN GLYCOSYLATED A1C: CPT | Performed by: INTERNAL MEDICINE

## 2021-12-06 PROCEDURE — 93005 ELECTROCARDIOGRAM TRACING: CPT

## 2021-12-06 PROCEDURE — 85025 COMPLETE CBC W/AUTO DIFF WBC: CPT | Performed by: EMERGENCY MEDICINE

## 2021-12-06 PROCEDURE — 0241U HB NFCT DS VIR RESP RNA 4 TRGT: CPT | Performed by: EMERGENCY MEDICINE

## 2021-12-06 PROCEDURE — 99220 PR INITIAL OBSERVATION CARE/DAY 70 MINUTES: CPT | Performed by: INTERNAL MEDICINE

## 2021-12-06 PROCEDURE — 36415 COLL VENOUS BLD VENIPUNCTURE: CPT | Performed by: EMERGENCY MEDICINE

## 2021-12-06 PROCEDURE — 85730 THROMBOPLASTIN TIME PARTIAL: CPT | Performed by: EMERGENCY MEDICINE

## 2021-12-06 PROCEDURE — 99285 EMERGENCY DEPT VISIT HI MDM: CPT

## 2021-12-06 PROCEDURE — 84484 ASSAY OF TROPONIN QUANT: CPT | Performed by: EMERGENCY MEDICINE

## 2021-12-06 PROCEDURE — 70450 CT HEAD/BRAIN W/O DYE: CPT

## 2021-12-06 PROCEDURE — 99285 EMERGENCY DEPT VISIT HI MDM: CPT | Performed by: EMERGENCY MEDICINE

## 2021-12-06 PROCEDURE — 99214 OFFICE O/P EST MOD 30 MIN: CPT | Performed by: NURSE PRACTITIONER

## 2021-12-06 PROCEDURE — 87086 URINE CULTURE/COLONY COUNT: CPT

## 2021-12-06 PROCEDURE — 81001 URINALYSIS AUTO W/SCOPE: CPT

## 2021-12-06 PROCEDURE — 93010 ELECTROCARDIOGRAM REPORT: CPT | Performed by: INTERNAL MEDICINE

## 2021-12-06 PROCEDURE — 82948 REAGENT STRIP/BLOOD GLUCOSE: CPT

## 2021-12-06 PROCEDURE — 80053 COMPREHEN METABOLIC PANEL: CPT | Performed by: EMERGENCY MEDICINE

## 2021-12-06 PROCEDURE — 85610 PROTHROMBIN TIME: CPT | Performed by: EMERGENCY MEDICINE

## 2021-12-06 PROCEDURE — 85049 AUTOMATED PLATELET COUNT: CPT | Performed by: INTERNAL MEDICINE

## 2021-12-06 RX ORDER — CALCIUM CARBONATE 200(500)MG
1000 TABLET,CHEWABLE ORAL DAILY PRN
Status: DISCONTINUED | OUTPATIENT
Start: 2021-12-06 | End: 2021-12-09 | Stop reason: HOSPADM

## 2021-12-06 RX ORDER — ACETAMINOPHEN 325 MG/1
650 TABLET ORAL EVERY 6 HOURS PRN
Status: DISCONTINUED | OUTPATIENT
Start: 2021-12-06 | End: 2021-12-09 | Stop reason: HOSPADM

## 2021-12-06 RX ORDER — ESCITALOPRAM OXALATE 10 MG/1
10 TABLET ORAL DAILY
Status: CANCELLED | OUTPATIENT
Start: 2021-12-06

## 2021-12-06 RX ORDER — ALPRAZOLAM 0.25 MG/1
0.25 TABLET ORAL
Status: DISCONTINUED | OUTPATIENT
Start: 2021-12-06 | End: 2021-12-09 | Stop reason: HOSPADM

## 2021-12-06 RX ORDER — GLYCOPYRROLATE 1 MG/1
1 TABLET ORAL 3 TIMES DAILY
Status: DISCONTINUED | OUTPATIENT
Start: 2021-12-06 | End: 2021-12-07

## 2021-12-06 RX ORDER — ONDANSETRON 2 MG/ML
4 INJECTION INTRAMUSCULAR; INTRAVENOUS EVERY 6 HOURS PRN
Status: DISCONTINUED | OUTPATIENT
Start: 2021-12-06 | End: 2021-12-09 | Stop reason: HOSPADM

## 2021-12-06 RX ORDER — FAMOTIDINE 20 MG/1
20 TABLET, FILM COATED ORAL DAILY
Status: DISCONTINUED | OUTPATIENT
Start: 2021-12-06 | End: 2021-12-09 | Stop reason: HOSPADM

## 2021-12-06 RX ORDER — OMEGA-3S/DHA/EPA/FISH OIL/D3 300MG-1000
400 CAPSULE ORAL DAILY
Status: DISCONTINUED | OUTPATIENT
Start: 2021-12-06 | End: 2021-12-09 | Stop reason: HOSPADM

## 2021-12-06 RX ORDER — CLOPIDOGREL BISULFATE 75 MG/1
75 TABLET ORAL DAILY
Status: DISCONTINUED | OUTPATIENT
Start: 2021-12-06 | End: 2021-12-09 | Stop reason: HOSPADM

## 2021-12-06 RX ORDER — MAGNESIUM HYDROXIDE/ALUMINUM HYDROXICE/SIMETHICONE 120; 1200; 1200 MG/30ML; MG/30ML; MG/30ML
30 SUSPENSION ORAL EVERY 6 HOURS PRN
Status: DISCONTINUED | OUTPATIENT
Start: 2021-12-06 | End: 2021-12-09 | Stop reason: HOSPADM

## 2021-12-06 RX ORDER — LEVOTHYROXINE SODIUM 0.07 MG/1
75 TABLET ORAL
Status: DISCONTINUED | OUTPATIENT
Start: 2021-12-06 | End: 2021-12-09 | Stop reason: HOSPADM

## 2021-12-06 RX ORDER — ASCORBIC ACID 500 MG
500 TABLET ORAL DAILY
Status: DISCONTINUED | OUTPATIENT
Start: 2021-12-06 | End: 2021-12-09 | Stop reason: HOSPADM

## 2021-12-06 RX ORDER — NITROFURANTOIN 25; 75 MG/1; MG/1
100 CAPSULE ORAL 2 TIMES DAILY WITH MEALS
Status: DISCONTINUED | OUTPATIENT
Start: 2021-12-06 | End: 2021-12-07

## 2021-12-06 RX ORDER — SIMETHICONE 80 MG
80 TABLET,CHEWABLE ORAL 4 TIMES DAILY PRN
Status: DISCONTINUED | OUTPATIENT
Start: 2021-12-06 | End: 2021-12-09 | Stop reason: HOSPADM

## 2021-12-06 RX ORDER — DONEPEZIL HYDROCHLORIDE 10 MG/1
10 TABLET, FILM COATED ORAL
Status: DISCONTINUED | OUTPATIENT
Start: 2021-12-06 | End: 2021-12-09 | Stop reason: HOSPADM

## 2021-12-06 RX ORDER — CILOSTAZOL 50 MG/1
100 TABLET ORAL
Status: DISCONTINUED | OUTPATIENT
Start: 2021-12-06 | End: 2021-12-09 | Stop reason: HOSPADM

## 2021-12-06 RX ADMIN — ENOXAPARIN SODIUM 40 MG: 40 INJECTION SUBCUTANEOUS at 13:25

## 2021-12-06 RX ADMIN — CHOLECALCIFEROL TAB 10 MCG (400 UNIT) 400 UNITS: 10 TAB at 14:20

## 2021-12-06 RX ADMIN — INSULIN LISPRO 1 UNITS: 100 INJECTION, SOLUTION INTRAVENOUS; SUBCUTANEOUS at 18:46

## 2021-12-06 RX ADMIN — LEVOTHYROXINE SODIUM 75 MCG: 75 TABLET ORAL at 13:23

## 2021-12-06 RX ADMIN — OXYCODONE HYDROCHLORIDE AND ACETAMINOPHEN 500 MG: 500 TABLET ORAL at 13:30

## 2021-12-06 RX ADMIN — CLOPIDOGREL BISULFATE 75 MG: 75 TABLET ORAL at 13:23

## 2021-12-06 RX ADMIN — NITROFURANTOIN (MONOHYDRATE/MACROCRYSTALS) 100 MG: 75; 25 CAPSULE ORAL at 17:31

## 2021-12-06 RX ADMIN — DONEPEZIL HYDROCHLORIDE 10 MG: 10 TABLET, FILM COATED ORAL at 22:20

## 2021-12-06 RX ADMIN — GLYCOPYRROLATE 1 MG: 1 TABLET ORAL at 22:20

## 2021-12-06 RX ADMIN — CARBIDOPA AND LEVODOPA 1 TABLET: 25; 100 TABLET ORAL at 21:59

## 2021-12-06 RX ADMIN — CARBIDOPA AND LEVODOPA 1 TABLET: 25; 100 TABLET ORAL at 15:50

## 2021-12-06 RX ADMIN — FAMOTIDINE 20 MG: 20 TABLET ORAL at 13:30

## 2021-12-06 RX ADMIN — CILOSTAZOL 100 MG: 50 TABLET ORAL at 15:50

## 2021-12-06 RX ADMIN — GLYCOPYRROLATE 1 MG: 1 TABLET ORAL at 15:50

## 2021-12-07 PROBLEM — G93.41 METABOLIC ENCEPHALOPATHY: Status: ACTIVE | Noted: 2021-12-07

## 2021-12-07 LAB
ANION GAP SERPL CALCULATED.3IONS-SCNC: 8 MMOL/L (ref 4–13)
BASOPHILS # BLD AUTO: 0.07 THOUSANDS/ΜL (ref 0–0.1)
BASOPHILS NFR BLD AUTO: 1 % (ref 0–1)
BUN SERPL-MCNC: 11 MG/DL (ref 5–25)
CALCIUM SERPL-MCNC: 8.5 MG/DL (ref 8.3–10.1)
CHLORIDE SERPL-SCNC: 104 MMOL/L (ref 100–108)
CO2 SERPL-SCNC: 25 MMOL/L (ref 21–32)
CREAT SERPL-MCNC: 0.84 MG/DL (ref 0.6–1.3)
EOSINOPHIL # BLD AUTO: 0.18 THOUSAND/ΜL (ref 0–0.61)
EOSINOPHIL NFR BLD AUTO: 3 % (ref 0–6)
ERYTHROCYTE [DISTWIDTH] IN BLOOD BY AUTOMATED COUNT: 13.8 % (ref 11.6–15.1)
EST. AVERAGE GLUCOSE BLD GHB EST-MCNC: 134 MG/DL
GFR SERPL CREATININE-BSD FRML MDRD: 67 ML/MIN/1.73SQ M
GLUCOSE SERPL-MCNC: 118 MG/DL (ref 65–140)
GLUCOSE SERPL-MCNC: 133 MG/DL (ref 65–140)
GLUCOSE SERPL-MCNC: 204 MG/DL (ref 65–140)
GLUCOSE SERPL-MCNC: 208 MG/DL (ref 65–140)
HBA1C MFR BLD: 6.3 %
HCT VFR BLD AUTO: 43.1 % (ref 34.8–46.1)
HGB BLD-MCNC: 14.2 G/DL (ref 11.5–15.4)
IMM GRANULOCYTES # BLD AUTO: 0.02 THOUSAND/UL (ref 0–0.2)
IMM GRANULOCYTES NFR BLD AUTO: 0 % (ref 0–2)
LYMPHOCYTES # BLD AUTO: 1.6 THOUSANDS/ΜL (ref 0.6–4.47)
LYMPHOCYTES NFR BLD AUTO: 26 % (ref 14–44)
MCH RBC QN AUTO: 31.1 PG (ref 26.8–34.3)
MCHC RBC AUTO-ENTMCNC: 32.9 G/DL (ref 31.4–37.4)
MCV RBC AUTO: 94 FL (ref 82–98)
MONOCYTES # BLD AUTO: 0.59 THOUSAND/ΜL (ref 0.17–1.22)
MONOCYTES NFR BLD AUTO: 10 % (ref 4–12)
NEUTROPHILS # BLD AUTO: 3.6 THOUSANDS/ΜL (ref 1.85–7.62)
NEUTS SEG NFR BLD AUTO: 59 % (ref 43–75)
NRBC BLD AUTO-RTO: 0 /100 WBCS
PLATELET # BLD AUTO: 273 THOUSANDS/UL (ref 149–390)
PMV BLD AUTO: 9.8 FL (ref 8.9–12.7)
POTASSIUM SERPL-SCNC: 3.5 MMOL/L (ref 3.5–5.3)
RBC # BLD AUTO: 4.57 MILLION/UL (ref 3.81–5.12)
SODIUM SERPL-SCNC: 137 MMOL/L (ref 136–145)
WBC # BLD AUTO: 6.06 THOUSAND/UL (ref 4.31–10.16)

## 2021-12-07 PROCEDURE — 85025 COMPLETE CBC W/AUTO DIFF WBC: CPT | Performed by: INTERNAL MEDICINE

## 2021-12-07 PROCEDURE — 97163 PT EVAL HIGH COMPLEX 45 MIN: CPT

## 2021-12-07 PROCEDURE — 99232 SBSQ HOSP IP/OBS MODERATE 35: CPT | Performed by: NURSE PRACTITIONER

## 2021-12-07 PROCEDURE — 99232 SBSQ HOSP IP/OBS MODERATE 35: CPT | Performed by: INTERNAL MEDICINE

## 2021-12-07 PROCEDURE — 97167 OT EVAL HIGH COMPLEX 60 MIN: CPT

## 2021-12-07 PROCEDURE — 36415 COLL VENOUS BLD VENIPUNCTURE: CPT | Performed by: INTERNAL MEDICINE

## 2021-12-07 PROCEDURE — 80048 BASIC METABOLIC PNL TOTAL CA: CPT | Performed by: INTERNAL MEDICINE

## 2021-12-07 PROCEDURE — 82948 REAGENT STRIP/BLOOD GLUCOSE: CPT

## 2021-12-07 RX ORDER — DOCUSATE SODIUM 100 MG/1
100 CAPSULE, LIQUID FILLED ORAL 2 TIMES DAILY
Status: DISCONTINUED | OUTPATIENT
Start: 2021-12-07 | End: 2021-12-09 | Stop reason: HOSPADM

## 2021-12-07 RX ORDER — SACCHAROMYCES BOULARDII 250 MG
250 CAPSULE ORAL 2 TIMES DAILY
Status: DISCONTINUED | OUTPATIENT
Start: 2021-12-07 | End: 2021-12-09 | Stop reason: HOSPADM

## 2021-12-07 RX ORDER — GLYCOPYRROLATE 1 MG/1
1 TABLET ORAL 2 TIMES DAILY
Status: DISCONTINUED | OUTPATIENT
Start: 2021-12-07 | End: 2021-12-09 | Stop reason: HOSPADM

## 2021-12-07 RX ORDER — SENNOSIDES 8.6 MG
2 TABLET ORAL
Status: DISCONTINUED | OUTPATIENT
Start: 2021-12-07 | End: 2021-12-09 | Stop reason: HOSPADM

## 2021-12-07 RX ADMIN — Medication 250 MG: at 11:18

## 2021-12-07 RX ADMIN — CHOLECALCIFEROL TAB 10 MCG (400 UNIT) 400 UNITS: 10 TAB at 09:04

## 2021-12-07 RX ADMIN — CILOSTAZOL 100 MG: 50 TABLET ORAL at 16:40

## 2021-12-07 RX ADMIN — LEVOTHYROXINE SODIUM 75 MCG: 75 TABLET ORAL at 06:34

## 2021-12-07 RX ADMIN — CILOSTAZOL 100 MG: 50 TABLET ORAL at 09:04

## 2021-12-07 RX ADMIN — ENOXAPARIN SODIUM 40 MG: 40 INJECTION SUBCUTANEOUS at 08:55

## 2021-12-07 RX ADMIN — CARBIDOPA AND LEVODOPA 1 TABLET: 25; 100 TABLET ORAL at 16:40

## 2021-12-07 RX ADMIN — GLYCOPYRROLATE 1 MG: 1 TABLET ORAL at 09:03

## 2021-12-07 RX ADMIN — DOCUSATE SODIUM 100 MG: 100 CAPSULE ORAL at 18:59

## 2021-12-07 RX ADMIN — CEFTRIAXONE SODIUM 1000 MG: 10 INJECTION, POWDER, FOR SOLUTION INTRAVENOUS at 10:00

## 2021-12-07 RX ADMIN — DONEPEZIL HYDROCHLORIDE 10 MG: 10 TABLET, FILM COATED ORAL at 22:29

## 2021-12-07 RX ADMIN — Medication 250 MG: at 19:00

## 2021-12-07 RX ADMIN — CARBIDOPA AND LEVODOPA 1 TABLET: 25; 100 TABLET ORAL at 09:04

## 2021-12-07 RX ADMIN — FAMOTIDINE 20 MG: 20 TABLET ORAL at 09:04

## 2021-12-07 RX ADMIN — OXYCODONE HYDROCHLORIDE AND ACETAMINOPHEN 500 MG: 500 TABLET ORAL at 09:04

## 2021-12-07 RX ADMIN — CLOPIDOGREL BISULFATE 75 MG: 75 TABLET ORAL at 09:04

## 2021-12-07 RX ADMIN — INSULIN LISPRO 1 UNITS: 100 INJECTION, SOLUTION INTRAVENOUS; SUBCUTANEOUS at 13:28

## 2021-12-07 RX ADMIN — CARBIDOPA AND LEVODOPA 1 TABLET: 25; 100 TABLET ORAL at 21:29

## 2021-12-07 RX ADMIN — GLYCOPYRROLATE 1 MG: 1 TABLET ORAL at 19:03

## 2021-12-08 LAB
ALBUMIN SERPL BCP-MCNC: 2.8 G/DL (ref 3.5–5)
ALP SERPL-CCNC: 68 U/L (ref 46–116)
ALT SERPL W P-5'-P-CCNC: 11 U/L (ref 12–78)
ANION GAP SERPL CALCULATED.3IONS-SCNC: 9 MMOL/L (ref 4–13)
AST SERPL W P-5'-P-CCNC: 14 U/L (ref 5–45)
BACTERIA UR CULT: NORMAL
BILIRUB SERPL-MCNC: 0.52 MG/DL (ref 0.2–1)
BUN SERPL-MCNC: 9 MG/DL (ref 5–25)
CALCIUM ALBUM COR SERPL-MCNC: 9.6 MG/DL (ref 8.3–10.1)
CALCIUM SERPL-MCNC: 8.6 MG/DL (ref 8.3–10.1)
CHLORIDE SERPL-SCNC: 106 MMOL/L (ref 100–108)
CO2 SERPL-SCNC: 25 MMOL/L (ref 21–32)
CREAT SERPL-MCNC: 0.69 MG/DL (ref 0.6–1.3)
ERYTHROCYTE [DISTWIDTH] IN BLOOD BY AUTOMATED COUNT: 13.5 % (ref 11.6–15.1)
GFR SERPL CREATININE-BSD FRML MDRD: 84 ML/MIN/1.73SQ M
GLUCOSE SERPL-MCNC: 134 MG/DL (ref 65–140)
GLUCOSE SERPL-MCNC: 159 MG/DL (ref 65–140)
GLUCOSE SERPL-MCNC: 168 MG/DL (ref 65–140)
GLUCOSE SERPL-MCNC: 168 MG/DL (ref 65–140)
GLUCOSE SERPL-MCNC: 196 MG/DL (ref 65–140)
HCT VFR BLD AUTO: 43.2 % (ref 34.8–46.1)
HGB BLD-MCNC: 14.6 G/DL (ref 11.5–15.4)
MAGNESIUM SERPL-MCNC: 2.1 MG/DL (ref 1.6–2.6)
MCH RBC QN AUTO: 32.1 PG (ref 26.8–34.3)
MCHC RBC AUTO-ENTMCNC: 33.8 G/DL (ref 31.4–37.4)
MCV RBC AUTO: 95 FL (ref 82–98)
PLATELET # BLD AUTO: 273 THOUSANDS/UL (ref 149–390)
PMV BLD AUTO: 9.9 FL (ref 8.9–12.7)
POTASSIUM SERPL-SCNC: 3.6 MMOL/L (ref 3.5–5.3)
PROT SERPL-MCNC: 6.2 G/DL (ref 6.4–8.2)
RBC # BLD AUTO: 4.55 MILLION/UL (ref 3.81–5.12)
SODIUM SERPL-SCNC: 140 MMOL/L (ref 136–145)
WBC # BLD AUTO: 6.76 THOUSAND/UL (ref 4.31–10.16)

## 2021-12-08 PROCEDURE — 82948 REAGENT STRIP/BLOOD GLUCOSE: CPT

## 2021-12-08 PROCEDURE — 80053 COMPREHEN METABOLIC PANEL: CPT | Performed by: PHYSICIAN ASSISTANT

## 2021-12-08 PROCEDURE — 85027 COMPLETE CBC AUTOMATED: CPT | Performed by: PHYSICIAN ASSISTANT

## 2021-12-08 PROCEDURE — 99232 SBSQ HOSP IP/OBS MODERATE 35: CPT | Performed by: INTERNAL MEDICINE

## 2021-12-08 PROCEDURE — 83735 ASSAY OF MAGNESIUM: CPT | Performed by: PHYSICIAN ASSISTANT

## 2021-12-08 RX ADMIN — Medication 250 MG: at 10:00

## 2021-12-08 RX ADMIN — ENOXAPARIN SODIUM 40 MG: 40 INJECTION SUBCUTANEOUS at 09:59

## 2021-12-08 RX ADMIN — CARBIDOPA AND LEVODOPA 1 TABLET: 25; 100 TABLET ORAL at 17:00

## 2021-12-08 RX ADMIN — GLYCOPYRROLATE 1 MG: 1 TABLET ORAL at 10:00

## 2021-12-08 RX ADMIN — CARBIDOPA AND LEVODOPA 1 TABLET: 25; 100 TABLET ORAL at 20:43

## 2021-12-08 RX ADMIN — CILOSTAZOL 100 MG: 50 TABLET ORAL at 17:00

## 2021-12-08 RX ADMIN — INSULIN LISPRO 1 UNITS: 100 INJECTION, SOLUTION INTRAVENOUS; SUBCUTANEOUS at 13:10

## 2021-12-08 RX ADMIN — DONEPEZIL HYDROCHLORIDE 10 MG: 10 TABLET, FILM COATED ORAL at 21:34

## 2021-12-08 RX ADMIN — INSULIN LISPRO 1 UNITS: 100 INJECTION, SOLUTION INTRAVENOUS; SUBCUTANEOUS at 09:59

## 2021-12-08 RX ADMIN — INSULIN LISPRO 1 UNITS: 100 INJECTION, SOLUTION INTRAVENOUS; SUBCUTANEOUS at 17:54

## 2021-12-08 RX ADMIN — CILOSTAZOL 100 MG: 50 TABLET ORAL at 08:00

## 2021-12-08 RX ADMIN — DOCUSATE SODIUM 100 MG: 100 CAPSULE ORAL at 17:54

## 2021-12-08 RX ADMIN — CLOPIDOGREL BISULFATE 75 MG: 75 TABLET ORAL at 10:00

## 2021-12-08 RX ADMIN — GLYCOPYRROLATE 1 MG: 1 TABLET ORAL at 17:54

## 2021-12-08 RX ADMIN — CEFTRIAXONE SODIUM 1000 MG: 10 INJECTION, POWDER, FOR SOLUTION INTRAVENOUS at 10:00

## 2021-12-08 RX ADMIN — Medication 250 MG: at 17:54

## 2021-12-08 RX ADMIN — CHOLECALCIFEROL TAB 10 MCG (400 UNIT) 400 UNITS: 10 TAB at 10:00

## 2021-12-08 RX ADMIN — LEVOTHYROXINE SODIUM 75 MCG: 75 TABLET ORAL at 05:23

## 2021-12-08 RX ADMIN — CARBIDOPA AND LEVODOPA 1 TABLET: 25; 100 TABLET ORAL at 10:00

## 2021-12-08 RX ADMIN — OXYCODONE HYDROCHLORIDE AND ACETAMINOPHEN 500 MG: 500 TABLET ORAL at 10:00

## 2021-12-08 RX ADMIN — FAMOTIDINE 20 MG: 20 TABLET ORAL at 10:00

## 2021-12-08 RX ADMIN — DOCUSATE SODIUM 100 MG: 100 CAPSULE ORAL at 10:00

## 2021-12-09 VITALS
RESPIRATION RATE: 16 BRPM | HEART RATE: 84 BPM | SYSTOLIC BLOOD PRESSURE: 135 MMHG | TEMPERATURE: 98.4 F | DIASTOLIC BLOOD PRESSURE: 78 MMHG | OXYGEN SATURATION: 91 %

## 2021-12-09 LAB
GLUCOSE SERPL-MCNC: 133 MG/DL (ref 65–140)
GLUCOSE SERPL-MCNC: 227 MG/DL (ref 65–140)

## 2021-12-09 PROCEDURE — 82948 REAGENT STRIP/BLOOD GLUCOSE: CPT

## 2021-12-09 PROCEDURE — 99239 HOSP IP/OBS DSCHRG MGMT >30: CPT | Performed by: INTERNAL MEDICINE

## 2021-12-09 RX ORDER — SACCHAROMYCES BOULARDII 250 MG
250 CAPSULE ORAL 2 TIMES DAILY
Qty: 4 CAPSULE | Refills: 0
Start: 2021-12-09 | End: 2021-12-11

## 2021-12-09 RX ORDER — GLYCOPYRROLATE 1 MG/1
1 TABLET ORAL 2 TIMES DAILY
Qty: 60 TABLET | Refills: 0
Start: 2021-12-09 | End: 2022-05-13 | Stop reason: SDUPTHER

## 2021-12-09 RX ADMIN — DOCUSATE SODIUM 100 MG: 100 CAPSULE ORAL at 09:15

## 2021-12-09 RX ADMIN — CHOLECALCIFEROL TAB 10 MCG (400 UNIT) 400 UNITS: 10 TAB at 09:15

## 2021-12-09 RX ADMIN — LEVOTHYROXINE SODIUM 75 MCG: 75 TABLET ORAL at 05:44

## 2021-12-09 RX ADMIN — CEFTRIAXONE SODIUM 1000 MG: 10 INJECTION, POWDER, FOR SOLUTION INTRAVENOUS at 09:44

## 2021-12-09 RX ADMIN — GLYCOPYRROLATE 1 MG: 1 TABLET ORAL at 09:15

## 2021-12-09 RX ADMIN — OXYCODONE HYDROCHLORIDE AND ACETAMINOPHEN 500 MG: 500 TABLET ORAL at 09:15

## 2021-12-09 RX ADMIN — CARBIDOPA AND LEVODOPA 1 TABLET: 25; 100 TABLET ORAL at 09:15

## 2021-12-09 RX ADMIN — CILOSTAZOL 100 MG: 50 TABLET ORAL at 05:44

## 2021-12-09 RX ADMIN — CLOPIDOGREL BISULFATE 75 MG: 75 TABLET ORAL at 09:15

## 2021-12-09 RX ADMIN — Medication 250 MG: at 09:15

## 2021-12-09 RX ADMIN — FAMOTIDINE 20 MG: 20 TABLET ORAL at 09:15

## 2021-12-09 RX ADMIN — ENOXAPARIN SODIUM 40 MG: 40 INJECTION SUBCUTANEOUS at 09:15

## 2021-12-10 ENCOUNTER — TRANSITIONAL CARE MANAGEMENT (OUTPATIENT)
Dept: FAMILY MEDICINE CLINIC | Facility: CLINIC | Age: 76
End: 2021-12-10

## 2021-12-10 DIAGNOSIS — R53.1 ACUTE LEFT-SIDED WEAKNESS: ICD-10-CM

## 2021-12-17 ENCOUNTER — TRANSITIONAL CARE MANAGEMENT (OUTPATIENT)
Dept: FAMILY MEDICINE CLINIC | Facility: CLINIC | Age: 76
End: 2021-12-17

## 2021-12-17 DIAGNOSIS — R53.1 ACUTE LEFT-SIDED WEAKNESS: ICD-10-CM

## 2021-12-17 RX ORDER — CLOPIDOGREL BISULFATE 75 MG/1
75 TABLET ORAL DAILY
Qty: 90 TABLET | Refills: 0 | Status: SHIPPED | OUTPATIENT
Start: 2021-12-17 | End: 2021-12-20

## 2021-12-20 RX ORDER — CLOPIDOGREL BISULFATE 75 MG/1
TABLET ORAL
Qty: 90 TABLET | Refills: 1 | Status: SHIPPED | OUTPATIENT
Start: 2021-12-20 | End: 2021-12-20

## 2021-12-20 RX ORDER — CLOPIDOGREL BISULFATE 75 MG/1
TABLET ORAL
Qty: 90 TABLET | Refills: 0 | Status: SHIPPED | OUTPATIENT
Start: 2021-12-20 | End: 2022-03-28

## 2021-12-22 ENCOUNTER — OFFICE VISIT (OUTPATIENT)
Dept: FAMILY MEDICINE CLINIC | Facility: CLINIC | Age: 76
End: 2021-12-22

## 2021-12-22 VITALS — HEART RATE: 84 BPM | DIASTOLIC BLOOD PRESSURE: 64 MMHG | OXYGEN SATURATION: 94 % | SYSTOLIC BLOOD PRESSURE: 108 MMHG

## 2021-12-22 DIAGNOSIS — G93.41 METABOLIC ENCEPHALOPATHY: Primary | ICD-10-CM

## 2021-12-22 DIAGNOSIS — S81.802A WOUND OF LEFT LOWER EXTREMITY, INITIAL ENCOUNTER: ICD-10-CM

## 2021-12-22 DIAGNOSIS — G20 PARKINSON DISEASE (HCC): ICD-10-CM

## 2021-12-22 PROCEDURE — 99496 TRANSJ CARE MGMT HIGH F2F 7D: CPT | Performed by: PHYSICIAN ASSISTANT

## 2021-12-22 RX ORDER — ASPIRIN 81 MG/1
81 TABLET ORAL 3 TIMES WEEKLY
COMMUNITY

## 2021-12-22 RX ORDER — SIMVASTATIN 20 MG
20 TABLET ORAL
COMMUNITY

## 2022-01-22 ENCOUNTER — TELEPHONE (OUTPATIENT)
Dept: FAMILY MEDICINE CLINIC | Facility: CLINIC | Age: 77
End: 2022-01-22

## 2022-01-22 DIAGNOSIS — N39.0 RECURRENT UTI: Primary | ICD-10-CM

## 2022-01-22 RX ORDER — NITROFURANTOIN 25; 75 MG/1; MG/1
100 CAPSULE ORAL 2 TIMES DAILY
Qty: 14 CAPSULE | Refills: 0 | Status: SHIPPED | OUTPATIENT
Start: 2022-01-22 | End: 2022-01-27 | Stop reason: HOSPADM

## 2022-01-22 NOTE — TELEPHONE ENCOUNTER
Please let pt know I called in macrobid for pt as this is one of the only antibiotics she is not allergic to   If her symptoms do not improve she needs ER eval

## 2022-01-22 NOTE — TELEPHONE ENCOUNTER
Pt's spouse called joni with c/o that pt has a uti again, she is confused and unable to walk and its to much for him  He said he bought azo test trips and checked her urine and it showed positive for leuk  Please advise

## 2022-01-23 ENCOUNTER — APPOINTMENT (EMERGENCY)
Dept: RADIOLOGY | Facility: HOSPITAL | Age: 77
DRG: 690 | End: 2022-01-23
Payer: MEDICARE

## 2022-01-23 ENCOUNTER — APPOINTMENT (EMERGENCY)
Dept: CT IMAGING | Facility: HOSPITAL | Age: 77
DRG: 690 | End: 2022-01-23
Payer: MEDICARE

## 2022-01-23 ENCOUNTER — HOSPITAL ENCOUNTER (INPATIENT)
Facility: HOSPITAL | Age: 77
LOS: 3 days | Discharge: NURSING, REHAB OR LONG-TERM CARE | DRG: 690 | End: 2022-01-27
Attending: EMERGENCY MEDICINE | Admitting: INTERNAL MEDICINE
Payer: MEDICARE

## 2022-01-23 DIAGNOSIS — G20 PARKINSON'S DISEASE (HCC): ICD-10-CM

## 2022-01-23 DIAGNOSIS — B37.0 ORAL THRUSH: ICD-10-CM

## 2022-01-23 DIAGNOSIS — N39.0 UTI (URINARY TRACT INFECTION): Primary | ICD-10-CM

## 2022-01-23 DIAGNOSIS — F41.1 GENERALIZED ANXIETY DISORDER: ICD-10-CM

## 2022-01-23 DIAGNOSIS — R53.83 FATIGUE: ICD-10-CM

## 2022-01-23 DIAGNOSIS — R53.1 GENERALIZED WEAKNESS: ICD-10-CM

## 2022-01-23 DIAGNOSIS — R26.2 AMBULATORY DYSFUNCTION: ICD-10-CM

## 2022-01-23 LAB
2HR DELTA HS TROPONIN: 0 NG/L
4HR DELTA HS TROPONIN: 0 NG/L
ALBUMIN SERPL BCP-MCNC: 3.5 G/DL (ref 3.5–5)
ALP SERPL-CCNC: 83 U/L (ref 46–116)
ALT SERPL W P-5'-P-CCNC: 22 U/L (ref 12–78)
ANION GAP SERPL CALCULATED.3IONS-SCNC: 10 MMOL/L (ref 4–13)
APTT PPP: 31 SECONDS (ref 23–37)
AST SERPL W P-5'-P-CCNC: 19 U/L (ref 5–45)
ATRIAL RATE: 107 BPM
BACTERIA UR QL AUTO: ABNORMAL /HPF
BASE EX.OXY STD BLDV CALC-SCNC: 73.5 % (ref 60–80)
BASE EXCESS BLDV CALC-SCNC: -0.2 MMOL/L
BASOPHILS # BLD AUTO: 0.08 THOUSANDS/ΜL (ref 0–0.1)
BASOPHILS NFR BLD AUTO: 1 % (ref 0–1)
BILIRUB SERPL-MCNC: 0.92 MG/DL (ref 0.2–1)
BILIRUB UR QL STRIP: NEGATIVE
BUN SERPL-MCNC: 15 MG/DL (ref 5–25)
CALCIUM SERPL-MCNC: 9.7 MG/DL (ref 8.3–10.1)
CARDIAC TROPONIN I PNL SERPL HS: 3 NG/L
CHLORIDE SERPL-SCNC: 106 MMOL/L (ref 100–108)
CLARITY UR: ABNORMAL
CO2 SERPL-SCNC: 28 MMOL/L (ref 21–32)
COLOR UR: YELLOW
CREAT SERPL-MCNC: 0.91 MG/DL (ref 0.6–1.3)
EOSINOPHIL # BLD AUTO: 0.13 THOUSAND/ΜL (ref 0–0.61)
EOSINOPHIL NFR BLD AUTO: 1 % (ref 0–6)
ERYTHROCYTE [DISTWIDTH] IN BLOOD BY AUTOMATED COUNT: 13.6 % (ref 11.6–15.1)
FLUAV RNA RESP QL NAA+PROBE: NEGATIVE
FLUBV RNA RESP QL NAA+PROBE: NEGATIVE
GFR SERPL CREATININE-BSD FRML MDRD: 60 ML/MIN/1.73SQ M
GLUCOSE SERPL-MCNC: 136 MG/DL (ref 65–140)
GLUCOSE SERPL-MCNC: 171 MG/DL (ref 65–140)
GLUCOSE UR STRIP-MCNC: NEGATIVE MG/DL
HCO3 BLDV-SCNC: 23.2 MMOL/L (ref 24–30)
HCT VFR BLD AUTO: 48.7 % (ref 34.8–46.1)
HGB BLD-MCNC: 16.7 G/DL (ref 11.5–15.4)
HGB UR QL STRIP.AUTO: NEGATIVE
IMM GRANULOCYTES # BLD AUTO: 0.05 THOUSAND/UL (ref 0–0.2)
IMM GRANULOCYTES NFR BLD AUTO: 1 % (ref 0–2)
INR PPP: 1.11 (ref 0.84–1.19)
KETONES UR STRIP-MCNC: ABNORMAL MG/DL
LACTATE SERPL-SCNC: 1.4 MMOL/L (ref 0.5–2)
LEUKOCYTE ESTERASE UR QL STRIP: ABNORMAL
LYMPHOCYTES # BLD AUTO: 0.56 THOUSANDS/ΜL (ref 0.6–4.47)
LYMPHOCYTES NFR BLD AUTO: 5 % (ref 14–44)
MCH RBC QN AUTO: 32.1 PG (ref 26.8–34.3)
MCHC RBC AUTO-ENTMCNC: 34.3 G/DL (ref 31.4–37.4)
MCV RBC AUTO: 94 FL (ref 82–98)
MONOCYTES # BLD AUTO: 0.85 THOUSAND/ΜL (ref 0.17–1.22)
MONOCYTES NFR BLD AUTO: 8 % (ref 4–12)
NEUTROPHILS # BLD AUTO: 8.68 THOUSANDS/ΜL (ref 1.85–7.62)
NEUTS SEG NFR BLD AUTO: 84 % (ref 43–75)
NITRITE UR QL STRIP: NEGATIVE
NON-SQ EPI CELLS URNS QL MICRO: ABNORMAL /HPF
NRBC BLD AUTO-RTO: 0 /100 WBCS
O2 CT BLDV-SCNC: 14.5 ML/DL
P AXIS: 64 DEGREES
PCO2 BLDV: 34.4 MM HG (ref 42–50)
PH BLDV: 7.45 [PH] (ref 7.3–7.4)
PH UR STRIP.AUTO: 6 [PH]
PLATELET # BLD AUTO: 262 THOUSANDS/UL (ref 149–390)
PLATELET # BLD AUTO: 282 THOUSANDS/UL (ref 149–390)
PMV BLD AUTO: 10.1 FL (ref 8.9–12.7)
PMV BLD AUTO: 10.3 FL (ref 8.9–12.7)
PO2 BLDV: 34.1 MM HG (ref 35–45)
POTASSIUM SERPL-SCNC: 3.6 MMOL/L (ref 3.5–5.3)
PR INTERVAL: 138 MS
PROCALCITONIN SERPL-MCNC: 0.16 NG/ML
PROT SERPL-MCNC: 7.1 G/DL (ref 6.4–8.2)
PROT UR STRIP-MCNC: ABNORMAL MG/DL
PROTHROMBIN TIME: 14.1 SECONDS (ref 11.6–14.5)
QRS AXIS: 57 DEGREES
QRSD INTERVAL: 70 MS
QT INTERVAL: 324 MS
QTC INTERVAL: 432 MS
RBC # BLD AUTO: 5.2 MILLION/UL (ref 3.81–5.12)
RBC #/AREA URNS AUTO: ABNORMAL /HPF
RSV RNA RESP QL NAA+PROBE: NEGATIVE
SARS-COV-2 RNA RESP QL NAA+PROBE: NEGATIVE
SODIUM SERPL-SCNC: 144 MMOL/L (ref 136–145)
SP GR UR STRIP.AUTO: 1.02 (ref 1–1.03)
T WAVE AXIS: 51 DEGREES
TSH SERPL DL<=0.05 MIU/L-ACNC: 1.81 UIU/ML (ref 0.36–3.74)
UROBILINOGEN UR QL STRIP.AUTO: 0.2 E.U./DL
VENTRICULAR RATE: 107 BPM
WBC # BLD AUTO: 10.35 THOUSAND/UL (ref 4.31–10.16)
WBC #/AREA URNS AUTO: ABNORMAL /HPF

## 2022-01-23 PROCEDURE — 96365 THER/PROPH/DIAG IV INF INIT: CPT

## 2022-01-23 PROCEDURE — 87040 BLOOD CULTURE FOR BACTERIA: CPT | Performed by: EMERGENCY MEDICINE

## 2022-01-23 PROCEDURE — 93010 ELECTROCARDIOGRAM REPORT: CPT | Performed by: INTERNAL MEDICINE

## 2022-01-23 PROCEDURE — 0241U HB NFCT DS VIR RESP RNA 4 TRGT: CPT | Performed by: EMERGENCY MEDICINE

## 2022-01-23 PROCEDURE — 99220 PR INITIAL OBSERVATION CARE/DAY 70 MINUTES: CPT | Performed by: INTERNAL MEDICINE

## 2022-01-23 PROCEDURE — 93005 ELECTROCARDIOGRAM TRACING: CPT

## 2022-01-23 PROCEDURE — G1004 CDSM NDSC: HCPCS

## 2022-01-23 PROCEDURE — 96361 HYDRATE IV INFUSION ADD-ON: CPT

## 2022-01-23 PROCEDURE — 85049 AUTOMATED PLATELET COUNT: CPT | Performed by: INTERNAL MEDICINE

## 2022-01-23 PROCEDURE — 85610 PROTHROMBIN TIME: CPT | Performed by: EMERGENCY MEDICINE

## 2022-01-23 PROCEDURE — 84443 ASSAY THYROID STIM HORMONE: CPT | Performed by: EMERGENCY MEDICINE

## 2022-01-23 PROCEDURE — 85730 THROMBOPLASTIN TIME PARTIAL: CPT | Performed by: EMERGENCY MEDICINE

## 2022-01-23 PROCEDURE — 82948 REAGENT STRIP/BLOOD GLUCOSE: CPT

## 2022-01-23 PROCEDURE — 81001 URINALYSIS AUTO W/SCOPE: CPT | Performed by: EMERGENCY MEDICINE

## 2022-01-23 PROCEDURE — 84145 PROCALCITONIN (PCT): CPT | Performed by: EMERGENCY MEDICINE

## 2022-01-23 PROCEDURE — 87086 URINE CULTURE/COLONY COUNT: CPT | Performed by: EMERGENCY MEDICINE

## 2022-01-23 PROCEDURE — 36415 COLL VENOUS BLD VENIPUNCTURE: CPT | Performed by: EMERGENCY MEDICINE

## 2022-01-23 PROCEDURE — 84484 ASSAY OF TROPONIN QUANT: CPT | Performed by: EMERGENCY MEDICINE

## 2022-01-23 PROCEDURE — 70450 CT HEAD/BRAIN W/O DYE: CPT

## 2022-01-23 PROCEDURE — 99285 EMERGENCY DEPT VISIT HI MDM: CPT | Performed by: EMERGENCY MEDICINE

## 2022-01-23 PROCEDURE — 83605 ASSAY OF LACTIC ACID: CPT | Performed by: EMERGENCY MEDICINE

## 2022-01-23 PROCEDURE — 85025 COMPLETE CBC W/AUTO DIFF WBC: CPT | Performed by: EMERGENCY MEDICINE

## 2022-01-23 PROCEDURE — 80053 COMPREHEN METABOLIC PANEL: CPT | Performed by: EMERGENCY MEDICINE

## 2022-01-23 PROCEDURE — 82805 BLOOD GASES W/O2 SATURATION: CPT | Performed by: EMERGENCY MEDICINE

## 2022-01-23 PROCEDURE — 71045 X-RAY EXAM CHEST 1 VIEW: CPT

## 2022-01-23 PROCEDURE — 99285 EMERGENCY DEPT VISIT HI MDM: CPT

## 2022-01-23 RX ORDER — ACETAMINOPHEN 500 MG
500 TABLET ORAL EVERY 4 HOURS PRN
Status: DISCONTINUED | OUTPATIENT
Start: 2022-01-23 | End: 2022-01-23 | Stop reason: SDUPTHER

## 2022-01-23 RX ORDER — HEPARIN SODIUM 5000 [USP'U]/ML
5000 INJECTION, SOLUTION INTRAVENOUS; SUBCUTANEOUS EVERY 8 HOURS SCHEDULED
Status: DISCONTINUED | OUTPATIENT
Start: 2022-01-23 | End: 2022-01-27 | Stop reason: HOSPADM

## 2022-01-23 RX ORDER — CILOSTAZOL 50 MG/1
100 TABLET ORAL
Status: DISCONTINUED | OUTPATIENT
Start: 2022-01-23 | End: 2022-01-27 | Stop reason: HOSPADM

## 2022-01-23 RX ORDER — ASPIRIN 81 MG/1
81 TABLET ORAL 3 TIMES WEEKLY
Status: DISCONTINUED | OUTPATIENT
Start: 2022-01-24 | End: 2022-01-27 | Stop reason: HOSPADM

## 2022-01-23 RX ORDER — CLOPIDOGREL BISULFATE 75 MG/1
75 TABLET ORAL DAILY
Status: DISCONTINUED | OUTPATIENT
Start: 2022-01-23 | End: 2022-01-27 | Stop reason: HOSPADM

## 2022-01-23 RX ORDER — MAGNESIUM HYDROXIDE/ALUMINUM HYDROXICE/SIMETHICONE 120; 1200; 1200 MG/30ML; MG/30ML; MG/30ML
30 SUSPENSION ORAL EVERY 6 HOURS PRN
Status: DISCONTINUED | OUTPATIENT
Start: 2022-01-23 | End: 2022-01-27 | Stop reason: HOSPADM

## 2022-01-23 RX ORDER — PRAVASTATIN SODIUM 40 MG
40 TABLET ORAL
Status: DISCONTINUED | OUTPATIENT
Start: 2022-01-23 | End: 2022-01-27 | Stop reason: HOSPADM

## 2022-01-23 RX ORDER — SIMETHICONE 80 MG
80 TABLET,CHEWABLE ORAL 4 TIMES DAILY PRN
Status: DISCONTINUED | OUTPATIENT
Start: 2022-01-23 | End: 2022-01-27 | Stop reason: HOSPADM

## 2022-01-23 RX ORDER — GLYCOPYRROLATE 1 MG/1
1 TABLET ORAL 2 TIMES DAILY
Status: DISCONTINUED | OUTPATIENT
Start: 2022-01-23 | End: 2022-01-27 | Stop reason: HOSPADM

## 2022-01-23 RX ORDER — DOCUSATE SODIUM 100 MG/1
100 CAPSULE, LIQUID FILLED ORAL 2 TIMES DAILY
Status: DISCONTINUED | OUTPATIENT
Start: 2022-01-23 | End: 2022-01-27 | Stop reason: HOSPADM

## 2022-01-23 RX ORDER — FAMOTIDINE 20 MG/1
20 TABLET, FILM COATED ORAL 2 TIMES DAILY
Status: DISCONTINUED | OUTPATIENT
Start: 2022-01-23 | End: 2022-01-27 | Stop reason: HOSPADM

## 2022-01-23 RX ORDER — DONEPEZIL HYDROCHLORIDE 10 MG/1
10 TABLET, FILM COATED ORAL
Status: DISCONTINUED | OUTPATIENT
Start: 2022-01-23 | End: 2022-01-27 | Stop reason: HOSPADM

## 2022-01-23 RX ORDER — CALCIUM CARBONATE 200(500)MG
1000 TABLET,CHEWABLE ORAL DAILY PRN
Status: DISCONTINUED | OUTPATIENT
Start: 2022-01-23 | End: 2022-01-27 | Stop reason: HOSPADM

## 2022-01-23 RX ORDER — SACCHAROMYCES BOULARDII 250 MG
250 CAPSULE ORAL 2 TIMES DAILY
Status: DISCONTINUED | OUTPATIENT
Start: 2022-01-23 | End: 2022-01-27 | Stop reason: HOSPADM

## 2022-01-23 RX ORDER — ONDANSETRON 2 MG/ML
4 INJECTION INTRAMUSCULAR; INTRAVENOUS EVERY 6 HOURS PRN
Status: DISCONTINUED | OUTPATIENT
Start: 2022-01-23 | End: 2022-01-27 | Stop reason: HOSPADM

## 2022-01-23 RX ORDER — SODIUM CHLORIDE 9 MG/ML
3 INJECTION INTRAVENOUS
Status: DISCONTINUED | OUTPATIENT
Start: 2022-01-23 | End: 2022-01-27 | Stop reason: HOSPADM

## 2022-01-23 RX ORDER — ACETAMINOPHEN 325 MG/1
650 TABLET ORAL EVERY 6 HOURS PRN
Status: DISCONTINUED | OUTPATIENT
Start: 2022-01-23 | End: 2022-01-27 | Stop reason: HOSPADM

## 2022-01-23 RX ORDER — LEVOTHYROXINE SODIUM 0.07 MG/1
75 TABLET ORAL DAILY
Status: DISCONTINUED | OUTPATIENT
Start: 2022-01-23 | End: 2022-01-27 | Stop reason: HOSPADM

## 2022-01-23 RX ORDER — ACETAMINOPHEN 650 MG/1
650 SUPPOSITORY RECTAL ONCE
Status: COMPLETED | OUTPATIENT
Start: 2022-01-23 | End: 2022-01-23

## 2022-01-23 RX ADMIN — DOCUSATE SODIUM 100 MG: 100 CAPSULE, LIQUID FILLED ORAL at 13:27

## 2022-01-23 RX ADMIN — HEPARIN SODIUM 5000 UNITS: 5000 INJECTION INTRAVENOUS; SUBCUTANEOUS at 21:56

## 2022-01-23 RX ADMIN — CEFTRIAXONE SODIUM 1000 MG: 10 INJECTION, POWDER, FOR SOLUTION INTRAVENOUS at 10:01

## 2022-01-23 RX ADMIN — DONEPEZIL HYDROCHLORIDE 10 MG: 10 TABLET, FILM COATED ORAL at 21:56

## 2022-01-23 RX ADMIN — GLYCOPYRROLATE 1 MG: 1 TABLET ORAL at 16:28

## 2022-01-23 RX ADMIN — Medication 250 MG: at 22:00

## 2022-01-23 RX ADMIN — CARBIDOPA AND LEVODOPA 1 TABLET: 25; 100 TABLET ORAL at 21:56

## 2022-01-23 RX ADMIN — CARBIDOPA AND LEVODOPA 1 TABLET: 25; 100 TABLET ORAL at 16:28

## 2022-01-23 RX ADMIN — CILOSTAZOL 100 MG: 50 TABLET ORAL at 16:29

## 2022-01-23 RX ADMIN — ACETAMINOPHEN 650 MG: 650 SUPPOSITORY RECTAL at 11:10

## 2022-01-23 RX ADMIN — FAMOTIDINE 20 MG: 20 TABLET ORAL at 22:00

## 2022-01-23 RX ADMIN — FAMOTIDINE 20 MG: 20 TABLET ORAL at 13:27

## 2022-01-23 RX ADMIN — CLOPIDOGREL BISULFATE 75 MG: 75 TABLET ORAL at 13:27

## 2022-01-23 RX ADMIN — DOCUSATE SODIUM 100 MG: 100 CAPSULE, LIQUID FILLED ORAL at 22:01

## 2022-01-23 RX ADMIN — HEPARIN SODIUM 5000 UNITS: 5000 INJECTION INTRAVENOUS; SUBCUTANEOUS at 13:32

## 2022-01-23 RX ADMIN — Medication 250 MG: at 13:27

## 2022-01-23 RX ADMIN — SODIUM CHLORIDE 500 ML: 0.9 INJECTION, SOLUTION INTRAVENOUS at 10:01

## 2022-01-23 RX ADMIN — PRAVASTATIN SODIUM 40 MG: 40 TABLET ORAL at 16:28

## 2022-01-23 RX ADMIN — LEVOTHYROXINE SODIUM 75 MCG: 75 TABLET ORAL at 13:27

## 2022-01-23 NOTE — ED PROVIDER NOTES
History  Chief Complaint   Patient presents with    Weakness - Generalized     pt was diagnosed with a uti 2 days ago started med yesterday but today according  more weak then usual  HX of parkinsons but can normally assist with pivoting into her chair but today could not     63 yo F h/o Parkinson disease, h/o CVA with expressive aphasia, anxiety/depression presenting from home for evaluation of generalized weakness  Pt can normally stand to pivot to wheelchair, etc , however was unable to stand today   called PCP office yesterday regarding weakness/confusion and concern for UTI, was started on macrobid yesterday  Pt was admitted for similar in December 2021 with UTI and tolerated Ceftriaxone at that time  Pt is unable to answer any questions for myself  Plan: Sepsis workup, straight cath urine/culture, CXR, COVID, IVF/abx, admit     states weaker than usual- unable to stand/bear weight today, sleepier than usual,  also reports increased stiffness of LUE for >1 week             Prior to Admission Medications   Prescriptions Last Dose Informant Patient Reported? Taking? ALPRAZolam (XANAX) 0 5 mg tablet  Spouse/Significant Other No No   Sig: TAKE 1 TABLET (0 5 MG TOTAL) BY MOUTH 3 (THREE) TIMES A DAY AS NEEDED FOR ANXIETY  Patient taking differently: 0 25 mg daily at bedtime as needed     Multiple Vitamin (MULTIVITAMIN) tablet  Spouse/Significant Other Yes No   Sig: Take 1 tablet by mouth daily  NON FORMULARY  Spouse/Significant Other Yes No   Sig: Take 1 tablet by mouth 2 (two) times a day  Calcium- D3-600mg   Probiotic Product (PROBIOTIC DAILY PO)  Spouse/Significant Other Yes No   Sig: Take 1 tablet by mouth daily  Vitamin D, Cholecalciferol, 1000 UNITS CAPS  Spouse/Significant Other Yes No   Sig: Take 1 tablet by mouth daily  acetaminophen (TYLENOL) 500 mg tablet  Spouse/Significant Other Yes No   Sig: Take 500 mg by mouth   One throughout the night as needed   ascorbic acid (VITAMIN C) 500 mg tablet  Spouse/Significant Other No No   Sig: Take 1 tablet (500 mg total) by mouth daily   aspirin (ECOTRIN LOW STRENGTH) 81 mg EC tablet   Yes No   Sig: Take 81 mg by mouth 3 (three) times a week   carbidopa-levodopa (SINEMET)  mg per tablet  Spouse/Significant Other No No   Sig: Take 1 tablet by mouth 3 (three) times a day   cilostazol (PLETAL) 100 mg tablet   No No   Sig: TAKE 1 TABLET (100 MG TOTAL) BY MOUTH 2 (TWO) TIMES A DAY BEFORE MEALS   clopidogrel (PLAVIX) 75 mg tablet   No No   Sig: TAKE 1 TABLET BY MOUTH EVERY DAY   donepezil (ARICEPT) 10 mg tablet  Spouse/Significant Other No No   Sig: TAKE 1 TABLET BY MOUTH DAILY AT BEDTIME   glycopyrrolate (ROBINUL) 1 mg tablet   No No   Sig: Take 1 tablet (1 mg total) by mouth 2 (two) times a day   ketoconazole (NIZORAL) 2 % cream   No No   Sig: APPLY TO AFFECTED AREA TWICE A DAY   Patient not taking: Reported on 12/6/2021   levothyroxine 75 mcg tablet  Spouse/Significant Other No No   Sig: TAKE 1 TABLET EVERY DAY   nitrofurantoin (MACROBID) 100 mg capsule   No No   Sig: Take 1 capsule (100 mg total) by mouth 2 (two) times a day for 7 days   ranitidine (ZANTAC) 150 mg tablet  Spouse/Significant Other Yes No   Sig: Take 150 mg by mouth daily     simvastatin (ZOCOR) 20 mg tablet  Spouse/Significant Other Yes No   Sig: Take 20 mg by mouth daily at bedtime      Facility-Administered Medications: None       Past Medical History:   Diagnosis Date    Abdominal pain, suprapubic 07/03/2014    Resolved:  November 22, 2016    Abscess of skin and subcutaneous tissue 11/21/2016    Resolved:  February 28, 2017    Acute kidney failure, unspecified ([place] [id number] )     Anxiety     Burning with urination 12/14/2016    Resolved:  February 28, 2017    Conjunctivitis 06/10/2013    Depression     Depression     Diabetes mellitus ([facility] ) 2/2/2020    Dysuria 06/06/2016    Resolved:  February 28 2017    Falling 10/18/2012    GERD (gastroesophageal reflux disease)     Groin pain 04/16/2015    Resolved:  February 28, 2017    Hyperlipidemia     Hypothyroid     Influenza 04/05/2013    Leg abrasion 09/22/2016    Resolved:  February 28, 2017    Parkinson's disease [facility])     Psychiatric illness     Rectal pain 10/17/2013    Resolved:  February 28, 2017    Thyroid disease        Past Surgical History:   Procedure Laterality Date    APPENDECTOMY      Age 4 or 3    BACK SURGERY      BRAIN SURGERY      CLOSED REDUCTION NASAL FRACTURE      CYSTOSCOPY  01/28/2014    RENETTA Isabel   (Diagnostic)    DEEP BRAIN STIMULATOR PLACEMENT      EYE SURGERY      FRACTURE SURGERY      JOINT REPLACEMENT      LAMINECTOMY      Decompress, Facetectomy, Foraminotomy Lumbar Seg    ORIF HIP FRACTURE Left     About 3 years ago    TONSILLECTOMY         Family History   Problem Relation Age of Onset    Heart attack Mother         Acute Myocardial Infarction (MI)    Diabetes Mother         Diabetes Mellitus    Stroke Father         Syndrome    No Known Problems Brother     No Known Problems Maternal Aunt     No Known Problems Paternal Aunt     No Known Problems Maternal Uncle     No Known Problems Paternal Uncle     No Known Problems Maternal Grandfather     No Known Problems Maternal Grandmother     No Known Problems Paternal Grandfather     No Known Problems Paternal Grandmother     No Known Problems Cousin     Heart disease Family     Hypertension Family     Thyroid disease Family     ADD / ADHD Neg Hx     Alcohol abuse Neg Hx     Anxiety disorder Neg Hx     Bipolar disorder Neg Hx     Dementia Neg Hx     Depression Neg Hx     Drug abuse Neg Hx     OCD Neg Hx     Paranoid behavior Neg Hx     Schizophrenia Neg Hx     Seizures Neg Hx     Self-Injury Neg Hx     Suicide Attempts Neg Hx      I have reviewed and agree with the history as documented      E-Cigarette/Vaping     E-Cigarette/Vaping Substances     Social History     Tobacco Use    Smoking status: Never Smoker    Smokeless tobacco: Never Used    Tobacco comment: n/a   Substance Use Topics    Alcohol use: Yes     Alcohol/week: 1 0 standard drink     Types: 1 Glasses of wine per week     Comment: drinks one fourth of a glass of white wine once a week  (Never drank alcohol per [employer])    Drug use: No       Review of Systems   Unable to perform ROS: Patient nonverbal       Physical Exam  Physical Exam  Vitals and nursing note reviewed  Constitutional:       General: She is not in acute distress  Appearance: She is well-developed  HENT:      Head: Normocephalic and atraumatic  Nose: Nose normal    Eyes:      Conjunctiva/sclera: Conjunctivae normal    Cardiovascular:      Rate and Rhythm: Regular rhythm  Tachycardia present  Heart sounds: Normal heart sounds  Pulmonary:      Effort: Pulmonary effort is normal  No respiratory distress  Breath sounds: Normal breath sounds  No stridor  No wheezing or rales  Chest:      Chest wall: No tenderness  Abdominal:      General: There is no distension  Palpations: Abdomen is soft  Tenderness: There is no abdominal tenderness  There is no guarding or rebound  Musculoskeletal:         General: No deformity  Cervical back: Normal range of motion and neck supple  Skin:     General: Skin is warm and dry  Findings: No rash  Neurological:      Mental Status: She is alert  Motor: No abnormal muscle tone  Comments: Opens eyes on repeated questioning/noxious stimuli, does not answer any questions or wake up to follow commands  Unable to assess strength, contractures upper extremities, L arm flexed at elbow and across chest, unable to assess orientation/sensation/coordination   Psychiatric:         Thought Content:  Thought content normal          Judgment: Judgment normal          Vital Signs  ED Triage Vitals   Temperature Pulse Respirations Blood Pressure SpO2   01/23/22 0903 01/23/22 0903 01/23/22 5710 01/23/22 0903 01/23/22 0911   99 2 °F (37 3 °C) (!) 111 18 136/95 95 %      Temp Source Heart Rate Source Patient Position - Orthostatic VS BP Location FiO2 (%)   01/23/22 0903 01/23/22 0903 01/23/22 0903 01/23/22 0903 --   Oral Monitor Lying Left arm       Pain Score       01/23/22 1110       Med Not Given for Pain - for MAR use only           Vitals:    01/23/22 0903 01/23/22 1030 01/23/22 1100 01/23/22 1200   BP: 136/95 117/59 112/84 137/60   Pulse: (!) 111 (!) 110 (!) 112 104   Patient Position - Orthostatic VS: Lying  Lying          Visual Acuity      ED Medications  Medications   sodium chloride (PF) 0 9 % injection 3 mL (has no administration in time range)   ceftriaxone (ROCEPHIN) 1 g/50 mL in dextrose IVPB (0 mg Intravenous Stopped 1/23/22 1031)   sodium chloride 0 9 % bolus 500 mL (0 mL Intravenous Stopped 1/23/22 1224)   acetaminophen (TYLENOL) rectal suppository 650 mg (650 mg Rectal Given 1/23/22 1110)       Diagnostic Studies  Results Reviewed     Procedure Component Value Units Date/Time    Blood gas, venous [[id number]]  (Abnormal) Collected: 01/23/22 1204    Lab Status: Final result Specimen: Blood from Arm, Left Updated: 01/23/22 1224     pH, Sp 7 447     pCO2, Sp 34 4 mm Hg      pO2, Sp 34 1 mm Hg      HCO3, Sp 23 2 mmol/L      Base Excess, Sp -0 2 mmol/L      O2 Content, Sp 14 5 ml/dL      O2 HGB, VENOUS 73 5 %     HS Troponin I 2hr [[id number]] Collected: 01/23/22 1204    Lab Status: In process Specimen: Blood from Arm, Left Updated: 01/23/22 1214    Urine Microscopic [[id number]]  (Abnormal) Collected: 01/23/22 1000    Lab Status: Final result Specimen: Urine, Straight Cath Updated: 01/23/22 1212     RBC, UA 0-1 /hpf      WBC, UA 30-50 /hpf      Epithelial Cells Occasional /hpf      Bacteria, UA Moderate /hpf     Urine culture [[id number]] Collected: 01/23/22 1000    Lab Status:  In process Specimen: Urine, Straight Cath Updated: 01/23/22 1212    COVID/FLU/RSV - 2 hour TAT [[id number]] (Normal) Collected: 01/23/22 0943    Lab Status: Final result Specimen: Nares from Nasopharyngeal Swab Updated: 01/23/22 1134     SARS-CoV-2 Negative     INFLUENZA A PCR Negative     INFLUENZA B PCR Negative     RSV PCR Negative    Narrative:      FOR PEDIATRIC PATIENTS - copy/paste COVID Guidelines URL to browser: [email or web address]    SARS-CoV-2 assay is a Nucleic Acid Amplification assay intended for the  qualitative detection of nucleic acid from SARS-CoV-2 in nasopharyngeal  swabs  Results are for the presumptive identification of SARS-CoV-2 RNA  Positive results are indicative of infection with SARS-CoV-2, the virus  causing COVID-19, but do not rule out bacterial infection or co-infection  with other viruses  Laboratories within the [place] and its  territories are required to report all positive results to the appropriate  public health authorities  Negative results do not preclude SARS-CoV-2  infection and should not be used as the sole basis for treatment or other  patient management decisions  Negative results must be combined with  clinical observations, patient history, and epidemiological information  This test has not been FDA cleared or approved  This test has been authorized by FDA under an Emergency Use Authorization  (EUA)  This test is only authorized for the duration of time the  declaration that circumstances exist justifying the authorization of the  emergency use of an in vitro diagnostic tests for detection of SARS-CoV-2  virus and/or diagnosis of COVID-19 infection under section 564(b)(1) of  the Act, 21 U  S C  047GRE-9(T)(2), unless the authorization is terminated  or revoked sooner  The test has been validated but independent review by FDA  and CLIA is pending  Test performed using [employer] GeneXpert: This RT-PCR assay targets N2,  a region unique to SARS-CoV-2   A conserved region in the E-gene was chosen  for pan-Sarbecovirus detection which includes SARS-CoV-2  HS Troponin I 4hr [[id number]]     Lab Status: No result Specimen: Blood     UA w Reflex to Microscopic w Reflex to Culture [[id number]]  (Abnormal) Collected: 01/23/22 1000    Lab Status: Final result Specimen: Urine, Straight Cath Updated: 01/23/22 1046     Color, UA Yellow     Clarity, UA Cloudy     Specific Gravity, UA 1 025     pH, UA 6 0     Leukocytes, UA Small     Nitrite, UA Negative     Protein, UA Trace mg/dl      Glucose, UA Negative mg/dl      Ketones, UA 15 (1+) mg/dl      Urobilinogen, UA 0 2 E U /dl      Bilirubin, UA Negative     Blood, UA Negative    Lactic Acid [[id number]]  (Normal) Collected: 01/23/22 0930    Lab Status: Final result Specimen: Blood from Hand, Left Updated: 01/23/22 1038     LACTIC ACID 1 4 mmol/L     Narrative:      Result may be elevated if tourniquet was used during collection  TSH, 3rd generation with Free T4 reflex [[id number]]  (Normal) Collected: 01/23/22 0930    Lab Status: Final result Specimen: Blood from Hand, Left Updated: 01/23/22 1025     TSH 3RD GENERATON 1 814 uIU/mL     Narrative:      Patients undergoing fluorescein dye angiography may retain small amounts of fluorescein in the body for 48-72 hours post procedure  Samples containing fluorescein can produce falsely depressed TSH values  If the patient had this procedure,a specimen should be resubmitted post fluorescein clearance        Procalcitonin with AM Reflex [[id number]]  (Normal) Collected: 01/23/22 0930    Lab Status: Final result Specimen: Blood from Hand, Left Updated: 01/23/22 1024     Procalcitonin 0 16 ng/ml     Procalcitonin Reflex [[id number]]     Lab Status: No result Specimen: Blood     HS Troponin 0hr (reflex protocol) [[id number]] Collected: 01/23/22 0930    Lab Status: Final result Specimen: Blood from Hand, Left Updated: 01/23/22 1023     hs TnI 0hr 3 ng/L     Comprehensive metabolic panel [[id number]]  (Abnormal) Collected: 01/23/22 0930 Lab Status: Final result Specimen: Blood from Hand, Left Updated: 01/23/22 1015     Sodium 144 mmol/L      Potassium 3 6 mmol/L      Chloride 106 mmol/L      CO2 28 mmol/L      ANION GAP 10 mmol/L      BUN 15 mg/dL      Creatinine 0 91 mg/dL      Glucose 171 mg/dL      Calcium 9 7 mg/dL      AST 19 U/L      ALT 22 U/L      Alkaline Phosphatase 83 U/L      Total Protein 7 1 g/dL      Albumin 3 5 g/dL      Total Bilirubin 0 92 mg/dL      eGFR 60 ml/min/1 73sq m     Narrative:      National Kidney Disease Foundation guidelines for Chronic Kidney Disease (CKD):     Stage 1 with normal or high GFR (GFR > 90 mL/min/1 73 square meters)    Stage 2 Mild CKD (GFR = 60-89 mL/min/1 73 square meters)    Stage 3A Moderate CKD (GFR = 45-59 mL/min/1 73 square meters)    Stage 3B Moderate CKD (GFR = 30-44 mL/min/1 73 square meters)    Stage 4 Severe CKD (GFR = 15-29 mL/min/1 73 square meters)    Stage 5 End Stage CKD (GFR <15 mL/min/1 73 square meters)  Note: GFR calculation is accurate only with a steady state creatinine    Protime-INR [[id number]]  (Normal) Collected: 01/23/22 0930    Lab Status: Final result Specimen: Blood from Hand, Left Updated: 01/23/22 1010     Protime 14 1 seconds      INR 1 11    APTT [[id number]]  (Normal) Collected: 01/23/22 0930    Lab Status: Final result Specimen: Blood from Hand, Left Updated: 01/23/22 1010     PTT 31 seconds     CBC and differential [[id number]]  (Abnormal) Collected: 01/23/22 0930    Lab Status: Final result Specimen: Blood from Hand, Left Updated: 01/23/22 0955     WBC 10 35 Thousand/uL      RBC 5 20 Million/uL      Hemoglobin 16 7 g/dL      Hematocrit 48 7 %      MCV 94 fL      MCH 32 1 pg      MCHC 34 3 g/dL      RDW 13 6 %      MPV 10 3 fL      Platelets 280 Thousands/uL      nRBC 0 /100 WBCs      Neutrophils Relative 84 %      Immat GRANS % 1 %      Lymphocytes Relative 5 %      Monocytes Relative 8 %      Eosinophils Relative 1 %      Basophils Relative 1 % Neutrophils Absolute 8 68 Thousands/µL      Immature Grans Absolute 0 05 Thousand/uL      Lymphocytes Absolute 0 56 Thousands/µL      Monocytes Absolute 0 85 Thousand/µL      Eosinophils Absolute 0 13 Thousand/µL      Basophils Absolute 0 08 Thousands/µL     Blood culture #1 [[id number]] Collected: 01/23/22 0943    Lab Status: In process Specimen: Blood from Arm, Left Updated: 01/23/22 0954    Blood culture #2 [[id number]] Collected: 01/23/22 0930    Lab Status: In process Specimen: Blood from Hand, Left Updated: 01/23/22 0950                 CT head without contrast   ED Interpretation by Holly Gambino DO (01/23 1221)   IMPRESSION:     No acute intracranial abnormality  Final Result by Giulia Martin MD (01/23 1216)      No acute intracranial abnormality  Workstation performed: [id number]         XR chest 1 view portable    (Results Pending)              Procedures  Procedures         ED Course  ED Course as of 01/23/22 1227   Sun Jan 23, 2022   0913 Pulse(!): 111   0929 Temperature: 100 2 °F (37 9 °C)   0950 EKG: sinus tachycardia, baseline artifact, normal intervals, no ST changes   1023 hs TnI 0hr: 3   1025 CXR interpreted by myself: no acute abn/infiltrate   1048 Leukocytes, UA(!): Small   1126 Discussed with pt's - pt sleepier than usual/weaker than typical  She can typically stand/bear weight to pivot and for the  to get her pants up/down, however she couldn't stand up today  Unable to care for her at home in this state  Also with increased L arm stiffness per the , states was seen by Neurology last week for this and was going on at that time   1213 Bacteria, UA(!): Moderate   1213 WBC, UA(!): 30-50                            Initial Sepsis Screening     [address] Name 01/23/22 0929                Is the patient's history suggestive of a new or worsening infection?  Yes (Proceed)  -[address]        Suspected source of infection urinary tract infection;suspect infection, source [facility]        Are two or more of the following signs & symptoms of infection both present and new to the patient? No  -KH        Indicate SIRS criteria Tachycardia > 90 bpm  -KH        If the answer is yes to both questions, suspicion of sepsis is present --        If severe sepsis is present AND tissue hypoperfusion perists in the hour after fluid resuscitation or lactate > 4, the patient meets criteria for SEPTIC SHOCK --        Are any of the following organ dysfunction criteria present within 6 hours of suspected infection and SIRS criteria that are NOT considered to be chronic conditions? --        Organ dysfunction --        Date of presentation of severe sepsis --        Time of presentation of severe sepsis --        Tissue hypoperfusion persists in the hour after crystalloid fluid administration, evidenced, by either: --        Was hypotension present within one hour of the conclusion of crystalloid fluid administration? --        Date of presentation of septic shock --        Time of presentation of septic shock --              User Key  (r) = Recorded By, (t) = Taken By, (c) = Cosigned By    [address] [place] [address] Name Provider Type    [address] [place] [address], DO Physician                SBIRT 20yo+      Most Recent Value   SBIRT (22 yo +)    In order to provide better care to our patients, we are screening all of our patients for alcohol and drug use  Would it be okay to ask you these screening questions? No Filed at: 01/23/2022 9973   Initial Alcohol Screen: US AUDIT-C     1  How often do you have a drink containing alcohol? 0 Filed at: 01/23/2022 0920   2  How many drinks containing alcohol do you have on a typical day you are drinking? 0 Filed at: 01/23/2022 0920   3a  Male UNDER 65: How often do you have five or more drinks on one occasion? 0 Filed at: 01/23/2022 0920   3b  FEMALE Any Age, or MALE 65+: How often do you have 4 or more drinks on one occassion?  0 Filed at: 01/23/2022 0920   Audit-C Score 0 Filed at: 01/23/2022 0920   CHANDLER: How many times in the past year have you    Used an illegal drug or used a prescription medication for non-medical reasons? Never Filed at: 01/23/2022 0920                    MDM  Number of Diagnoses or Management Options  Ambulatory dysfunction  Fatigue  Generalized weakness  Park[facility]'s disease ([facility] )  UTI (urinary tract infection)  Diagnosis management comments: 65 yo F with Parkinson's and worsening weakness/fatigue, found to have UTI- Ceftriaxone started, admitted given inability to be at home in current state       Amount and/or Complexity of Data Reviewed  Clinical lab tests: ordered and reviewed  Tests in the radiology section of CPT®: ordered and reviewed  Tests in the medicine section of CPT®: ordered and reviewed  Review and summarize past medical records: yes  Independent visualization of images, tracings, or specimens: yes        Disposition  Final diagnoses:   UTI (urinary tract infection)   Generalized weakness   Parkinson's disease ([facility] )   Ambulatory dysfunction   Fatigue     Time reflects when diagnosis was documented in both MDM as applicable and the Disposition within this note     Time User Action Codes Description Comment    1/23/2022 12:24 PM Mami Williams A Add [N39 0] UTI (urinary tract infection)     1/23/2022 12:25 PM Mami Williams A Add [R53 1] Generalized weakness     1/23/2022 12:25 PM Mami Williams A Add [G20] Parkinson's disease ([facility] )     1/23/2022 12:25 PM Ottie Christos Add [R26 2] Ambulatory dysfunction     1/23/2022 12:27 PM Ottie Christos Add [R53 83] Fatigue       ED Disposition     ED Disposition Condition Date/Time Comment    Admit Stable Sun Jan 23, 2022 12:24 PM Case was discussed with MARLEN and the patient's admission status was agreed to be Admission Status: inpatient status to the service of Dr Dyan Nj           Follow-up Information    None         Patient's Medications   Discharge Prescriptions    No medications on file       No discharge procedures on file     PDMP Review     None          ED Provider  Electronically Signed by           Katherine Oliver DO  01/23/22 0809

## 2022-01-23 NOTE — ASSESSMENT & PLAN NOTE
Lab Results   Component Value Date    HGBA1C 6 3 (H) 12/06/2021       No results for input(s): POCGLU in the last 72 hours      Blood Sugar Average: Last 72 hrs:  Diet controlled at home  Will place on sliding scale and basal bolus protocol - algorithm level 1

## 2022-01-23 NOTE — ASSESSMENT & PLAN NOTE
History of Parkinson's dementia with deep brain stimulator - this is ending near life of battery, due for change in April

## 2022-01-23 NOTE — ASSESSMENT & PLAN NOTE
Presents with ambulatory dysfunction in the setting of suspected urinary tract infection  Will place PT OT consults for further disposition management  Patient is wheelchair-bound, but over the past 3 days has developed significant generalized weakness to the point  cannot care for her with felt assistant  Previous history of right hemispheric 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 - followed by [facility] [place] neurology

## 2022-01-23 NOTE — ASSESSMENT & PLAN NOTE
Patient  reports that he tested her urine with a home test kit  Leukocyte positive, received 1 day of Macrobid prescribed by patient's primary care provider  Will start ceftriaxone based upon no reaction  Initially started on aztreonam, but discussed with clinical pharmacy and will monitor- has multiple drug intolerances

## 2022-01-23 NOTE — PLAN OF CARE
Problem: MOBILITY - ADULT  Goal: Maintain or return to baseline ADL function  Description: INTERVENTIONS:  -  Assess patient's ability to carry out ADLs; assess patient's baseline for ADL function and identify physical deficits which impact ability to perform ADLs (bathing, care of mouth/teeth, toileting, grooming, dressing, etc )  - Assess/evaluate cause of self-care deficits   - Assess range of motion  - Assess patient's mobility; develop plan if impaired  - Assess patient's need for assistive devices and provide as appropriate  - Encourage maximum independence but intervene and supervise when necessary  - Involve family in performance of ADLs  - Assess for home care needs following discharge   - Consider OT consult to assist with ADL evaluation and planning for discharge  - Provide patient education as appropriate  Outcome: Progressing  Goal: Maintains/Returns to pre admission functional level  Description: INTERVENTIONS:  - Perform BMAT or MOVE assessment daily    - Set and communicate daily mobility goal to care team and patient/family/caregiver  - Collaborate with rehabilitation services on mobility goals if consulted  - Perform Range of Motion 3 times a day  - Reposition patient every 2 hours    - Dangle patient 3 times a day  - Out of bed for toileting  - Record patient progress and toleration of activity level   Outcome: Progressing     Problem: Potential for Falls  Goal: Patient will remain free of falls  Description: INTERVENTIONS:  - Educate patient/family on patient safety including physical limitations  - Instruct patient to call for assistance with activity   - Consult OT/PT to assist with strengthening/mobility   - Keep Call bell within reach  - Keep bed low and locked with side rails adjusted as appropriate  - Keep care items and personal belongings within reach  - Initiate and maintain comfort rounds  - Make Fall Risk Sign visible to staff  - Offer Toileting every 2 Hours, in advance of need  - Initiate/Maintain bed/chair alarm  - Obtain necessary fall risk management equipment: chair alarm  - Apply yellow socks and bracelet for high fall risk patients  - Consider moving patient to room near nurses station  Outcome: Progressing     Problem: PAIN - ADULT  Goal: Verbalizes/displays adequate comfort level or baseline comfort level  Description: Interventions:  - Encourage patient to monitor pain and request assistance  - Assess pain using appropriate pain scale  - Administer analgesics based on type and severity of pain and evaluate response  - Implement non-pharmacological measures as appropriate and evaluate response  - Consider cultural and social influences on pain and pain management  - Notify physician/advanced practitioner if interventions unsuccessful or patient reports new pain  Outcome: Progressing     Problem: INFECTION - ADULT  Goal: Absence or prevention of progression during hospitalization  Description: INTERVENTIONS:  - Assess and monitor for signs and symptoms of infection  - Monitor lab/diagnostic results  - Monitor all insertion sites, i e  indwelling lines, tubes, and drains  - Monitor endotracheal if appropriate and nasal secretions for changes in amount and color  - [place] appropriate cooling/warming therapies per order  - Administer medications as ordered  - Instruct and encourage patient and family to use good hand hygiene technique  - Identify and instruct in appropriate isolation precautions for identified infection/condition  Outcome: Progressing     Problem: DISCHARGE PLANNING  Goal: Discharge to home or other facility with appropriate resources  Description: INTERVENTIONS:  - Identify barriers to discharge w/patient and caregiver  - Arrange for needed discharge resources and transportation as appropriate  - Identify discharge learning needs (meds, wound care, etc )  - Arrange for interpretive services to assist at discharge as needed  - Refer to Case Management Department for coordinating discharge planning if the patient needs post-hospital services based on physician/advanced practitioner order or complex needs related to functional status, cognitive ability, or social support system  Outcome: Progressing     Problem: Knowledge Deficit  Goal: Patient/family/caregiver demonstrates understanding of disease process, treatment plan, medications, and discharge instructions  Description: Complete learning assessment and assess knowledge base    Interventions:  - Provide teaching at level of understanding  - Provide teaching via preferred learning methods  Outcome: Progressing     Problem: GENITOURINARY - ADULT  Goal: Maintains or returns to baseline urinary function  Description: INTERVENTIONS:  - Assess urinary function  - Encourage oral fluids to ensure adequate hydration if ordered  - Administer IV fluids as ordered to ensure adequate hydration  - Administer ordered medications as needed  - Offer frequent toileting  - Follow urinary retention protocol if ordered  Outcome: Progressing  Goal: Absence of urinary retention  Description: INTERVENTIONS:  - Assess patients ability to void and empty bladder  - Monitor I/O  - Bladder scan as needed  - Discuss with physician/AP medications to alleviate retention as needed  - Discuss catheterization for long term situations as appropriate  Outcome: Progressing

## 2022-01-23 NOTE — H&P
[facility]  H&P- Merrick Wolf 1945, 64 y o  female MRN: [id number]  Unit/Bed#: ED 32 Encounter: [id number]  Primary Care Provider: Iron Fontaine DO   Date and time admitted to hospital: 1/23/2022  8:57 AM    Assessment and Plan  * Ambulatory dysfunction  Assessment & Plan  Presents with ambulatory dysfunction in the setting of suspected urinary tract infection  Will place PT OT consults for further disposition management  Patient is wheelchair-bound, but over the past 3 days has developed significant generalized weakness to the point  cannot care for her with felt assistant  Previous history of right hemispheric 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 - followed by [address] neurology    S/P deep brain stimulator placement  Assessment & Plan  History of Parkinson's dementia with deep brain stimulator - this is ending near life of battery, due for change in April    Recurrent UTI  Assessment & Plan  Patient  reports that he tested her urine with a home test kit  Leukocyte positive, received 1 day of Macrobid prescribed by patient's primary care provider  Will start ceftriaxone based upon no reaction  Initially started on aztreonam, but discussed with clinical pharmacy and will monitor- has multiple drug intolerances    Type 2 diabetes mellitus with hyperglycemia ([facility] [address] )  Assessment & Plan  Lab Results   Component Value Date    HGBA1C 6 3 (H) 12/06/2021       No results for input(s): POCGLU in the last 72 hours      Blood Sugar Average: Last 72 hrs:  Diet controlled at home  Will place on sliding scale and basal bolus protocol - algorithm level 1      Parkinson disease (HCC)  Assessment & Plan  Continue carbidopa levodopa, continue secretion control with Chad  Geriatrics consultation given history of dementia      Code Status: Level 1 - Full Code **    VTE Prophylaxis: Heparin  / sequential compression device     POLST: There is no POLST form on file for this patient (pre-hospital)  Discussion with family:  My  at bedside    Anticipated Length of Stay:  Patient will be admitted on an Observation basis with an anticipated length of stay of  less than 2 midnights  Justification for Hospital Stay: Ambulatory dysfunction , UTI    Total Time for Visit, including Counseling / Coordination of Care: 45 minutes  Greater than 50% of this total time spent on direct patient counseling and coordination of care  Chief Complaint:     Chief Complaint   Patient presents with    Weakness - Generalized     pt was diagnosed with a uti 2 days ago started med yesterday but today according  more weak then usual  HX of parkinsons but can normally assist with pivoting into her chair but today could not       History of Present Illness:    Jodi Allred is a 64 y o  female who presents with 3 day history of weakness as well as confusion  The patient has a history of Parkinson's dementia she has a deep brain stimulator in place  She is able to ambulate with a wheelchair and with her 's assistance  However, over the last 72 hours had worsening weakness  Her  attempted to test her urine with a urine test kit  He reports that it was positive for leukocytes, but negative for nitrates  He states due to her disease she cannot tell when she has urinary tract infection  He did not smell her urine and cannot determine if she has had foul-smelling urine  She did take 1 dose of Macrobid after thorough  had reported to her primary care doctor that she did have a abnormal UA  At the time examination she endorses no pain, no nausea vomiting or diarrhea  She has had previous admissions in the past for urinary tract infection, has a history of recurrent UTIs       Due to encephalopathy, unable to obtain an accurate review of systems    Review of Systems:    A complete and comprehensive 14 point organ system review was performed and all other systems are negative other than stated above in the HPI    Past Medical and Surgical History:     Past Medical History:   Diagnosis Date    Abdominal pain, suprapubic 07/03/2014    Resolved:  November 22, 2016    Abscess of skin and subcutaneous tissue 11/21/2016    Resolved:  February 28, 2017    Acute kidney failure, unspecified ([facility] )     Anxiety     Burning with urination 12/14/2016    Resolved:  February 28, 2017    Conjunctivitis 06/10/2013    Depression     Depression     Diabetes mellitus ([facility] ) 2/2/2020    Dysuria 06/06/2016    Resolved:  February 28 2017    Falling 10/18/2012    GERD (gastroesophageal reflux disease)     Groin pain 04/16/2015    Resolved:  February 28, 2017    Hyperlipidemia     Hypothyroid     Influenza 04/05/2013    Leg abrasion 09/22/2016    Resolved:  February 28, 2017    Parkinson's disease [facility])     Psychiatric illness     Rectal pain 10/17/2013    Resolved:  February 28, 2017    Thyroid disease        Past Surgical History:   Procedure Laterality Date    APPENDECTOMY      Age 4 or 3    BACK SURGERY      BRAIN SURGERY      CLOSED REDUCTION NASAL FRACTURE      CYSTOSCOPY  01/28/2014    [facility]  RENETTA King   (Diagnostic)    DEEP BRAIN STIMULATOR PLACEMENT      EYE SURGERY      FRACTURE SURGERY      JOINT REPLACEMENT      LAMINECTOMY      Decompress, Facetectomy, Foraminotomy Lumbar Seg    ORIF HIP FRACTURE Left     About 3 years ago    TONSILLECTOMY         Meds/Allergies:    Prior to Admission medications    Medication Sig Start Date End Date Taking? Authorizing Provider   acetaminophen (TYLENOL) 500 mg tablet Take 500 mg by mouth  One throughout the night as needed    Historical Provider, MD   ALPCARLOS ENRIQUEZoomer Torres) 0 5 mg tablet TAKE 1 TABLET (0 5 MG TOTAL) BY MOUTH 3 (THREE) TIMES A DAY AS NEEDED FOR ANXIETY    Patient taking differently: 0 25 mg daily at bedtime as needed   4/22/21   YOLANDA Avalos   ascorbic acid (VITAMIN C) 500 mg tablet Take 1 tablet (500 mg total) by mouth daily 2/4/20   Sylwia Campo MD   aspirin (ECOTRIN LOW STRENGTH) 81 mg EC tablet Take 81 mg by mouth 3 (three) times a week    Historical Provider, MD   carbidopa-levodopa (SINEMET)  mg per tablet Take 1 tablet by mouth 3 (three) times a day 2/5/20   Sylwia Campo MD   cilostazol (PLETAL) 100 mg tablet TAKE 1 TABLET (100 MG TOTAL) BY MOUTH 2 (TWO) TIMES A DAY BEFORE MEALS 10/18/21   Hugo Meng PA-C   clopidogrel (PLAVIX) 75 mg tablet TAKE 1 TABLET BY MOUTH EVERY DAY 12/20/21   Satnam Kwon DO   donepezil (ARICEPT) 10 mg tablet TAKE 1 TABLET BY MOUTH DAILY AT BEDTIME 4/3/21   YOLANDA Rob   glycopyrrolate (ROBINUL) 1 mg tablet Take 1 tablet (1 mg total) by mouth 2 (two) times a day 12/9/21   Siena Singh PA-C   ketoconazole (NIZORAL) 2 % cream APPLY TO AFFECTED AREA TWICE A DAY  Patient not taking: Reported on 12/6/2021 9/27/21   Satnam Kwon DO   levothyroxine 75 mcg tablet TAKE 1 TABLET EVERY DAY 7/21/21   Satnam Kwon DO   Multiple Vitamin (MULTIVITAMIN) tablet Take 1 tablet by mouth daily  Historical Provider, MD   nitrofurantoin (MACROBID) 100 mg capsule Take 1 capsule (100 mg total) by mouth 2 (two) times a day for 7 days 1/22/22 1/29/22  Betty Singh PA-C   NON FORMULARY Take 1 tablet by mouth 2 (two) times a day  Calcium- D3-600mg    Historical Provider, MD   Probiotic Product (PROBIOTIC DAILY PO) Take 1 tablet by mouth daily  Historical Provider, MD   ranitidine (ZANTAC) 150 mg tablet Take 150 mg by mouth daily  Historical Provider, MD   simvastatin (ZOCOR) 20 mg tablet Take 20 mg by mouth daily at bedtime    Historical Provider, MD   Vitamin D, Cholecalciferol, 1000 UNITS CAPS Take 1 tablet by mouth daily  Historical Provider, MD     I have reviewed home medications with patient personally  Allergies:    Allergies   Allergen Reactions    Gabapentin      Other reaction(s): Hypotension    Bupropion GI Intolerance and Other (See Comments)     Other reaction(s): Tremor    Cephalexin     Duloxetine      Other reaction(s): Other (see comments)  unkown  unknown      Erythromycin GI Intolerance    Methadone Hallucinations    Mirtazapine GI Intolerance    Paroxetine GI Intolerance    Sulfa Antibiotics     Tricyclic Antidepressants      Other reaction(s):  Other    Acetazolamide Rash    Levofloxacin Anxiety and Other (See Comments)       Social History:     Marital Status: /Civil Union   Occupation:  Retired    Substance Use History:   Social History     Substance and Sexual Activity   Alcohol Use Yes    Alcohol/week: 1 0 standard drink    Types: 1 Glasses of wine per week    Comment: drinks one fourth of a glass of white wine once a week  (Never drank alcohol per [employer])     Social History     Tobacco Use   Smoking Status Never Smoker   Smokeless Tobacco Never Used   Tobacco Comment    n/a     Social History     Substance and Sexual Activity   Drug Use No       Family History:    Family History   Problem Relation Age of Onset    Heart attack Mother         Acute Myocardial Infarction (MI)    Diabetes Mother         Diabetes Mellitus    Stroke Father         Syndrome    No Known Problems Brother     No Known Problems Maternal Aunt     No Known Problems Paternal Aunt     No Known Problems Maternal Uncle     No Known Problems Paternal Uncle     No Known Problems Maternal Grandfather     No Known Problems Maternal Grandmother     No Known Problems Paternal Grandfather     No Known Problems Paternal Grandmother     No Known Problems Cousin     Heart disease Family     Hypertension Family     Thyroid disease Family     ADD / ADHD Neg Hx     Alcohol abuse Neg Hx     Anxiety disorder Neg Hx     Bipolar disorder Neg Hx     Dementia Neg Hx     Depression Neg Hx     Drug abuse Neg Hx     OCD Neg Hx     Paranoid behavior Neg Hx     Schizophrenia Neg Hx  Seizures Neg Hx     Self-Injury Neg Hx     Suicide Attempts Neg Hx        Physical Exam:     Vitals:   Blood Pressure: 107/54 (01/23/22 1300)  Pulse: 102 (01/23/22 1300)  Temperature: 100 2 °F (37 9 °C) (01/23/22 0916)  Temp Source: Rectal (01/23/22 0916)  Respirations: 21 (01/23/22 1300)  Height: 5' (152 4 cm) (01/23/22 1300)  Weight - Scale: 74 8 kg (164 lb 14 5 oz) (01/23/22 1300)  SpO2: 93 % (01/23/22 1300)      General:  Appears chronically ill  HEENT: atraumatic, PERRLA, moist mucosa, normal pharynx, normal tonsils and adenoids, normal tongue, no fluid in sinuses  Neck: Trachea midline, no carotid bruit, no masses  Respiratory: normal chest wall expansion, CTA B, no r/r/w, no rubs  Cardiovascular: RRR, no m/r/g, Normal S1 and S2  Abdomen: Soft, non-tender, non-distended, normal bowel sounds in all quadrants, no hepatosplenomegaly, no tympany  Rectal: deferred  Musculoskeletal:  Limited by Parkinson's dementia  Integumentary: warm, dry, and pink, with no rash, purpura, or petechia  Heme/Lymph: no lymphadenopathy, no bruises  Neurological: Cranial Nerves II-XII grossly intact  Psychiatric: cooperative    Additional Data:     Lab Results: I have personally reviewed pertinent reports  Results from last 7 days   Lab Units 01/23/22  1335 01/23/22  0930 01/23/22  0930   WBC Thousand/uL  --   --  10 35*   HEMOGLOBIN g/dL  --   --  16 7*   HEMATOCRIT %  --   --  48 7*   PLATELETS Thousands/uL 262   < > 282   NEUTROS PCT %  --   --  84*   LYMPHS PCT %  --   --  5*   MONOS PCT %  --   --  8   EOS PCT %  --   --  1    < > = values in this interval not displayed       Results from last 7 days   Lab Units 01/23/22  0930   SODIUM mmol/L 144   POTASSIUM mmol/L 3 6   CHLORIDE mmol/L 106   CO2 mmol/L 28   BUN mg/dL 15   CREATININE mg/dL 0 91   ANION GAP mmol/L 10   CALCIUM mg/dL 9 7   ALBUMIN g/dL 3 5   TOTAL BILIRUBIN mg/dL 0 92   ALK PHOS U/L 83   ALT U/L 22   AST U/L 19   GLUCOSE RANDOM mg/dL 171*     Results from last 7 days   Lab Units 01/23/22  0930   INR  1 11             Results from last 7 days   Lab Units 01/23/22  0930   LACTIC ACID mmol/L 1 4   PROCALCITONIN ng/ml 0 16     Results from last 7 days   Lab Units 01/23/22  1204 01/23/22  0930   HS TNI 0HR ng/L  --  3   HS TNI 2HR ng/L 3  --        Imaging: I have personally reviewed pertinent reports  CT head without contrast   ED Interpretation by Caroline Slater DO (01/23 1221)   IMPRESSION:     No acute intracranial abnormality  Final Result by Travis Holman MD (01/23 1216)      No acute intracranial abnormality  Workstation performed: [id number]         XR chest 1 view portable    (Results Pending)       EKG, Pathology, and Other Studies Reviewed on Admission:   · CT with no acute intracranial abnormality    Allscripts / Epic Records Reviewed: Yes     ** Please Note: This note was completed in part utilizing M-Modal Fluency Direct Software  Grammatical errors, random word insertions, spelling mistakes, and incomplete sentences may be an occasional consequence of this system secondary to software limitations, ambient noise, and hardware issues  If you have any questions or concerns about the content, text, or information contained within the body of this dictation, please contact the provider for clarification  **

## 2022-01-23 NOTE — ASSESSMENT & PLAN NOTE
Continue carbidopa levodopa, continue secretion control with Chad  Geriatrics consultation given history of dementia

## 2022-01-24 PROBLEM — Z86.73 HISTORY OF STROKE: Status: ACTIVE | Noted: 2022-01-24

## 2022-01-24 LAB
ANION GAP SERPL CALCULATED.3IONS-SCNC: 9 MMOL/L (ref 4–13)
BACTERIA UR CULT: NORMAL
BUN SERPL-MCNC: 17 MG/DL (ref 5–25)
CALCIUM SERPL-MCNC: 8.7 MG/DL (ref 8.3–10.1)
CHLORIDE SERPL-SCNC: 109 MMOL/L (ref 100–108)
CO2 SERPL-SCNC: 23 MMOL/L (ref 21–32)
CREAT SERPL-MCNC: 0.79 MG/DL (ref 0.6–1.3)
ERYTHROCYTE [DISTWIDTH] IN BLOOD BY AUTOMATED COUNT: 13.8 % (ref 11.6–15.1)
GFR SERPL CREATININE-BSD FRML MDRD: 71 ML/MIN/1.73SQ M
GLUCOSE SERPL-MCNC: 111 MG/DL (ref 65–140)
GLUCOSE SERPL-MCNC: 117 MG/DL (ref 65–140)
GLUCOSE SERPL-MCNC: 120 MG/DL (ref 65–140)
GLUCOSE SERPL-MCNC: 178 MG/DL (ref 65–140)
GLUCOSE SERPL-MCNC: 179 MG/DL (ref 65–140)
HCT VFR BLD AUTO: 43.4 % (ref 34.8–46.1)
HGB BLD-MCNC: 14.8 G/DL (ref 11.5–15.4)
MCH RBC QN AUTO: 32.2 PG (ref 26.8–34.3)
MCHC RBC AUTO-ENTMCNC: 34.1 G/DL (ref 31.4–37.4)
MCV RBC AUTO: 95 FL (ref 82–98)
PLATELET # BLD AUTO: 233 THOUSANDS/UL (ref 149–390)
PMV BLD AUTO: 10.1 FL (ref 8.9–12.7)
POTASSIUM SERPL-SCNC: 3.3 MMOL/L (ref 3.5–5.3)
PROCALCITONIN SERPL-MCNC: 0.14 NG/ML
RBC # BLD AUTO: 4.59 MILLION/UL (ref 3.81–5.12)
SODIUM SERPL-SCNC: 141 MMOL/L (ref 136–145)
WBC # BLD AUTO: 5.86 THOUSAND/UL (ref 4.31–10.16)

## 2022-01-24 PROCEDURE — 92610 EVALUATE SWALLOWING FUNCTION: CPT

## 2022-01-24 PROCEDURE — 99222 1ST HOSP IP/OBS MODERATE 55: CPT | Performed by: NURSE PRACTITIONER

## 2022-01-24 PROCEDURE — 80048 BASIC METABOLIC PNL TOTAL CA: CPT | Performed by: INTERNAL MEDICINE

## 2022-01-24 PROCEDURE — 84145 PROCALCITONIN (PCT): CPT | Performed by: EMERGENCY MEDICINE

## 2022-01-24 PROCEDURE — 85027 COMPLETE CBC AUTOMATED: CPT | Performed by: INTERNAL MEDICINE

## 2022-01-24 PROCEDURE — 99232 SBSQ HOSP IP/OBS MODERATE 35: CPT | Performed by: PHYSICIAN ASSISTANT

## 2022-01-24 PROCEDURE — 82948 REAGENT STRIP/BLOOD GLUCOSE: CPT

## 2022-01-24 RX ORDER — POTASSIUM CHLORIDE 20 MEQ/1
20 TABLET, EXTENDED RELEASE ORAL ONCE
Status: COMPLETED | OUTPATIENT
Start: 2022-01-24 | End: 2022-01-24

## 2022-01-24 RX ADMIN — POTASSIUM CHLORIDE 20 MEQ: 1500 TABLET, EXTENDED RELEASE ORAL at 12:15

## 2022-01-24 RX ADMIN — CILOSTAZOL 100 MG: 50 TABLET ORAL at 08:58

## 2022-01-24 RX ADMIN — CILOSTAZOL 100 MG: 50 TABLET ORAL at 17:20

## 2022-01-24 RX ADMIN — CLOPIDOGREL BISULFATE 75 MG: 75 TABLET ORAL at 08:58

## 2022-01-24 RX ADMIN — Medication 250 MG: at 17:47

## 2022-01-24 RX ADMIN — INSULIN LISPRO 1 UNITS: 100 INJECTION, SOLUTION INTRAVENOUS; SUBCUTANEOUS at 12:15

## 2022-01-24 RX ADMIN — CARBIDOPA AND LEVODOPA 1 TABLET: 25; 100 TABLET ORAL at 08:59

## 2022-01-24 RX ADMIN — FAMOTIDINE 20 MG: 20 TABLET ORAL at 08:58

## 2022-01-24 RX ADMIN — HEPARIN SODIUM 5000 UNITS: 5000 INJECTION INTRAVENOUS; SUBCUTANEOUS at 05:47

## 2022-01-24 RX ADMIN — FAMOTIDINE 20 MG: 20 TABLET ORAL at 17:19

## 2022-01-24 RX ADMIN — DOCUSATE SODIUM 100 MG: 100 CAPSULE, LIQUID FILLED ORAL at 09:01

## 2022-01-24 RX ADMIN — HEPARIN SODIUM 5000 UNITS: 5000 INJECTION INTRAVENOUS; SUBCUTANEOUS at 21:21

## 2022-01-24 RX ADMIN — Medication 250 MG: at 09:04

## 2022-01-24 RX ADMIN — DONEPEZIL HYDROCHLORIDE 10 MG: 10 TABLET, FILM COATED ORAL at 21:21

## 2022-01-24 RX ADMIN — CARBIDOPA AND LEVODOPA 1 TABLET: 25; 100 TABLET ORAL at 17:20

## 2022-01-24 RX ADMIN — CARBIDOPA AND LEVODOPA 1 TABLET: 25; 100 TABLET ORAL at 21:21

## 2022-01-24 RX ADMIN — ASPIRIN 81 MG: 81 TABLET, COATED ORAL at 08:58

## 2022-01-24 RX ADMIN — PRAVASTATIN SODIUM 40 MG: 40 TABLET ORAL at 17:19

## 2022-01-24 RX ADMIN — GLYCOPYRROLATE 1 MG: 1 TABLET ORAL at 08:58

## 2022-01-24 RX ADMIN — HEPARIN SODIUM 5000 UNITS: 5000 INJECTION INTRAVENOUS; SUBCUTANEOUS at 14:30

## 2022-01-24 RX ADMIN — DOCUSATE SODIUM 100 MG: 100 CAPSULE, LIQUID FILLED ORAL at 17:22

## 2022-01-24 RX ADMIN — GLYCOPYRROLATE 1 MG: 1 TABLET ORAL at 17:20

## 2022-01-24 RX ADMIN — CEFTRIAXONE SODIUM 1000 MG: 10 INJECTION, POWDER, FOR SOLUTION INTRAVENOUS at 10:35

## 2022-01-24 RX ADMIN — LEVOTHYROXINE SODIUM 75 MCG: 75 TABLET ORAL at 05:47

## 2022-01-24 NOTE — CONSULTS
Consultation - Geriatrics   Portia Pollack 64 y o  female MRN: [id number]  Unit/Bed#: E5 -01 Encounter: [id number]      Assessment/Plan  Ambulatory dysfunction  Multifactorial fall Risk factors:  Age, Parkinson's, limited mobility, visual impairment, polypharmacy  PT/OT  Rehab post hospitalization    Parkinson's dementia  Follows Neurology in [place] (Dr Manasa Hemphill)  Has deep brain stimulator battery due to be changed in April  TSH 1 184 on 1/23/22  Recommend check Vitamin B12 level  Continue sinement and Aricept, supportive care    Anxiety/depression  Chronic use of Lexapro and Xanax 0 25 milligrams p o  Q h s  Although benzodiazepines are not recommended in the older adult population do not recommend abrupt withdrawal  Recommend continuation of Xanax 0 2 milligrams p o  Q h s    Falls with Psychiatry as outpatient    Frailty  Severe   Secondary to underlying parkinson's dementia, hx of stroke, needs assistance iwht IADLs and ADLs, limited mobility, dysphagia, incontinence, polypharmacy  Albumin 3 5, maintain protein in diet  PT/OT   is primary caregiver, continue supportive care    Delirium precautions  Hx of encephalopathy with prior hospitalization, associated UTI, resolved with treatment  At risk for delirium secondary to hospitalization, history of delirium with previous hospitalization in December, polypharmacy, multiple underlying comorbidities  Provide frequent redirection, reorientation, distraction techniques  Avoid deliriogenic medications such as tramadol, benzodiazepines, anticholinergics,  Benadryl  Treat pain, See geriatric pain protocol  Monitor for constipation and urinary retention  Encourage early and frequent moblization, OOB  Encourage Hydration/ Nutrition  Implement sleep hygiene, limit night time interuptions, group activities    UTI  UA on admission, positive small leukocytes, moderate bacteria  Started on IV Ceftriaxone  Internal med following    Advance care planning  Full code    Home medication review  [facility] [phone]  Alprazolam 0 5 milligrams, filled 10/18/2021 dispense 90  Carbidopa levodopa  tablet q i d  (pre  taking TID)  Cilostazol 100 mg po BId  Plavix 75 milligrams p o  Daily  Donepezil 10 milligrams p o  Q evening  Lexapro 20 milligrams p o  Daily  Glycopyrrolate 1 milligram BID  Macrobid- BID  Simvastatin 20 milligrams p o  Daily    History of Present Illness   Physician Requesting Consult: Jaswinder Kong DO  Reason for Consult / Principal Problem:  Ambulatory dysfunction  Hx and PE limited by:  Not applicable  HPI: Raul Lane is a 64y o  year old female who presents with weakness and confusion x3 days  She uses a wheelchair for mobility at baseline with assistance of her   However over the last 72 hours she had worsening weakness  Her  used a urine test kit to test her urine and reported that it was positive for leukocytes and negative for nitrates  Her  reported to her PCP that she have an abnormal UA  She was given 1 dose of Macrobid  She has Parkinson's dementia with deep brain stimulator, diabetes mellitus, anxiety depression, hypothyroidism, GERD, expressive aphasia, dysphagia, she has a history of stroke    Prior to arrival patient resides at home with her   She needs assistance with IADLs and ADLs  She uses a wheelchair for mobility  She wears glasses  Patient is incontinent of urine use frequent toileting  She has constipation  Upon exam patient is lying in bed with her eyes closed  He she awakens to verbal stimuli  She is alert orient x1  She is calm and cooperative  Spoke with nursing who states patient seems more sedate this a m   No signs of hallucinations or delusions no behavioral issues      Inpatient consult to Gerontology  Consult performed by: YOLANDA Lemons  Consult ordered by: Mercy Bean DO          Review of Systems   Unable to perform ROS: Dementia       Historical Information   Past Medical History:   Diagnosis Date    Abdominal pain, suprapubic 07/03/2014    Resolved:  November 22, 2016    Abscess of skin and subcutaneous tissue 11/21/2016    Resolved:  February 28, 2017    Acute kidney failure, unspecified ([facility] )     Anxiety     Burning with urination 12/14/2016    Resolved:  February 28, 2017    Conjunctivitis 06/10/2013    Depression     Depression     Diabetes mellitus ([facility] ) 2/2/2020    Dysuria 06/06/2016    Resolved:  February 28 2017    Falling 10/18/2012    GERD (gastroesophageal reflux disease)     Groin pain 04/16/2015    Resolved:  February 28, 2017    Hyperlipidemia     Hypothyroid     Influenza 04/05/2013    Leg abrasion 09/22/2016    Resolved:  February 28, 2017    Parkinson's disease [facility])     Psychiatric illness     Rectal pain 10/17/2013    Resolved:  February 28, 2017    Thyroid disease      Past Surgical History:   Procedure Laterality Date    APPENDECTOMY      Age 4 or 3    BACK SURGERY      BRAIN SURGERY      CLOSED REDUCTION NASAL FRACTURE      CYSTOSCOPY  01/28/2014    RENETTA Arrington   (Diagnostic)    DEEP BRAIN STIMULATOR PLACEMENT      EYE SURGERY      FRACTURE SURGERY      JOINT REPLACEMENT      LAMINECTOMY      Decompress, Facetectomy, Foraminotomy Lumbar Seg    ORIF HIP FRACTURE Left     About 3 years ago    TONSILLECTOMY       Social History   Social History     Substance and Sexual Activity   Alcohol Use Yes    Alcohol/week: 1 0 standard drink    Types: 1 Glasses of wine per week    Comment: drinks one fourth of a glass of white wine once a week  (Never drank alcohol per [employer])     Social History     Substance and Sexual Activity   Drug Use No     Social History     Tobacco Use   Smoking Status Never Smoker   Smokeless Tobacco Never Used   Tobacco Comment    n/a         Family History:   Family History   Problem Relation Age of Onset    Heart attack Mother         Acute Myocardial Infarction (MI)  Diabetes Mother         Diabetes Mellitus    Stroke Father         Syndrome    No Known Problems Brother     No Known Problems Maternal Aunt     No Known Problems Paternal Aunt     No Known Problems Maternal Uncle     No Known Problems Paternal Uncle     No Known Problems Maternal Grandfather     No Known Problems Maternal Grandmother     No Known Problems Paternal Grandfather     No Known Problems Paternal Grandmother     No Known Problems Cousin     Heart disease Family     Hypertension Family     Thyroid disease Family     ADD / ADHD Neg Hx     Alcohol abuse Neg Hx     Anxiety disorder Neg Hx     Bipolar disorder Neg Hx     Dementia Neg Hx     Depression Neg Hx     Drug abuse Neg Hx     OCD Neg Hx     Paranoid behavior Neg Hx     Schizophrenia Neg Hx     Seizures Neg Hx     Self-Injury Neg Hx     Suicide Attempts Neg Hx        Meds/Allergies   Current meds:   Current Facility-Administered Medications   Medication Dose Route Frequency    acetaminophen (TYLENOL) tablet 650 mg  650 mg Oral Q6H PRN    aluminum-magnesium hydroxide-simethicone (MYLANTA) oral suspension 30 mL  30 mL Oral Q6H PRN    aspirin (ECOTRIN LOW STRENGTH) EC tablet 81 mg  81 mg Oral Once per day on Mon Wed Fri    calcium carbonate (TUMS) chewable tablet 1,000 mg  1,000 mg Oral Daily PRN    carbidopa-levodopa (SINEMET)  mg per tablet 1 tablet  1 tablet Oral TID    ceftriaxone (ROCEPHIN) 1 g/50 mL in dextrose IVPB  1,000 mg Intravenous Q24H    cilostazol (PLETAL) tablet 100 mg  100 mg Oral BID AC    clopidogrel (PLAVIX) tablet 75 mg  75 mg Oral Daily    docusate sodium (COLACE) capsule 100 mg  100 mg Oral BID    donepezil (ARICEPT) tablet 10 mg  10 mg Oral HS    famotidine (PEPCID) tablet 20 mg  20 mg Oral BID    glycopyrrolate (ROBINUL) tablet 1 mg  1 mg Oral BID    heparin (porcine) subcutaneous injection 5,000 Units  5,000 Units Subcutaneous Q8H [facility]    insulin lispro (HumaLOG) 100 units/mL subcutaneous injection 1-5 Units  1-5 Units Subcutaneous TID AC    levothyroxine tablet 75 mcg  75 mcg Oral Daily    ondansetron (ZOFRAN) injection 4 mg  4 mg Intravenous Q6H PRN    pravastatin (PRAVACHOL) tablet 40 mg  40 mg Oral Daily With Dinner    saccharomyces boulardii (FLORASTOR) capsule 250 mg  250 mg Oral BID    simethicone (MYLICON) chewable tablet 80 mg  80 mg Oral 4x Daily PRN    sodium chloride (PF) 0 9 % injection 3 mL  3 mL Intravenous Q1H PRN        Allergies   Allergen Reactions    Gabapentin      Other reaction(s): Hypotension    Bupropion GI Intolerance and Other (See Comments)     Other reaction(s): Tremor    Cephalexin     Duloxetine      Other reaction(s): Other (see comments)  unkown  unknown      Erythromycin GI Intolerance    Methadone Hallucinations    Mirtazapine GI Intolerance    Paroxetine GI Intolerance    Sulfa Antibiotics     Tricyclic Antidepressants      Other reaction(s): Other    Acetazolamide Rash    Levofloxacin Anxiety and Other (See Comments)       Objective   Vitals: Blood pressure 143/76, pulse 89, temperature 97 8 °F (36 6 °C), resp  rate 18, height 5' (1 524 m), weight 74 kg (163 lb 2 3 oz), SpO2 93 %, not currently breastfeeding  ,Body mass index is 31 86 kg/m²  Physical Exam  Constitutional:       Comments: Awakens to name   HENT:      Head: Normocephalic  Nose: No congestion  Mouth/Throat:      Mouth: Mucous membranes are moist    Eyes:      General:         Right eye: No discharge  Left eye: No discharge  Cardiovascular:      Rate and Rhythm: Normal rate and regular rhythm  Pulmonary:      Effort: Pulmonary effort is normal       Breath sounds: Normal breath sounds  Abdominal:      General: Bowel sounds are normal       Palpations: Abdomen is soft  Musculoskeletal:         General: Normal range of motion  Cervical back: Rigidity present  Skin:     General: Skin is warm and dry     Neurological:      Comments: Oriented x1   Psychiatric:         Mood and Affect: Mood normal          Lab Results:   Results from last 7 days   Lab Units 01/24/22  0658   WBC Thousand/uL 5 86   HEMOGLOBIN g/dL 14 8   HEMATOCRIT % 43 4   PLATELETS Thousands/uL 233        Results from last 7 days   Lab Units 01/24/22  0557 01/23/22  0930 01/23/22  0930   POTASSIUM mmol/L 3 3*   < > 3 6   CHLORIDE mmol/L 109*   < > 106   CO2 mmol/L 23   < > 28   BUN mg/dL 17   < > 15   CREATININE mg/dL 0 79   < > 0 91   CALCIUM mg/dL 8 7   < > 9 7   ALK PHOS U/L  --   --  83   ALT U/L  --   --  22   AST U/L  --   --  19    < > = values in this interval not displayed  Imaging Studies: I have personally reviewed pertinent reports  EKG, Pathology, and Other Studies: I have personally reviewed pertinent reports      VTE Prophylaxis: Sequential compression device (Venodyne)     Code Status: Level 1 - Full Code

## 2022-01-24 NOTE — ASSESSMENT & PLAN NOTE
Continue carbidopa-levodopa, continue Robinul 1 mg bid   Spoke with , she is nonverbal for many years and wheelchair bound   States she can usually stand for a few seconds   PT/OT consulted

## 2022-01-24 NOTE — ASSESSMENT & PLAN NOTE
Patient  reports that he tested her urine with a home test kit  Leukocyte positive, received 1 day of Macrobid prescribed by patient's primary care provider  Continue IV ceftriaxone for now and follow blood and urine cultures

## 2022-01-24 NOTE — ASSESSMENT & PLAN NOTE
Lab Results   Component Value Date    HGBA1C 6 3 (H) 12/06/2021       Recent Labs     01/23/22  1538 01/24/22  0722 01/24/22  1150   POCGLU 136 111 178*       Blood Sugar Average: Last 72 hrs:  Diet controlled at home  Will place on sliding scale and basal bolus protocol - algorithm level 1

## 2022-01-24 NOTE — APP STUDENT NOTE
PEPE STUDENT  Inpatient Progress Note for TRAINING ONLY  Not Part of Legal Medical Record       Progress Note - Carin Peña 64 y o  female MRN: [id number]    Unit/Bed#: E5 -01 Encounter: [id number]    Assessment/Plan:  1  Ambulatory dysfunction  Presents with ambulatory dysfunction in the setting of suspected urinary tract infection  Will place PT OT consults for further disposition management  Patient is wheelchair-bound, but over the past 3 days has developed significant generalized weakness to the point  cannot care for her with felt assistant  Previous history of right hemispheric 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 - followed by [address] [place] neurology    2  S/p deep brain stimulator placement  History of Parkinson's dementia with deep brain stimulator - this is ending near life of battery, due for change in April    3  Recurrent UTI  Patient  reports that he tested her urine with a home test kit  Leukocyte positive, received 1 day of Macrobid prescribed by patient's primary care provider  Initially started on aztreonam, but discussed with clinical pharmacy and will monitor- has multiple drug intolerances  Continue ceftriaxone 1g/50 mL in dextrose IVPB, 1000 mg  mL/hr over 30 min q24 hours    4  DM II with hyperglycemia  Diet controlled at home  Will place on sliding scale and basal bolus protocol    5  Parkinson disease  Continue carbidopa levodopa, continue secretion control with Chad  Geriatrics consultation given history of dementia    Subjective:   Hx limited secondary to pt being non-verbal  Pt lying in bed appears sleepy  Denies pain, CP, abd pain  Objective:     Vitals: Blood pressure 143/76, pulse 89, temperature 97 8 °F (36 6 °C), resp  rate 18, height 5' (1 524 m), weight 74 kg (163 lb 2 3 oz), SpO2 93 %, not currently breastfeeding  ,Body mass index is 31 86 kg/m²        Intake/Output Summary (Last 24 hours) at 1/24/2022 5465  Last data filed at 1/23/2022 1224  Gross per 24 hour   Intake 550 ml   Output 0 ml   Net 550 ml       Physical Exam: General appearance: Pt appears fatigued and sleepy  Head: Normocephalic, without obvious abnormality, atraumatic  Lungs: clear to auscultation bilaterally  Heart: regular rate and rhythm, S1, S2 normal, no murmur, click, rub or gallop  Abdomen: soft, non-tender; bowel sounds normal; no masses,  no organomegaly  Extremities: extremities normal, warm and well-perfused; no cyanosis, clubbing, or edema  Skin: Skin color, texture, turgor normal  No rashes or lesions     Invasive Devices  Report    Peripheral Intravenous Line            Peripheral IV 01/23/22 Left Forearm 1 day    Peripheral IV 01/23/22 Left Hand 1 day          Drain            External Urinary Catheter <1 day                Lab, Imaging and other studies: I have personally reviewed pertinent reports      VTE Pharmacologic Prophylaxis: Heparin  VTE Mechanical Prophylaxis: sequential compression device    Nathan HARRY

## 2022-01-24 NOTE — CASE MANAGEMENT
Case Management Assessment & Discharge Planning Note    Patient name Yanna Pencil  Location [address] MRN [id number]  : 1945 Date 2022       Current Admission Date: 2022  Current Admission Diagnosis:Ambulatory dysfunction   Patient Active Problem List    Diagnosis Date Noted    History of stroke 2022    Ambulatory dysfunction 2022    Leg wound, left     Metabolic encephalopathy     PVC (premature ventricular contraction) 2021    Chorea 2020    Dementia with Lewy bodies (CODE) [facility] ) 2020    Right-sided lacunar infarction ([facility] ) 2020    S/P deep brain stimulator placement 2020    Recurrent UTI 2020    Type 2 diabetes mellitus with hyperglycemia ([facility] ) 2020    Acute left-sided weakness 2020    Chronic cough 2019    Decreased ambulation status 2019    Major depression with psychotic features ([facility] ) 2019    Health care maintenance 2019    Sialorrhea 2018    Allergic rhinitis 2017    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 2016    Acquired hypothyroidism 2016    Mixed hyperlipidemia 2016    Parkinson disease ([facility] ) 2016    GERD (gastroesophageal reflux disease) 2016    Anxiety, generalized 2015    Osteoporosis 2012      LOS (days): 0  Geometric Mean LOS (GMLOS) (days): 2 90  Days to GMLOS:2 7     OBJECTIVE:    Risk of Unplanned Readmission Score: 13         Current admission status: Inpatient       Preferred Pharmacy:   [id number] [place] - [address]  Phone: [phone] Fax: [address] [place] [address]  Phone: [phone] Fax: [phone]    Primary Care Provider: Nataly Pfeiffer DO    Primary Insurance: MEDICARE  Secondary Insurance: [facility] MAGGIE    ASSESSMENT:  Active Health Care Agents    There are no active Health Care Agents on file  Advance Directives  Does patient have a [address]?: Yes  Does patient have Advance Directives?: Yes  Advance Directives: Power of  for health care  Primary Contact: Paulina Levy, [phone]         Readmission Root Cause  30 Day Readmission: No    Patient Information  Admitted from[de-identified] Home  Mental Status: Confused  During Assessment patient was accompanied by: Not accompanied during assessment  Assessment information provided by[de-identified] Spouse  Primary Caregiver: Spouse  Caregiver's Name[de-identified] Estrellita Livingston Relationship to Patient[de-identified] Family Member  Caregiver's Telephone Number[de-identified] [phone]  Support Systems: Family members,Spouse/significant other  Home entry access options   Select all that apply : Stairs  Number of steps to enter home : 2  Type of Current Residence: [facility]  Living Arrangements: Lives w/ Spouse/significant other    Activities of Daily Living Prior to Admission  Functional Status: Assistance  Completes ADLs independently?: No  Level of ADL dependence: Assistance  Ambulates independently?: No  Level of ambulatory dependence: Assistance  Does patient use assisted devices?: Yes  Assisted Devices (DME) used: [employer]  Does patient currently own DME?: Yes  What DME does the patient currently own?: Wheelchair,Walker  Does patient have a history of Outpatient Therapy (PT/OT)?: No  Does the patient have a history of Short-Term Rehab?: Yes (complete care at [place])  Does patient have a history of [facility]?: No  Does patient currently have [facility]?: No         Patient Information Continued  Does patient have prescription coverage?: Yes  Does patient have a history of substance abuse?: No  Does patient have a history of Mental Health Diagnosis?: No         Means of Transportation  Means of Transport to [facility][de-identified] Family transport        DISCHARGE DETAILS:    Discharge planning discussed with[de-identified] patient's spouse  Freedom of Choice: Yes     CM contacted family/caregiver?: Yes  Were Treatment Team discharge recommendations reviewed with patient/caregiver?: Yes  Did patient/caregiver verbalize understanding of patient care needs?: N/A- going to facility  Were patient/caregiver advised of the risks associated with not following Treatment Team discharge recommendations?: Yes    Contacts  Patient Contacts: Guillermo Jade  Relationship to Patient[de-identified] Family  Contact Method: Phone  Phone Number: [phone]  Reason/Outcome: Continuity of [address] [facility]         Is the patient interested in [place] [address] at discharge?: No    DME Referral Provided  Referral made for DME?: No    Other Referral/Resources/Interventions Provided:  Interventions: Short Term Rehab         Treatment Team Recommendation: Short Term Rehab  Discharge Destination Plan[de-identified] Short Term Rehab  Transport at Discharge : BLS Ambulance

## 2022-01-24 NOTE — PLAN OF CARE
Problem: MOBILITY - ADULT  Goal: Maintain or return to baseline ADL function  Description: INTERVENTIONS:  -  Assess patient's ability to carry out ADLs; assess patient's baseline for ADL function and identify physical deficits which impact ability to perform ADLs (bathing, care of mouth/teeth, toileting, grooming, dressing, etc )  - Assess/evaluate cause of self-care deficits   - Assess range of motion  - Assess patient's mobility; develop plan if impaired  - Assess patient's need for assistive devices and provide as appropriate  - Encourage maximum independence but intervene and supervise when necessary  - Involve family in performance of ADLs  - Assess for home care needs following discharge   - Consider OT consult to assist with ADL evaluation and planning for discharge  - Provide patient education as appropriate  Outcome: Progressing  Goal: Maintains/Returns to pre admission functional level  Description: INTERVENTIONS:  - Perform BMAT or MOVE assessment daily    - Set and communicate daily mobility goal to care team and patient/family/caregiver  - Collaborate with rehabilitation services on mobility goals if consulted  - Perform Range of Motion 3 times a day  - Reposition patient every 2 hours    - Dangle patient 3 times a day  - Out of bed for toileting  - Record patient progress and toleration of activity level   Outcome: Progressing     Problem: Potential for Falls  Goal: Patient will remain free of falls  Description: INTERVENTIONS:  - Educate patient/family on patient safety including physical limitations  - Instruct patient to call for assistance with activity   - Consult OT/PT to assist with strengthening/mobility   - Keep Call bell within reach  - Keep bed low and locked with side rails adjusted as appropriate  - Keep care items and personal belongings within reach  - Initiate and maintain comfort rounds  - Make Fall Risk Sign visible to staff  - Offer Toileting every 2 Hours, in advance of need  - Initiate/Maintain bed/chair alarm  - Obtain necessary fall risk management equipment: chair alarm  - Apply yellow socks and bracelet for high fall risk patients  - Consider moving patient to room near nurses station  Outcome: Progressing     Problem: PAIN - ADULT  Goal: Verbalizes/displays adequate comfort level or baseline comfort level  Description: Interventions:  - Encourage patient to monitor pain and request assistance  - Assess pain using appropriate pain scale  - Administer analgesics based on type and severity of pain and evaluate response  - Implement non-pharmacological measures as appropriate and evaluate response  - Consider cultural and social influences on pain and pain management  - Notify physician/advanced practitioner if interventions unsuccessful or patient reports new pain  Outcome: Progressing     Problem: INFECTION - ADULT  Goal: Absence or prevention of progression during hospitalization  Description: INTERVENTIONS:  - Assess and monitor for signs and symptoms of infection  - Monitor lab/diagnostic results  - Monitor all insertion sites, i e  indwelling lines, tubes, and drains  - Monitor endotracheal if appropriate and nasal secretions for changes in amount and color  - [place] appropriate cooling/warming therapies per order  - Administer medications as ordered  - Instruct and encourage patient and family to use good hand hygiene technique  - Identify and instruct in appropriate isolation precautions for identified infection/condition  Outcome: Progressing     Problem: DISCHARGE PLANNING  Goal: Discharge to home or other facility with appropriate resources  Description: INTERVENTIONS:  - Identify barriers to discharge w/patient and caregiver  - Arrange for needed discharge resources and transportation as appropriate  - Identify discharge learning needs (meds, wound care, etc )  - Arrange for interpretive services to assist at discharge as needed  - Refer to Case Management Department for coordinating discharge planning if the patient needs post-hospital services based on physician/advanced practitioner order or complex needs related to functional status, cognitive ability, or social support system  Outcome: Progressing     Problem: Knowledge Deficit  Goal: Patient/family/caregiver demonstrates understanding of disease process, treatment plan, medications, and discharge instructions  Description: Complete learning assessment and assess knowledge base    Interventions:  - Provide teaching at level of understanding  - Provide teaching via preferred learning methods  Outcome: Progressing     Problem: GENITOURINARY - ADULT  Goal: Maintains or returns to baseline urinary function  Description: INTERVENTIONS:  - Assess urinary function  - Encourage oral fluids to ensure adequate hydration if ordered  - Administer IV fluids as ordered to ensure adequate hydration  - Administer ordered medications as needed  - Offer frequent toileting  - Follow urinary retention protocol if ordered  Outcome: Progressing  Goal: Absence of urinary retention  Description: INTERVENTIONS:  - Assess patients ability to void and empty bladder  - Monitor I/O  - Bladder scan as needed  - Discuss with physician/AP medications to alleviate retention as needed  - Discuss catheterization for long term situations as appropriate  Outcome: Progressing     Problem: Prexisting or High Potential for Compromised Skin Integrity  Goal: Skin integrity is maintained or improved  Description: INTERVENTIONS:  - Identify patients at risk for skin breakdown  - Assess and monitor skin integrity  - Assess and monitor nutrition and hydration status  - Monitor labs   - Assess for incontinence   - Turn and reposition patient  - Assist with mobility/ambulation  - Relieve pressure over bony prominences  - Avoid friction and shearing  - Provide appropriate hygiene as needed including keeping skin clean and dry  - Evaluate need for skin moisturizer/barrier cream  - Collaborate with interdisciplinary team   - Patient/family teaching  - Consider wound care consult   Outcome: Progressing

## 2022-01-24 NOTE — SPEECH THERAPY NOTE
Speech Language/Pathology  Speech/Language Pathology  Assessment    Patient Name: Isra Jackson  [id number] Date: 1/24/2022     Problem List  Principal Problem:    Ambulatory dysfunction  Active Problems:    Parkinson disease ([place] [address] )    Type 2 diabetes mellitus with hyperglycemia ([place] [address] )    Recurrent UTI    S/P deep brain stimulator placement    Past Medical History  Past Medical History:   Diagnosis Date    Abdominal pain, suprapubic 07/03/2014    Resolved:  November 22, 2016    Abscess of skin and subcutaneous tissue 11/21/2016    Resolved:  February 28, 2017    Acute kidney failure, unspecified ([place] [address] )     Anxiety     Burning with urination 12/14/2016    Resolved:  February 28, 2017    Conjunctivitis 06/10/2013    Depression     Depression     Diabetes mellitus ([place] [address] ) 2/2/2020    Dysuria 06/06/2016    Resolved:  February 28 2017    Falling 10/18/2012    GERD (gastroesophageal reflux disease)     Groin pain 04/16/2015    Resolved:  February 28, 2017    Hyperlipidemia     Hypothyroid     Influenza 04/05/2013    Leg abrasion 09/22/2016    Resolved:  February 28, 2017    Parkinson's disease [facility])     Psychiatric illness     Rectal pain 10/17/2013    Resolved:  February 28, 2017    Thyroid disease      Past Surgical History  Past Surgical History:   Procedure Laterality Date    APPENDECTOMY      Age 4 or 3    BACK SURGERY      BRAIN SURGERY      CLOSED REDUCTION NASAL FRACTURE      CYSTOSCOPY  01/28/2014    RENETTA Schreiber   (Diagnostic)    DEEP BRAIN STIMULATOR PLACEMENT      EYE SURGERY      FRACTURE SURGERY      JOINT REPLACEMENT      LAMINECTOMY      Decompress, Facetectomy, Foraminotomy Lumbar Seg    ORIF HIP FRACTURE Left     About 3 years ago    TONSILLECTOMY            Bedside Swallow Evaluation:    Summary:  Pt presents w/ reduced ability to follow commands  Poor head control  Head hangs down  Opens for PO trials   Slow but functional appearing oral manipulation and transfer w/ puree  Suspect reduced control and bolus formation w/ tsp nectar thick liquids  Swallows were fairly  Laryngeal rise appeared reduced  Unable to assess vocal quality  Did not follow cues to cough  O2 sats remained stable  On RA  Mod oral stage, ? Mild/mod pharyngeal  Not very interactive  Difficult to assess  May improve as UTI resolves  Spoke w/  later who reported she was drinking thin liquids at home  "no steak"  Ate things like fish, ground meat  Recommendations:  Diet:puree for now  Liquid: honey thick  H/o disliking thick  Will trial upgrades  Meds: crush  Supervision: full  Must be fed  Positioning:Upright  Strategies: Pt to take PO/Meds only when fully alert and upright  Oral care w/ suction  Aspiration precautions  Reflux precautions  Therapy Prognosis: fair  Prognosis considerations: dysphagia history, parkinsons, uti's  Frequency:2-5x  wk    Consider consult w/:  Nutrition    H&P/Admit info, pertinent provider notes/procedures/studies/PMH:(copied and placed in this report)  History of Present Illness:  Michelle Westbrook is a 64 y o  female who presents with 3 day history of weakness as well as confusion  The patient has a history of Parkinson's dementia she has a deep brain stimulator in place  She is able to ambulate with a wheelchair and with her 's assistance  However, over the last 72 hours had worsening weakness  Her  attempted to test her urine with a urine test kit  He reports that it was positive for leukocytes, but negative for nitrates  He states due to her disease she cannot tell when she has urinary tract infection  He did not smell her urine and cannot determine if she has had foul-smelling urine  She did take 1 dose of Macrobid after thorough  had reported to her primary care doctor that she did have a abnormal UA    At the time examination she endorses no pain, no nausea vomiting or diarrhea        She has had previous admissions in the past for urinary tract infection, has a history of recurrent UTIs       Due to encephalopathy, unable to obtain an accurate review of systems    Goal(s):  Pt will tolerate least restrictive diet w/out s/s aspiration or oral/pharyngeal difficulties  Patient's goal: unable to state    Reason for consult:  R/o aspiration  Determine safest and least restrictive diet  Change in mental status  New neuro event  H/o neurological disease  respiratory compromise  h/o dysphagia       Current diet:  Puree w/ honey thick liquids  (not waking up yesterday)  Premorbid diet[de-identified]  ? Level 2 type w/ thin  Previous VBS:  Video Barium Swallow Study  1/24/20     Summary:  Oral stage moderate  No mastication of soft solid  Eventually cleared w/ multiple sips of liquids  Bolus formation and control were best w/ purees/honey thick liquids, mild w/ thin and nectar  Transfer time varied from prompt to moderately delayed  Overall pt was more alert and held her head up better for the study than she did earlier at the bedside, but still had periods where she seemed to doze off  Pharyngeal stage was mild/mod impaired  Initial swallows were prompt to mildly delayed  Secondary swallows (on retention) were delayed or absent  Epiglottic inversion was inconsistent  Pharyngeal constriction was mildly reduced, laryngeal rise was mild/mod reduced  Per gross esophageal screen: slower clearance, some retropulsion/? Reflux  observed at the end of the study  Images are on PACS for review             Recommendations:  Diet: puree (will trial soft solids if cognition/alertness improve)  Liquids: honey thick for now  ST to trial thin and nectar at the bedside (need consistent alertness and swallow function over consecutive sessions)  Meds:crush  Strategies: feed  Only when fully alert, tactile and verbal prompts as needed, gustatory stim as needed  Head at midline, alternate w/ liquids  Upright position  F/u ST tx: yes  Therapy Prognosis: favorable   Seems to be improving  Prognosis considerations: h/o progressive dz  Multiple meds  Full Supervision  Feed as needed  Aspiration Precautions  Reflux Precautions  Consider consult with: nutrition  Results reviewed with: pt, PA  Aspiration precautions posted  O2 requirement:  none  Voice/Speech:  nonverbal  Social:  Home w/   Follows commands:   ~75% 1 step                   Cognitive Status:  difficult to alert for PO  Oral mech exam:  Dentition:partial natural  Labial strength and ROM:reduced  Lingual strength and ROM:reduced  Mandibular strength and ROM:unableto assess  Secretion management:appeared wfl  Volitional cough:unable  Oral care set up, suctioned  Min/nothing    Esophageal stage:  H/o GERD  Aspiration precautions posted  Results d/w:  Pt, nursing, family

## 2022-01-24 NOTE — PLAN OF CARE
Problem: MOBILITY - ADULT  Goal: Maintain or return to baseline ADL function  Description: INTERVENTIONS:  -  Assess patient's ability to carry out ADLs; assess patient's baseline for ADL function and identify physical deficits which impact ability to perform ADLs (bathing, care of mouth/teeth, toileting, grooming, dressing, etc )  - Assess/evaluate cause of self-care deficits   - Assess range of motion  - Assess patient's mobility; develop plan if impaired  - Assess patient's need for assistive devices and provide as appropriate  - Encourage maximum independence but intervene and supervise when necessary  - Involve family in performance of ADLs  - Assess for home care needs following discharge   - Consider OT consult to assist with ADL evaluation and planning for discharge  - Provide patient education as appropriate  Outcome: Progressing  Goal: Maintains/Returns to pre admission functional level  Description: INTERVENTIONS:  - Perform BMAT or MOVE assessment daily    - Set and communicate daily mobility goal to care team and patient/family/caregiver  - Collaborate with rehabilitation services on mobility goals if consulted  - Perform Range of Motion 3 times a day  - Reposition patient every 2 hours    - Dangle patient 3 times a day  - Out of bed for toileting  - Record patient progress and toleration of activity level   Outcome: Progressing     Problem: Potential for Falls  Goal: Patient will remain free of falls  Description: INTERVENTIONS:  - Educate patient/family on patient safety including physical limitations  - Instruct patient to call for assistance with activity   - Consult OT/PT to assist with strengthening/mobility   - Keep Call bell within reach  - Keep bed low and locked with side rails adjusted as appropriate  - Keep care items and personal belongings within reach  - Initiate and maintain comfort rounds  - Make Fall Risk Sign visible to staff  - Offer Toileting every 2 Hours, in advance of need  - Initiate/Maintain bed/chair alarm  - Obtain necessary fall risk management equipment: chair alarm  - Apply yellow socks and bracelet for high fall risk patients  - Consider moving patient to room near nurses station  Outcome: Progressing     Problem: PAIN - ADULT  Goal: Verbalizes/displays adequate comfort level or baseline comfort level  Description: Interventions:  - Encourage patient to monitor pain and request assistance  - Assess pain using appropriate pain scale  - Administer analgesics based on type and severity of pain and evaluate response  - Implement non-pharmacological measures as appropriate and evaluate response  - Consider cultural and social influences on pain and pain management  - Notify physician/advanced practitioner if interventions unsuccessful or patient reports new pain  Outcome: Progressing     Problem: INFECTION - ADULT  Goal: Absence or prevention of progression during hospitalization  Description: INTERVENTIONS:  - Assess and monitor for signs and symptoms of infection  - Monitor lab/diagnostic results  - Monitor all insertion sites, i e  indwelling lines, tubes, and drains  - Monitor endotracheal if appropriate and nasal secretions for changes in amount and color  - [place] appropriate cooling/warming therapies per order  - Administer medications as ordered  - Instruct and encourage patient and family to use good hand hygiene technique  - Identify and instruct in appropriate isolation precautions for identified infection/condition  Outcome: Progressing     Problem: DISCHARGE PLANNING  Goal: Discharge to home or other facility with appropriate resources  Description: INTERVENTIONS:  - Identify barriers to discharge w/patient and caregiver  - Arrange for needed discharge resources and transportation as appropriate  - Identify discharge learning needs (meds, wound care, etc )  - Arrange for interpretive services to assist at discharge as needed  - Refer to Case Management Department for coordinating discharge planning if the patient needs post-hospital services based on physician/advanced practitioner order or complex needs related to functional status, cognitive ability, or social support system  Outcome: Progressing     Problem: Knowledge Deficit  Goal: Patient/family/caregiver demonstrates understanding of disease process, treatment plan, medications, and discharge instructions  Description: Complete learning assessment and assess knowledge base    Interventions:  - Provide teaching at level of understanding  - Provide teaching via preferred learning methods  Outcome: Progressing     Problem: GENITOURINARY - ADULT  Goal: Maintains or returns to baseline urinary function  Description: INTERVENTIONS:  - Assess urinary function  - Encourage oral fluids to ensure adequate hydration if ordered  - Administer IV fluids as ordered to ensure adequate hydration  - Administer ordered medications as needed  - Offer frequent toileting  - Follow urinary retention protocol if ordered  Outcome: Progressing  Goal: Absence of urinary retention  Description: INTERVENTIONS:  - Assess patients ability to void and empty bladder  - Monitor I/O  - Bladder scan as needed  - Discuss with physician/AP medications to alleviate retention as needed  - Discuss catheterization for long term situations as appropriate  Outcome: Progressing     Problem: Prexisting or High Potential for Compromised Skin Integrity  Goal: Skin integrity is maintained or improved  Description: INTERVENTIONS:  - Identify patients at risk for skin breakdown  - Assess and monitor skin integrity  - Assess and monitor nutrition and hydration status  - Monitor labs   - Assess for incontinence   - Turn and reposition patient  - Assist with mobility/ambulation  - Relieve pressure over bony prominences  - Avoid friction and shearing  - Provide appropriate hygiene as needed including keeping skin clean and dry  - Evaluate need for skin moisturizer/barrier cream  - Collaborate with interdisciplinary team   - Patient/family teaching  - Consider wound care consult   Outcome: Progressing

## 2022-01-24 NOTE — PROGRESS NOTES
[facility]  Progress Note - Poonam Harper 1945, 64 y o  female MRN: [id number]  Unit/Bed#: E5 -01 Encounter: [id number]  Primary Care Provider: Melany Yepez DO   Date and time admitted to hospital: 1/23/2022  8:57 AM    * Ambulatory dysfunction  Assessment & Plan  Presents with ambulatory dysfunction in the setting of suspected urinary tract infection  Will place PT OT consults for further disposition management  Patient is wheelchair-bound, but over the past 3 days has developed significant generalized weakness to the point  cannot care for her with felt assistant   Previous history of right hemispheric 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   She is nonverbal per      Recurrent UTI  Assessment & Plan  Patient  reports that he tested her urine with a home test kit  Leukocyte positive, received 1 day of Macrobid prescribed by patient's primary care provider  Continue IV ceftriaxone for now and follow blood and urine cultures       Parkinson disease ([facility] [address] )  Assessment & Plan  Continue carbidopa-levodopa, continue Robinul 1 mg bid   Spoke with , she is nonverbal for many years and wheelchair bound   States she can usually stand for a few seconds   PT/OT consulted     History of stroke  Assessment & Plan  History of stroke in 2/2020   Maintained on pletal, plavix, aspirin     S/P deep brain stimulator placement  Assessment & Plan  History of Parkinson's dementia with deep brain stimulator -due for change in April    Type 2 diabetes mellitus with hyperglycemia [facility])  Assessment & Plan  Lab Results   Component Value Date    HGBA1C 6 3 (H) 12/06/2021       Recent Labs     01/23/22  1538 01/24/22  0722 01/24/22  1150   POCGLU 136 111 178*       Blood Sugar Average: Last 72 hrs:  Diet controlled at home  Will place on sliding scale and basal bolus protocol - algorithm level 1        VTE Pharmacologic Prophylaxis: VTE Score: 4 Moderate Risk (Score 3-4) - Pharmacological DVT Prophylaxis Ordered: heparin  Patient Centered Rounds: I performed bedside rounds with nursing staff today  Discussions with Specialists or Other Care Team Provider: reviewed geriatric recs     Education and Discussions with Family / Patient: Updated  () via phone  Time Spent for Care: 20 minutes  More than 50% of total time spent on counseling and coordination of care as described above  Current Length of Stay: 0 day(s)  Current Patient Status: Inpatient   Certification Statement: The patient will continue to require additional inpatient hospital stay due to ambualtory dysfunction, UTI   Discharge Plan: Anticipate discharge in 48 hrs to discharge location to be determined pending rehab evaluations  Code Status: Level 1 - Full Code    Subjective:   Patient is lying in bed  She is nonverbal which has been states is her baseline  She does respond to certain commands to squeeze hands and wiggle her toes  She sticks her tongue out when asked  She also open her eyes when asked very slightly  Objective:     Vitals:   Temp (24hrs), Av 2 °F (36 8 °C), Min:97 8 °F (36 6 °C), Max:98 5 °F (36 9 °C)    Temp:  [97 8 °F (36 6 °C)-98 5 °F (36 9 °C)] 97 8 °F (36 6 °C)  HR:  [] 89  Resp:  [18-21] 18  BP: (107-143)/(54-76) 143/76  SpO2:  [89 %-94 %] 94 %  Body mass index is 31 86 kg/m²  Input and Output Summary (last 24 hours): Intake/Output Summary (Last 24 hours) at 2022 1213  Last data filed at 2022 1224  Gross per 24 hour   Intake 500 ml   Output --   Net 500 ml       Physical Exam:   Physical Exam  Vitals and nursing note reviewed  Constitutional:       General: She is not in acute distress  Appearance: She is ill-appearing (chronically ill appearing)  She is not toxic-appearing  Cardiovascular:      Rate and Rhythm: Normal rate and regular rhythm  Pulmonary:      Effort: Pulmonary effort is normal  No respiratory distress  Breath sounds: Normal breath sounds  No wheezing or rales  Comments: Decreased throughout, on room air   Abdominal:      General: Bowel sounds are normal       Palpations: Abdomen is soft  Musculoskeletal:      Right lower leg: No edema  Left lower leg: No edema  Skin:     General: Skin is dry  Coloration: Skin is pale  Neurological:      Mental Status: She is alert  Mental status is at baseline  Comments: Chronic left sided weakness, will squeezes hands decreased on left, wiggles toes slightly, sticks tongue out and will open eyes very slightly when asked    Psychiatric:         Speech: She is noncommunicative  Additional Data:     Labs:  Results from last 7 days   Lab Units 01/24/22  0658 01/23/22  1335 01/23/22  0930   WBC Thousand/uL 5 86   < > 10 35*   HEMOGLOBIN g/dL 14 8   < > 16 7*   HEMATOCRIT % 43 4   < > 48 7*   PLATELETS Thousands/uL 233   < > 282   NEUTROS PCT %  --   --  84*   LYMPHS PCT %  --   --  5*   MONOS PCT %  --   --  8   EOS PCT %  --   --  1    < > = values in this interval not displayed  Results from last 7 days   Lab Units 01/24/22  0557 01/23/22  0930 01/23/22  0930   SODIUM mmol/L 141   < > 144   POTASSIUM mmol/L 3 3*   < > 3 6   CHLORIDE mmol/L 109*   < > 106   CO2 mmol/L 23   < > 28   BUN mg/dL 17   < > 15   CREATININE mg/dL 0 79   < > 0 91   ANION GAP mmol/L 9   < > 10   CALCIUM mg/dL 8 7   < > 9 7   ALBUMIN g/dL  --   --  3 5   TOTAL BILIRUBIN mg/dL  --   --  0 92   ALK PHOS U/L  --   --  83   ALT U/L  --   --  22   AST U/L  --   --  19   GLUCOSE RANDOM mg/dL 117   < > 171*    < > = values in this interval not displayed       Results from last 7 days   Lab Units 01/23/22  0930   INR  1 11     Results from last 7 days   Lab Units 01/24/22  1150 01/24/22  0722 01/23/22  1538   POC GLUCOSE mg/dl 178* 111 136         Results from last 7 days   Lab Units 01/24/22  0557 01/23/22  0930   LACTIC ACID mmol/L  --  1 4   PROCALCITONIN ng/ml 0 14 0 16 Lines/Drains:  Invasive Devices  Report    Peripheral Intravenous Line            Peripheral IV 01/23/22 Left Forearm 1 day    Peripheral IV 01/23/22 Left Hand 1 day          Drain            External Urinary Catheter 1 day                      Imaging: Reviewed radiology reports from this admission including: CT head    Recent Cultures (last 7 days):   Results from last 7 days   Lab Units 01/23/22  0943 01/23/22  0930   BLOOD CULTURE  Received in Microbiology Lab  Culture in Progress  Received in Microbiology Lab  Culture in Progress         Last 24 Hours Medication List:   Current Facility-Administered Medications   Medication Dose Route Frequency Provider Last Rate    acetaminophen  650 mg Oral Q6H PRN Tracey Brocks, DO      aluminum-magnesium hydroxide-simethicone  30 mL Oral Q6H PRN Tracey Wallaecs, DO      aspirin  81 mg Oral Once per day on Mon Wed Fri Dash Hernadez, DO      calcium carbonate  1,000 mg Oral Daily PRN Tracey Cannon, DO      carbidopa-levodopa  1 tablet Oral TID Dash Hernadez, DO      cefTRIAXone  1,000 mg Intravenous Q24H Tracey Cannon, DO 1,000 mg (01/24/22 1035)    cilostazol  100 mg Oral BID AC Dash MARKS Dyan [place], DO      clopidogrel  75 mg Oral Daily Dash KENDRICK [place], DO      docusate sodium  100 mg Oral BID Dash Hernadez, DO      donepezil  10 mg Oral HS Dash Hernadez, DO      famotidine  20 mg Oral BID Dahs Hernadez, DO      glycopyrrolate  1 mg Oral BID Dash KENDRICK Hernadez, DO      heparin (porcine)  5,000 Units Subcutaneous Q8H [address] Dash KENDRICK Hernadez, DO      insulin lispro  1-5 Units Subcutaneous TID AC Dash Zaidi [place], DO      levothyroxine  75 mcg Oral Daily Dash Hernadez, DO      ondansetron  4 mg Intravenous Q6H PRN Tracey Wallaces, DO      potassium chloride  20 mEq Oral Once Sarah Aceves PA-C      pravastatin  40 mg Oral Daily With Dinner Dash Hernadez, DO      saccharomyces boulardii  250 mg Oral BID Dash Hernadez, DO      simethicone  80 mg Oral 4x Daily PRN Tracey Cannon, DO      sodium chloride (PF)  3 mL Intravenous Q1H PRN Henderson Opitz, DO          Today, Patient Was Seen By: Vinh Leal PA-C    **Please Note: This note may have been constructed using a voice recognition system  **

## 2022-01-25 LAB
BASOPHILS # BLD AUTO: 0.06 THOUSANDS/ΜL (ref 0–0.1)
BASOPHILS NFR BLD AUTO: 1 % (ref 0–1)
EOSINOPHIL # BLD AUTO: 0.12 THOUSAND/ΜL (ref 0–0.61)
EOSINOPHIL NFR BLD AUTO: 1 % (ref 0–6)
ERYTHROCYTE [DISTWIDTH] IN BLOOD BY AUTOMATED COUNT: 13.9 % (ref 11.6–15.1)
GLUCOSE SERPL-MCNC: 120 MG/DL (ref 65–140)
GLUCOSE SERPL-MCNC: 133 MG/DL (ref 65–140)
GLUCOSE SERPL-MCNC: 141 MG/DL (ref 65–140)
GLUCOSE SERPL-MCNC: 188 MG/DL (ref 65–140)
HCT VFR BLD AUTO: 43.4 % (ref 34.8–46.1)
HGB BLD-MCNC: 14.7 G/DL (ref 11.5–15.4)
IMM GRANULOCYTES # BLD AUTO: 0.04 THOUSAND/UL (ref 0–0.2)
IMM GRANULOCYTES NFR BLD AUTO: 1 % (ref 0–2)
LYMPHOCYTES # BLD AUTO: 2.06 THOUSANDS/ΜL (ref 0.6–4.47)
LYMPHOCYTES NFR BLD AUTO: 23 % (ref 14–44)
MCH RBC QN AUTO: 31.8 PG (ref 26.8–34.3)
MCHC RBC AUTO-ENTMCNC: 33.9 G/DL (ref 31.4–37.4)
MCV RBC AUTO: 94 FL (ref 82–98)
MONOCYTES # BLD AUTO: 0.81 THOUSAND/ΜL (ref 0.17–1.22)
MONOCYTES NFR BLD AUTO: 9 % (ref 4–12)
NEUTROPHILS # BLD AUTO: 5.73 THOUSANDS/ΜL (ref 1.85–7.62)
NEUTS SEG NFR BLD AUTO: 65 % (ref 43–75)
NRBC BLD AUTO-RTO: 0 /100 WBCS
PLATELET # BLD AUTO: 275 THOUSANDS/UL (ref 149–390)
PMV BLD AUTO: 10.3 FL (ref 8.9–12.7)
RBC # BLD AUTO: 4.62 MILLION/UL (ref 3.81–5.12)
WBC # BLD AUTO: 8.82 THOUSAND/UL (ref 4.31–10.16)

## 2022-01-25 PROCEDURE — 92526 ORAL FUNCTION THERAPY: CPT

## 2022-01-25 PROCEDURE — 99232 SBSQ HOSP IP/OBS MODERATE 35: CPT | Performed by: PHYSICIAN ASSISTANT

## 2022-01-25 PROCEDURE — 97163 PT EVAL HIGH COMPLEX 45 MIN: CPT

## 2022-01-25 PROCEDURE — 82948 REAGENT STRIP/BLOOD GLUCOSE: CPT

## 2022-01-25 PROCEDURE — 97167 OT EVAL HIGH COMPLEX 60 MIN: CPT

## 2022-01-25 PROCEDURE — 85025 COMPLETE CBC W/AUTO DIFF WBC: CPT | Performed by: PHYSICIAN ASSISTANT

## 2022-01-25 PROCEDURE — 99232 SBSQ HOSP IP/OBS MODERATE 35: CPT | Performed by: NURSE PRACTITIONER

## 2022-01-25 RX ORDER — ALPRAZOLAM 0.5 MG/1
0.25 TABLET ORAL
Qty: 5 TABLET | Refills: 0 | Status: SHIPPED | OUTPATIENT
Start: 2022-01-25 | End: 2022-01-27 | Stop reason: HOSPADM

## 2022-01-25 RX ORDER — FLUCONAZOLE 100 MG/1
200 TABLET ORAL DAILY
Status: COMPLETED | OUTPATIENT
Start: 2022-01-25 | End: 2022-01-25

## 2022-01-25 RX ADMIN — CILOSTAZOL 100 MG: 50 TABLET ORAL at 08:23

## 2022-01-25 RX ADMIN — GLYCOPYRROLATE 1 MG: 1 TABLET ORAL at 18:19

## 2022-01-25 RX ADMIN — CARBIDOPA AND LEVODOPA 1 TABLET: 25; 100 TABLET ORAL at 23:24

## 2022-01-25 RX ADMIN — CEFTRIAXONE SODIUM 1000 MG: 10 INJECTION, POWDER, FOR SOLUTION INTRAVENOUS at 10:40

## 2022-01-25 RX ADMIN — Medication 250 MG: at 08:24

## 2022-01-25 RX ADMIN — DONEPEZIL HYDROCHLORIDE 10 MG: 10 TABLET, FILM COATED ORAL at 23:24

## 2022-01-25 RX ADMIN — DOCUSATE SODIUM 100 MG: 100 CAPSULE, LIQUID FILLED ORAL at 18:18

## 2022-01-25 RX ADMIN — CILOSTAZOL 100 MG: 50 TABLET ORAL at 18:19

## 2022-01-25 RX ADMIN — DOCUSATE SODIUM 100 MG: 100 CAPSULE, LIQUID FILLED ORAL at 08:23

## 2022-01-25 RX ADMIN — CLOPIDOGREL BISULFATE 75 MG: 75 TABLET ORAL at 08:23

## 2022-01-25 RX ADMIN — PRAVASTATIN SODIUM 40 MG: 40 TABLET ORAL at 18:19

## 2022-01-25 RX ADMIN — FAMOTIDINE 20 MG: 20 TABLET ORAL at 08:23

## 2022-01-25 RX ADMIN — GLYCOPYRROLATE 1 MG: 1 TABLET ORAL at 08:23

## 2022-01-25 RX ADMIN — HEPARIN SODIUM 5000 UNITS: 5000 INJECTION INTRAVENOUS; SUBCUTANEOUS at 06:01

## 2022-01-25 RX ADMIN — CARBIDOPA AND LEVODOPA 1 TABLET: 25; 100 TABLET ORAL at 08:23

## 2022-01-25 RX ADMIN — Medication 250 MG: at 18:19

## 2022-01-25 RX ADMIN — HEPARIN SODIUM 5000 UNITS: 5000 INJECTION INTRAVENOUS; SUBCUTANEOUS at 23:24

## 2022-01-25 RX ADMIN — CARBIDOPA AND LEVODOPA 1 TABLET: 25; 100 TABLET ORAL at 18:18

## 2022-01-25 RX ADMIN — INSULIN LISPRO 1 UNITS: 100 INJECTION, SOLUTION INTRAVENOUS; SUBCUTANEOUS at 12:01

## 2022-01-25 RX ADMIN — LEVOTHYROXINE SODIUM 75 MCG: 75 TABLET ORAL at 06:01

## 2022-01-25 RX ADMIN — HEPARIN SODIUM 5000 UNITS: 5000 INJECTION INTRAVENOUS; SUBCUTANEOUS at 13:05

## 2022-01-25 RX ADMIN — FAMOTIDINE 20 MG: 20 TABLET ORAL at 18:19

## 2022-01-25 RX ADMIN — FLUCONAZOLE 200 MG: 100 TABLET ORAL at 18:18

## 2022-01-25 NOTE — ASSESSMENT & PLAN NOTE
Presents with ambulatory dysfunction in the setting of suspected urinary tract infection, baseline wheelchair bound but does stand and pivot per  at baseline, nonverbal due to Parkinson's,  Previous history of right hemispheric 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   PT  OT recommending rehab   CM working on placement   First choice is [facility] with geriatrics at the bedside today   Spoke with Shahla Peng and gave update today   Awaiting placement

## 2022-01-25 NOTE — PROGRESS NOTES
[facility]  Progress Note - La Nena Gordon 1945, 64 y o  female MRN: [id number]  Unit/Bed#: E5 -01 Encounter: [id number]  Primary Care Provider: Naif Mccabe DO   Date and time admitted to hospital: 1/23/2022  8:57 AM    * Ambulatory dysfunction  Assessment & Plan  Presents with ambulatory dysfunction in the setting of suspected urinary tract infection, baseline wheelchair bound and nonverbal due to Parkinson's,  Previous history of right hemispheric 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   PT  OT recommending rehab   CM working on placement     Recurrent UTI  Assessment & Plan  Patient  reports that he tested her urine with a home test kit   received 1 day of Macrobid prescribed by patient's primary care provider prior to admission   Urine culture without significant growth and blood cx negative to date   Will continue IV rocephin today to complete 3 day course then monitor off further antibiotics         Parkinson disease ([facility] [address] )  Assessment & Plan  Continue carbidopa-levodopa, continue Robinul 1 mg bid   Spoke with , she is nonverbal for many years and wheelchair bound   States she can usually stand for a few seconds   He is having a hard time taking care of her at home due to progressive weakness   PT/OT consulted   Discharge to rehab     History of stroke  Assessment & Plan  History of stroke in 2/2020   Maintained on pletal, plavix, aspirin     S/P deep brain stimulator placement  Assessment & Plan  History of Parkinson's dementia with deep brain stimulator -due for change in April    Type 2 diabetes mellitus with hyperglycemia [facility])  Assessment & Plan  Lab Results   Component Value Date    HGBA1C 6 3 (H) 12/06/2021       Recent Labs     01/24/22  1150 01/24/22  1523 01/24/22  2118 01/25/22  0757   POCGLU 178* 120 179* 141*       Blood Sugar Average: Last 72 hrs:  Diet controlled at home  Will place on sliding scale and basal bolus protocol - algorithm level 1          VTE Pharmacologic Prophylaxis: VTE Score: 4 Moderate Risk (Score 3-4) - Pharmacological DVT Prophylaxis Ordered: heparin  Patient Centered Rounds: I performed bedside rounds with nursing staff today  Discussions with Specialists or Other Care Team Provider:     Education and Discussions with Family / Patient: Updated  () via phone  Time Spent for Care: 20 minutes  More than 50% of total time spent on counseling and coordination of care as described above  Current Length of Stay: 1 day(s)  Current Patient Status: Inpatient   Certification Statement: The patient will continue to require additional inpatient hospital stay due to ambulatory dysfunction needing placement   Discharge Plan: Anticipate discharge in 24-48 hrs to rehab facility  Code Status: Level 1 - Full Code    Subjective:   Ricky Shay is resting in bed  She is nonverbal but does open eyes spontaneously , she shakes her head "no" to being in pain  Objective:     Vitals:   Temp (24hrs), Av 2 °F (36 8 °C), Min:97 8 °F (36 6 °C), Max:98 6 °F (37 °C)    Temp:  [97 8 °F (36 6 °C)-98 6 °F (37 °C)] 98 2 °F (36 8 °C)  HR:  [] 93  Resp:  [16-18] 18  BP: (125-134)/(70-80) 125/75  SpO2:  [89 %-92 %] 89 %  Body mass index is 31 86 kg/m²  Input and Output Summary (last 24 hours):   No intake or output data in the 24 hours ending 22 0942    Physical Exam:   Physical Exam  Vitals and nursing note reviewed  Constitutional:       Comments: Eyes closed but will open spontaneously today    Cardiovascular:      Rate and Rhythm: Normal rate and regular rhythm  Pulmonary:      Effort: Pulmonary effort is normal  No respiratory distress  Breath sounds: Normal breath sounds  No wheezing  Abdominal:      General: Bowel sounds are normal       Palpations: Abdomen is soft  Musculoskeletal:      Right lower leg: No edema  Left lower leg: No edema  Skin:     Coloration: Skin is pale  Neurological:      Mental Status: Mental status is at baseline  Psychiatric:         Speech: She is noncommunicative  Comments: Shakes head no to being in pain           Additional Data:     Labs:  Results from last 7 days   Lab Units 01/25/22  0500   WBC Thousand/uL 8 82   HEMOGLOBIN g/dL 14 7   HEMATOCRIT % 43 4   PLATELETS Thousands/uL 275   NEUTROS PCT % 65   LYMPHS PCT % 23   MONOS PCT % 9   EOS PCT % 1     Results from last 7 days   Lab Units 01/24/22  0557 01/23/22  0930 01/23/22  0930   SODIUM mmol/L 141   < > 144   POTASSIUM mmol/L 3 3*   < > 3 6   CHLORIDE mmol/L 109*   < > 106   CO2 mmol/L 23   < > 28   BUN mg/dL 17   < > 15   CREATININE mg/dL 0 79   < > 0 91   ANION GAP mmol/L 9   < > 10   CALCIUM mg/dL 8 7   < > 9 7   ALBUMIN g/dL  --   --  3 5   TOTAL BILIRUBIN mg/dL  --   --  0 92   ALK PHOS U/L  --   --  83   ALT U/L  --   --  22   AST U/L  --   --  19   GLUCOSE RANDOM mg/dL 117   < > 171*    < > = values in this interval not displayed  Results from last 7 days   Lab Units 01/23/22  0930   INR  1 11     Results from last 7 days   Lab Units 01/25/22  0757 01/24/22  2118 01/24/22  1523 01/24/22  1150 01/24/22  0722 01/23/22  1538   POC GLUCOSE mg/dl 141* 179* 120 178* 111 136         Results from last 7 days   Lab Units 01/24/22  0557 01/23/22  0930   LACTIC ACID mmol/L  --  1 4   PROCALCITONIN ng/ml 0 14 0 16       Lines/Drains:  Invasive Devices  Report    Peripheral Intravenous Line            Peripheral IV 01/23/22 Left Forearm 2 days    Peripheral IV 01/23/22 Left Hand 2 days          Drain            External Urinary Catheter 1 day                          Recent Cultures (last 7 days):   Results from last 7 days   Lab Units 01/23/22  1000 01/23/22  0943 01/23/22  0930   BLOOD CULTURE   --  No Growth at 24 hrs  No Growth at 24 hrs     URINE CULTURE  No Growth <1000 cfu/mL  --   --        Last 24 Hours Medication List:   Current Facility-Administered Medications   Medication Dose Route Frequency Provider Last Rate    acetaminophen  650 mg Oral Q6H PRN Joey Marie, DO      aluminum-magnesium hydroxide-simethicone  30 mL Oral Q6H PRN Joey Marie, DO      aspirin  81 mg Oral Once per day on Mon Wed Fri Dash Hernadez, DO      calcium carbonate  1,000 mg Oral Daily PRN Joey Marie, DO      carbidopa-levodopa  1 tablet Oral TID Dash Hernadez, DO      cefTRIAXone  1,000 mg Intravenous Q24H Austyn Joseph PA-C      cilostazol  100 mg Oral BID AC Joey Marie, DO      clopidogrel  75 mg Oral Daily Dash Odonnell, DO      docusate sodium  100 mg Oral BID Dash Hernadez, DO      donepezil  10 mg Oral HS Dash Hernadez, DO      famotidine  20 mg Oral BID Dash Hernadez, DO      glycopyrrolate  1 mg Oral BID Dash Hernadez, DO      heparin (porcine)  5,000 Units Subcutaneous Q8H [facility] Dash Joseph, DO      insulin lispro  1-5 Units Subcutaneous TID AC Joey Marie, DO      levothyroxine  75 mcg Oral Daily Dash Hernadez, DO      ondansetron  4 mg Intravenous Q6H PRN Joey Marie, DO      pravastatin  40 mg Oral Daily With Dinner Dash Hernadez, DO      saccharomyces boulardii  250 mg Oral BID Dash Hernadez, DO      simethicone  80 mg Oral 4x Daily PRN Joey Marie, DO      sodium chloride (PF)  3 mL Intravenous Q1H PRN Monie Coleman,           Today, Patient Was Seen By: Austyn Joseph PA-C    **Please Note: This note may have been constructed using a voice recognition system  **

## 2022-01-25 NOTE — ASSESSMENT & PLAN NOTE
Lab Results   Component Value Date    HGBA1C 6 3 (H) 12/06/2021       Recent Labs     01/24/22  1150 01/24/22  1523 01/24/22  2118 01/25/22  0757   POCGLU 178* 120 179* 141*       Blood Sugar Average: Last 72 hrs:  Diet controlled at home  Will place on sliding scale and basal bolus protocol - algorithm level 1

## 2022-01-25 NOTE — PLAN OF CARE
Problem: MOBILITY - ADULT  Goal: Maintain or return to baseline ADL function  Description: INTERVENTIONS:  -  Assess patient's ability to carry out ADLs; assess patient's baseline for ADL function and identify physical deficits which impact ability to perform ADLs (bathing, care of mouth/teeth, toileting, grooming, dressing, etc )  - Assess/evaluate cause of self-care deficits   - Assess range of motion  - Assess patient's mobility; develop plan if impaired  - Assess patient's need for assistive devices and provide as appropriate  - Encourage maximum independence but intervene and supervise when necessary  - Involve family in performance of ADLs  - Assess for home care needs following discharge   - Consider OT consult to assist with ADL evaluation and planning for discharge  - Provide patient education as appropriate  Outcome: Progressing  Goal: Maintains/Returns to pre admission functional level  Description: INTERVENTIONS:  - Perform BMAT or MOVE assessment daily    - Set and communicate daily mobility goal to care team and patient/family/caregiver  - Collaborate with rehabilitation services on mobility goals if consulted  - Perform Range of Motion 3 times a day  - Reposition patient every 2 hours    - Dangle patient 3 times a day  - Out of bed for toileting  - Record patient progress and toleration of activity level   Outcome: Progressing     Problem: Potential for Falls  Goal: Patient will remain free of falls  Description: INTERVENTIONS:  - Educate patient/family on patient safety including physical limitations  - Instruct patient to call for assistance with activity   - Consult OT/PT to assist with strengthening/mobility   - Keep Call bell within reach  - Keep bed low and locked with side rails adjusted as appropriate  - Keep care items and personal belongings within reach  - Initiate and maintain comfort rounds  - Make Fall Risk Sign visible to staff  - Offer Toileting every 2 Hours, in advance of need  - Initiate/Maintain bed/chair alarm  - Obtain necessary fall risk management equipment: chair alarm  - Apply yellow socks and bracelet for high fall risk patients  - Consider moving patient to room near nurses station  Outcome: Progressing     Problem: PAIN - ADULT  Goal: Verbalizes/displays adequate comfort level or baseline comfort level  Description: Interventions:  - Encourage patient to monitor pain and request assistance  - Assess pain using appropriate pain scale  - Administer analgesics based on type and severity of pain and evaluate response  - Implement non-pharmacological measures as appropriate and evaluate response  - Consider cultural and social influences on pain and pain management  - Notify physician/advanced practitioner if interventions unsuccessful or patient reports new pain  Outcome: Progressing     Problem: INFECTION - ADULT  Goal: Absence or prevention of progression during hospitalization  Description: INTERVENTIONS:  - Assess and monitor for signs and symptoms of infection  - Monitor lab/diagnostic results  - Monitor all insertion sites, i e  indwelling lines, tubes, and drains  - Monitor endotracheal if appropriate and nasal secretions for changes in amount and color  - [place] appropriate cooling/warming therapies per order  - Administer medications as ordered  - Instruct and encourage patient and family to use good hand hygiene technique  - Identify and instruct in appropriate isolation precautions for identified infection/condition  Outcome: Progressing     Problem: DISCHARGE PLANNING  Goal: Discharge to home or other facility with appropriate resources  Description: INTERVENTIONS:  - Identify barriers to discharge w/patient and caregiver  - Arrange for needed discharge resources and transportation as appropriate  - Identify discharge learning needs (meds, wound care, etc )  - Arrange for interpretive services to assist at discharge as needed  - Refer to Case Management Department for coordinating discharge planning if the patient needs post-hospital services based on physician/advanced practitioner order or complex needs related to functional status, cognitive ability, or social support system  Outcome: Progressing     Problem: Knowledge Deficit  Goal: Patient/family/caregiver demonstrates understanding of disease process, treatment plan, medications, and discharge instructions  Description: Complete learning assessment and assess knowledge base    Interventions:  - Provide teaching at level of understanding  - Provide teaching via preferred learning methods  Outcome: Progressing     Problem: GENITOURINARY - ADULT  Goal: Maintains or returns to baseline urinary function  Description: INTERVENTIONS:  - Assess urinary function  - Encourage oral fluids to ensure adequate hydration if ordered  - Administer IV fluids as ordered to ensure adequate hydration  - Administer ordered medications as needed  - Offer frequent toileting  - Follow urinary retention protocol if ordered  Outcome: Progressing  Goal: Absence of urinary retention  Description: INTERVENTIONS:  - Assess patient's ability to void and empty bladder  - Monitor I/O  - Bladder scan as needed  - Discuss with physician/AP medications to alleviate retention as needed  - Discuss catheterization for long term situations as appropriate  Outcome: Progressing     Problem: Prexisting or High Potential for Compromised Skin Integrity  Goal: Skin integrity is maintained or improved  Description: INTERVENTIONS:  - Identify patients at risk for skin breakdown  - Assess and monitor skin integrity  - Assess and monitor nutrition and hydration status  - Monitor labs   - Assess for incontinence   - Turn and reposition patient  - Assist with mobility/ambulation  - Relieve pressure over bony prominences  - Avoid friction and shearing  - Provide appropriate hygiene as needed including keeping skin clean and dry  - Evaluate need for skin moisturizer/barrier cream  - Collaborate with interdisciplinary team   - Patient/family teaching  - Consider wound care consult   Outcome: Progressing

## 2022-01-25 NOTE — ASSESSMENT & PLAN NOTE
Continue carbidopa-levodopa, continue Robinul 1 mg bid   Spoke with , she is nonverbal for many years and wheelchair bound   States she can usually stand for a few seconds and pivot at baseline   He is having a hard time taking care of her at home due to progressive weakness   PT/OT consulted   Discharge to rehab

## 2022-01-25 NOTE — CASE MANAGEMENT
Case Management Progress Note    Patient name Portia Pollack  [address]  [id number]-* MRN [id number]  : 1945 Date 2022       LOS (days): 1  Geometric Mean LOS (GMLOS) (days): 2 90  Days to GMLOS:1 9        OBJECTIVE:        Current admission status: Inpatient  Preferred Pharmacy:   [address]  [address] [facility]  Phone: [phone] Fax: [address] [place] [address]  Phone: [phone] Fax: [phone]    Primary Care Provider: Sunshine Brooke DO    Primary Insurance: MEDICARE  Secondary Insurance: [facility]    PROGRESS NOTE:    Patient pending rehab facility acceptance  Facilities waiting on therapy notes  CM will continue to follow

## 2022-01-25 NOTE — OCCUPATIONAL THERAPY NOTE
Occupational Therapy Evaluation     Patient Name: Oliver DIASIWP'A Date: 1/25/2022  Problem List  Principal Problem:    Ambulatory dysfunction  Active Problems:    Parkinson disease ([facility] 75 )    Type 2 diabetes mellitus with hyperglycemia ([facility] )    Recurrent UTI    S/P deep brain stimulator placement    History of stroke    Past Medical History  Past Medical History:   Diagnosis Date    Abdominal pain, suprapubic 07/03/2014    Resolved:  November 22, 2016    Abscess of skin and subcutaneous tissue 11/21/2016    Resolved:  February 28, 2017    Acute kidney failure, unspecified ([facility] )     Anxiety     Burning with urination 12/14/2016    Resolved:  February 28, 2017    Conjunctivitis 06/10/2013    Depression     Depression     Diabetes mellitus ([place] [address] ) 2/2/2020    Dysuria 06/06/2016    Resolved:  February 28 2017    Falling 10/18/2012    GERD (gastroesophageal reflux disease)     Groin pain 04/16/2015    Resolved:  February 28, 2017    Hyperlipidemia     Hypothyroid     Influenza 04/05/2013    Leg abrasion 09/22/2016    Resolved:  February 28, 2017    Parkinson's disease [facility])     Psychiatric illness     Rectal pain 10/17/2013    Resolved:  February 28, 2017    Thyroid disease      Past Surgical History  Past Surgical History:   Procedure Laterality Date    APPENDECTOMY      Age 4 or 3    BACK SURGERY      BRAIN SURGERY      CLOSED REDUCTION NASAL FRACTURE      CYSTOSCOPY  01/28/2014    RENETTA Campbell   (Diagnostic)    DEEP BRAIN STIMULATOR PLACEMENT      EYE SURGERY      FRACTURE SURGERY      JOINT REPLACEMENT      LAMINECTOMY      Decompress, Facetectomy, Foraminotomy Lumbar Seg    ORIF HIP FRACTURE Left     About 3 years ago    TONSILLECTOMY             01/25/22 0933   OT Last Visit   OT Visit Date 01/25/22   Note Type   Note type Evaluation   Restrictions/Precautions   Weight Bearing Precautions Per Order No   Other Precautions Cognitive; Chair Alarm; Bed Alarm; Fall Risk Pain Assessment   Pain Assessment Tool FLACC   Pain Score No Pain   Hospital Pain Intervention(s) Repositioned; Ambulation/increased activity; Emotional support; Rest   Multiple Pain Sites No   Pain Rating: FLACC (Rest) - Face 0   Pain Rating: FLACC (Rest) - Legs 0   Pain Rating: FLACC (Rest) - Activity 0   Pain Rating: FLACC (Rest) - Cry 0   Pain Rating: FLACC (Rest) - Consolability 0   Score: FLACC (Rest) 0   Pain Rating: FLACC (Activity) - Face 0   Pain Rating: FLACC (Activity) - Legs 0   Pain Rating: FLACC (Activity) - Activity 0   Pain Rating: FLACC (Activity) - Cry 0   Pain Rating: FLACC (Activity) - Consolability 0   Score: FLACC (Activity) 0   Home Living   Type of Home House   Home Layout One level  (0 KARLA)   Bathroom Shower/Tub   (sponge bathes at baseline)   Bathroom Toilet Standard   Bathroom Equipment Grab bars in shower   [facility] bed; Other (Comment)  (Gait belt, Safety belt for W/C)   Additional Comments Pt lives with spouse in a one level house with 0 KARLA  Prior Function   Level of [place] Needs assistance with ADLs and functional mobility; Needs assistance with IADLs   Lives With Spouse   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;Personal care attendant  (HHA for 35 mins 2x/wk per chart)   ADL Assistance Needs assistance   IADLs Needs assistance   Falls in the last 6 months 1 to 4  (Per chart review)   Vocational Retired   Comments Pt lives with spouse in a one level house with 0 KARLA  At baseline, pt required assist w/ ADLs and IADLs  Pt requires assist w/ stand pivot transfers w/ gait belt to/from W/C  Per chart: Assists holding food containers during feeding  (-)   (+) falls PTA  Lifestyle   Autonomy Pt lives with spouse in a one level house with 0 KARLA  At baseline, pt required assist w/ ADLs and IADLs  Pt requires assist w/ stand pivot transfers w/ gait belt to/from W/C  Per chart: Assists holding food containers during feeding  (-)   (+) falls PTA  Reciprocal Relationships Spouse   Service to Others Retired   ADL   Where Assessed Chair   Eating Assistance 2  Maximal Assistance   Grooming Assistance 2  Maximal [address] 2  Maximal Assistance   LB [address] 1  Total Assistance   UB Dressing Assistance 2  Maximal Assistance    [place] [place] [facility] 1  Total [address]  1  Total [address] 1  Total Assistance   Bed Mobility   Supine to Sit 2  Maximal assistance   Additional items Assist x 2;HOB elevated; Increased time required;Verbal cues;LE management   Sit to Supine 1  Dependent   Additional items Assist x 2; Increased time required;Verbal cues;LE management   Additional Comments Pt able to tolerate approx  5 mins sitting EOB with Fair- to Poor+ static seated balance  Pt w/ R lateral and posterior lean seated EOB, requiring max verbal and tactile cues for upright and midline positioning  Pt lying supine at end of session w/ bed alarm activated  Call bell within reach  All needs met    Transfers   Sit to Stand 3  Moderate assistance   Additional items Assist x 2; Increased time required;Verbal cues  (W/ Quick Move)   Stand to Sit 3  Moderate assistance   Additional items Assist x 2; Increased time required;Verbal cues   Mechanical lift 3  Moderate assistance   Additional items Assist x 2; Increased time required;Verbal cues  (W/ Quick Move)   Additional Comments Mod A of 2 required to initiate forward weight shift from surface for sit>stand and Mod A of 2 for controlled descent to surface for stand>sit with use of Quick Move  Pt noted to be incontinent of BM in standing  Pt able to tolerate approx   3 mins of standing during posterior hygiene with verbal cues for posture   Functional Mobility   Additional Comments Non-ambulatory at baseline   Balance   Static Sitting Poor +  (to Poor+)   Dynamic Sitting Poor   Static Standing Poor   Dynamic Standing   (Zero) Activity Tolerance   Activity Tolerance Patient limited by fatigue;Treatment limited secondary to medical complications (Comment); Other (Comment)  (Cognitive deficits)   Medical Staff Made Aware CARLOS Nelson   Nurse Made Aware yes; Demetra RN   RUE Assessment   RUE Assessment WFL   RUE Strength   RUE Overall Strength Within Functional Limits - able to perform ADL tasks with strength  (3+/5 throughout)   LUE Assessment   LUE Assessment X  (limited AROM noted; Hx L hemiparesis 2* hx CVA)   LUE Overall PROM   L Shoulder Flexion WFL   L Shoulder Extension WFL   L Elbow Flexion WFL   L Elbow Extension WFL   L Wrist Flexion WFL   L Wrist Extension WFL   LUE Strength   LUE Overall Strength Deficits   L Shoulder Flexion 2+/5   L Shoulder Extension 2+/5   L Elbow Flexion 3-/5   L Elbow Extension 3-/5   Hand Function   Gross Motor Coordination   (R=functional, L=impaired)   Fine Motor Coordination   (R=functional, L=impaired)   Vision-Basic Assessment   Current Vision Wears glasses all the time   Vision - Complex Assessment   Additional Comments Pt maintained eyes closed throughout eval  Able to open slightly w/ max verbal cues   Cognition   Overall Cognitive Status Impaired   Arousal/Participation Lethargic;Responsive   Attention Difficulty attending to directions   Orientation Level Oriented to person; Unable to assess  (responded to name)   Memory Unable to assess   Following Commands Follows one step commands with increased time or repetition   Comments Pt non-verbal throughout eval  Nods head yes/no and squeezes hands to communicate "yes/no"   Assessment   Limitation Decreased ADL status; Decreased UE ROM; Decreased UE strength;Decreased Safe judgement during ADL;Decreased cognition;Decreased endurance;Decreased self-care trans;Decreased high-level ADLs   Prognosis Poor   Goals   Patient Goals None stated 2* non-verbal during eval   LTG Time Frame 10-14   Long Term Goal Please refer to LTGs listed below   Plan   Treatment Interventions ADL retraining;Functional transfer training;UE strengthening/ROM; Endurance training;Patient/family training;Equipment evaluation/education; Compensatory technique education;Continued evaluation; Activityengagement; Energy conservation   Goal Expiration Date 02/08/22   OT Treatment Day 0   OT Frequency 2-3x/wk   Recommendation   OT Discharge Recommendation Post acute rehabilitation services   OT - OK to Discharge Yes  (when medically cleared)   Additional Comments  From OT standpoint, recommend STR vs Home OT and 24/7 care pending family's ability to care for pt at current level of functioning upon D/C  Per chart: Spouse having a hard time caring for pt at home  Additional Comments 2 The patient's raw score on the AM-PAC Daily Activity inpatient short form low function score is 14, standardized score is Low Function Daily Activity Standardized Score: 24 79  Patients with a standardized score less than 39 4 are likely to benefit from discharge to post-acute rehab services   Please refer to the recommendation of the Occupational Therapist for safe discharge planning   AM-PAC Daily Activity Inpatient   Lower Body Dressing 1   Bathing 1   Toileting 1   Upper Body Dressing 2   Grooming 2   Eating 2   Daily Activity Raw Score 9   Turning Head Towards Sound 3   Follow Simple Instructions 2   Low Function Daily Activity Raw Score 14   Low Function Daily Activity Standardized Score 24 79   AM-PAC Applied Cognition Inpatient   Following a Speech/Presentation 2   Understanding Ordinary Conversation 2   Taking Medications 1   Remembering Where Things Are Placed or Put Away 1   Remembering List of 4-5 Errands 1   Taking Care of Complicated Tasks 1   Applied Cognition Raw Score 8   Applied Cognition Standardized Score 19 32     Assessment: Pt is a 64 y o  female seen for OT evaluation s/p adm to [facility] on 1/23/2022 w/ weakness and confusion x3 days and admitted w/ Ambulatory dysfunction and Recurrent UTI  Comorbidities affecting pts functional performance include a significant PMH of Anxiety, Depression, DM, HLD, Parkinson's disease with deep brain stimulator in place, and Stroke  Pt with active OT orders and activity orders for Ambulate  Pt lives with spouse in a one level house with 0 KARLA  At baseline, pt required assist w/ ADLs and IADLs  Pt requires assist w/ stand pivot transfers w/ gait belt to/from W/C  Per chart: Assists holding food containers during feeding  (-)   (+) falls PTA  Upon evaluation, pt currently requires Max A for UB ADLs, Total A for LB ADLs, Total A for toileting, Max A of 2 for supine>sit, Dependent Assist of 2 for sit>supine, for bed mobility, and Mod A of 2 for functional transfers 2* the following deficits impacting occupational performance: decreased ROM, decreased strength, decreased balance, decreased tolerance, impaired [facility], impaired [address], impaired sensation, impaired initiation and impaired problem solving  These impairments, as well at pts increased fall risk, difficulty performing ADLS, limited insight into deficits and decreased initiation and engagement  limit pts ability to safely engage in all baseline areas of occupation  Pt to continue to benefit from continued acute OT services during hospital stay to address defined deficits and to maximize level of functional independence in the following Occupational Performance areas: eating, grooming, bathing/shower, toilet hygiene, dressing, health maintenance, clothing management, social participation and bed mobility/transfers  From OT standpoint, recommend STR vs Home OT and 24/7 care pending family's ability to care for pt at current level of functioning upon D/C  Per chart: Spouse having a hard time caring for pt at home  OT will continue to follow pt 2-3x/wk to address the following goals to  w/in 10-14 days:    GOALS    1   Pt will improve activity tolerance to G for min 30 min txment sessions for increase engagement in functional tasks    2  Pt will complete bed mobility at a Mod A level w/ G balance/safety demonstrated to decrease caregiver assistance required    3  Pt will increase seated tolerance to 8-10 with Fair- dynamic seated balance to increase safety during participation in ADLs     4  Pt will complete UB dressing/self care w/ min A using adaptive device and DME as needed     5  Pt will complete LB dressing/self care w/ mod A using adaptive device and DME as needed    6  Pt will complete toileting w/ mod A w/ G hygiene/thoroughness using DME as needed    7  Pt will improve functional transfers to Mod A on/off all surfaces using DME as needed w/ G balance/safety     8  Pt will be attentive 100% of the time during ongoing cognitive assessment w/ G participation to assist w/ safe d/c planning/recommendations    9  Pt will increase BUE strength by 1MM grade via AROM/AAROM/PROM exercises to increase independence in ADLs and transfers    10  Pt will increase standing tolerance to 5-8 mins with Fair- dynamic standing balance to increase safety during participation in ADLs     11  Pt will demonstrate ability to perform pressure relief techniques (ie: weight shifts, frequent changes in position, placement of positioning devices- pillows, wedges, etc) at a Mod A level after education from therapist     12   Pt will follow 75% simple one step verbal commands to increase participation in therapy sessions        FEDERICO Deshpande/ARCHIE

## 2022-01-25 NOTE — ASSESSMENT & PLAN NOTE
Presents with ambulatory dysfunction in the setting of suspected urinary tract infection, baseline wheelchair bound and nonverbal due to Parkinson's,  Previous history of right hemispheric 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   PT  OT recommending rehab   CM working on placement

## 2022-01-25 NOTE — PROGRESS NOTES
Progress Note - Michelle Westbrook 64 y o  female MRN: [id number]    Unit/Bed#: E5 -01 Encounter: [id number]      Assessment/Plan:  Ambulatory dysfunction  Multifactorial fall Risk factors:  Age, Parkinson's, limited mobility, visual impairment, polypharmacy  PT/OT  Rehab post hospitalization     Parkinson's dementia  Follows Neurology in [place] (Dr Edward Riggs)  Has deep brain stimulator battery due to be changed in April  TSH 1 184 on 1/23/22  Recommend check Vitamin B12 level  Continue sinement and Aricept, supportive care     Anxiety/depression  Chronic use of Lexapro and Xanax 0 25 milligrams p o  Q h s    Although benzodiazepines are not recommended in the older adult population do not recommend abrupt withdrawal  Recommend continuation of Xanax 0 2 milligrams p o  Q h s  (presently not ordered inpatient, continue to monitor for signs of withdrawal, agitation)  Follows with Psychiatry as outpatient     Frailty  Severe   Secondary to underlying parkinson's dementia, hx of stroke, needs assistance iwht IADLs and ADLs, limited mobility, dysphagia, incontinence, polypharmacy  Albumin 3 5, maintain protein in diet  PT/OT   is primary caregiver, continue supportive care     Delirium precautions  Hx of encephalopathy with prior hospitalization, associated UTI, resolved with treatment  At risk for delirium secondary to hospitalization, history of delirium with previous hospitalization in December, polypharmacy, multiple underlying comorbidities  Provide frequent redirection, reorientation, distraction techniques  Avoid deliriogenic medications such as tramadol, benzodiazepines, anticholinergics,  Benadryl  Treat pain, See geriatric pain protocol  Monitor for constipation and urinary retention  Encourage early and frequent moblization, OOB  Encourage Hydration/ Nutrition  Implement sleep hygiene, limit night time interuptions, group activities     UTI  UA on admission, positive small leukocytes, moderate bacteria  Started on IV Ceftriaxone  Internal med following    Subjective:   Upon exam pt in bed  She is alert, able to respond yes/no  Objective:     Vitals: Blood pressure 125/75, pulse 93, temperature 98 2 °F (36 8 °C), resp  rate 18, height 5' (1 524 m), weight 74 kg (163 lb 2 3 oz), SpO2 (!) 89 %, not currently breastfeeding  ,Body mass index is 31 86 kg/m²  No intake or output data in the 24 hours ending 01/25/22 0939    Physical Exam:   General : NAD  HEENT : MMM, torticollis   Heart : Normal rate, no murmur rub or gallop  Lungs : CTA no wheezes, rales or rhonchi  Abdomen : Soft, NT/ND, BS auscultated in all 4 quads  Ext :  no edema  Skin : Pink, warm, dry, age appropriate turgor and mobility  Neuro : Nonfocal  Psych : somnelent O x 1       Invasive Devices  Report    Peripheral Intravenous Line            Peripheral IV 01/23/22 Left Forearm 2 days    Peripheral IV 01/23/22 Left Hand 2 days          Drain            External Urinary Catheter 1 day                Lab, Imaging and other studies: I have personally reviewed pertinent reports      VTE Pharmacologic Prophylaxis: Sequential compression device (Venodyne)   VTE Mechanical Prophylaxis: sequential compression device

## 2022-01-25 NOTE — ASSESSMENT & PLAN NOTE
Lab Results   Component Value Date    HGBA1C 6 3 (H) 12/06/2021       Recent Labs     01/24/22  1150 01/24/22  1523 01/24/22  2118 01/25/22  0757   POCGLU 178* 120 179* 141*       Blood Sugar Average: Last 72 hrs:  Diet controlled at home

## 2022-01-25 NOTE — ASSESSMENT & PLAN NOTE
Patient  reports that he tested her urine with a home test kit   received 1 day of Macrobid prescribed by patient's primary care provider prior to admission   Urine culture without significant growth and blood cx negative to date   Received 3 days IV rocephin, monitoring off further antibiotics

## 2022-01-25 NOTE — SPEECH THERAPY NOTE
Speech Language/Pathology    Speech/Language Pathology Progress Note    Patient Name: Wilton Aadme  MDRHS'A Date: 1/25/2022     Problem List  Principal Problem:    Ambulatory dysfunction  Active Problems:    Parkinson disease ([facility] )    Type 2 diabetes mellitus with hyperglycemia ([facility] )    Recurrent UTI    S/P deep brain stimulator placement    History of stroke       Past Medical History  Past Medical History:   Diagnosis Date    Abdominal pain, suprapubic 07/03/2014    Resolved:  November 22, 2016    Abscess of skin and subcutaneous tissue 11/21/2016    Resolved:  February 28, 2017    Acute kidney failure, unspecified ([facility] )     Anxiety     Burning with urination 12/14/2016    Resolved:  February 28, 2017    Conjunctivitis 06/10/2013    Depression     Depression     Diabetes mellitus ([place] [address] ) 2/2/2020    Dysuria 06/06/2016    Resolved:  February 28 2017    Falling 10/18/2012    GERD (gastroesophageal reflux disease)     Groin pain 04/16/2015    Resolved:  February 28, 2017    Hyperlipidemia     Hypothyroid     Influenza 04/05/2013    Leg abrasion 09/22/2016    Resolved:  February 28, 2017    Parkinson's disease [facility])     Psychiatric illness     Rectal pain 10/17/2013    Resolved:  February 28, 2017    Thyroid disease         Past Surgical History  Past Surgical History:   Procedure Laterality Date    APPENDECTOMY      Age 4 or 3    BACK SURGERY      BRAIN SURGERY      CLOSED REDUCTION NASAL FRACTURE      CYSTOSCOPY  01/28/2014    RENETTA Millan   (Diagnostic)    DEEP BRAIN STIMULATOR PLACEMENT      EYE SURGERY      FRACTURE SURGERY      JOINT REPLACEMENT      LAMINECTOMY      Decompress, Facetectomy, Foraminotomy Lumbar Seg    ORIF HIP FRACTURE Left     About 3 years ago    TONSILLECTOMY           Subjective:  Patient was awake and a bit more alert  Required full assistance in feeding  Objective:  Patient seen for f/u dysphagia therapy   Head control improved but hung down majority of session  Best when propped  She followed basic commands  Thick white substance in striations on tongue  Did not come off when brushed  O2 stats prior to PO was 91on RA  She ate cream of wheat, a few bits of puree waffle, and honey thick apple juice by tsp  Slow oral manipulation and transfer with puree  Swallows were prompt  Laryngeal rise appeared reduced  Throat clears x2 after cream of wheat which was too thin  Patient cued to cough, required demonstration  Cough was weak  Trialed nectar thick water by straw  Took several sips w/ prompt appearing swallow  No cough or overt s/s aspiration  No cough or throat clear  Pt's drink of preference is water  Assessment:  Patient was more alert  Tolerated puree and honey thick by tsp wfl given extra time  Consumed ~ 40% of her meal   Tolerated trials nectar thick water by straw wfl      Plan/Recommendations:  Continue with dysphagia level 1  Upgrade to nectar thick liquids (prefers water)  Continue with oral care  ST will f/u

## 2022-01-25 NOTE — PHYSICAL THERAPY NOTE
PHYSICAL THERAPY EVALUATION          Patient Name: Brooke Gunter  [id number] Date: 1/25/2022   PT EVALUATION    64 y o     [id number]    UTI (urinary tract infection) [N39 0]  Parkinson's disease ([facility] ) [G20]  Fatigue [R53 83]  Generalized weakness [R53 1]  Ambulatory dysfunction [R26 2]    Past Medical History:   Diagnosis Date    Abdominal pain, suprapubic 07/03/2014    Resolved:  November 22, 2016    Abscess of skin and subcutaneous tissue 11/21/2016    Resolved:  February 28, 2017    Acute kidney failure, unspecified ([place] [address] )     Anxiety     Burning with urination 12/14/2016    Resolved:  February 28, 2017    Conjunctivitis 06/10/2013    Depression     Depression     Diabetes mellitus ([place] [address] ) 2/2/2020    Dysuria 06/06/2016    Resolved:  February 28 2017    Falling 10/18/2012    GERD (gastroesophageal reflux disease)     Groin pain 04/16/2015    Resolved:  February 28, 2017    Hyperlipidemia     Hypothyroid     Influenza 04/05/2013    Leg abrasion 09/22/2016    Resolved:  February 28, 2017    Parkinson's disease [facility])     Psychiatric illness     Rectal pain 10/17/2013    Resolved:  February 28, 2017    Thyroid disease      Past Surgical History:   Procedure Laterality Date    APPENDECTOMY      Age 4 or 5    BACK SURGERY      BRAIN SURGERY      CLOSED REDUCTION NASAL FRACTURE      CYSTOSCOPY  01/28/2014    RENETTA Guerrero   (Diagnostic)    DEEP BRAIN STIMULATOR PLACEMENT      EYE SURGERY      FRACTURE SURGERY      JOINT REPLACEMENT      LAMINECTOMY      Decompress, Facetectomy, Foraminotomy Lumbar Seg    ORIF HIP FRACTURE Left     About 3 years ago    TONSILLECTOMY          01/25/22 0934   PT Last Visit   PT Visit Date 01/25/22   Note Type   Note type Evaluation   Pain Assessment   Pain Assessment Tool FLACC   Pain Score No Pain  (head shake "no" to pain)   Pain Rating: FLACC (Rest) - Face 0   Pain Rating: FLACC (Rest) - Legs 0   Pain Rating: FLACC (Rest) - Activity 0   Pain Rating: FLACC (Rest) - Cry 0   Pain Rating: FLACC (Rest) - Consolability 0   Score: FLACC (Rest) 0   Pain Rating: FLACC (Activity) - Face 0   Pain Rating: FLACC (Activity) - Legs 0   Pain Rating: FLACC (Activity) - Activity 0   Pain Rating: FLACC (Activity) - Cry 0   Pain Rating: FLACC (Activity) - Consolability 0   Score: FLACC (Activity) 0   Restrictions/Precautions   Other Precautions Cognitive; Chair Alarm; Bed Alarm;Aspiration;Multiple lines; Fall Risk  (communication barrier)   Home Living   Type of [address] [place] [address] One level;Stairs to enter without rails   Bathroom Shower/Tub   (does not utilize)   Bathroom Toilet Standard   Bathroom Equipment Grab bars around toilet   [address] [facility] bed; Other (Comment)  (gait belt)   Additional Comments 1 KARLA   Prior Function   Level of [place] Needs assistance with ADLs and functional mobility; Needs assistance with IADLs   Lives With Spouse   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;Personal care attendant   ADL Assistance Needs assistance   IADLs Needs assistance   Comments obtained via chart review  spouse performs stand pivot transf using gait belt bed>wc  dependent for wc mobility  also has PCA 2/wk for 30 mins to shower   General   Additional Pertinent History pt admitted 1/23/22 for ambulatory dysfunction  [facility] [address] [facility] [birth year][facility][birth year][facility][birth year][facility][birth year] for metabolic encephalopathy  hx of PD w DBS, stroke w residual weakness and nonverbal, T2DM   Cognition   Overall Cognitive Status Impaired   Arousal/Participation Cooperative   Attention Difficulty attending to directions   Orientation Level Oriented to person; Unable to assess  (responds to name)   Memory Unable to assess   Following Commands Follows one step commands with increased time or repetition   Comments non verbal  inconsistent shakes head or squeezes hand to respond to questions   maintained eyes closed majority of session   Subjective   Subjective non verbal   RLE Assessment   RLE Assessment X   Strength RLE   R Hip Flexion 2+/5   R Hip ADduction 2-/5   R Knee Flexion 3+/5   R Knee Extension 4/5   R Ankle Plantar Flexion 2-/5   R Ankle Dorsiflexion 2-/5   LLE Assessment   LLE Assessment X   Strength LLE   L Hip Flexion 2+/5   L Hip ADduction 2-/5   L Knee Extension 3+/5   L Ankle Dorsiflexion 2-/5   L Ankle Plantar Flexion 2-/5   Coordination   Sensation X  (impaired L)   Bed Mobility   Supine to Sit 2  Maximal assistance   Additional items Assist x 2;HOB elevated; Increased time required;Verbal cues;LE management; Other   Sit to Supine 1  Dependent   Additional items Assist x 2; Increased time required;Verbal cues;LE management; Other   Transfers   Sit to Stand 3  Moderate assistance   Additional items Assist x 2; Increased time required;Verbal cues; Other  (quick move)   Stand to Sit 3  Moderate assistance   Additional items Assist x 2;Impulsive;Verbal cues; Other  (quick move)   Balance   Static Sitting Poor +   Dynamic Sitting Poor  (to poor - w fatigue)   Static Standing Zero  (Ax2 mechanical lift)   Endurance Deficit   Endurance Deficit Yes   Endurance Deficit Description easily fatigued   Activity Tolerance   Activity Tolerance Patient limited by fatigue;Treatment limited secondary to medical complications (Comment)   Medical Staff Made Aware Rachael Mac OT   Nurse Made Aware Demetra SEVILLA   Assessment   Prognosis Fair   Problem List Decreased strength;Decreased endurance; Impaired balance;Decreased mobility; Decreased cognition; Impaired judgement;Decreased safety awareness; Obesity   Assessment Wilton Adame is a 64 y o  female admitted to [facility] on 1/23/2022 for Ambulatory dysfunction  PT was consulted and pt was seen on 1/25/2022 for mobility assessment and d/c planning  Pt presents w high fall risk, multiple lines  Poor historian secondary to nonverbal; information obtained via chart review   Per chart pt stand pivot transf w max Ax1 from spouse  Pt is currently functioning at a max-dep Ax2 for bed mobility and mod Ax2 for transfers w use of quick move device  Upon sitting EOB pt unable to hold herself upright requiring constant physical support  Balance impacted by [facility], generalized weakness, decreased awareness and poor righting reactions  However able to tolerate standing w max Ax1 for support about one minute  Easily fatigued resulting in bl knee buckling, poor UE support, unsteadiness, limited standing tolerance and impulsivity to return to seated position  Pt will benefit from continued skilled IP PT to address the above mentioned impairments  in order to maximize recovery and increase functional independence when completing mobility and ADLs  Currently PT recommendations for DME include mechanical lift to decrease caregiver burden  At this time PT recommendations for d/c are STR v continued 24/7 S and HHPT pending caregiver abilities and appropriate DME at home  Barriers to Discharge Decreased caregiver support   Barriers to Discharge Comments caregiver burden at current LOF   Goals   Patient Goals none stated 2* nonverbal   STG Expiration Date 02/08/22   Short Term Goal #1 1)  Pt will perform bed mobility with max Ax1 demonstrating appropriate technique 100% of the time in order to improve function  2)  Perform all transfers with max Ax1 demonstrating safe and appropriate technique 100% of the time in order to improve ability to negotiate safely in home environment  3)  Improve overall strength and balance 1/2 grade in order to optimize ability to perform functional tasks and reduce fall risk  5) Increase activity tolerance to 45 minutes in order to improve endurance to functional tasks  6) PT for ongoing patient and family/caregiver education, DME needs and d/c planning in order to promote highest level of function in least restrictive environment     Plan   Treatment/Interventions Functional transfer training;RICCO strengthening/ROM; Therapeutic exercise; Endurance training;Cognitive reorientation;Patient/family training;Equipment eval/education; Bed mobility; Compensatory technique education;Continued evaluation;Spoke to nursing;OT   PT Frequency 2-3x/wk   Recommendation   PT Discharge Recommendation Post acute rehabilitation services  (vs 24/7 S, HHPT and mechanical lift)   Equipment Recommended Other (Comment)  (may benefit from mechanical lift at home)   Additional Comments The patient's AM-PAC Basic Mobility Inpatient Short Form Raw Score is 6  A Raw score of less than or equal to 16 suggests the patient may benefit from discharge to post-acute rehabilitation services  Please also refer to the recommendation of the Physical Therapist for safe discharge planning     AM-PAC Basic Mobility Inpatient   Turning in Bed Without Bedrails 1   Lying on Back to Sitting on Edge of Flat Bed 1   Moving Bed to Chair 1   Standing Up From Chair 1   Walk in Room 1   Climb 3-5 Stairs 1   Basic Mobility Inpatient Raw Score 6   Turning Head Towards Sound 2   Follow Simple Instructions 2   Low Function Basic Mobility Raw Score 10   Low Function Basic Mobility Standardized Score 14 65   Highest Level Of Mobility   -[facility] Goal 2: Bed activities/Dependent transfer   -[facility] Highest Level of Mobility 3: Sit at edge of bed   -HL Goal Achieved Yes   History: co - morbidities, social background, past experience, fall risk, assist for mobility, assist for adl's, cognition, multiple lines  Exam: impairments in systems including musculoskeletal (strength, posture), neuromuscular (balance,transfers, motor function and sensation), am-pac, cognition  Clinical: unstable/unpredictable  Complexity:high      Jayson Henry, PT

## 2022-01-25 NOTE — PLAN OF CARE
Problem: PHYSICAL THERAPY ADULT  Goal: Performs mobility at highest level of function for planned discharge setting  See evaluation for individualized goals  Description: Treatment/Interventions: Functional transfer training,LE strengthening/ROM,Therapeutic exercise,Endurance training,Cognitive reorientation,Patient/family training,Equipment eval/education,Bed mobility,Compensatory technique education,Continued evaluation,Spoke to nursing,OT  Equipment Recommended: Other (Comment) (may benefit from mechanical lift at home)       See flowsheet documentation for full assessment, interventions and recommendations  1/25/2022 1440 by Satish Mccray PT  Note: Prognosis: Fair  Problem List: Decreased strength,Decreased endurance,Impaired balance,Decreased mobility,Decreased cognition,Impaired judgement,Decreased safety awareness,Obesity  Assessment: Oliver Dillon is a 64 y o  female admitted to [facility] on 1/23/2022 for Ambulatory dysfunction  PT was consulted and pt was seen on 1/25/2022 for mobility assessment and d/c planning  Pt presents w high fall risk, multiple lines  Poor historian secondary to nonverbal; information obtained via chart review  Per chart pt stand pivot transf w max Ax1 from spouse  Pt is currently functioning at a max-dep Ax2 for bed mobility and mod Ax2 for transfers w use of quick move device  Upon sitting EOB pt unable to hold herself upright requiring constant physical support  Balance impacted by [facility], generalized weakness, decreased awareness and poor righting reactions  However able to tolerate standing w max Ax1 for support about one minute  Easily fatigued resulting in bl knee buckling, poor UE support, unsteadiness, limited standing tolerance and impulsivity to return to seated position  Pt will benefit from continued skilled IP PT to address the above mentioned impairments  in order to maximize recovery and increase functional independence when completing mobility and ADLs  Currently PT recommendations for DME include mechanical lift to decrease caregiver burden  At this time PT recommendations for d/c are STR v continued 24/7 S and HHPT pending caregiver abilities and appropriate DME at home  Barriers to Discharge: Decreased caregiver support  Barriers to Discharge Comments: caregiver burden at current LOF     PT Discharge Recommendation: Post acute rehabilitation services (vs 24/7 S, HHPT and mechanical lift)          See flowsheet documentation for full assessment  1/25/2022 1440 by Tash Campos PT  Note: Prognosis: Fair  Problem List: Decreased strength,Decreased endurance,Impaired balance,Decreased mobility,Decreased cognition,Impaired judgement,Decreased safety awareness,Obesity  Assessment: Adela Eastman is a 64 y o  female admitted to [facility] on 1/23/2022 for Ambulatory dysfunction  PT was consulted and pt was seen on 1/25/2022 for mobility assessment and d/c planning  Pt presents w high fall risk, multiple lines  Poor historian secondary to nonverbal; information obtained via chart review  Per chart pt stand pivot transf w max Ax1 from spouse  Pt is currently functioning at a max-dep Ax2 for bed mobility and mod Ax2 for transfers w use of quick move device  Upon sitting EOB pt unable to hold herself upright requiring constant physical support  Balance impacted by [facility], generalized weakness, decreased awareness and poor righting reactions  However able to tolerate standing w max Ax1 for support about one minute  Easily fatigued resulting in bl knee buckling, poor UE support, unsteadiness, limited standing tolerance and impulsivity to return to seated position  Pt will benefit from continued skilled IP PT to address the above mentioned impairments  in order to maximize recovery and increase functional independence when completing mobility and ADLs  Currently PT recommendations for DME include mechanical lift to decrease caregiver burden   At this time PT recommendations for d/c are STR v continued 24/7 S and HHPT pending caregiver abilities and appropriate DME at home  Barriers to Discharge: Decreased caregiver support  Barriers to Discharge Comments: caregiver burden at current LOF     PT Discharge Recommendation: Post acute rehabilitation services (vs 24/7 S, HHPT and mechanical lift)          See flowsheet documentation for full assessment

## 2022-01-25 NOTE — ASSESSMENT & PLAN NOTE
Continue carbidopa-levodopa, continue Robinul 1 mg bid   Spoke with , she is nonverbal for many years and wheelchair bound   States she can usually stand for a few seconds   He is having a hard time taking care of her at home due to progressive weakness   PT/OT consulted   Discharge to rehab

## 2022-01-25 NOTE — PLAN OF CARE
Problem: MOBILITY - ADULT  Goal: Maintain or return to baseline ADL function  Description: INTERVENTIONS:  -  Assess patient's ability to carry out ADLs; assess patient's baseline for ADL function and identify physical deficits which impact ability to perform ADLs (bathing, care of mouth/teeth, toileting, grooming, dressing, etc )  - Assess/evaluate cause of self-care deficits   - Assess range of motion  - Assess patient's mobility; develop plan if impaired  - Assess patient's need for assistive devices and provide as appropriate  - Encourage maximum independence but intervene and supervise when necessary  - Involve family in performance of ADLs  - Assess for home care needs following discharge   - Consider OT consult to assist with ADL evaluation and planning for discharge  - Provide patient education as appropriate  Outcome: Progressing  Goal: Maintains/Returns to pre admission functional level  Description: INTERVENTIONS:  - Perform BMAT or MOVE assessment daily    - Set and communicate daily mobility goal to care team and patient/family/caregiver  - Collaborate with rehabilitation services on mobility goals if consulted  - Perform Range of Motion 3 times a day  - Reposition patient every 2 hours    - Dangle patient 3 times a day  - Out of bed for toileting  - Record patient progress and toleration of activity level   Outcome: Progressing     Problem: Potential for Falls  Goal: Patient will remain free of falls  Description: INTERVENTIONS:  - Educate patient/family on patient safety including physical limitations  - Instruct patient to call for assistance with activity   - Consult OT/PT to assist with strengthening/mobility   - Keep Call bell within reach  - Keep bed low and locked with side rails adjusted as appropriate  - Keep care items and personal belongings within reach  - Initiate and maintain comfort rounds  - Make Fall Risk Sign visible to staff  - Offer Toileting every 2 Hours, in advance of need  - Initiate/Maintain bed/chair alarm  - Obtain necessary fall risk management equipment: chair alarm  - Apply yellow socks and bracelet for high fall risk patients  - Consider moving patient to room near nurses station  Outcome: Progressing     Problem: PAIN - ADULT  Goal: Verbalizes/displays adequate comfort level or baseline comfort level  Description: Interventions:  - Encourage patient to monitor pain and request assistance  - Assess pain using appropriate pain scale  - Administer analgesics based on type and severity of pain and evaluate response  - Implement non-pharmacological measures as appropriate and evaluate response  - Consider cultural and social influences on pain and pain management  - Notify physician/advanced practitioner if interventions unsuccessful or patient reports new pain  Outcome: Progressing     Problem: INFECTION - ADULT  Goal: Absence or prevention of progression during hospitalization  Description: INTERVENTIONS:  - Assess and monitor for signs and symptoms of infection  - Monitor lab/diagnostic results  - Monitor all insertion sites, i e  indwelling lines, tubes, and drains  - Monitor endotracheal if appropriate and nasal secretions for changes in amount and color  - [place] appropriate cooling/warming therapies per order  - Administer medications as ordered  - Instruct and encourage patient and family to use good hand hygiene technique  - Identify and instruct in appropriate isolation precautions for identified infection/condition  Outcome: Progressing     Problem: DISCHARGE PLANNING  Goal: Discharge to home or other facility with appropriate resources  Description: INTERVENTIONS:  - Identify barriers to discharge w/patient and caregiver  - Arrange for needed discharge resources and transportation as appropriate  - Identify discharge learning needs (meds, wound care, etc )  - Arrange for interpretive services to assist at discharge as needed  - Refer to Case Management Department for coordinating discharge planning if the patient needs post-hospital services based on physician/advanced practitioner order or complex needs related to functional status, cognitive ability, or social support system  Outcome: Progressing     Problem: Knowledge Deficit  Goal: Patient/family/caregiver demonstrates understanding of disease process, treatment plan, medications, and discharge instructions  Description: Complete learning assessment and assess knowledge base    Interventions:  - Provide teaching at level of understanding  - Provide teaching via preferred learning methods  Outcome: Progressing     Problem: GENITOURINARY - ADULT  Goal: Maintains or returns to baseline urinary function  Description: INTERVENTIONS:  - Assess urinary function  - Encourage oral fluids to ensure adequate hydration if ordered  - Administer IV fluids as ordered to ensure adequate hydration  - Administer ordered medications as needed  - Offer frequent toileting  - Follow urinary retention protocol if ordered  Outcome: Progressing  Goal: Absence of urinary retention  Description: INTERVENTIONS:  - Assess patient's ability to void and empty bladder  - Monitor I/O  - Bladder scan as needed  - Discuss with physician/AP medications to alleviate retention as needed  - Discuss catheterization for long term situations as appropriate  Outcome: Progressing     Problem: Prexisting or High Potential for Compromised Skin Integrity  Goal: Skin integrity is maintained or improved  Description: INTERVENTIONS:  - Identify patients at risk for skin breakdown  - Assess and monitor skin integrity  - Assess and monitor nutrition and hydration status  - Monitor labs   - Assess for incontinence   - Turn and reposition patient  - Assist with mobility/ambulation  - Relieve pressure over bony prominences  - Avoid friction and shearing  - Provide appropriate hygiene as needed including keeping skin clean and dry  - Evaluate need for skin moisturizer/barrier cream  - Collaborate with interdisciplinary team   - Patient/family teaching  - Consider wound care consult   Outcome: Progressing     Problem: Nutrition/Hydration-ADULT  Goal: Nutrient/Hydration intake appropriate for improving, restoring or maintaining nutritional needs  Description: Monitor and assess patient's nutrition/hydration status for malnutrition  Collaborate with interdisciplinary team and initiate plan and interventions as ordered  Monitor patient's weight and dietary intake as ordered or per policy  Utilize nutrition screening tool and intervene as necessary  Determine patient's food preferences and provide high-protein, high-caloric foods as appropriate       INTERVENTIONS:  - Monitor oral intake, urinary output, labs, and treatment plans  - Assess nutrition and hydration status and recommend course of action  - Evaluate amount of meals eaten  - Assist patient with eating if necessary   - Allow adequate time for meals  - Recommend/ encourage appropriate diets, oral nutritional supplements, and vitamin/mineral supplements  - Order, calculate, and assess calorie counts as needed  - Recommend, monitor, and adjust tube feedings and TPN/PPN based on assessed needs  - Assess need for intravenous fluids  - Provide specific nutrition/hydration education as appropriate  - Include patient/family/caregiver in decisions related to nutrition  Outcome: Progressing

## 2022-01-25 NOTE — ASSESSMENT & PLAN NOTE
Patient  reports that he tested her urine with a home test kit   received 1 day of Macrobid prescribed by patient's primary care provider prior to admission   Urine culture without significant growth and blood cx negative to date   Will continue IV rocephin today to complete 3 day course then monitor off further antibiotics

## 2022-01-25 NOTE — PLAN OF CARE
Problem: OCCUPATIONAL THERAPY ADULT  Goal: Performs self-care activities at highest level of function for planned discharge setting  See evaluation for individualized goals  Description: Treatment Interventions: ADL retraining,Functional transfer training,UE strengthening/ROM,Endurance training,Patient/family training,Equipment evaluation/education,Compensatory technique education,Continued evaluation,Activityengagement,Energy conservation          See flowsheet documentation for full assessment, interventions and recommendations  1/25/2022 1316 by Justina Baeza OT  Note: Limitation: Decreased ADL status,Decreased UE ROM,Decreased UE strength,Decreased Safe judgement during ADL,Decreased cognition,Decreased endurance,Decreased self-care trans,Decreased high-level ADLs  Prognosis: Poor  Assessment: Pt is a 64 y o  female seen for OT evaluation s/p adm to [facility] on 1/23/2022 w/ weakness and confusion x3 days and admitted w/ Ambulatory dysfunction and Recurrent UTI  Comorbidities affecting pts functional performance include a significant PMH of Anxiety, Depression, DM, HLD, Parkinson's disease with deep brain stimulator in place, and Stroke  Pt with active OT orders and activity orders for Ambulate  Pt lives with spouse in a one level house with 0 KARLA  At baseline, pt required assist w/ ADLs and IADLs  Pt requires assist w/ stand pivot transfers w/ gait belt to/from W/C  Per chart: Assists holding food containers during feeding  (-)   (+) falls PTA   Upon evaluation, pt currently requires Max A for UB ADLs, Total A for LB ADLs, Total A for toileting, Max A of 2 for supine>sit, Dependent Assist of 2 for sit>supine, for bed mobility, and Mod A of 2 for functional transfers 2* the following deficits impacting occupational performance: decreased ROM, decreased strength, decreased balance, decreased tolerance, impaired GMC, impaired [facility], impaired sensation, impaired initiation and impaired problem solving  These impairments, as well at pts increased fall risk, difficulty performing ADLS, limited insight into deficits and decreased initiation and engagement  limit pts ability to safely engage in all baseline areas of occupation  Pt to continue to benefit from continued acute OT services during hospital stay to address defined deficits and to maximize level of functional independence in the following Occupational Performance areas: eating, grooming, bathing/shower, toilet hygiene, dressing, health maintenance, clothing management, social participation and bed mobility/transfers  From OT standpoint, recommend STR vs Home OT and 24/7 care pending family's ability to care for pt at current level of functioning upon D/C  Per chart: Spouse having a hard time caring for pt at home   OT will continue to follow pt 2-3x/wk to address the following goals to  w/in 10-14 days:     OT Discharge Recommendation: Post acute rehabilitation services  OT - OK to Discharge: Yes (when medically cleared)

## 2022-01-26 PROBLEM — N39.0 RECURRENT UTI: Status: RESOLVED | Noted: 2020-03-05 | Resolved: 2022-01-26

## 2022-01-26 PROBLEM — B37.0 ORAL THRUSH: Status: ACTIVE | Noted: 2022-01-26

## 2022-01-26 LAB
FLUAV RNA RESP QL NAA+PROBE: NEGATIVE
FLUBV RNA RESP QL NAA+PROBE: NEGATIVE
GLUCOSE SERPL-MCNC: 130 MG/DL (ref 65–140)
GLUCOSE SERPL-MCNC: 136 MG/DL (ref 65–140)
GLUCOSE SERPL-MCNC: 208 MG/DL (ref 65–140)
RSV RNA RESP QL NAA+PROBE: NEGATIVE
SARS-COV-2 RNA RESP QL NAA+PROBE: NEGATIVE

## 2022-01-26 PROCEDURE — 97530 THERAPEUTIC ACTIVITIES: CPT

## 2022-01-26 PROCEDURE — 97110 THERAPEUTIC EXERCISES: CPT

## 2022-01-26 PROCEDURE — 82948 REAGENT STRIP/BLOOD GLUCOSE: CPT

## 2022-01-26 PROCEDURE — 0241U HB NFCT DS VIR RESP RNA 4 TRGT: CPT | Performed by: PHYSICIAN ASSISTANT

## 2022-01-26 PROCEDURE — 99232 SBSQ HOSP IP/OBS MODERATE 35: CPT | Performed by: INTERNAL MEDICINE

## 2022-01-26 PROCEDURE — 99232 SBSQ HOSP IP/OBS MODERATE 35: CPT | Performed by: PHYSICIAN ASSISTANT

## 2022-01-26 RX ORDER — FLUCONAZOLE 100 MG/1
100 TABLET ORAL DAILY
Status: DISCONTINUED | OUTPATIENT
Start: 2022-01-26 | End: 2022-01-27 | Stop reason: HOSPADM

## 2022-01-26 RX ADMIN — Medication 250 MG: at 17:22

## 2022-01-26 RX ADMIN — HEPARIN SODIUM 5000 UNITS: 5000 INJECTION INTRAVENOUS; SUBCUTANEOUS at 21:23

## 2022-01-26 RX ADMIN — Medication 250 MG: at 09:39

## 2022-01-26 RX ADMIN — GLYCOPYRROLATE 1 MG: 1 TABLET ORAL at 09:38

## 2022-01-26 RX ADMIN — LEVOTHYROXINE SODIUM 75 MCG: 75 TABLET ORAL at 05:41

## 2022-01-26 RX ADMIN — FAMOTIDINE 20 MG: 20 TABLET ORAL at 09:37

## 2022-01-26 RX ADMIN — ASPIRIN 81 MG: 81 TABLET, COATED ORAL at 09:38

## 2022-01-26 RX ADMIN — FLUCONAZOLE 100 MG: 100 TABLET ORAL at 17:19

## 2022-01-26 RX ADMIN — GLYCOPYRROLATE 1 MG: 1 TABLET ORAL at 17:20

## 2022-01-26 RX ADMIN — HEPARIN SODIUM 5000 UNITS: 5000 INJECTION INTRAVENOUS; SUBCUTANEOUS at 05:41

## 2022-01-26 RX ADMIN — CILOSTAZOL 100 MG: 50 TABLET ORAL at 17:20

## 2022-01-26 RX ADMIN — DONEPEZIL HYDROCHLORIDE 10 MG: 10 TABLET, FILM COATED ORAL at 21:22

## 2022-01-26 RX ADMIN — INSULIN LISPRO 1 UNITS: 100 INJECTION, SOLUTION INTRAVENOUS; SUBCUTANEOUS at 12:00

## 2022-01-26 RX ADMIN — FAMOTIDINE 20 MG: 20 TABLET ORAL at 17:19

## 2022-01-26 RX ADMIN — HEPARIN SODIUM 5000 UNITS: 5000 INJECTION INTRAVENOUS; SUBCUTANEOUS at 14:48

## 2022-01-26 RX ADMIN — CARBIDOPA AND LEVODOPA 1 TABLET: 25; 100 TABLET ORAL at 09:37

## 2022-01-26 RX ADMIN — CARBIDOPA AND LEVODOPA 1 TABLET: 25; 100 TABLET ORAL at 21:22

## 2022-01-26 RX ADMIN — PRAVASTATIN SODIUM 40 MG: 40 TABLET ORAL at 17:19

## 2022-01-26 RX ADMIN — CILOSTAZOL 100 MG: 50 TABLET ORAL at 09:38

## 2022-01-26 RX ADMIN — CARBIDOPA AND LEVODOPA 1 TABLET: 25; 100 TABLET ORAL at 17:19

## 2022-01-26 RX ADMIN — CLOPIDOGREL BISULFATE 75 MG: 75 TABLET ORAL at 09:38

## 2022-01-26 NOTE — ASSESSMENT & PLAN NOTE
Continue carbidopa-levodopa, continue Robinul 1 mg bid   Patient is nonverbal for many years and wheelchair bound,  unable to provide necessary care at home  Accepted at rehab

## 2022-01-26 NOTE — CASE MANAGEMENT
Case Management Progress Note    [facility] [address] [place] [address]  [phone] MRN [id number]  : 1945 Date 2022       LOS (days): 2  Geometric Mean LOS (GMLOS) (days): 2 90  Days to GMLOS:0 9        OBJECTIVE:        Current admission status: Inpatient  Preferred Pharmacy:   [facility]pharmacy [address] [place] [address]  Phone: [phone] Fax: [address] [place] [address], [address] [place] [address]  Phone: [phone] Fax: [phone]    Primary Care Provider: Oliver Pemberton DO    Primary Insurance: MEDICARE  Secondary Insurance: [employer]    PROGRESS NOTE:    Patient needs short term rehab, referrals pending in [facility] [address]  CM spoke with patient's spouse Tammy Butterfield for additional choices, Tammy Butterfield chose [employer] [facility] [facility] [address]

## 2022-01-26 NOTE — ASSESSMENT & PLAN NOTE
Lab Results   Component Value Date    HGBA1C 6 3 (H) 12/06/2021       Recent Labs     01/25/22  1056 01/25/22  1639 01/25/22  2149 01/26/22  0758   POCGLU 188* 120 133 136       Blood Sugar Average: Last 72 hrs:  Diet controlled at home

## 2022-01-26 NOTE — PHYSICAL THERAPY NOTE
Physical Therapy Treatment Note     01/26/22 1152   PT Last Visit   PT Visit Date 01/26/22   Note Type   Note Type Treatment   Pain Assessment   Pain Assessment Tool FLACC   Pain Score No Pain   Restrictions/Precautions   Weight Bearing Precautions Per Order No   Other Precautions Cognitive; Bed Alarm; Fall Risk;Telemetry;Aspiration   General   Chart Reviewed Yes   Family/Caregiver Present No   Subjective   Subjective pt  did answer to questions however unclear  Said her own name and husbands name  Bed Mobility   Supine to Sit 2  Maximal assistance   Additional items Assist x 1;HOB elevated; Bedrails; Increased time required;LE management;Verbal cues  (used blue pad as sling)   Sit to Supine 2  Maximal assistance   Additional items Assist x 1; Increased time required;LE management  (HOB flat)   Additional Comments seated at EOb with Mod A - CS and max cues   Balance   Static Sitting Poor +   Dynamic Sitting Poor   Static Standing Zero   Endurance Deficit   Endurance Deficit No   Activity Tolerance   Activity Tolerance Patient tolerated treatment well   Nurse Made Aware Yes   Exercises   THR Supine;Bilateral;AROM;AAROM;20 reps   Assessment   Prognosis Fair   Problem List Impaired balance;Decreased mobility; Decreased coordination;Decreased cognition;Decreased safety awareness; Impaired judgement   Assessment Pt  able to particiapte in supine LE Gus needed prompting and tactile cues for completing the reps  When mentioned about transferring patient to EOB patient initiated getting her LEs OOB independently  Pt  needed max cues to stay upright seated at EOB as patient tend to be excessively leaning forward as she was sitting at EOB  Pt  able to actively mobilize LEs when prompted with VCs and TC  However patient unable to mobilize UEs without assistance   Impaired cog  and unable to follow safety cues makes patient a high risk of fall and patient is functionaing below her baseline and needs increased assistance for all levels of mobility  Recommend DC to rehab to address the aforesaid mobility and balance deficits  pt  positoned back in bed semi reclined with all needs within reach and nursing staff present  Barriers to Discharge Decreased caregiver support   Barriers to Discharge Comments Increased care giver burden at current LOF   Goals   Patient Goals None reported   STG Expiration Date 02/08/22   PT Treatment Day 1   Plan   Treatment/Interventions Functional transfer training;LE strengthening/ROM; Therapeutic exercise;Patient/family training;Bed mobility;Spoke to nursing   Progress Progressing toward goals   PT Frequency 2-3x/wk   Recommendation   PT Discharge Recommendation Post acute rehabilitation services  (vs 24/7 S, HHPT and mechanical lift)   Equipment Recommended   (may benefit from mech  lift at home)   [address] Inpatient   Turning in Bed Without Bedrails 1   Lying on Back to Sitting on Edge of Flat Bed 1   Moving Bed to Chair 1   Standing Up From Chair 1   Walk in Room 1   Climb 3-5 Stairs 1   Basic Mobility Inpatient Raw Score 6   Turning Head Towards Sound 2   Follow Simple Instructions 2   Low Function Basic Mobility Raw Score 10   Low Function Basic Mobility Standardized Score 14 65   Highest Level Of Mobility   JH-HLM Goal 2: Bed activities/Dependent transfer   JH-HLM Highest Level of Mobility 3: Sit at edge of bed   JH-HLM Goal Achieved Yes   End of Consult   Patient Position at End of Consult Supine; All needs within reach;Bed/Chair alarm activated         Sasha Wong PTA    An AM-PAC basic mobility standardized score less than 42 9 suggest the patient may benefit from discharge to post-acute rehab services

## 2022-01-26 NOTE — PLAN OF CARE
Problem: PHYSICAL THERAPY ADULT  Goal: Performs mobility at highest level of function for planned discharge setting  See evaluation for individualized goals  Description: Treatment/Interventions: Functional transfer training,LE strengthening/ROM,Therapeutic exercise,Endurance training,Cognitive reorientation,Patient/family training,Equipment eval/education,Bed mobility,Compensatory technique education,Continued evaluation,Spoke to nursing,OT  Equipment Recommended: Other (Comment) (may benefit from mechanical lift at home)       See flowsheet documentation for full assessment, interventions and recommendations  Outcome: Progressing  Note: Prognosis: Fair  Problem List: Impaired balance,Decreased mobility,Decreased coordination,Decreased cognition,Decreased safety awareness,Impaired judgement  Assessment: Pt  able to particiapte in supine LE Gus needed prompting and tactile cues for completing the reps  When mentioned about transferring patient to EOB patient initiated getting her LEs OOB independently  Pt  needed max cues to stay upright seated at EOB as patient tend to be excessively leaning forward as she was sitting at EOB  Pt  able to actively mobilize LEs when prompted with VCs and TC  However patient unable to mobilize UEs without assistance  Impaired cog  and unable to follow safety cues makes patient a high risk of fall and patient is functionaing below her baseline and needs increased assistance for all levels of mobility  Recommend DC to rehab to address the aforesaid mobility and balance deficits  pt  positoned back in bed semi reclined with all needs within reach and nursing staff present  Barriers to Discharge: Decreased caregiver support  Barriers to Discharge Comments: Increased care giver burden at current LOF     PT Discharge Recommendation: Post acute rehabilitation services (vs 24/7 S, HHPT and mechanical lift)          See flowsheet documentation for full assessment

## 2022-01-26 NOTE — PLAN OF CARE
Problem: MOBILITY - ADULT  Goal: Maintain or return to baseline ADL function  Description: INTERVENTIONS:  -  Assess patient's ability to carry out ADLs; assess patient's baseline for ADL function and identify physical deficits which impact ability to perform ADLs (bathing, care of mouth/teeth, toileting, grooming, dressing, etc )  - Assess/evaluate cause of self-care deficits   - Assess range of motion  - Assess patient's mobility; develop plan if impaired  - Assess patient's need for assistive devices and provide as appropriate  - Encourage maximum independence but intervene and supervise when necessary  - Involve family in performance of ADLs  - Assess for home care needs following discharge   - Consider OT consult to assist with ADL evaluation and planning for discharge  - Provide patient education as appropriate  Outcome: Progressing  Goal: Maintains/Returns to pre admission functional level  Description: INTERVENTIONS:  - Perform BMAT or MOVE assessment daily    - Set and communicate daily mobility goal to care team and patient/family/caregiver  - Collaborate with rehabilitation services on mobility goals if consulted  - Perform Range of Motion 3 times a day  - Reposition patient every 2 hours    - Dangle patient 3 times a day  - Out of bed for toileting  - Record patient progress and toleration of activity level   Outcome: Progressing     Problem: Potential for Falls  Goal: Patient will remain free of falls  Description: INTERVENTIONS:  - Educate patient/family on patient safety including physical limitations  - Instruct patient to call for assistance with activity   - Consult OT/PT to assist with strengthening/mobility   - Keep Call bell within reach  - Keep bed low and locked with side rails adjusted as appropriate  - Keep care items and personal belongings within reach  - Initiate and maintain comfort rounds  - Make Fall Risk Sign visible to staff  - Offer Toileting every 2 Hours, in advance of need  - Initiate/Maintain bed/chair alarm  - Obtain necessary fall risk management equipment: chair alarm  - Apply yellow socks and bracelet for high fall risk patients  - Consider moving patient to room near nurses station  Outcome: Progressing     Problem: PAIN - ADULT  Goal: Verbalizes/displays adequate comfort level or baseline comfort level  Description: Interventions:  - Encourage patient to monitor pain and request assistance  - Assess pain using appropriate pain scale  - Administer analgesics based on type and severity of pain and evaluate response  - Implement non-pharmacological measures as appropriate and evaluate response  - Consider cultural and social influences on pain and pain management  - Notify physician/advanced practitioner if interventions unsuccessful or patient reports new pain  Outcome: Progressing     Problem: INFECTION - ADULT  Goal: Absence or prevention of progression during hospitalization  Description: INTERVENTIONS:  - Assess and monitor for signs and symptoms of infection  - Monitor lab/diagnostic results  - Monitor all insertion sites, i e  indwelling lines, tubes, and drains  - Monitor endotracheal if appropriate and nasal secretions for changes in amount and color  - [place] appropriate cooling/warming therapies per order  - Administer medications as ordered  - Instruct and encourage patient and family to use good hand hygiene technique  - Identify and instruct in appropriate isolation precautions for identified infection/condition  Outcome: Progressing     Problem: DISCHARGE PLANNING  Goal: Discharge to home or other facility with appropriate resources  Description: INTERVENTIONS:  - Identify barriers to discharge w/patient and caregiver  - Arrange for needed discharge resources and transportation as appropriate  - Identify discharge learning needs (meds, wound care, etc )  - Arrange for interpretive services to assist at discharge as needed  - Refer to Case Management Department for coordinating discharge planning if the patient needs post-hospital services based on physician/advanced practitioner order or complex needs related to functional status, cognitive ability, or social support system  Outcome: Progressing

## 2022-01-26 NOTE — APP STUDENT NOTE
PEPE STUDENT  Inpatient Progress Note for TRAINING ONLY  Not Part of Legal Medical Record       Progress Note - Portia Pollack 64 y o  female MRN: [id number]    Unit/Bed#: E5 -01 Encounter: [id number]    Assessment/Plan:  1  Ambulatory dysfunction   Presents with ambulatory dysfunction in the setting of suspected urinary tract infection  Will place PT OT recommend rehab  CM working on placement  First choice is [facility] with geriatrics at the bedside today   Spoke with Eli Jang and gave update today   Patient is wheelchair-bound, but over the past 3 days has developed significant generalized weakness to the point  cannot care for her with felt assistant   Previous history of right hemispheric 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   She is nonverbal per      2  Recurrent UTI  Patient  reports that he tested her urine with a home test kit  Leukocyte positive, received 1 day of Macrobid prescribed by patient's primary care provider  Received 3 doses IV ceftriaxone, monitor off further abx     3  Parkinson disease  Continue carbidopa-levodopa, continue Robinul 1 mg bid   Spoke with , she is nonverbal for many years and wheelchair bound   States she can usually stand for a few seconds   PT/OT consulted  D/c to rehab    4  Hx of stroke  History of stroke in 2/2020   Maintained on pletal, plavix, aspirin     5  S/p deep brain stimulator placement  History of Parkinson's dementia with deep brain stimulator -due for change in April 6  DM II with hyperglycemia  - diet controlled at home  - place on sliding scale and basal bolus protocol     VTE Pharmacologic Prophylaxis: VTE Score: 4 Moderate Risk (Score 3-4) - Pharmacological DVT Prophylaxis Ordered: heparin  Patient Centered Rounds: I performed bedside rounds with nursing staff today    Discussions with Specialists or Other Care Team Provider:     Education and Discussions with Family / Patient: Updated contact person () via phone  Time Spent for Care: 20 minutes  More than 50% of total time spent on counseling and coordination of care as described above  Current Length of Stay: 2 day(s)  Current Patient Status: Inpatient   Certification Statement: The patient will continue to require additional inpatient hospital stay due to ambulatory dysfuncion  Discharge Plan: Anticipate discharge in 24-48 hrs to rehab facility  Code Status: Level 1 - Full Code    Subjective:   Pt is lying in bed  She is nonverbal, but is able to respond by nodding/shaking her head  Denies pain       Objective:     Vitals:   Temp (24hrs), Av 3 °F (36 8 °C), Min:98 3 °F (36 8 °C), Max:98 3 °F (36 8 °C)    Temp:  [98 3 °F (36 8 °C)] 98 3 °F (36 8 °C)  HR:  [74-98] 74  Resp:  [18] 18  BP: (118-150)/(56-81) 118/56  SpO2:  [92 %] 92 %  Body mass index is 31 56 kg/m²  Input and Output Summary (last 24 hours):   No intake or output data in the 24 hours ending 22 1034    Physical Exam:   Physical Exam  Constitutional:       General: She is not in acute distress  Appearance: She is not ill-appearing (chronically) or toxic-appearing  Comments: Appears sleepy and not able to keep eyes open   HENT:      Head: Normocephalic and atraumatic  Eyes:      Extraocular Movements: Extraocular movements intact  Conjunctiva/sclera: Conjunctivae normal       Pupils: Pupils are equal, round, and reactive to light  Cardiovascular:      Rate and Rhythm: Normal rate and regular rhythm  Pulses: Normal pulses  Heart sounds: Normal heart sounds  No murmur heard  No gallop  Pulmonary:      Effort: Pulmonary effort is normal       Breath sounds: Normal breath sounds  Abdominal:      General: Bowel sounds are normal  There is no distension  Palpations: Abdomen is soft  Tenderness: There is no abdominal tenderness  Skin:     General: Skin is warm        Capillary Refill: Capillary refill takes less than 2 seconds  Psychiatric:      Comments: Nonverbal - baseline          Additional Data:     Labs:  Results from last 7 days   Lab Units 01/25/22  0500   WBC Thousand/uL 8 82   HEMOGLOBIN g/dL 14 7   HEMATOCRIT % 43 4   PLATELETS Thousands/uL 275   NEUTROS PCT % 65   LYMPHS PCT % 23   MONOS PCT % 9   EOS PCT % 1     Results from last 7 days   Lab Units 01/24/22  0557 01/23/22  0930 01/23/22  0930   SODIUM mmol/L 141   < > 144   POTASSIUM mmol/L 3 3*   < > 3 6   CHLORIDE mmol/L 109*   < > 106   CO2 mmol/L 23   < > 28   BUN mg/dL 17   < > 15   CREATININE mg/dL 0 79   < > 0 91   ANION GAP mmol/L 9   < > 10   CALCIUM mg/dL 8 7   < > 9 7   ALBUMIN g/dL  --   --  3 5   TOTAL BILIRUBIN mg/dL  --   --  0 92   ALK PHOS U/L  --   --  83   ALT U/L  --   --  22   AST U/L  --   --  19   GLUCOSE RANDOM mg/dL 117   < > 171*    < > = values in this interval not displayed  Results from last 7 days   Lab Units 01/23/22  0930   INR  1 11     Results from last 7 days   Lab Units 01/26/22  0758 01/25/22  2149 01/25/22  1639 01/25/22  1056 01/25/22  0757 01/24/22  2118 01/24/22  1523 01/24/22  1150 01/24/22  0722 01/23/22  1538   POC GLUCOSE mg/dl 136 133 120 188* 141* 179* 120 178* 111 136         Results from last 7 days   Lab Units 01/24/22  0557 01/23/22  0930   LACTIC ACID mmol/L  --  1 4   PROCALCITONIN ng/ml 0 14 0 16       Lines/Drains:  Invasive Devices  Report    Peripheral Intravenous Line            Peripheral IV 01/23/22 Left Forearm 3 days    Peripheral IV 01/23/22 Left Hand 3 days          Drain            External Urinary Catheter 3 days                Imaging: No pertinent imaging reviewed  Recent Cultures (last 7 days):   Results from last 7 days   Lab Units 01/23/22  1000 01/23/22  0943 01/23/22  0930   BLOOD CULTURE   --  No Growth at 48 hrs  No Growth at 48 hrs     URINE CULTURE  No Growth <1000 cfu/mL  --   --        Last 24 Hours Medication List:   Current Facility-Administered Medications   Medication Dose Route Frequency Provider Last Rate    acetaminophen  650 mg Oral Q6H PRN [place] Vargas, DO      aluminum-magnesium hydroxide-simethicone  30 mL Oral Q6H PRN Rikki Vargas, DO      aspirin  81 mg Oral Once per day on Mon Wed Fri Dash Hernadez, DO      calcium carbonate  1,000 mg Oral Daily PRN Rikki Vargas, DO      carbidopa-levodopa  1 tablet Oral TID [place] Vargas, DO      cilostazol  100 mg Oral BID AC [place] Vargas, DO      clopidogrel  75 mg Oral Daily Dash KENDRICK Spivey, DO      docusate sodium  100 mg Oral BID Shaun D Gill, DO      donepezil  10 mg Oral HS Shaun D Gill, DO      famotidine  20 mg Oral BID Shaun D Gill, DO      glycopyrrolate  1 mg Oral BID Shaun D Gill, DO      heparin (porcine)  5,000 Units Subcutaneous Q8H [address] Dash Verbronal Rodriguez, DO      insulin lispro  1-5 Units Subcutaneous TID AC Rikki Vargas, DO      levothyroxine  75 mcg Oral Daily Shaun D Hernadez, DO      ondansetron  4 mg Intravenous Q6H PRN [place] Vargas, DO      pravastatin  40 mg Oral Daily With Dinner Shaun D Hernadez, DO      saccharomyces boulardii  250 mg Oral BID Shaun D Gill, DO      simethicone  80 mg Oral 4x Daily PRN [place] Vargas, DO      sodium chloride (PF)  3 mL Intravenous Q1H PRN Holly Gambino, DO          Today, Patient Was Seen By: Param HARRY

## 2022-01-26 NOTE — PROGRESS NOTES
[facility]  Progress Note - Michelle Westbrook 1945, 64 y o  female MRN: [id number]  Unit/Bed#: E5 -01 Encounter: [id number]  Primary Care Provider: Nunu Kenney DO   Date and time admitted to hospital: 1/23/2022  8:57 AM    * Ambulatory dysfunction  Assessment & Plan  Presents with ambulatory dysfunction in the setting of suspected urinary tract infection, baseline wheelchair bound but does stand and pivot per  at baseline, nonverbal due to Parkinson's,  Previous history of right hemispheric 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   PT  OT recommending rehab   CM working on placement   First choice is [facility] with geriatrics at the bedside today   Spoke with Jason Zamarripa and gave update today   Awaiting placement     Recurrent UTI-resolved as of 1/26/2022  Assessment & Plan  Patient  reports that he tested her urine with a home test kit   received 1 day of Macrobid prescribed by patient's primary care provider prior to admission   Urine culture without significant growth and blood cx negative to date   Received 3 days IV rocephin, monitoring off further antibiotics            Parkinson disease ([facility] [address] )  Assessment & Plan  Continue carbidopa-levodopa, continue Robinul 1 mg bid   Spoke with , she is nonverbal for many years and wheelchair bound   States she can usually stand for a few seconds and pivot at baseline   He is having a hard time taking care of her at home due to progressive weakness   PT/OT consulted   Discharge to rehab     History of stroke  Assessment & Plan  History of stroke in 2/2020   Maintained on pletal, plavix, aspirin     S/P deep brain stimulator placement  Assessment & Plan  History of Parkinson's dementia with deep brain stimulator -due for change in April    Type 2 diabetes mellitus with hyperglycemia [facility])  Assessment & Plan  Lab Results   Component Value Date    HGBA1C 6 3 (H) 12/06/2021       Recent Labs 22  1150 22  1523 22  2118 22  0757   POCGLU 178* 120 179* 141*       Blood Sugar Average: Last 72 hrs:  Diet controlled at home          VTE Pharmacologic Prophylaxis: VTE Score: 4 Moderate Risk (Score 3-4) - Pharmacological DVT Prophylaxis Ordered: heparin  Patient Centered Rounds: I performed bedside rounds with nursing staff today  spoke with Lana   Discussions with Specialists or Other Care Team Provider: Geriatrics, case management     Education and Discussions with Family / Patient: Updated  () via phone  I called patient's  Shahla Peng and and given update  We are waiting on rehab at this time  I answered all his questions prior to getting off the phone  Time Spent for Care: 20 minutes  More than 50% of total time spent on counseling and coordination of care as described above  Current Length of Stay: 2 day(s)  Current Patient Status: Inpatient   Certification Statement: The patient will continue to require additional inpatient hospital stay due to rehab placement due to ambualtory dysfunction from Parkinson's  Discharge Plan: hopefully in 24 hours to rehab facility     Code Status: Level 1 - Full Code    Subjective:   Edvin Friend was sleeping on arrival   She did a week to very light sternal rub and speaking mildly  She opens her eyes  She will squeeze my hands bilaterally  She shakes her head no very slighting to being in pain  Objective:     Vitals:   Temp (24hrs), Av 3 °F (36 8 °C), Min:98 3 °F (36 8 °C), Max:98 3 °F (36 8 °C)    Temp:  [98 3 °F (36 8 °C)] 98 3 °F (36 8 °C)  HR:  [74-98] 74  Resp:  [18] 18  BP: (118-150)/(56-81) 118/56  SpO2:  [92 %] 92 %  Body mass index is 31 56 kg/m²  Input and Output Summary (last 24 hours):   No intake or output data in the 24 hours ending 22 1005    Physical Exam:   Physical Exam  Vitals and nursing note reviewed  Constitutional:       General: She is not in acute distress  Appearance: Ill appearance: chronically ill and deconditioned    HENT:      Head: Normocephalic  Eyes:      Conjunctiva/sclera: Conjunctivae normal    Cardiovascular:      Rate and Rhythm: Normal rate and regular rhythm  Pulmonary:      Effort: Pulmonary effort is normal  No respiratory distress  Breath sounds: No wheezing, rhonchi or rales  Comments: Decreased throughout, on room air   Abdominal:      General: Bowel sounds are normal       Palpations: Abdomen is soft  Musculoskeletal:      Right lower leg: No edema  Left lower leg: No edema  Skin:     Coloration: Skin is pale  Neurological:      Mental Status: She is alert  Comments: Globally weak, chronic left weakness from prior stroke ,will squeeze hands when asked    Psychiatric:         Mood and Affect: Mood normal          Speech: She is noncommunicative  Behavior: Behavior is not agitated or aggressive  Additional Data:     Labs:  Results from last 7 days   Lab Units 01/25/22  0500   WBC Thousand/uL 8 82   HEMOGLOBIN g/dL 14 7   HEMATOCRIT % 43 4   PLATELETS Thousands/uL 275   NEUTROS PCT % 65   LYMPHS PCT % 23   MONOS PCT % 9   EOS PCT % 1     Results from last 7 days   Lab Units 01/24/22  0557 01/23/22  0930 01/23/22  0930   SODIUM mmol/L 141   < > 144   POTASSIUM mmol/L 3 3*   < > 3 6   CHLORIDE mmol/L 109*   < > 106   CO2 mmol/L 23   < > 28   BUN mg/dL 17   < > 15   CREATININE mg/dL 0 79   < > 0 91   ANION GAP mmol/L 9   < > 10   CALCIUM mg/dL 8 7   < > 9 7   ALBUMIN g/dL  --   --  3 5   TOTAL BILIRUBIN mg/dL  --   --  0 92   ALK PHOS U/L  --   --  83   ALT U/L  --   --  22   AST U/L  --   --  19   GLUCOSE RANDOM mg/dL 117   < > 171*    < > = values in this interval not displayed       Results from last 7 days   Lab Units 01/23/22  0930   INR  1 11     Results from last 7 days   Lab Units 01/26/22  0758 01/25/22  2149 01/25/22  1639 01/25/22  1056 01/25/22  0757 01/24/22  2118 01/24/22  1523 01/24/22  1150 01/24/22  0722 01/23/22  1538   POC GLUCOSE mg/dl 136 133 120 188* 141* 179* 120 178* 111 136         Results from last 7 days   Lab Units 01/24/22  0557 01/23/22  0930   LACTIC ACID mmol/L  --  1 4   PROCALCITONIN ng/ml 0 14 0 16       Lines/Drains:  Invasive Devices  Report    Peripheral Intravenous Line            Peripheral IV 01/23/22 Left Forearm 3 days    Peripheral IV 01/23/22 Left Hand 3 days          Drain            External Urinary Catheter 3 days                      Imaging:  No new imaging to review    Recent Cultures (last 7 days):   Results from last 7 days   Lab Units 01/23/22  1000 01/23/22  0943 01/23/22  0930   BLOOD CULTURE   --  No Growth at 48 hrs  No Growth at 48 hrs     URINE CULTURE  No Growth <1000 cfu/mL  --   --        Last 24 Hours Medication List:   Current Facility-Administered Medications   Medication Dose Route Frequency Provider Last Rate    acetaminophen  650 mg Oral Q6H PRN Sergio Leap, DO      aluminum-magnesium hydroxide-simethicone  30 mL Oral Q6H PRN Sergio Leap, DO      aspirin  81 mg Oral Once per day on Mon Wed Fri Dash Hernadez, DO      calcium carbonate  1,000 mg Oral Daily PRN Sergio Leap, DO      carbidopa-levodopa  1 tablet Oral TID Sergio Leap, DO      cilostazol  100 mg Oral BID AC Sergio Leap, DO      clopidogrel  75 mg Oral Daily Dash Dowd, DO      docusate sodium  100 mg Oral BID Dash Hernadez, DO      donepezil  10 mg Oral HS Dash Hernadez, DO      famotidine  20 mg Oral BID Dash Hernadez, DO      glycopyrrolate  1 mg Oral BID Dash Hernadez, DO      heparin (porcine)  5,000 Units Subcutaneous Q8H [address] Dash Hernadez, DO      insulin lispro  1-5 Units Subcutaneous TID  Dash Dowd, DO      levothyroxine  75 mcg Oral Daily Dash Hernadez, DO      ondansetron  4 mg Intravenous Q6H PRN Sergio Leap, DO      pravastatin  40 mg Oral Daily With Dinner Dash Hernadez, DO      saccharomyces boulardii  250 mg Oral BID Dash Hernadez, DO      simethicone  80 mg Oral 4x Daily PRN Carlitos Morgan DO      sodium chloride (PF)  3 mL Intravenous Q1H PRN Minnie Ash DO          Today, Patient Was Seen By: Cody Tolentino PA-C    **Please Note: This note may have been constructed using a voice recognition system  **

## 2022-01-26 NOTE — CASE MANAGEMENT
Case Management Discharge Planning Note    Patient name Brent Shabazz  Location [address]  [address] [address]55-* MRN [id number]  : 1945 Date 2022       Current Admission Date: 2022  Current Admission Diagnosis:Ambulatory dysfunction   Patient Active Problem List    Diagnosis Date Noted    Oral thrush 2022    History of stroke 2022    Ambulatory dysfunction 2022    Leg wound, left     Metabolic encephalopathy     PVC (premature ventricular contraction) 2021    Chorea 2020    Dementia with Lewy bodies (CODE) ([facility] ) 2020    Right-sided lacunar infarction ([facility] [address] ) 2020    S/P deep brain stimulator placement 2020    Type 2 diabetes mellitus with hyperglycemia [facility] ) 2020    Acute left-sided weakness 2020    Chronic cough 2019    Decreased ambulation status 2019    Major depression with psychotic features ([facility] [address] ) 2019    Health care maintenance 2019    Sialorrhea 2018    Allergic rhinitis 2017    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 2016    Acquired hypothyroidism 2016    Mixed hyperlipidemia 2016    Parkinson disease ([facility] [address] ) 2016    GERD (gastroesophageal reflux disease) 2016    Anxiety, generalized 2015    Osteoporosis 2012      LOS (days): 2  Geometric Mean LOS (GMLOS) (days): 2 90  Days to GMLOS:0 8     OBJECTIVE:  Risk of Unplanned Readmission Score: 13         Current admission status: Inpatient   Preferred Pharmacy:   [id number] [place] - [address] [facility]  Phone: [phone] Fax: [address] [place] [address]  Phone: [phone] Fax: [phone]    Primary Care Provider: Shelton Brantley DO    Primary Insurance: MEDICARE  Secondary Insurance: [employer] [place]    DISCHARGE DETAILS:    Discharge planning discussed with[de-identified] Gris Best of Choice: Yes     CM contacted family/caregiver?: Yes  Were Treatment Team discharge recommendations reviewed with patient/caregiver?: Yes  Did patient/caregiver verbalize understanding of patient care needs?: N/A- going to facility  Were patient/caregiver advised of the risks associated with not following Treatment Team discharge recommendations?: Yes    Contacts  Patient Contacts: [place]  Relationship to Patient[de-identified] Family  Contact Method: Phone  Phone Number: [phone]  Reason/Outcome: Continuity of [address]          Is the patient interested in [place] [address] at discharge?: No    DME Referral Provided  Referral made for DME?: No    Other Referral/Resources/Interventions Provided:  Interventions: Short Term Rehab    Treatment Team Recommendation: Short Term Rehab  Discharge Destination Plan[de-identified] Short Term Rehab  Transport at Discharge : [facility]  Dispatcher Contacted: Yes  Number/Name of Dispatcher: Pa Gómez and Unit #): [id number]  ETA of Transport (Date): 01/27/22  ETA of Transport (Time):  (TIME PENDING, 11:30-4PM REQUESTED)     Transfer Mode: [address] [place] [address] Street Name, [address] : [facility]  Receiving Facility/Agency Phone Number: [phone] and  Facility/Agency Fax Number: fax for [facility] [phone]

## 2022-01-26 NOTE — PROGRESS NOTES
Progress Note - Carin Peña 64 y o  female MRN: [id number]    Unit/Bed#: E5 -01 Encounter: [id number]      Assessment/Plan:  Ambulatory dysfunction  Multifactorial fall Risk factors:  Age, Parkinson's, limited mobility, visual impairment, polypharmacy  PT/OT  Rehab post hospitalization     Parkinson's dementia  Follows Neurology in [place] (Dr Aleksandr Shields)  Has deep brain stimulator battery due to be changed in April  TSH 1 184 on 1/23/22  Recommend check Vitamin B12 level  Continue sinement and Aricept, supportive care     Anxiety/depression  Chronic use of Lexapro and Xanax 0 25 milligrams p o  Q h s    Although benzodiazepines are not recommended in the older adult population do not recommend abrupt withdrawal  Doing well off xanax, continue to monitor  Follows with Psychiatry as outpatient     Frailty  Severe   Secondary to underlying parkinson's dementia, hx of stroke, needs assistance with IADLs and ADLs, limited mobility, dysphagia, incontinence, polypharmacy  Albumin 3 5, maintain protein in diet  PT/OT   is primary caregiver, continue supportive care  Rehab post hospitalization for optimization     Delirium precautions  Hx of encephalopathy with prior hospitalization, associated UTI, resolved with treatment  At risk for delirium secondary to hospitalization, history of delirium with previous hospitalization in December, polypharmacy, multiple underlying comorbidities  Provide frequent redirection, reorientation, distraction techniques  Avoid deliriogenic medications such as tramadol, benzodiazepines, anticholinergics,  Benadryl  Treat pain, See geriatric pain protocol  Monitor for constipation and urinary retention  Encourage early and frequent moblization, OOB  Encourage Hydration/ Nutrition  Implement sleep hygiene, limit night time interuptions, group activities     UTI  UA on admission, positive small leukocytes, moderate bacteria  Completed 3 course of IV Rocephin  Internal med following    Subjective:   Upon exam pt is lying in bed  Her eyes are closed she does not opens eyes at present time  She shakes head yes/ no to questions  Objective:     Vitals: Blood pressure 118/56, pulse 74, temperature 98 3 °F (36 8 °C), resp  rate 18, height 5' (1 524 m), weight 73 3 kg (161 lb 9 6 oz), SpO2 92 %, not currently breastfeeding  ,Body mass index is 31 56 kg/m²  No intake or output data in the 24 hours ending 01/26/22 0903    Physical Exam:   General : NAD  HEENT : MMM, torticollis    Heart : Normal rate, no murmur rub or gallop  Lungs : CTA no wheezes, rales or rhonchi  Abdomen : Soft, NT/ND, BS auscultated in all 4 quads  Ext :  no edema  Skin : Pink, warm, dry, age appropriate turgor and mobility  Neuro : Nonfocal  Psych : drowsy, opens eyes briefly      Invasive Devices  Report    Peripheral Intravenous Line            Peripheral IV 01/23/22 Left Forearm 2 days    Peripheral IV 01/23/22 Left Hand 2 days          Drain            External Urinary Catheter 2 days                Lab, Imaging and other studies: I have personally reviewed pertinent reports      VTE Pharmacologic Prophylaxis: Sequential compression device (Venodyne)   VTE Mechanical Prophylaxis: sequential compression device

## 2022-01-26 NOTE — ASSESSMENT & PLAN NOTE
Presents with ambulatory dysfunction in the setting of suspected urinary tract infection, baseline wheelchair bound but does stand and pivot per  at baseline, nonverbal due to Parkinson's,  Previous history of right hemispheric 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   PT/OT recommending rehab   CM arrangements for placement appreciated   Accepted [facility] in stable condition,  updated via telephone

## 2022-01-26 NOTE — PLAN OF CARE
Problem: MOBILITY - ADULT  Goal: Maintain or return to baseline ADL function  Description: INTERVENTIONS:  -  Assess patient's ability to carry out ADLs; assess patient's baseline for ADL function and identify physical deficits which impact ability to perform ADLs (bathing, care of mouth/teeth, toileting, grooming, dressing, etc )  - Assess/evaluate cause of self-care deficits   - Assess range of motion  - Assess patient's mobility; develop plan if impaired  - Assess patient's need for assistive devices and provide as appropriate  - Encourage maximum independence but intervene and supervise when necessary  - Involve family in performance of ADLs  - Assess for home care needs following discharge   - Consider OT consult to assist with ADL evaluation and planning for discharge  - Provide patient education as appropriate  Outcome: Progressing  Goal: Maintains/Returns to pre admission functional level  Description: INTERVENTIONS:  - Perform BMAT or MOVE assessment daily    - Set and communicate daily mobility goal to care team and patient/family/caregiver  - Collaborate with rehabilitation services on mobility goals if consulted  - Perform Range of Motion 3 times a day  - Reposition patient every 2 hours    - Dangle patient 3 times a day  - Out of bed for toileting  - Record patient progress and toleration of activity level   Outcome: Progressing     Problem: Potential for Falls  Goal: Patient will remain free of falls  Description: INTERVENTIONS:  - Educate patient/family on patient safety including physical limitations  - Instruct patient to call for assistance with activity   - Consult OT/PT to assist with strengthening/mobility   - Keep Call bell within reach  - Keep bed low and locked with side rails adjusted as appropriate  - Keep care items and personal belongings within reach  - Initiate and maintain comfort rounds  - Make Fall Risk Sign visible to staff  - Offer Toileting every 2 Hours, in advance of need  - Initiate/Maintain bed/chair alarm  - Obtain necessary fall risk management equipment: chair alarm  - Apply yellow socks and bracelet for high fall risk patients  - Consider moving patient to room near nurses station  Outcome: Progressing     Problem: PAIN - ADULT  Goal: Verbalizes/displays adequate comfort level or baseline comfort level  Description: Interventions:  - Encourage patient to monitor pain and request assistance  - Assess pain using appropriate pain scale  - Administer analgesics based on type and severity of pain and evaluate response  - Implement non-pharmacological measures as appropriate and evaluate response  - Consider cultural and social influences on pain and pain management  - Notify physician/advanced practitioner if interventions unsuccessful or patient reports new pain  Outcome: Progressing     Problem: INFECTION - ADULT  Goal: Absence or prevention of progression during hospitalization  Description: INTERVENTIONS:  - Assess and monitor for signs and symptoms of infection  - Monitor lab/diagnostic results  - Monitor all insertion sites, i e  indwelling lines, tubes, and drains  - Monitor endotracheal if appropriate and nasal secretions for changes in amount and color  - [place] appropriate cooling/warming therapies per order  - Administer medications as ordered  - Instruct and encourage patient and family to use good hand hygiene technique  - Identify and instruct in appropriate isolation precautions for identified infection/condition  Outcome: Progressing     Problem: DISCHARGE PLANNING  Goal: Discharge to home or other facility with appropriate resources  Description: INTERVENTIONS:  - Identify barriers to discharge w/patient and caregiver  - Arrange for needed discharge resources and transportation as appropriate  - Identify discharge learning needs (meds, wound care, etc )  - Arrange for interpretive services to assist at discharge as needed  - Refer to Case Management Department for coordinating discharge planning if the patient needs post-hospital services based on physician/advanced practitioner order or complex needs related to functional status, cognitive ability, or social support system  Outcome: Progressing     Problem: Knowledge Deficit  Goal: Patient/family/caregiver demonstrates understanding of disease process, treatment plan, medications, and discharge instructions  Description: Complete learning assessment and assess knowledge base    Interventions:  - Provide teaching at level of understanding  - Provide teaching via preferred learning methods  Outcome: Progressing     Problem: GENITOURINARY - ADULT  Goal: Maintains or returns to baseline urinary function  Description: INTERVENTIONS:  - Assess urinary function  - Encourage oral fluids to ensure adequate hydration if ordered  - Administer IV fluids as ordered to ensure adequate hydration  - Administer ordered medications as needed  - Offer frequent toileting  - Follow urinary retention protocol if ordered  Outcome: Progressing  Goal: Absence of urinary retention  Description: INTERVENTIONS:  - Assess patient's ability to void and empty bladder  - Monitor I/O  - Bladder scan as needed  - Discuss with physician/AP medications to alleviate retention as needed  - Discuss catheterization for long term situations as appropriate  Outcome: Progressing     Problem: Prexisting or High Potential for Compromised Skin Integrity  Goal: Skin integrity is maintained or improved  Description: INTERVENTIONS:  - Identify patients at risk for skin breakdown  - Assess and monitor skin integrity  - Assess and monitor nutrition and hydration status  - Monitor labs   - Assess for incontinence   - Turn and reposition patient  - Assist with mobility/ambulation  - Relieve pressure over bony prominences  - Avoid friction and shearing  - Provide appropriate hygiene as needed including keeping skin clean and dry  - Evaluate need for skin moisturizer/barrier cream  - Collaborate with interdisciplinary team   - Patient/family teaching  - Consider wound care consult   Outcome: Progressing     Problem: Nutrition/Hydration-ADULT  Goal: Nutrient/Hydration intake appropriate for improving, restoring or maintaining nutritional needs  Description: Monitor and assess patient's nutrition/hydration status for malnutrition  Collaborate with interdisciplinary team and initiate plan and interventions as ordered  Monitor patient's weight and dietary intake as ordered or per policy  Utilize nutrition screening tool and intervene as necessary  Determine patient's food preferences and provide high-protein, high-caloric foods as appropriate       INTERVENTIONS:  - Monitor oral intake, urinary output, labs, and treatment plans  - Assess nutrition and hydration status and recommend course of action  - Evaluate amount of meals eaten  - Assist patient with eating if necessary   - Allow adequate time for meals  - Recommend/ encourage appropriate diets, oral nutritional supplements, and vitamin/mineral supplements  - Order, calculate, and assess calorie counts as needed  - Recommend, monitor, and adjust tube feedings and TPN/PPN based on assessed needs  - Assess need for intravenous fluids  - Provide specific nutrition/hydration education as appropriate  - Include patient/family/caregiver in decisions related to nutrition  Outcome: Progressing

## 2022-01-26 NOTE — NURSING NOTE
Agree w/previous assessment  Pt in bed  No complaints or events in my 4hrs  Handed off and next nurse will CTM

## 2022-01-27 VITALS
WEIGHT: 163.36 LBS | BODY MASS INDEX: 32.07 KG/M2 | SYSTOLIC BLOOD PRESSURE: 145 MMHG | DIASTOLIC BLOOD PRESSURE: 73 MMHG | OXYGEN SATURATION: 92 % | TEMPERATURE: 98 F | HEART RATE: 88 BPM | RESPIRATION RATE: 22 BRPM | HEIGHT: 60 IN

## 2022-01-27 LAB
GLUCOSE SERPL-MCNC: 121 MG/DL (ref 65–140)
GLUCOSE SERPL-MCNC: 181 MG/DL (ref 65–140)

## 2022-01-27 PROCEDURE — 99239 HOSP IP/OBS DSCHRG MGMT >30: CPT | Performed by: FAMILY MEDICINE

## 2022-01-27 PROCEDURE — 82948 REAGENT STRIP/BLOOD GLUCOSE: CPT

## 2022-01-27 PROCEDURE — 99232 SBSQ HOSP IP/OBS MODERATE 35: CPT | Performed by: NURSE PRACTITIONER

## 2022-01-27 RX ORDER — FLUCONAZOLE 100 MG/1
100 TABLET ORAL DAILY
Refills: 0
Start: 2022-01-27 | End: 2022-01-29

## 2022-01-27 RX ADMIN — FAMOTIDINE 20 MG: 20 TABLET ORAL at 09:42

## 2022-01-27 RX ADMIN — CARBIDOPA AND LEVODOPA 1 TABLET: 25; 100 TABLET ORAL at 09:42

## 2022-01-27 RX ADMIN — LEVOTHYROXINE SODIUM 75 MCG: 75 TABLET ORAL at 05:20

## 2022-01-27 RX ADMIN — CILOSTAZOL 100 MG: 50 TABLET ORAL at 09:41

## 2022-01-27 RX ADMIN — HEPARIN SODIUM 5000 UNITS: 5000 INJECTION INTRAVENOUS; SUBCUTANEOUS at 05:20

## 2022-01-27 RX ADMIN — Medication 250 MG: at 09:42

## 2022-01-27 RX ADMIN — CLOPIDOGREL BISULFATE 75 MG: 75 TABLET ORAL at 09:42

## 2022-01-27 RX ADMIN — GLYCOPYRROLATE 1 MG: 1 TABLET ORAL at 09:41

## 2022-01-27 NOTE — PLAN OF CARE
Problem: MOBILITY - ADULT  Goal: Maintain or return to baseline ADL function  Description: INTERVENTIONS:  -  Assess patient's ability to carry out ADLs; assess patient's baseline for ADL function and identify physical deficits which impact ability to perform ADLs (bathing, care of mouth/teeth, toileting, grooming, dressing, etc )  - Assess/evaluate cause of self-care deficits   - Assess range of motion  - Assess patient's mobility; develop plan if impaired  - Assess patient's need for assistive devices and provide as appropriate  - Encourage maximum independence but intervene and supervise when necessary  - Involve family in performance of ADLs  - Assess for home care needs following discharge   - Consider OT consult to assist with ADL evaluation and planning for discharge  - Provide patient education as appropriate  Outcome: Progressing  Goal: Maintains/Returns to pre admission functional level  Description: INTERVENTIONS:  - Perform BMAT or MOVE assessment daily    - Set and communicate daily mobility goal to care team and patient/family/caregiver  - Collaborate with rehabilitation services on mobility goals if consulted  - Perform Range of Motion 3 times a day  - Reposition patient every 2 hours    - Dangle patient 3 times a day  - Out of bed for toileting  - Record patient progress and toleration of activity level   Outcome: Progressing     Problem: Potential for Falls  Goal: Patient will remain free of falls  Description: INTERVENTIONS:  - Educate patient/family on patient safety including physical limitations  - Instruct patient to call for assistance with activity   - Consult OT/PT to assist with strengthening/mobility   - Keep Call bell within reach  - Keep bed low and locked with side rails adjusted as appropriate  - Keep care items and personal belongings within reach  - Initiate and maintain comfort rounds  - Make Fall Risk Sign visible to staff  - Offer Toileting every 2 Hours, in advance of need  - Initiate/Maintain bed/chair alarm  - Obtain necessary fall risk management equipment: chair alarm  - Apply yellow socks and bracelet for high fall risk patients  - Consider moving patient to room near nurses station  Outcome: Progressing     Problem: INFECTION - ADULT  Goal: Absence or prevention of progression during hospitalization  Description: INTERVENTIONS:  - Assess and monitor for signs and symptoms of infection  - Monitor lab/diagnostic results  - Monitor all insertion sites, i e  indwelling lines, tubes, and drains  - Monitor endotracheal if appropriate and nasal secretions for changes in amount and color  - [place] appropriate cooling/warming therapies per order  - Administer medications as ordered  - Instruct and encourage patient and family to use good hand hygiene technique  - Identify and instruct in appropriate isolation precautions for identified infection/condition  Outcome: Progressing     Problem: DISCHARGE PLANNING  Goal: Discharge to home or other facility with appropriate resources  Description: INTERVENTIONS:  - Identify barriers to discharge w/patient and caregiver  - Arrange for needed discharge resources and transportation as appropriate  - Identify discharge learning needs (meds, wound care, etc )  - Arrange for interpretive services to assist at discharge as needed  - Refer to Case Management Department for coordinating discharge planning if the patient needs post-hospital services based on physician/advanced practitioner order or complex needs related to functional status, cognitive ability, or social support system  Outcome: Progressing     Problem: Knowledge Deficit  Goal: Patient/family/caregiver demonstrates understanding of disease process, treatment plan, medications, and discharge instructions  Description: Complete learning assessment and assess knowledge base    Interventions:  - Provide teaching at level of understanding  - Provide teaching via preferred learning methods  Outcome: Progressing     Problem: GENITOURINARY - ADULT  Goal: Maintains or returns to baseline urinary function  Description: INTERVENTIONS:  - Assess urinary function  - Encourage oral fluids to ensure adequate hydration if ordered  - Administer IV fluids as ordered to ensure adequate hydration  - Administer ordered medications as needed  - Offer frequent toileting  - Follow urinary retention protocol if ordered  Outcome: Progressing  Goal: Absence of urinary retention  Description: INTERVENTIONS:  - Assess patient's ability to void and empty bladder  - Monitor I/O  - Bladder scan as needed  - Discuss with physician/AP medications to alleviate retention as needed  - Discuss catheterization for long term situations as appropriate  Outcome: Progressing     Problem: Prexisting or High Potential for Compromised Skin Integrity  Goal: Skin integrity is maintained or improved  Description: INTERVENTIONS:  - Identify patients at risk for skin breakdown  - Assess and monitor skin integrity  - Assess and monitor nutrition and hydration status  - Monitor labs   - Assess for incontinence   - Turn and reposition patient  - Assist with mobility/ambulation  - Relieve pressure over bony prominences  - Avoid friction and shearing  - Provide appropriate hygiene as needed including keeping skin clean and dry  - Evaluate need for skin moisturizer/barrier cream  - Collaborate with interdisciplinary team   - Patient/family teaching  - Consider wound care consult   Outcome: Progressing     Problem: Nutrition/Hydration-ADULT  Goal: Nutrient/Hydration intake appropriate for improving, restoring or maintaining nutritional needs  Description: Monitor and assess patient's nutrition/hydration status for malnutrition  Collaborate with interdisciplinary team and initiate plan and interventions as ordered  Monitor patient's weight and dietary intake as ordered or per policy  Utilize nutrition screening tool and intervene as necessary  Determine patient's food preferences and provide high-protein, high-caloric foods as appropriate       INTERVENTIONS:  - Monitor oral intake, urinary output, labs, and treatment plans  - Assess nutrition and hydration status and recommend course of action  - Evaluate amount of meals eaten  - Assist patient with eating if necessary   - Allow adequate time for meals  - Recommend/ encourage appropriate diets, oral nutritional supplements, and vitamin/mineral supplements  - Order, calculate, and assess calorie counts as needed  - Recommend, monitor, and adjust tube feedings and TPN/PPN based on assessed needs  - Assess need for intravenous fluids  - Provide specific nutrition/hydration education as appropriate  - Include patient/family/caregiver in decisions related to nutrition  Outcome: Progressing

## 2022-01-27 NOTE — PROGRESS NOTES
Progress Note - Laverne Angela 64 y o  female MRN: [id number]    Unit/Bed#: E5 -01 Encounter: [id number]      Assessment/Plan:  Ambulatory dysfunction  Multifactorial fall Risk factors:  Age, Parkinson's, limited mobility, visual impairment, polypharmacy  PT/OT  Rehab post hospitalization     Parkinson's dementia  Follows Neurology in [place] (Dr Lauren Monroe)  Has deep brain stimulator battery due to be changed in April  TSH 1 184 on 1/23/22  Recommend check Vitamin B12 level  Continue sinement and Aricept, supportive care     Anxiety/depression  Chronic use of Lexapro and Xanax 0 25 milligrams p o  Q h s    Although benzodiazepines are not recommended in the older adult population do not recommend abrupt withdrawal  Doing well off xanax, continue to monitor  Follows with Psychiatry as outpatient     Frailty  Severe   Secondary to underlying parkinson's dementia, hx of stroke, needs assistance with IADLs and ADLs, limited mobility, dysphagia, incontinence, polypharmacy  Albumin 3 5, maintain protein in diet  PT/OT   is primary caregiver, continue supportive care  Rehab post hospitalization for optimization     Delirium precautions  Hx of encephalopathy with prior hospitalization, associated UTI, resolved with treatment  At risk for delirium secondary to hospitalization, history of delirium with previous hospitalization in December, polypharmacy, multiple underlying comorbidities  Provide frequent redirection, reorientation, distraction techniques  Avoid deliriogenic medications such as tramadol, benzodiazepines, anticholinergics,  Benadryl  Treat pain, See geriatric pain protocol  Monitor for constipation and urinary retention  Encourage early and frequent moblization, OOB  Encourage Hydration/ Nutrition  Implement sleep hygiene, limit night time interuptions, group activities     UTI  UA on admission, positive small leukocytes, moderate bacteria  Completed 3 course of IV Rocephin  Internal med following    Subjective:   Upon exam patient is lying in bed  Her eyes are closed  Nursing at bedside  Reports that she ate breakfast     Objective:     Vitals: Blood pressure 145/73, pulse 88, temperature 98 °F (36 7 °C), resp  rate 22, height 5' (1 524 m), weight 74 1 kg (163 lb 5 8 oz), SpO2 92 %, not currently breastfeeding  ,Body mass index is 31 9 kg/m²  No intake or output data in the 24 hours ending 01/27/22 0942    Physical Exam:   General : NAD  HEENT : MMM   Heart : Normal rate, no murmur rub or gallop  Lungs : CTA no wheezes, rales or rhonchi  Abdomen : Soft, NT/ND, BS auscultated in all 4 quads  Ext :  no edema  Skin : Pink, warm, dry, age appropriate turgor and mobility  Neuro : Nonfocal  Psych : eyes closed      Invasive Devices  Report    Peripheral Intravenous Line            Peripheral IV 01/27/22 Dorsal (posterior); Right Hand <1 day                Lab, Imaging and other studies: I have personally reviewed pertinent reports      VTE Pharmacologic Prophylaxis: Sequential compression device (Venodyne)   VTE Mechanical Prophylaxis: sequential compression device

## 2022-01-27 NOTE — DISCHARGE SUMMARY
[facility]  Discharge- Roseline Calderon 1945, 64 y o  female MRN: [id number]  Unit/Bed#: E5 -01 Encounter: [id number]  Primary Care Provider: Fidelia Lopez DO   Date and time admitted to hospital: 1/23/2022  8:57 AM    * Ambulatory dysfunction  Assessment & Plan  Presents with ambulatory dysfunction in the setting of suspected urinary tract infection, baseline wheelchair bound but does stand and pivot per  at baseline, nonverbal due to Parkinson's,  Previous history of right hemispheric 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   PT/OT recommending rehab   CM arrangements for placement appreciated   Accepted [facility] in stable condition,  updated via telephone     Oral thrush  Assessment & Plan  Started on fluconazole - complete x3 days       History of stroke  Assessment & Plan  History of stroke in 2/2020   Maintained on pletal, plavix, aspirin     S/P deep brain stimulator placement  Assessment & Plan  History of Parkinson's dementia with deep brain stimulator -due for change in April    Type 2 diabetes mellitus with hyperglycemia [facility])  Assessment & Plan  Lab Results   Component Value Date    HGBA1C 6 3 (H) 12/06/2021       Recent Labs     01/25/22  1056 01/25/22  1639 01/25/22  2149 01/26/22  0758   POCGLU 188* 120 133 136       Blood Sugar Average: Last 72 hrs:  Diet controlled at home        Parkinson disease [facility])  Assessment & Plan  Continue carbidopa-levodopa, continue Robinul 1 mg bid   Patient is nonverbal for many years and wheelchair bound,  unable to provide necessary care at home  Accepted at rehab     Recurrent UTI-resolved as of 1/26/2022  Assessment & Plan  Patient  reports that he tested her urine with a home test kit   received 1 day of Macrobid prescribed by patient's primary care provider prior to admission   Urine culture without significant growth and blood cx negative to date   Received 3 days IV rocephin, monitoring off further antibiotics              Medical Problems             Resolved Problems  Date Reviewed: 1/27/2022          Resolved    Recurrent UTI 1/26/2022     Resolved by  James Martinez PA-C              Discharging Physician / Practitioner: Abbie Swain MD  PCP: Kathryn Butterfield DO  Admission Date:   Admission Orders (From admission, onward)     Ordered        01/24/22 1113  Inpatient Admission  Once            01/23/22 1226  Place in Observation  Once                      Discharge Date: 01/27/22    Consultations During Hospital Stay:  · Geriatric medicine, PT, OT, CM    Procedures Performed:   CT head without contrast   ED Interpretation by Caroline Slater DO (01/23 1221)   IMPRESSION:     No acute intracranial abnormality  Final Result by Travis Holman MD (01/23 1216)      No acute intracranial abnormality  Workstation performed: [id number]         XR chest 1 view portable   Final Result by Keith Chen MD (01/24 0765)      No acute cardiopulmonary disease  Workstation performed: [id number]               Significant Findings / Test Results:   Results from last 7 days   Lab Units 01/25/22  0500   WBC Thousand/uL 8 82   HEMOGLOBIN g/dL 14 7   HEMATOCRIT % 43 4   PLATELETS Thousands/uL 275     Results from last 7 days   Lab Units 01/24/22  0557   SODIUM mmol/L 141   POTASSIUM mmol/L 3 3*   CHLORIDE mmol/L 109*   CO2 mmol/L 23   BUN mg/dL 17   CREATININE mg/dL 0 79   CALCIUM mg/dL 8 7         Incidental Findings:   · None     Test Results Pending at Discharge (will require follow up):    · None     Outpatient Tests Requested:  · None    Complications:  None    Reason for Admission: ambulatory dysfunction     Hospital Course:   Jozef Lu is a 64 y o  female patient with history of progressive Parkinson's disease, recurrent UTIs, ambulatory dysfunction and diet-controlled diabetes who originally presented to the hospital on 1/23/2022 due to worsening ambulatory dysfunction  Underwent course of antibiotics for UTI treatment, remained stable and afebrile  Evaluated by PT and OT and recommended rehab placement  Case Management made arrangements for discharge at rehab facility, patient discharged in improved and stable condition to [facility] care at [facility]  Patient's  updated via telephone  Please see above list of diagnoses and related plan for additional information  Condition at Discharge: stable    Discharge Day Visit / Exam:   Subjective:  Patient seen and examined  Sleeping, arousable  Nonverbal at baseline  Per staff no overnight events  Vitals: Blood Pressure: 145/73 (01/27/22 0722)  Pulse: 88 (01/27/22 0722)  Temperature: 98 °F (36 7 °C) (01/27/22 0722)  Temp Source: Oral (01/24/22 1520)  Respirations: 22 (01/27/22 0722)  Height: 5' (152 4 cm) (01/23/22 1300)  Weight - Scale: 74 1 kg (163 lb 5 8 oz) (01/27/22 0600)  SpO2: 92 % (01/27/22 0722)  Exam:   Physical Exam  Constitutional:       General: She is not in acute distress  Appearance: She is obese  She is ill-appearing  HENT:      Head: Normocephalic and atraumatic  Mouth/Throat:      Pharynx: Oropharynx is clear  Eyes:      Conjunctiva/sclera: Conjunctivae normal    Cardiovascular:      Rate and Rhythm: Normal rate and regular rhythm  Heart sounds: No murmur heard  Pulmonary:      Effort: No respiratory distress  Breath sounds: No wheezing or rales  Abdominal:      General: There is no distension  Tenderness: There is no abdominal tenderness  There is no guarding  Musculoskeletal:      Right lower leg: No edema  Left lower leg: No edema  Skin:     General: Skin is warm and dry  Neurological:      Mental Status: Mental status is at baseline  Psychiatric:         Speech: She is noncommunicative  Discussion with Family: Updated  () via phone      Discharge instructions/Information to patient and family:   See after visit summary for information provided to patient and family  Provisions for Follow-Up Care:  See after visit summary for information related to follow-up care and any pertinent home health orders  Disposition:   Other [place] [facility] care at [facility] Readmission: No     Discharge Statement:  I spent 35 minutes discharging the patient  This time was spent on the day of discharge  I had direct contact with the patient on the day of discharge  Greater than 50% of the total time was spent examining patient, answering all patient questions, arranging and discussing plan of care with patient as well as directly providing post-discharge instructions  Additional time then spent on discharge activities  Discharge Medications:  See after visit summary for reconciled discharge medications provided to patient and/or family        **Please Note: This note may have been constructed using a voice recognition system**

## 2022-01-27 NOTE — PLAN OF CARE
Problem: MOBILITY - ADULT  Goal: Maintain or return to baseline ADL function  Description: INTERVENTIONS:  -  Assess patient's ability to carry out ADLs; assess patient's baseline for ADL function and identify physical deficits which impact ability to perform ADLs (bathing, care of mouth/teeth, toileting, grooming, dressing, etc )  - Assess/evaluate cause of self-care deficits   - Assess range of motion  - Assess patient's mobility; develop plan if impaired  - Assess patient's need for assistive devices and provide as appropriate  - Encourage maximum independence but intervene and supervise when necessary  - Involve family in performance of ADLs  - Assess for home care needs following discharge   - Consider OT consult to assist with ADL evaluation and planning for discharge  - Provide patient education as appropriate  Outcome: Progressing  Goal: Maintains/Returns to pre admission functional level  Description: INTERVENTIONS:  - Perform BMAT or MOVE assessment daily    - Set and communicate daily mobility goal to care team and patient/family/caregiver  - Collaborate with rehabilitation services on mobility goals if consulted  - Perform Range of Motion 3 times a day  - Reposition patient every 2 hours    - Dangle patient 3 times a day  - Out of bed for toileting  - Record patient progress and toleration of activity level   Outcome: Progressing     Problem: Potential for Falls  Goal: Patient will remain free of falls  Description: INTERVENTIONS:  - Educate patient/family on patient safety including physical limitations  - Instruct patient to call for assistance with activity   - Consult OT/PT to assist with strengthening/mobility   - Keep Call bell within reach  - Keep bed low and locked with side rails adjusted as appropriate  - Keep care items and personal belongings within reach  - Initiate and maintain comfort rounds  - Make Fall Risk Sign visible to staff  - Offer Toileting every 2 Hours, in advance of need  - Initiate/Maintain bed/chair alarm  - Obtain necessary fall risk management equipment: chair alarm  - Apply yellow socks and bracelet for high fall risk patients  - Consider moving patient to room near nurses station  Outcome: Progressing     Problem: PAIN - ADULT  Goal: Verbalizes/displays adequate comfort level or baseline comfort level  Description: Interventions:  - Encourage patient to monitor pain and request assistance  - Assess pain using appropriate pain scale  - Administer analgesics based on type and severity of pain and evaluate response  - Implement non-pharmacological measures as appropriate and evaluate response  - Consider cultural and social influences on pain and pain management  - Notify physician/advanced practitioner if interventions unsuccessful or patient reports new pain  Outcome: Progressing     Problem: INFECTION - ADULT  Goal: Absence or prevention of progression during hospitalization  Description: INTERVENTIONS:  - Assess and monitor for signs and symptoms of infection  - Monitor lab/diagnostic results  - Monitor all insertion sites, i e  indwelling lines, tubes, and drains  - Monitor endotracheal if appropriate and nasal secretions for changes in amount and color  - [place] appropriate cooling/warming therapies per order  - Administer medications as ordered  - Instruct and encourage patient and family to use good hand hygiene technique  - Identify and instruct in appropriate isolation precautions for identified infection/condition  Outcome: Progressing     Problem: DISCHARGE PLANNING  Goal: Discharge to home or other facility with appropriate resources  Description: INTERVENTIONS:  - Identify barriers to discharge w/patient and caregiver  - Arrange for needed discharge resources and transportation as appropriate  - Identify discharge learning needs (meds, wound care, etc )  - Arrange for interpretive services to assist at discharge as needed  - Refer to Case Management Department for coordinating discharge planning if the patient needs post-hospital services based on physician/advanced practitioner order or complex needs related to functional status, cognitive ability, or social support system  Outcome: Progressing     Problem: Knowledge Deficit  Goal: Patient/family/caregiver demonstrates understanding of disease process, treatment plan, medications, and discharge instructions  Description: Complete learning assessment and assess knowledge base    Interventions:  - Provide teaching at level of understanding  - Provide teaching via preferred learning methods  Outcome: Progressing     Problem: GENITOURINARY - ADULT  Goal: Maintains or returns to baseline urinary function  Description: INTERVENTIONS:  - Assess urinary function  - Encourage oral fluids to ensure adequate hydration if ordered  - Administer IV fluids as ordered to ensure adequate hydration  - Administer ordered medications as needed  - Offer frequent toileting  - Follow urinary retention protocol if ordered  Outcome: Progressing  Goal: Absence of urinary retention  Description: INTERVENTIONS:  - Assess patient's ability to void and empty bladder  - Monitor I/O  - Bladder scan as needed  - Discuss with physician/AP medications to alleviate retention as needed  - Discuss catheterization for long term situations as appropriate  Outcome: Progressing     Problem: Prexisting or High Potential for Compromised Skin Integrity  Goal: Skin integrity is maintained or improved  Description: INTERVENTIONS:  - Identify patients at risk for skin breakdown  - Assess and monitor skin integrity  - Assess and monitor nutrition and hydration status  - Monitor labs   - Assess for incontinence   - Turn and reposition patient  - Assist with mobility/ambulation  - Relieve pressure over bony prominences  - Avoid friction and shearing  - Provide appropriate hygiene as needed including keeping skin clean and dry  - Evaluate need for skin moisturizer/barrier cream  - Collaborate with interdisciplinary team   - Patient/family teaching  - Consider wound care consult   Outcome: Progressing     Problem: Nutrition/Hydration-ADULT  Goal: Nutrient/Hydration intake appropriate for improving, restoring or maintaining nutritional needs  Description: Monitor and assess patient's nutrition/hydration status for malnutrition  Collaborate with interdisciplinary team and initiate plan and interventions as ordered  Monitor patient's weight and dietary intake as ordered or per policy  Utilize nutrition screening tool and intervene as necessary  Determine patient's food preferences and provide high-protein, high-caloric foods as appropriate       INTERVENTIONS:  - Monitor oral intake, urinary output, labs, and treatment plans  - Assess nutrition and hydration status and recommend course of action  - Evaluate amount of meals eaten  - Assist patient with eating if necessary   - Allow adequate time for meals  - Recommend/ encourage appropriate diets, oral nutritional supplements, and vitamin/mineral supplements  - Order, calculate, and assess calorie counts as needed  - Recommend, monitor, and adjust tube feedings and TPN/PPN based on assessed needs  - Assess need for intravenous fluids  - Provide specific nutrition/hydration education as appropriate  - Include patient/family/caregiver in decisions related to nutrition  Outcome: Progressing

## 2022-01-28 LAB
BACTERIA BLD CULT: NORMAL
BACTERIA BLD CULT: NORMAL

## 2022-02-16 ENCOUNTER — TELEPHONE (OUTPATIENT)
Dept: LAB | Facility: HOSPITAL | Age: 77
End: 2022-02-16

## 2022-02-16 ENCOUNTER — APPOINTMENT (OUTPATIENT)
Dept: LAB | Facility: CLINIC | Age: 77
End: 2022-02-16
Payer: MEDICARE

## 2022-02-16 DIAGNOSIS — N39.0 RECURRENT UTI: ICD-10-CM

## 2022-02-16 DIAGNOSIS — E11.65 TYPE 2 DIABETES MELLITUS WITH HYPERGLYCEMIA, WITHOUT LONG-TERM CURRENT USE OF INSULIN (HCC): ICD-10-CM

## 2022-02-16 DIAGNOSIS — M81.0 OSTEOPOROSIS WITHOUT CURRENT PATHOLOGICAL FRACTURE, UNSPECIFIED OSTEOPOROSIS TYPE: ICD-10-CM

## 2022-02-16 DIAGNOSIS — F32.3 MAJOR DEPRESSION WITH PSYCHOTIC FEATURES (HCC): ICD-10-CM

## 2022-02-16 DIAGNOSIS — Z96.89 S/P DEEP BRAIN STIMULATOR PLACEMENT: ICD-10-CM

## 2022-02-16 DIAGNOSIS — E78.2 MIXED HYPERLIPIDEMIA: ICD-10-CM

## 2022-02-16 DIAGNOSIS — F06.31 MOOD DISORDER WITH MAJOR DEPRESSIVE-LIKE EPISODE DUE TO GENERAL MEDICAL CONDITION: ICD-10-CM

## 2022-02-16 DIAGNOSIS — Z74.09 DECREASED AMBULATION STATUS: ICD-10-CM

## 2022-02-16 DIAGNOSIS — G20 PARKINSON DISEASE (HCC): ICD-10-CM

## 2022-02-16 DIAGNOSIS — E03.9 ACQUIRED HYPOTHYROIDISM: ICD-10-CM

## 2022-02-16 DIAGNOSIS — G31.83 DEMENTIA WITH LEWY BODIES (CODE) (HCC): ICD-10-CM

## 2022-02-16 DIAGNOSIS — B37.83 PERLECHE WITH CANDIDIASIS: ICD-10-CM

## 2022-02-16 DIAGNOSIS — K21.9 GASTROESOPHAGEAL REFLUX DISEASE WITHOUT ESOPHAGITIS: Chronic | ICD-10-CM

## 2022-02-16 DIAGNOSIS — I63.81 RIGHT-SIDED LACUNAR INFARCTION (HCC): ICD-10-CM

## 2022-02-16 LAB
CREAT UR-MCNC: 173 MG/DL
MICROALBUMIN UR-MCNC: 291 MG/L (ref 0–20)
MICROALBUMIN/CREAT 24H UR: 168 MG/G CREATININE (ref 0–30)

## 2022-02-16 PROCEDURE — 82043 UR ALBUMIN QUANTITATIVE: CPT

## 2022-02-16 PROCEDURE — 82570 ASSAY OF URINE CREATININE: CPT

## 2022-02-22 ENCOUNTER — APPOINTMENT (OUTPATIENT)
Dept: LAB | Facility: HOSPITAL | Age: 77
End: 2022-02-22
Payer: MEDICARE

## 2022-02-22 LAB
25(OH)D3 SERPL-MCNC: 41.6 NG/ML (ref 30–100)
ALBUMIN SERPL BCP-MCNC: 3.2 G/DL (ref 3.5–5)
ALP SERPL-CCNC: 74 U/L (ref 46–116)
ALT SERPL W P-5'-P-CCNC: 16 U/L (ref 12–78)
ANION GAP SERPL CALCULATED.3IONS-SCNC: 5 MMOL/L (ref 4–13)
AST SERPL W P-5'-P-CCNC: 16 U/L (ref 5–45)
BILIRUB SERPL-MCNC: 0.56 MG/DL (ref 0.2–1)
BUN SERPL-MCNC: 15 MG/DL (ref 5–25)
CALCIUM ALBUM COR SERPL-MCNC: 10.7 MG/DL (ref 8.3–10.1)
CALCIUM SERPL-MCNC: 10.1 MG/DL (ref 8.3–10.1)
CHLORIDE SERPL-SCNC: 108 MMOL/L (ref 100–108)
CHOLEST SERPL-MCNC: 115 MG/DL
CO2 SERPL-SCNC: 27 MMOL/L (ref 21–32)
CREAT SERPL-MCNC: 0.79 MG/DL (ref 0.6–1.3)
ERYTHROCYTE [DISTWIDTH] IN BLOOD BY AUTOMATED COUNT: 13.8 % (ref 11.6–15.1)
EST. AVERAGE GLUCOSE BLD GHB EST-MCNC: 134 MG/DL
GFR SERPL CREATININE-BSD FRML MDRD: 71 ML/MIN/1.73SQ M
GLUCOSE P FAST SERPL-MCNC: 172 MG/DL (ref 65–99)
HBA1C MFR BLD: 6.3 %
HCT VFR BLD AUTO: 45.2 % (ref 34.8–46.1)
HDLC SERPL-MCNC: 45 MG/DL
HGB BLD-MCNC: 15.3 G/DL (ref 11.5–15.4)
LDLC SERPL CALC-MCNC: 43 MG/DL (ref 0–100)
MCH RBC QN AUTO: 31.5 PG (ref 26.8–34.3)
MCHC RBC AUTO-ENTMCNC: 33.8 G/DL (ref 31.4–37.4)
MCV RBC AUTO: 93 FL (ref 82–98)
PLATELET # BLD AUTO: 279 THOUSANDS/UL (ref 149–390)
PMV BLD AUTO: 10.6 FL (ref 8.9–12.7)
POTASSIUM SERPL-SCNC: 3.3 MMOL/L (ref 3.5–5.3)
PROT SERPL-MCNC: 6.7 G/DL (ref 6.4–8.2)
RBC # BLD AUTO: 4.85 MILLION/UL (ref 3.81–5.12)
SODIUM SERPL-SCNC: 140 MMOL/L (ref 136–145)
TRIGL SERPL-MCNC: 133 MG/DL
TSH SERPL DL<=0.05 MIU/L-ACNC: 2.03 UIU/ML (ref 0.36–3.74)
WBC # BLD AUTO: 7.36 THOUSAND/UL (ref 4.31–10.16)

## 2022-02-22 PROCEDURE — 36415 COLL VENOUS BLD VENIPUNCTURE: CPT

## 2022-02-22 PROCEDURE — 80053 COMPREHEN METABOLIC PANEL: CPT

## 2022-02-22 PROCEDURE — 85027 COMPLETE CBC AUTOMATED: CPT

## 2022-02-22 PROCEDURE — 82306 VITAMIN D 25 HYDROXY: CPT

## 2022-02-22 PROCEDURE — 84443 ASSAY THYROID STIM HORMONE: CPT

## 2022-02-22 PROCEDURE — 83036 HEMOGLOBIN GLYCOSYLATED A1C: CPT

## 2022-02-22 PROCEDURE — 80061 LIPID PANEL: CPT

## 2022-02-23 ENCOUNTER — TRANSCRIBE ORDERS (OUTPATIENT)
Dept: LAB | Facility: HOSPITAL | Age: 77
End: 2022-02-23

## 2022-02-23 DIAGNOSIS — Z01.818 OTHER SPECIFIED PRE-OPERATIVE EXAMINATION: Primary | ICD-10-CM

## 2022-02-24 ENCOUNTER — OFFICE VISIT (OUTPATIENT)
Dept: FAMILY MEDICINE CLINIC | Facility: CLINIC | Age: 77
End: 2022-02-24
Payer: MEDICARE

## 2022-02-24 VITALS
BODY MASS INDEX: 29.69 KG/M2 | DIASTOLIC BLOOD PRESSURE: 62 MMHG | SYSTOLIC BLOOD PRESSURE: 120 MMHG | TEMPERATURE: 97.5 F | WEIGHT: 152 LBS

## 2022-02-24 DIAGNOSIS — E78.2 MIXED HYPERLIPIDEMIA: ICD-10-CM

## 2022-02-24 DIAGNOSIS — G93.41 METABOLIC ENCEPHALOPATHY: ICD-10-CM

## 2022-02-24 DIAGNOSIS — R26.2 AMBULATORY DYSFUNCTION: ICD-10-CM

## 2022-02-24 DIAGNOSIS — Z96.89 S/P DEEP BRAIN STIMULATOR PLACEMENT: ICD-10-CM

## 2022-02-24 DIAGNOSIS — E11.29 TYPE 2 DIABETES MELLITUS WITH MICROALBUMINURIA, WITHOUT LONG-TERM CURRENT USE OF INSULIN (HCC): Primary | ICD-10-CM

## 2022-02-24 DIAGNOSIS — Z74.09 DECREASED AMBULATION STATUS: ICD-10-CM

## 2022-02-24 DIAGNOSIS — R80.9 TYPE 2 DIABETES MELLITUS WITH MICROALBUMINURIA, WITHOUT LONG-TERM CURRENT USE OF INSULIN (HCC): Primary | ICD-10-CM

## 2022-02-24 DIAGNOSIS — K21.9 GASTROESOPHAGEAL REFLUX DISEASE WITHOUT ESOPHAGITIS: Chronic | ICD-10-CM

## 2022-02-24 DIAGNOSIS — I63.81 RIGHT-SIDED LACUNAR INFARCTION (HCC): ICD-10-CM

## 2022-02-24 DIAGNOSIS — M81.0 OSTEOPOROSIS WITHOUT CURRENT PATHOLOGICAL FRACTURE, UNSPECIFIED OSTEOPOROSIS TYPE: ICD-10-CM

## 2022-02-24 DIAGNOSIS — F06.31 MOOD DISORDER WITH MAJOR DEPRESSIVE-LIKE EPISODE DUE TO GENERAL MEDICAL CONDITION: ICD-10-CM

## 2022-02-24 DIAGNOSIS — I69.354 HEMIPARESIS AFFECTING LEFT SIDE AS LATE EFFECT OF CEREBROVASCULAR ACCIDENT (CVA) (HCC): ICD-10-CM

## 2022-02-24 DIAGNOSIS — E87.6 HYPOKALEMIA: ICD-10-CM

## 2022-02-24 DIAGNOSIS — G31.83 DEMENTIA WITH LEWY BODIES (CODE) (HCC): ICD-10-CM

## 2022-02-24 DIAGNOSIS — F32.3 MAJOR DEPRESSION WITH PSYCHOTIC FEATURES (HCC): ICD-10-CM

## 2022-02-24 DIAGNOSIS — E03.9 ACQUIRED HYPOTHYROIDISM: ICD-10-CM

## 2022-02-24 DIAGNOSIS — D48.5 NEOPLASM OF UNCERTAIN BEHAVIOR OF SKIN OF LOWER LEG: ICD-10-CM

## 2022-02-24 DIAGNOSIS — G20 PARKINSON DISEASE (HCC): ICD-10-CM

## 2022-02-24 PROBLEM — S81.802A LEG WOUND, LEFT: Status: RESOLVED | Noted: 2021-12-22 | Resolved: 2022-02-24

## 2022-02-24 PROBLEM — Z86.73 HISTORY OF STROKE: Status: RESOLVED | Noted: 2022-01-24 | Resolved: 2022-02-24

## 2022-02-24 PROBLEM — R53.1 WEAKNESS OF LEFT SIDE OF BODY: Status: RESOLVED | Noted: 2020-01-30 | Resolved: 2022-02-24

## 2022-02-24 PROCEDURE — 99215 OFFICE O/P EST HI 40 MIN: CPT | Performed by: FAMILY MEDICINE

## 2022-02-24 RX ORDER — POTASSIUM CHLORIDE 600 MG/1
8 TABLET, FILM COATED, EXTENDED RELEASE ORAL DAILY
Qty: 30 TABLET | Refills: 5 | Status: SHIPPED | OUTPATIENT
Start: 2022-02-24 | End: 2022-05-20

## 2022-02-24 NOTE — PROGRESS NOTES
Assessment and Plan:  1  Type 2 diabetes with microalbuminuria, stable diet controlled  2  Hypothyroidism, stable continue present therapy  3  Dementia with Lewy bodies  4  Metabolic encephalopathy  5  Right lacunar infarction with residual left-sided weakness  6  Parkinsonism  7  Deep brain stimulator  Patient follows with [facility]  8  Depression, stable follow psychiatry  9  Ambulation dysfunction patient is wheelchair-bound  10  Hypokalemia start Slow-K 8 no: Tablets 1 daily check potassium 1 month  11  Skin neoplasm left calf a meeting ABCDE criteria refer to Dermatology for further evaluation must rule out skin cancer  12  patient declines any further DEXA scans, check a vitamin-D level  13  GERD, stable continue present therapy  14  Patient return in 6 months for office visit, blood work, and a [address]  Per  patient does not travel well and will only come in every 6 months   15   states wife has history of recurrent UTI does have some cloudy urine  Order for UA and CNS was given    Patient will drop urine off at lab      Problem List Items Addressed This Visit        Digestive    GERD (gastroesophageal reflux disease) (Chronic)     Stable continue present therapy            Endocrine    Acquired hypothyroidism     Stable continue present therapy         Relevant Orders    CBC    Comprehensive metabolic panel    Hemoglobin A1C    Lipid Panel with Direct LDL reflex    Microalbumin / creatinine urine ratio    TSH, 3rd generation with Free T4 reflex    T4    UA (URINE) with reflex to Scope    Vitamin D 25 hydroxy    Type 2 diabetes mellitus with microalbuminuria, without long-term current use of insulin (HCC) - Primary       Lab Results   Component Value Date    HGBA1C 6 3 (H) 02/22/2022   Stable, diet controlled         Relevant Orders    CBC    Comprehensive metabolic panel    Hemoglobin A1C    Lipid Panel with Direct LDL reflex    Microalbumin / creatinine urine ratio    TSH, 3rd generation with Free T4 reflex    T4    UA (URINE) with reflex to Scope    Vitamin D 25 hydroxy       Nervous and Auditory    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     Follows with psychiatry         Relevant Orders    CBC    Comprehensive metabolic panel    Hemoglobin A1C    Lipid Panel with Direct LDL reflex    Microalbumin / creatinine urine ratio    TSH, 3rd generation with Free T4 reflex    T4    UA (URINE) with reflex to Scope    Vitamin D 25 hydroxy    Parkinson disease ([facility] [address] )     Stable follows with Neurology         Relevant Orders    CBC    Comprehensive metabolic panel    Hemoglobin A1C    Lipid Panel with Direct LDL reflex    Microalbumin / creatinine urine ratio    TSH, 3rd generation with Free T4 reflex    T4    UA (URINE) with reflex to Scope    Vitamin D 25 hydroxy    Dementia with Lewy bodies (CODE) (HCC)     Continue Aricept follows with Neurology         Relevant Orders    CBC    Comprehensive metabolic panel    Hemoglobin A1C    Lipid Panel with Direct LDL reflex    Microalbumin / creatinine urine ratio    TSH, 3rd generation with Free T4 reflex    T4    UA (URINE) with reflex to Scope    Vitamin D 25 hydroxy    Metabolic encephalopathy     Follows with Neurology         Relevant Orders    CBC    Comprehensive metabolic panel    Hemoglobin A1C    Lipid Panel with Direct LDL reflex    Microalbumin / creatinine urine ratio    TSH, 3rd generation with Free T4 reflex    T4    UA (URINE) with reflex to Scope    Vitamin D 25 hydroxy    Hemiparesis affecting left side as late effect of cerebrovascular accident ([facility]) ([facility] )     Stable, follows with Neurology         Relevant Orders    CBC    Comprehensive metabolic panel    Hemoglobin A1C    Lipid Panel with Direct LDL reflex    Microalbumin / creatinine urine ratio    TSH, 3rd generation with Free T4 reflex    T4    UA (URINE) with reflex to Scope    Vitamin D 25 hydroxy    RESOLVED: Right-sided lacunar infarction [facility])    Relevant Orders    CBC    Comprehensive metabolic panel    Hemoglobin A1C    Lipid Panel with Direct LDL reflex    Microalbumin / creatinine urine ratio    TSH, 3rd generation with Free T4 reflex    T4    UA (URINE) with reflex to Scope    Vitamin D 25 hydroxy       Musculoskeletal and Integument    Osteoporosis     Patient and  have declined DEXA scans         Relevant Orders    CBC    Comprehensive metabolic panel    Hemoglobin A1C    Lipid Panel with Direct LDL reflex    Microalbumin / creatinine urine ratio    TSH, 3rd generation with Free T4 reflex    T4    UA (URINE) with reflex to Scope    Vitamin D 25 hydroxy       Other    Mixed hyperlipidemia     Stable continue present therapy         Relevant Orders    CBC    Comprehensive metabolic panel    Hemoglobin A1C    Lipid Panel with Direct LDL reflex    Microalbumin / creatinine urine ratio    TSH, 3rd generation with Free T4 reflex    T4    UA (URINE) with reflex to Scope    Vitamin D 25 hydroxy    Major depression with psychotic features ([facility] [address] )     To follow-up with Psychiatry         Relevant Orders    CBC    Comprehensive metabolic panel    Hemoglobin A1C    Lipid Panel with Direct LDL reflex    Microalbumin / creatinine urine ratio    TSH, 3rd generation with Free T4 reflex    T4    UA (URINE) with reflex to Scope    Vitamin D 25 hydroxy    Decreased ambulation status     Wheelchair-bound         Relevant Orders    CBC    Comprehensive metabolic panel    Hemoglobin A1C    Lipid Panel with Direct LDL reflex    Microalbumin / creatinine urine ratio    TSH, 3rd generation with Free T4 reflex    T4    UA (URINE) with reflex to Scope    Vitamin D 25 hydroxy    S/P deep brain stimulator placement     Follows with [facility] patient is due for battery change in next few months         Relevant Orders    CBC    Comprehensive metabolic panel    Hemoglobin A1C    Lipid Panel with Direct LDL reflex    Microalbumin / creatinine urine ratio    TSH, 3rd generation with Free T4 reflex    T4    UA (URINE) with reflex to Scope    Vitamin D 25 hydroxy    Hypokalemia     Start KCl 8 mEq daily check potassium level 1 month         Relevant Medications    potassium chloride (KLOR-CON) 8 MEQ tablet    Other Relevant Orders    Potassium    CBC    Comprehensive metabolic panel    Hemoglobin A1C    Lipid Panel with Direct LDL reflex    Microalbumin / creatinine urine ratio    TSH, 3rd generation with Free T4 reflex    T4    UA (URINE) with reflex to Scope    Vitamin D 25 hydroxy    RESOLVED: Ambulatory dysfunction    Relevant Orders    CBC    Comprehensive metabolic panel    Hemoglobin A1C    Lipid Panel with Direct LDL reflex    Microalbumin / creatinine urine ratio    TSH, 3rd generation with Free T4 reflex    T4    UA (URINE) with reflex to Scope    Vitamin D 25 hydroxy      Other Visit Diagnoses     Neoplasm of uncertain behavior of skin of lower leg        Relevant Orders    Ambulatory Referral to Dermatology    CBC    Comprehensive metabolic panel    Hemoglobin A1C    Lipid Panel with Direct LDL reflex    Microalbumin / creatinine urine ratio    TSH, 3rd generation with Free T4 reflex    T4    UA (URINE) with reflex to Scope    Vitamin D 25 hydroxy                 Diagnoses and all orders for this visit:    Type 2 diabetes mellitus with microalbuminuria, without long-term current use of insulin (HCC)  -     CBC; Future  -     Comprehensive metabolic panel; Future  -     Hemoglobin A1C; Future  -     Lipid Panel with Direct LDL reflex; Future  -     Microalbumin / creatinine urine ratio; Future  -     TSH, 3rd generation with Free T4 reflex; Future  -     T4; Future  -     UA (URINE) with reflex to Scope; Future  -     Vitamin D 25 hydroxy; Future    Acquired hypothyroidism  -     CBC; Future  -     Comprehensive metabolic panel; Future  -     Hemoglobin A1C; Future  -     Lipid Panel with Direct LDL reflex;  Future  -     Microalbumin / creatinine urine ratio; Future  -     TSH, 3rd generation with Free T4 reflex; Future  -     T4; Future  -     UA (URINE) with reflex to Scope; Future  -     Vitamin D 25 hydroxy; Future    Dementia with Lewy bodies (CODE) ([place] [address] )  -     CBC; Future  -     Comprehensive metabolic panel; Future  -     Hemoglobin A1C; Future  -     Lipid Panel with Direct LDL reflex; Future  -     Microalbumin / creatinine urine ratio; Future  -     TSH, 3rd generation with Free T4 reflex; Future  -     T4; Future  -     UA (URINE) with reflex to Scope; Future  -     Vitamin D 25 hydroxy; Future    Metabolic encephalopathy  -     CBC; Future  -     Comprehensive metabolic panel; Future  -     Hemoglobin A1C; Future  -     Lipid Panel with Direct LDL reflex; Future  -     Microalbumin / creatinine urine ratio; Future  -     TSH, 3rd generation with Free T4 reflex; Future  -     T4; Future  -     UA (URINE) with reflex to Scope; Future  -     Vitamin D 25 hydroxy; Future    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  -     CBC; Future  -     Comprehensive metabolic panel; Future  -     Hemoglobin A1C; Future  -     Lipid Panel with Direct LDL reflex; Future  -     Microalbumin / creatinine urine ratio; Future  -     TSH, 3rd generation with Free T4 reflex; Future  -     T4; Future  -     UA (URINE) with reflex to Scope; Future  -     Vitamin D 25 hydroxy; Future    Right-sided lacunar infarction ([place] [address] )  -     CBC; Future  -     Comprehensive metabolic panel; Future  -     Hemoglobin A1C; Future  -     Lipid Panel with Direct LDL reflex; Future  -     Microalbumin / creatinine urine ratio; Future  -     TSH, 3rd generation with Free T4 reflex; Future  -     T4; Future  -     UA (URINE) with reflex to Scope; Future  -     Vitamin D 25 hydroxy; Future    Parkinson disease ([place] [address] )  -     CBC; Future  -     Comprehensive metabolic panel;  Future  -     Hemoglobin A1C; Future  -     Lipid Panel with Direct LDL reflex; Future  -     Microalbumin / creatinine urine ratio; Future  -     TSH, 3rd generation with Free T4 reflex; Future  -     T4; Future  -     UA (URINE) with reflex to Scope; Future  -     Vitamin D 25 hydroxy; Future    Osteoporosis without current pathological fracture, unspecified osteoporosis type  -     CBC; Future  -     Comprehensive metabolic panel; Future  -     Hemoglobin A1C; Future  -     Lipid Panel with Direct LDL reflex; Future  -     Microalbumin / creatinine urine ratio; Future  -     TSH, 3rd generation with Free T4 reflex; Future  -     T4; Future  -     UA (URINE) with reflex to Scope; Future  -     Vitamin D 25 hydroxy; Future    Ambulatory dysfunction  -     CBC; Future  -     Comprehensive metabolic panel; Future  -     Hemoglobin A1C; Future  -     Lipid Panel with Direct LDL reflex; Future  -     Microalbumin / creatinine urine ratio; Future  -     TSH, 3rd generation with Free T4 reflex; Future  -     T4; Future  -     UA (URINE) with reflex to Scope; Future  -     Vitamin D 25 hydroxy; Future    Decreased ambulation status  -     CBC; Future  -     Comprehensive metabolic panel; Future  -     Hemoglobin A1C; Future  -     Lipid Panel with Direct LDL reflex; Future  -     Microalbumin / creatinine urine ratio; Future  -     TSH, 3rd generation with Free T4 reflex; Future  -     T4; Future  -     UA (URINE) with reflex to Scope; Future  -     Vitamin D 25 hydroxy; Future    Major depression with psychotic features ([facility] [address] )  -     CBC; Future  -     Comprehensive metabolic panel; Future  -     Hemoglobin A1C; Future  -     Lipid Panel with Direct LDL reflex; Future  -     Microalbumin / creatinine urine ratio; Future  -     TSH, 3rd generation with Free T4 reflex; Future  -     T4; Future  -     UA (URINE) with reflex to Scope; Future  -     Vitamin D 25 hydroxy; Future    S/P deep brain stimulator placement  -     CBC; Future  -     Comprehensive metabolic panel;  Future  - Hemoglobin A1C; Future  -     Lipid Panel with Direct LDL reflex; Future  -     Microalbumin / creatinine urine ratio; Future  -     TSH, 3rd generation with Free T4 reflex; Future  -     T4; Future  -     UA (URINE) with reflex to Scope; Future  -     Vitamin D 25 hydroxy; Future    Mixed hyperlipidemia  -     CBC; Future  -     Comprehensive metabolic panel; Future  -     Hemoglobin A1C; Future  -     Lipid Panel with Direct LDL reflex; Future  -     Microalbumin / creatinine urine ratio; Future  -     TSH, 3rd generation with Free T4 reflex; Future  -     T4; Future  -     UA (URINE) with reflex to Scope; Future  -     Vitamin D 25 hydroxy; Future    Hemiparesis affecting left side as late effect of cerebrovascular accident (CVA) ([facility] [address] )  -     CBC; Future  -     Comprehensive metabolic panel; Future  -     Hemoglobin A1C; Future  -     Lipid Panel with Direct LDL reflex; Future  -     Microalbumin / creatinine urine ratio; Future  -     TSH, 3rd generation with Free T4 reflex; Future  -     T4; Future  -     UA (URINE) with reflex to Scope; Future  -     Vitamin D 25 hydroxy; Future    Hypokalemia  -     potassium chloride (KLOR-CON) 8 MEQ tablet; Take 1 tablet (8 mEq total) by mouth daily  -     Potassium; Future  -     CBC; Future  -     Comprehensive metabolic panel; Future  -     Hemoglobin A1C; Future  -     Lipid Panel with Direct LDL reflex; Future  -     Microalbumin / creatinine urine ratio; Future  -     TSH, 3rd generation with Free T4 reflex; Future  -     T4; Future  -     UA (URINE) with reflex to Scope; Future  -     Vitamin D 25 hydroxy; Future    Neoplasm of uncertain behavior of skin of lower leg  -     Ambulatory Referral to Dermatology; Future  -     CBC; Future  -     Comprehensive metabolic panel; Future  -     Hemoglobin A1C; Future  -     Lipid Panel with Direct LDL reflex; Future  -     Microalbumin / creatinine urine ratio; Future  -     TSH, 3rd generation with Free T4 reflex;  Future  - T4; Future  -     UA (URINE) with reflex to Scope; Future  -     Vitamin D 25 hydroxy; Future    Gastroesophageal reflux disease without esophagitis              Subjective:      Patient ID: Yanna Bach is a 64 y o  female  CC:    Chief Complaint   Patient presents with    Follow-up     for chronic conditions  mgb       HPI:    Mentally patient continues to decline  Patient follows with Neurology [facility]  Patient will be having in the next month or to battery change for her deep brain stimulator out patient [address]  Blood work was discussed with the  and the patient   wishes me to check a skin lesion on her left calf it has been present for years seems to be changing      The following portions of the patient's history were reviewed and updated as appropriate: allergies, current medications, past family history, past medical history, past social history, past surgical history and problem list       Review of Systems   Constitutional: Negative  HENT: Negative  Eyes: Negative  Respiratory: Negative  Cardiovascular: Negative  Gastrointestinal: Negative  Endocrine:        Diabetes is in good control   Genitourinary: Negative  Musculoskeletal:        Motor tone continues to decrease   Skin: Negative  Allergic/Immunologic: Negative  Neurological:        HPI   Hematological: Negative  Psychiatric/Behavioral: Negative  Data to review:       Objective:    Vitals:    02/24/22 1243   BP: 120/62   BP Location: Right arm   Patient Position: Sitting   Cuff Size: Large   Temp: 97 5 °F (36 4 °C)   TempSrc: Temporal   Weight: 68 9 kg (152 lb)        Physical Exam  Vitals and nursing note reviewed  Constitutional:       Appearance: Normal appearance  HENT:      Head: Normocephalic and atraumatic  Cardiovascular:      Rate and Rhythm: Normal rate and regular rhythm  Heart sounds: Normal heart sounds     Pulmonary: Effort: Pulmonary effort is normal       Breath sounds: Normal breath sounds  Abdominal:      General: Bowel sounds are normal       Palpations: Abdomen is soft  Tenderness: There is no abdominal tenderness  Musculoskeletal:      Cervical back: Neck supple  No rigidity  Right lower leg: No edema  Left lower leg: No edema  Skin:     General: Skin is warm and dry  Comments: Lateral left calf with dark black irregular slightly raised neoplasm 0 7 x 0 7 mm   Neurological:      Mental Status: She is alert  Mental status is at baseline  Gait: Gait abnormal       Comments: Chronic left-sided weakness, patient is nonverbal   Patient wheelchair-bound   Psychiatric:         Mood and Affect: Mood normal            BMI Counseling: Body mass index is 29 69 kg/m²  The BMI is above normal  Nutrition recommendations include moderation in carbohydrate intake and reducing intake of cholesterol  Rationale for BMI follow-up plan is due to patient being overweight or obese

## 2022-02-24 NOTE — PATIENT INSTRUCTIONS
Start potassium tablets 1 daily  Check potassium level 1 month you can do this with the blood work from [address] all present therapy  Follow-up with Dermatology must rule out skin cancer left calf lesion  Patient to return in 6 months for office visit, blood work, and a [place]

## 2022-02-25 ENCOUNTER — TELEPHONE (OUTPATIENT)
Dept: ADMINISTRATIVE | Facility: OTHER | Age: 77
End: 2022-02-25

## 2022-02-25 NOTE — LETTER
Diabetic Eye Exam Form    Date Requested: 22  Patient: Merrick Wolf  Patient : 1945   Referring Provider: Iron Fontaine DO      DIABETIC Eye Exam Date _______________________________    Type of Exam MUST be documented for Diabetic Eye Exams  Please CHECK ONE  Dilated Retinal Exam      Optomap-Iris Exam      Fundus Photography Completed       Left Eye - Please check Retinopathy or No Retinopathy AND Type      Exam did show retinopathy    Exam did not show retinopathy         Mild     Proliferative           Moderate    Severe            None         Right Eye - Please check Retinopathy or No Retinopathy AND Type      Exam did show retinopathy    Exam did not show retinopathy         Mild     Proliferative        Moderate    Severe        None       Comments __________________________________________________________    Practice Providing Exam ______________________________________________    Exam Performed By (print name) _______________________________________      Provider Signature ___________________________________________________    These reports are needed for  compliance  Please fax this completed form and a copy of the Diabetic Eye Exam report to our office located at [place] [address] as soon as possible via [phone] attention Naomie Bledsoe: Phone [phone]    We thank you for your assistance in treating our mutual patient

## 2022-02-25 NOTE — LETTER
Diabetic Eye Exam Form    Date Requested: 22  Patient: Alec Lopez  Patient : 1945   Referring Provider: Lio Abdullahi,     Dilated Retinal Exam, Optomap-Iris Exam, or Fundus Photography Done       IF  Yes ([place] Which one above)         No     Date of Diabetic Eye Exam ______________________________  Left Eye      Exam did show retinopathy    Exam did not show retinopathy         Mild       Moderate       None       Proliferative       Severe     Right Eye     Exam did show retinopathy    Exam did not show retinopathy         Mild       Moderate       None       Proliferative       Severe     Comments _2021_________________________________________________________    Practice Providing Exam ______________________________________________    Exam Performed By (print name) _______________________________________      Provider Signature ___________________________________________________    These reports are needed for  compliance  Please fax this completed form and a copy of the Diabetic Eye Exam report to our office located at [place] [address] as soon as possible via [phone] karsten Valverde: Phone [phone]    We thank you for your assistance in treating our mutual patient

## 2022-02-25 NOTE — TELEPHONE ENCOUNTER
----- Message from Ten Mckinley sent at 2/24/2022  1:11 PM EST -----  Regarding: eye exam  02/24/22 1:12 PM    Hello, our patient Carin Peña has had Diabetic Eye Exam completed/performed  Please assist in updating the patient chart by making an External outreach to Dr Nikki Toscano facility located in [place], [place]  The date of service is 9/2021      Thank you,  Ten Mckinley  [facility] normal...

## 2022-02-25 NOTE — TELEPHONE ENCOUNTER
Upon review of the In Basket request and the patient's chart, initial outreach has been made via fax, please see Contacts section for details        Att x1 eye    Thank you  Janette Merlos

## 2022-03-04 NOTE — TELEPHONE ENCOUNTER
As a follow-up, a second attempt has been made for outreach via fax, please see Contacts section for details       x2 eye    Thank you  Cali Liang

## 2022-03-11 NOTE — TELEPHONE ENCOUNTER
As a final attempt, a third outreach has been made via telephone call  Please see Contacts section for details  This encounter will be closed and completed by end of day  Should we receive the requested information because of previous outreach attempts, the requested patient's chart will be updated appropriately       Thank you  Josias Galan

## 2022-03-22 ENCOUNTER — CONSULT (OUTPATIENT)
Dept: DERMATOLOGY | Facility: CLINIC | Age: 77
End: 2022-03-22
Payer: MEDICARE

## 2022-03-22 VITALS — BODY MASS INDEX: 29.82 KG/M2 | TEMPERATURE: 97.5 F | WEIGHT: 151.9 LBS | HEIGHT: 60 IN

## 2022-03-22 DIAGNOSIS — D48.5 NEOPLASM OF UNCERTAIN BEHAVIOR OF SKIN: Primary | ICD-10-CM

## 2022-03-22 DIAGNOSIS — D48.5 NEOPLASM OF UNCERTAIN BEHAVIOR OF SKIN OF LOWER LEG: ICD-10-CM

## 2022-03-22 PROCEDURE — 88305 TISSUE EXAM BY PATHOLOGIST: CPT | Performed by: STUDENT IN AN ORGANIZED HEALTH CARE EDUCATION/TRAINING PROGRAM

## 2022-03-22 PROCEDURE — 99204 OFFICE O/P NEW MOD 45 MIN: CPT | Performed by: DERMATOLOGY

## 2022-03-22 PROCEDURE — 11102 TANGNTL BX SKIN SINGLE LES: CPT | Performed by: DERMATOLOGY

## 2022-03-22 NOTE — PROGRESS NOTES
[facility] Note     Patient Name: Jennifer Blackman  Encounter Date: 3/22/2022     Have you been cared for by a [facility] Dermatologist in the last 3 years and, if so, which one? No    · Have you traveled outside of the [address] [place] [address] in the past 3 months or outside of the [facility] area in the last 2 weeks? No     May we call your Preferred Phone number to discuss your specific medical information? Yes     May we leave a detailed message that includes your specific medical information? Yes      Today's Chief Concerns:   Concern #1:  Spot of concern on Left leg   Concern #2:      Past Medical History:  Have you personally ever had or currently have any of the following? · Skin cancer (such as Melanoma, Basal Cell Carcinoma, Squamous Cell Carcinoma? (If Yes, please provide more detail)- No  · Eczema: No  · Psoriasis: No  · HIV/AIDS: No  · Hepatitis B or C: No  · Tuberculosis: No  · Systemic Immunosuppression such as Diabetes, Biologic or Immunotherapy, Chemotherapy, Organ Transplantation, Bone Marrow Transplantation (If YES, please provide more detail): No  · Radiation Treatment (If YES, please provide more detail): No  · Any other major medical conditions/concerns? (If Yes, which types)- No      Family History:  Have any of your "first degree relatives" (parent, brother, sister, or child) had any of the following       · Skin cancer such as Melanoma or Merkel Cell Carcinoma or Pancreatic Cancer? No  · Eczema, Asthma, Hay Fever or Seasonal Allergies: No  · Psoriasis or Psoriatic Arthritis: No  · Do any other medical conditions seem to run in your family? If Yes, what condition and which relatives? No    Current Medications:       Current Outpatient Medications:     acetaminophen (TYLENOL) 500 mg tablet, Take 500 mg by mouth   One throughout the night as needed, Disp: , Rfl:     ascorbic acid (VITAMIN C) 500 mg tablet, Take 1 tablet (500 mg total) by mouth daily, Disp: , Rfl: 0    aspirin (ECOTRIN LOW STRENGTH) 81 mg EC tablet, Take 81 mg by mouth 3 (three) times a week, Disp: , Rfl:     carbidopa-levodopa (SINEMET)  mg per tablet, Take 1 tablet by mouth 3 (three) times a day (Patient taking differently: Take 1 tablet by mouth 3 (three) times a day 1 tablet 0800, 1700; 1/2 tablet 1500, 2200 ), Disp: , Rfl: 0    cilostazol (PLETAL) 100 mg tablet, TAKE 1 TABLET (100 MG TOTAL) BY MOUTH 2 (TWO) TIMES A DAY BEFORE MEALS, Disp: 180 tablet, Rfl: 1    clopidogrel (PLAVIX) 75 mg tablet, TAKE 1 TABLET BY MOUTH EVERY DAY, Disp: 90 tablet, Rfl: 0    donepezil (ARICEPT) 10 mg tablet, TAKE 1 TABLET BY MOUTH DAILY AT BEDTIME, Disp: 90 tablet, Rfl: 3    glycopyrrolate (ROBINUL) 1 mg tablet, Take 1 tablet (1 mg total) by mouth 2 (two) times a day, Disp: 60 tablet, Rfl: 0    levothyroxine 75 mcg tablet, TAKE 1 TABLET EVERY DAY, Disp: 90 tablet, Rfl: 3    Multiple Vitamin (MULTIVITAMIN) tablet, Take 1 tablet by mouth daily  , Disp: , Rfl:     NON FORMULARY, Take 1 tablet by mouth 2 (two) times a day  Calcium- D3-600mg, Disp: , Rfl:     potassium chloride (KLOR-CON) 8 MEQ tablet, Take 1 tablet (8 mEq total) by mouth daily, Disp: 30 tablet, Rfl: 5    Probiotic Product (PROBIOTIC DAILY PO), Take 1 tablet by mouth daily  , Disp: , Rfl:     ranitidine (ZANTAC) 150 mg tablet, Take 150 mg by mouth daily  , Disp: , Rfl:     simvastatin (ZOCOR) 20 mg tablet, Take 20 mg by mouth daily at bedtime, Disp: , Rfl:     Vitamin D, Cholecalciferol, 1000 UNITS CAPS, Take 1 tablet by mouth daily  , Disp: , Rfl:       Review of Systems:  Have you recently had or currently have any of the following? If YES, what are you doing for the problem?     · Fever, chills or unintended weight loss: No  · Sudden loss or change in your vision: No  · Nausea, vomiting or blood in your stool: No  · Painful or swollen joints: No  · Wheezing or cough: No  · Changing mole or non-healing wound: YES, Left leg  · Nosebleeds: No  · Excessive sweating: No  · Easy or prolonged bleeding? No  · Over the last 2 weeks, how often have you been bothered by the following problems? · Taking little interest or pleasure in doing things: 1 - Not at All  · Feeling down, depressed, or hopeless: 1 - Not at All  · Rapid heartbeat with epinephrine:  No    · FEMALES ONLY:    · Are you pregnant or planning to become pregnant? No  · Are you currently or planning to be nursing or breast feeding? No    · Any known allergies? Allergies   Allergen Reactions    Gabapentin      Other reaction(s): Hypotension    Bupropion GI Intolerance and Other (See Comments)     Other reaction(s): Tremor    Cephalexin     Duloxetine      Other reaction(s): Other (see comments)  unkown  unknown      Erythromycin GI Intolerance    Methadone Hallucinations    Mirtazapine GI Intolerance    Paroxetine GI Intolerance    Sulfa Antibiotics     Tricyclic Antidepressants      Other reaction(s): Other    Acetazolamide Rash    Levofloxacin Anxiety and Other (See Comments)         Physical Exam:     Was a chaperone (Derm Clinical Assistant) present throughout the entire Physical Exam? Yes     Did the Dermatology Team specifically  the patient on the importance of a Full Skin Exam to be sure that nothing is missed clinically?  Yes}  o Did the patient ultimately request or accept a Full Skin Exam?  NO  o Did the patient specifically refuse to have the areas "under-the-bra" examined by the Dermatologist? No  o Did the patient specifically refuse to have the areas "under-the-underwear" examined by the Dermatologist? No    CONSTITUTIONAL:   Vitals:    03/22/22 1237   Temp: 97 5 °F (36 4 °C)   TempSrc: Temporal   Weight: 68 9 kg (151 lb 14 4 oz)   Height: 5' (1 524 m)       PSYCH: Normal mood and affect  EYES: Normal conjunctiva  ENT: Normal lips and oral mucosa  CARDIOVASCULAR: No edema  RESPIRATORY: Normal respirations  HEME/LYMPH/IMMUNO:  No regional lymphadenopathy except as noted below in "ASSESSMENT AND PLAN BY DIAGNOSIS"    SKIN:  FULL ORGAN SYSTEM EXAM   Left Leg Normal except as noted below in Assessment        Assessment and Plan by Diagnosis:    History of Present Condition:     Duration:  How long has this been an issue for you?    o  1 year   Location Affected:  Where on the body is this affecting you?    o  Leg   Quality:  Is there any bleeding, pain, itch, burning/irritation, or redness associated with the skin lesion? o  Bleeding   Severity:  Describe any bleeding, pain, itch, burning/irritation, or redness on a scale of 1 to 10 (with 10 being the worst)  o  0   Timing:  Does this condition seem to be there pretty constantly or do you notice it more at specific times throughout the day?    o  Constant   Context:  Have you ever noticed that this condition seems to be associated with specific activities you do?    o  Denies   Modifying Factors:    o Anything that seems to make the condition worse?    -  Denies  o What have you tried to do to make the condition better? -  Denies   Associated Signs and Symptoms:  Does this skin lesion seem to be associated with any of the following:  o  SL AMB DERM SIGNS AND SYMPTOMS: Redness, Bleeding and Skin color changes       1  NEOPLASM OF UNCERTAIN BEHAVIOR OF SKIN    Physical Exam:   (Anatomic Location); (Size and Morphological Description); (Differential Diagnosis):  o Left lateral calf; 1 2 cm crusted patch; probable superficial basal cell carcinoma  Additional History of Present Condition:  Patients  stated patient has had a lesion on her left lower leg for at least a year  Patients  takes care of the patient who has parkinson's disease  Patient isn't verbal  The  stated he noticed the lesion a year ago when he was getting his wife out of bed and the scab on the spot rubbed off and started bleeding   Patient was seen at her PCP and was informed to go to dermatology to see Dr Jonathan Lakhani for a biopsy  Assessment and Plan:   I have discussed with the patient that a sample of skin via a "skin biopsy would be potentially helpful to further make a specific diagnosis under the microscope   Based on a thorough discussion of this condition and the management approach to it (including a comprehensive discussion of the known risks, side effects and potential benefits of treatment), the patient (family) agrees to implement the following specific plan:    · If biopsy results come back Basal Cell Carcinoma, consider Imiquimod at home to limit physician trips  o Procedure:  Skin Biopsy  After a thorough discussion of treatment options and risk/benefits/alternatives (including but not limited to local pain, scarring, dyspigmentation, blistering, possible superinfection, and inability to confirm a diagnosis via histopathology), verbal and written consent were obtained and portion of the rash was biopsied for tissue sample  See below for consent that was obtained from patient and subsequent Procedure Note  PROCEDURE SHAVE BIOPSY NOTE:     Performing Physician: Dr Sanchez   Anatomic Location; Clinical Description with size (cm); Pre-Op Diagnosis:   o Left lateral calf; 1 2 cm crusted patch; probable superficial basal cell carcinoma   Post-op diagnosis: Same      Local anesthesia: 1% xylocaine with epi       Topical anesthesia: None     Hemostasis: Aluminum chloride       After obtaining informed consent  at which time there was a discussion about the purpose of biopsy  and low risks of infection and bleeding  The area was prepped and draped in the usual fashion  Anesthesia was obtained with 1% lidocaine with epinephrine  A shave biopsy to an appropriate sampling depth was obtained with a sterile blade (such as a 15-blade or DermaBlade)  The resulting wound was covered with surgical ointment and bandaged appropriately       The patient tolerated the procedure well without complications and was without signs of functional compromise  Specimen has been sent for review by Dermatopathology  Standard post-procedure care has been explained and has been included in written form within the patient's copy of Informed Consent  INFORMED CONSENT DISCUSSION AND POST-OPERATIVE INSTRUCTIONS FOR PATIENT    I   RATIONALE FOR PROCEDURE  I understand that a skin biopsy allows the Dermatologist to test a lesion or rash under the microscope to obtain a diagnosis  It usually involves numbing the area with numbing medication and removing a small piece of skin; sometimes the area will be closed with sutures  In this specific procedure, sutures are not usually needed  If any sutures are placed, then they are usually need to be removed in 2 weeks or less  I understand that my Dermatologist recommends that a skin "shave" biopsy be performed today  A local anesthetic, similar to the kind that a dentist uses when filling a cavity, will be injected with a very small needle into the skin area to be sampled  The injected skin and tissue underneath "will go to sleep and become numb so no pain should be felt afterwards  An instrument shaped like a tiny "razor blade" (shave biopsy instrument) will be used to cut a small piece of tissue and skin from the area so that a sample of tissue can be taken and examined more closely under the microscope  A slight amount of bleeding will occur, but it will be stopped with direct pressure and a pressure bandage and any other appropriate methods  I understands that a scar will form where the wound was created  Surgical ointment will be applied to help protect the wound  Sutures are not usually needed      II   RISKS AND POTENTIAL COMPLICATIONS   I understand the risks and potential complications of a skin biopsy include but are not limited to the following:   Bleeding   Infection   Pain   Scar/keloid   Skin discoloration   Incomplete Removal   Recurrence   Nerve Damage/Numbness/Loss of Function   Allergic Reaction to Anesthesia   Biopsies are diagnostic procedures and based on findings additional treatment or evaluation may be required   Loss or destruction of specimen resulting in no additional findings    My Dermatologist has explained to me the nature of the condition, the nature of the procedure, and the benefits to be reasonably expected compared with alternative approaches  My Dermatologist has discussed the likelihood of major risks or complications of this procedure including the specific risks listed above, such as bleeding, infection, and scarring/keloid  I understand that a scar is expected after this procedure  I understand that my physician cannot predict if the scar will form a "keloid," which extends beyond the borders of the wound that is created  A keloid is a thick, painful, and bumpy scar  A keloid can be difficult to treat, as it does not always respond well to therapy, which includes injecting cortisone directly into the keloid every few weeks  While this usually reduces the pain and size of the scar, it does not eliminate it  I understand that photographs may be taken before and after the procedure  These will be maintained as part of the medical providers confidential records and may not be made available to me  I further authorize the medical provider to use the photographs for teaching purposes or to illustrate scientific papers, books, or lectures if in his/her judgment, medical research, education, or science may benefit from its use  I have had an opportunity to fully inquire about the risks and benefits of this procedure and its alternatives  I have been given ample time and opportunity to ask questions and to seek a second opinion if I wished to do so  I acknowledge that there have specifically been no guarantees as to the cosmetic results from the procedure    I am aware that with any procedure there is always the possibility of an unexpected complication  III  POST-PROCEDURAL CARE (WHAT YOU WILL NEED TO DO "AFTER THE BIOPSY" TO OPTIMIZE HEALING)     Keep the area clean and dry  Try NOT to remove the bandage or get it wet for the first 24 hours   Gently clean the area and apply surgical ointment (such as Vaseline petrolatum ointment, which is available "over the counter" and not a prescription) to the biopsy site for up to 2 weeks straight  This acts to protect the wound from the outside world   Sutures are not usually placed in this procedure  If any sutures were placed, return for suture removal as instructed (generally 1 week for the face, 2 weeks for the body)   Take Acetaminophen (Tylenol) for discomfort, if no contraindications  Ibuprofen or aspirin could make bleeding worse   Call our office immediately for signs of infection: fever, chills, increased redness, warmth, tenderness, discomfort/pain, or pus or foul smell coming from the wound  WHAT TO DO IF THERE IS ANY BLEEDING? If a small amount of bleeding is noticed, place a clean cloth over the area and apply firm pressure for ten minutes  Check the wound after 10 minutes of direct pressure  If bleeding persists, try one more time for an additional 10 minutes of direct pressure on the area  If the bleeding becomes heavier or does not stop after the second attempt, or if you have any other questions about this procedure, then please call your [facility] Dermatologist by calling [phone] (SKIN)  I hereby acknowledge that I have reviewed and verified the site with my Dermatologist and have requested and authorized my Dermatologist to proceed with the procedure          Scribe Attestation    I,:   am acting as a scribe while in the presence of the attending physician :       I,:   personally performed the services described in this documentation    as scribed in my presence : Xolair Counseling:  Patient informed of potential adverse effects including but not limited to fever, muscle aches, rash and allergic reactions.  The patient verbalized understanding of the proper use and possible adverse effects of Xolair.  All of the patient's questions and concerns were addressed.

## 2022-03-22 NOTE — PATIENT INSTRUCTIONS
INFORMED CONSENT DISCUSSION AND POST-OPERATIVE INSTRUCTIONS FOR PATIENT    I   RATIONALE FOR PROCEDURE  I understand that a skin biopsy allows the Dermatologist to test a lesion or rash under the microscope to obtain a diagnosis  It usually involves numbing the area with numbing medication and removing a small piece of skin; sometimes the area will be closed with sutures  In this specific procedure, sutures are not usually needed  If any sutures are placed, then they are usually need to be removed in 2 weeks or less  I understand that my Dermatologist recommends that a skin "shave" biopsy be performed today  A local anesthetic, similar to the kind that a dentist uses when filling a cavity, will be injected with a very small needle into the skin area to be sampled  The injected skin and tissue underneath "will go to sleep and become numb so no pain should be felt afterwards  An instrument shaped like a tiny "razor blade" (shave biopsy instrument) will be used to cut a small piece of tissue and skin from the area so that a sample of tissue can be taken and examined more closely under the microscope  A slight amount of bleeding will occur, but it will be stopped with direct pressure and a pressure bandage and any other appropriate methods  I understands that a scar will form where the wound was created  Surgical ointment will be applied to help protect the wound  Sutures are not usually needed      II   RISKS AND POTENTIAL COMPLICATIONS   I understand the risks and potential complications of a skin biopsy include but are not limited to the following:   Bleeding   Infection   Pain   Scar/keloid   Skin discoloration   Incomplete Removal   Recurrence   Nerve Damage/Numbness/Loss of Function   Allergic Reaction to Anesthesia   Biopsies are diagnostic procedures and based on findings additional treatment or evaluation may be required   Loss or destruction of specimen resulting in no additional findings    My Dermatologist has explained to me the nature of the condition, the nature of the procedure, and the benefits to be reasonably expected compared with alternative approaches  My Dermatologist has discussed the likelihood of major risks or complications of this procedure including the specific risks listed above, such as bleeding, infection, and scarring/keloid  I understand that a scar is expected after this procedure  I understand that my physician cannot predict if the scar will form a "keloid," which extends beyond the borders of the wound that is created  A keloid is a thick, painful, and bumpy scar  A keloid can be difficult to treat, as it does not always respond well to therapy, which includes injecting cortisone directly into the keloid every few weeks  While this usually reduces the pain and size of the scar, it does not eliminate it  I understand that photographs may be taken before and after the procedure  These will be maintained as part of the medical providers confidential records and may not be made available to me  I further authorize the medical provider to use the photographs for teaching purposes or to illustrate scientific papers, books, or lectures if in his/her judgment, medical research, education, or science may benefit from its use  I have had an opportunity to fully inquire about the risks and benefits of this procedure and its alternatives  I have been given ample time and opportunity to ask questions and to seek a second opinion if I wished to do so  I acknowledge that there have specifically been no guarantees as to the cosmetic results from the procedure  I am aware that with any procedure there is always the possibility of an unexpected complication  III  POST-PROCEDURAL CARE (WHAT YOU WILL NEED TO DO "AFTER THE BIOPSY" TO OPTIMIZE HEALING)     Keep the area clean and dry    Try NOT to remove the bandage or get it wet for the first 24 hours      Gently clean the area and apply surgical ointment (such as Vaseline petrolatum ointment, which is available "over the counter" and not a prescription) to the biopsy site for up to 2 weeks straight  This acts to protect the wound from the outside world   Sutures are not usually placed in this procedure  If any sutures were placed, return for suture removal as instructed (generally 1 week for the face, 2 weeks for the body)   Take Acetaminophen (Tylenol) for discomfort, if no contraindications  Ibuprofen or aspirin could make bleeding worse   Call our office immediately for signs of infection: fever, chills, increased redness, warmth, tenderness, discomfort/pain, or pus or foul smell coming from the wound  WHAT TO DO IF THERE IS ANY BLEEDING? If a small amount of bleeding is noticed, place a clean cloth over the area and apply firm pressure for ten minutes  Check the wound after 10 minutes of direct pressure  If bleeding persists, try one more time for an additional 10 minutes of direct pressure on the area  If the bleeding becomes heavier or does not stop after the second attempt, or if you have any other questions about this procedure, then please call your [facility]'s Dermatologist by calling [phone] (SKIN)

## 2022-03-29 ENCOUNTER — TELEPHONE (OUTPATIENT)
Dept: LAB | Facility: HOSPITAL | Age: 77
End: 2022-03-29

## 2022-03-29 NOTE — RESULT ENCOUNTER NOTE
DERMATOPATHOLOGY RESULT NOTE    Results reviewed by ordering physician  Called patient to personally discuss results  Discussed results with patient's       Instructions for Clinical Derm Team:   (remember to route Result Note to appropriate staff):    Call patient and schedule for follow up visit in 3 months    Result & Plan by Specimen:    Specimen A: malignant  Plan: lesion is in situ only  Given serious ambulation issues due to parkinson's we elect to initially treat with 5FU BID for 4 weeks  I reviewed use and care with her   Final Diagnosis  A   Skin, left lateral calf, shave biopsy:     SQUAMOUS CELL CARCINOMA IN SITU; extending to the tissue edges

## 2022-03-30 ENCOUNTER — TELEPHONE (OUTPATIENT)
Dept: DERMATOLOGY | Facility: CLINIC | Age: 77
End: 2022-03-30

## 2022-03-30 NOTE — TELEPHONE ENCOUNTER
Per Dr Sandy Lora, telephone call to patient to schedule 3 month follow up S/P Efudex to left calf  Spoke with   Patient is scheduled for 7/12/2022 at 11:10 AM with Dr Sandy Lora in the [facility] office

## 2022-03-31 DIAGNOSIS — R53.1 ACUTE LEFT-SIDED WEAKNESS: ICD-10-CM

## 2022-03-31 RX ORDER — CILOSTAZOL 100 MG/1
100 TABLET ORAL
Qty: 180 TABLET | Refills: 1 | Status: SHIPPED | OUTPATIENT
Start: 2022-03-31

## 2022-04-05 ENCOUNTER — APPOINTMENT (OUTPATIENT)
Dept: LAB | Facility: HOSPITAL | Age: 77
End: 2022-04-05
Payer: MEDICARE

## 2022-04-05 DIAGNOSIS — Z01.818 OTHER SPECIFIED PRE-OPERATIVE EXAMINATION: ICD-10-CM

## 2022-04-05 DIAGNOSIS — E87.6 HYPOKALEMIA: ICD-10-CM

## 2022-04-05 LAB
ALBUMIN SERPL BCP-MCNC: 3.2 G/DL (ref 3.5–5)
ALP SERPL-CCNC: 70 U/L (ref 46–116)
ALT SERPL W P-5'-P-CCNC: 21 U/L (ref 12–78)
ANION GAP SERPL CALCULATED.3IONS-SCNC: 5 MMOL/L (ref 4–13)
APTT PPP: 30 SECONDS (ref 23–37)
AST SERPL W P-5'-P-CCNC: 14 U/L (ref 5–45)
BASOPHILS # BLD AUTO: 0.1 THOUSANDS/ΜL (ref 0–0.1)
BASOPHILS NFR BLD AUTO: 1 % (ref 0–1)
BILIRUB SERPL-MCNC: 0.75 MG/DL (ref 0.2–1)
BUN SERPL-MCNC: 15 MG/DL (ref 5–25)
CALCIUM ALBUM COR SERPL-MCNC: 10.2 MG/DL (ref 8.3–10.1)
CALCIUM SERPL-MCNC: 9.6 MG/DL (ref 8.3–10.1)
CHLORIDE SERPL-SCNC: 110 MMOL/L (ref 100–108)
CO2 SERPL-SCNC: 26 MMOL/L (ref 21–32)
CREAT SERPL-MCNC: 0.86 MG/DL (ref 0.6–1.3)
EOSINOPHIL # BLD AUTO: 0.37 THOUSAND/ΜL (ref 0–0.61)
EOSINOPHIL NFR BLD AUTO: 5 % (ref 0–6)
ERYTHROCYTE [DISTWIDTH] IN BLOOD BY AUTOMATED COUNT: 13.8 % (ref 11.6–15.1)
EST. AVERAGE GLUCOSE BLD GHB EST-MCNC: 137 MG/DL
GFR SERPL CREATININE-BSD FRML MDRD: 64 ML/MIN/1.73SQ M
GLUCOSE SERPL-MCNC: 201 MG/DL (ref 65–140)
HBA1C MFR BLD: 6.4 %
HCT VFR BLD AUTO: 45.6 % (ref 34.8–46.1)
HGB BLD-MCNC: 15.5 G/DL (ref 11.5–15.4)
IMM GRANULOCYTES # BLD AUTO: 0.03 THOUSAND/UL (ref 0–0.2)
IMM GRANULOCYTES NFR BLD AUTO: 0 % (ref 0–2)
INR PPP: 1.06 (ref 0.84–1.19)
LYMPHOCYTES # BLD AUTO: 1.51 THOUSANDS/ΜL (ref 0.6–4.47)
LYMPHOCYTES NFR BLD AUTO: 22 % (ref 14–44)
MCH RBC QN AUTO: 31.6 PG (ref 26.8–34.3)
MCHC RBC AUTO-ENTMCNC: 34 G/DL (ref 31.4–37.4)
MCV RBC AUTO: 93 FL (ref 82–98)
MONOCYTES # BLD AUTO: 0.65 THOUSAND/ΜL (ref 0.17–1.22)
MONOCYTES NFR BLD AUTO: 9 % (ref 4–12)
NEUTROPHILS # BLD AUTO: 4.35 THOUSANDS/ΜL (ref 1.85–7.62)
NEUTS SEG NFR BLD AUTO: 63 % (ref 43–75)
NRBC BLD AUTO-RTO: 0 /100 WBCS
PLATELET # BLD AUTO: 300 THOUSANDS/UL (ref 149–390)
PMV BLD AUTO: 10.7 FL (ref 8.9–12.7)
POTASSIUM SERPL-SCNC: 3.6 MMOL/L (ref 3.5–5.3)
PROT SERPL-MCNC: 6.7 G/DL (ref 6.4–8.2)
PROTHROMBIN TIME: 13.4 SECONDS (ref 11.6–14.5)
RBC # BLD AUTO: 4.91 MILLION/UL (ref 3.81–5.12)
SODIUM SERPL-SCNC: 141 MMOL/L (ref 136–145)
WBC # BLD AUTO: 7.01 THOUSAND/UL (ref 4.31–10.16)

## 2022-04-05 PROCEDURE — 36415 COLL VENOUS BLD VENIPUNCTURE: CPT

## 2022-04-05 PROCEDURE — 85025 COMPLETE CBC W/AUTO DIFF WBC: CPT

## 2022-04-05 PROCEDURE — 85610 PROTHROMBIN TIME: CPT

## 2022-04-05 PROCEDURE — 85730 THROMBOPLASTIN TIME PARTIAL: CPT

## 2022-04-05 PROCEDURE — 83036 HEMOGLOBIN GLYCOSYLATED A1C: CPT

## 2022-04-05 PROCEDURE — 86769 SARS-COV-2 COVID-19 ANTIBODY: CPT

## 2022-04-05 PROCEDURE — 80053 COMPREHEN METABOLIC PANEL: CPT

## 2022-04-06 LAB
SARS-COV-2 SEMI-QUANT IGG AB: >800 AU/ML
SARS-COV-2 SPIKE AB INTERP CONTAINING ATTACHED ABBREV COVDSN: POSITIVE

## 2022-04-12 ENCOUNTER — OFFICE VISIT (OUTPATIENT)
Dept: PSYCHIATRY | Facility: CLINIC | Age: 77
End: 2022-04-12
Payer: MEDICARE

## 2022-04-12 DIAGNOSIS — F41.1 GAD (GENERALIZED ANXIETY DISORDER): Primary | ICD-10-CM

## 2022-04-12 PROCEDURE — 99214 OFFICE O/P EST MOD 30 MIN: CPT | Performed by: NURSE PRACTITIONER

## 2022-04-12 RX ORDER — ALPRAZOLAM 0.25 MG/1
TABLET ORAL
Qty: 90 TABLET | Refills: 2 | Status: SHIPPED | OUTPATIENT
Start: 2022-04-12

## 2022-04-12 NOTE — PSYCH
Patient ID: Maryland Schirmer is a 76 y o  female with a psychiatric history significant for major depressive disorder and parkinson's disease who presents in person with her  for a medication management follow-up appointment  We discussed how she has been doing since she was seen last  Her  reports no changes since the last visit and states that her appetite and sleep have been good  He reports that around 10-12 in the morning she tends to become sleep and around 3 pm she becomes more awake  He reports that she has an upcoming surgery this Monday at [facility] to replace her DBS due to low batteries on both sides  She recently obtained lab work  No medication changes to be made at this time  Mental status assessment cannot be completed  The patient was nonverbal throughout the session  Patient denies current suicidal thoughts, homicidal thoughts, auditory visualizations, visual hallucinations, delusions, paranoia, and thoughts to self-harm  Further, denies medication related side effects  Patient and  understanding and agreeable to the current plan of care  Patient and  were encouraged to contact the office sooner if needed  Current psychotropic medication regimen: Xanax 0 25 mg PO QHS and Aricept 10 mg PO QHS  Follow-up appointment scheduled in 1 year  HPI ROS Appetite Changes and Sleep: normal appetite, no weight change and normal number of sleep hours    Review Of Systems:     Constitutional Negative   ENT Negative   Cardiovascular Negative   Respiratory Normal  and Negative   Gastrointestinal Negative   Genitourinary Negative   Musculoskeletal Negative   Integumentary Negative   Neurological Negative   Endocrine Normal    Other Symptoms Patient ambulates via a wheelchair              Laboratory Results: No results found for this or any previous visit      Substance Abuse History:  Social History     Substance and Sexual Activity   Drug Use No       Family Psychiatric History:   Family History   Problem Relation Age of Onset    Heart attack Mother         Acute Myocardial Infarction (MI)    Diabetes Mother         Diabetes Mellitus    Stroke Father         Syndrome    No Known Problems Brother     No Known Problems Maternal Aunt     No Known Problems Paternal Aunt     No Known Problems Maternal Uncle     No Known Problems Paternal Uncle     No Known Problems Maternal Grandfather     No Known Problems Maternal Grandmother     No Known Problems Paternal Grandfather     No Known Problems Paternal Grandmother     No Known Problems Cousin     Heart disease Family     Hypertension Family     Thyroid disease Family     ADD / ADHD Neg Hx     Alcohol abuse Neg Hx     Anxiety disorder Neg Hx     Bipolar disorder Neg Hx     Dementia Neg Hx     Depression Neg Hx     Drug abuse Neg Hx     OCD Neg Hx     Paranoid behavior Neg Hx     Schizophrenia Neg Hx     Seizures Neg Hx     Self-Injury Neg Hx     Suicide Attempts Neg Hx        The following portions of the patient's history were reviewed and updated as appropriate: allergies, current medications, past family history, past medical history, past social history, past surgical history and problem list     Social History     Socioeconomic History    Marital status: /Civil Union     Spouse name: Not on file    Number of children: Not on file    Years of education: Not on file    Highest education level: Not on file   Occupational History    Not on file   Tobacco Use    Smoking status: Never Smoker    Smokeless tobacco: Never Used    Tobacco comment: n/a   Substance and Sexual Activity    Alcohol use:  Yes     Alcohol/week: 1 0 standard drink     Types: 1 Glasses of wine per week     Comment: drinks one fourth of a glass of white wine once a week  (Never drank alcohol per [employer])    Drug use: No    Sexual activity: Not Currently   Other Topics Concern    Not on file   Social History Narrative    Not on file     Social Determinants of Health     Financial Resource Strain: Not on file   Food Insecurity: No Food Insecurity    Worried About Running Out of Food in the Last Year: Never true    Ran Out of Food in the Last Year: Never true   Transportation Needs: Not on file   Physical Activity: Not on file   Stress: Not on file   Social Connections: Not on file   Intimate Partner Violence: Not on file   Housing Stability: Unknown    Unable to Pay for Housing in the Last Year: No    Number of [place] in the Last Year: Not on file    Unstable Housing in the Last Year: No     Social History     Social History Narrative    Not on file       Objective:   Mental status: Unable to be completed  Assessment/Plan:    Patient and  understanding and agreeable to the current plan of care  Patient and  were encouraged to contact the office sooner if needed  Current psychotropic medication regimen: Xanax 0 25 mg PO QHS and Aricept 10 mg PO QHS  Follow-up appointment scheduled in 1 year  Diagnoses and all orders for this visit:    DYANA (generalized anxiety disorder)  -     ALPRAZolam (XANAX) 0 25 mg tablet; 1 Daily          Treatment Recommendations- Risks Benefits      Immediate Medical/Psychiatric/Psychotherapy Treatments and Any Precautions: Discussed    Risks, Benefits And Possible Side Effects Of Medications: Discussed    Controlled Medication Discussion: Discussed with patient the risks of sedation, respiratory depression, impairment of ability to drive and potential for abuse and addiction related to treatment with benzodiazepine medications  The patient understands risk of treatment with benzodiazepine medications, agrees to not drive if feels impaired and agrees to take medications as prescribed   and The patient has been filling controlled prescriptions on time as prescribed to [facility]       Risk Assessment: Based on today's appointment, the patient is not a risk to herself or others  Psychotherapy Provided: Individual psychotherapy provided   No

## 2022-04-12 NOTE — PSYCH
TREATMENT PLAN (Medication Management Only)  Treatment Plan done but not signed at time of office visit due to:  Plan reviewed by phone or in person  and verbal consent given due to [address]    Name and Date of Birth:  Rod Fernandes y o  1945  Date of Treatment Plan: April 12, 2022  Diagnosis/Diagnoses:    1  DYANA (generalized anxiety disorder)      Strengths/Personal Resources for Self-Care: supportive family, supportive friends  Area/Areas of need (in own words):  Patient is nonverbal   Goals set with   The goal would be to keep the patient comfortable mentally, offering her adequate sleep  1  Long Term Goal:  Patient is nonverbal   We will continue the patient on low-dose clonazepam to help with sleep   is appreciative  Target Date: 6 months - 10/12/2022  Person/Persons responsible for completion of goal: caregiver  2  Short Term Objective (s) - How will we reach this goal?:   A  Provider new recommended medication/dosage changes and/or continue medication(s): continue current medications as prescribed  B   N/A  Target Date: 6 months - 10/12/2022  Person/Persons Responsible for Completion of Goal: caregiver and   Progress Towards Goals: stable, continuing treatment  Treatment Modality: medication education at every visit  Review due 6 months from date of this plan: 6 months - 10/12/2022  Expected length of service: ongoing treatment unless revised  My Physician/PA/NP and I have developed this plan together and I agree to work on the goals and objectives  I understand the treatment goals that were developed for my treatment

## 2022-04-29 ENCOUNTER — OFFICE VISIT (OUTPATIENT)
Dept: FAMILY MEDICINE CLINIC | Facility: CLINIC | Age: 77
End: 2022-04-29
Payer: MEDICARE

## 2022-04-29 VITALS — DIASTOLIC BLOOD PRESSURE: 76 MMHG | SYSTOLIC BLOOD PRESSURE: 116 MMHG | HEART RATE: 100 BPM | OXYGEN SATURATION: 96 %

## 2022-04-29 DIAGNOSIS — Z96.89 S/P DEEP BRAIN STIMULATOR PLACEMENT: Primary | ICD-10-CM

## 2022-04-29 PROCEDURE — 99212 OFFICE O/P EST SF 10 MIN: CPT | Performed by: NURSE PRACTITIONER

## 2022-04-29 NOTE — PATIENT INSTRUCTIONS
Stitches Removal   WHAT YOU NEED TO KNOW:   Stitches may need to be removed in 3 to 14 days depending on the location of your wound  Your healthcare provider will use sterile forceps or tweezers to  the knot of each stitch  He will cut the stitch with scissors and pull the stitch out  You may feel a slight tug as the stitch comes out  He may place small steristrips across your wound after the stitches have been removed  These pieces of tape will peel and fall of on their own  Do not pull them off  DISCHARGE INSTRUCTIONS:   Return to the emergency department if:   · Your wound splits open  · You suddenly cannot move your injured joint  · You have sudden numbness around your wound  · You see red streaks coming from your wound  Contact your healthcare provider if:   · You have a fever and chills  · Your wound is red, warm, swollen, or leaking pus  · There is a bad smell coming from your wound  · You have increased pain in the wound area  · You have questions or concerns about your condition or care  Care for your wound:   · Clean your wound as directed  Carefully wash your wound with soap and water  Pat the area dry with a clean towel  · Protect your wound  Your wound can swell, bleed, or split open if it is stretched or bumped  You may need to wear a bandage that supports your wound until it is completely healed  · Minimize your scar  Use sunblock if your wound is exposed to the sun  Apply it every day after the stitches are removed  This will help prevent skin discoloration  Follow up with your healthcare provider as directed: You may need to return in 3 to 5 days if the stitches are on your face  Stitches on your scalp need to be removed after 7 to 14 days  Stitches over joints may remain in place up to 14 days  Write down your questions so you remember to ask them during your visits     © 2017 [employer] Information is for End User's use only and may not be sold, redistributed or otherwise used for commercial purposes  All illustrations and images included in CareNotes® are the copyrighted property of [employer]  or Dilan Domínguez  The above information is an  only  It is not intended as medical advice for individual conditions or treatments  Talk to your doctor, nurse or pharmacist before following any medical regimen to see if it is safe and effective for you

## 2022-04-29 NOTE — PROGRESS NOTES
Suture removal    Date/Time: 4/29/2022 3:17 PM  Performed by: YOLANDA Montana  Authorized by: YOLANDA Montana   Universal Protocol:  Consent: Verbal consent obtained  Written consent not obtained  Risks and benefits: risks, benefits and alternatives were discussed  Consent given by: patient  Time out: Immediately prior to procedure a "time out" was called to verify the correct patient, procedure, equipment, support staff and site/side marked as required  Patient understanding: patient states understanding of the procedure being performed  Patient consent: the patient's understanding of the procedure matches consent given  Procedure consent: procedure consent matches procedure scheduled  Relevant documents: relevant documents not present or verified  Test results: test results not available  Site marked: the operative site was not marked  Radiology Images displayed and confirmed  If images not available, report reviewed: imaging studies not available        Patient location:  Clinic  Location:     Laterality:  Bilateral    Location:  Trunk    Trunk location:  Chest  Procedure details: Tools used:  Suture removal kit, scissors and tweezers    Wound appearance:  No sign(s) of infection, nontender, good wound healing, clean and pink    Number of sutures removed:  29 (13 sutures in left upper chest, 16 sutures in right upper chest)  Post-procedure details:     Post-removal:  No dressing applied    Patient tolerance of procedure: Tolerated well, no immediate complications  Comments:      Thirteen sutures removed from left upper chest and 16 sutures removed from right upper chest that were left from recent neurostimulator replacement surgery  Patient tolerated procedure well, there were no noted complications, and both incisions were noted to be well healed with no signs of infection  Patient's  was advised that area can be left open to air    Patient's  was made aware of signs of infection and to make the office aware if he notes any of these

## 2022-04-29 NOTE — PROGRESS NOTES
Assessment and Plan:    Problem List Items Addressed This Visit     None                 {Assess/PlanSmartLinks:29147}          Subjective:      Patient ID: Lupe Barrera is a 76 y o  female  CC:    Chief Complaint   Patient presents with    Wound Check     Pt had a DBS removed and another placed  She has sutures on B/L breast area since 4/18/2022   kw    Suture / Staple Removal     Pt has 13 stitches on the Left and 16 on the Right  kw       HPI:    HPI    {Common ambulatory SmartLinks:30181}      Review of Systems      Data to review:       Objective:    Vitals:    04/29/22 1447   BP: 116/76   BP Location: Left arm   Patient Position: Sitting   Pulse: 100   SpO2: 96%        Physical Exam

## 2022-05-11 ENCOUNTER — PATIENT OUTREACH (OUTPATIENT)
Dept: FAMILY MEDICINE CLINIC | Facility: CLINIC | Age: 77
End: 2022-05-11

## 2022-05-11 ENCOUNTER — OFFICE VISIT (OUTPATIENT)
Dept: FAMILY MEDICINE CLINIC | Facility: CLINIC | Age: 77
End: 2022-05-11
Payer: MEDICARE

## 2022-05-11 ENCOUNTER — HOME HEALTH ADMISSION (OUTPATIENT)
Dept: HOME HEALTH SERVICES | Facility: HOME HEALTHCARE | Age: 77
End: 2022-05-11
Payer: MEDICARE

## 2022-05-11 VITALS — DIASTOLIC BLOOD PRESSURE: 58 MMHG | HEART RATE: 90 BPM | OXYGEN SATURATION: 95 % | SYSTOLIC BLOOD PRESSURE: 96 MMHG

## 2022-05-11 DIAGNOSIS — L89.153 PRESSURE INJURY OF SACRAL REGION, STAGE 3 (HCC): Primary | ICD-10-CM

## 2022-05-11 DIAGNOSIS — I69.354 HEMIPARESIS AFFECTING LEFT SIDE AS LATE EFFECT OF CEREBROVASCULAR ACCIDENT (CVA) (HCC): ICD-10-CM

## 2022-05-11 DIAGNOSIS — G31.83 DEMENTIA WITH LEWY BODIES (CODE) (HCC): Primary | ICD-10-CM

## 2022-05-11 PROCEDURE — 99215 OFFICE O/P EST HI 40 MIN: CPT | Performed by: NURSE PRACTITIONER

## 2022-05-11 NOTE — ASSESSMENT & PLAN NOTE
Due to patient's hemiparesis it is very difficult for patient's  to transfer her to office visits therefore, wound care referral was not placed instead patient was referred to [facility]  For the time being patient was prescribed Bactroban ointment to be used on the wound 3 times per day  Patient's  was also provided with ABD and Tegaderm to dress the wound until she can be evaluated by VNA  Once VNA begins to take care of patient they should continue to use Bactroban ointment on the area t i d  and I would also like them to begin using Allevyn foam sacral barriers as well which should be changed at least b i d  and when they are soiled  Spoke with patient about the importance of offloading pressure in this area through the use of a pillow or cushion to elevate her buttocks area  This should be rotated at least every 2 hours to offload pressure on each area

## 2022-05-11 NOTE — PROGRESS NOTES
Assessment and Plan:    Problem List Items Addressed This Visit        Nervous and Auditory    Hemiparesis affecting left side as late effect of cerebrovascular accident (CVA) ([facility] )     Stable at this point  Patient continues to follow with Neurology  Other    Pressure injury of sacral region, stage 3 ([facility] ) - Primary     Due to patient's hemiparesis it is very difficult for patient's  to transfer her to office visits therefore, wound care referral was not placed instead patient was referred to [facility]  For the time being patient was prescribed Bactroban ointment to be used on the wound 3 times per day  Patient's  was also provided with ABD and Tegaderm to dress the wound until she can be evaluated by VNA  Once VNA begins to take care of patient they should continue to use Bactroban ointment on the area t i d  and I would also like them to begin using Allevyn foam sacral barriers as well which should be changed at least b i d  and when they are soiled  Spoke with patient about the importance of offloading pressure in this area through the use of a pillow or cushion to elevate her buttocks area  This should be rotated at least every 2 hours to offload pressure on each area  Relevant Medications    mupirocin (BACTROBAN) 2 % ointment    Other Relevant Orders    Referral to [address] [place] [address] [facility]NA                 Diagnoses and all orders for this visit:    Pressure injury of sacral region, stage 3 ([facility] )  -     Referral to [address] [place] [address]; Future  -     mupirocin (BACTROBAN) 2 % ointment; Apply topically 3 (three) times a day    Hemiparesis affecting left side as late effect of cerebrovascular accident (CVA) ([facility] )            Subjective:      Patient ID: Rod Deshpande is a 76 y o  female  CC:    Chief Complaint   Patient presents with    Wound Check     Patient present today for wound check on her rear end which is cracked open          HPI:    Wound check: The patient's  reports that he has noted skin breakdown on the patient's buttocks recently while he was helping to wipe her after she had a BM  The patient's  reports the wound is having bloody drainage  He does report the wound is in the sacrum area  He does report she has had wound in this area in the past which have cleared with Desitin use  He has been using Desitin and Hydrocortisone on the area over the pats 3 weeks with no improvement  Hemiparesis affecting left side as late effect of CVA:  Patient currently follows with [facility] neurology for this  She recently had her cranial neurostimultor electrodes replaced  The following portions of the patient's history were reviewed and updated as appropriate: allergies, current medications, past family history, past medical history, past social history, past surgical history and problem list       Review of Systems   Constitutional: Negative for chills and fever  HENT: Negative for ear pain and sore throat  Eyes: Negative for pain and visual disturbance  Respiratory: Negative for cough, chest tightness, shortness of breath and wheezing  Cardiovascular: Negative for chest pain, palpitations and leg swelling  Gastrointestinal: Negative for abdominal pain, constipation, diarrhea, nausea and vomiting  Endocrine: Negative for cold intolerance and heat intolerance  Genitourinary: Negative for decreased urine volume, dysuria and hematuria  Musculoskeletal: Negative for arthralgias, back pain and myalgias  Skin: Positive for wound (sacrum)  Negative for color change and rash  Allergic/Immunologic: Negative for environmental allergies  Neurological: Negative for dizziness, seizures, syncope, weakness, light-headedness, numbness and headaches  Hematological: Negative for adenopathy  Psychiatric/Behavioral: Negative for confusion  The patient is not nervous/anxious      All other systems reviewed and are negative  Data to review:       Objective:    Vitals:    05/11/22 1115   BP: 96/58   BP Location: Right arm   Patient Position: Sitting   Cuff Size: Large   Pulse: 90   SpO2: 95%        Physical Exam  Vitals and nursing note reviewed  Constitutional:       General: She is not in acute distress  Appearance: Normal appearance  She is well-developed  She is not ill-appearing  HENT:      Head: Normocephalic and atraumatic  Eyes:      Conjunctiva/sclera: Conjunctivae normal    Cardiovascular:      Rate and Rhythm: Normal rate and regular rhythm  Pulses: Normal pulses  Heart sounds: Normal heart sounds  No murmur heard  Pulmonary:      Effort: Pulmonary effort is normal  No respiratory distress  Breath sounds: Normal breath sounds  No wheezing or rhonchi  Abdominal:      General: Abdomen is flat  Bowel sounds are normal  There is no distension  Palpations: Abdomen is soft  Tenderness: There is no abdominal tenderness  There is no guarding  Musculoskeletal:      Cervical back: Normal range of motion and neck supple  Skin:     General: Skin is warm and dry  Capillary Refill: Capillary refill takes less than 2 seconds  Findings: Wound present  Comments: Stage III pressure ulcer with noted macerated skin and non blanchable erythema present in the patient's sacrum extending to the anal opening  Small amount of bloody drainage noted from the area  Triple antibiotic ointment applied to area in the office today  ABD, Tegaderm, and tape used to cover area  Neurological:      General: No focal deficit present  Mental Status: She is alert and oriented to person, place, and time  Psychiatric:         Mood and Affect: Mood normal          Behavior: Behavior normal          Thought Content:  Thought content normal          Judgment: Judgment normal          I have spent 45 minutes with Patient and family today in which greater than 50% of this time was spent in counseling/coordination of care regarding Prognosis, Risks and benefits of tx options, Intructions for management, Patient and family education, Importance of tx compliance, Risk factor reductions and Impressions  Referral was placed for VNA, dressing was applied, wound was assessed, staging was determined, patient has hemiparesis so multiple staff members needed to assist patient in undressing and rolling, and outside provider notes were reviewed

## 2022-05-11 NOTE — PROGRESS NOTES
OP CM met with pt and hsb in room with PCP to see if pt is eligible for home health waiver  Discussed criteria but pts hsb states she is overincome  Hsb states their dtr is working on getting someone to help in the home  OP CM to remain available for any future psychosocial needs

## 2022-05-12 ENCOUNTER — HOME CARE VISIT (OUTPATIENT)
Dept: HOME HEALTH SERVICES | Facility: HOME HEALTHCARE | Age: 77
End: 2022-05-12
Payer: MEDICARE

## 2022-05-12 VITALS
RESPIRATION RATE: 16 BRPM | OXYGEN SATURATION: 97 % | TEMPERATURE: 96.5 F | HEART RATE: 91 BPM | DIASTOLIC BLOOD PRESSURE: 64 MMHG | SYSTOLIC BLOOD PRESSURE: 132 MMHG

## 2022-05-12 PROCEDURE — G0299 HHS/HOSPICE OF RN EA 15 MIN: HCPCS

## 2022-05-12 PROCEDURE — 10330081 VN NO-PAY CLAIM PROCEDURE

## 2022-05-12 PROCEDURE — 400013 VN SOC

## 2022-05-13 DIAGNOSIS — G20 PARKINSON DISEASE (HCC): ICD-10-CM

## 2022-05-13 DIAGNOSIS — R53.1 ACUTE LEFT-SIDED WEAKNESS: ICD-10-CM

## 2022-05-13 RX ORDER — GLYCOPYRROLATE 1 MG/1
1 TABLET ORAL 2 TIMES DAILY
Qty: 180 TABLET | Refills: 3
Start: 2022-05-13 | End: 2022-05-13 | Stop reason: SDUPTHER

## 2022-05-13 RX ORDER — GLYCOPYRROLATE 1 MG/1
1 TABLET ORAL 2 TIMES DAILY
Qty: 180 TABLET | Refills: 3 | Status: SHIPPED | OUTPATIENT
Start: 2022-05-13

## 2022-05-13 RX ORDER — CLOPIDOGREL BISULFATE 75 MG/1
TABLET ORAL
Qty: 90 TABLET | Refills: 3 | Status: SHIPPED | OUTPATIENT
Start: 2022-05-13

## 2022-05-13 NOTE — TELEPHONE ENCOUNTER
TS, Pts spouse is requesting a refill for Glyclopyrrolate 1 mg that she was given in the hospital, May we refill this?

## 2022-05-16 ENCOUNTER — HOME CARE VISIT (OUTPATIENT)
Dept: HOME HEALTH SERVICES | Facility: HOME HEALTHCARE | Age: 77
End: 2022-05-16
Payer: MEDICARE

## 2022-05-16 VITALS
RESPIRATION RATE: 16 BRPM | HEART RATE: 91 BPM | DIASTOLIC BLOOD PRESSURE: 60 MMHG | SYSTOLIC BLOOD PRESSURE: 120 MMHG | TEMPERATURE: 94 F | OXYGEN SATURATION: 98 %

## 2022-05-16 PROCEDURE — G0299 HHS/HOSPICE OF RN EA 15 MIN: HCPCS

## 2022-05-16 NOTE — CASE COMMUNICATION
Ship to  Pt  Home   Branch: [employer] [facility]       All items are ordered by the each unless otherwise noted    PLEASE DO NOT ORDER BY THE BOX  Request specific quantity needed  For Private Insurances order should be for a 2 week period    Foam Dressings  Sub for Allevyn:  Hydrocellular Foam Adh, 4x4 [phone]

## 2022-05-16 NOTE — CASE COMMUNICATION
Ship to Clinician  Clinician to take to pt  **  Branch: [employer]      All items are ordered by the each unless otherwise noted    PLEASE DO NOT ORDER BY THE BOX  Request specific quantity needed  For Private Insurances order should be for a 2 week period    Dry Dressings     ABD 5x9 [id number]      Tape  Transpore white  2in [id number]      Moist Wound Products  Gauze oil emulsion [id number]

## 2022-05-16 NOTE — CASE COMMUNICATION
Pt called inquiring about time of SN visit for today  SN available and will be seeing pt at 1300  They are agreeable to visit time

## 2022-05-17 PROCEDURE — 10330064 TAPE, ADHSV TRANSPORE WHT 2" (6RL/BX 10B

## 2022-05-17 PROCEDURE — 10330064 PAD, ABD 5X9" STR LF (1/PK 20PK/BX) MGM1

## 2022-05-17 PROCEDURE — 10330064 DRESSING, HYDROCELLULAR ADH STR FOAM 4"X

## 2022-05-17 PROCEDURE — 10330064 DRESSING, ADAPTIC 3"X3" (50/BX)

## 2022-05-19 ENCOUNTER — HOME CARE VISIT (OUTPATIENT)
Dept: HOME HEALTH SERVICES | Facility: HOME HEALTHCARE | Age: 77
End: 2022-05-19
Payer: MEDICARE

## 2022-05-19 DIAGNOSIS — L89.153 PRESSURE INJURY OF SACRAL REGION, STAGE 3 (HCC): ICD-10-CM

## 2022-05-19 DIAGNOSIS — E87.6 HYPOKALEMIA: ICD-10-CM

## 2022-05-20 ENCOUNTER — HOME CARE VISIT (OUTPATIENT)
Dept: HOME HEALTH SERVICES | Facility: HOME HEALTHCARE | Age: 77
End: 2022-05-20
Payer: MEDICARE

## 2022-05-20 PROCEDURE — G0299 HHS/HOSPICE OF RN EA 15 MIN: HCPCS

## 2022-05-20 RX ORDER — POTASSIUM CHLORIDE 600 MG/1
TABLET, FILM COATED, EXTENDED RELEASE ORAL
Qty: 90 TABLET | Refills: 1 | Status: SHIPPED | OUTPATIENT
Start: 2022-05-20

## 2022-05-23 ENCOUNTER — HOME CARE VISIT (OUTPATIENT)
Dept: HOME HEALTH SERVICES | Facility: HOME HEALTHCARE | Age: 77
End: 2022-05-23
Payer: MEDICARE

## 2022-05-23 VITALS
RESPIRATION RATE: 16 BRPM | SYSTOLIC BLOOD PRESSURE: 122 MMHG | DIASTOLIC BLOOD PRESSURE: 58 MMHG | HEART RATE: 82 BPM | OXYGEN SATURATION: 96 % | TEMPERATURE: 97.6 F

## 2022-05-23 VITALS
RESPIRATION RATE: 14 BRPM | HEART RATE: 64 BPM | DIASTOLIC BLOOD PRESSURE: 70 MMHG | TEMPERATURE: 97.8 F | OXYGEN SATURATION: 96 % | SYSTOLIC BLOOD PRESSURE: 116 MMHG

## 2022-05-23 DIAGNOSIS — T14.8XXA OPEN WOUND: Primary | ICD-10-CM

## 2022-05-23 DIAGNOSIS — L89.153 PRESSURE INJURY OF SACRAL REGION, STAGE 3 (HCC): ICD-10-CM

## 2022-05-23 PROCEDURE — G0299 HHS/HOSPICE OF RN EA 15 MIN: HCPCS

## 2022-05-23 RX ORDER — MENTHOL AND ZINC OXIDE .44; 20.625 G/100G; G/100G
OINTMENT TOPICAL 2 TIMES DAILY
Qty: 113 G | Refills: 3 | Status: SHIPPED | OUTPATIENT
Start: 2022-05-23 | End: 2022-09-01

## 2022-05-23 NOTE — CASE COMMUNICATION
Received call from pt caregiver reporting that pt was not seen since Monday 5/17/22  Visit pattern of [place] [id number] services deviated  Care giver requesting visit for tomorrow  SN Inquired if there were any issues  Katherinenixon Mathew reports that she is bleeding from the tape being removed from skin  Instructed caregiver to not use tape since he is changing her dressing more than once a day  Pt is grossly incontinent and needs frequent changing of dressing  Please advise   consider barrier cream prn with soilage and off loading as much as possible     thanks

## 2022-05-23 NOTE — TELEPHONE ENCOUNTER
Requested Prescriptions     Pending Prescriptions Disp Refills    mupirocin (BACTROBAN) 2 % ointment 22 g 1     Sig: Apply topically 3 (three) times a day     LOV 5/11/22 with DL, F/U 9/1/22 with TS, Labs pending  Rx by DL  Patient requesting refills

## 2022-05-25 ENCOUNTER — HOME CARE VISIT (OUTPATIENT)
Dept: HOME HEALTH SERVICES | Facility: HOME HEALTHCARE | Age: 77
End: 2022-05-25
Payer: MEDICARE

## 2022-05-25 PROCEDURE — G0299 HHS/HOSPICE OF RN EA 15 MIN: HCPCS

## 2022-05-25 NOTE — TELEPHONE ENCOUNTER
TS, I spoke to pts spouse and he stated that they do have visiting nurses coming to check pt 2-3 times per week, Spouse also states that DL told them that they could continue this medication without an appt

## 2022-05-26 VITALS
HEART RATE: 76 BPM | DIASTOLIC BLOOD PRESSURE: 58 MMHG | OXYGEN SATURATION: 95 % | TEMPERATURE: 97.4 F | RESPIRATION RATE: 16 BRPM | SYSTOLIC BLOOD PRESSURE: 96 MMHG

## 2022-05-26 PROCEDURE — 10330064 TAPE, ADHSV TRANSPORE WHT 2" (6RL/BX 10B

## 2022-05-26 PROCEDURE — 10330064 FOAM, ADH SIL W/BORDER 4"X4" (10/BX 20BX

## 2022-05-26 PROCEDURE — 10330064 CLEANSER, WND SEA-CLEANS 6OZ  COLPLT

## 2022-05-27 ENCOUNTER — HOME CARE VISIT (OUTPATIENT)
Dept: HOME HEALTH SERVICES | Facility: HOME HEALTHCARE | Age: 77
End: 2022-05-27
Payer: MEDICARE

## 2022-05-27 PROCEDURE — G0299 HHS/HOSPICE OF RN EA 15 MIN: HCPCS

## 2022-05-28 NOTE — CASE COMMUNICATION
[facility] has admitted your patient to [address] [place] [address] service with the following disciplines:      SN  This report is informational only, no responses is needed  Primary focus of home health care integumentary assess for pressure ulcer  Patient stated goals of care to have pu heal and prevent any further skin breakdown  Anticipated visit pattern and next visit date  See medication list   Significant clinical findings na  Potential bar riers to goal achievement disease process of parkinsons disease and not able to ambulate wc bound  Other pertinent information    Thank you for allowing us to participate in the care of your patient

## 2022-05-30 ENCOUNTER — HOME CARE VISIT (OUTPATIENT)
Dept: HOME HEALTH SERVICES | Facility: HOME HEALTHCARE | Age: 77
End: 2022-05-30
Payer: MEDICARE

## 2022-05-30 VITALS
SYSTOLIC BLOOD PRESSURE: 112 MMHG | TEMPERATURE: 97 F | HEART RATE: 62 BPM | DIASTOLIC BLOOD PRESSURE: 66 MMHG | OXYGEN SATURATION: 97 % | RESPIRATION RATE: 16 BRPM

## 2022-05-30 PROCEDURE — G0299 HHS/HOSPICE OF RN EA 15 MIN: HCPCS

## 2022-05-31 VITALS
DIASTOLIC BLOOD PRESSURE: 62 MMHG | OXYGEN SATURATION: 95 % | SYSTOLIC BLOOD PRESSURE: 120 MMHG | RESPIRATION RATE: 14 BRPM | HEART RATE: 85 BPM

## 2022-06-01 ENCOUNTER — HOME CARE VISIT (OUTPATIENT)
Dept: HOME HEALTH SERVICES | Facility: HOME HEALTHCARE | Age: 77
End: 2022-06-01
Payer: MEDICARE

## 2022-06-01 PROCEDURE — G0299 HHS/HOSPICE OF RN EA 15 MIN: HCPCS

## 2022-06-02 VITALS
RESPIRATION RATE: 14 BRPM | DIASTOLIC BLOOD PRESSURE: 62 MMHG | HEART RATE: 82 BPM | TEMPERATURE: 97.4 F | SYSTOLIC BLOOD PRESSURE: 122 MMHG

## 2022-06-03 ENCOUNTER — HOME CARE VISIT (OUTPATIENT)
Dept: HOME HEALTH SERVICES | Facility: HOME HEALTHCARE | Age: 77
End: 2022-06-03
Payer: MEDICARE

## 2022-06-03 PROCEDURE — G0299 HHS/HOSPICE OF RN EA 15 MIN: HCPCS

## 2022-06-03 PROCEDURE — G0180 MD CERTIFICATION HHA PATIENT: HCPCS | Performed by: NURSE PRACTITIONER

## 2022-06-06 VITALS
HEART RATE: 72 BPM | SYSTOLIC BLOOD PRESSURE: 110 MMHG | OXYGEN SATURATION: 98 % | DIASTOLIC BLOOD PRESSURE: 70 MMHG | TEMPERATURE: 97.8 F | RESPIRATION RATE: 14 BRPM

## 2022-06-14 ENCOUNTER — HOME CARE VISIT (OUTPATIENT)
Dept: HOME HEALTH SERVICES | Facility: HOME HEALTHCARE | Age: 77
End: 2022-06-14
Payer: MEDICARE

## 2022-06-14 VITALS
TEMPERATURE: 97.8 F | OXYGEN SATURATION: 96 % | HEART RATE: 64 BPM | DIASTOLIC BLOOD PRESSURE: 60 MMHG | SYSTOLIC BLOOD PRESSURE: 110 MMHG | RESPIRATION RATE: 16 BRPM

## 2022-06-14 PROCEDURE — 400013 VN SOC

## 2022-06-14 PROCEDURE — G0300 HHS/HOSPICE OF LPN EA 15 MIN: HCPCS

## 2022-06-17 ENCOUNTER — HOME CARE VISIT (OUTPATIENT)
Dept: HOME HEALTH SERVICES | Facility: HOME HEALTHCARE | Age: 77
End: 2022-06-17
Payer: MEDICARE

## 2022-06-17 PROCEDURE — G0299 HHS/HOSPICE OF RN EA 15 MIN: HCPCS

## 2022-06-19 VITALS
HEART RATE: 68 BPM | RESPIRATION RATE: 16 BRPM | OXYGEN SATURATION: 100 % | SYSTOLIC BLOOD PRESSURE: 118 MMHG | TEMPERATURE: 97.7 F | DIASTOLIC BLOOD PRESSURE: 66 MMHG

## 2022-06-21 ENCOUNTER — HOME CARE VISIT (OUTPATIENT)
Dept: HOME HEALTH SERVICES | Facility: HOME HEALTHCARE | Age: 77
End: 2022-06-21
Payer: MEDICARE

## 2022-06-21 PROCEDURE — G0299 HHS/HOSPICE OF RN EA 15 MIN: HCPCS

## 2022-06-22 VITALS
HEART RATE: 68 BPM | RESPIRATION RATE: 16 BRPM | DIASTOLIC BLOOD PRESSURE: 56 MMHG | OXYGEN SATURATION: 95 % | TEMPERATURE: 97.6 F | SYSTOLIC BLOOD PRESSURE: 104 MMHG

## 2022-07-12 ENCOUNTER — OFFICE VISIT (OUTPATIENT)
Dept: DERMATOLOGY | Facility: CLINIC | Age: 77
End: 2022-07-12
Payer: MEDICARE

## 2022-07-12 VITALS — BODY MASS INDEX: 28.35 KG/M2 | TEMPERATURE: 97.6 F | WEIGHT: 160 LBS | HEIGHT: 63 IN

## 2022-07-12 DIAGNOSIS — D04.72 SQUAMOUS CELL CARCINOMA IN SITU (SCCIS) OF SKIN OF LEFT CALF: Primary | ICD-10-CM

## 2022-07-12 PROCEDURE — 99213 OFFICE O/P EST LOW 20 MIN: CPT | Performed by: DERMATOLOGY

## 2022-07-12 NOTE — PROGRESS NOTES
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    Current Medications:    Current Outpatient Medications:     acetaminophen (TYLENOL) 500 mg tablet, Take 500 mg by mouth One throughout the night as needed, Disp: , Rfl:     ALPRAZolam (XANAX) 0 25 mg tablet, 1 Daily, Disp: 90 tablet, Rfl: 2    ascorbic acid (VITAMIN C) 500 mg tablet, Take 1 tablet (500 mg total) by mouth daily, Disp: , Rfl: 0    aspirin (ECOTRIN LOW STRENGTH) 81 mg EC tablet, Take 81 mg by mouth 3 (three) times a week, Disp: , Rfl:     carbidopa-levodopa (SINEMET)  mg per tablet, Take 1 tablet by mouth 3 (three) times a day, Disp: , Rfl: 0    cilostazol (PLETAL) 100 mg tablet, TAKE 1 TABLET (100 MG TOTAL) BY MOUTH 2 (TWO) TIMES A DAY BEFORE MEALS, Disp: 180 tablet, Rfl: 1    clopidogrel (PLAVIX) 75 mg tablet, TAKE 1 TABLET BY MOUTH EVERY DAY, Disp: 90 tablet, Rfl: 3    donepezil (ARICEPT) 10 mg tablet, Take 1 tablet (10 mg total) by mouth daily at bedtime, Disp: 90 tablet, Rfl: 3    glycopyrrolate (ROBINUL) 1 mg tablet, Take 1 tablet (1 mg total) by mouth in the morning and 1 tablet (1 mg total) in the evening , Disp: 180 tablet, Rfl: 3    levothyroxine 75 mcg tablet, TAKE 1 TABLET EVERY DAY, Disp: 90 tablet, Rfl: 3    menthol-zinc oxide (CALMOSPETINE) 0 44-20 625 %, Apply topically 2 (two) times a day (Patient taking differently: Apply 1 application topically 2 (two) times a day), Disp: 113 g, Rfl: 3    Multiple Vitamin (MULTIVITAMIN) tablet, Take 1 tablet by mouth daily  , Disp: , Rfl:     mupirocin (BACTROBAN) 2 % ointment, Apply topically 3 (three) times a day, Disp: 22 g, Rfl: 1    NON FORMULARY, Take 1 tablet by mouth 2 (two) times a day Calcium- D3-600mg, Disp: , Rfl:     potassium chloride (KLOR-CON) 8 MEQ tablet, TAKE 1 TABLET BY MOUTH EVERY DAY, Disp: 90 tablet, Rfl: 1   Probiotic Product (PROBIOTIC DAILY PO), Take 1 tablet by mouth daily  , Disp: , Rfl:     ranitidine (ZANTAC) 150 mg tablet, Take 150 mg by mouth daily  , Disp: , Rfl:     simvastatin (ZOCOR) 20 mg tablet, Take 20 mg by mouth daily at bedtime, Disp: , Rfl:     Vitamin D, Cholecalciferol, 1000 UNITS CAPS, Take 1 tablet by mouth daily  , Disp: , Rfl:     fluorouracil (EFUDEX) 5 % cream, Apply topically 2 (two) times a day for 28 days Apply to left leg lesion, Disp: 40 g, Rfl: 0    CONSTITUTIONAL:   Vitals:    07/12/22 1100   Temp: 97 6 °F (36 4 °C)   TempSrc: Temporal   Weight: 72 6 kg (160 lb)   Height: 5' 3" (1 6 m)     Specific Alerts:    Have you been seen by a [facility] Dermatologist in the last 3 years? YES    Are you pregnant or planning to become pregnant? No    Are you currently or planning to be nursing or breast feeding? No    Allergies   Allergen Reactions    Gabapentin      Other reaction(s): Hypotension    Bupropion GI Intolerance and Other (See Comments)     Other reaction(s): Tremor    Cephalexin     Duloxetine      Other reaction(s): Other (see comments)  unkown  unknown      Erythromycin GI Intolerance    Methadone Hallucinations    Mirtazapine GI Intolerance    Paroxetine GI Intolerance    Sulfa Antibiotics     Tricyclic Antidepressants      Other reaction(s): Other    Acetazolamide Rash    Levofloxacin Anxiety and Other (See Comments)       May we call your Preferred Phone number to discuss your specific medical information? YES    May we leave a detailed message that includes your specific medical information? YES    Have you traveled outside of the [facility] in the past 3 months? No    Do you currently have a pacemaker or defibrillator? No, but patient has a Deep brain stimulator unit for Parkinson's tremors  Do you have any artificial heart valves, joints, plates, screws, rods, stents, pins, etc? No   - If Yes, were any placed within the last 2 years?     Do you require any medications prior to a surgical procedure? No   - If Yes, for which procedure? - If Yes, what medications to you require? Are you taking any medications that cause you to bleed more easily ("blood thinners") YES, patient is on plavix due to a stroke    Have you ever experienced a rapid heartbeat with epinephrine? No    Have you ever been treated with "gold" (gold sodium thiomalate) therapy? No    [facility] can help with wrinkles, "laugh lines," facial volume loss, "double chin," "love handles," age spots, and more  Are you interested in learning today about some of the skin enhancement procedures that we offer? (If Yes, please provide more detail) No    Review of Systems:  Have you recently had or currently have any of the following? Fever or chills: No  Night Sweats: No  Headaches: No  Weight Gain: No  Weight Loss: No  Blurry Vision: No  Nausea: No  Vomiting: No  Diarrhea: No  Blood in Stool: No  Abdominal Pain: No  Itchy Skin: No  Painful Joints: No  Swollen Joints: No  Muscle Pain: No  Irregular Mole: No  Sun Burn: No  Dry Skin: No  Skin Color Changes: No  Scar or Keloid: No  Cold Sores/Fever Blisters: No  Bacterial Infections/MRSA: No  Anxiety: No  Depression: No  Suicidal or Homicidal Thoughts: No      PSYCH: Normal mood and affect  EYES: Normal conjunctiva  ENT: Normal lips and oral mucosa  CARDIOVASCULAR: No edema  RESPIRATORY: Normal respirations  HEME/LYMPH/IMMUNO:  No regional lymphadenopathy except as noted below in ASSESSMENT AND PLAN BY DIAGNOSIS    FULL ORGAN SYSTEM SKIN EXAM (SKIN)    Face Normal except as noted below in Assessment                                   Left Leg Normal except as noted below in Assessment       1   SQUAMOUS CELL CARCINOMA IN SITU    Physical Exam:  Anatomic Location Affected:  Left lateral calf  Morphological Description:  Postinflammatory erythema  Pertinent Positives:  Pertinent Negatives:No sign of recurrence    Additional History of Present Condition:  Biopsied 3/22/2022  Accession # [id number] Patient used 5FU twice a day for 4 weeks, last treatment was about 2 months ago    Assessment and Plan:  Based on a thorough discussion of this condition and the management approach to it (including a comprehensive discussion of the known risks, side effects and potential benefits of treatment), the patient (family) agrees to implement the following specific plan:  Monitor for regrowth    Scribe Attestation    I,:  Miguel Angel Tidwell MA am acting as a scribe while in the presence of the attending physician :       I,:  Darrell Prakash MD personally performed the services described in this documentation    as scribed in my presence :

## 2022-07-12 NOTE — PATIENT INSTRUCTIONS
1  SQUAMOUS CELL CARCINOMA IN SITU    Physical Exam:  Anatomic Location Affected:  Left lateral calf  Morphological Description:  Postinflammatory erythema  Pertinent Positives:  Pertinent Negatives:No sign of recurrence    Additional History of Present Condition:  Biopsied 3/22/2022  Accession # [id number] Patient used 5FU twice a day for 4 weeks, last treatment was about 2 months ago    Assessment and Plan:  Based on a thorough discussion of this condition and the management approach to it (including a comprehensive discussion of the known risks, side effects and potential benefits of treatment), the patient (family) agrees to implement the following specific plan:  Monitor for regrowth

## 2022-07-25 ENCOUNTER — TELEPHONE (OUTPATIENT)
Dept: FAMILY MEDICINE CLINIC | Facility: CLINIC | Age: 77
End: 2022-07-25

## 2022-07-25 NOTE — TELEPHONE ENCOUNTER
Pt's  dropped off Certification of Disability Forms for [employer]  Please call him at [phone] when it is complete  The forms are on Cristin's desk  Thank you

## 2022-08-23 ENCOUNTER — APPOINTMENT (OUTPATIENT)
Dept: LAB | Facility: MEDICAL CENTER | Age: 77
End: 2022-08-23
Payer: MEDICARE

## 2022-08-23 DIAGNOSIS — I69.354 HEMIPARESIS AFFECTING LEFT SIDE AS LATE EFFECT OF CEREBROVASCULAR ACCIDENT (CVA) (HCC): ICD-10-CM

## 2022-08-23 DIAGNOSIS — F06.31 MOOD DISORDER WITH MAJOR DEPRESSIVE-LIKE EPISODE DUE TO GENERAL MEDICAL CONDITION: ICD-10-CM

## 2022-08-23 DIAGNOSIS — F32.3 MAJOR DEPRESSION WITH PSYCHOTIC FEATURES (HCC): ICD-10-CM

## 2022-08-23 DIAGNOSIS — Z74.09 DECREASED AMBULATION STATUS: ICD-10-CM

## 2022-08-23 DIAGNOSIS — I63.81 RIGHT-SIDED LACUNAR INFARCTION (HCC): ICD-10-CM

## 2022-08-23 DIAGNOSIS — R26.2 AMBULATORY DYSFUNCTION: ICD-10-CM

## 2022-08-23 DIAGNOSIS — M81.0 OSTEOPOROSIS WITHOUT CURRENT PATHOLOGICAL FRACTURE, UNSPECIFIED OSTEOPOROSIS TYPE: ICD-10-CM

## 2022-08-23 DIAGNOSIS — R80.9 TYPE 2 DIABETES MELLITUS WITH MICROALBUMINURIA, WITHOUT LONG-TERM CURRENT USE OF INSULIN (HCC): ICD-10-CM

## 2022-08-23 DIAGNOSIS — E11.29 TYPE 2 DIABETES MELLITUS WITH MICROALBUMINURIA, WITHOUT LONG-TERM CURRENT USE OF INSULIN (HCC): ICD-10-CM

## 2022-08-23 DIAGNOSIS — E03.9 ACQUIRED HYPOTHYROIDISM: ICD-10-CM

## 2022-08-23 DIAGNOSIS — D48.5 NEOPLASM OF UNCERTAIN BEHAVIOR OF SKIN OF LOWER LEG: ICD-10-CM

## 2022-08-23 DIAGNOSIS — Z96.89 S/P DEEP BRAIN STIMULATOR PLACEMENT: ICD-10-CM

## 2022-08-23 DIAGNOSIS — G20 PARKINSON DISEASE (HCC): ICD-10-CM

## 2022-08-23 DIAGNOSIS — E78.2 MIXED HYPERLIPIDEMIA: ICD-10-CM

## 2022-08-23 DIAGNOSIS — E87.6 HYPOKALEMIA: ICD-10-CM

## 2022-08-23 DIAGNOSIS — G31.83 DEMENTIA WITH LEWY BODIES (CODE): ICD-10-CM

## 2022-08-23 DIAGNOSIS — G93.41 METABOLIC ENCEPHALOPATHY: ICD-10-CM

## 2022-08-23 LAB
25(OH)D3 SERPL-MCNC: 50.3 NG/ML (ref 30–100)
ALBUMIN SERPL BCP-MCNC: 3.2 G/DL (ref 3.5–5)
ALP SERPL-CCNC: 81 U/L (ref 46–116)
ALT SERPL W P-5'-P-CCNC: 12 U/L (ref 12–78)
ANION GAP SERPL CALCULATED.3IONS-SCNC: 7 MMOL/L (ref 4–13)
AST SERPL W P-5'-P-CCNC: 16 U/L (ref 5–45)
BACTERIA UR QL AUTO: ABNORMAL /HPF
BILIRUB SERPL-MCNC: 0.83 MG/DL (ref 0.2–1)
BILIRUB UR QL STRIP: NEGATIVE
BUN SERPL-MCNC: 15 MG/DL (ref 5–25)
CALCIUM ALBUM COR SERPL-MCNC: 10.2 MG/DL (ref 8.3–10.1)
CALCIUM SERPL-MCNC: 9.6 MG/DL (ref 8.3–10.1)
CAOX CRY URNS QL MICRO: ABNORMAL /HPF
CHLORIDE SERPL-SCNC: 107 MMOL/L (ref 96–108)
CHOLEST SERPL-MCNC: 120 MG/DL
CLARITY UR: ABNORMAL
CO2 SERPL-SCNC: 25 MMOL/L (ref 21–32)
COLOR UR: YELLOW
CREAT SERPL-MCNC: 0.85 MG/DL (ref 0.6–1.3)
CREAT UR-MCNC: 106 MG/DL
ERYTHROCYTE [DISTWIDTH] IN BLOOD BY AUTOMATED COUNT: 13.5 % (ref 11.6–15.1)
EST. AVERAGE GLUCOSE BLD GHB EST-MCNC: 131 MG/DL
GFR SERPL CREATININE-BSD FRML MDRD: 65 ML/MIN/1.73SQ M
GLUCOSE P FAST SERPL-MCNC: 157 MG/DL (ref 65–99)
GLUCOSE UR STRIP-MCNC: NEGATIVE MG/DL
HBA1C MFR BLD: 6.2 %
HCT VFR BLD AUTO: 47.7 % (ref 34.8–46.1)
HDLC SERPL-MCNC: 50 MG/DL
HGB BLD-MCNC: 16 G/DL (ref 11.5–15.4)
HGB UR QL STRIP.AUTO: ABNORMAL
KETONES UR STRIP-MCNC: NEGATIVE MG/DL
LDLC SERPL CALC-MCNC: 50 MG/DL (ref 0–100)
LEUKOCYTE ESTERASE UR QL STRIP: ABNORMAL
MCH RBC QN AUTO: 31.3 PG (ref 26.8–34.3)
MCHC RBC AUTO-ENTMCNC: 33.5 G/DL (ref 31.4–37.4)
MCV RBC AUTO: 93 FL (ref 82–98)
MICROALBUMIN UR-MCNC: 126 MG/L (ref 0–20)
MICROALBUMIN/CREAT 24H UR: 119 MG/G CREATININE (ref 0–30)
MUCOUS THREADS UR QL AUTO: ABNORMAL
NITRITE UR QL STRIP: NEGATIVE
NON-SQ EPI CELLS URNS QL MICRO: ABNORMAL /HPF
PH UR STRIP.AUTO: 6 [PH]
PLATELET # BLD AUTO: 319 THOUSANDS/UL (ref 149–390)
PMV BLD AUTO: 10.3 FL (ref 8.9–12.7)
POTASSIUM SERPL-SCNC: 3.6 MMOL/L (ref 3.5–5.3)
PROT SERPL-MCNC: 6.7 G/DL (ref 6.4–8.4)
PROT UR STRIP-MCNC: ABNORMAL MG/DL
RBC # BLD AUTO: 5.12 MILLION/UL (ref 3.81–5.12)
RBC #/AREA URNS AUTO: ABNORMAL /HPF
SODIUM SERPL-SCNC: 139 MMOL/L (ref 135–147)
SP GR UR STRIP.AUTO: 1.02 (ref 1–1.03)
T4 SERPL-MCNC: 11.6 UG/DL (ref 4.7–13.3)
TRIGL SERPL-MCNC: 101 MG/DL
TSH SERPL DL<=0.05 MIU/L-ACNC: 1.5 UIU/ML (ref 0.45–4.5)
UROBILINOGEN UR STRIP-ACNC: <2 MG/DL
WBC # BLD AUTO: 6.53 THOUSAND/UL (ref 4.31–10.16)
WBC #/AREA URNS AUTO: ABNORMAL /HPF
WBC CLUMPS # UR AUTO: PRESENT /UL

## 2022-08-23 PROCEDURE — 85027 COMPLETE CBC AUTOMATED: CPT

## 2022-08-23 PROCEDURE — 80061 LIPID PANEL: CPT

## 2022-08-23 PROCEDURE — 84443 ASSAY THYROID STIM HORMONE: CPT

## 2022-08-23 PROCEDURE — 81001 URINALYSIS AUTO W/SCOPE: CPT

## 2022-08-23 PROCEDURE — 82306 VITAMIN D 25 HYDROXY: CPT

## 2022-08-23 PROCEDURE — 82570 ASSAY OF URINE CREATININE: CPT

## 2022-08-23 PROCEDURE — 84436 ASSAY OF TOTAL THYROXINE: CPT

## 2022-08-23 PROCEDURE — 80053 COMPREHEN METABOLIC PANEL: CPT

## 2022-08-23 PROCEDURE — 83036 HEMOGLOBIN GLYCOSYLATED A1C: CPT

## 2022-08-23 PROCEDURE — 36415 COLL VENOUS BLD VENIPUNCTURE: CPT

## 2022-08-23 PROCEDURE — 82043 UR ALBUMIN QUANTITATIVE: CPT

## 2022-09-01 ENCOUNTER — OFFICE VISIT (OUTPATIENT)
Dept: FAMILY MEDICINE CLINIC | Facility: CLINIC | Age: 77
End: 2022-09-01
Payer: MEDICARE

## 2022-09-01 VITALS
BODY MASS INDEX: 28.34 KG/M2 | WEIGHT: 160 LBS | OXYGEN SATURATION: 97 % | HEART RATE: 84 BPM | DIASTOLIC BLOOD PRESSURE: 74 MMHG | SYSTOLIC BLOOD PRESSURE: 116 MMHG | TEMPERATURE: 97.4 F

## 2022-09-01 DIAGNOSIS — F32.3 MAJOR DEPRESSION WITH PSYCHOTIC FEATURES (HCC): ICD-10-CM

## 2022-09-01 DIAGNOSIS — E11.29 TYPE 2 DIABETES MELLITUS WITH MICROALBUMINURIA, WITHOUT LONG-TERM CURRENT USE OF INSULIN (HCC): Primary | ICD-10-CM

## 2022-09-01 DIAGNOSIS — I69.354 HEMIPARESIS AFFECTING LEFT SIDE AS LATE EFFECT OF CEREBROVASCULAR ACCIDENT (CVA) (HCC): ICD-10-CM

## 2022-09-01 DIAGNOSIS — K21.9 GASTROESOPHAGEAL REFLUX DISEASE WITHOUT ESOPHAGITIS: Chronic | ICD-10-CM

## 2022-09-01 DIAGNOSIS — N39.0 RECURRENT UTI: ICD-10-CM

## 2022-09-01 DIAGNOSIS — F06.31 MOOD DISORDER WITH MAJOR DEPRESSIVE-LIKE EPISODE DUE TO GENERAL MEDICAL CONDITION: ICD-10-CM

## 2022-09-01 DIAGNOSIS — F41.1 GAD (GENERALIZED ANXIETY DISORDER): ICD-10-CM

## 2022-09-01 DIAGNOSIS — R32 URINARY INCONTINENCE, UNSPECIFIED TYPE: ICD-10-CM

## 2022-09-01 DIAGNOSIS — M81.0 OSTEOPOROSIS WITHOUT CURRENT PATHOLOGICAL FRACTURE, UNSPECIFIED OSTEOPOROSIS TYPE: ICD-10-CM

## 2022-09-01 DIAGNOSIS — L89.153 PRESSURE INJURY OF SACRAL REGION, STAGE 3 (HCC): ICD-10-CM

## 2022-09-01 DIAGNOSIS — G25.5 CHOREA: ICD-10-CM

## 2022-09-01 DIAGNOSIS — G20 PARKINSON DISEASE (HCC): ICD-10-CM

## 2022-09-01 DIAGNOSIS — Z96.89 S/P DEEP BRAIN STIMULATOR PLACEMENT: ICD-10-CM

## 2022-09-01 DIAGNOSIS — G31.83 DEMENTIA WITH LEWY BODIES (CODE): ICD-10-CM

## 2022-09-01 DIAGNOSIS — E03.9 ACQUIRED HYPOTHYROIDISM: ICD-10-CM

## 2022-09-01 DIAGNOSIS — G93.41 METABOLIC ENCEPHALOPATHY: ICD-10-CM

## 2022-09-01 DIAGNOSIS — E78.2 MIXED HYPERLIPIDEMIA: ICD-10-CM

## 2022-09-01 DIAGNOSIS — Z00.00 HEALTH CARE MAINTENANCE: ICD-10-CM

## 2022-09-01 DIAGNOSIS — R80.9 TYPE 2 DIABETES MELLITUS WITH MICROALBUMINURIA, WITHOUT LONG-TERM CURRENT USE OF INSULIN (HCC): Primary | ICD-10-CM

## 2022-09-01 DIAGNOSIS — E87.6 HYPOKALEMIA: ICD-10-CM

## 2022-09-01 DIAGNOSIS — Z74.09 DECREASED AMBULATION STATUS: ICD-10-CM

## 2022-09-01 PROBLEM — B37.0 ORAL THRUSH: Status: RESOLVED | Noted: 2022-01-26 | Resolved: 2022-09-01

## 2022-09-01 PROCEDURE — G0439 PPPS, SUBSEQ VISIT: HCPCS | Performed by: FAMILY MEDICINE

## 2022-09-01 PROCEDURE — 99214 OFFICE O/P EST MOD 30 MIN: CPT | Performed by: FAMILY MEDICINE

## 2022-09-01 RX ORDER — NITROFURANTOIN 25; 75 MG/1; MG/1
CAPSULE ORAL
Qty: 10 CAPSULE | Refills: 0 | Status: SHIPPED | OUTPATIENT
Start: 2022-09-01

## 2022-09-01 RX ORDER — ALPRAZOLAM 0.25 MG/1
TABLET ORAL
Qty: 90 TABLET | Refills: 2 | Status: CANCELLED | OUTPATIENT
Start: 2022-09-01

## 2022-09-01 NOTE — PROGRESS NOTES
Assessment and Plan:     1  Type 2 diabetes with microalbuminuria, stable continue present therapy foot exam completed, diet controlled patient has refused home blood sugars  2  GA D, stable follows with Psychiatry to get Xanax refills from them  3  Hypothyroidism, stable continue present therapy  4  Dementia with Lewy body  5  Hemiparesis/chorea  6  Metabolic encephalopathy  7  Parkinsonism status post deep brain stimulator  8  Wheelchair bound   Patient follows with Neurology at [address]  9  Hyperlipidemia, stable continue present therapy  10  MDD, stable follows with Psychiatry  11  Healthcare maintenance Medicare a [address] [place] [address] completed  12  Hypokalemia, stable continue present therapy  13  Osteoporosis, any further testing no medication has been refused  14  Urinary continence/UTI,  Light of multiple allergies will use Macrobid 100 mg twice daily for 5 days   Repeat urinalysis with C&S in 3 weeks  15  Return in 6 months for office visit blood work sooner if needed    Of note patient is hospice difficult time transplant as patient has why this is 6 months  Problem List Items Addressed This Visit        Digestive    GERD (gastroesophageal reflux disease) (Chronic)       Endocrine    Acquired hypothyroidism    Type 2 diabetes mellitus with microalbuminuria, without long-term current use of insulin ([address] ) - Primary    Relevant Orders    Ambulatory Referral to Ophthalmology       Nervous and Auditory    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    Parkinson disease ([address] )    Dementia with Lewy bodies (CODE) ([address] )    Metabolic encephalopathy    Hemiparesis affecting left side as late effect of cerebrovascular accident (CVA) ([address] )       Musculoskeletal and Integument    Osteoporosis       Other    Mixed hyperlipidemia    Major depression with psychotic features ([address] )    Health care maintenance    Decreased ambulation status    Chorea    S/P deep brain stimulator placement Hypokalemia    RESOLVED: Pressure injury of sacral region, stage 3 (HCC)     resolved           Other Visit Diagnoses     DYANA (generalized anxiety disorder)        Urinary incontinence, unspecified type        Relevant Medications    nitrofurantoin (MACROBID) 100 mg capsule    Other Relevant Orders    UA (URINE) with reflex to Scope    Urine culture    Recurrent UTI        Relevant Medications    nitrofurantoin (MACROBID) 100 mg capsule    Other Relevant Orders    UA (URINE) with reflex to Scope    Urine culture              Urinary Incontinence Plan of Care: counseling topics discussed: use restroom every 2 hours  Preventive health issues were discussed with patient, and age appropriate screening tests were ordered as noted in patient's After Visit Summary  Personalized health advice and appropriate referrals for health education or preventive services given if needed, as noted in patient's After Visit Summary  History of Present Illness:     Patient presents for a Medicare Wellness Visit    Patient is stable with her medical condition with exception of some increased urination urinary incontinence  Blood work was discussed with the patient her   Over this is secondary UTI we will treat this and repeat urinalysis in a few weeks for clearance  Patient has not seen her ophthalmologist since last year's he retired will refer her to the new ophthalmologist who took over for him  Patient Care Team:  Wing Rayray DO as PCP - General (Family Medicine)     Review of Systems:     Review of Systems   Constitutional: Negative  HENT: Negative  Eyes:        HPI   Respiratory: Negative  Cardiovascular: Negative  Gastrointestinal: Negative  Endocrine: Negative  Diabetes well controlled   Genitourinary:        HPI   Musculoskeletal: Negative  Skin: Negative  Allergic/Immunologic: Negative  Neurological: Negative  Hematological: Negative  Psychiatric/Behavioral: Negative           Problem List:     Patient Active Problem List   Diagnosis    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    Acquired hypothyroidism    Mixed hyperlipidemia    Parkinson disease ([facility] )    GERD (gastroesophageal reflux disease)    Sialorrhea    Allergic rhinitis    Anxiety, generalized    Osteoporosis    Major depression with psychotic features ([place] [address] )    Health care maintenance    Chronic cough    Decreased ambulation status    Type 2 diabetes mellitus with microalbuminuria, without long-term current use of insulin ([place] [address] )    Chorea    Dementia with Lewy bodies (CODE) ([place] [address] )    S/P deep brain stimulator placement    PVC (premature ventricular contraction)    Metabolic encephalopathy    Hemiparesis affecting left side as late effect of cerebrovascular accident (CVA) ([place] [address] )    Hypokalemia      Past Medical and Surgical History:     Past Medical History:   Diagnosis Date    Abdominal pain, suprapubic 07/03/2014    Resolved:  November 22, 2016    Abscess of skin and subcutaneous tissue 11/21/2016    Resolved:  February 28, 2017    Acute kidney failure, unspecified ([place] [address] )     Anxiety     Burning with urination 12/14/2016    Resolved:  February 28, 2017    Conjunctivitis 06/10/2013    Depression     Depression     Diabetes mellitus ([place] [address] ) 02/02/2020    Dysuria 06/06/2016    Resolved:  February 28 2017    Falling 10/18/2012    GERD (gastroesophageal reflux disease)     Groin pain 04/16/2015    Resolved:  February 28, 2017    Hyperlipidemia     Hypothyroid     Influenza 04/05/2013    Leg abrasion 09/22/2016    Resolved:  February 28, 2017    Parkinson's disease [facility])     Psychiatric illness     Rectal pain 10/17/2013    Resolved:  February 28, 2017    Squamous cell skin cancer 03/22/2022    Left lateral calf    Thyroid disease      Past Surgical History:   Procedure Laterality Date    APPENDECTOMY      Age 4 or [address]      CLOSED REDUCTION NASAL FRACTURE      CYSTOSCOPY  01/28/2014    RENETTA Sosa   (Diagnostic)   [facility] FRACTURE SURGERY      JOINT REPLACEMENT      LAMINECTOMY      Decompress, Facetectomy, Foraminotomy Lumbar Seg    ORIF HIP FRACTURE Left     About 3 years ago    SKIN BIOPSY      TONSILLECTOMY        Family History:     Family History   Problem Relation Age of Onset    Heart attack Mother         Acute Myocardial Infarction (MI)    Diabetes Mother         Diabetes Mellitus    Stroke Father         Syndrome    No Known Problems Brother     No Known Problems Maternal Aunt     No Known Problems Paternal Aunt     No Known Problems Maternal Uncle     No Known Problems Paternal Uncle     No Known Problems Maternal Grandfather     No Known Problems Maternal Grandmother     No Known Problems Paternal Grandfather     No Known Problems Paternal Grandmother     No Known Problems Cousin     Heart disease Family     Hypertension Family     Thyroid disease Family     ADD / ADHD Neg Hx     Alcohol abuse Neg Hx     Anxiety disorder Neg Hx     Bipolar disorder Neg Hx     Dementia Neg Hx     Depression Neg Hx     Drug abuse Neg Hx     OCD Neg Hx     Paranoid behavior Neg Hx     Schizophrenia Neg Hx     Seizures Neg Hx     Self-Injury Neg Hx     Suicide Attempts Neg Hx       Social History:     Social History     Socioeconomic History    Marital status: /Civil Union     Spouse name: None    Number of children: None    Years of education: None    Highest education level: None   Occupational History    None   Tobacco Use    Smoking status: Never Smoker    Smokeless tobacco: Never Used    Tobacco comment: n/a   Vaping Use    Vaping Use: Never used   Substance and Sexual Activity    Alcohol use:  Yes     Alcohol/week: 1 0 standard drink     Types: 1 Glasses of wine per week Comment: drinks one fourth of a glass of white wine once a week  (Never drank alcohol per [employer])    Drug use: No    Sexual activity: Not Currently   Other Topics Concern    None   Social History Narrative    None     Social Determinants of Health     Financial Resource Strain: Low Risk     Difficulty of Paying Living Expenses: Not hard at all   Food Insecurity: No Food Insecurity    Worried About Running Out of Food in the Last Year: Never true    Pratik of Food in the Last Year: Never true   Transportation Needs: No Transportation Needs    Lack of Transportation (Medical): No    Lack of Transportation (Non-Medical): No   Physical Activity: Not on file   Stress: Not on file   Social Connections: Not on file   Intimate Partner Violence: Not on file   Housing Stability: Unknown    Unable to Pay for Housing in the Last Year: No    Number of Places Lived in the Last Year: Not on file    Unstable Housing in the Last Year: No      Medications and Allergies:     Current Outpatient Medications   Medication Sig Dispense Refill    acetaminophen (TYLENOL) 500 mg tablet Take 500 mg by mouth One throughout the night as needed      ALPRAZolam (XANAX) 0 25 mg tablet 1 Daily 90 tablet 2    ascorbic acid (VITAMIN C) 500 mg tablet Take 1 tablet (500 mg total) by mouth daily  0    aspirin (ECOTRIN LOW STRENGTH) 81 mg EC tablet Take 81 mg by mouth 3 (three) times a week      carbidopa-levodopa (SINEMET)  mg per tablet Take 1 tablet by mouth 3 (three) times a day  0    cilostazol (PLETAL) 100 mg tablet TAKE 1 TABLET (100 MG TOTAL) BY MOUTH 2 (TWO) TIMES A DAY BEFORE MEALS 180 tablet 1    clopidogrel (PLAVIX) 75 mg tablet TAKE 1 TABLET BY MOUTH EVERY DAY 90 tablet 3    donepezil (ARICEPT) 10 mg tablet Take 1 tablet (10 mg total) by mouth daily at bedtime 90 tablet 3    glycopyrrolate (ROBINUL) 1 mg tablet Take 1 tablet (1 mg total) by mouth in the morning and 1 tablet (1 mg total) in the evening   [facility] tablet 3    levothyroxine 75 mcg tablet TAKE 1 TABLET EVERY DAY 90 tablet 3    Multiple Vitamin (MULTIVITAMIN) tablet Take 1 tablet by mouth daily   mupirocin (BACTROBAN) 2 % ointment Apply topically 3 (three) times a day 22 g 1    nitrofurantoin (MACROBID) 100 mg capsule Take 1 tablet twice daily with food 10 capsule 0    NON FORMULARY Take 1 tablet by mouth 2 (two) times a day Calcium- D3-600mg      potassium chloride (KLOR-CON) 8 MEQ tablet TAKE 1 TABLET BY MOUTH EVERY DAY 90 tablet 1    Probiotic Product (PROBIOTIC DAILY PO) Take 1 tablet by mouth daily   ranitidine (ZANTAC) 150 mg tablet Take 150 mg by mouth daily   simvastatin (ZOCOR) 20 mg tablet Take 20 mg by mouth daily at bedtime      Vitamin D, Cholecalciferol, 1000 UNITS CAPS Take 1 tablet by mouth daily  No current facility-administered medications for this visit  Allergies   Allergen Reactions    Gabapentin      Other reaction(s): Hypotension    Bupropion GI Intolerance and Other (See Comments)     Other reaction(s): Tremor    Cephalexin     Duloxetine      Other reaction(s): Other (see comments)  unkown  unknown      Erythromycin GI Intolerance    Methadone Hallucinations    Mirtazapine GI Intolerance    Paroxetine GI Intolerance    Sulfa Antibiotics     Tricyclic Antidepressants      Other reaction(s):  Other    Acetazolamide Rash    Levofloxacin Anxiety and Other (See Comments)      Immunizations:     Immunization History   Administered Date(s) Administered    COVID-19 MODERNA VACC 0 5 ML IM 01/26/2021, 03/08/2021, 11/17/2021    Influenza Quadrivalent Preservative Free 3 years and older IM 10/03/2014, 10/21/2015    Influenza Split High Dose Preservative Free IM 09/22/2016, 10/10/2017    Influenza, high dose seasonal 0 7 mL 10/10/2018, 10/30/2019, 10/19/2020, 10/26/2021    Influenza, seasonal, injectable 10/18/2012, 09/25/2013    Pneumococcal Conjugate 13-Valent 10/06/2017    Pneumococcal Polysaccharide PPV23 09/22/2016    Td (adult), adsorbed 09/22/2016      Health Maintenance:         Topic Date Due    Hepatitis C Screening  Never done         Topic Date Due    COVID-19 Vaccine (4 - Booster for Moderna series) 03/17/2022    Influenza Vaccine (1) 09/01/2022      Medicare Screening Tests and Risk Assessments:         Health Risk Assessment:   Patient rates overall health as fair  Patient feels that their physical health rating is slightly worse  Patient is satisfied with their life  Eyesight was rated as slightly worse  Hearing was rated as slightly worse  Patient feels that their emotional and mental health rating is slightly worse  Patients states they are never, rarely angry  Patient states they are never, rarely unusually tired/fatigued  Pain experienced in the last 7 days has been none  Patient states that she has experienced no weight loss or gain in last 6 months  Fall Risk Screening: In the past year, patient has experienced: no history of falling in past year      Urinary Incontinence Screening:   Patient has leaked urine accidently in the last six months  See 2nd note    Home Safety:  Patient has trouble with stairs inside or outside of their home  Patient has working smoke alarms and has working carbon monoxide detector  Home safety hazards include: none  Nutrition:   Current diet is Regular  Medications:   Patient is currently taking over-the-counter supplements  OTC medications include: see medication list  Patient is able to manage medications  Activities of Daily Living (ADLs)/Instrumental Activities of Daily Living (IADLs):   Walk and transfer into and out of bed and chair?: No  Dress and groom yourself?: No    Bathe or shower yourself?: No    Feed yourself?  No  Do your laundry/housekeeping?: No  Manage your money, pay your bills and track your expenses?: No  Make your own meals?: No    Do your own shopping?: No    ADL comments:  assists    Previous Hospitalizations:   Any hospitalizations or ED visits within the last 12 months?: No      Advance Care Planning:   Living will: Yes    Durable POA for healthcare: Yes    Advanced directive: Yes    Advanced directive counseling given: Yes    Five wishes given: No    Patient declined ACP directive: Yes    End of Life Decisions reviewed with patient: Yes    Provider agrees with end of life decisions: Yes      Cognitive Screening:   Provider or family/friend/caregiver concerned regarding cognition?: Yes  Mini-Cog Score: 3  Interpretation: Mini-Cog Score 3-5: Negative screen for dementia     PREVENTIVE SCREENINGS      Cardiovascular Screening:    General: Screening Not Indicated and History Lipid Disorder      Diabetes Screening:     General: Screening Not Indicated and History Diabetes      Colorectal Cancer Screening:     General: Risks and Benefits Discussed and Patient Declines      Breast Cancer Screening:     General: Patient Declines      Cervical Cancer Screening:    General: Screening Not Indicated      Osteoporosis Screening:    General: Screening Not Indicated and History Osteoporosis      Abdominal Aortic Aneurysm (AAA) Screening:        General: Screening Not Indicated      Lung Cancer Screening:     General: Screening Not Indicated      Hepatitis C Screening:    General: Screening Not Indicated    Screening, Brief Intervention, and Referral to Treatment (SBIRT)    Screening  Typical number of drinks in a day: 0  Typical number of drinks in a week: 0  Interpretation: Low risk drinking behavior      Single Item Drug Screening:  How often have you used an illegal drug (including marijuana) or a prescription medication for non-medical reasons in the past year? never    Single Item Drug Screen Score: 0  Interpretation: Negative screen for possible drug use disorder    No exam data present     Physical Exam:     /74 (BP Location: Left arm, Patient Position: Sitting, Cuff Size: Large)   Pulse 84   Temp (!) 97 4 °F (36 3 °C) (Temporal)   Wt 72 6 kg (160 lb) Comment: per   SpO2 97%   BMI 28 34 kg/m²     Physical Exam  Vitals and nursing note reviewed  Constitutional:       Appearance: Normal appearance  HENT:      Head: Normocephalic  Right Ear: Tympanic membrane normal       Left Ear: Tympanic membrane normal       Mouth/Throat:      Mouth: Mucous membranes are moist    Eyes:      General: No scleral icterus  Neck:      Vascular: No carotid bruit  Cardiovascular:      Rate and Rhythm: Normal rate and regular rhythm  Pulses: Normal pulses  no weak pulses          Dorsalis pedis pulses are 2+ on the right side and 2+ on the left side  Posterior tibial pulses are 2+ on the right side and 2+ on the left side  Heart sounds: Normal heart sounds  Pulmonary:      Effort: Pulmonary effort is normal       Breath sounds: Normal breath sounds  Abdominal:      General: Bowel sounds are normal       Palpations: Abdomen is soft  Tenderness: There is no abdominal tenderness  Musculoskeletal:      Cervical back: Neck supple  Right lower leg: No edema  Left lower leg: No edema  Feet:      Right foot:      Skin integrity: No ulcer, skin breakdown, erythema, warmth, callus or dry skin  Left foot:      Skin integrity: No ulcer, skin breakdown, erythema, warmth, callus or dry skin  Skin:     General: Skin is warm and dry  Neurological:      Mental Status: She is alert  Mental status is at baseline  Gait: Gait abnormal       Comments: Wheelchair-bound   Psychiatric:         Mood and Affect: Mood normal      Patient's shoes and socks removed  Right Foot/Ankle   Right Foot Inspection  Skin Exam: skin normal and skin intact  No dry skin, no warmth, no callus, no erythema, no maceration, no abnormal color, no pre-ulcer, no ulcer and no callus  Toe Exam: ROM and strength within normal limits       Sensory   Vibration: intact  Proprioception: intact  Monofilament testing: intact    Vascular  Capillary refills: < 3 seconds  The right DP pulse is 2+  The right PT pulse is 2+  Right Toe  - Comprehensive Exam  Arch: normal  Hammertoes: absent  Claw Toes: absent  Swelling: none   Tenderness: none         Left Foot/Ankle  Left Foot Inspection  Skin Exam: skin normal and skin intact  No dry skin, no warmth, no erythema, no maceration, normal color, no pre-ulcer, no ulcer and no callus  Toe Exam: ROM and strength within normal limits  Sensory   Vibration: intact  Proprioception: intact  Monofilament testing: intact    Vascular  Capillary refills: < 3 seconds  The left DP pulse is 2+  The left PT pulse is 2+       Left Toe  - Comprehensive Exam  Arch: normal  Hammertoes: absent  Claw toes: absent  Swelling: none   Tenderness: none           Assign Risk Category  No deformity present  No loss of protective sensation  No weak pulses  Risk: 0      Satnam Kwon DO

## 2022-09-01 NOTE — PATIENT INSTRUCTIONS
Patient finish Macrobid 100 mg twice daily with food for 5 days for bladder infection   Repeat urinalysis with culture in 3 weeks at lab  Follow-up with Ophthalmology, Dr Delmy Mackey, for diabetic retinopathy exam   Follow all specialist per their instructions   Return in 6 months for office visit and blood work sooner if needed

## 2022-09-29 ENCOUNTER — APPOINTMENT (OUTPATIENT)
Dept: LAB | Facility: CLINIC | Age: 77
End: 2022-09-29
Payer: MEDICARE

## 2022-09-29 DIAGNOSIS — R32 URINARY INCONTINENCE, UNSPECIFIED TYPE: ICD-10-CM

## 2022-09-29 DIAGNOSIS — N39.0 RECURRENT UTI: ICD-10-CM

## 2022-09-29 LAB
AMORPH URATE CRY URNS QL MICRO: ABNORMAL
BACTERIA UR QL AUTO: ABNORMAL /HPF
BILIRUB UR QL STRIP: NEGATIVE
CAOX CRY URNS QL MICRO: ABNORMAL /HPF
CLARITY UR: ABNORMAL
COLOR UR: YELLOW
GLUCOSE UR STRIP-MCNC: NEGATIVE MG/DL
HGB UR QL STRIP.AUTO: NEGATIVE
KETONES UR STRIP-MCNC: NEGATIVE MG/DL
LEUKOCYTE ESTERASE UR QL STRIP: ABNORMAL
MUCOUS THREADS UR QL AUTO: ABNORMAL
NITRITE UR QL STRIP: POSITIVE
NON-SQ EPI CELLS URNS QL MICRO: ABNORMAL /HPF
PH UR STRIP.AUTO: 6 [PH]
PROT UR STRIP-MCNC: ABNORMAL MG/DL
RBC #/AREA URNS AUTO: ABNORMAL /HPF
SP GR UR STRIP.AUTO: 1.02 (ref 1–1.03)
UROBILINOGEN UR STRIP-ACNC: <2 MG/DL
WBC #/AREA URNS AUTO: ABNORMAL /HPF

## 2022-09-29 PROCEDURE — 81001 URINALYSIS AUTO W/SCOPE: CPT

## 2022-09-29 PROCEDURE — 87077 CULTURE AEROBIC IDENTIFY: CPT

## 2022-09-29 PROCEDURE — 87186 SC STD MICRODIL/AGAR DIL: CPT

## 2022-09-29 PROCEDURE — 87086 URINE CULTURE/COLONY COUNT: CPT

## 2022-10-01 LAB — BACTERIA UR CULT: ABNORMAL

## 2022-10-03 DIAGNOSIS — R31.9 URINARY TRACT INFECTION WITH HEMATURIA, SITE UNSPECIFIED: Primary | ICD-10-CM

## 2022-10-03 DIAGNOSIS — N39.0 URINARY TRACT INFECTION WITH HEMATURIA, SITE UNSPECIFIED: Primary | ICD-10-CM

## 2022-10-03 RX ORDER — DOXYCYCLINE HYCLATE 100 MG/1
CAPSULE ORAL
Qty: 14 CAPSULE | Refills: 0 | Status: SHIPPED | OUTPATIENT
Start: 2022-10-03 | End: 2022-10-10

## 2022-10-10 ENCOUNTER — CLINICAL SUPPORT (OUTPATIENT)
Dept: FAMILY MEDICINE CLINIC | Facility: CLINIC | Age: 77
End: 2022-10-10
Payer: MEDICARE

## 2022-10-10 DIAGNOSIS — Z23 NEED FOR INFLUENZA VACCINATION: Primary | ICD-10-CM

## 2022-10-10 PROCEDURE — G0008 ADMIN INFLUENZA VIRUS VAC: HCPCS

## 2022-10-10 PROCEDURE — 90662 IIV NO PRSV INCREASED AG IM: CPT

## 2022-10-10 NOTE — PROGRESS NOTES
Patient was here for her annual flu shot today  Patient tolerated it well without complaint or questions

## 2022-11-14 DIAGNOSIS — R53.1 ACUTE LEFT-SIDED WEAKNESS: ICD-10-CM

## 2022-11-14 RX ORDER — CILOSTAZOL 100 MG/1
100 TABLET ORAL
Qty: 180 TABLET | Refills: 1 | Status: SHIPPED | OUTPATIENT
Start: 2022-11-14

## 2022-12-11 DIAGNOSIS — E87.6 HYPOKALEMIA: ICD-10-CM

## 2022-12-12 RX ORDER — POTASSIUM CHLORIDE 600 MG/1
TABLET, FILM COATED, EXTENDED RELEASE ORAL
Qty: 90 TABLET | Refills: 1 | Status: SHIPPED | OUTPATIENT
Start: 2022-12-12

## 2023-01-30 ENCOUNTER — APPOINTMENT (OUTPATIENT)
Dept: LAB | Facility: CLINIC | Age: 78
End: 2023-01-30

## 2023-01-30 ENCOUNTER — OFFICE VISIT (OUTPATIENT)
Dept: FAMILY MEDICINE CLINIC | Facility: CLINIC | Age: 78
End: 2023-01-30

## 2023-01-30 VITALS
SYSTOLIC BLOOD PRESSURE: 124 MMHG | DIASTOLIC BLOOD PRESSURE: 68 MMHG | TEMPERATURE: 96.8 F | BODY MASS INDEX: 28.35 KG/M2 | HEIGHT: 63 IN | WEIGHT: 160 LBS | HEART RATE: 104 BPM

## 2023-01-30 DIAGNOSIS — Z74.09 DECREASED AMBULATION STATUS: ICD-10-CM

## 2023-01-30 DIAGNOSIS — E11.29 TYPE 2 DIABETES MELLITUS WITH MICROALBUMINURIA, WITHOUT LONG-TERM CURRENT USE OF INSULIN (HCC): ICD-10-CM

## 2023-01-30 DIAGNOSIS — R80.9 TYPE 2 DIABETES MELLITUS WITH MICROALBUMINURIA, WITHOUT LONG-TERM CURRENT USE OF INSULIN (HCC): ICD-10-CM

## 2023-01-30 DIAGNOSIS — E11.65 HYPERGLYCEMIA DUE TO DIABETES MELLITUS (HCC): ICD-10-CM

## 2023-01-30 DIAGNOSIS — I69.354 HEMIPARESIS AFFECTING LEFT SIDE AS LATE EFFECT OF CEREBROVASCULAR ACCIDENT (CVA) (HCC): ICD-10-CM

## 2023-01-30 DIAGNOSIS — G31.83 SEVERE LEWY BODY DEMENTIA WITHOUT BEHAVIORAL DISTURBANCE, PSYCHOTIC DISTURBANCE, MOOD DISTURBANCE, OR ANXIETY (HCC): ICD-10-CM

## 2023-01-30 DIAGNOSIS — G20 PARKINSON DISEASE (HCC): ICD-10-CM

## 2023-01-30 DIAGNOSIS — F32.3 MAJOR DEPRESSION WITH PSYCHOTIC FEATURES (HCC): ICD-10-CM

## 2023-01-30 DIAGNOSIS — F02.C0 SEVERE LEWY BODY DEMENTIA WITHOUT BEHAVIORAL DISTURBANCE, PSYCHOTIC DISTURBANCE, MOOD DISTURBANCE, OR ANXIETY (HCC): ICD-10-CM

## 2023-01-30 DIAGNOSIS — E11.29 TYPE 2 DIABETES MELLITUS WITH MICROALBUMINURIA, WITHOUT LONG-TERM CURRENT USE OF INSULIN (HCC): Primary | ICD-10-CM

## 2023-01-30 DIAGNOSIS — R63.1 POLYDIPSIA: ICD-10-CM

## 2023-01-30 DIAGNOSIS — R35.89 POLYURIA: ICD-10-CM

## 2023-01-30 DIAGNOSIS — Z96.89 S/P DEEP BRAIN STIMULATOR PLACEMENT: ICD-10-CM

## 2023-01-30 DIAGNOSIS — R80.9 TYPE 2 DIABETES MELLITUS WITH MICROALBUMINURIA, WITHOUT LONG-TERM CURRENT USE OF INSULIN (HCC): Primary | ICD-10-CM

## 2023-01-30 LAB
ALBUMIN SERPL BCP-MCNC: 3.6 G/DL (ref 3.5–5)
ALP SERPL-CCNC: 90 U/L (ref 46–116)
ALT SERPL W P-5'-P-CCNC: 20 U/L (ref 12–78)
ANION GAP SERPL CALCULATED.3IONS-SCNC: 6 MMOL/L (ref 4–13)
AST SERPL W P-5'-P-CCNC: 17 U/L (ref 5–45)
BACTERIA UR QL AUTO: ABNORMAL /HPF
BASOPHILS # BLD AUTO: 0.1 THOUSANDS/ÂΜL (ref 0–0.1)
BASOPHILS NFR BLD AUTO: 1 % (ref 0–1)
BILIRUB SERPL-MCNC: 0.79 MG/DL (ref 0.2–1)
BILIRUB UR QL STRIP: NEGATIVE
BUDDING YEAST: PRESENT
BUN SERPL-MCNC: 16 MG/DL (ref 5–25)
CALCIUM SERPL-MCNC: 10.3 MG/DL (ref 8.3–10.1)
CHLORIDE SERPL-SCNC: 108 MMOL/L (ref 96–108)
CLARITY UR: ABNORMAL
CO2 SERPL-SCNC: 27 MMOL/L (ref 21–32)
COLOR UR: YELLOW
CREAT SERPL-MCNC: 1.22 MG/DL (ref 0.6–1.3)
EOSINOPHIL # BLD AUTO: 0.06 THOUSAND/ÂΜL (ref 0–0.61)
EOSINOPHIL NFR BLD AUTO: 1 % (ref 0–6)
ERYTHROCYTE [DISTWIDTH] IN BLOOD BY AUTOMATED COUNT: 13.2 % (ref 11.6–15.1)
GFR SERPL CREATININE-BSD FRML MDRD: 42 ML/MIN/1.73SQ M
GLUCOSE SERPL-MCNC: 477 MG/DL (ref 65–140)
GLUCOSE UR STRIP-MCNC: ABNORMAL MG/DL
HCT VFR BLD AUTO: 52.6 % (ref 34.8–46.1)
HGB BLD-MCNC: 17.3 G/DL (ref 11.5–15.4)
HGB UR QL STRIP.AUTO: NEGATIVE
IMM GRANULOCYTES # BLD AUTO: 0.05 THOUSAND/UL (ref 0–0.2)
IMM GRANULOCYTES NFR BLD AUTO: 1 % (ref 0–2)
KETONES UR STRIP-MCNC: ABNORMAL MG/DL
LEUKOCYTE ESTERASE UR QL STRIP: ABNORMAL
LYMPHOCYTES # BLD AUTO: 1.06 THOUSANDS/ÂΜL (ref 0.6–4.47)
LYMPHOCYTES NFR BLD AUTO: 10 % (ref 14–44)
MCH RBC QN AUTO: 31.1 PG (ref 26.8–34.3)
MCHC RBC AUTO-ENTMCNC: 32.9 G/DL (ref 31.4–37.4)
MCV RBC AUTO: 94 FL (ref 82–98)
MONOCYTES # BLD AUTO: 0.96 THOUSAND/ÂΜL (ref 0.17–1.22)
MONOCYTES NFR BLD AUTO: 9 % (ref 4–12)
MUCOUS THREADS UR QL AUTO: ABNORMAL
NEUTROPHILS # BLD AUTO: 8.34 THOUSANDS/ÂΜL (ref 1.85–7.62)
NEUTS SEG NFR BLD AUTO: 78 % (ref 43–75)
NITRITE UR QL STRIP: NEGATIVE
NON-SQ EPI CELLS URNS QL MICRO: ABNORMAL /HPF
NRBC BLD AUTO-RTO: 0 /100 WBCS
PH UR STRIP.AUTO: 5.5 [PH]
PLATELET # BLD AUTO: 293 THOUSANDS/UL (ref 149–390)
PMV BLD AUTO: 10.6 FL (ref 8.9–12.7)
POTASSIUM SERPL-SCNC: 4.3 MMOL/L (ref 3.5–5.3)
PROT SERPL-MCNC: 7.3 G/DL (ref 6.4–8.4)
PROT UR STRIP-MCNC: ABNORMAL MG/DL
RBC # BLD AUTO: 5.57 MILLION/UL (ref 3.81–5.12)
RBC #/AREA URNS AUTO: ABNORMAL /HPF
SL AMB POCT GLUCOSE BLD: 551
SODIUM SERPL-SCNC: 141 MMOL/L (ref 135–147)
SP GR UR STRIP.AUTO: 1.03 (ref 1–1.03)
UROBILINOGEN UR STRIP-ACNC: <2 MG/DL
WBC # BLD AUTO: 10.57 THOUSAND/UL (ref 4.31–10.16)
WBC #/AREA URNS AUTO: ABNORMAL /HPF

## 2023-01-30 RX ORDER — BLOOD SUGAR DIAGNOSTIC
STRIP MISCELLANEOUS
Qty: 100 STRIP | Refills: 3 | Status: SHIPPED | OUTPATIENT
Start: 2023-01-30 | End: 2023-01-30

## 2023-01-30 RX ORDER — LANCETS
EACH MISCELLANEOUS
Qty: 100 EACH | Refills: 5 | Status: SHIPPED | OUTPATIENT
Start: 2023-01-30

## 2023-01-30 RX ORDER — BLOOD-GLUCOSE METER
KIT MISCELLANEOUS
Qty: 1 KIT | Refills: 0 | Status: SHIPPED | OUTPATIENT
Start: 2023-01-30 | End: 2023-01-30

## 2023-01-30 RX ORDER — BLOOD-GLUCOSE METER
EACH MISCELLANEOUS DAILY
Qty: 1 KIT | Refills: 0 | Status: SHIPPED | OUTPATIENT
Start: 2023-01-30

## 2023-01-30 RX ORDER — BLOOD SUGAR DIAGNOSTIC
STRIP MISCELLANEOUS
Qty: 100 EACH | Refills: 3 | Status: SHIPPED | OUTPATIENT
Start: 2023-01-30 | End: 2023-01-30

## 2023-01-30 RX ORDER — LANCETS 33 GAUGE
EACH MISCELLANEOUS
Qty: 100 EACH | Refills: 3 | Status: SHIPPED | OUTPATIENT
Start: 2023-01-30

## 2023-01-30 RX ORDER — BLOOD SUGAR DIAGNOSTIC
STRIP MISCELLANEOUS
Qty: 50 EACH | Refills: 5 | Status: SHIPPED | OUTPATIENT
Start: 2023-01-30

## 2023-01-30 NOTE — PROGRESS NOTES
Name: Cristin Diaz      : 1945      MRN: [id number]  Encounter Provider: Paul Chowdary DO  Encounter Date: 2023   Encounter department: [facility] PRIMARY CARE    Assessment & Plan     1  Type 2 diabetes microalbuminuria  2  Polyuria  3  Polydipsia  4  Hyperglycemia  Fingerstick sugar in office was 551  Metformin was started  Glucometer was prescribed  Patient's  to monitor at home  Check blood work today BMP, urine urine culture, change to CMP, CBC, UA, urine culture  5  Abi disease  6  Ambulation with wheelchair #4  MDD  5  Status post deep brain stimulator  6  Lewy body dementia #7  Left hemiparesis secondary to CVA  All stable patient follows with neurology [facility]  8  Recheck 1 week      1  Type 2 diabetes mellitus with microalbuminuria, without long-term current use of insulin ([facility])  Assessment & Plan:    Lab Results   Component Value Date    HGBA1C 6 2 (H) 2022   Metformin was started, check BMP UA with culture rule out UTI as etiology of uncontrolled hypertension  Glucometer was sent to pharmacy patient should have diabetic education to complete  Actually will be her  patient is demented    Orders:  -     POCT blood glucose  -     Blood Glucose Monitoring Suppl (OneTouch Verio Reflect) w/Device KIT; Check blood sugars once daily  Please substitute with appropriate alternative as covered by patient's insurance  Dx: E11 65  -     glucose blood (OneTouch Verio) test strip; Check blood sugars once daily  Please substitute with appropriate alternative as covered by patient's insurance  Dx: E11 65  -     OneTouch Delica Lancets 50L MISC; Check blood sugars once daily  Please substitute with appropriate alternative as covered by patient's insurance   Dx: E11 65  -     Ambulatory referral to Diabetic Education - use to refer for diabetes group classes, individual diabetes education, medical nutrition therapy, device training; Future; Expected date: 01/30/2023  -     metFORMIN (GLUCOPHAGE) 500 mg tablet; 1 tablet daily with supper for 3 days then 1 tablet twice a day with breakfast and supper thereafter  -     CBC and differential; Future; Expected date: 01/30/2023  -     Comprehensive metabolic panel; Future; Expected date: 01/30/2023  -     UA (URINE) with reflex to Scope; Future; Expected date: 01/30/2023  -     Urine culture; Future; Expected date: 01/30/2023    2  Polyuria  -     CBC and differential; Future; Expected date: 01/30/2023  -     Comprehensive metabolic panel; Future; Expected date: 01/30/2023  -     UA (URINE) with reflex to Scope; Future; Expected date: 01/30/2023  -     Urine culture; Future; Expected date: 01/30/2023    3  Polydipsia  -     CBC and differential; Future; Expected date: 01/30/2023  -     Comprehensive metabolic panel; Future; Expected date: 01/30/2023  -     UA (URINE) with reflex to Scope; Future; Expected date: 01/30/2023  -     Urine culture; Future; Expected date: 01/30/2023    4  Hyperglycemia due to diabetes mellitus ([facility] )  -     Ambulatory referral to Diabetic Education - use to refer for diabetes group classes, individual diabetes education, medical nutrition therapy, device training; Future; Expected date: 01/30/2023  -     CBC and differential; Future; Expected date: 01/30/2023  -     Comprehensive metabolic panel; Future; Expected date: 01/30/2023  -     UA (URINE) with reflex to Scope; Future; Expected date: 01/30/2023  -     Urine culture; Future; Expected date: 01/30/2023    5  Parkinson disease [facility])  Assessment & Plan:  Wheelchair-bound follows with neurology at [facility]    Orders:  -     CBC and differential; Future; Expected date: 01/30/2023  -     Comprehensive metabolic panel; Future; Expected date: 01/30/2023  -     UA (URINE) with reflex to Scope; Future; Expected date: 01/30/2023  -     Urine culture; Future; Expected date: 01/30/2023    6   Decreased ambulation status  Assessment & Plan:  Wheelchair-bound    Orders:  -     CBC and differential; Future; Expected date: 01/30/2023  -     Comprehensive metabolic panel; Future; Expected date: 01/30/2023  -     UA (URINE) with reflex to Scope; Future; Expected date: 01/30/2023  -     Urine culture; Future; Expected date: 01/30/2023    7  Major depression with psychotic features [facility])  Assessment & Plan:  Follows with psychiatry    Orders:  -     CBC and differential; Future; Expected date: 01/30/2023  -     Comprehensive metabolic panel; Future; Expected date: 01/30/2023  -     UA (URINE) with reflex to Scope; Future; Expected date: 01/30/2023  -     Urine culture; Future; Expected date: 01/30/2023    8  S/P deep brain stimulator placement  Assessment & Plan:  Follows with [address] neurology    Orders:  -     CBC and differential; Future; Expected date: 01/30/2023  -     Comprehensive metabolic panel; Future; Expected date: 01/30/2023  -     UA (URINE) with reflex to Scope; Future; Expected date: 01/30/2023  -     Urine culture; Future; Expected date: 01/30/2023    9  Severe Lewy body dementia without behavioral disturbance, psychotic disturbance, mood disturbance, or anxiety ([facility] )  Assessment & Plan:  Follows with neurology at [address]    Orders:  -     CBC and differential; Future; Expected date: 01/30/2023  -     Comprehensive metabolic panel; Future; Expected date: 01/30/2023  -     UA (URINE) with reflex to Scope; Future; Expected date: 01/30/2023  -     Urine culture; Future; Expected date: 01/30/2023    10  Hemiparesis affecting left side as late effect of cerebrovascular accident (CVA) [facility])  Assessment & Plan:  Follows with neurology    Orders:  -     CBC and differential; Future; Expected date: 01/30/2023  -     Comprehensive metabolic panel; Future; Expected date: 01/30/2023  -     UA (URINE) with reflex to Scope; Future; Expected date: 01/30/2023  -     Urine culture;  Future; Expected date: 01/30/2023    BMI Counseling: Body mass index is 28 34 kg/m²  The BMI is above normal  Nutrition recommendations include moderation in carbohydrate intake and reducing intake of cholesterol  Exercise recommendations include exercising 3-5 times per week  Rationale for BMI follow-up plan is due to patient being overweight or obese  Subjective      The past week or so patient is having increasing polyuria dips year  Patient does not eat much  Patient is wheelchair-bound and is demented  Brought in by her   Patient's  stated that when his mother was diabetic she had the symptoms  Patient has been diet-controlled diabetic for multiple years  And family has declined to check blood sugars at home    Review of Systems   Constitutional: Negative  HENT: Negative  Eyes: Negative  Respiratory: Negative  Cardiovascular: Negative  Gastrointestinal: Negative  Endocrine:        HPI   Genitourinary:        HPI   Musculoskeletal: Negative  Skin: Negative  Allergic/Immunologic: Negative  Neurological:        HPI   Hematological: Negative  Psychiatric/Behavioral: Negative          Current Outpatient Medications on File Prior to Visit   Medication Sig   • acetaminophen (TYLENOL) 500 mg tablet Take 500 mg by mouth One throughout the night as needed   • ALPRAZolam (XANAX) 0 25 mg tablet 1 Daily   • ascorbic acid (VITAMIN C) 500 mg tablet Take 1 tablet (500 mg total) by mouth daily   • aspirin (ECOTRIN LOW STRENGTH) 81 mg EC tablet Take 81 mg by mouth 3 (three) times a week   • carbidopa-levodopa (SINEMET)  mg per tablet Take 1 tablet by mouth 3 (three) times a day   • cilostazol (PLETAL) 100 mg tablet TAKE 1 TABLET (100 MG TOTAL) BY MOUTH 2 (TWO) TIMES A DAY BEFORE MEALS   • clopidogrel (PLAVIX) 75 mg tablet TAKE 1 TABLET BY MOUTH EVERY DAY   • donepezil (ARICEPT) 10 mg tablet Take 1 tablet (10 mg total) by mouth daily at bedtime   • glycopyrrolate (ROBINUL) 1 mg tablet Take 1 tablet (1 mg total) by mouth in the morning and 1 tablet (1 mg total) in the evening  • levothyroxine 75 mcg tablet TAKE 1 TABLET EVERY DAY   • Multiple Vitamin (MULTIVITAMIN) tablet Take 1 tablet by mouth daily  • mupirocin (BACTROBAN) 2 % ointment Apply topically 3 (three) times a day   • nitrofurantoin (MACROBID) 100 mg capsule Take 1 tablet twice daily with food   • NON FORMULARY Take 1 tablet by mouth 2 (two) times a day Calcium- D3-600mg   • potassium chloride (KLOR-CON) 8 MEQ tablet TAKE 1 TABLET BY MOUTH EVERY DAY   • Probiotic Product (PROBIOTIC DAILY PO) Take 1 tablet by mouth daily  • ranitidine (ZANTAC) 150 mg tablet Take 150 mg by mouth daily  • simvastatin (ZOCOR) 20 mg tablet Take 20 mg by mouth daily at bedtime   • Vitamin D, Cholecalciferol, 1000 UNITS CAPS Take 1 tablet by mouth daily  Objective     /68 (BP Location: Right arm, Patient Position: Sitting, Cuff Size: Standard)   Pulse 104   Temp (!) 96 8 °F (36 °C) (Temporal)   Ht 5' 3" (1 6 m) Comment: per   Wt 72 6 kg (160 lb) Comment: per   BMI 28 34 kg/m²     Physical Exam  Vitals and nursing note reviewed  Constitutional:       General: She is not in acute distress  Appearance: She is not ill-appearing, toxic-appearing or diaphoretic  Eyes:      General: No scleral icterus  Cardiovascular:      Rate and Rhythm: Normal rate and regular rhythm  Heart sounds: Normal heart sounds  Pulmonary:      Effort: Pulmonary effort is normal       Breath sounds: Normal breath sounds  Abdominal:      General: Bowel sounds are normal       Palpations: Abdomen is soft  Tenderness: There is no abdominal tenderness  There is no right CVA tenderness or left CVA tenderness  Musculoskeletal:      Cervical back: Neck supple  No rigidity  Right lower leg: No edema  Left lower leg: No edema  Skin:     General: Skin is warm and dry     Neurological:      Gait: Gait abnormal  Comments: Chronic left-sided weakness, wheelchair-bound       Satnam Kwon DO

## 2023-01-30 NOTE — PATIENT INSTRUCTIONS
Complete blood work today at [facility] metformin 500 mg 1 daily with supper for 3 days then 1 twice daily with breakfast and supper thereafter  Obtain glucometer and start checking blood sugars if needed diabetic educators will contact you for assistance  Recheck 1 week

## 2023-01-30 NOTE — TELEPHONE ENCOUNTER
stopped by office and states that [facility] alreadly contacted us that the Accu-Chek Guide will be covered by ins for her diabetic testing  He was just checking in with us  Thank you

## 2023-01-30 NOTE — ASSESSMENT & PLAN NOTE
Lab Results   Component Value Date    HGBA1C 6 2 (H) 08/23/2022   Metformin was started, check BMP UA with culture rule out UTI as etiology of uncontrolled hypertension  Glucometer was sent to pharmacy patient should have diabetic education to complete    Actually will be her  patient is demented

## 2023-01-31 DIAGNOSIS — E83.52 HYPERCALCEMIA: ICD-10-CM

## 2023-01-31 DIAGNOSIS — D72.829 LEUKOCYTOSIS, UNSPECIFIED TYPE: ICD-10-CM

## 2023-01-31 DIAGNOSIS — R41.89 COGNITIVE DECLINE: ICD-10-CM

## 2023-01-31 DIAGNOSIS — N39.0 RECURRENT UTI: Primary | ICD-10-CM

## 2023-01-31 RX ORDER — DOXYCYCLINE HYCLATE 100 MG/1
100 CAPSULE ORAL EVERY 12 HOURS SCHEDULED
Qty: 14 CAPSULE | Refills: 0 | Status: SHIPPED | OUTPATIENT
Start: 2023-01-31 | End: 2023-02-07

## 2023-02-01 DIAGNOSIS — N39.0 RECURRENT UTI: Primary | ICD-10-CM

## 2023-02-01 LAB
BACTERIA UR CULT: ABNORMAL
BACTERIA UR CULT: ABNORMAL

## 2023-02-01 RX ORDER — FLUCONAZOLE 150 MG/1
TABLET ORAL
Qty: 2 TABLET | Refills: 0 | Status: SHIPPED | OUTPATIENT
Start: 2023-02-01 | End: 2023-02-03

## 2023-02-01 RX ORDER — DONEPEZIL HYDROCHLORIDE 10 MG/1
TABLET, FILM COATED ORAL
Qty: 90 TABLET | Refills: 2 | Status: SHIPPED | OUTPATIENT
Start: 2023-02-01

## 2023-02-03 ENCOUNTER — TELEMEDICINE (OUTPATIENT)
Dept: DIABETES SERVICES | Facility: CLINIC | Age: 78
End: 2023-02-03

## 2023-02-03 VITALS — WEIGHT: 160 LBS | BODY MASS INDEX: 28.34 KG/M2

## 2023-02-03 DIAGNOSIS — R80.9 TYPE 2 DIABETES MELLITUS WITH MICROALBUMINURIA, WITHOUT LONG-TERM CURRENT USE OF INSULIN (HCC): Primary | ICD-10-CM

## 2023-02-03 DIAGNOSIS — E11.65 HYPERGLYCEMIA DUE TO DIABETES MELLITUS (HCC): ICD-10-CM

## 2023-02-03 DIAGNOSIS — E11.29 TYPE 2 DIABETES MELLITUS WITH MICROALBUMINURIA, WITHOUT LONG-TERM CURRENT USE OF INSULIN (HCC): Primary | ICD-10-CM

## 2023-02-03 NOTE — PROGRESS NOTES
Virtual Regular Visit    Verification of patient location:    Patient is located in the following state in which I hold an active license [place]      Assessment/Plan:    Problem List Items Addressed This Visit        Endocrine    Type 2 diabetes mellitus with microalbuminuria, without long-term current use of insulin ([facility] )   Other Visit Diagnoses     Hyperglycemia due to diabetes mellitus ([facility] )                   Reason for visit is   Chief Complaint   Patient presents with   • Diabetes Type 2   • Virtual Regular Visit        Encounter provider [address] [place] [address]    Provider located at [address] [place] [id number] [phone]      Recent Visits  Date Type Provider Dept   01/30/23 Office Visit Ritika Goodrich, [address] [place] [address],[address] Floor Primary Care   Showing recent visits within past 7 days and meeting all other requirements  Today's Visits  Date Type Provider Dept   02/03/23 Telemedicine [address] [place] [address] [facility] Diabetic Education Francisco Rang today's visits and meeting all other requirements  Future Appointments  No visits were found meeting these conditions  Showing future appointments within next 150 days and meeting all other requirements       The patient was identified by name and date of birth  Paige Shipley/ was informed that this is a telemedicine visit and that the visit is being conducted through the [employer]  She / agrees to proceed     My office door was closed  No one else was in the room  She/ acknowledged consent and understanding of privacy and security of the video platform  The patient has agreed to participate and understands they can discontinue the visit at any time  Patient is aware this is a billable service  Bety Cooley is a 76 y o  female with Type 2 Diabetes Mellitus  Gerber CARVER     Past Medical History:   Diagnosis Date   • Abdominal pain, suprapubic 07/03/2014    Resolved:  November 22, 2016   • Abscess of skin and subcutaneous tissue 11/21/2016    Resolved:  February 28, 2017   • Acute kidney failure, unspecified ([facility] )    • Anxiety    • Burning with urination 12/14/2016    Resolved:  February 28, 2017   • Conjunctivitis 06/10/2013   • Depression    • Depression    • Diabetes mellitus ([facility] ) 02/02/2020   • Dysuria 06/06/2016    Resolved:  February 28 2017   • Falling 10/18/2012   • GERD (gastroesophageal reflux disease)    • Groin pain 04/16/2015    Resolved:  February 28, 2017   • Hyperlipidemia    • Hypothyroid    • Influenza 04/05/2013   • Leg abrasion 09/22/2016    Resolved:  February 28, 2017   • Parkinson's disease [facility])    • Psychiatric illness    • Rectal pain 10/17/2013    Resolved:  February 28, 2017   • Squamous cell skin cancer 03/22/2022    Left lateral calf   • Thyroid disease        Past Surgical History:   Procedure Laterality Date   • APPENDECTOMY      Age 4 or 7   • BACK SURGERY     • BRAIN SURGERY     • CLOSED REDUCTION NASAL FRACTURE     • CYSTOSCOPY  01/28/2014    RENETTA De La Paz   (Diagnostic)   • DEEP BRAIN STIMULATOR PLACEMENT     • EYE SURGERY     • FRACTURE SURGERY     • JOINT REPLACEMENT     • LAMINECTOMY      Decompress, Facetectomy, Foraminotomy Lumbar Seg   • ORIF HIP FRACTURE Left     About 3 years ago   • SKIN BIOPSY     • TONSILLECTOMY         Current Outpatient Medications   Medication Sig Dispense Refill   • metFORMIN (GLUCOPHAGE) 500 mg tablet 1 tablet daily with supper for 3 days then 1 tablet twice a day with breakfast and supper thereafter 60 tablet 5   • acetaminophen (TYLENOL) 500 mg tablet Take 500 mg by mouth One throughout the night as needed     • ALPRAZolam (XANAX) 0 25 mg tablet 1 Daily 90 tablet 2   • ascorbic acid (VITAMIN C) 500 mg tablet Take 1 tablet (500 mg total) by mouth daily  0   • aspirin (ECOTRIN LOW STRENGTH) 81 mg EC tablet Take 81 mg by mouth 3 (three) times a week     • Blood Glucose Monitoring Suppl (Accu-Chek Guide) w/Device KIT Use in the morning Test daily 1 kit 0   • carbidopa-levodopa (SINEMET)  mg per tablet Take 1 tablet by mouth 3 (three) times a day  0   • cilostazol (PLETAL) 100 mg tablet TAKE 1 TABLET (100 MG TOTAL) BY MOUTH 2 (TWO) TIMES A DAY BEFORE MEALS 180 tablet 1   • clopidogrel (PLAVIX) 75 mg tablet TAKE 1 TABLET BY MOUTH EVERY DAY 90 tablet 3   • donepezil (ARICEPT) 10 mg tablet TAKE 1 TABLET BY MOUTH DAILY AT BEDTIME 90 tablet 2   • doxycycline hyclate (VIBRAMYCIN) 100 mg capsule Take 1 capsule (100 mg total) by mouth every 12 (twelve) hours for 7 days 14 capsule 0   • fluconazole (DIFLUCAN) 150 mg tablet Take 1 tablet daily for 2 days 2 tablet 0   • glucose blood (Accu-Chek Guide) test strip Test Daily 50 each 5   • glycopyrrolate (ROBINUL) 1 mg tablet Take 1 tablet (1 mg total) by mouth in the morning and 1 tablet (1 mg total) in the evening  180 tablet 3   • Lancets (accu-chek multiclix) lancets Test daily 100 each 5   • levothyroxine 75 mcg tablet TAKE 1 TABLET EVERY DAY 90 tablet 0   • Multiple Vitamin (MULTIVITAMIN) tablet Take 1 tablet by mouth daily  • mupirocin (BACTROBAN) 2 % ointment Apply topically 3 (three) times a day 22 g 1   • nitrofurantoin (MACROBID) 100 mg capsule Take 1 tablet twice daily with food 10 capsule 0   • NON FORMULARY Take 1 tablet by mouth 2 (two) times a day Calcium- D3-600mg     • OneTouch Delica Lancets 45R MISC Check blood sugars once daily  Please substitute with appropriate alternative as covered by patient's insurance  Dx: E11 65 100 each 3   • potassium chloride (KLOR-CON) 8 MEQ tablet TAKE 1 TABLET BY MOUTH EVERY DAY 90 tablet 1   • Probiotic Product (PROBIOTIC DAILY PO) Take 1 tablet by mouth daily  • ranitidine (ZANTAC) 150 mg tablet Take 150 mg by mouth daily       • simvastatin (ZOCOR) 20 mg tablet Take 20 mg by mouth daily at bedtime     • Vitamin D, Cholecalciferol, 1000 UNITS CAPS Take 1 tablet by mouth daily  No current facility-administered medications for this visit  Allergies   Allergen Reactions   • Gabapentin      Other reaction(s): Hypotension   • Bupropion GI Intolerance and Other (See Comments)     Other reaction(s): Tremor   • Cephalexin    • Duloxetine      Other reaction(s): Other (see comments)  unkown  unknown     • Erythromycin GI Intolerance   • Methadone Hallucinations   • Mirtazapine GI Intolerance   • Paroxetine GI Intolerance   • Sulfa Antibiotics    • Tricyclic Antidepressants      Other reaction(s): Other   • Acetazolamide Rash   • Levofloxacin Anxiety and Other (See Comments)       Review of Systems    Video Exam    Vitals:    02/03/23 1154   Weight: 72 6 kg (160 lb)       Physical Exam     I spent 60 minutes directly with the patient during this visit    Medical Nutrition Therapy        Assessment    Visit Type: Initial visit  Chief complaint/Medical Diagnosis/reason for visit E11 29 Type 2 Diabetes Mellitus with microalbuminuria without long term current use of insulin    HPI Santo Mckeon is non-verbal, has L hemiparesis secondary to CVA, dementia, Parkinson's, and is confirmed to wheelchair  Anjali's , Bearl Schwab gave me all of Williams information and is Anjali's caregiver  Bearl Schwab stated Suzie Rosales blood sugars are very high currently and he needs to know how to prepare her meals properly  Obtained a 24 hour food recall for Santo Mckeon from Bearl Schwab  Problems identified in food recall include inconsistent carbohydrate intake, low protein intake, unbalanced meals, drinking 3-4 oz orange juice every morning, having 3 Tablespoons applesauce with all 3 meals for taking medications, chewing issues (only has a few teeth on the top and bottom)  Jaun Large Bearl Schwab on Anjali's 1400 calorie balanced individualized meal plan to assist with consistency, balance and portion control  This meal plan along with the diabetic portion booklet will be mailed to patient and    Encouraged the consumption of balanced meals and snack at appropriate times  Eli Laws for patient to keep carbohydrate intake to 30 grams per meal and 15 grams per snack to assist with glycemic control  My Plate, food models, and portion booklet were used to teach basic carbohydrate counting via Virtual Medical Nutrition Therapy (MNT) appointment today  Don/Patient stated he will let me know after he reads the portion booklet and individualized meal plan for Jason Beasley if he needs additional MNT education at a later date  RD will remain available for further dietary questions/concerns  Please Note: Arden Gill  had read over all the instructions on how to take Anjali's blood sugars and do fingersticks with the AccuChek meter prior to our appointment today  Zen Griffiths explained all the steps for checking Anjali's blood sugars correctly and feels comfortable doing them  Zen Griffiths stated he will check Anjali's blood sugars 1x/day or 1x every other day as physician requested      Ht Readings from Last 1 Encounters:   01/30/23 5' 3" (1 6 m)     Wt Readings from Last 3 Encounters:   02/03/23 72 6 kg (160 lb)   01/30/23 72 6 kg (160 lb)   09/01/22 72 6 kg (160 lb)     Weight Change: No    Barriers to Learning: cognitive and language    Do you follow any special diet presently?: No  Who shops: spouse  Who cooks: spouse    Food Log: Completed via the method of food recall    Breakfast:8AM - 3 Tablespoon applesauce for meds; 3-4 oz orange juice, 3/4 cup fresh fruit cup, 1 egg, 1/2 sl toast, black coffee; 2x/week - add 1/2 donut to above breakfast  Morning Snack:None  Lunch:1 cup chicken noodle soup, 1/2 c regular pudding  Afternoon Snack: 3:30PM - 2 cubes cheese  Dinner:4:30-5PM - chicken/meatloaf/ chopped beef/ fish, mashed potato, with lima beans, carrots or corn; sometimes 1/2 piece pie  Evening Snack:None  Beverages: coffee, water, orange juice  Eating out/Take out:seldom  Exercise minimal - L hemiparesis    Calorie needs 1400kcals/day  Carbs: 30g/meal, 15g/snack         Nutrition Diagnosis:  Food and nutrition related knowledge deficit  related to Lack of prior exposure to accurate nutrition related information as evidenced by Verbalizes inaccurate or incomplete information    Intervention: plate method, carbohydrate counting, increased protein intake, meal timing, meal planning, individualized meal plan and monitoring portion control     Treatment Goals: Patient will consume 3 meals a day, Patient will consume snacks and Patient will count carbohydrates    Monitoring and evaluation:    Term code indicator  FH 1 3 2 Food Intake Criteria: Consume 3 balanced meals/day at appropriate times  Term code indicator  FH 1 6 3 Carbohydrate Intake Criteria: 30 grams carbohydrate/meal and 15 grams carbohydrate/snack  Term code indicator  FH 1 3 2 Food Intake Criteria: Consume 1 balanced snack/day at appropriate time  Materials Provided: CHO counting, and individualized meal plan along with portion booklet will be mailed to patient/  Patient’s Response to Instruction:  Comprehensiongood  Motivationgood  Expected Compliancegood    Begin Time: 10:50AM  End Time: 11:50AM  Referring Provider: Dr Mansoor Ramirez    Thank you for coming to the [address] [place] [address] for education today  Please feel free to call with any questions or concerns      Cinthya Mari  [address] [place] [id number] [phone]

## 2023-02-03 NOTE — PATIENT INSTRUCTIONS
Consume 3 balanced meals/day at appropriate times  30 grams carbohydrate/meals and 15 grams carbohydrate/snack  Consume 1 balanced snack/day at appropriate time

## 2023-02-06 ENCOUNTER — OFFICE VISIT (OUTPATIENT)
Dept: FAMILY MEDICINE CLINIC | Facility: CLINIC | Age: 78
End: 2023-02-06

## 2023-02-06 ENCOUNTER — APPOINTMENT (OUTPATIENT)
Dept: LAB | Facility: CLINIC | Age: 78
End: 2023-02-06

## 2023-02-06 VITALS
HEIGHT: 63 IN | RESPIRATION RATE: 12 BRPM | HEART RATE: 99 BPM | TEMPERATURE: 96.7 F | BODY MASS INDEX: 28.34 KG/M2 | SYSTOLIC BLOOD PRESSURE: 112 MMHG | DIASTOLIC BLOOD PRESSURE: 62 MMHG

## 2023-02-06 DIAGNOSIS — N18.30 STAGE 3 CHRONIC KIDNEY DISEASE, UNSPECIFIED WHETHER STAGE 3A OR 3B CKD (HCC): ICD-10-CM

## 2023-02-06 DIAGNOSIS — R35.89 POLYURIA: ICD-10-CM

## 2023-02-06 DIAGNOSIS — E11.65 HYPERGLYCEMIA DUE TO DIABETES MELLITUS (HCC): ICD-10-CM

## 2023-02-06 DIAGNOSIS — E83.52 HYPERCALCEMIA: ICD-10-CM

## 2023-02-06 DIAGNOSIS — D72.829 LEUKOCYTOSIS, UNSPECIFIED TYPE: ICD-10-CM

## 2023-02-06 DIAGNOSIS — R63.1 POLYDIPSIA: ICD-10-CM

## 2023-02-06 DIAGNOSIS — R80.9 TYPE 2 DIABETES MELLITUS WITH MICROALBUMINURIA, WITHOUT LONG-TERM CURRENT USE OF INSULIN (HCC): ICD-10-CM

## 2023-02-06 DIAGNOSIS — R80.9 TYPE 2 DIABETES MELLITUS WITH MICROALBUMINURIA, WITHOUT LONG-TERM CURRENT USE OF INSULIN (HCC): Primary | ICD-10-CM

## 2023-02-06 DIAGNOSIS — E11.29 TYPE 2 DIABETES MELLITUS WITH MICROALBUMINURIA, WITHOUT LONG-TERM CURRENT USE OF INSULIN (HCC): Primary | ICD-10-CM

## 2023-02-06 DIAGNOSIS — E11.29 TYPE 2 DIABETES MELLITUS WITH MICROALBUMINURIA, WITHOUT LONG-TERM CURRENT USE OF INSULIN (HCC): ICD-10-CM

## 2023-02-06 LAB
BASOPHILS # BLD AUTO: 0.12 THOUSANDS/ÂΜL (ref 0–0.1)
BASOPHILS NFR BLD AUTO: 1 % (ref 0–1)
CALCIUM SERPL-MCNC: 9.6 MG/DL (ref 8.3–10.1)
EOSINOPHIL # BLD AUTO: 0.19 THOUSAND/ÂΜL (ref 0–0.61)
EOSINOPHIL NFR BLD AUTO: 2 % (ref 0–6)
ERYTHROCYTE [DISTWIDTH] IN BLOOD BY AUTOMATED COUNT: 13.3 % (ref 11.6–15.1)
HCT VFR BLD AUTO: 48.5 % (ref 34.8–46.1)
HGB BLD-MCNC: 16.1 G/DL (ref 11.5–15.4)
IMM GRANULOCYTES # BLD AUTO: 0.04 THOUSAND/UL (ref 0–0.2)
IMM GRANULOCYTES NFR BLD AUTO: 0 % (ref 0–2)
LYMPHOCYTES # BLD AUTO: 2.5 THOUSANDS/ÂΜL (ref 0.6–4.47)
LYMPHOCYTES NFR BLD AUTO: 28 % (ref 14–44)
MCH RBC QN AUTO: 31.3 PG (ref 26.8–34.3)
MCHC RBC AUTO-ENTMCNC: 33.2 G/DL (ref 31.4–37.4)
MCV RBC AUTO: 94 FL (ref 82–98)
MONOCYTES # BLD AUTO: 0.74 THOUSAND/ÂΜL (ref 0.17–1.22)
MONOCYTES NFR BLD AUTO: 8 % (ref 4–12)
NEUTROPHILS # BLD AUTO: 5.44 THOUSANDS/ÂΜL (ref 1.85–7.62)
NEUTS SEG NFR BLD AUTO: 61 % (ref 43–75)
NRBC BLD AUTO-RTO: 0 /100 WBCS
PLATELET # BLD AUTO: 348 THOUSANDS/UL (ref 149–390)
PMV BLD AUTO: 10.9 FL (ref 8.9–12.7)
PTH-INTACT SERPL-MCNC: 24 PG/ML (ref 18.4–80.1)
RBC # BLD AUTO: 5.14 MILLION/UL (ref 3.81–5.12)
SL AMB POCT GLUCOSE BLD: 235
WBC # BLD AUTO: 9.03 THOUSAND/UL (ref 4.31–10.16)

## 2023-02-06 NOTE — ASSESSMENT & PLAN NOTE
Lab Results   Component Value Date    EGFR 42 01/30/2023    EGFR 65 08/23/2022    EGFR 64 04/05/2022    CREATININE 1 22 01/30/2023    CREATININE 0 85 08/23/2022    CREATININE 0 86 04/05/2022   Age-appropriate we will monitor

## 2023-02-06 NOTE — PROGRESS NOTES
Name: Anette Luna      : 1945      MRN: [id number]  Encounter Provider: Mansoor Ramirez DO  Encounter Date: 2023   Encounter department: [facility] PRIMARY CARE    Assessment & Plan     1  Type 2 diabetes with microalbuminuria #2  Hyperglycemia  Fingerstick today in office was 235  Continue metformin  Check blood sugars at least every other day can daily is preferred  3   UTI, repeat UA with culture as planned patient was treated for yeast UTI with urologist  4  Leukocytosis repeat CBC with differential #5  Hypercalcemia repeat calcium, calcitonin, PTH #6  Polydipsia/polyuria, resolved with metformin  7  CKD-3, GFR is 42 we will monitor  8  Return at scheduled appointment in March sooner if needed        1  Type 2 diabetes mellitus with microalbuminuria, without long-term current use of insulin (HCC)  -     CBC and differential; Future; Expected date: 2023  -     Calcitonin; Future; Expected date: 2023  -     Calcium; Future; Expected date: 2023  -     PTH, intact; Future; Expected date: 2023  -     POCT blood glucose    2  Hypercalcemia  -     CBC and differential; Future; Expected date: 2023  -     Calcitonin; Future; Expected date: 2023  -     Calcium; Future; Expected date: 2023  -     PTH, intact; Future; Expected date: 2023    3  Leukocytosis, unspecified type  -     CBC and differential; Future; Expected date: 2023  -     Calcitonin; Future; Expected date: 2023  -     Calcium; Future; Expected date: 2023  -     PTH, intact; Future; Expected date: 2023    4  Polydipsia  -     CBC and differential; Future; Expected date: 2023  -     Calcitonin; Future; Expected date: 2023  -     Calcium; Future; Expected date: 2023  -     PTH, intact; Future; Expected date: 2023    5  Polyuria  -     CBC and differential; Future; Expected date: 2023  -     Calcitonin;  Future; Expected date: 02/06/2023  -     Calcium; Future; Expected date: 02/06/2023  -     PTH, intact; Future; Expected date: 02/06/2023    6  Hyperglycemia due to diabetes mellitus (HCC)  -     CBC and differential; Future; Expected date: 02/06/2023  -     Calcitonin; Future; Expected date: 02/06/2023  -     Calcium; Future; Expected date: 02/06/2023  -     PTH, intact; Future; Expected date: 02/06/2023  -     POCT blood glucose    7  Stage 3 chronic kidney disease, unspecified whether stage 3a or 3b CKD ([facility] [address] )  Assessment & Plan:  Lab Results   Component Value Date    EGFR 42 01/30/2023    EGFR 65 08/23/2022    EGFR 64 04/05/2022    CREATININE 1 22 01/30/2023    CREATININE 0 85 08/23/2022    CREATININE 0 86 04/05/2022   Age-appropriate we will monitor          Urinary Incontinence Plan of Care: counseling topics discussed: use restroom every 2 hours  Subjective      She is in with her   Patient has end-stage dementia and is nonverbal   Patient states her urination has slowed down considerably and her polydipsia has improved  Unfortunately patient does check sugar 1 time since last office visit on Saturday was 308  Blood work from previous was discussed    Review of Systems   Constitutional: Negative for chills and fever  HENT: Negative for ear pain and sore throat  Eyes: Negative for pain and visual disturbance  Respiratory: Negative for cough and shortness of breath  Cardiovascular: Negative for chest pain and palpitations  Gastrointestinal: Negative for abdominal pain and vomiting  Endocrine:        HPI   Genitourinary: Negative for dysuria and hematuria  Musculoskeletal: Negative for arthralgias and back pain  Skin: Negative for color change and rash  Neurological: Negative for seizures and syncope  All other systems reviewed and are negative        Current Outpatient Medications on File Prior to Visit   Medication Sig   • acetaminophen (TYLENOL) 500 mg tablet Take 500 mg by mouth One throughout the night as needed   • ALPRAZolam (XANAX) 0 25 mg tablet 1 Daily   • ascorbic acid (VITAMIN C) 500 mg tablet Take 1 tablet (500 mg total) by mouth daily   • aspirin (ECOTRIN LOW STRENGTH) 81 mg EC tablet Take 81 mg by mouth 3 (three) times a week   • Blood Glucose Monitoring Suppl (Accu-Chek Guide) w/Device KIT Use in the morning Test daily   • carbidopa-levodopa (SINEMET)  mg per tablet Take 1 tablet by mouth 3 (three) times a day   • cilostazol (PLETAL) 100 mg tablet TAKE 1 TABLET (100 MG TOTAL) BY MOUTH 2 (TWO) TIMES A DAY BEFORE MEALS   • clopidogrel (PLAVIX) 75 mg tablet TAKE 1 TABLET BY MOUTH EVERY DAY   • donepezil (ARICEPT) 10 mg tablet TAKE 1 TABLET BY MOUTH DAILY AT BEDTIME   • doxycycline hyclate (VIBRAMYCIN) 100 mg capsule Take 1 capsule (100 mg total) by mouth every 12 (twelve) hours for 7 days   • glucose blood (Accu-Chek Guide) test strip Test Daily   • glycopyrrolate (ROBINUL) 1 mg tablet Take 1 tablet (1 mg total) by mouth in the morning and 1 tablet (1 mg total) in the evening  • Lancets (accu-chek multiclix) lancets Test daily   • levothyroxine 75 mcg tablet TAKE 1 TABLET EVERY DAY   • metFORMIN (GLUCOPHAGE) 500 mg tablet 1 tablet daily with supper for 3 days then 1 tablet twice a day with breakfast and supper thereafter   • Multiple Vitamin (MULTIVITAMIN) tablet Take 1 tablet by mouth daily  • mupirocin (BACTROBAN) 2 % ointment Apply topically 3 (three) times a day   • nitrofurantoin (MACROBID) 100 mg capsule Take 1 tablet twice daily with food   • NON FORMULARY Take 1 tablet by mouth 2 (two) times a day Calcium- [id number]   • OneTouch Delica Lancets 49H MISC Check blood sugars once daily  Please substitute with appropriate alternative as covered by patient's insurance  Dx: E11 65   • potassium chloride (KLOR-CON) 8 MEQ tablet TAKE 1 TABLET BY MOUTH EVERY DAY   • Probiotic Product (PROBIOTIC DAILY PO) Take 1 tablet by mouth daily     • ranitidine (ZANTAC) 150 mg tablet Take 150 mg by mouth daily  • simvastatin (ZOCOR) 20 mg tablet Take 20 mg by mouth daily at bedtime   • Vitamin D, Cholecalciferol, 1000 UNITS CAPS Take 1 tablet by mouth daily  Objective     /62 (BP Location: Right arm, Patient Position: Sitting, Cuff Size: Adult)   Pulse 99   Temp (!) 96 7 °F (35 9 °C) (Temporal)   Resp 12   Ht 5' 3" (1 6 m)   BMI 28 34 kg/m²     Physical Exam  Vitals and nursing note reviewed  Constitutional:       Appearance: Normal appearance  HENT:      Head: Normocephalic and atraumatic  Mouth/Throat:      Mouth: Mucous membranes are moist    Eyes:      General: No scleral icterus  Cardiovascular:      Rate and Rhythm: Normal rate and regular rhythm  Heart sounds: Normal heart sounds  Pulmonary:      Effort: Pulmonary effort is normal       Breath sounds: Normal breath sounds  Abdominal:      General: Bowel sounds are normal       Tenderness: There is no abdominal tenderness  Musculoskeletal:      Right lower leg: No edema  Left lower leg: No edema  Skin:     General: Skin is warm and dry  Neurological:      Mental Status: She is alert  Mental status is at baseline        Gait: Gait abnormal       Comments: Chair bound   Psychiatric:         Behavior: Behavior normal        Satnam Kwon DO

## 2023-02-06 NOTE — PATIENT INSTRUCTIONS
Complete calcium blood work and CBC today  Repeat urinalysis on Wednesday  Continue present therapy  Check blood sugars at least every other day, checking daily is preferred  Return at scheduled appointment

## 2023-02-08 ENCOUNTER — APPOINTMENT (OUTPATIENT)
Dept: LAB | Facility: CLINIC | Age: 78
End: 2023-02-08

## 2023-02-08 DIAGNOSIS — E83.52 HYPERCALCEMIA: ICD-10-CM

## 2023-02-08 DIAGNOSIS — N39.0 RECURRENT UTI: ICD-10-CM

## 2023-02-08 DIAGNOSIS — D72.829 LEUKOCYTOSIS, UNSPECIFIED TYPE: ICD-10-CM

## 2023-02-08 LAB
BACTERIA UR QL AUTO: ABNORMAL /HPF
BILIRUB UR QL STRIP: NEGATIVE
CLARITY UR: ABNORMAL
COLOR UR: YELLOW
GLUCOSE UR STRIP-MCNC: ABNORMAL MG/DL
HGB UR QL STRIP.AUTO: NEGATIVE
KETONES UR STRIP-MCNC: NEGATIVE MG/DL
LEUKOCYTE ESTERASE UR QL STRIP: ABNORMAL
MUCOUS THREADS UR QL AUTO: ABNORMAL
NITRITE UR QL STRIP: NEGATIVE
NON-SQ EPI CELLS URNS QL MICRO: ABNORMAL /HPF
PH UR STRIP.AUTO: 6 [PH]
PROT UR STRIP-MCNC: ABNORMAL MG/DL
RBC #/AREA URNS AUTO: ABNORMAL /HPF
SP GR UR STRIP.AUTO: 1.02 (ref 1–1.03)
UROBILINOGEN UR STRIP-ACNC: <2 MG/DL
WBC #/AREA URNS AUTO: ABNORMAL /HPF

## 2023-02-09 LAB — BACTERIA UR CULT: NORMAL

## 2023-02-10 LAB — CALCIT SERPL-MCNC: <2 PG/ML (ref 0–5)

## 2023-02-11 DIAGNOSIS — R53.1 ACUTE LEFT-SIDED WEAKNESS: ICD-10-CM

## 2023-02-13 RX ORDER — CILOSTAZOL 100 MG/1
100 TABLET ORAL
Qty: 180 TABLET | Refills: 1 | Status: SHIPPED | OUTPATIENT
Start: 2023-02-13

## 2023-02-13 RX ORDER — CILOSTAZOL 100 MG/1
100 TABLET ORAL
Qty: 180 TABLET | Refills: 1 | OUTPATIENT
Start: 2023-02-13

## 2023-02-17 DIAGNOSIS — R80.9 TYPE 2 DIABETES MELLITUS WITH MICROALBUMINURIA, WITHOUT LONG-TERM CURRENT USE OF INSULIN (HCC): ICD-10-CM

## 2023-02-17 DIAGNOSIS — G20 PARKINSON DISEASE (HCC): ICD-10-CM

## 2023-02-17 DIAGNOSIS — E11.29 TYPE 2 DIABETES MELLITUS WITH MICROALBUMINURIA, WITHOUT LONG-TERM CURRENT USE OF INSULIN (HCC): ICD-10-CM

## 2023-02-17 RX ORDER — GLYCOPYRROLATE 1 MG/1
1 TABLET ORAL 2 TIMES DAILY
Qty: 180 TABLET | Refills: 3 | Status: SHIPPED | OUTPATIENT
Start: 2023-02-17

## 2023-03-02 ENCOUNTER — RA CDI HCC (OUTPATIENT)
Dept: OTHER | Facility: HOSPITAL | Age: 78
End: 2023-03-02

## 2023-03-02 ENCOUNTER — APPOINTMENT (OUTPATIENT)
Dept: LAB | Facility: CLINIC | Age: 78
End: 2023-03-02

## 2023-03-02 DIAGNOSIS — E83.52 HYPERCALCEMIA: ICD-10-CM

## 2023-03-02 DIAGNOSIS — Z96.89 S/P DEEP BRAIN STIMULATOR PLACEMENT: ICD-10-CM

## 2023-03-02 DIAGNOSIS — G20 PARKINSON DISEASE (HCC): ICD-10-CM

## 2023-03-02 DIAGNOSIS — R80.9 TYPE 2 DIABETES MELLITUS WITH MICROALBUMINURIA, WITHOUT LONG-TERM CURRENT USE OF INSULIN (HCC): ICD-10-CM

## 2023-03-02 DIAGNOSIS — R35.89 POLYURIA: ICD-10-CM

## 2023-03-02 DIAGNOSIS — E11.29 TYPE 2 DIABETES MELLITUS WITH MICROALBUMINURIA, WITHOUT LONG-TERM CURRENT USE OF INSULIN (HCC): ICD-10-CM

## 2023-03-02 DIAGNOSIS — F02.C0 SEVERE LEWY BODY DEMENTIA WITHOUT BEHAVIORAL DISTURBANCE, PSYCHOTIC DISTURBANCE, MOOD DISTURBANCE, OR ANXIETY (HCC): ICD-10-CM

## 2023-03-02 DIAGNOSIS — Z74.09 DECREASED AMBULATION STATUS: ICD-10-CM

## 2023-03-02 DIAGNOSIS — R63.1 POLYDIPSIA: ICD-10-CM

## 2023-03-02 DIAGNOSIS — E11.65 HYPERGLYCEMIA DUE TO DIABETES MELLITUS (HCC): ICD-10-CM

## 2023-03-02 DIAGNOSIS — I69.354 HEMIPARESIS AFFECTING LEFT SIDE AS LATE EFFECT OF CEREBROVASCULAR ACCIDENT (CVA) (HCC): ICD-10-CM

## 2023-03-02 DIAGNOSIS — G31.83 SEVERE LEWY BODY DEMENTIA WITHOUT BEHAVIORAL DISTURBANCE, PSYCHOTIC DISTURBANCE, MOOD DISTURBANCE, OR ANXIETY (HCC): ICD-10-CM

## 2023-03-02 DIAGNOSIS — F32.3 MAJOR DEPRESSION WITH PSYCHOTIC FEATURES (HCC): ICD-10-CM

## 2023-03-02 DIAGNOSIS — N39.0 RECURRENT UTI: Primary | ICD-10-CM

## 2023-03-02 DIAGNOSIS — N39.0 RECURRENT UTI: ICD-10-CM

## 2023-03-02 DIAGNOSIS — D72.829 LEUKOCYTOSIS, UNSPECIFIED TYPE: ICD-10-CM

## 2023-03-02 LAB
ALBUMIN SERPL BCP-MCNC: 3.4 G/DL (ref 3.5–5)
ALP SERPL-CCNC: 63 U/L (ref 46–116)
ALT SERPL W P-5'-P-CCNC: 13 U/L (ref 12–78)
ANION GAP SERPL CALCULATED.3IONS-SCNC: 1 MMOL/L (ref 4–13)
AST SERPL W P-5'-P-CCNC: 16 U/L (ref 5–45)
BACTERIA UR QL AUTO: ABNORMAL /HPF
BASOPHILS # BLD AUTO: 0.11 THOUSANDS/ÂΜL (ref 0–0.1)
BASOPHILS NFR BLD AUTO: 1 % (ref 0–1)
BILIRUB SERPL-MCNC: 0.48 MG/DL (ref 0.2–1)
BILIRUB UR QL STRIP: NEGATIVE
BUN SERPL-MCNC: 18 MG/DL (ref 5–25)
CALCIUM ALBUM COR SERPL-MCNC: 10.2 MG/DL (ref 8.3–10.1)
CALCIUM SERPL-MCNC: 9.7 MG/DL (ref 8.3–10.1)
CAOX CRY URNS QL MICRO: ABNORMAL /HPF
CHLORIDE SERPL-SCNC: 108 MMOL/L (ref 96–108)
CLARITY UR: ABNORMAL
CO2 SERPL-SCNC: 28 MMOL/L (ref 21–32)
COLOR UR: YELLOW
CREAT SERPL-MCNC: 0.79 MG/DL (ref 0.6–1.3)
EOSINOPHIL # BLD AUTO: 0.62 THOUSAND/ÂΜL (ref 0–0.61)
EOSINOPHIL NFR BLD AUTO: 8 % (ref 0–6)
ERYTHROCYTE [DISTWIDTH] IN BLOOD BY AUTOMATED COUNT: 13.9 % (ref 11.6–15.1)
GFR SERPL CREATININE-BSD FRML MDRD: 71 ML/MIN/1.73SQ M
GLUCOSE P FAST SERPL-MCNC: 161 MG/DL (ref 65–99)
GLUCOSE UR STRIP-MCNC: NEGATIVE MG/DL
HCT VFR BLD AUTO: 48.3 % (ref 34.8–46.1)
HGB BLD-MCNC: 15.8 G/DL (ref 11.5–15.4)
HGB UR QL STRIP.AUTO: NEGATIVE
IMM GRANULOCYTES # BLD AUTO: 0.02 THOUSAND/UL (ref 0–0.2)
IMM GRANULOCYTES NFR BLD AUTO: 0 % (ref 0–2)
KETONES UR STRIP-MCNC: ABNORMAL MG/DL
LEUKOCYTE ESTERASE UR QL STRIP: ABNORMAL
LYMPHOCYTES # BLD AUTO: 2.1 THOUSANDS/ÂΜL (ref 0.6–4.47)
LYMPHOCYTES NFR BLD AUTO: 27 % (ref 14–44)
MCH RBC QN AUTO: 31.4 PG (ref 26.8–34.3)
MCHC RBC AUTO-ENTMCNC: 32.7 G/DL (ref 31.4–37.4)
MCV RBC AUTO: 96 FL (ref 82–98)
MONOCYTES # BLD AUTO: 0.66 THOUSAND/ÂΜL (ref 0.17–1.22)
MONOCYTES NFR BLD AUTO: 9 % (ref 4–12)
NEUTROPHILS # BLD AUTO: 4.25 THOUSANDS/ÂΜL (ref 1.85–7.62)
NEUTS SEG NFR BLD AUTO: 55 % (ref 43–75)
NITRITE UR QL STRIP: POSITIVE
NON-SQ EPI CELLS URNS QL MICRO: ABNORMAL /HPF
NRBC BLD AUTO-RTO: 0 /100 WBCS
PH UR STRIP.AUTO: 5.5 [PH]
PLATELET # BLD AUTO: 318 THOUSANDS/UL (ref 149–390)
PMV BLD AUTO: 10.2 FL (ref 8.9–12.7)
POTASSIUM SERPL-SCNC: 3.8 MMOL/L (ref 3.5–5.3)
PROT SERPL-MCNC: 6.7 G/DL (ref 6.4–8.4)
PROT UR STRIP-MCNC: ABNORMAL MG/DL
RBC # BLD AUTO: 5.03 MILLION/UL (ref 3.81–5.12)
RBC #/AREA URNS AUTO: ABNORMAL /HPF
SODIUM SERPL-SCNC: 137 MMOL/L (ref 135–147)
SP GR UR STRIP.AUTO: 1.02 (ref 1–1.03)
UROBILINOGEN UR STRIP-ACNC: <2 MG/DL
WBC # BLD AUTO: 7.76 THOUSAND/UL (ref 4.31–10.16)
WBC #/AREA URNS AUTO: ABNORMAL /HPF

## 2023-03-02 NOTE — PROGRESS NOTES
Ada from lab called requesting new labs for urinalysis and CMP  Orders placed based on Dr Thakur Fearing office visit note on 2/6/23

## 2023-03-02 NOTE — PROGRESS NOTES
E11 22  [facility]  coding opportunities          Chart Reviewed number of suggestions sent to Provider: 1     Patients Insurance     Medicare Insurance: [employer]

## 2023-03-05 LAB
BACTERIA UR CULT: ABNORMAL

## 2023-03-06 DIAGNOSIS — N39.0 RECURRENT UTI: Primary | ICD-10-CM

## 2023-03-06 RX ORDER — NITROFURANTOIN 25; 75 MG/1; MG/1
CAPSULE ORAL
Qty: 10 CAPSULE | Refills: 0 | Status: SHIPPED | OUTPATIENT
Start: 2023-03-06

## 2023-03-09 ENCOUNTER — OFFICE VISIT (OUTPATIENT)
Dept: FAMILY MEDICINE CLINIC | Facility: CLINIC | Age: 78
End: 2023-03-09

## 2023-03-09 ENCOUNTER — APPOINTMENT (OUTPATIENT)
Dept: LAB | Facility: CLINIC | Age: 78
End: 2023-03-09

## 2023-03-09 VITALS
HEART RATE: 77 BPM | BODY MASS INDEX: 28.34 KG/M2 | OXYGEN SATURATION: 99 % | HEIGHT: 63 IN | TEMPERATURE: 97.6 F | DIASTOLIC BLOOD PRESSURE: 70 MMHG | RESPIRATION RATE: 16 BRPM | SYSTOLIC BLOOD PRESSURE: 110 MMHG

## 2023-03-09 DIAGNOSIS — E78.2 MIXED HYPERLIPIDEMIA: ICD-10-CM

## 2023-03-09 DIAGNOSIS — M79.672 LEFT FOOT PAIN: ICD-10-CM

## 2023-03-09 DIAGNOSIS — E03.9 ACQUIRED HYPOTHYROIDISM: ICD-10-CM

## 2023-03-09 DIAGNOSIS — G20 PARKINSON DISEASE (HCC): ICD-10-CM

## 2023-03-09 DIAGNOSIS — F32.3 MAJOR DEPRESSION WITH PSYCHOTIC FEATURES (HCC): ICD-10-CM

## 2023-03-09 DIAGNOSIS — N18.30 STAGE 3 CHRONIC KIDNEY DISEASE, UNSPECIFIED WHETHER STAGE 3A OR 3B CKD (HCC): ICD-10-CM

## 2023-03-09 DIAGNOSIS — F06.31 MOOD DISORDER WITH MAJOR DEPRESSIVE-LIKE EPISODE DUE TO GENERAL MEDICAL CONDITION: ICD-10-CM

## 2023-03-09 DIAGNOSIS — E11.29 TYPE 2 DIABETES MELLITUS WITH MICROALBUMINURIA, WITHOUT LONG-TERM CURRENT USE OF INSULIN (HCC): Primary | ICD-10-CM

## 2023-03-09 DIAGNOSIS — E87.6 HYPOKALEMIA: ICD-10-CM

## 2023-03-09 DIAGNOSIS — G93.41 METABOLIC ENCEPHALOPATHY: ICD-10-CM

## 2023-03-09 DIAGNOSIS — M81.0 OSTEOPOROSIS WITHOUT CURRENT PATHOLOGICAL FRACTURE, UNSPECIFIED OSTEOPOROSIS TYPE: ICD-10-CM

## 2023-03-09 DIAGNOSIS — R80.9 TYPE 2 DIABETES MELLITUS WITH MICROALBUMINURIA, WITHOUT LONG-TERM CURRENT USE OF INSULIN (HCC): Primary | ICD-10-CM

## 2023-03-09 DIAGNOSIS — I69.354 HEMIPARESIS AFFECTING LEFT SIDE AS LATE EFFECT OF CEREBROVASCULAR ACCIDENT (CVA) (HCC): ICD-10-CM

## 2023-03-09 DIAGNOSIS — Z74.09 DECREASED AMBULATION STATUS: ICD-10-CM

## 2023-03-09 LAB
CHOLEST SERPL-MCNC: 114 MG/DL
HDLC SERPL-MCNC: 45 MG/DL
LDLC SERPL CALC-MCNC: 39 MG/DL (ref 0–100)
SL AMB POCT HEMOGLOBIN AIC: 7.4 (ref ?–6.5)
TRIGL SERPL-MCNC: 152 MG/DL
TSH SERPL DL<=0.05 MIU/L-ACNC: 1.26 UIU/ML (ref 0.45–4.5)

## 2023-03-09 NOTE — PROGRESS NOTES
Name: Megan Maurer      : 1945      MRN: [id number]  Encounter Provider: Sourav Hogan DO  Encounter Date: 3/9/2023   Encounter department: [facility] PRIMARY CARE    Assessment & Plan     1  Type 2 diabetes with microalbuminuria/renal disease, stable hemoglobin A1c 7 4  Under 8 0 is goal for this patient  2  Hypothyroidism check TSH  3  Hemiparesis status post CVA/encephalopathy/Parkinson disease/Lewy body dementia, patient is at baseline follows with neurology at [facility]  4  Mood disorder/depression, stable follows with psychiatry  5  Osteoporosis  has declined any further DEXA scans or treatment  6  CKD-3, stable continue to monitor #7  Wheelchair-bound  B  Hypokalemia, stable continue present therapy  9  Hyperlipidemia, check lipid panel today  10  Left foot pain, will check x-ray because of history of decubitus ulcer which has healed  Will refer to podiatry, Dr Caroline Christie  11  Return in 6 months for office visit, blood work, and a AWV  Patient's  has a difficult time with transportation will only bring her in every 6 months        1  Type 2 diabetes mellitus with microalbuminuria, without long-term current use of insulin (HCC)  Assessment & Plan:    Lab Results   Component Value Date    HGBA1C 7 4 (A) 2023   Hemoglobin A1c 7 4 today  Continue present therapy    Orders:  -     POCT hemoglobin A1c  -     CBC; Future; Expected date: 2023  -     Comprehensive metabolic panel; Future; Expected date: 2023  -     Hemoglobin A1C; Future; Expected date: 2023  -     Lipid Panel with Direct LDL reflex; Future; Expected date: 2023  -     Microalbumin / creatinine urine ratio; Future; Expected date: 2023  -     TSH, 3rd generation with Free T4 reflex; Future; Expected date: 2023  -     UA (URINE) with reflex to Scope; Future; Expected date: 2023  -     Urine culture;  Future; Expected date: 2023  - Vitamin D 25 hydroxy; Future; Expected date: 09/09/2023    2  Acquired hypothyroidism  Assessment & Plan:  Check TSH today    Orders:  -     TSH, 3rd generation with Free T4 reflex; Future; Expected date: 03/09/2023  -     Lipid Panel with Direct LDL reflex; Future; Expected date: 03/09/2023  -     CBC; Future; Expected date: 09/09/2023  -     Comprehensive metabolic panel; Future; Expected date: 09/09/2023  -     Hemoglobin A1C; Future; Expected date: 09/09/2023  -     Lipid Panel with Direct LDL reflex; Future; Expected date: 09/09/2023  -     Microalbumin / creatinine urine ratio; Future; Expected date: 09/09/2023  -     TSH, 3rd generation with Free T4 reflex; Future; Expected date: 09/09/2023  -     UA (URINE) with reflex to Scope; Future; Expected date: 09/09/2023  -     Urine culture; Future; Expected date: 09/09/2023  -     Vitamin D 25 hydroxy; Future; Expected date: 09/09/2023    3  Hemiparesis affecting left side as late effect of cerebrovascular accident (CVA) [facility])  Assessment & Plan:  Chronic follows with neurology    Orders:  -     CBC; Future; Expected date: 09/09/2023  -     Comprehensive metabolic panel; Future; Expected date: 09/09/2023  -     Hemoglobin A1C; Future; Expected date: 09/09/2023  -     Lipid Panel with Direct LDL reflex; Future; Expected date: 09/09/2023  -     Microalbumin / creatinine urine ratio; Future; Expected date: 09/09/2023  -     TSH, 3rd generation with Free T4 reflex; Future; Expected date: 09/09/2023  -     UA (URINE) with reflex to Scope; Future; Expected date: 09/09/2023  -     Urine culture; Future; Expected date: 09/09/2023  -     Vitamin D 25 hydroxy; Future; Expected date: 09/09/2023    4  Metabolic encephalopathy  Assessment & Plan:  As above    Orders:  -     CBC; Future; Expected date: 09/09/2023  -     Comprehensive metabolic panel;  Future; Expected date: 09/09/2023  -     Hemoglobin A1C; Future; Expected date: 09/09/2023  -     Lipid Panel with Direct LDL reflex; Future; Expected date: 09/09/2023  -     Microalbumin / creatinine urine ratio; Future; Expected date: 09/09/2023  -     TSH, 3rd generation with Free T4 reflex; Future; Expected date: 09/09/2023  -     UA (URINE) with reflex to Scope; Future; Expected date: 09/09/2023  -     Urine culture; Future; Expected date: 09/09/2023  -     Vitamin D 25 hydroxy; Future; Expected date: 09/09/2023    5  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  Assessment & Plan:  Stable follows with psychiatry    Orders:  -     CBC; Future; Expected date: 09/09/2023  -     Comprehensive metabolic panel; Future; Expected date: 09/09/2023  -     Hemoglobin A1C; Future; Expected date: 09/09/2023  -     Lipid Panel with Direct LDL reflex; Future; Expected date: 09/09/2023  -     Microalbumin / creatinine urine ratio; Future; Expected date: 09/09/2023  -     TSH, 3rd generation with Free T4 reflex; Future; Expected date: 09/09/2023  -     UA (URINE) with reflex to Scope; Future; Expected date: 09/09/2023  -     Urine culture; Future; Expected date: 09/09/2023  -     Vitamin D 25 hydroxy; Future; Expected date: 09/09/2023    6  Parkinson disease [facility])  Assessment & Plan:  As above    Orders:  -     CBC; Future; Expected date: 09/09/2023  -     Comprehensive metabolic panel; Future; Expected date: 09/09/2023  -     Hemoglobin A1C; Future; Expected date: 09/09/2023  -     Lipid Panel with Direct LDL reflex; Future; Expected date: 09/09/2023  -     Microalbumin / creatinine urine ratio; Future; Expected date: 09/09/2023  -     TSH, 3rd generation with Free T4 reflex; Future; Expected date: 09/09/2023  -     UA (URINE) with reflex to Scope; Future; Expected date: 09/09/2023  -     Urine culture; Future; Expected date: 09/09/2023  -     Vitamin D 25 hydroxy; Future; Expected date: 09/09/2023    7  Left foot pain  -     CBC; Future; Expected date: 09/09/2023  -     Comprehensive metabolic panel;  Future; Expected date: 09/09/2023  -     Hemoglobin A1C; Future; Expected date: 09/09/2023  -     Lipid Panel with Direct LDL reflex; Future; Expected date: 09/09/2023  -     Microalbumin / creatinine urine ratio; Future; Expected date: 09/09/2023  -     TSH, 3rd generation with Free T4 reflex; Future; Expected date: 09/09/2023  -     UA (URINE) with reflex to Scope; Future; Expected date: 09/09/2023  -     Urine culture; Future; Expected date: 09/09/2023  -     Vitamin D 25 hydroxy; Future; Expected date: 09/09/2023  -     Ambulatory Referral to Podiatry; Future  -     XR foot 3+ vw left; Future; Expected date: 03/09/2023    8  Osteoporosis without current pathological fracture, unspecified osteoporosis type  Assessment & Plan:  Check vitamin D DEXA scan has been declined by     Orders:  -     CBC; Future; Expected date: 09/09/2023  -     Comprehensive metabolic panel; Future; Expected date: 09/09/2023  -     Hemoglobin A1C; Future; Expected date: 09/09/2023  -     Lipid Panel with Direct LDL reflex; Future; Expected date: 09/09/2023  -     Microalbumin / creatinine urine ratio; Future; Expected date: 09/09/2023  -     TSH, 3rd generation with Free T4 reflex; Future; Expected date: 09/09/2023  -     UA (URINE) with reflex to Scope; Future; Expected date: 09/09/2023  -     Urine culture; Future; Expected date: 09/09/2023  -     Vitamin D 25 hydroxy; Future; Expected date: 09/09/2023    9  Stage 3 chronic kidney disease, unspecified whether stage 3a or 3b CKD [facility])  Assessment & Plan:  Lab Results   Component Value Date    EGFR 71 03/02/2023    EGFR 42 01/30/2023    EGFR 65 08/23/2022    CREATININE 0 79 03/02/2023    CREATININE 1 22 01/30/2023    CREATININE 0 85 08/23/2022   Stable continue to monitor    Orders:  -     CBC; Future; Expected date: 09/09/2023  -     Comprehensive metabolic panel;  Future; Expected date: 09/09/2023  -     Hemoglobin A1C; Future; Expected date: 09/09/2023  -     Lipid Panel with Direct LDL reflex; Future; Expected date: 09/09/2023  -     Microalbumin / creatinine urine ratio; Future; Expected date: 09/09/2023  -     TSH, 3rd generation with Free T4 reflex; Future; Expected date: 09/09/2023  -     UA (URINE) with reflex to Scope; Future; Expected date: 09/09/2023  -     Urine culture; Future; Expected date: 09/09/2023  -     Vitamin D 25 hydroxy; Future; Expected date: 09/09/2023    10  Decreased ambulation status  Assessment & Plan:  Wheelchair-bound    Orders:  -     CBC; Future; Expected date: 09/09/2023  -     Comprehensive metabolic panel; Future; Expected date: 09/09/2023  -     Hemoglobin A1C; Future; Expected date: 09/09/2023  -     Lipid Panel with Direct LDL reflex; Future; Expected date: 09/09/2023  -     Microalbumin / creatinine urine ratio; Future; Expected date: 09/09/2023  -     TSH, 3rd generation with Free T4 reflex; Future; Expected date: 09/09/2023  -     UA (URINE) with reflex to Scope; Future; Expected date: 09/09/2023  -     Urine culture; Future; Expected date: 09/09/2023  -     Vitamin D 25 hydroxy; Future; Expected date: 09/09/2023    11  Hypokalemia  Assessment & Plan:  Stable continue present therapy    Orders:  -     CBC; Future; Expected date: 09/09/2023  -     Comprehensive metabolic panel; Future; Expected date: 09/09/2023  -     Hemoglobin A1C; Future; Expected date: 09/09/2023  -     Lipid Panel with Direct LDL reflex; Future; Expected date: 09/09/2023  -     Microalbumin / creatinine urine ratio; Future; Expected date: 09/09/2023  -     TSH, 3rd generation with Free T4 reflex; Future; Expected date: 09/09/2023  -     UA (URINE) with reflex to Scope; Future; Expected date: 09/09/2023  -     Urine culture; Future; Expected date: 09/09/2023  -     Vitamin D 25 hydroxy; Future; Expected date: 09/09/2023    12  Major depression with psychotic features [facility])  Assessment & Plan:  Follows with psychiatry    Orders:  -     CBC;  Future; Expected date: 09/09/2023  - Comprehensive metabolic panel; Future; Expected date: 09/09/2023  -     Hemoglobin A1C; Future; Expected date: 09/09/2023  -     Lipid Panel with Direct LDL reflex; Future; Expected date: 09/09/2023  -     Microalbumin / creatinine urine ratio; Future; Expected date: 09/09/2023  -     TSH, 3rd generation with Free T4 reflex; Future; Expected date: 09/09/2023  -     UA (URINE) with reflex to Scope; Future; Expected date: 09/09/2023  -     Urine culture; Future; Expected date: 09/09/2023  -     Vitamin D 25 hydroxy; Future; Expected date: 09/09/2023    13  Mixed hyperlipidemia  Assessment & Plan: On simvastatin 20 mg check lipid panel today    Orders:  -     TSH, 3rd generation with Free T4 reflex; Future; Expected date: 03/09/2023  -     Lipid Panel with Direct LDL reflex; Future; Expected date: 03/09/2023  -     CBC; Future; Expected date: 09/09/2023  -     Comprehensive metabolic panel; Future; Expected date: 09/09/2023  -     Hemoglobin A1C; Future; Expected date: 09/09/2023  -     Lipid Panel with Direct LDL reflex; Future; Expected date: 09/09/2023  -     Microalbumin / creatinine urine ratio; Future; Expected date: 09/09/2023  -     TSH, 3rd generation with Free T4 reflex; Future; Expected date: 09/09/2023  -     UA (URINE) with reflex to Scope; Future; Expected date: 09/09/2023  -     Urine culture; Future; Expected date: 09/09/2023  -     Vitamin D 25 hydroxy; Future; Expected date: 09/09/2023           Subjective      Patient is in with her   Patient is chronically nonverbal    stated that a few months ago she had a "pressure ulcer" back of left heel this has resolved  The patient complains about pain when he touches this area  No open areas  Patient did not complete her blood work before office visit hemoglobin A1c via fingerstick was 7 4 today  Blood work from earlier this month was discussed    However a lipid, thyroid and hemoglobin A1c were not completed    Review of Systems Constitutional: Negative  HENT: Negative  Eyes: Negative  Respiratory: Negative  Cardiovascular: Negative  Gastrointestinal: Negative  Endocrine:        HPI   Genitourinary: Negative  Musculoskeletal:        HPI   Skin:        HPI   Allergic/Immunologic: Negative  Neurological:        HPI   Hematological: Negative  Psychiatric/Behavioral: Negative  Current Outpatient Medications on File Prior to Visit   Medication Sig   • acetaminophen (TYLENOL) 500 mg tablet Take 500 mg by mouth One throughout the night as needed   • ALPRAZolam (XANAX) 0 25 mg tablet 1 Daily   • ascorbic acid (VITAMIN C) 500 mg tablet Take 1 tablet (500 mg total) by mouth daily   • aspirin (ECOTRIN LOW STRENGTH) 81 mg EC tablet Take 81 mg by mouth 3 (three) times a week   • Blood Glucose Monitoring Suppl (Accu-Chek Guide) w/Device KIT Use in the morning Test daily   • carbidopa-levodopa (SINEMET)  mg per tablet Take 1 tablet by mouth 3 (three) times a day   • cilostazol (PLETAL) 100 mg tablet Take 1 tablet (100 mg total) by mouth 2 (two) times a day before meals   • clopidogrel (PLAVIX) 75 mg tablet TAKE 1 TABLET BY MOUTH EVERY DAY   • donepezil (ARICEPT) 10 mg tablet TAKE 1 TABLET BY MOUTH DAILY AT BEDTIME   • glucose blood (Accu-Chek Guide) test strip Test Daily   • glycopyrrolate (ROBINUL) 1 mg tablet TAKE 1 TABLET (1 MG TOTAL) BY MOUTH IN THE MORNING AND 1 TABLET (1 MG TOTAL) IN THE EVENING  • Lancets (accu-chek multiclix) lancets Test daily   • levothyroxine 75 mcg tablet TAKE 1 TABLET EVERY DAY   • metFORMIN (GLUCOPHAGE) 500 mg tablet 1 tablet daily with supper for 3 days then 1 tablet twice a day with breakfast and supper thereafter   • Multiple Vitamin (MULTIVITAMIN) tablet Take 1 tablet by mouth daily     • mupirocin (BACTROBAN) 2 % ointment Apply topically 3 (three) times a day   • nitrofurantoin (MACROBID) 100 mg capsule Take 1 tablet twice daily with food   • NON FORMULARY Take 1 tablet by mouth 2 (two) times a day Calcium- [id number]   • OneTouch Delica Lancets 95K MISC Check blood sugars once daily  Please substitute with appropriate alternative as covered by patient's insurance  Dx: E11 65   • potassium chloride (KLOR-CON) 8 MEQ tablet TAKE 1 TABLET BY MOUTH EVERY DAY   • Probiotic Product (PROBIOTIC DAILY PO) Take 1 tablet by mouth daily  • ranitidine (ZANTAC) 150 mg tablet Take 150 mg by mouth daily  • simvastatin (ZOCOR) 20 mg tablet Take 20 mg by mouth daily at bedtime   • Vitamin D, Cholecalciferol, 1000 UNITS CAPS Take 1 tablet by mouth daily  Objective     /70 (BP Location: Left arm, Patient Position: Sitting, Cuff Size: Standard)   Pulse 77   Temp 97 6 °F (36 4 °C) (Tympanic)   Resp 16   Ht 5' 3" (1 6 m)   SpO2 99%   BMI 28 34 kg/m²     Physical Exam  Vitals and nursing note reviewed  HENT:      Head: Normocephalic and atraumatic  Mouth/Throat:      Mouth: Mucous membranes are moist    Eyes:      General: No scleral icterus  Neck:      Vascular: No carotid bruit  Cardiovascular:      Rate and Rhythm: Normal rate and regular rhythm  Pulses: Normal pulses  Heart sounds: Normal heart sounds  Pulmonary:      Effort: Pulmonary effort is normal       Breath sounds: Normal breath sounds  Abdominal:      General: Bowel sounds are normal       Palpations: Abdomen is soft  Tenderness: There is no abdominal tenderness  Musculoskeletal:         General: Tenderness present  Cervical back: Neck supple  Right lower leg: No edema  Left lower leg: No edema  Comments: Minimal left calcaneal tenderness posterior negative open areas negative redness   Skin:     General: Skin is warm and dry  Neurological:      Mental Status: She is alert  Mental status is at baseline  Motor: Weakness present  Gait: Gait abnormal       Comments: Patient's neurologic at baseline  Patient is wheelchair-bound    Unable to ascertain if any sensory deficit with this patient   Psychiatric:         Mood and Affect: Mood normal        Satnam Kwon, DO

## 2023-03-09 NOTE — ASSESSMENT & PLAN NOTE
Lab Results   Component Value Date    HGBA1C 7 4 (A) 03/09/2023   Hemoglobin A1c 7 4 today    Continue present therapy

## 2023-03-09 NOTE — ASSESSMENT & PLAN NOTE
Lab Results   Component Value Date    EGFR 71 03/02/2023    EGFR 42 01/30/2023    EGFR 65 08/23/2022    CREATININE 0 79 03/02/2023    CREATININE 1 22 01/30/2023    CREATININE 0 85 08/23/2022   Stable continue to monitor

## 2023-03-09 NOTE — PATIENT INSTRUCTIONS
Pleat blood work today for thyroid and lipid even if nonfasting  Complete x-ray of left foot  Follow with podiatry, Dr Katharine Samuel for left foot pain  Continue all other medications and follow with all specialist further instructions  Return in 6 months for office visit, blood work, and [email or web address]

## 2023-04-03 DIAGNOSIS — E11.29 TYPE 2 DIABETES MELLITUS WITH MICROALBUMINURIA, WITHOUT LONG-TERM CURRENT USE OF INSULIN (HCC): ICD-10-CM

## 2023-04-03 DIAGNOSIS — R80.9 TYPE 2 DIABETES MELLITUS WITH MICROALBUMINURIA, WITHOUT LONG-TERM CURRENT USE OF INSULIN (HCC): ICD-10-CM

## 2023-05-12 DIAGNOSIS — R53.1 ACUTE LEFT-SIDED WEAKNESS: ICD-10-CM

## 2023-05-12 RX ORDER — CLOPIDOGREL BISULFATE 75 MG/1
TABLET ORAL
Qty: 90 TABLET | Refills: 3 | Status: SHIPPED | OUTPATIENT
Start: 2023-05-12

## 2023-06-12 DIAGNOSIS — T14.8XXA OPEN WOUND: ICD-10-CM

## 2023-06-12 DIAGNOSIS — E87.6 HYPOKALEMIA: ICD-10-CM

## 2023-06-12 RX ORDER — POTASSIUM CHLORIDE 600 MG/1
TABLET, FILM COATED, EXTENDED RELEASE ORAL
Qty: 90 TABLET | Refills: 1 | Status: SHIPPED | OUTPATIENT
Start: 2023-06-12

## 2023-06-12 RX ORDER — ANORECTAL OINTMENT 15.7; .44; 24; 20.6 G/100G; G/100G; G/100G; G/100G
OINTMENT TOPICAL
Qty: 113 G | Refills: 3 | Status: SHIPPED | OUTPATIENT
Start: 2023-06-12

## 2023-09-08 DIAGNOSIS — R53.1 ACUTE LEFT-SIDED WEAKNESS: ICD-10-CM

## 2023-09-08 RX ORDER — CILOSTAZOL 100 MG/1
100 TABLET ORAL
Qty: 180 TABLET | Refills: 1 | Status: SHIPPED | OUTPATIENT
Start: 2023-09-08

## 2023-09-14 ENCOUNTER — RA CDI HCC (OUTPATIENT)
Dept: OTHER | Facility: HOSPITAL | Age: 78
End: 2023-09-14

## 2023-09-14 ENCOUNTER — APPOINTMENT (OUTPATIENT)
Dept: LAB | Facility: CLINIC | Age: 78
End: 2023-09-14
Payer: MEDICARE

## 2023-09-14 DIAGNOSIS — E87.6 HYPOKALEMIA: ICD-10-CM

## 2023-09-14 DIAGNOSIS — G20.A1 PARKINSON DISEASE: ICD-10-CM

## 2023-09-14 DIAGNOSIS — F32.3 MAJOR DEPRESSION WITH PSYCHOTIC FEATURES (HCC): ICD-10-CM

## 2023-09-14 DIAGNOSIS — E03.9 ACQUIRED HYPOTHYROIDISM: ICD-10-CM

## 2023-09-14 DIAGNOSIS — I69.354 HEMIPARESIS AFFECTING LEFT SIDE AS LATE EFFECT OF CEREBROVASCULAR ACCIDENT (CVA) (HCC): ICD-10-CM

## 2023-09-14 DIAGNOSIS — N18.30 STAGE 3 CHRONIC KIDNEY DISEASE, UNSPECIFIED WHETHER STAGE 3A OR 3B CKD (HCC): ICD-10-CM

## 2023-09-14 DIAGNOSIS — F06.31 MOOD DISORDER WITH MAJOR DEPRESSIVE-LIKE EPISODE DUE TO GENERAL MEDICAL CONDITION: ICD-10-CM

## 2023-09-14 DIAGNOSIS — E78.2 MIXED HYPERLIPIDEMIA: ICD-10-CM

## 2023-09-14 DIAGNOSIS — R80.9 TYPE 2 DIABETES MELLITUS WITH MICROALBUMINURIA, WITHOUT LONG-TERM CURRENT USE OF INSULIN: ICD-10-CM

## 2023-09-14 DIAGNOSIS — M81.0 OSTEOPOROSIS WITHOUT CURRENT PATHOLOGICAL FRACTURE, UNSPECIFIED OSTEOPOROSIS TYPE: ICD-10-CM

## 2023-09-14 DIAGNOSIS — Z74.09 DECREASED AMBULATION STATUS: ICD-10-CM

## 2023-09-14 DIAGNOSIS — G93.41 METABOLIC ENCEPHALOPATHY: ICD-10-CM

## 2023-09-14 DIAGNOSIS — E11.29 TYPE 2 DIABETES MELLITUS WITH MICROALBUMINURIA, WITHOUT LONG-TERM CURRENT USE OF INSULIN: ICD-10-CM

## 2023-09-14 DIAGNOSIS — M79.672 LEFT FOOT PAIN: ICD-10-CM

## 2023-09-14 LAB
25(OH)D3 SERPL-MCNC: 54.6 NG/ML (ref 30–100)
ALBUMIN SERPL BCP-MCNC: 3.7 G/DL (ref 3.5–5)
ALP SERPL-CCNC: 61 U/L (ref 34–104)
ALT SERPL W P-5'-P-CCNC: 10 U/L (ref 7–52)
AMORPH URATE CRY URNS QL MICRO: ABNORMAL
ANION GAP SERPL CALCULATED.3IONS-SCNC: 12 MMOL/L
AST SERPL W P-5'-P-CCNC: 17 U/L (ref 13–39)
BACTERIA UR QL AUTO: ABNORMAL /HPF
BILIRUB SERPL-MCNC: 0.49 MG/DL (ref 0.2–1)
BILIRUB UR QL STRIP: NEGATIVE
BUN SERPL-MCNC: 16 MG/DL (ref 5–25)
CALCIUM SERPL-MCNC: 9.4 MG/DL (ref 8.4–10.2)
CAOX CRY URNS QL MICRO: ABNORMAL /HPF
CHLORIDE SERPL-SCNC: 105 MMOL/L (ref 96–108)
CHOLEST SERPL-MCNC: 126 MG/DL
CLARITY UR: ABNORMAL
CO2 SERPL-SCNC: 24 MMOL/L (ref 21–32)
COLOR UR: YELLOW
CREAT SERPL-MCNC: 0.7 MG/DL (ref 0.6–1.3)
CREAT UR-MCNC: 92.6 MG/DL
ERYTHROCYTE [DISTWIDTH] IN BLOOD BY AUTOMATED COUNT: 13.2 % (ref 11.6–15.1)
EST. AVERAGE GLUCOSE BLD GHB EST-MCNC: 123 MG/DL
GFR SERPL CREATININE-BSD FRML MDRD: 82 ML/MIN/1.73SQ M
GLUCOSE P FAST SERPL-MCNC: 155 MG/DL (ref 65–99)
GLUCOSE UR STRIP-MCNC: NEGATIVE MG/DL
HBA1C MFR BLD: 5.9 %
HCT VFR BLD AUTO: 45.4 % (ref 34.8–46.1)
HDLC SERPL-MCNC: 45 MG/DL
HGB BLD-MCNC: 15.1 G/DL (ref 11.5–15.4)
HGB UR QL STRIP.AUTO: NEGATIVE
KETONES UR STRIP-MCNC: NEGATIVE MG/DL
LDLC SERPL CALC-MCNC: 43 MG/DL (ref 0–100)
LEUKOCYTE ESTERASE UR QL STRIP: ABNORMAL
MCH RBC QN AUTO: 31.3 PG (ref 26.8–34.3)
MCHC RBC AUTO-ENTMCNC: 33.3 G/DL (ref 31.4–37.4)
MCV RBC AUTO: 94 FL (ref 82–98)
MICROALBUMIN UR-MCNC: 67.6 MG/L
MICROALBUMIN/CREAT 24H UR: 73 MG/G CREATININE (ref 0–30)
MUCOUS THREADS UR QL AUTO: ABNORMAL
NITRITE UR QL STRIP: POSITIVE
NON-SQ EPI CELLS URNS QL MICRO: ABNORMAL /HPF
PH UR STRIP.AUTO: 7 [PH]
PLATELET # BLD AUTO: 336 THOUSANDS/UL (ref 149–390)
PMV BLD AUTO: 10 FL (ref 8.9–12.7)
POTASSIUM SERPL-SCNC: 3.7 MMOL/L (ref 3.5–5.3)
PROT SERPL-MCNC: 6.3 G/DL (ref 6.4–8.4)
PROT UR STRIP-MCNC: ABNORMAL MG/DL
RBC # BLD AUTO: 4.82 MILLION/UL (ref 3.81–5.12)
RBC #/AREA URNS AUTO: ABNORMAL /HPF
SODIUM SERPL-SCNC: 141 MMOL/L (ref 135–147)
SP GR UR STRIP.AUTO: 1.02 (ref 1–1.03)
TRIGL SERPL-MCNC: 188 MG/DL
TSH SERPL DL<=0.05 MIU/L-ACNC: 1.57 UIU/ML (ref 0.45–4.5)
UROBILINOGEN UR STRIP-ACNC: <2 MG/DL
WBC # BLD AUTO: 7.22 THOUSAND/UL (ref 4.31–10.16)
WBC #/AREA URNS AUTO: ABNORMAL /HPF

## 2023-09-14 PROCEDURE — 84443 ASSAY THYROID STIM HORMONE: CPT

## 2023-09-14 PROCEDURE — 80053 COMPREHEN METABOLIC PANEL: CPT

## 2023-09-14 PROCEDURE — 87086 URINE CULTURE/COLONY COUNT: CPT

## 2023-09-14 PROCEDURE — 82306 VITAMIN D 25 HYDROXY: CPT

## 2023-09-14 PROCEDURE — 81001 URINALYSIS AUTO W/SCOPE: CPT

## 2023-09-14 PROCEDURE — 85027 COMPLETE CBC AUTOMATED: CPT

## 2023-09-14 PROCEDURE — 82570 ASSAY OF URINE CREATININE: CPT

## 2023-09-14 PROCEDURE — 83036 HEMOGLOBIN GLYCOSYLATED A1C: CPT

## 2023-09-14 PROCEDURE — 82043 UR ALBUMIN QUANTITATIVE: CPT

## 2023-09-14 PROCEDURE — 80061 LIPID PANEL: CPT

## 2023-09-14 PROCEDURE — 36415 COLL VENOUS BLD VENIPUNCTURE: CPT

## 2023-09-14 NOTE — PROGRESS NOTES
[address] [facility] coding opportunities          Chart Reviewed number of suggestions sent to Provider: 1     Patients Insurance     Medicare Insurance: [employer]

## 2023-09-15 DIAGNOSIS — E03.9 HYPOTHYROIDISM, UNSPECIFIED TYPE: Chronic | ICD-10-CM

## 2023-09-15 LAB — BACTERIA UR CULT: NORMAL

## 2023-09-15 RX ORDER — LEVOTHYROXINE SODIUM 0.07 MG/1
TABLET ORAL
Qty: 90 TABLET | Refills: 0 | Status: SHIPPED | OUTPATIENT
Start: 2023-09-15

## 2023-09-21 ENCOUNTER — OFFICE VISIT (OUTPATIENT)
Dept: FAMILY MEDICINE CLINIC | Facility: CLINIC | Age: 78
End: 2023-09-21
Payer: MEDICARE

## 2023-09-21 VITALS
RESPIRATION RATE: 16 BRPM | SYSTOLIC BLOOD PRESSURE: 110 MMHG | DIASTOLIC BLOOD PRESSURE: 68 MMHG | HEIGHT: 63 IN | BODY MASS INDEX: 28.35 KG/M2 | HEART RATE: 92 BPM | WEIGHT: 160 LBS

## 2023-09-21 DIAGNOSIS — I69.354 HEMIPARESIS AFFECTING LEFT SIDE AS LATE EFFECT OF CEREBROVASCULAR ACCIDENT (CVA) (HCC): ICD-10-CM

## 2023-09-21 DIAGNOSIS — R80.9 TYPE 2 DIABETES MELLITUS WITH MICROALBUMINURIA, WITHOUT LONG-TERM CURRENT USE OF INSULIN: Primary | ICD-10-CM

## 2023-09-21 DIAGNOSIS — E11.29 TYPE 2 DIABETES MELLITUS WITH MICROALBUMINURIA, WITHOUT LONG-TERM CURRENT USE OF INSULIN: Primary | ICD-10-CM

## 2023-09-21 DIAGNOSIS — E03.9 ACQUIRED HYPOTHYROIDISM: ICD-10-CM

## 2023-09-21 DIAGNOSIS — E78.2 MIXED HYPERLIPIDEMIA: ICD-10-CM

## 2023-09-21 DIAGNOSIS — Z74.09 DECREASED AMBULATION STATUS: ICD-10-CM

## 2023-09-21 DIAGNOSIS — E87.6 HYPOKALEMIA: ICD-10-CM

## 2023-09-21 DIAGNOSIS — G93.41 METABOLIC ENCEPHALOPATHY: ICD-10-CM

## 2023-09-21 DIAGNOSIS — K21.9 GASTROESOPHAGEAL REFLUX DISEASE WITHOUT ESOPHAGITIS: Chronic | ICD-10-CM

## 2023-09-21 DIAGNOSIS — Z96.89 S/P DEEP BRAIN STIMULATOR PLACEMENT: ICD-10-CM

## 2023-09-21 DIAGNOSIS — G20.A1 PARKINSON DISEASE: ICD-10-CM

## 2023-09-21 DIAGNOSIS — F32.3 MAJOR DEPRESSION WITH PSYCHOTIC FEATURES (HCC): ICD-10-CM

## 2023-09-21 DIAGNOSIS — G31.83 SEVERE LEWY BODY DEMENTIA WITHOUT BEHAVIORAL DISTURBANCE, PSYCHOTIC DISTURBANCE, MOOD DISTURBANCE, OR ANXIETY (HCC): ICD-10-CM

## 2023-09-21 DIAGNOSIS — Z00.00 HEALTH CARE MAINTENANCE: ICD-10-CM

## 2023-09-21 DIAGNOSIS — N18.30 STAGE 3 CHRONIC KIDNEY DISEASE, UNSPECIFIED WHETHER STAGE 3A OR 3B CKD (HCC): ICD-10-CM

## 2023-09-21 DIAGNOSIS — F06.31 MOOD DISORDER WITH MAJOR DEPRESSIVE-LIKE EPISODE DUE TO GENERAL MEDICAL CONDITION: ICD-10-CM

## 2023-09-21 DIAGNOSIS — F02.C0 SEVERE LEWY BODY DEMENTIA WITHOUT BEHAVIORAL DISTURBANCE, PSYCHOTIC DISTURBANCE, MOOD DISTURBANCE, OR ANXIETY (HCC): ICD-10-CM

## 2023-09-21 DIAGNOSIS — H61.23 BILATERAL IMPACTED CERUMEN: ICD-10-CM

## 2023-09-21 DIAGNOSIS — Z23 NEEDS FLU SHOT: ICD-10-CM

## 2023-09-21 PROCEDURE — G0008 ADMIN INFLUENZA VIRUS VAC: HCPCS | Performed by: FAMILY MEDICINE

## 2023-09-21 PROCEDURE — G0439 PPPS, SUBSEQ VISIT: HCPCS | Performed by: FAMILY MEDICINE

## 2023-09-21 PROCEDURE — 99214 OFFICE O/P EST MOD 30 MIN: CPT | Performed by: FAMILY MEDICINE

## 2023-09-21 PROCEDURE — 90662 IIV NO PRSV INCREASED AG IM: CPT | Performed by: FAMILY MEDICINE

## 2023-09-21 NOTE — ASSESSMENT & PLAN NOTE
Lab Results   Component Value Date    HGBA1C 5.9 (H) 09/14/2023   Stable well-controlled continue metformin

## 2023-09-21 NOTE — ASSESSMENT & PLAN NOTE
Lab Results   Component Value Date    EGFR 82 09/14/2023    EGFR 71 03/02/2023    EGFR 42 01/30/2023    CREATININE 0.70 09/14/2023    CREATININE 0.79 03/02/2023    CREATININE 1.22 01/30/2023   Stable continue to monitor

## 2023-09-21 NOTE — PROGRESS NOTES
Assessment and Plan:     1. Type 2 diabetes with renal manifestation, stable continue present therapy #2 pain hypothyroidism, stable continue present therapy #3. GERD, stable continue present therapy #4. CVA with left hemiparesis, will continue on aspirin and Pletal we will discontinue clopidogrel  5. Parkinson's status post deep brain stimulator, worsening symptoms follows with [address] [place], [place] [address]ate   6. Metabolic encephalopathy/labile dementia, worsening follows with neurology  7. CKD-3, stable continue to monitor  8. Decreased ambulation status, wheelchair-bound  Benign. Healthcare maintenance Medicare AWV and influenza vaccine given #9. Hypokalemia, stable continue present therapy #10. MDD/mood disorder, stable in remission off medication and follow-up with psychiatry per their recommendations  11. Hyperlipidemia, stable continue present therapy  12. Need for limitation, given  13. Cerumen impaction bilateral resolved status post lavage with curettage  14.   Patient return in 6 months for office visit and blood work sooner if needed      Problem List Items Addressed This Visit        Digestive    GERD (gastroesophageal reflux disease) (Chronic)     Stable, continue Zantac         Relevant Orders    Albumin / creatinine urine ratio    Comprehensive metabolic panel    Hemoglobin A1C    CBC and Platelet    Lipid Panel with Direct LDL reflex    TSH, 3rd generation with Free T4 reflex    Vitamin D 25 hydroxy       Endocrine    Acquired hypothyroidism     Stable continue levothyroxine 75 mcg daily         Relevant Orders    Albumin / creatinine urine ratio    Comprehensive metabolic panel    Hemoglobin A1C    CBC and Platelet    Lipid Panel with Direct LDL reflex    TSH, 3rd generation with Free T4 reflex    Vitamin D 25 hydroxy    Type 2 diabetes mellitus with microalbuminuria, without long-term current use of insulin (HCC) - Primary       Lab Results   Component Value Date    HGBA1C 5.9 (H) 09/14/2023   Stable well-controlled continue metformin         Relevant Orders    Albumin / creatinine urine ratio    Comprehensive metabolic panel    Hemoglobin A1C    CBC and Platelet    Lipid Panel with Direct LDL reflex    TSH, 3rd generation with Free T4 reflex    Vitamin D 25 hydroxy       Nervous and Auditory    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     In remission continue with psychiatry per their recommendations         Relevant Orders    Albumin / creatinine urine ratio    Comprehensive metabolic panel    Hemoglobin A1C    CBC and Platelet    Lipid Panel with Direct LDL reflex    TSH, 3rd generation with Free T4 reflex    Vitamin D 25 hydroxy    Parkinson disease (HCC)     Symptoms slowly worsening follows with [facility] status post deep brain stimulator         Relevant Orders    Albumin / creatinine urine ratio    Comprehensive metabolic panel    Hemoglobin A1C    CBC and Platelet    Lipid Panel with Direct LDL reflex    TSH, 3rd generation with Free T4 reflex    Vitamin D 25 hydroxy    Severe Lewy body dementia without behavioral disturbance, psychotic disturbance, mood disturbance, or anxiety (HCC)     As above         Relevant Orders    Albumin / creatinine urine ratio    Comprehensive metabolic panel    Hemoglobin A1C    CBC and Platelet    Lipid Panel with Direct LDL reflex    TSH, 3rd generation with Free T4 reflex    Vitamin D 25 hydroxy    Metabolic encephalopathy     Patient essentially nonverbal patient follows with neurology at [address] [place] [address]          Relevant Orders    Albumin / creatinine urine ratio    Comprehensive metabolic panel    Hemoglobin A1C    CBC and Platelet    Lipid Panel with Direct LDL reflex    TSH, 3rd generation with Free T4 reflex    Vitamin D 25 hydroxy    Hemiparesis affecting left side as late effect of cerebrovascular accident (CVA) ([address])     Patient is on 3 antiplatelet drugs, Pletal, Plavix, aspirin.   We will leave on aspirin Pletal discontinue Plavix at this juncture         Relevant Orders    Albumin / creatinine urine ratio    Comprehensive metabolic panel    Hemoglobin A1C    CBC and Platelet    Lipid Panel with Direct LDL reflex    TSH, 3rd generation with Free T4 reflex    Vitamin D 25 hydroxy       Genitourinary    Stage 3 chronic kidney disease, unspecified whether stage 3a or 3b CKD (HCC)     Lab Results   Component Value Date    EGFR 82 09/14/2023    EGFR 71 03/02/2023    EGFR 42 01/30/2023    CREATININE 0.70 09/14/2023    CREATININE 0.79 03/02/2023    CREATININE 1.22 01/30/2023   Stable continue to monitor         Relevant Orders    Albumin / creatinine urine ratio    Comprehensive metabolic panel    Hemoglobin A1C    CBC and Platelet    Lipid Panel with Direct LDL reflex    TSH, 3rd generation with Free T4 reflex    Vitamin D 25 hydroxy       Other    Mixed hyperlipidemia     Able simvastatin         Relevant Orders    Albumin / creatinine urine ratio    Comprehensive metabolic panel    Hemoglobin A1C    CBC and Platelet    Lipid Panel with Direct LDL reflex    TSH, 3rd generation with Free T4 reflex    Vitamin D 25 hydroxy    Major depression with psychotic features ([address])     Follows with psychiatry         Relevant Orders    Albumin / creatinine urine ratio    Comprehensive metabolic panel    Hemoglobin A1C    CBC and Platelet    Lipid Panel with Direct LDL reflex    TSH, 3rd generation with Free T4 reflex    Vitamin D 25 hydroxy    Health care maintenance     Medicare AWV completed, influenza vaccine given         Decreased ambulation status     Ambulates with wheelchair         Relevant Orders    Albumin / creatinine urine ratio    Comprehensive metabolic panel    Hemoglobin A1C    CBC and Platelet    Lipid Panel with Direct LDL reflex    TSH, 3rd generation with Free T4 reflex    Vitamin D 25 hydroxy    S/P deep brain stimulator placement     As above         Relevant Orders    Albumin / creatinine urine ratio    Comprehensive metabolic panel    Hemoglobin A1C    CBC and Platelet    Lipid Panel with Direct LDL reflex    TSH, 3rd generation with Free T4 reflex    Vitamin D 25 hydroxy    Hypokalemia     Stable continue present therapy         Relevant Orders    Albumin / creatinine urine ratio    Comprehensive metabolic panel    Hemoglobin A1C    CBC and Platelet    Lipid Panel with Direct LDL reflex    TSH, 3rd generation with Free T4 reflex    Vitamin D 25 hydroxy   Other Visit Diagnoses     Needs flu shot        Relevant Orders    influenza vaccine, high-dose, PF 0.7 mL (FLUZONE HIGH-DOSE) (Completed)    Bilateral impacted cerumen        Relevant Orders    Ear cerumen removal (Completed)    Albumin / creatinine urine ratio    Comprehensive metabolic panel    Hemoglobin A1C    CBC and Platelet    Lipid Panel with Direct LDL reflex    TSH, 3rd generation with Free T4 reflex    Vitamin D 25 hydroxy           Preventive health issues were discussed with patient, and age appropriate screening tests were ordered as noted in patient's After Visit Summary. Personalized health advice and appropriate referrals for health education or preventive services given if needed, as noted in patient's After Visit Summary. History of Present Illness:     Patient presents for a Medicare Wellness Visit    Patient is stable  believes she may have earwax because she is little less responsive. Patient at this juncture is essentially nonverbal.  Will follow commands. Sugars been under 140 at home. Blood work was discussed     Patient Care Team:  Nancy Ramos DO as PCP - General (Family Medicine)  Lauro Fu as Diabetes Educator (Diabetes Services)     Review of Systems:     Review of Systems   Constitutional: Negative. HENT: Negative. Eyes: Negative. Respiratory: Negative. Cardiovascular: Negative. Gastrointestinal: Negative. Endocrine:        HPI   Genitourinary: Negative. Musculoskeletal: Negative. Skin: Negative. Allergic/Immunologic: Negative. Neurological:        HPI   Hematological: Negative. Psychiatric/Behavioral: Negative.          Problem List:     Patient Active Problem List   Diagnosis   • 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   • Acquired hypothyroidism   • Mixed hyperlipidemia   • Parkinson disease ([address])   • GERD (gastroesophageal reflux disease)   • Sialorrhea   • Allergic rhinitis   • Osteoporosis   • Major depression with psychotic features ([address])   • Health care maintenance   • Chronic cough   • Decreased ambulation status   • Type 2 diabetes mellitus with microalbuminuria, without long-term current use of insulin ([facility])   • Chorea   • Severe Lewy body dementia without behavioral disturbance, psychotic disturbance, mood disturbance, or anxiety ([facility])   • S/P deep brain stimulator placement   • PVC (premature ventricular contraction)   • Metabolic encephalopathy   • Hemiparesis affecting left side as late effect of cerebrovascular accident (CVA) ([address])   • Hypokalemia   • Stage 3 chronic kidney disease, unspecified whether stage 3a or 3b CKD ([address])      Past Medical and Surgical History:     Past Medical History:   Diagnosis Date   • Abdominal pain, suprapubic 07/03/2014    Resolved:  November 22, 2016   • Abscess of skin and subcutaneous tissue 11/21/2016    Resolved:  February 28, 2017   • Acute kidney failure, unspecified ([address])    • Anxiety    • Burning with urination 12/14/2016    Resolved:  February 28, 2017   • Conjunctivitis 06/10/2013   • Depression    • Depression    • Diabetes mellitus ([address]) 02/02/2020   • Dysuria 06/06/2016    Resolved:  February 28 2017   • Falling 10/18/2012   • GERD (gastroesophageal reflux disease)    • Groin pain 04/16/2015    Resolved:  February 28, 2017   • Hyperlipidemia    • Hypothyroid    • Influenza 04/05/2013   • Leg abrasion 09/22/2016    Resolved:  February 28, 2017   • Parkinson's disease [facility])    • Psychiatric illness    • Rectal pain 10/17/2013    Resolved:  February 28, 2017   • Squamous cell skin cancer 03/22/2022    Left lateral calf   • Thyroid disease      Past Surgical History:   Procedure Laterality Date   • APPENDECTOMY      Age 4 or 3   • BACK SURGERY     • BRAIN SURGERY     • CLOSED REDUCTION NASAL FRACTURE     • CYSTOSCOPY  01/28/2014    Dino Plasencia.  Alyssa Barahona M.D.  (Diagnostic)   • DEEP BRAIN STIMULATOR PLACEMENT     • EYE SURGERY     • FL MYELOGRAM CERVICAL  3/4/2014   • FRACTURE SURGERY     • JOINT REPLACEMENT     • LAMINECTOMY      Decompress, Facetectomy, Foraminotomy Lumbar Seg   • ORIF HIP FRACTURE Left     About 3 years ago   • SKIN BIOPSY     • TONSILLECTOMY        Family History:     Family History   Problem Relation Age of Onset   • Heart attack Mother         Acute Myocardial Infarction (MI)   • Diabetes Mother         Diabetes Mellitus   • Stroke Father         Syndrome   • No Known Problems Brother    • No Known Problems Maternal Aunt    • No Known Problems Paternal Aunt    • No Known Problems Maternal Uncle    • No Known Problems Paternal Uncle    • No Known Problems Maternal Grandfather    • No Known Problems Maternal Grandmother    • No Known Problems Paternal Grandfather    • No Known Problems Paternal Grandmother    • No Known Problems Cousin    • Heart disease Family    • Hypertension Family    • Thyroid disease Family    • ADD / ADHD Neg Hx    • Alcohol abuse Neg Hx    • Anxiety disorder Neg Hx    • Bipolar disorder Neg Hx    • Dementia Neg Hx    • Depression Neg Hx    • Drug abuse Neg Hx    • OCD Neg Hx    • Paranoid behavior Neg Hx    • Schizophrenia Neg Hx    • Seizures Neg Hx    • Self-Injury Neg Hx    • Suicide Attempts Neg Hx       Social History:     Social History     Socioeconomic History   • Marital status: /Civil Union     Spouse name: None   • Number of children: None   • Years of education: None   • Highest education level: None Occupational History   • None   Tobacco Use   • Smoking status: Never   • Smokeless tobacco: Never   • Tobacco comments:     n/a   Vaping Use   • Vaping Use: Never used   Substance and Sexual Activity   • Alcohol use: Yes     Alcohol/week: 1.0 standard drink of alcohol     Types: 1 Glasses of wine per week     Comment: drinks one fourth of a glass of white wine once a week  (Never drank alcohol per [employer])   • Drug use: No   • Sexual activity: Not Currently   Other Topics Concern   • None   Social History Narrative   • None     Social Determinants of Health     Financial Resource Strain: Low Risk  (9/21/2023)    Overall Financial Resource Strain (CARDIA)    • Difficulty of Paying Living Expenses: Not hard at all   Food Insecurity: No Food Insecurity (12/6/2021)    Hunger Vital Sign    • Worried About Running Out of Food in the Last Year: Never true    • Ran Out of Food in the Last Year: Never true   Transportation Needs: No Transportation Needs (9/21/2023)    PRAPARE - Transportation    • Lack of Transportation (Medical): No    • Lack of Transportation (Non-Medical):  No   Physical Activity: Not on file   Stress: Not on file   Social Connections: Not on file   Intimate Partner Violence: Not on file   Housing Stability: Unknown (12/6/2021)    Housing Stability Vital Sign    • Unable to Pay for Housing in the Last Year: No    • Number of Places Lived in the Last Year: Not on file    • Unstable Housing in the Last Year: No      Medications and Allergies:     Current Outpatient Medications   Medication Sig Dispense Refill   • acetaminophen (TYLENOL) 500 mg tablet Take 500 mg by mouth One throughout the night as needed     • ALPRAZolam (XANAX) 0.25 mg tablet 1 Daily 90 tablet 2   • ascorbic acid (VITAMIN C) 500 mg tablet Take 1 tablet (500 mg total) by mouth daily  0   • aspirin (ECOTRIN LOW STRENGTH) 81 mg EC tablet Take 81 mg by mouth 3 (three) times a week     • Blood Glucose Monitoring Suppl (Accu-Chek Guide) w/Device KIT Use in the morning Test daily 1 kit 0   • Calmoseptine 0.44-20.6 % OINT APPLY TO AFFECTED AREA TWICE A  g 3   • carbidopa-levodopa (SINEMET)  mg per tablet Take 1 tablet by mouth 3 (three) times a day  0   • cilostazol (PLETAL) 100 mg tablet TAKE 1 TABLET (100 MG TOTAL) BY MOUTH 2 (TWO) TIMES A DAY BEFORE MEALS 180 tablet 1   • donepezil (ARICEPT) 10 mg tablet TAKE 1 TABLET BY MOUTH DAILY AT BEDTIME 90 tablet 2   • glucose blood (Accu-Chek Guide) test strip Test Daily 50 each 5   • glycopyrrolate (ROBINUL) 1 mg tablet TAKE 1 TABLET (1 MG TOTAL) BY MOUTH IN THE MORNING AND 1 TABLET (1 MG TOTAL) IN THE EVENING. 180 tablet 3   • Lancets (accu-chek multiclix) lancets Test daily 100 each 5   • levothyroxine 75 mcg tablet TAKE 1 TABLET EVERY DAY 90 tablet 0   • metFORMIN (GLUCOPHAGE) 500 mg tablet TAKE 1 TABLET BY MOUTH DAILY WITH SUPPER FOR 3 DAYS, THEN 1 TABLET TWICE DAILY WITH BREAKFAST & SUPPER THEREAFTER 180 tablet 1   • Multiple Vitamin (MULTIVITAMIN) tablet Take 1 tablet by mouth daily. • mupirocin (BACTROBAN) 2 % ointment Apply topically 3 (three) times a day 22 g 1   • nitrofurantoin (MACROBID) 100 mg capsule Take 1 tablet twice daily with food 10 capsule 0   • NON FORMULARY Take 1 tablet by mouth 2 (two) times a day Calcium- D3-600mg     • OneTouch Delica Lancets 90M MISC Check blood sugars once daily. Please substitute with appropriate alternative as covered by patient's insurance. Dx: E11.65 100 each 3   • potassium chloride (KLOR-CON) 8 MEQ tablet TAKE 1 TABLET BY MOUTH EVERY DAY 90 tablet 1   • Probiotic Product (PROBIOTIC DAILY PO) Take 1 tablet by mouth daily. • ranitidine (ZANTAC) 150 mg tablet Take 150 mg by mouth daily. • simvastatin (ZOCOR) 20 mg tablet Take 20 mg by mouth daily at bedtime     • Vitamin D, Cholecalciferol, 1000 UNITS CAPS Take 1 tablet by mouth daily. No current facility-administered medications for this visit.      Allergies   Allergen Reactions   • Gabapentin      Other reaction(s): Hypotension   • Bupropion GI Intolerance and Other (See Comments)     Other reaction(s): Tremor   • Cephalexin    • Duloxetine      Other reaction(s): Other (see comments)  unkown  unknown     • Erythromycin GI Intolerance   • Methadone Hallucinations   • Mirtazapine GI Intolerance   • Paroxetine GI Intolerance   • Sulfa Antibiotics    • Tricyclic Antidepressants      Other reaction(s): Other   • Acetazolamide Rash   • Levofloxacin Anxiety and Other (See Comments)      Immunizations:     Immunization History   Administered Date(s) Administered   • COVID-19 J&J (Keduo) vaccine 0.5 mL 01/26/2021, 03/08/2021   • COVID-19 MODERNA VACC 0.5 ML IM 01/26/2021, 03/08/2021, 11/17/2021   • Influenza Quadrivalent Preservative Free 3 years and older IM 10/03/2014, 10/21/2015   • Influenza Split High Dose Preservative Free IM 09/22/2016, 10/10/2017, 12/09/2021   • Influenza, high dose seasonal 0.7 mL 10/10/2018, 10/30/2019, 10/19/2020, 10/26/2021, 10/10/2022, 09/21/2023   • Influenza, seasonal, injectable 10/18/2012, 09/25/2013   • Pneumococcal Conjugate 13-Valent 10/06/2017, 12/09/2021   • Pneumococcal Polysaccharide PPV23 09/22/2016, 12/09/2021   • Td (adult), adsorbed 09/22/2016   • Tuberculin Skin Test 12/09/2021, 01/27/2022      Health Maintenance: There are no preventive care reminders to display for this patient. Topic Date Due   • COVID-19 Vaccine (5 - Moderna series) 01/12/2022      Medicare Screening Tests and Risk Assessments:     Hang Robledo is here for her Subsequent Wellness visit. Last Medicare Wellness visit information reviewed, patient interviewed, no change since last AWV. Health Risk Assessment:   Patient rates overall health as fair. Patient feels that their physical health rating is same. Patient is satisfied with their life. Eyesight was rated as same. Hearing was rated as same. Patient feels that their emotional and mental health rating is same. Patients states they are never, rarely angry. Patient states they are often unusually tired/fatigued. Pain experienced in the last 7 days has been none. Patient states that she has experienced no weight loss or gain in last 6 months. Depression Screening:   PHQ-9 Score: 0      Fall Risk Screening: In the past year, patient has experienced: no history of falling in past year      Urinary Incontinence Screening:   Patient has not leaked urine accidently in the last six months. Home Safety:  Patient has trouble with stairs inside or outside of their home. Patient has no working smoke alarms and has working carbon monoxide detector. Medications:   Patient is not currently taking any over-the-counter supplements. Patient is not able to manage medications.  takes care oif her    Activities of Daily Living (ADLs)/Instrumental Activities of Daily Living (IADLs):   Walk and transfer into and out of bed and chair?: No  Dress and groom yourself?: No    Bathe or shower yourself?: No    Feed yourself? No  Do your laundry/housekeeping?: No  Manage your money, pay your bills and track your expenses?: No  Make your own meals?: No    Do your own shopping?: No    ADL comments:  take care of her    Advance Care Planning:   Living will: Yes    Durable POA for healthcare:  Yes    Advanced directive: Yes    Advanced directive counseling given: Yes    Five wishes given: No    Patient declined ACP directive: Yes    End of Life Decisions reviewed with patient: Yes    Provider agrees with end of life decisions: Yes      Cognitive Screening:   Provider or family/friend/caregiver concerned regarding cognition?: Yes  Mini-Cog Score: 0  Interpretation: Mini-Cog Score 0-2: Positive screen for dementia    PREVENTIVE SCREENINGS      Cardiovascular Screening:    General: Screening Not Indicated and History Lipid Disorder      Diabetes Screening:     General: Screening Not Indicated and History Diabetes      Colorectal Cancer Screening:     General: Screening Not Indicated      Breast Cancer Screening:     General: Screening Not Indicated      Cervical Cancer Screening:    General: Screening Not Indicated      Osteoporosis Screening:    General: Screening Not Indicated and History Osteoporosis      Abdominal Aortic Aneurysm (AAA) Screening:        General: Screening Not Indicated      Lung Cancer Screening:     General: Screening Not Indicated      Hepatitis C Screening:    General: Screening Not Indicated    Screening, Brief Intervention, and Referral to Treatment (SBIRT)    Screening  Typical number of drinks in a day: 0  Typical number of drinks in a week: 0  Interpretation: Low risk drinking behavior. No results found. Physical Exam:     /68   Pulse 92   Resp 16   Ht 5' 3" (1.6 m)   Wt 72.6 kg (160 lb)   BMI 28.34 kg/m²     Physical Exam  Vitals and nursing note reviewed. Constitutional:       Appearance: Normal appearance. HENT:      Head: Normocephalic and atraumatic. Ears:      Comments: Bilateral cerumen impaction after lavage with curettage EACs clear tympanic membranes intact     Mouth/Throat:      Mouth: Mucous membranes are moist.   Eyes:      General: No scleral icterus. Neck:      Vascular: No carotid bruit. Cardiovascular:      Rate and Rhythm: Normal rate and regular rhythm. Heart sounds: Normal heart sounds. Pulmonary:      Effort: Pulmonary effort is normal.      Breath sounds: Normal breath sounds. Abdominal:      General: Bowel sounds are normal.      Palpations: Abdomen is soft. Tenderness: There is no abdominal tenderness. Musculoskeletal:      Cervical back: Neck supple. Right lower leg: No edema. Left lower leg: No edema. Skin:     General: Skin is warm and dry. Neurological:      Mental Status: She is alert. Comments: Only able to follow very simple commands.   Is awake alert essentially nonverbal   Psychiatric:         Mood and Affect: Mood normal.        Ear cerumen removal    Date/Time: 9/21/2023 1:20 PM    Performed by: Lizet Edouard DO  Authorized by: Lizet Edouard DO  Universal Protocol:  Consent: Verbal consent obtained. Patient location:  Other (comment)  Indications / Diagnosis:  Office  Procedure details:     Local anesthetic:  None    Location:  L ear and R ear    Procedure type: irrigation with instrumentation      Instrumentation: curette      Approach:  Natural orifice    Equipment used:  Bilateral cerumen impaction removed with lavage with warm water as well as curettage  Post-procedure details:     Complication:  None    Hearing quality:  Normal    Patient tolerance of procedure:   Tolerated well, no immediate complications      Satnam Kwon DO

## 2023-09-21 NOTE — PATIENT INSTRUCTIONS
Follow-up with neurology and psychiatry per their recommendations  Discontinue clopidogrel  Continue other medications  Return in 6 months for office visit and blood work sooner if needed  Medicare Preventive Visit Patient Instructions  Thank you for completing your Welcome to Medicare Visit or Medicare Annual Wellness Visit today. Your next wellness visit will be due in one year (9/21/2024). The screening/preventive services that you may require over the next 5-10 years are detailed below. Some tests may not apply to you based off risk factors and/or age. Screening tests ordered at today's visit but not completed yet may show as past due. Also, please note that scanned in results may not display below. Preventive Screenings:  Service Recommendations Previous Testing/Comments   Colorectal Cancer Screening  * Colonoscopy    * Fecal Occult Blood Test (FOBT)/Fecal Immunochemical Test (FIT)  * Fecal DNA/Cologuard Test  * Flexible Sigmoidoscopy Age: 43-73 years old   Colonoscopy: every 10 years (may be performed more frequently if at higher risk)  OR  FOBT/FIT: every 1 year  OR  Cologuard: every 3 years  OR  Sigmoidoscopy: every 5 years  Screening may be recommended earlier than age 39 if at higher risk for colorectal cancer. Also, an individualized decision between you and your healthcare provider will decide whether screening between the ages of 77-80 would be appropriate. Colonoscopy: Not on file  FOBT/FIT: Not on file  Cologuard: Not on file  Sigmoidoscopy: Not on file          Breast Cancer Screening Age: 36 years old  Frequency: every 1-2 years  Not required if history of left and right mastectomy Mammogram: Not on file        Cervical Cancer Screening Between the ages of 21-29, pap smear recommended once every 3 years. Between the ages of 32-69, can perform pap smear with HPV co-testing every 5 years.    Recommendations may differ for women with a history of total hysterectomy, cervical cancer, or abnormal pap smears in past. Pap Smear: Not on file    Screening Not Indicated   Hepatitis C Screening Once for adults born between 1945 and 1965  More frequently in patients at high risk for Hepatitis C Hep C Antibody: Not on file        Diabetes Screening 1-2 times per year if you're at risk for diabetes or have pre-diabetes Fasting glucose: 155 mg/dL (9/14/2023)  A1C: 5.9 % (9/14/2023)  Screening Not Indicated  History Diabetes   Cholesterol Screening Once every 5 years if you don't have a lipid disorder. May order more often based on risk factors. Lipid panel: 09/14/2023    Screening Not Indicated  History Lipid Disorder     Other Preventive Screenings Covered by Medicare:  Abdominal Aortic Aneurysm (AAA) Screening: covered once if your at risk. You're considered to be at risk if you have a family history of AAA. Lung Cancer Screening: covers low dose CT scan once per year if you meet all of the following conditions: (1) Age 48-67; (2) No signs or symptoms of lung cancer; (3) Current smoker or have quit smoking within the last 15 years; (4) You have a tobacco smoking history of at least 20 pack years (packs per day multiplied by number of years you smoked); (5) You get a written order from a healthcare provider. Glaucoma Screening: covered annually if you're considered high risk: (1) You have diabetes OR (2) Family history of glaucoma OR (3)  aged 48 and older OR (3)  American aged 72 and older  Osteoporosis Screening: covered every 2 years if you meet one of the following conditions: (1) You're estrogen deficient and at risk for osteoporosis based off medical history and other findings; (2) Have a vertebral abnormality; (3) On glucocorticoid therapy for more than 3 months; (4) Have primary hyperparathyroidism; (5) On osteoporosis medications and need to assess response to drug therapy. Last bone density test (DXA Scan): Not on file.   HIV Screening: covered annually if you're between the age of 15-65. Also covered annually if you are younger than 13 and older than 72 with risk factors for HIV infection. For pregnant patients, it is covered up to 3 times per pregnancy. Immunizations:  Immunization Recommendations   Influenza Vaccine Annual influenza vaccination during flu season is recommended for all persons aged >= 6 months who do not have contraindications   Pneumococcal Vaccine   * Pneumococcal conjugate vaccine = PCV13 (Prevnar 13), PCV15 (Vaxneuvance), PCV20 (Prevnar 20)  * Pneumococcal polysaccharide vaccine = PPSV23 (Pneumovax) Adults 20-63 years old: 1-3 doses may be recommended based on certain risk factors  Adults 72 years old: 1-2 doses may be recommended based off what pneumonia vaccine you previously received   Hepatitis B Vaccine 3 dose series if at intermediate or high risk (ex: diabetes, end stage renal disease, liver disease)   Tetanus (Td) Vaccine - COST NOT COVERED BY MEDICARE PART B Following completion of primary series, a booster dose should be given every 10 years to maintain immunity against tetanus. Td may also be given as tetanus wound prophylaxis. Tdap Vaccine - COST NOT COVERED BY MEDICARE PART B Recommended at least once for all adults. For pregnant patients, recommended with each pregnancy. Shingles Vaccine (Shingrix) - COST NOT COVERED BY MEDICARE PART B  2 shot series recommended in those aged 48 and above     Health Maintenance Due:  There are no preventive care reminders to display for this patient. Immunizations Due:      Topic Date Due    COVID-19 Vaccine (5 - Moderna series) 01/12/2022    Influenza Vaccine (1) 09/01/2023     Advance Directives   What are advance directives? Advance directives are legal documents that state your wishes and plans for medical care. These plans are made ahead of time in case you lose your ability to make decisions for yourself.  Advance directives can apply to any medical decision, such as the treatments you want, and if you want to donate organs. What are the types of advance directives? There are many types of advance directives, and each state has rules about how to use them. You may choose a combination of any of the following:  Living will: This is a written record of the treatment you want. You can also choose which treatments you do not want, which to limit, and which to stop at a certain time. This includes surgery, medicine, IV fluid, and tube feedings. Durable power of  for healthcare [facility]): This is a written record that states who you want to make healthcare choices for you when you are unable to make them for yourself. This person, called a proxy, is usually a family member or a friend. You may choose more than 1 proxy. Do not resuscitate (DNR) order:  A DNR order is used in case your heart stops beating or you stop breathing. It is a request not to have certain forms of treatment, such as CPR. A DNR order may be included in other types of advance directives. Medical directive: This covers the care that you want if you are in a coma, near death, or unable to make decisions for yourself. You can list the treatments you want for each condition. Treatment may include pain medicine, surgery, blood transfusions, dialysis, IV or tube feedings, and a ventilator (breathing machine). Values history: This document has questions about your views, beliefs, and how you feel and think about life. This information can help others choose the care that you would choose. Why are advance directives important? An advance directive helps you control your care. Although spoken wishes may be used, it is better to have your wishes written down. Spoken wishes can be misunderstood, or not followed. Treatments may be given even if you do not want them. An advance directive may make it easier for your family to make difficult choices about your care.    Urinary Incontinence   Urinary incontinence (UI)  is when you lose control of your bladder. UI develops because your bladder cannot store or empty urine properly. The 3 most common types of UI are stress incontinence, urge incontinence, or both. Medicines:   May be given to help strengthen your bladder control. Report any side effects of medication to your healthcare provider. Do pelvic muscle exercises often:  Your pelvic muscles help you stop urinating. Squeeze these muscles tight for 5 seconds, then relax for 5 seconds. Gradually work up to squeezing for 10 seconds. Do 3 sets of 15 repetitions a day, or as directed. This will help strengthen your pelvic muscles and improve bladder control. Train your bladder:  Go to the bathroom at set times, such as every 2 hours, even if you do not feel the urge to go. You can also try to hold your urine when you feel the urge to go. For example, hold your urine for 5 minutes when you feel the urge to go. As that becomes easier, hold your urine for 10 minutes. Self-care:   Keep a UI record. Write down how often you leak urine and how much you leak. Make a note of what you were doing when you leaked urine. Drink liquids as directed. You may need to limit the amount of liquid you drink to help control your urine leakage. Do not drink any liquid right before you go to bed. Limit or do not have drinks that contain caffeine or alcohol. Prevent constipation. Eat a variety of high-fiber foods. Good examples are high-fiber cereals, beans, vegetables, and whole-grain breads. Walking is the best way to trigger your intestines to have a bowel movement. Exercise regularly and maintain a healthy weight. Weight loss and exercise will decrease pressure on your bladder and help you control your leakage. Use a catheter as directed  to help empty your bladder. A catheter is a tiny, plastic tube that is put into your bladder to drain your urine. Go to behavior therapy as directed.   Behavior therapy may be used to help you learn to control your urge to urinate. Weight Management   Why it is important to manage your weight:  Being overweight increases your risk of health conditions such as heart disease, high blood pressure, type 2 diabetes, and certain types of cancer. It can also increase your risk for osteoarthritis, sleep apnea, and other respiratory problems. Aim for a slow, steady weight loss. Even a small amount of weight loss can lower your risk of health problems. How to lose weight safely:  A safe and healthy way to lose weight is to eat fewer calories and get regular exercise. You can lose up about 1 pound a week by decreasing the number of calories you eat by 500 calories each day. Healthy meal plan for weight management:  A healthy meal plan includes a variety of foods, contains fewer calories, and helps you stay healthy. A healthy meal plan includes the following:  Eat whole-grain foods more often. A healthy meal plan should contain fiber. Fiber is the part of grains, fruits, and vegetables that is not broken down by your body. Whole-grain foods are healthy and provide extra fiber in your diet. Some examples of whole-grain foods are whole-wheat breads and pastas, oatmeal, brown rice, and bulgur. Eat a variety of vegetables every day. Include dark, leafy greens such as spinach, kale, daniella greens, and mustard greens. Eat yellow and orange vegetables such as carrots, sweet potatoes, and winter squash. Eat a variety of fruits every day. Choose fresh or canned fruit (canned in its own juice or light syrup) instead of juice. Fruit juice has very little or no fiber. Eat low-fat dairy foods. Drink fat-free (skim) milk or 1% milk. Eat fat-free yogurt and low-fat cottage cheese. Try low-fat cheeses such as mozzarella and other reduced-fat cheeses. Choose meat and other protein foods that are low in fat. Choose beans or other legumes such as split peas or lentils.  Choose fish, skinless poultry (chicken or turkey), or lean cuts of red meat (beef or pork). Before you cook meat or poultry, cut off any visible fat. Use less fat and oil. Try baking foods instead of frying them. Add less fat, such as margarine, sour cream, regular salad dressing and mayonnaise to foods. Eat fewer high-fat foods. Some examples of high-fat foods include french fries, doughnuts, ice cream, and cakes. Eat fewer sweets. Limit foods and drinks that are high in sugar. This includes candy, cookies, regular soda, and sweetened drinks. Exercise:  Exercise at least 30 minutes per day on most days of the week. Some examples of exercise include walking, biking, dancing, and swimming. You can also fit in more physical activity by taking the stairs instead of the elevator or parking farther away from stores. Ask your healthcare provider about the best exercise plan for you. © Copyright [employer] 2018 Information is for End User's use only and may not be sold, redistributed or otherwise used for commercial purposes.  All illustrations and images included in CareNotes® are the copyrighted property of [employer].D.A[employer] Inc. or [address] [place] [address]

## 2023-09-21 NOTE — ASSESSMENT & PLAN NOTE
Patient essentially nonverbal patient follows with neurology at [address] [place] [address] Dr

## 2023-09-21 NOTE — ASSESSMENT & PLAN NOTE
Patient is on 3 antiplatelet drugs, Pletal, Plavix, aspirin.   We will leave on aspirin Pletal discontinue Plavix at this juncture

## 2023-09-27 DIAGNOSIS — E11.29 TYPE 2 DIABETES MELLITUS WITH MICROALBUMINURIA, WITHOUT LONG-TERM CURRENT USE OF INSULIN: ICD-10-CM

## 2023-09-27 DIAGNOSIS — R80.9 TYPE 2 DIABETES MELLITUS WITH MICROALBUMINURIA, WITHOUT LONG-TERM CURRENT USE OF INSULIN: ICD-10-CM

## 2023-10-29 DIAGNOSIS — R41.89 COGNITIVE DECLINE: ICD-10-CM

## 2023-10-29 RX ORDER — DONEPEZIL HYDROCHLORIDE 10 MG/1
10 TABLET, FILM COATED ORAL
Qty: 90 TABLET | Refills: 2 | Status: SHIPPED | OUTPATIENT
Start: 2023-10-29

## 2023-12-14 ENCOUNTER — TELEPHONE (OUTPATIENT)
Dept: FAMILY MEDICINE CLINIC | Facility: CLINIC | Age: 78
End: 2023-12-14

## 2023-12-14 DIAGNOSIS — G31.83 SEVERE LEWY BODY DEMENTIA WITHOUT BEHAVIORAL DISTURBANCE, PSYCHOTIC DISTURBANCE, MOOD DISTURBANCE, OR ANXIETY (HCC): ICD-10-CM

## 2023-12-14 DIAGNOSIS — Z74.09 DECREASED AMBULATION STATUS: ICD-10-CM

## 2023-12-14 DIAGNOSIS — I69.354 HEMIPARESIS AFFECTING LEFT SIDE AS LATE EFFECT OF CEREBROVASCULAR ACCIDENT (CVA) (HCC): Primary | ICD-10-CM

## 2023-12-14 DIAGNOSIS — F02.C0 SEVERE LEWY BODY DEMENTIA WITHOUT BEHAVIORAL DISTURBANCE, PSYCHOTIC DISTURBANCE, MOOD DISTURBANCE, OR ANXIETY (HCC): ICD-10-CM

## 2023-12-14 DIAGNOSIS — G20.A1 PARKINSON'S DISEASE, UNSPECIFIED WHETHER DYSKINESIA PRESENT, UNSPECIFIED WHETHER MANIFESTATIONS FLUCTUATE: ICD-10-CM

## 2023-12-14 DIAGNOSIS — G93.41 METABOLIC ENCEPHALOPATHY: ICD-10-CM

## 2023-12-14 NOTE — TELEPHONE ENCOUNTER
Patient daughter gladis called into the office requesting if  can put in a order for a lift equipment. Patient daughter stated her dad helps her mom out but she doesn't want him to carry her they need it for her to get out of bed , the bathroom and the car. Daughter also want to know if the doctor can provide a number for them to call so they can come out to the house and show them how to use it. Please advise and call daughter at  [phone]. Thank You.

## 2023-12-18 DIAGNOSIS — Z74.09 DECREASED AMBULATION STATUS: ICD-10-CM

## 2023-12-18 DIAGNOSIS — I69.354 HEMIPARESIS AFFECTING LEFT SIDE AS LATE EFFECT OF CEREBROVASCULAR ACCIDENT (CVA) (HCC): Primary | ICD-10-CM

## 2023-12-18 DIAGNOSIS — F02.C0 SEVERE LEWY BODY DEMENTIA WITHOUT BEHAVIORAL DISTURBANCE, PSYCHOTIC DISTURBANCE, MOOD DISTURBANCE, OR ANXIETY (HCC): ICD-10-CM

## 2023-12-18 DIAGNOSIS — G20.A1 PARKINSON'S DISEASE, UNSPECIFIED WHETHER DYSKINESIA PRESENT, UNSPECIFIED WHETHER MANIFESTATIONS FLUCTUATE: ICD-10-CM

## 2023-12-18 DIAGNOSIS — G31.83 SEVERE LEWY BODY DEMENTIA WITHOUT BEHAVIORAL DISTURBANCE, PSYCHOTIC DISTURBANCE, MOOD DISTURBANCE, OR ANXIETY (HCC): ICD-10-CM

## 2023-12-18 NOTE — TELEPHONE ENCOUNTER
Consult  ke's visiting nurse for evaluation.  They were sent to physical therapist to the house for evaluation and if lift is appropriate we will order and physical therapy can help with the education of the family

## 2023-12-18 NOTE — PROGRESS NOTES
Received notification from  [facility] they do not offer home physical therapy evaluations and treatment.  I have placed order for [facility] at their recommendations

## 2023-12-19 NOTE — TELEPHONE ENCOUNTER
Referral faxed to [facility] due to [facility] not being able to provide the services requested.

## 2024-01-19 ENCOUNTER — TELEPHONE (OUTPATIENT)
Age: 79
End: 2024-01-19

## 2024-01-19 NOTE — TELEPHONE ENCOUNTER
Daughter call stating Physical therapy faxed over form for provider to fill out. Was faxed over on Tuesday and daughter is checking up on the status of the form.   Please give shana a call back.

## 2024-01-25 DIAGNOSIS — E87.6 HYPOKALEMIA: ICD-10-CM

## 2024-01-25 RX ORDER — POTASSIUM CHLORIDE 600 MG/1
TABLET, FILM COATED, EXTENDED RELEASE ORAL
Qty: 90 TABLET | Refills: 1 | Status: SHIPPED | OUTPATIENT
Start: 2024-01-25

## 2024-02-08 DIAGNOSIS — R80.9 TYPE 2 DIABETES MELLITUS WITH MICROALBUMINURIA, WITHOUT LONG-TERM CURRENT USE OF INSULIN: ICD-10-CM

## 2024-02-08 DIAGNOSIS — E11.29 TYPE 2 DIABETES MELLITUS WITH MICROALBUMINURIA, WITHOUT LONG-TERM CURRENT USE OF INSULIN: ICD-10-CM

## 2024-02-08 DIAGNOSIS — R53.1 ACUTE LEFT-SIDED WEAKNESS: ICD-10-CM

## 2024-02-08 DIAGNOSIS — G20.A1 PARKINSON DISEASE: ICD-10-CM

## 2024-02-08 RX ORDER — GLYCOPYRROLATE 1 MG/1
TABLET ORAL
Qty: 180 TABLET | Refills: 1 | Status: SHIPPED | OUTPATIENT
Start: 2024-02-08

## 2024-02-08 RX ORDER — CILOSTAZOL 100 MG/1
100 TABLET ORAL
Qty: 180 TABLET | Refills: 1 | Status: SHIPPED | OUTPATIENT
Start: 2024-02-08

## 2024-02-21 PROBLEM — Z00.00 HEALTH CARE MAINTENANCE: Status: RESOLVED | Noted: 2019-02-21 | Resolved: 2024-02-21

## 2024-02-23 DIAGNOSIS — E03.9 HYPOTHYROIDISM, UNSPECIFIED TYPE: Chronic | ICD-10-CM

## 2024-02-23 RX ORDER — LEVOTHYROXINE SODIUM 0.07 MG/1
TABLET ORAL
Qty: 90 TABLET | Refills: 1 | Status: SHIPPED | OUTPATIENT
Start: 2024-02-23

## 2024-03-26 ENCOUNTER — APPOINTMENT (OUTPATIENT)
Dept: LAB | Facility: HOSPITAL | Age: 79
End: 2024-03-26
Payer: MEDICARE

## 2024-03-26 DIAGNOSIS — G93.41 METABOLIC ENCEPHALOPATHY: ICD-10-CM

## 2024-03-26 DIAGNOSIS — E78.2 MIXED HYPERLIPIDEMIA: ICD-10-CM

## 2024-03-26 DIAGNOSIS — I69.354 HEMIPARESIS AFFECTING LEFT SIDE AS LATE EFFECT OF CEREBROVASCULAR ACCIDENT (CVA) (HCC): ICD-10-CM

## 2024-03-26 DIAGNOSIS — F06.31 MOOD DISORDER WITH MAJOR DEPRESSIVE-LIKE EPISODE DUE TO GENERAL MEDICAL CONDITION: ICD-10-CM

## 2024-03-26 DIAGNOSIS — E03.9 ACQUIRED HYPOTHYROIDISM: ICD-10-CM

## 2024-03-26 DIAGNOSIS — F32.3 MAJOR DEPRESSION WITH PSYCHOTIC FEATURES (HCC): ICD-10-CM

## 2024-03-26 DIAGNOSIS — R80.9 TYPE 2 DIABETES MELLITUS WITH MICROALBUMINURIA, WITHOUT LONG-TERM CURRENT USE OF INSULIN (HCC): ICD-10-CM

## 2024-03-26 DIAGNOSIS — E87.6 HYPOKALEMIA: ICD-10-CM

## 2024-03-26 DIAGNOSIS — Z74.09 DECREASED AMBULATION STATUS: ICD-10-CM

## 2024-03-26 DIAGNOSIS — Z96.89 S/P DEEP BRAIN STIMULATOR PLACEMENT: ICD-10-CM

## 2024-03-26 DIAGNOSIS — H61.23 BILATERAL IMPACTED CERUMEN: ICD-10-CM

## 2024-03-26 DIAGNOSIS — E11.29 TYPE 2 DIABETES MELLITUS WITH MICROALBUMINURIA, WITHOUT LONG-TERM CURRENT USE OF INSULIN (HCC): ICD-10-CM

## 2024-03-26 DIAGNOSIS — G20.A1 PARKINSON DISEASE: ICD-10-CM

## 2024-03-26 DIAGNOSIS — N18.30 STAGE 3 CHRONIC KIDNEY DISEASE, UNSPECIFIED WHETHER STAGE 3A OR 3B CKD (HCC): ICD-10-CM

## 2024-03-26 DIAGNOSIS — G31.83 SEVERE LEWY BODY DEMENTIA WITHOUT BEHAVIORAL DISTURBANCE, PSYCHOTIC DISTURBANCE, MOOD DISTURBANCE, OR ANXIETY (HCC): ICD-10-CM

## 2024-03-26 DIAGNOSIS — F02.C0 SEVERE LEWY BODY DEMENTIA WITHOUT BEHAVIORAL DISTURBANCE, PSYCHOTIC DISTURBANCE, MOOD DISTURBANCE, OR ANXIETY (HCC): ICD-10-CM

## 2024-03-26 DIAGNOSIS — K21.9 GASTROESOPHAGEAL REFLUX DISEASE WITHOUT ESOPHAGITIS: Chronic | ICD-10-CM

## 2024-03-26 LAB
25(OH)D3 SERPL-MCNC: 69.7 NG/ML (ref 30–100)
ALBUMIN SERPL BCP-MCNC: 3.8 G/DL (ref 3.5–5)
ALP SERPL-CCNC: 63 U/L (ref 34–104)
ALT SERPL W P-5'-P-CCNC: 9 U/L (ref 7–52)
ANION GAP SERPL CALCULATED.3IONS-SCNC: 5 MMOL/L (ref 4–13)
AST SERPL W P-5'-P-CCNC: 18 U/L (ref 13–39)
BILIRUB SERPL-MCNC: 0.68 MG/DL (ref 0.2–1)
BUN SERPL-MCNC: 20 MG/DL (ref 5–25)
CALCIUM SERPL-MCNC: 9.7 MG/DL (ref 8.4–10.2)
CHLORIDE SERPL-SCNC: 106 MMOL/L (ref 96–108)
CHOLEST SERPL-MCNC: 111 MG/DL
CO2 SERPL-SCNC: 30 MMOL/L (ref 21–32)
CREAT SERPL-MCNC: 0.73 MG/DL (ref 0.6–1.3)
CREAT UR-MCNC: 67.7 MG/DL
ERYTHROCYTE [DISTWIDTH] IN BLOOD BY AUTOMATED COUNT: 14.1 % (ref 11.6–15.1)
EST. AVERAGE GLUCOSE BLD GHB EST-MCNC: 111 MG/DL
GFR SERPL CREATININE-BSD FRML MDRD: 78 ML/MIN/1.73SQ M
GLUCOSE SERPL-MCNC: 114 MG/DL (ref 65–140)
HBA1C MFR BLD: 5.5 %
HCT VFR BLD AUTO: 47 % (ref 34.8–46.1)
HDLC SERPL-MCNC: 50 MG/DL
HGB BLD-MCNC: 15.4 G/DL (ref 11.5–15.4)
LDLC SERPL CALC-MCNC: 37 MG/DL (ref 0–100)
MCH RBC QN AUTO: 31.3 PG (ref 26.8–34.3)
MCHC RBC AUTO-ENTMCNC: 32.8 G/DL (ref 31.4–37.4)
MCV RBC AUTO: 96 FL (ref 82–98)
MICROALBUMIN UR-MCNC: 148.8 MG/L
MICROALBUMIN/CREAT 24H UR: 220 MG/G CREATININE (ref 0–30)
PLATELET # BLD AUTO: 346 THOUSANDS/UL (ref 149–390)
PMV BLD AUTO: 10.5 FL (ref 8.9–12.7)
POTASSIUM SERPL-SCNC: 4 MMOL/L (ref 3.5–5.3)
PROT SERPL-MCNC: 6.5 G/DL (ref 6.4–8.4)
RBC # BLD AUTO: 4.92 MILLION/UL (ref 3.81–5.12)
SODIUM SERPL-SCNC: 141 MMOL/L (ref 135–147)
TRIGL SERPL-MCNC: 119 MG/DL
TSH SERPL DL<=0.05 MIU/L-ACNC: 0.93 UIU/ML (ref 0.45–4.5)
WBC # BLD AUTO: 7.17 THOUSAND/UL (ref 4.31–10.16)

## 2024-03-26 PROCEDURE — 84443 ASSAY THYROID STIM HORMONE: CPT

## 2024-03-26 PROCEDURE — 82570 ASSAY OF URINE CREATININE: CPT

## 2024-03-26 PROCEDURE — 80053 COMPREHEN METABOLIC PANEL: CPT

## 2024-03-26 PROCEDURE — 85027 COMPLETE CBC AUTOMATED: CPT

## 2024-03-26 PROCEDURE — 82043 UR ALBUMIN QUANTITATIVE: CPT

## 2024-03-26 PROCEDURE — 83036 HEMOGLOBIN GLYCOSYLATED A1C: CPT

## 2024-03-26 PROCEDURE — 36415 COLL VENOUS BLD VENIPUNCTURE: CPT

## 2024-03-26 PROCEDURE — 82306 VITAMIN D 25 HYDROXY: CPT

## 2024-03-26 PROCEDURE — 80061 LIPID PANEL: CPT

## 2024-03-28 ENCOUNTER — TELEPHONE (OUTPATIENT)
Dept: FAMILY MEDICINE CLINIC | Facility: CLINIC | Age: 79
End: 2024-03-28

## 2024-03-28 NOTE — TELEPHONE ENCOUNTER
Patient's  was in today.  This was supposed to be an appointment for both the  and Anjali.  Anjali was not feeling well and was not up to it.  Discussed patient's labs with her .  Patient will make appointment next few weeks

## 2024-04-04 ENCOUNTER — TELEPHONE (OUTPATIENT)
Age: 79
End: 2024-04-04

## 2024-04-04 DIAGNOSIS — L89.306 PRESSURE INJURY OF DEEP TISSUE OF BUTTOCK, UNSPECIFIED LATERALITY: Primary | ICD-10-CM

## 2024-04-04 DIAGNOSIS — G31.83 SEVERE LEWY BODY DEMENTIA WITHOUT BEHAVIORAL DISTURBANCE, PSYCHOTIC DISTURBANCE, MOOD DISTURBANCE, OR ANXIETY (HCC): ICD-10-CM

## 2024-04-04 DIAGNOSIS — F02.C0 SEVERE LEWY BODY DEMENTIA WITHOUT BEHAVIORAL DISTURBANCE, PSYCHOTIC DISTURBANCE, MOOD DISTURBANCE, OR ANXIETY (HCC): ICD-10-CM

## 2024-04-04 DIAGNOSIS — I69.354 HEMIPARESIS AFFECTING LEFT SIDE AS LATE EFFECT OF CEREBROVASCULAR ACCIDENT (CVA) (HCC): ICD-10-CM

## 2024-04-04 NOTE — TELEPHONE ENCOUNTER
called stated Anjali has been with a pressure ulcer on her low buttock for at least  one week.    Unable to schedule Virtual visit. [employer] email sent for set up.     has been applying Rx: Calmoseptine cream. She also has been prescribed Rx: Ketoconazole.     would like  to know if Anjali can have Home Care Services?.    Please review and advice   Thank you

## 2024-04-04 NOTE — ADDENDUM NOTE
Addended by: EMILIANO SCHILLING on: 4/4/2024 04:10 PM     Modules accepted: Orders    
Left UE/Right UE

## 2024-04-05 NOTE — TELEPHONE ENCOUNTER
Patients  called in regarding home health care. Questioned if it was ordered. Advised that order is in chart. Advised  to give until Monday for them to contact him and if he hasn't heard anything call us back.  verbalized understanding.

## 2024-04-08 ENCOUNTER — TELEPHONE (OUTPATIENT)
Age: 79
End: 2024-04-08

## 2024-04-08 DIAGNOSIS — F02.C0 SEVERE LEWY BODY DEMENTIA WITHOUT BEHAVIORAL DISTURBANCE, PSYCHOTIC DISTURBANCE, MOOD DISTURBANCE, OR ANXIETY (HCC): ICD-10-CM

## 2024-04-08 DIAGNOSIS — L89.306 PRESSURE INJURY OF DEEP TISSUE OF BUTTOCK, UNSPECIFIED LATERALITY: Primary | ICD-10-CM

## 2024-04-08 DIAGNOSIS — G31.83 SEVERE LEWY BODY DEMENTIA WITHOUT BEHAVIORAL DISTURBANCE, PSYCHOTIC DISTURBANCE, MOOD DISTURBANCE, OR ANXIETY (HCC): ICD-10-CM

## 2024-04-08 DIAGNOSIS — I69.354 HEMIPARESIS AFFECTING LEFT SIDE AS LATE EFFECT OF CEREBROVASCULAR ACCIDENT (CVA) (HCC): ICD-10-CM

## 2024-04-08 NOTE — TELEPHONE ENCOUNTER
[facility] was denied was advised that referral can be placed for [facility] care please advise

## 2024-04-09 ENCOUNTER — TELEPHONE (OUTPATIENT)
Age: 79
End: 2024-04-09

## 2024-04-10 ENCOUNTER — TELEPHONE (OUTPATIENT)
Age: 79
End: 2024-04-10

## 2024-04-10 ENCOUNTER — TELEMEDICINE (OUTPATIENT)
Dept: PSYCHIATRY | Facility: CLINIC | Age: 79
End: 2024-04-10
Payer: MEDICARE

## 2024-04-10 DIAGNOSIS — F41.1 GAD (GENERALIZED ANXIETY DISORDER): Primary | ICD-10-CM

## 2024-04-10 DIAGNOSIS — R41.89 COGNITIVE DECLINE: ICD-10-CM

## 2024-04-10 PROCEDURE — 99442 PR PHYS/QHP TELEPHONE EVALUATION 11-20 MIN: CPT | Performed by: NURSE PRACTITIONER

## 2024-04-10 NOTE — PSYCH
Virtual Brief Visit    This Visit is being completed by telephone. The Patient is located at Home and in the following state in which I hold an active license [place]    The patient was identified by name and date of birth. Anjali Shipley was informed that this is a telemedicine visit and that the visit is being conducted through the [employer] platform. She agrees to proceed..  My office door was closed. No one else was in the room.  She acknowledged consent and understanding of privacy and security of the video platform. The patient has agreed to participate and understands they can discontinue the visit at any time.    Patient is aware this is a billable service.       Assessment/Plan: I had the opportunity today to discuss how the patient is doing with the patient's , Shane.  Kristin reports that Rita Parkinson's disease is progressing.  Psychiatrically, there are no issues.  She no longer takes Xanax and we are now going to start to titrate her off of the donepezil.  At last visit, we successfully eliminated any other psychiatric medications.  The elimination of these medications has had no negative effect on the patient.  Shane is aware that if in the future,if he needs us he can call us at any time.  We will keep Williams case open in the event any further treatment is warranted.    Problem List Items Addressed This Visit    None  Visit Diagnoses       Cognitive decline                Recent Visits  No visits were found meeting these conditions.  Showing recent visits within past 7 days and meeting all other requirements  Today's Visits  Date Type Provider Dept   04/10/24 Telemedicine [facility]   Showing today's visits and meeting all other requirements  Future Appointments  No visits were found meeting these conditions.  Showing future appointments within next 150 days and meeting all other requirements         Visit Time  Total Visit Duration: The  call lasted approximately 15 minutes.  Patient has been titrated off of alprazolam and donepezil.  No longer on any psychiatric medication.

## 2024-04-10 NOTE — TELEPHONE ENCOUNTER
Called pt today in regards of trying to schedule an appointment with provider, pt has been set for an telephone call with provider 4/11 at 1:00 PM.    If patient has any concerns or issues to please call office back, thank you!

## 2024-04-10 NOTE — PSYCH
"TREATMENT PLAN (Medication Management Only)        [facility]    Name and Date of Birth:  Anjali Shipley 78 y.o. 1945  Date of Treatment Plan: April 10, 2024  Diagnosis/Diagnoses:    1. DYANA (generalized anxiety disorder)    2. Cognitive decline      Strengths/Personal Resources for Self-Care: supportive family, supportive friends.  Area/Areas of need (in own words): \" Patient's Parkinson's disease has progressed significantly.  She is mostly nonverbal.  No psychiatric symptoms evident.\".  1. Long Term Goal: \" Continue comfort care which is being delivered in the patient's home with the assistance of the patient's .\".   Target Date: 6 months - 10/10/2024  Person/Persons responsible for completion of goal: , Shane  2.  Short Term Objective (s) - How will we reach this goal?:   A.  Provider new recommended medication/dosage changes and/or continue medication(s):  On no psychiatric medications at this time. .  B.  N/A.    Target Date: 6 months - 10/10/2024  Person/Persons Responsible for Completion of Goal:   Progress Towards Goals: limited progress  Treatment Modality:  As needed  Review due 6 months from date of this plan: 6 months - 10/10/2024  Expected length of service: ongoing treatment unless revised  My Physician/PA/NP and I have developed this plan together and I agree to work on the goals and objectives. I understand the treatment goals that were developed for my treatment.  "

## 2024-04-10 NOTE — TELEPHONE ENCOUNTER
Asking for pt to be scheduled for an appt so they can start at home care visits. Asking for her to be seen within the next 30 days.

## 2024-04-11 ENCOUNTER — TELEMEDICINE (OUTPATIENT)
Dept: FAMILY MEDICINE CLINIC | Facility: CLINIC | Age: 79
End: 2024-04-11
Payer: MEDICARE

## 2024-04-11 DIAGNOSIS — E03.9 ACQUIRED HYPOTHYROIDISM: ICD-10-CM

## 2024-04-11 DIAGNOSIS — F02.C0 SEVERE LEWY BODY DEMENTIA WITHOUT BEHAVIORAL DISTURBANCE, PSYCHOTIC DISTURBANCE, MOOD DISTURBANCE, OR ANXIETY (HCC): ICD-10-CM

## 2024-04-11 DIAGNOSIS — N18.30 STAGE 3 CHRONIC KIDNEY DISEASE, UNSPECIFIED WHETHER STAGE 3A OR 3B CKD (HCC): ICD-10-CM

## 2024-04-11 DIAGNOSIS — G31.83 SEVERE LEWY BODY DEMENTIA WITHOUT BEHAVIORAL DISTURBANCE, PSYCHOTIC DISTURBANCE, MOOD DISTURBANCE, OR ANXIETY (HCC): ICD-10-CM

## 2024-04-11 DIAGNOSIS — R80.9 TYPE 2 DIABETES MELLITUS WITH MICROALBUMINURIA, WITHOUT LONG-TERM CURRENT USE OF INSULIN (HCC): Primary | ICD-10-CM

## 2024-04-11 DIAGNOSIS — E11.29 TYPE 2 DIABETES MELLITUS WITH MICROALBUMINURIA, WITHOUT LONG-TERM CURRENT USE OF INSULIN (HCC): Primary | ICD-10-CM

## 2024-04-11 DIAGNOSIS — L89.150 PRESSURE INJURY OF SACRAL REGION, UNSTAGEABLE (HCC): ICD-10-CM

## 2024-04-11 PROCEDURE — G2211 COMPLEX E/M VISIT ADD ON: HCPCS | Performed by: FAMILY MEDICINE

## 2024-04-11 PROCEDURE — 99213 OFFICE O/P EST LOW 20 MIN: CPT | Performed by: FAMILY MEDICINE

## 2024-04-11 NOTE — PATIENT INSTRUCTIONS
Bactroban ointment to area twice daily  Please call [facility] let them know that Anjali had a visit.  Bring patient in when able

## 2024-04-11 NOTE — ASSESSMENT & PLAN NOTE
Lab Results   Component Value Date    EGFR 78 03/26/2024    EGFR 82 09/14/2023    EGFR 71 03/02/2023    CREATININE 0.73 03/26/2024    CREATININE 0.70 09/14/2023    CREATININE 0.79 03/02/2023   Fars been over 70 for the past year this has been discontinued diagnosis

## 2024-04-11 NOTE — Clinical Note
Please call [facility] at [phone] and let them know patient did have office visit and they need to start wound care ASAP for her sacral decubitus

## 2024-04-11 NOTE — PROGRESS NOTES
Virtual Regular Visit  It was my intent to perform this visit via video technology but the patient was not able to do a video connection so the visit was completed via audio telephone only.   Verification of patient location:    Patient is located at Home in the following state in which I hold an active license [place]      Assessment/Plan:    Problem List Items Addressed This Visit        Endocrine    Acquired hypothyroidism     Stable blood work reviewed         Type 2 diabetes mellitus with microalbuminuria, without long-term current use of insulin ([facility]) - Primary       Lab Results   Component Value Date    HGBA1C 5.5 03/26/2024   Stable blood work reviewed            Nervous and Auditory    Severe Lewy body dementia without behavioral disturbance, psychotic disturbance, mood disturbance, or anxiety ([facility])     Patient unwilling to come in at the present time  will bring her in in the future            Genitourinary    RESOLVED: Stage 3 chronic kidney disease, unspecified whether stage 3a or 3b CKD ([facility])     Lab Results   Component Value Date    EGFR 78 03/26/2024    EGFR 82 09/14/2023    EGFR 71 03/02/2023    CREATININE 0.73 03/26/2024    CREATININE 0.70 09/14/2023    CREATININE 0.79 03/02/2023   Fars been over 70 for the past year this has been discontinued diagnosis        Other Visit Diagnoses     Pressure injury of sacral region, unstageable ([facility])        Relevant Medications    mupirocin (BACTROBAN) 2 % ointment               Reason for visit is   Chief Complaint   Patient presents with   • Virtual Regular Visit        Encounter provider Satnam Kwon DO    Provider located at [facility]  [address] [place] [id number] [phone]      Recent Visits  No visits were found meeting these conditions.  Showing recent visits within past 7 days and meeting all other requirements  Today's Visits  Date Type Provider Dept   04/11/24 Telemedicine Satnam  DO [facility]   Showing today's visits and meeting all other requirements  Future Appointments  No visits were found meeting these conditions.  Showing future appointments within next 150 days and meeting all other requirements       The patient was identified by name and date of birth. Anjali Shipley was informed that this is a telemedicine visit and that the visit is being conducted through Telephone.  My office door was closed. No one else was in the room.  She acknowledged consent and understanding of privacy and security of the video platform. The patient has agreed to participate and understands they can discontinue the visit at any time.    Patient is aware this is a billable service.     Subjective  Anjali Shipley is a 79 y.o. female       Patient's  made this appointment for the patient.  Patient unable to quit at the present time.  Unfortunately family unable to video visit: Do phone visit.  [facility] is unable to commend to take care of the decubitus ulcer until patient had an office visit with me.  This is recurrent.  I have prescribed Bactroban ointment will have nursing staff call [facility] to start.  Blood work was discussed         Past Medical History:   Diagnosis Date   • Abdominal pain, suprapubic 07/03/2014    Resolved:  November 22, 2016   • Abscess of skin and subcutaneous tissue 11/21/2016    Resolved:  February 28, 2017   • Acute kidney failure, unspecified (HCC)    • Anxiety    • Burning with urination 12/14/2016    Resolved:  February 28, 2017   • Conjunctivitis 06/10/2013   • Depression    • Depression    • Diabetes mellitus (HCC) 02/02/2020   • Dysuria 06/06/2016    Resolved:  February 28 2017   • Falling 10/18/2012   • GERD (gastroesophageal reflux disease)    • Groin pain 04/16/2015    Resolved:  February 28, 2017   • Hyperlipidemia    • Hypothyroid    • Influenza 04/05/2013   • Leg abrasion 09/22/2016    Resolved:  February 28, 2017   •  Parkinson's disease    • Psychiatric illness    • Rectal pain 10/17/2013    Resolved:  February 28, 2017   • Squamous cell skin cancer 03/22/2022    Left lateral calf   • Thyroid disease        Past Surgical History:   Procedure Laterality Date   • APPENDECTOMY      Age 6 or 7   • BACK SURGERY     • BRAIN SURGERY     • CLOSED REDUCTION NASAL FRACTURE     • CYSTOSCOPY  01/28/2014    Ivan Siddiqui M.D.  (Diagnostic)   • DEEP BRAIN STIMULATOR PLACEMENT     • EYE SURGERY     • FL MYELOGRAM CERVICAL  3/4/2014   • FRACTURE SURGERY     • JOINT REPLACEMENT     • LAMINECTOMY      Decompress, Facetectomy, Foraminotomy Lumbar Seg   • ORIF HIP FRACTURE Left     About 3 years ago   • SKIN BIOPSY     • TONSILLECTOMY         Current Outpatient Medications   Medication Sig Dispense Refill   • mupirocin (BACTROBAN) 2 % ointment Apply topically 2 (two) times a day 30 g 1   • acetaminophen (TYLENOL) 500 mg tablet Take 500 mg by mouth One throughout the night as needed     • ascorbic acid (VITAMIN C) 500 mg tablet Take 1 tablet (500 mg total) by mouth daily  0   • aspirin (ECOTRIN LOW STRENGTH) 81 mg EC tablet Take 81 mg by mouth 3 (three) times a week     • Blood Glucose Monitoring Suppl (Accu-Chek Guide) w/Device KIT Use in the morning Test daily 1 kit 0   • Calmoseptine 0.44-20.6 % OINT APPLY TO AFFECTED AREA TWICE A  g 3   • carbidopa-levodopa (SINEMET)  mg per tablet Take 1 tablet by mouth 3 (three) times a day  0   • cilostazol (PLETAL) 100 mg tablet TAKE 1 TABLET (100 MG TOTAL) BY MOUTH 2 (TWO) TIMES A DAY BEFORE MEALS 180 tablet 1   • glucose blood (Accu-Chek Guide) test strip Test Daily 50 each 5   • glycopyrrolate (ROBINUL) 1 mg tablet TAKE 1 TABLET (1 MG TOTAL) BY MOUTH IN THE MORNING AND IN THE EVENING 180 tablet 1   • Lancets (accu-chek multiclix) lancets Test daily 100 each 5   • levothyroxine 75 mcg tablet TAKE 1 TABLET EVERY DAY 90 tablet 1   • metFORMIN (GLUCOPHAGE) 500 mg tablet TAKE 1 TABLET BY  MOUTH DAILY WITH SUPPER FOR 3 DAYS, THEN 1 TABLET TWICE DAILY WITH BREAKFAST & SUPPER THEREAFTER 180 tablet 1   • Multiple Vitamin (MULTIVITAMIN) tablet Take 1 tablet by mouth daily.     • nitrofurantoin (MACROBID) 100 mg capsule Take 1 tablet twice daily with food 10 capsule 0   • NON FORMULARY Take 1 tablet by mouth 2 (two) times a day Calcium- D3-600mg     • OneTouch Delica Lancets 33G MISC Check blood sugars once daily. Please substitute with appropriate alternative as covered by patient's insurance. Dx: E11.65 100 each 3   • potassium chloride (KLOR-CON) 8 MEQ tablet TAKE 1 TABLET BY MOUTH EVERY DAY 90 tablet 1   • Probiotic Product (PROBIOTIC DAILY PO) Take 1 tablet by mouth daily.     • ranitidine (ZANTAC) 150 mg tablet Take 150 mg by mouth daily.     • simvastatin (ZOCOR) 20 mg tablet Take 20 mg by mouth daily at bedtime     • Vitamin D, Cholecalciferol, 1000 UNITS CAPS Take 1 tablet by mouth daily.       No current facility-administered medications for this visit.        Allergies   Allergen Reactions   • Gabapentin      Other reaction(s): Hypotension   • Bupropion GI Intolerance and Other (See Comments)     Other reaction(s): Tremor   • Cephalexin    • Duloxetine      Other reaction(s): Other (see comments)  unkown  unknown     • Erythromycin GI Intolerance   • Methadone Hallucinations   • Mirtazapine GI Intolerance   • Paroxetine GI Intolerance   • Sulfa Antibiotics    • Tricyclic Antidepressants      Other reaction(s): Other   • Acetazolamide Rash   • Levofloxacin Anxiety and Other (See Comments)       Review of Systems   Constitutional: Negative.    HENT: Negative.     Eyes: Negative.    Respiratory: Negative.     Cardiovascular: Negative.    Gastrointestinal: Negative.    Endocrine: Negative.    Genitourinary: Negative.    Musculoskeletal: Negative.    Skin:         HPI   Allergic/Immunologic: Negative.    Neurological: Negative.    Hematological: Negative.    Psychiatric/Behavioral: Negative.          Video Exam    There were no vitals filed for this visit.    Physical Exam  Constitutional:       Comments: No exam phone visit only          Visit Time  Total Visit Duration: 15

## 2024-04-15 ENCOUNTER — TELEPHONE (OUTPATIENT)
Dept: FAMILY MEDICINE CLINIC | Facility: CLINIC | Age: 79
End: 2024-04-15

## 2024-04-15 NOTE — TELEPHONE ENCOUNTER
----- Message from Satnam Kwon DO sent at 4/11/2024 12:17 PM EDT -----  Please call [facility] at [phone] and let them know patient did have office visit and they need to start wound care ASAP for her sacral decubitus

## 2024-04-15 NOTE — TELEPHONE ENCOUNTER
Spoke with .   [facility]  Will complete message.

## 2024-04-17 ENCOUNTER — TELEPHONE (OUTPATIENT)
Age: 79
End: 2024-04-17

## 2024-04-17 NOTE — TELEPHONE ENCOUNTER
"C RN calling on behalf of patient, requesting to review medication and doses. Also states they will be dropping off a \"485\" form for PCP to sign tomorrow.   "

## 2024-04-20 ENCOUNTER — HOSPITAL ENCOUNTER (INPATIENT)
Facility: HOSPITAL | Age: 79
LOS: 4 days | Discharge: NURSING, REHAB OR LONG-TERM CARE | DRG: 689 | End: 2024-04-24
Attending: EMERGENCY MEDICINE | Admitting: HOSPITALIST
Payer: MEDICARE

## 2024-04-20 ENCOUNTER — APPOINTMENT (EMERGENCY)
Dept: RADIOLOGY | Facility: HOSPITAL | Age: 79
DRG: 689 | End: 2024-04-20
Payer: MEDICARE

## 2024-04-20 ENCOUNTER — APPOINTMENT (EMERGENCY)
Dept: CT IMAGING | Facility: HOSPITAL | Age: 79
DRG: 689 | End: 2024-04-20
Payer: MEDICARE

## 2024-04-20 DIAGNOSIS — L89.152 SACRAL DECUBITUS ULCER, STAGE II (HCC): ICD-10-CM

## 2024-04-20 DIAGNOSIS — R53.1 WEAKNESS: Primary | ICD-10-CM

## 2024-04-20 DIAGNOSIS — R80.9 TYPE 2 DIABETES MELLITUS WITH MICROALBUMINURIA, WITHOUT LONG-TERM CURRENT USE OF INSULIN (HCC): ICD-10-CM

## 2024-04-20 DIAGNOSIS — E11.29 TYPE 2 DIABETES MELLITUS WITH MICROALBUMINURIA, WITHOUT LONG-TERM CURRENT USE OF INSULIN (HCC): ICD-10-CM

## 2024-04-20 DIAGNOSIS — S31.000A WOUND OF SACRAL REGION, INITIAL ENCOUNTER: ICD-10-CM

## 2024-04-20 DIAGNOSIS — N39.0 UTI (URINARY TRACT INFECTION): ICD-10-CM

## 2024-04-20 LAB
2HR DELTA HS TROPONIN: 1 NG/L
4HR DELTA HS TROPONIN: -1 NG/L
ALBUMIN SERPL BCP-MCNC: 3.8 G/DL (ref 3.5–5)
ALP SERPL-CCNC: 73 U/L (ref 34–104)
ALT SERPL W P-5'-P-CCNC: 27 U/L (ref 7–52)
ANION GAP SERPL CALCULATED.3IONS-SCNC: 4 MMOL/L (ref 4–13)
APTT PPP: 32 SECONDS (ref 23–37)
AST SERPL W P-5'-P-CCNC: 44 U/L (ref 13–39)
ATRIAL RATE: 93 BPM
ATRIAL RATE: 93 BPM
BACTERIA UR QL AUTO: ABNORMAL /HPF
BASOPHILS # BLD AUTO: 0.1 THOUSANDS/ÂΜL (ref 0–0.1)
BASOPHILS NFR BLD AUTO: 1 % (ref 0–1)
BILIRUB DIRECT SERPL-MCNC: 0.13 MG/DL (ref 0–0.2)
BILIRUB SERPL-MCNC: 1.04 MG/DL (ref 0.2–1)
BILIRUB UR QL STRIP: NEGATIVE
BNP SERPL-MCNC: 25 PG/ML (ref 0–100)
BUN SERPL-MCNC: 14 MG/DL (ref 5–25)
CALCIUM SERPL-MCNC: 9.9 MG/DL (ref 8.4–10.2)
CARDIAC TROPONIN I PNL SERPL HS: 3 NG/L
CARDIAC TROPONIN I PNL SERPL HS: 4 NG/L
CARDIAC TROPONIN I PNL SERPL HS: 5 NG/L
CHLORIDE SERPL-SCNC: 103 MMOL/L (ref 96–108)
CHOLEST SERPL-MCNC: 127 MG/DL
CLARITY UR: ABNORMAL
CO2 SERPL-SCNC: 29 MMOL/L (ref 21–32)
COLOR UR: YELLOW
CREAT SERPL-MCNC: 0.71 MG/DL (ref 0.6–1.3)
EOSINOPHIL # BLD AUTO: 0.1 THOUSAND/ÂΜL (ref 0–0.61)
EOSINOPHIL NFR BLD AUTO: 1 % (ref 0–6)
ERYTHROCYTE [DISTWIDTH] IN BLOOD BY AUTOMATED COUNT: 14.5 % (ref 11.6–15.1)
GFR SERPL CREATININE-BSD FRML MDRD: 80 ML/MIN/1.73SQ M
GLUCOSE SERPL-MCNC: 127 MG/DL (ref 65–140)
GLUCOSE SERPL-MCNC: 139 MG/DL (ref 65–140)
GLUCOSE SERPL-MCNC: 179 MG/DL (ref 65–140)
GLUCOSE UR STRIP-MCNC: NEGATIVE MG/DL
HCT VFR BLD AUTO: 46.7 % (ref 34.8–46.1)
HDLC SERPL-MCNC: 54 MG/DL
HGB BLD-MCNC: 16 G/DL (ref 11.5–15.4)
HGB UR QL STRIP.AUTO: NEGATIVE
IMM GRANULOCYTES # BLD AUTO: 0.04 THOUSAND/UL (ref 0–0.2)
IMM GRANULOCYTES NFR BLD AUTO: 0 % (ref 0–2)
INR PPP: 1.11 (ref 0.84–1.19)
KETONES UR STRIP-MCNC: NEGATIVE MG/DL
LACTATE SERPL-SCNC: 1.2 MMOL/L (ref 0.5–2)
LDLC SERPL CALC-MCNC: 50 MG/DL (ref 0–100)
LEUKOCYTE ESTERASE UR QL STRIP: ABNORMAL
LYMPHOCYTES # BLD AUTO: 1.99 THOUSANDS/ÂΜL (ref 0.6–4.47)
LYMPHOCYTES NFR BLD AUTO: 18 % (ref 14–44)
MAGNESIUM SERPL-MCNC: 1.9 MG/DL (ref 1.9–2.7)
MCH RBC QN AUTO: 32.1 PG (ref 26.8–34.3)
MCHC RBC AUTO-ENTMCNC: 34.3 G/DL (ref 31.4–37.4)
MCV RBC AUTO: 94 FL (ref 82–98)
MONOCYTES # BLD AUTO: 1.19 THOUSAND/ÂΜL (ref 0.17–1.22)
MONOCYTES NFR BLD AUTO: 11 % (ref 4–12)
MUCOUS THREADS UR QL AUTO: ABNORMAL
NEUTROPHILS # BLD AUTO: 7.88 THOUSANDS/ÂΜL (ref 1.85–7.62)
NEUTS SEG NFR BLD AUTO: 69 % (ref 43–75)
NITRITE UR QL STRIP: NEGATIVE
NON-SQ EPI CELLS URNS QL MICRO: ABNORMAL /HPF
NRBC BLD AUTO-RTO: 0 /100 WBCS
P AXIS: 62 DEGREES
P AXIS: 75 DEGREES
PH UR STRIP.AUTO: 6 [PH]
PLATELET # BLD AUTO: 378 THOUSANDS/UL (ref 149–390)
PMV BLD AUTO: 9.6 FL (ref 8.9–12.7)
POTASSIUM SERPL-SCNC: 4.1 MMOL/L (ref 3.5–5.3)
PR INTERVAL: 176 MS
PR INTERVAL: 192 MS
PROCALCITONIN SERPL-MCNC: 0.11 NG/ML
PROT SERPL-MCNC: 6.9 G/DL (ref 6.4–8.4)
PROT UR STRIP-MCNC: ABNORMAL MG/DL
PROTHROMBIN TIME: 14.5 SECONDS (ref 11.6–14.5)
QRS AXIS: 33 DEGREES
QRS AXIS: 42 DEGREES
QRSD INTERVAL: 54 MS
QRSD INTERVAL: 54 MS
QT INTERVAL: 334 MS
QT INTERVAL: 348 MS
QTC INTERVAL: 415 MS
QTC INTERVAL: 432 MS
RBC # BLD AUTO: 4.99 MILLION/UL (ref 3.81–5.12)
RBC #/AREA URNS AUTO: ABNORMAL /HPF
SODIUM SERPL-SCNC: 136 MMOL/L (ref 135–147)
SP GR UR STRIP.AUTO: 1.02 (ref 1–1.03)
T WAVE AXIS: 57 DEGREES
T WAVE AXIS: 73 DEGREES
TRIGL SERPL-MCNC: 115 MG/DL
TSH SERPL DL<=0.05 MIU/L-ACNC: 3.24 UIU/ML (ref 0.45–4.5)
UROBILINOGEN UR QL STRIP.AUTO: 0.2 E.U./DL
VENTRICULAR RATE: 93 BPM
VENTRICULAR RATE: 93 BPM
WBC # BLD AUTO: 11.3 THOUSAND/UL (ref 4.31–10.16)
WBC #/AREA URNS AUTO: ABNORMAL /HPF

## 2024-04-20 PROCEDURE — 80076 HEPATIC FUNCTION PANEL: CPT | Performed by: EMERGENCY MEDICINE

## 2024-04-20 PROCEDURE — 80048 BASIC METABOLIC PNL TOTAL CA: CPT | Performed by: EMERGENCY MEDICINE

## 2024-04-20 PROCEDURE — 85730 THROMBOPLASTIN TIME PARTIAL: CPT | Performed by: EMERGENCY MEDICINE

## 2024-04-20 PROCEDURE — 99223 1ST HOSP IP/OBS HIGH 75: CPT | Performed by: HOSPITALIST

## 2024-04-20 PROCEDURE — 85025 COMPLETE CBC W/AUTO DIFF WBC: CPT | Performed by: EMERGENCY MEDICINE

## 2024-04-20 PROCEDURE — 87077 CULTURE AEROBIC IDENTIFY: CPT | Performed by: PHYSICIAN ASSISTANT

## 2024-04-20 PROCEDURE — 83605 ASSAY OF LACTIC ACID: CPT | Performed by: EMERGENCY MEDICINE

## 2024-04-20 PROCEDURE — 96374 THER/PROPH/DIAG INJ IV PUSH: CPT

## 2024-04-20 PROCEDURE — 81001 URINALYSIS AUTO W/SCOPE: CPT | Performed by: EMERGENCY MEDICINE

## 2024-04-20 PROCEDURE — 83880 ASSAY OF NATRIURETIC PEPTIDE: CPT | Performed by: EMERGENCY MEDICINE

## 2024-04-20 PROCEDURE — 80061 LIPID PANEL: CPT | Performed by: PHYSICIAN ASSISTANT

## 2024-04-20 PROCEDURE — 87086 URINE CULTURE/COLONY COUNT: CPT | Performed by: EMERGENCY MEDICINE

## 2024-04-20 PROCEDURE — 84145 PROCALCITONIN (PCT): CPT | Performed by: EMERGENCY MEDICINE

## 2024-04-20 PROCEDURE — 84484 ASSAY OF TROPONIN QUANT: CPT | Performed by: PHYSICIAN ASSISTANT

## 2024-04-20 PROCEDURE — 85610 PROTHROMBIN TIME: CPT | Performed by: EMERGENCY MEDICINE

## 2024-04-20 PROCEDURE — 83735 ASSAY OF MAGNESIUM: CPT | Performed by: EMERGENCY MEDICINE

## 2024-04-20 PROCEDURE — 87086 URINE CULTURE/COLONY COUNT: CPT | Performed by: PHYSICIAN ASSISTANT

## 2024-04-20 PROCEDURE — 99223 1ST HOSP IP/OBS HIGH 75: CPT | Performed by: STUDENT IN AN ORGANIZED HEALTH CARE EDUCATION/TRAINING PROGRAM

## 2024-04-20 PROCEDURE — 93010 ELECTROCARDIOGRAM REPORT: CPT | Performed by: INTERNAL MEDICINE

## 2024-04-20 PROCEDURE — 93005 ELECTROCARDIOGRAM TRACING: CPT

## 2024-04-20 PROCEDURE — 84484 ASSAY OF TROPONIN QUANT: CPT | Performed by: EMERGENCY MEDICINE

## 2024-04-20 PROCEDURE — 87186 SC STD MICRODIL/AGAR DIL: CPT | Performed by: EMERGENCY MEDICINE

## 2024-04-20 PROCEDURE — 84443 ASSAY THYROID STIM HORMONE: CPT | Performed by: EMERGENCY MEDICINE

## 2024-04-20 PROCEDURE — 71045 X-RAY EXAM CHEST 1 VIEW: CPT

## 2024-04-20 PROCEDURE — 70450 CT HEAD/BRAIN W/O DYE: CPT

## 2024-04-20 PROCEDURE — 96361 HYDRATE IV INFUSION ADD-ON: CPT

## 2024-04-20 PROCEDURE — 99285 EMERGENCY DEPT VISIT HI MDM: CPT

## 2024-04-20 PROCEDURE — 82948 REAGENT STRIP/BLOOD GLUCOSE: CPT

## 2024-04-20 PROCEDURE — 99285 EMERGENCY DEPT VISIT HI MDM: CPT | Performed by: EMERGENCY MEDICINE

## 2024-04-20 PROCEDURE — 87040 BLOOD CULTURE FOR BACTERIA: CPT | Performed by: EMERGENCY MEDICINE

## 2024-04-20 PROCEDURE — 87186 SC STD MICRODIL/AGAR DIL: CPT | Performed by: PHYSICIAN ASSISTANT

## 2024-04-20 PROCEDURE — 36415 COLL VENOUS BLD VENIPUNCTURE: CPT | Performed by: EMERGENCY MEDICINE

## 2024-04-20 RX ORDER — ACETAMINOPHEN 325 MG/1
650 TABLET ORAL EVERY 8 HOURS PRN
Status: DISCONTINUED | OUTPATIENT
Start: 2024-04-20 | End: 2024-04-24 | Stop reason: HOSPADM

## 2024-04-20 RX ORDER — INSULIN LISPRO 100 [IU]/ML
1-5 INJECTION, SOLUTION INTRAVENOUS; SUBCUTANEOUS
Status: DISCONTINUED | OUTPATIENT
Start: 2024-04-20 | End: 2024-04-24

## 2024-04-20 RX ORDER — GLYCOPYRROLATE 1 MG/1
1 TABLET ORAL 2 TIMES DAILY
Status: DISCONTINUED | OUTPATIENT
Start: 2024-04-20 | End: 2024-04-24 | Stop reason: HOSPADM

## 2024-04-20 RX ORDER — ENOXAPARIN SODIUM 100 MG/ML
40 INJECTION SUBCUTANEOUS DAILY
Status: DISCONTINUED | OUTPATIENT
Start: 2024-04-20 | End: 2024-04-24

## 2024-04-20 RX ORDER — CILOSTAZOL 50 MG/1
100 TABLET ORAL
Status: DISCONTINUED | OUTPATIENT
Start: 2024-04-20 | End: 2024-04-24 | Stop reason: HOSPADM

## 2024-04-20 RX ORDER — CEFTRIAXONE 1 G/50ML
1000 INJECTION, SOLUTION INTRAVENOUS ONCE
Status: COMPLETED | OUTPATIENT
Start: 2024-04-20 | End: 2024-04-20

## 2024-04-20 RX ORDER — PRAVASTATIN SODIUM 40 MG
40 TABLET ORAL
Status: DISCONTINUED | OUTPATIENT
Start: 2024-04-20 | End: 2024-04-24 | Stop reason: HOSPADM

## 2024-04-20 RX ORDER — LEVOTHYROXINE SODIUM 0.07 MG/1
75 TABLET ORAL
Status: DISCONTINUED | OUTPATIENT
Start: 2024-04-21 | End: 2024-04-24 | Stop reason: HOSPADM

## 2024-04-20 RX ORDER — CEFTRIAXONE 1 G/50ML
1000 INJECTION, SOLUTION INTRAVENOUS EVERY 24 HOURS
Qty: 100 ML | Refills: 0 | Status: COMPLETED | OUTPATIENT
Start: 2024-04-21 | End: 2024-04-22

## 2024-04-20 RX ORDER — FAMOTIDINE 20 MG/1
20 TABLET, FILM COATED ORAL DAILY
Status: DISCONTINUED | OUTPATIENT
Start: 2024-04-20 | End: 2024-04-24 | Stop reason: HOSPADM

## 2024-04-20 RX ADMIN — GLYCOPYRROLATE 1 MG: 1 TABLET ORAL at 17:19

## 2024-04-20 RX ADMIN — CILOSTAZOL 100 MG: 50 TABLET ORAL at 17:18

## 2024-04-20 RX ADMIN — CARBIDOPA AND LEVODOPA 1 TABLET: 25; 100 TABLET ORAL at 17:18

## 2024-04-20 RX ADMIN — SODIUM CHLORIDE 500 ML: 0.9 INJECTION, SOLUTION INTRAVENOUS at 09:46

## 2024-04-20 RX ADMIN — PRAVASTATIN SODIUM 40 MG: 40 TABLET ORAL at 17:19

## 2024-04-20 RX ADMIN — CARBIDOPA AND LEVODOPA 0.5 TABLET: 25; 100 TABLET ORAL at 21:31

## 2024-04-20 RX ADMIN — FAMOTIDINE 20 MG: 20 TABLET, FILM COATED ORAL at 13:59

## 2024-04-20 RX ADMIN — CARBIDOPA AND LEVODOPA 1 TABLET: 25; 100 TABLET ORAL at 13:59

## 2024-04-20 RX ADMIN — CEFTRIAXONE 1000 MG: 1 INJECTION, SOLUTION INTRAVENOUS at 12:28

## 2024-04-20 RX ADMIN — INSULIN LISPRO 1 UNITS: 100 INJECTION, SOLUTION INTRAVENOUS; SUBCUTANEOUS at 21:30

## 2024-04-20 RX ADMIN — ENOXAPARIN SODIUM 40 MG: 40 INJECTION SUBCUTANEOUS at 13:59

## 2024-04-20 NOTE — ASSESSMENT & PLAN NOTE
She is nonverbal at baseline   Primarily wheelchair/bed bound but can sit to stand for dressing and bathing    is her caregiver

## 2024-04-20 NOTE — CONSULTS
Consultation - Neurology   Anjali Shipley 79 y.o. female MRN: [id number]  Unit/Bed#: E4 -01 Encounter: [id number]      Assessment/Plan   #Weakness  - Anjali Shipley is an 79 year old woman with PMHx as noted below who presented to the hospital for evaluation of right-sided weakness. Symptom onset at least 2 days ago. Noted to have possible UTI on urine studies, pending urine culture. Being empirically treated with CTX. CT head was completed which did not show any obvious acute infarct. Neurology was consulted for evaluation given c/f possible stroke. Difficult to say if symptoms are truly secondary to stroke. On my discussion with him, appears that she is currently not that significantly weaker than baseline. Will ask PT to evaluate her to see if she is able to stand as at baseline or if she still continues to have difficulty. Presentation could also be related to UTI. Unclear if we will be able to obtain an MRI brain, which I discussed with , for further evaluation to rule out stroke. He requested that we look to see if it is possible. If unable to have an MRI brain performed, can check a CTA H/N 24 hours from the initial CT scan for re-assessment. Will also re-evaluate her to see if she is improved after empiric treatment with CTX. She has otherwise remained at baseline per . Otherwise, we discussed her medication regimen. She has remained on 2 antiplatelet agents reportedly for stroke prevention sine 2020. Discussed with him that unless there is another indication for DAPT, we typically do not continue DAPT for a prolonged period of time, and if there was not a physician who specifically recommended DAPT, she should be on monotherapy in light of bleeding risk. She has been on aspirin and Pletal for the past month (was on Plavix and Pletal before this according to him). As she had a stroke on aspirin in 2020, favor stopping aspirin and continuing cilostazol for now as we continue to  "investigate. Discussed with him that we may switch to Plavix vs Brilinta if there is still a concern for stroke, although ideally if she did not have any obvious known stroke while on Plavix (as far as we know she has not), would just switch her to Plavix monotherapy. He expressed understanding, and would like to continue cilostazol for now. Will discuss with him again tomorrow, he is planning on visiting.    Plan:  - MRI brain w/o contrast. If unable to be done (suspect that it won't be able to get done given her DBS), can check a CTA H/N w/ and w/o contrast 24 hours from her CT head for interval evaluation.  - Permissive HTN up to 180 systolic for now (more than two days out from symptom onset)  - Follow up urine culture  - Telemetry while inpatient  - Unless there is an alternate need for DAPT, would stop aspirin and continue cilostazol for now. Will revisit switching to Plavix monotherapy with  depending on rest of workup.   - Will revisit rest of stroke workup depending on clinical course  - Continue home Sinemet   - Rest of care per primary    Recommendations for outpatient neurological follow up have yet to be determined.    History of Present Illness   Reason for Consult / Principal Problem: weakness  Hx and PE limited by: pt's mental status (poor mental status baseline)  HPI: Anjali Shipley is an 79 y.o. woman with PMHx including Parkinson's (on Sinemet), stroke with residual left-sided weakness, T2DM, HLD, hypothyroidism who presented to the hospital for evaluation of right-sided weakness. Pt at baseline is practically non-verbal (stated \"okay\" when I asked her how she was, and said \"no\" when asked if anything was bothering her, otherwise did not answer questions or speak). Spoke with  over the phone and he confirmed such; therefore, history limited from pt.  states that pt is bedbound but is able to remain standing on her feet when he attempts to bathe her or change her clothes. " Starting about 2 days ago, he began to have increasing difficulty taking care of her as she was unable to stay on her feet. He feels that the right side of her body has been weaker. When discussing my examination today with him, he felt that LUE and LLE strength are at baseline. He feels that the RUE and RLE strength is somewhat worse than her baseline. She is typically able to provide good  strength with the right hand but does have some mild chronic weakness in the RUE otherwise. He otherwise mentioned that yesterday, she had one episode where it appeared she was trying to reach for something in the air, but she otherwise was at her baseline. He has not noticed much else out of the ordinary, but was hoping to investigate why she is now unable to remain standing with assistance. Confirmed with him that she has been taking both aspirin and Pletal. She was on Plavix and Pletal based on recommendations from 2020 after her stroke, but about one month ago, pt's PCP switched the Plavix to aspirin. He states that she has been taking 2 antiplatelet medications since 2020, presumably for stroke. She has since followed up with her movement disorder specialist but  does not recall following up for her stroke. Otherwise, she takes Sinemet 1 tab 3 times daily and 1/2 tab before bed (as currently ordered).     Inpatient consult to Neurology  Consult performed by: Homer Delacruz MD  Consult ordered by: Juan Vo MD        Review of Systems  Difficult to assess due to mental status (baseline poor mental status)    Historical Information   Past Medical History:   Diagnosis Date    Abdominal pain, suprapubic 07/03/2014    Resolved:  November 22, 2016    Abscess of skin and subcutaneous tissue 11/21/2016    Resolved:  February 28, 2017    Acute kidney failure, unspecified (HCC)     Anxiety     Burning with urination 12/14/2016    Resolved:  February 28, 2017    Conjunctivitis 06/10/2013    Depression      Depression     Diabetes mellitus (HCC) 02/02/2020    Dysuria 06/06/2016    Resolved:  February 28 2017    Falling 10/18/2012    GERD (gastroesophageal reflux disease)     Groin pain 04/16/2015    Resolved:  February 28, 2017    Hyperlipidemia     Hypothyroid     Influenza 04/05/2013    Leg abrasion 09/22/2016    Resolved:  February 28, 2017    Parkinson's disease     Psychiatric illness     Rectal pain 10/17/2013    Resolved:  February 28, 2017    Squamous cell skin cancer 03/22/2022    Left lateral calf    Thyroid disease      Past Surgical History:   Procedure Laterality Date    APPENDECTOMY      Age 6 or 7    BACK SURGERY      BRAIN SURGERY      CLOSED REDUCTION NASAL FRACTURE      CYSTOSCOPY  01/28/2014    Ivan Siddiqui M.D.  (Diagnostic)    DEEP BRAIN STIMULATOR PLACEMENT      EYE SURGERY      FL MYELOGRAM CERVICAL  3/4/2014    FRACTURE SURGERY      JOINT REPLACEMENT      LAMINECTOMY      Decompress, Facetectomy, Foraminotomy Lumbar Seg    ORIF HIP FRACTURE Left     About 3 years ago    SKIN BIOPSY      TONSILLECTOMY       Social History   Social History     Substance and Sexual Activity   Alcohol Use Yes    Alcohol/week: 1.0 standard drink of alcohol    Types: 1 Glasses of wine per week    Comment: drinks one fourth of a glass of white wine once a week  (Never drank alcohol per [employer])     Social History     Substance and Sexual Activity   Drug Use No     E-Cigarette/Vaping    E-Cigarette Use Never User      E-Cigarette/Vaping Substances    Nicotine No     THC No     CBD No     Flavoring No     Other No      Social History     Tobacco Use   Smoking Status Never   Smokeless Tobacco Never   Tobacco Comments    n/a     Meds/Allergies   current meds:   Current Facility-Administered Medications   Medication Dose Route Frequency    acetaminophen (TYLENOL) tablet 650 mg  650 mg Oral Q8H PRN    [START ON 4/22/2024] aspirin (ECOTRIN LOW STRENGTH) EC tablet 81 mg  81 mg Oral Once per day on Monday Wednesday  Friday    carbidopa-levodopa (SINEMET)  mg per tablet 0.5 tablet  0.5 tablet Oral HS    carbidopa-levodopa (SINEMET)  mg per tablet 1 tablet  1 tablet Oral TID    [START ON 4/21/2024] cefTRIAXone (ROCEPHIN) IVPB (premix in dextrose) 1,000 mg 50 mL  1,000 mg Intravenous Q24H    cilostazol (PLETAL) tablet 100 mg  100 mg Oral BID AC    enoxaparin (LOVENOX) subcutaneous injection 40 mg  40 mg Subcutaneous Daily    famotidine (PEPCID) tablet 20 mg  20 mg Oral Daily    glycopyrrolate (ROBINUL) tablet 1 mg  1 mg Oral BID    insulin lispro (HumALOG/ADMELOG) 100 units/mL subcutaneous injection 1-5 Units  1-5 Units Subcutaneous TID AC    insulin lispro (HumALOG/ADMELOG) 100 units/mL subcutaneous injection 1-5 Units  1-5 Units Subcutaneous HS    [START ON 4/21/2024] levothyroxine tablet 75 mcg  75 mcg Oral Early Morning    pravastatin (PRAVACHOL) tablet 40 mg  40 mg Oral Daily With Dinner    and PTA meds:   Prior to Admission Medications   Prescriptions Last Dose Informant Patient Reported? Taking?   Blood Glucose Monitoring Suppl (Accu-Chek Guide) w/Device KIT 4/20/2024 Spouse/Significant Other No Yes   Sig: Use in the morning Test daily   Calmoseptine 0.44-20.6 % OINT Not Taking  No No   Sig: APPLY TO AFFECTED AREA TWICE A DAY   Patient not taking: Reported on 4/20/2024   Lancets (accu-chek multiclix) lancets 4/20/2024 Spouse/Significant Other No Yes   Sig: Test daily   Multiple Vitamin (MULTIVITAMIN) tablet Not Taking Spouse/Significant Other Yes No   Sig: Take 1 tablet by mouth daily.   Patient not taking: Reported on 4/20/2024   NON FORMULARY Not Taking Spouse/Significant Other Yes No   Sig: Take 1 tablet by mouth 2 (two) times a day Calcium- D3-600mg   Patient not taking: Reported on 4/20/2024   OneTouch Delica Lancets 33G MISC 4/20/2024 Spouse/Significant Other No Yes   Sig: Check blood sugars once daily. Please substitute with appropriate alternative as covered by patient's insurance. Dx: E11.65   Probiotic  Product (PROBIOTIC DAILY PO) Not Taking Spouse/Significant Other Yes No   Sig: Take 1 tablet by mouth daily.   Patient not taking: Reported on 4/20/2024   Vitamin D, Cholecalciferol, 1000 UNITS CAPS Not Taking Spouse/Significant Other Yes No   Sig: Take 1 tablet by mouth daily.   Patient not taking: Reported on 4/20/2024   acetaminophen (TYLENOL) 500 mg tablet Unknown Spouse/Significant Other Yes No   Sig: Take 500 mg by mouth One throughout the night as needed   ascorbic acid (VITAMIN C) 500 mg tablet Not Taking Spouse/Significant Other No No   Sig: Take 1 tablet (500 mg total) by mouth daily   Patient not taking: Reported on 4/20/2024   aspirin (ECOTRIN LOW STRENGTH) 81 mg EC tablet 4/20/2024 Spouse/Significant Other Yes Yes   Sig: Take 81 mg by mouth 3 (three) times a week   carbidopa-levodopa (SINEMET)  mg per tablet 4/20/2024 Spouse/Significant Other No Yes   Sig: Take 1 tablet by mouth 3 (three) times a day   cilostazol (PLETAL) 100 mg tablet 4/20/2024  No Yes   Sig: TAKE 1 TABLET (100 MG TOTAL) BY MOUTH 2 (TWO) TIMES A DAY BEFORE MEALS   glucose blood (Accu-Chek Guide) test strip 4/20/2024 Spouse/Significant Other No Yes   Sig: Test Daily   glycopyrrolate (ROBINUL) 1 mg tablet 4/20/2024  No Yes   Sig: TAKE 1 TABLET (1 MG TOTAL) BY MOUTH IN THE MORNING AND IN THE EVENING   levothyroxine 75 mcg tablet 4/20/2024  No Yes   Sig: TAKE 1 TABLET EVERY DAY   metFORMIN (GLUCOPHAGE) 500 mg tablet 4/20/2024  No Yes   Sig: TAKE 1 TABLET BY MOUTH DAILY WITH SUPPER FOR 3 DAYS, THEN 1 TABLET TWICE DAILY WITH BREAKFAST & SUPPER THEREAFTER   mupirocin (BACTROBAN) 2 % ointment Not Taking  No No   Sig: Apply topically 2 (two) times a day   Patient not taking: Reported on 4/20/2024   nitrofurantoin (MACROBID) 100 mg capsule Not Taking  No No   Sig: Take 1 tablet twice daily with food   Patient not taking: Reported on 4/20/2024   potassium chloride (KLOR-CON) 8 MEQ tablet 4/19/2024  No Yes   Sig: TAKE 1 TABLET BY MOUTH EVERY  "DAY   ranitidine (ZANTAC) 150 mg tablet Not Taking Spouse/Significant Other Yes No   Sig: Take 150 mg by mouth daily.   Patient not taking: Reported on 4/20/2024   simvastatin (ZOCOR) 20 mg tablet 4/19/2024 Spouse/Significant Other Yes Yes   Sig: Take 20 mg by mouth daily at bedtime      Facility-Administered Medications: None     Allergies   Allergen Reactions    Gabapentin      Other reaction(s): Hypotension    Bupropion GI Intolerance and Other (See Comments)     Other reaction(s): Tremor    Cephalexin     Duloxetine      Other reaction(s): Other (see comments)  unkown  unknown      Erythromycin GI Intolerance    Methadone Hallucinations    Mirtazapine GI Intolerance    Paroxetine GI Intolerance    Sulfa Antibiotics     Tricyclic Antidepressants      Other reaction(s): Other    Acetazolamide Rash    Levofloxacin Anxiety and Other (See Comments)     Objective   Vitals:Blood pressure 122/58, pulse 82, temperature 98 °F (36.7 °C), temperature source Temporal, resp. rate 18, weight 66.5 kg (146 lb 9.7 oz), SpO2 94%, not currently breastfeeding.,Body mass index is 25.97 kg/m².    Intake/Output Summary (Last 24 hours) at 4/20/2024 1718  Last data filed at 4/20/2024 1255  Gross per 24 hour   Intake 550 ml   Output --   Net 550 ml     Invasive Devices:   Invasive Devices       Peripheral Intravenous Line  Duration             Peripheral IV 04/20/24 Left;Ventral (anterior) Forearm <1 day    Peripheral IV 04/20/24 Right Antecubital <1 day                  Physical Exam  Gen: no acute distress   CV: regular rate   Pulm: normal respiratory effort  Abd: soft  Ext: mild edema    Mental status: eyes closed (typical baseline for pt, rarely opens eyes per ) but did open eyes briefly to command, forcibly closes eyes when attempting to keep them open to assess pupillary reaction to light, only answered two questions with \"okay\" (when asked how she was) and \"no\" (when asked if anything was bothering her), does not answer any " other questions, does follow some commands, maybe attended to me once but does not maintain eye opening long enough to track  Cranial nerves: upon brief inspection in light of pt participation: no gross deficits noted with EOM and pupils appear equal and reactive b/l, no obvious nystagmus, does not participate with visual field testing, face grossly symmetric, does not open mouth or protrude tongue when asked  Motor:    LUE about 2/5 proximally and 1/5 distally (hand)  LLE 2/5  RUE 3/5 (able to lift up off of the bed when asked but does not provide resistance)  RLE 4/5 proximally (hip and knee, does not participate with ankle strength testing)  Sensation: Does not participate but grimaces to noxious stimulus  Coordination: Does not participate with upper extremities. Attempts to perform heel to shin with the RLE when asked but does not get the foot to the other leg.   Gait: deferred    Lab Results: CBC:   Results from last 7 days   Lab Units 04/20/24  0916   WBC Thousand/uL 11.30*   RBC Million/uL 4.99   HEMOGLOBIN g/dL 16.0*   HEMATOCRIT % 46.7*   MCV fL 94   PLATELETS Thousands/uL 378   , BMP/CMP:   Results from last 7 days   Lab Units 04/20/24  0916   SODIUM mmol/L 136   POTASSIUM mmol/L 4.1   CHLORIDE mmol/L 103   CO2 mmol/L 29   BUN mg/dL 14   CREATININE mg/dL 0.71   CALCIUM mg/dL 9.9   AST U/L 44*   ALT U/L 27   ALK PHOS U/L 73   EGFR ml/min/1.73sq m 80   , HgBA1C:   , TSH:   Results from last 7 days   Lab Units 04/20/24  0916   TSH 3RD GENERATON uIU/mL 3.239   , Coagulation:   Results from last 7 days   Lab Units 04/20/24  0916   INR  1.11   , Lipid Profile:   Results from last 7 days   Lab Units 04/20/24  0916   HDL mg/dL 54   LDL CALC mg/dL 50   TRIGLYCERIDES mg/dL 115   , Urinalysis:   Results from last 7 days   Lab Units 04/20/24  1054   COLOR UA  Yellow   CLARITY UA  Slightly Cloudy   SPEC GRAV UA  1.020   PH UA  6.0   LEUKOCYTES UA  Trace*   NITRITE UA  Negative   GLUCOSE UA mg/dl Negative   KETONES UA  mg/dl Negative   BILIRUBIN UA  Negative   BLOOD UA  Negative     Imaging Studies: I have personally reviewed pertinent reports.   and I have personally reviewed pertinent films in PACS    Code Status: Level 3 - DNAR and DNI    Counseling / Coordination of Care  Total time spent today 30 minutes. Greater than 50% of total time was spent with the patient and / or family counseling and / or coordination of care. A description of the counseling / coordination of care:

## 2024-04-20 NOTE — ASSESSMENT & PLAN NOTE
Patient has progressive Parkinson's who presents with one day of worsening weakness specifically right leg per  where she can no longer stand   She is nonverbal history obtained by  who is her caregiver   Usually can stand and hold herself up to dress, bathe but since yesterday legs are weak specifically right leg, left side is chronically weak from prior stroke   Difficult to assess neuro exam   CT head no acute intracranial abnormality   Will order MRI brain to rule out stroke   Will have neurology evaluate   UA with trace leuks, innummerable bacteria and 20-30 WBC, lower suspicion for UTI - given IV ceftriaxone in ED will follow up urine culture   Neuo checks   PT and OT   Purred diet, thin liquids, speech consulted

## 2024-04-20 NOTE — ASSESSMENT & PLAN NOTE
Present for 3 weeks per    Off load pressure, q2 repo   Wound care consulted   Will order specialty bed

## 2024-04-20 NOTE — ED PROVIDER NOTES
"History  Chief Complaint   Patient presents with    Fatigue     Pts  called ems because he felt that patient, \"wasn't acting like herself.\" EMS not able to elaborate on what he meant. Pt has history of multiple strokes, parkinsons, and is non verbal.      80 YO female presents with increased weakness. Patient is unable to provide history as she is non-verbal and has decreased responsiveness at baseline.  is able to provide history. Patient has Parkinson, she has Hx of a stroke, she is generally bedbound at baseline, is able to assist with transferring.  states she has had weakness which appeared to be predominantly in the Right side that began 2 days prior. She was leaning. He states that, this morning, he was unable to get her out of bed. He states that, mentally, she appears to be about baseline, she typically sleeps ~90% of the day and often won't open her eyes when she is awake.       History provided by:  Medical records and spouse  History limited by:  Patient nonverbal   used: No        Prior to Admission Medications   Prescriptions Last Dose Informant Patient Reported? Taking?   Blood Glucose Monitoring Suppl (Accu-Chek Guide) w/Device KIT 4/20/2024 Spouse/Significant Other No Yes   Sig: Use in the morning Test daily   Calmoseptine 0.44-20.6 % OINT Not Taking  No No   Sig: APPLY TO AFFECTED AREA TWICE A DAY   Patient not taking: Reported on 4/20/2024   Lancets (accu-chek multiclix) lancets 4/20/2024 Spouse/Significant Other No Yes   Sig: Test daily   Multiple Vitamin (MULTIVITAMIN) tablet Not Taking Spouse/Significant Other Yes No   Sig: Take 1 tablet by mouth daily.   Patient not taking: Reported on 4/20/2024   NON FORMULARY Not Taking Spouse/Significant Other Yes No   Sig: Take 1 tablet by mouth 2 (two) times a day Calcium- D3-600mg   Patient not taking: Reported on 4/20/2024   OneTouch Delica Lancets 33G MISC 4/20/2024 Spouse/Significant Other No Yes   Sig: Check " blood sugars once daily. Please substitute with appropriate alternative as covered by patient's insurance. Dx: E11.65   Probiotic Product (PROBIOTIC DAILY PO) Not Taking Spouse/Significant Other Yes No   Sig: Take 1 tablet by mouth daily.   Patient not taking: Reported on 4/20/2024   Vitamin D, Cholecalciferol, 1000 UNITS CAPS Not Taking Spouse/Significant Other Yes No   Sig: Take 1 tablet by mouth daily.   Patient not taking: Reported on 4/20/2024   acetaminophen (TYLENOL) 500 mg tablet Unknown Spouse/Significant Other Yes No   Sig: Take 500 mg by mouth One throughout the night as needed   ascorbic acid (VITAMIN C) 500 mg tablet Not Taking Spouse/Significant Other No No   Sig: Take 1 tablet (500 mg total) by mouth daily   Patient not taking: Reported on 4/20/2024   aspirin (ECOTRIN LOW STRENGTH) 81 mg EC tablet 4/20/2024 Spouse/Significant Other Yes Yes   Sig: Take 81 mg by mouth 3 (three) times a week   carbidopa-levodopa (SINEMET)  mg per tablet 4/20/2024 Spouse/Significant Other No Yes   Sig: Take 1 tablet by mouth 3 (three) times a day   cilostazol (PLETAL) 100 mg tablet 4/20/2024  No Yes   Sig: TAKE 1 TABLET (100 MG TOTAL) BY MOUTH 2 (TWO) TIMES A DAY BEFORE MEALS   glucose blood (Accu-Chek Guide) test strip 4/20/2024 Spouse/Significant Other No Yes   Sig: Test Daily   glycopyrrolate (ROBINUL) 1 mg tablet 4/20/2024  No Yes   Sig: TAKE 1 TABLET (1 MG TOTAL) BY MOUTH IN THE MORNING AND IN THE EVENING   levothyroxine 75 mcg tablet 4/20/2024  No Yes   Sig: TAKE 1 TABLET EVERY DAY   metFORMIN (GLUCOPHAGE) 500 mg tablet 4/20/2024  No Yes   Sig: TAKE 1 TABLET BY MOUTH DAILY WITH SUPPER FOR 3 DAYS, THEN 1 TABLET TWICE DAILY WITH BREAKFAST & SUPPER THEREAFTER   mupirocin (BACTROBAN) 2 % ointment Not Taking  No No   Sig: Apply topically 2 (two) times a day   Patient not taking: Reported on 4/20/2024   nitrofurantoin (MACROBID) 100 mg capsule Not Taking  No No   Sig: Take 1 tablet twice daily with food   Patient  not taking: Reported on 4/20/2024   potassium chloride (KLOR-CON) 8 MEQ tablet 4/19/2024  No Yes   Sig: TAKE 1 TABLET BY MOUTH EVERY DAY   ranitidine (ZANTAC) 150 mg tablet Not Taking Spouse/Significant Other Yes No   Sig: Take 150 mg by mouth daily.   Patient not taking: Reported on 4/20/2024   simvastatin (ZOCOR) 20 mg tablet 4/19/2024 Spouse/Significant Other Yes Yes   Sig: Take 20 mg by mouth daily at bedtime      Facility-Administered Medications: None       Past Medical History:   Diagnosis Date    Abdominal pain, suprapubic 07/03/2014    Resolved:  November 22, 2016    Abscess of skin and subcutaneous tissue 11/21/2016    Resolved:  February 28, 2017    Acute kidney failure, unspecified (HCC)     Anxiety     Burning with urination 12/14/2016    Resolved:  February 28, 2017    Conjunctivitis 06/10/2013    Depression     Depression     Diabetes mellitus (HCC) 02/02/2020    Dysuria 06/06/2016    Resolved:  February 28 2017    Falling 10/18/2012    GERD (gastroesophageal reflux disease)     Groin pain 04/16/2015    Resolved:  February 28, 2017    Hyperlipidemia     Hypothyroid     Influenza 04/05/2013    Leg abrasion 09/22/2016    Resolved:  February 28, 2017    Parkinson's disease     Psychiatric illness     Rectal pain 10/17/2013    Resolved:  February 28, 2017    Squamous cell skin cancer 03/22/2022    Left lateral calf    Thyroid disease        Past Surgical History:   Procedure Laterality Date    APPENDECTOMY      Age 6 or 7    BACK SURGERY      BRAIN SURGERY      CLOSED REDUCTION NASAL FRACTURE      CYSTOSCOPY  01/28/2014    Ivan Siddiqui M.D.  (Diagnostic)    DEEP BRAIN STIMULATOR PLACEMENT      EYE SURGERY      FL MYELOGRAM CERVICAL  3/4/2014    FRACTURE SURGERY      JOINT REPLACEMENT      LAMINECTOMY      Decompress, Facetectomy, Foraminotomy Lumbar Seg    ORIF HIP FRACTURE Left     About 3 years ago    SKIN BIOPSY      TONSILLECTOMY         Family History   Problem Relation Age of Onset    Heart  attack Mother         Acute Myocardial Infarction (MI)    Diabetes Mother         Diabetes Mellitus    Stroke Father         Syndrome    No Known Problems Brother     No Known Problems Maternal Aunt     No Known Problems Paternal Aunt     No Known Problems Maternal Uncle     No Known Problems Paternal Uncle     No Known Problems Maternal Grandfather     No Known Problems Maternal Grandmother     No Known Problems Paternal Grandfather     No Known Problems Paternal Grandmother     No Known Problems Cousin     Heart disease Family     Hypertension Family     Thyroid disease Family     ADD / ADHD Neg Hx     Alcohol abuse Neg Hx     Anxiety disorder Neg Hx     Bipolar disorder Neg Hx     Dementia Neg Hx     Depression Neg Hx     Drug abuse Neg Hx     OCD Neg Hx     Paranoid behavior Neg Hx     Schizophrenia Neg Hx     Seizures Neg Hx     Self-Injury Neg Hx     Suicide Attempts Neg Hx      I have reviewed and agree with the history as documented.    E-Cigarette/Vaping    E-Cigarette Use Never User      E-Cigarette/Vaping Substances    Nicotine No     THC No     CBD No     Flavoring No     Other No      Social History     Tobacco Use    Smoking status: Never    Smokeless tobacco: Never    Tobacco comments:     n/a   Vaping Use    Vaping status: Never Used   Substance Use Topics    Alcohol use: Yes     Alcohol/week: 1.0 standard drink of alcohol     Types: 1 Glasses of wine per week     Comment: drinks one fourth of a glass of white wine once a week  (Never drank alcohol per [employer])    Drug use: No       Review of Systems   Unable to perform ROS: Patient nonverbal       Physical Exam  Physical Exam  Vitals and nursing note reviewed.   Constitutional:       Appearance: Normal appearance.   HENT:      Head: Normocephalic and atraumatic.   Eyes:      Extraocular Movements: Extraocular movements intact.      Conjunctiva/sclera: Conjunctivae normal.   Cardiovascular:      Rate and Rhythm: Normal rate.   Pulmonary:       Effort: Pulmonary effort is normal.   Abdominal:      General: There is no distension.   Musculoskeletal:         General: Normal range of motion.      Cervical back: Normal range of motion.   Skin:     Findings: No rash.   Neurological:      General: No focal deficit present.      Mental Status: She is alert. Mental status is at baseline.      GCS: GCS eye subscore is 2. GCS verbal subscore is 1. GCS motor subscore is 4.      Cranial Nerves: No cranial nerve deficit.      Comments: Able to follow very simple commands, no focal deficit.   Psychiatric:         Mood and Affect: Mood normal.         Vital Signs  ED Triage Vitals [04/20/24 0829]   Temperature Pulse Respirations Blood Pressure SpO2   98.9 °F (37.2 °C) 93 18 135/59 94 %      Temp Source Heart Rate Source Patient Position - Orthostatic VS BP Location FiO2 (%)   Oral Monitor Sitting Right arm --      Pain Score       --           Vitals:    04/20/24 1100 04/20/24 1300 04/20/24 1330 04/20/24 1549   BP: 134/61 108/61 123/67 122/58   Pulse: 92 85 88 82   Patient Position - Orthostatic VS: Lying Lying Lying Lying         Visual Acuity  Visual Acuity      Flowsheet Row Most Recent Value   R Pupil Size (mm) 3            ED Medications  Medications   cefTRIAXone (ROCEPHIN) IVPB (premix in dextrose) 1,000 mg 50 mL (has no administration in time range)   acetaminophen (TYLENOL) tablet 650 mg (has no administration in time range)   aspirin (ECOTRIN LOW STRENGTH) EC tablet 81 mg (has no administration in time range)   carbidopa-levodopa (SINEMET)  mg per tablet 1 tablet (1 tablet Oral Given 4/20/24 1359)   carbidopa-levodopa (SINEMET)  mg per tablet 0.5 tablet (has no administration in time range)   cilostazol (PLETAL) tablet 100 mg (has no administration in time range)   glycopyrrolate (ROBINUL) tablet 1 mg (has no administration in time range)   levothyroxine tablet 75 mcg (has no administration in time range)   famotidine (PEPCID) tablet 20 mg (20 mg  Oral Given 4/20/24 1359)   pravastatin (PRAVACHOL) tablet 40 mg (has no administration in time range)   enoxaparin (LOVENOX) subcutaneous injection 40 mg (40 mg Subcutaneous Given 4/20/24 1359)   insulin lispro (HumALOG/ADMELOG) 100 units/mL subcutaneous injection 1-5 Units ( Subcutaneous Not Given 4/20/24 1612)   insulin lispro (HumALOG/ADMELOG) 100 units/mL subcutaneous injection 1-5 Units (has no administration in time range)   sodium chloride 0.9 % bolus 500 mL (0 mL Intravenous Stopped 4/20/24 1043)   cefTRIAXone (ROCEPHIN) IVPB (premix in dextrose) 1,000 mg 50 mL (0 mg Intravenous Stopped 4/20/24 1255)       Diagnostic Studies  Results Reviewed       Procedure Component Value Units Date/Time    Blood culture #2 [[id number]] Collected: 04/20/24 0927    Lab Status: Preliminary result Specimen: Blood from Hand, Right Updated: 04/20/24 1701     Blood Culture Received in Microbiology Lab. Culture in Progress.    Blood culture #1 [[id number]] Collected: 04/20/24 0916    Lab Status: Preliminary result Specimen: Blood from Arm, Right Updated: 04/20/24 1701     Blood Culture Received in Microbiology Lab. Culture in Progress.    HS Troponin I 4hr [[id number]]  (Normal) Collected: 04/20/24 1445    Lab Status: Final result Specimen: Blood from Arm, Right Updated: 04/20/24 1514     hs TnI 4hr 3 ng/L      Delta 4hr hsTnI -1 ng/L     Lipid Panel with Direct LDL reflex [[id number]]  (Normal) Collected: 04/20/24 0916    Lab Status: Final result Specimen: Blood from Arm, Right Updated: 04/20/24 1349     Cholesterol 127 mg/dL      Triglycerides 115 mg/dL      HDL, Direct 54 mg/dL      LDL Calculated 50 mg/dL     Urine culture [[id number]] Collected: 04/20/24 1054    Lab Status: In process Specimen: Urine Updated: 04/20/24 1340    HS Troponin I 2hr [[id number]]  (Normal) Collected: 04/20/24 1153    Lab Status: Final result Specimen: Blood from Arm, Right Updated: 04/20/24 1224     hs TnI 2hr 5 ng/L      Delta 2hr hsTnI 1 ng/L     Urine  Microscopic [[id number]]  (Abnormal) Collected: 04/20/24 1054    Lab Status: Final result Specimen: Urine, Straight Cath Updated: 04/20/24 1209     RBC, UA 4-10 /hpf      WBC, UA 20-30 /hpf      Epithelial Cells Occasional /hpf      Bacteria, UA Innumerable /hpf      MUCUS THREADS Moderate    UA (URINE) with reflex to Scope [[id number]]  (Abnormal) Collected: 04/20/24 1054    Lab Status: Final result Specimen: Urine, Straight Cath Updated: 04/20/24 1133     Color, UA Yellow     Clarity, UA Slightly Cloudy     Specific Gravity, UA 1.020     pH, UA 6.0     Leukocytes, UA Trace     Nitrite, UA Negative     Protein, UA Trace mg/dl      Glucose, UA Negative mg/dl      Ketones, UA Negative mg/dl      Bilirubin, UA Negative     Occult Blood, UA Negative     Urobilinogen, UA 0.2 E.U./dl     Urine culture [[id number]] Collected: 04/20/24 1053    Lab Status: In process Specimen: Urine, Clean Catch Updated: 04/20/24 1105    TSH, 3rd generation with Free T4 reflex [[id number]]  (Normal) Collected: 04/20/24 0916    Lab Status: Final result Specimen: Blood from Arm, Right Updated: 04/20/24 1007     TSH 3RD GENERATON 3.239 uIU/mL     Basic metabolic panel [[id number]] Collected: 04/20/24 0916    Lab Status: Final result Specimen: Blood from Arm, Right Updated: 04/20/24 1005     Sodium 136 mmol/L      Potassium 4.1 mmol/L      Chloride 103 mmol/L      CO2 29 mmol/L      ANION GAP 4 mmol/L      BUN 14 mg/dL      Creatinine 0.71 mg/dL      Glucose 127 mg/dL      Calcium 9.9 mg/dL      eGFR 80 ml/min/1.73sq m     Narrative:      [employer] guidelines for Chronic Kidney Disease (CKD):     Stage 1 with normal or high GFR (GFR > 90 mL/min/1.73 square meters)    Stage 2 Mild CKD (GFR = 60-89 mL/min/1.73 square meters)    Stage 3A Moderate CKD (GFR = 45-59 mL/min/1.73 square meters)    Stage 3B Moderate CKD (GFR = 30-44 mL/min/1.73 square meters)    Stage 4 Severe CKD (GFR = 15-29 mL/min/1.73 square meters)    Stage 5  End Stage CKD (GFR <15 mL/min/1.73 square meters)  Note: GFR calculation is accurate only with a steady state creatinine    Hepatic function panel [[id number]]  (Abnormal) Collected: 04/20/24 0916    Lab Status: Final result Specimen: Blood from Arm, Right Updated: 04/20/24 1005     Total Bilirubin 1.04 mg/dL      Bilirubin, Direct 0.13 mg/dL      Alkaline Phosphatase 73 U/L      AST 44 U/L      ALT 27 U/L      Total Protein 6.9 g/dL      Albumin 3.8 g/dL     Magnesium [[id number]]  (Normal) Collected: 04/20/24 0916    Lab Status: Final result Specimen: Blood from Arm, Right Updated: 04/20/24 1005     Magnesium 1.9 mg/dL     Procalcitonin [[id number]]  (Normal) Collected: 04/20/24 0916    Lab Status: Final result Specimen: Blood from Arm, Right Updated: 04/20/24 1000     Procalcitonin 0.11 ng/ml     HS Troponin 0hr (reflex protocol) [[id number]]  (Normal) Collected: 04/20/24 0916    Lab Status: Final result Specimen: Blood from Arm, Right Updated: 04/20/24 0954     hs TnI 0hr 4 ng/L     B-Type Natriuretic Peptide(BNP) [[id number]]  (Normal) Collected: 04/20/24 0916    Lab Status: Final result Specimen: Blood from Arm, Right Updated: 04/20/24 0953     BNP 25 pg/mL     Lactic acid, plasma (w/reflex if result > 2.0) [[id number]]  (Normal) Collected: 04/20/24 0916    Lab Status: Final result Specimen: Blood from Arm, Right Updated: 04/20/24 0952     LACTIC ACID 1.2 mmol/L     Narrative:      Result may be elevated if tourniquet was used during collection.    Protime-INR [[id number]]  (Normal) Collected: 04/20/24 0916    Lab Status: Final result Specimen: Blood from Arm, Right Updated: 04/20/24 0944     Protime 14.5 seconds      INR 1.11    APTT [[id number]]  (Normal) Collected: 04/20/24 0916    Lab Status: Final result Specimen: Blood from Arm, Right Updated: 04/20/24 0944     PTT 32 seconds     CBC and differential [[id number]]  (Abnormal) Collected: 04/20/24 0916    Lab Status: Final result Specimen: Blood from Arm, Right  Updated: 04/20/24 0926     WBC 11.30 Thousand/uL      RBC 4.99 Million/uL      Hemoglobin 16.0 g/dL      Hematocrit 46.7 %      MCV 94 fL      MCH 32.1 pg      MCHC 34.3 g/dL      RDW 14.5 %      MPV 9.6 fL      Platelets 378 Thousands/uL      nRBC 0 /100 WBCs      Segmented % 69 %      Immature Grans % 0 %      Lymphocytes % 18 %      Monocytes % 11 %      Eosinophils Relative 1 %      Basophils Relative 1 %      Absolute Neutrophils 7.88 Thousands/µL      Absolute Immature Grans 0.04 Thousand/uL      Absolute Lymphocytes 1.99 Thousands/µL      Absolute Monocytes 1.19 Thousand/µL      Eosinophils Absolute 0.10 Thousand/µL      Basophils Absolute 0.10 Thousands/µL                    CT head without contrast   Final Result by Boni Harris MD (04/20 1019)      No acute intracranial abnormality.                  Resident: BONI LITTLEJOHN I, the attending radiologist, have reviewed the images and agree with the final report above.      Workstation performed: [id number]         XR chest 1 view portable   Final Result by Dany Salcedo MD (04/20 0914)      Minimal atelectasis in the right midlung field.      Interval removal of the left-sided stimulator device with both of the leads extending up the neck now attached to the right-sided device.      There appears to be a small hiatal hernia      Workstation performed: [id number]         MRI inpatient order    (Results Pending)              Procedures  Procedures         ED Course  ED Course as of 04/20/24 1709   Sat Apr 20, 2024   0900 Had discussion with , states she is unable to walk but typically she can assist with transfers. States she seems to have had weakness on the Right for the last 2 days and this morning she was able to assist with transitioning. He states her mental status does appear about baseline currently.   0930 Hemoglobin(!): 16.0  15.4 three weeks ago.                                             Medical Decision Making  1.  Weakness - Patient with worsening weakness,  states this seemed to be primarily in the Right side starting 2 days prior. Will order ECG and troponin to rule out acute MI, CBC for leukocytosis and anemia, metabolic panel for electrolyte abnormalities and dehydration, urine for infection. Will CT head.     Problems Addressed:  Sacral decubitus ulcer, stage II (HCC): acute illness or injury  UTI (urinary tract infection): acute illness or injury  Weakness: acute illness or injury    Amount and/or Complexity of Data Reviewed  Labs: ordered. Decision-making details documented in ED Course.  Radiology: ordered.    Risk  Prescription drug management.  Decision regarding hospitalization.             Disposition  Final diagnoses:   Weakness   UTI (urinary tract infection)   Sacral decubitus ulcer, stage II (HCC)     Time reflects when diagnosis was documented in both MDM as applicable and the Disposition within this note       Time User Action Codes Description Comment    4/20/2024 12:29 PM Juan Vo Add [R53.1] Weakness     4/20/2024 12:29 PM Juan Vo Add [N39.0] UTI (urinary tract infection)     4/20/2024 12:30 PM Juan Vo Add [L89.152] Sacral decubitus ulcer, stage II (HCC)           ED Disposition       ED Disposition   Admit    Condition   Stable    Date/Time   Sat Apr 20, 2024 12:29 PM    Comment   Case was discussed with MARLEN and the patient's admission status was agreed to be Admission Status: inpatient status to the service of Dr. Whitmore .               Follow-up Information    None         Current Discharge Medication List        CONTINUE these medications which have NOT CHANGED    Details   aspirin (ECOTRIN LOW STRENGTH) 81 mg EC tablet Take 81 mg by mouth 3 (three) times a week      Blood Glucose Monitoring Suppl (Accu-Chek Guide) w/Device KIT Use in the morning Test daily  Qty: 1 kit, Refills: 0    Associated Diagnoses: Type 2 diabetes mellitus with microalbuminuria, without long-term  current use of insulin ([facility])      carbidopa-levodopa (SINEMET)  mg per tablet Take 1 tablet by mouth 3 (three) times a day  Refills: 0    Associated Diagnoses: Parkinson disease      cilostazol (PLETAL) 100 mg tablet TAKE 1 TABLET (100 MG TOTAL) BY MOUTH 2 (TWO) TIMES A DAY BEFORE MEALS  Qty: 180 tablet, Refills: 1    Associated Diagnoses: Acute left-sided weakness      glucose blood (Accu-Chek Guide) test strip Test Daily  Qty: 50 each, Refills: 5    Associated Diagnoses: Type 2 diabetes mellitus with microalbuminuria, without long-term current use of insulin ([facility])      glycopyrrolate (ROBINUL) 1 mg tablet TAKE 1 TABLET (1 MG TOTAL) BY MOUTH IN THE MORNING AND IN THE EVENING  Qty: 180 tablet, Refills: 1    Associated Diagnoses: Parkinson disease      !! Lancets (accu-chek multiclix) lancets Test daily  Qty: 100 each, Refills: 5    Associated Diagnoses: Type 2 diabetes mellitus with microalbuminuria, without long-term current use of insulin ([facility])      levothyroxine 75 mcg tablet TAKE 1 TABLET EVERY DAY  Qty: 90 tablet, Refills: 1    Associated Diagnoses: Hypothyroidism, unspecified type      metFORMIN (GLUCOPHAGE) 500 mg tablet TAKE 1 TABLET BY MOUTH DAILY WITH SUPPER FOR 3 DAYS, THEN 1 TABLET TWICE DAILY WITH BREAKFAST & SUPPER THEREAFTER  Qty: 180 tablet, Refills: 1    Associated Diagnoses: Type 2 diabetes mellitus with microalbuminuria, without long-term current use of insulin ([facility])      !! OneTouch Delica Lancets 33G MISC Check blood sugars once daily. Please substitute with appropriate alternative as covered by patient's insurance. Dx: E11.65  Qty: 100 each, Refills: 3    Associated Diagnoses: Type 2 diabetes mellitus with microalbuminuria, without long-term current use of insulin ([facility])      potassium chloride (KLOR-CON) 8 MEQ tablet TAKE 1 TABLET BY MOUTH EVERY DAY  Qty: 90 tablet, Refills: 1    Associated Diagnoses: Hypokalemia      simvastatin (ZOCOR) 20 mg tablet Take 20 mg by mouth daily at bedtime       acetaminophen (TYLENOL) 500 mg tablet Take 500 mg by mouth One throughout the night as needed      ascorbic acid (VITAMIN C) 500 mg tablet Take 1 tablet (500 mg total) by mouth daily  Refills: 0    Associated Diagnoses: Health care maintenance      Calmoseptine 0.44-20.6 % OINT APPLY TO AFFECTED AREA TWICE A DAY  Qty: 113 g, Refills: 3    Associated Diagnoses: Open wound      Multiple Vitamin (MULTIVITAMIN) tablet Take 1 tablet by mouth daily.      mupirocin (BACTROBAN) 2 % ointment Apply topically 2 (two) times a day  Qty: 30 g, Refills: 1    Associated Diagnoses: Pressure injury of sacral region, unstageable (HCC)      nitrofurantoin (MACROBID) 100 mg capsule Take 1 tablet twice daily with food  Qty: 10 capsule, Refills: 0    Associated Diagnoses: Recurrent UTI      NON FORMULARY Take 1 tablet by mouth 2 (two) times a day Calcium- D3-600mg      Probiotic Product (PROBIOTIC DAILY PO) Take 1 tablet by mouth daily.      ranitidine (ZANTAC) 150 mg tablet Take 150 mg by mouth daily.      Vitamin D, Cholecalciferol, 1000 UNITS CAPS Take 1 tablet by mouth daily.       !! - Potential duplicate medications found. Please discuss with provider.          No discharge procedures on file.    PDMP Review       None            ED Provider  Electronically Signed by             Juan Vo MD  04/20/24 5868

## 2024-04-20 NOTE — PLAN OF CARE

## 2024-04-20 NOTE — H&P
Formerly [facility]  H&P  Name: Anjali Shipley 79 y.o. female I MRN: [id number]  Unit/Bed#: ED-06 I Date of Admission: 4/20/2024   Date of Service: 4/20/2024 I Hospital Day: 0      Assessment/Plan   * Weakness  Assessment & Plan  Patient has progressive Parkinson's who presents with one day of worsening weakness specifically right leg per  where she can no longer stand   She is nonverbal history obtained by  who is her caregiver   Usually can stand and hold herself up to dress, bathe but since yesterday legs are weak specifically right leg, left side is chronically weak from prior stroke   Difficult to assess neuro exam   CT head no acute intracranial abnormality   Will order MRI brain to rule out stroke   Will have neurology evaluate   UA with trace leuks, innummerable bacteria and 20-30 WBC, lower suspicion for UTI - given IV ceftriaxone in ED will follow up urine culture   Neuo checks   PT and OT   Purred diet, thin liquids, speech consulted       Parkinson disease  Assessment & Plan  Sinemet 1 tab tid and half tab at night   Robinul 1 mg bid   Neurology consulted     Sacral wound  Assessment & Plan  Present for 3 weeks per    Off load pressure, q2 repo   Wound care consulted   Will order specialty bed    Hemiparesis affecting left side as late effect of cerebrovascular accident (CVA) ([facility])  Assessment & Plan  Residual left sided weakness from prior stroke   Continue aspirin, statin, pletal     S/P deep brain stimulator placement  Assessment & Plan  Deep brain stimulator in place     Severe Lewy body dementia without behavioral disturbance, psychotic disturbance, mood disturbance, or anxiety ([facility])  Assessment & Plan  She is nonverbal at baseline   Primarily wheelchair/bed bound but can sit to stand for dressing and bathing    is her caregiver       Type 2 diabetes mellitus with microalbuminuria, without long-term current use of insulin ([facility])  Assessment & Plan  Lab  "Results   Component Value Date    HGBA1C 5.5 03/26/2024       No results for input(s): \"POCGLU\" in the last 72 hours.    Blood Sugar Average: Last 72 hrs:    SSI, accuchecks   Hold metformin   Last A1c 3.26 well controlled     Mixed hyperlipidemia  Assessment & Plan  Continue statin     Acquired hypothyroidism  Assessment & Plan  Continue levothryoxine            VTE Pharmacologic Prophylaxis: VTE Score: 6 High Risk (Score >/= 5) - Pharmacological DVT Prophylaxis Ordered: enoxaparin (Lovenox). Sequential Compression Devices Ordered.  Code Status: Prior level 3  Discussion with family: Updated  () at bedside.    Anticipated Length of Stay: Patient will be admitted on an inpatient basis with an anticipated length of stay of greater than 2 midnights secondary to weakness .    Total Time Spent on Date of Encounter in care of patient: 45 mins. This time was spent on one or more of the following: performing physical exam; counseling and coordination of care; obtaining or reviewing history; documenting in the medical record; reviewing/ordering tests, medications or procedures; communicating with other healthcare professionals and discussing with patient's family/caregivers.    Chief Complaint: weakness     History of Present Illness:  Anjali Shipley is a 79 y.o. female with a PMH of advanced Parkinson's, Lewy body dementia, type II non-insulin-dependent diabetes, history of stroke who presents with weakness.  Patient's  is her caregiver.  She is nonverbal and has not spoken for about a year and a half.  She is primarily wheelchair and bedbound but does stand and pivot into wheelchair and to get dressed and for bathing.  He states yesterday afternoon she became profoundly weak on standing with specific right leg weakness and ended up sliding to the ground.  He had to get his Hardeep lift to get her up.  He was able to bathe her yesterday but she continued to be very weak and difficult to stand.  " This morning he was unable to get her out of bed due to weakness.  She does have chronic left-sided weakness from prior stroke.  He states that she does not do anything on her own and he is her primary caregiver.  She does eat primarily puréed foods with Synthroid fluids.  He denies any nausea vomiting diarrhea.  He states given the weakness he is unable to care for.  He goran confirm she is level 3 DNR/DNI. .    Review of Systems:  Review of Systems   Unable to perform ROS: Dementia       Past Medical and Surgical History:   Past Medical History:   Diagnosis Date    Abdominal pain, suprapubic 07/03/2014    Resolved:  November 22, 2016    Abscess of skin and subcutaneous tissue 11/21/2016    Resolved:  February 28, 2017    Acute kidney failure, unspecified (HCC)     Anxiety     Burning with urination 12/14/2016    Resolved:  February 28, 2017    Conjunctivitis 06/10/2013    Depression     Depression     Diabetes mellitus (HCC) 02/02/2020    Dysuria 06/06/2016    Resolved:  February 28 2017    Falling 10/18/2012    GERD (gastroesophageal reflux disease)     Groin pain 04/16/2015    Resolved:  February 28, 2017    Hyperlipidemia     Hypothyroid     Influenza 04/05/2013    Leg abrasion 09/22/2016    Resolved:  February 28, 2017    Parkinson's disease     Psychiatric illness     Rectal pain 10/17/2013    Resolved:  February 28, 2017    Squamous cell skin cancer 03/22/2022    Left lateral calf    Thyroid disease        Past Surgical History:   Procedure Laterality Date    APPENDECTOMY      Age 6 or 7    BACK SURGERY      BRAIN SURGERY      CLOSED REDUCTION NASAL FRACTURE      CYSTOSCOPY  01/28/2014    Ivan Siddiqui M.D.  (Diagnostic)    DEEP BRAIN STIMULATOR PLACEMENT      EYE SURGERY      FL MYELOGRAM CERVICAL  3/4/2014    FRACTURE SURGERY      JOINT REPLACEMENT      LAMINECTOMY      Decompress, Facetectomy, Foraminotomy Lumbar Seg    ORIF HIP FRACTURE Left     About 3 years ago    SKIN BIOPSY      TONSILLECTOMY          Meds/Allergies:  Prior to Admission medications    Medication Sig Start Date End Date Taking? Authorizing Provider   ascorbic acid (VITAMIN C) 500 mg tablet Take 1 tablet (500 mg total) by mouth daily 2/4/20  Yes Aram Haji MD   aspirin (ECOTRIN LOW STRENGTH) 81 mg EC tablet Take 81 mg by mouth 3 (three) times a week   Yes Historical Provider, MD   carbidopa-levodopa (SINEMET)  mg per tablet Take 1 tablet by mouth 3 (three) times a day 2/5/20  Yes Aram Haji MD   cilostazol (PLETAL) 100 mg tablet TAKE 1 TABLET (100 MG TOTAL) BY MOUTH 2 (TWO) TIMES A DAY BEFORE MEALS 2/8/24  Yes Satnam Kwon DO   glycopyrrolate (ROBINUL) 1 mg tablet TAKE 1 TABLET (1 MG TOTAL) BY MOUTH IN THE MORNING AND IN THE EVENING 2/8/24  Yes Satnam Kwon DO   levothyroxine 75 mcg tablet TAKE 1 TABLET EVERY DAY 2/23/24  Yes Satnam Kwon DO   metFORMIN (GLUCOPHAGE) 500 mg tablet TAKE 1 TABLET BY MOUTH DAILY WITH SUPPER FOR 3 DAYS, THEN 1 TABLET TWICE DAILY WITH BREAKFAST & SUPPER THEREAFTER 2/8/24  Yes Satnam Kwon DO   Multiple Vitamin (MULTIVITAMIN) tablet Take 1 tablet by mouth daily.   Yes Historical Provider, MD   potassium chloride (KLOR-CON) 8 MEQ tablet TAKE 1 TABLET BY MOUTH EVERY DAY 1/25/24  Yes Satnam Kwon DO   simvastatin (ZOCOR) 20 mg tablet Take 20 mg by mouth daily at bedtime   Yes Historical Provider, MD   Vitamin D, Cholecalciferol, 1000 UNITS CAPS Take 1 tablet by mouth daily.   Yes Historical Provider, MD   acetaminophen (TYLENOL) 500 mg tablet Take 500 mg by mouth One throughout the night as needed    Historical Provider, MD   Blood Glucose Monitoring Suppl (Accu-Chek Guide) w/Device KIT Use in the morning Test daily 1/30/23   Satnam Kwon DO   Calmoseptine 0.44-20.6 % OINT APPLY TO AFFECTED AREA TWICE A DAY 6/12/23   Satnam Kwon DO   glucose blood (Accu-Chek Guide) test strip Test Daily 1/30/23   Satnam Kwon DO   Lancets  (accu-chek multiclix) lancets Test daily 1/30/23   Satnam Kwon DO   mupirocin (BACTROBAN) 2 % ointment Apply topically 2 (two) times a day 4/11/24   Satnam Kwon DO   nitrofurantoin (MACROBID) 100 mg capsule Take 1 tablet twice daily with food 3/6/23   Satnam Kwon DO   NON FORMULARY Take 1 tablet by mouth 2 (two) times a day Calcium- D3-600mg    Historical Provider, MD   OneTouch Delica Lancets 33G MISC Check blood sugars once daily. Please substitute with appropriate alternative as covered by patient's insurance. Dx: E11.65 1/30/23   Satnam Kwon DO   Probiotic Product (PROBIOTIC DAILY PO) Take 1 tablet by mouth daily.    Historical Provider, MD   ranitidine (ZANTAC) 150 mg tablet Take 150 mg by mouth daily.    Historical Provider, MD     I have reviewed home medications with patient personally.    Allergies:   Allergies   Allergen Reactions    Gabapentin      Other reaction(s): Hypotension    Bupropion GI Intolerance and Other (See Comments)     Other reaction(s): Tremor    Cephalexin     Duloxetine      Other reaction(s): Other (see comments)  unkown  unknown      Erythromycin GI Intolerance    Methadone Hallucinations    Mirtazapine GI Intolerance    Paroxetine GI Intolerance    Sulfa Antibiotics     Tricyclic Antidepressants      Other reaction(s): Other    Acetazolamide Rash    Levofloxacin Anxiety and Other (See Comments)       Social History:  Marital Status: /Civil Union   Occupation:   Patient Pre-hospital Living Situation: Home  Patient Pre-hospital Level of Mobility:  bed and wheelchair bound   Patient Pre-hospital Diet Restrictions:   Substance Use History:   Social History     Substance and Sexual Activity   Alcohol Use Yes    Alcohol/week: 1.0 standard drink of alcohol    Types: 1 Glasses of wine per week    Comment: drinks one fourth of a glass of white wine once a week  (Never drank alcohol per [employer])     Social History     Tobacco Use   Smoking Status Never    Smokeless Tobacco Never   Tobacco Comments    n/a     Social History     Substance and Sexual Activity   Drug Use No       Family History:  Family History   Problem Relation Age of Onset    Heart attack Mother         Acute Myocardial Infarction (MI)    Diabetes Mother         Diabetes Mellitus    Stroke Father         Syndrome    No Known Problems Brother     No Known Problems Maternal Aunt     No Known Problems Paternal Aunt     No Known Problems Maternal Uncle     No Known Problems Paternal Uncle     No Known Problems Maternal Grandfather     No Known Problems Maternal Grandmother     No Known Problems Paternal Grandfather     No Known Problems Paternal Grandmother     No Known Problems Cousin     Heart disease Family     Hypertension Family     Thyroid disease Family     ADD / ADHD Neg Hx     Alcohol abuse Neg Hx     Anxiety disorder Neg Hx     Bipolar disorder Neg Hx     Dementia Neg Hx     Depression Neg Hx     Drug abuse Neg Hx     OCD Neg Hx     Paranoid behavior Neg Hx     Schizophrenia Neg Hx     Seizures Neg Hx     Self-Injury Neg Hx     Suicide Attempts Neg Hx        Physical Exam:     Vitals:   Blood Pressure: 108/61 (04/20/24 1300)  Pulse: 85 (04/20/24 1300)  Temperature: 98.9 °F (37.2 °C) (04/20/24 0829)  Temp Source: Oral (04/20/24 0829)  Respirations: 19 (04/20/24 1300)  Weight - Scale: 66.5 kg (146 lb 9.7 oz) (04/20/24 0831)  SpO2: 96 % (04/20/24 1300)    Physical Exam  Vitals and nursing note reviewed.   Constitutional:       General: She is not in acute distress.     Appearance: Ill appearance: chronically ill appearing.      Comments: Eyes closed throughout exam, nonverbal    Cardiovascular:      Rate and Rhythm: Normal rate and regular rhythm.   Pulmonary:      Effort: Pulmonary effort is normal.      Breath sounds: Normal breath sounds.   Abdominal:      General: Bowel sounds are normal.      Palpations: Abdomen is soft.   Musculoskeletal:      Right lower leg: No edema.      Left lower leg:  No edema.   Skin:     General: Skin is warm and dry.      Comments: Rolled patient over with RN, large sacral wound    Neurological:      Comments: Can not perform neuro exam, nonverbal, eyes closed throughout exam, sighs occassionally    Psychiatric:         Speech: She is noncommunicative.         Additional Data:     Lab Results:  Results from last 7 days   Lab Units 04/20/24  0916   WBC Thousand/uL 11.30*   HEMOGLOBIN g/dL 16.0*   HEMATOCRIT % 46.7*   PLATELETS Thousands/uL 378   SEGS PCT % 69   LYMPHO PCT % 18   MONO PCT % 11   EOS PCT % 1     Results from last 7 days   Lab Units 04/20/24  0916   SODIUM mmol/L 136   POTASSIUM mmol/L 4.1   CHLORIDE mmol/L 103   CO2 mmol/L 29   BUN mg/dL 14   CREATININE mg/dL 0.71   ANION GAP mmol/L 4   CALCIUM mg/dL 9.9   ALBUMIN g/dL 3.8   TOTAL BILIRUBIN mg/dL 1.04*   ALK PHOS U/L 73   ALT U/L 27   AST U/L 44*   GLUCOSE RANDOM mg/dL 127     Results from last 7 days   Lab Units 04/20/24  0916   INR  1.11             Results from last 7 days   Lab Units 04/20/24  0916   LACTIC ACID mmol/L 1.2   PROCALCITONIN ng/ml 0.11       Lines/Drains:  Invasive Devices       Peripheral Intravenous Line  Duration             Peripheral IV 04/20/24 Left;Ventral (anterior) Forearm <1 day    Peripheral IV 04/20/24 Right Antecubital <1 day                        Imaging: Reviewed radiology reports from this admission including: CT head  CT head without contrast   Final Result by Boni Harris MD (04/20 1019)      No acute intracranial abnormality.                  Resident: BONI LITTLEJOHN I, the attending radiologist, have reviewed the images and agree with the final report above.      Workstation performed: [id number]         XR chest 1 view portable   Final Result by Dany Salcedo MD (04/20 0914)      Minimal atelectasis in the right midlung field.      Interval removal of the left-sided stimulator device with both of the leads extending up the neck now attached to the  right-sided device.      There appears to be a small hiatal hernia      Workstation performed: [id number]             EKG and Other Studies Reviewed on Admission:   EKG: NSR. HR 93.    ** Please Note: This note has been constructed using a voice recognition system. **

## 2024-04-20 NOTE — ASSESSMENT & PLAN NOTE
"Lab Results   Component Value Date    HGBA1C 5.5 03/26/2024       No results for input(s): \"POCGLU\" in the last 72 hours.    Blood Sugar Average: Last 72 hrs:    SSI, accuchecks   Hold metformin   Last A1c 3.26 well controlled   "

## 2024-04-21 LAB
ANION GAP SERPL CALCULATED.3IONS-SCNC: 5 MMOL/L (ref 4–13)
BASOPHILS # BLD AUTO: 0.08 THOUSANDS/ÂΜL (ref 0–0.1)
BASOPHILS NFR BLD AUTO: 1 % (ref 0–1)
BUN SERPL-MCNC: 14 MG/DL (ref 5–25)
CALCIUM SERPL-MCNC: 8.2 MG/DL (ref 8.4–10.2)
CHLORIDE SERPL-SCNC: 107 MMOL/L (ref 96–108)
CO2 SERPL-SCNC: 26 MMOL/L (ref 21–32)
CREAT SERPL-MCNC: 0.6 MG/DL (ref 0.6–1.3)
EOSINOPHIL # BLD AUTO: 0.08 THOUSAND/ÂΜL (ref 0–0.61)
EOSINOPHIL NFR BLD AUTO: 1 % (ref 0–6)
ERYTHROCYTE [DISTWIDTH] IN BLOOD BY AUTOMATED COUNT: 14.2 % (ref 11.6–15.1)
GFR SERPL CREATININE-BSD FRML MDRD: 85 ML/MIN/1.73SQ M
GLUCOSE SERPL-MCNC: 128 MG/DL (ref 65–140)
GLUCOSE SERPL-MCNC: 136 MG/DL (ref 65–140)
GLUCOSE SERPL-MCNC: 147 MG/DL (ref 65–140)
GLUCOSE SERPL-MCNC: 151 MG/DL (ref 65–140)
GLUCOSE SERPL-MCNC: 153 MG/DL (ref 65–140)
HCT VFR BLD AUTO: 39.3 % (ref 34.8–46.1)
HGB BLD-MCNC: 13.3 G/DL (ref 11.5–15.4)
IMM GRANULOCYTES # BLD AUTO: 0.02 THOUSAND/UL (ref 0–0.2)
IMM GRANULOCYTES NFR BLD AUTO: 0 % (ref 0–2)
LYMPHOCYTES # BLD AUTO: 1.79 THOUSANDS/ÂΜL (ref 0.6–4.47)
LYMPHOCYTES NFR BLD AUTO: 20 % (ref 14–44)
MAGNESIUM SERPL-MCNC: 1.7 MG/DL (ref 1.9–2.7)
MCH RBC QN AUTO: 31.8 PG (ref 26.8–34.3)
MCHC RBC AUTO-ENTMCNC: 33.8 G/DL (ref 31.4–37.4)
MCV RBC AUTO: 94 FL (ref 82–98)
MONOCYTES # BLD AUTO: 1.11 THOUSAND/ÂΜL (ref 0.17–1.22)
MONOCYTES NFR BLD AUTO: 12 % (ref 4–12)
NEUTROPHILS # BLD AUTO: 6.11 THOUSANDS/ÂΜL (ref 1.85–7.62)
NEUTS SEG NFR BLD AUTO: 66 % (ref 43–75)
NRBC BLD AUTO-RTO: 0 /100 WBCS
PLATELET # BLD AUTO: 292 THOUSANDS/UL (ref 149–390)
PMV BLD AUTO: 9.6 FL (ref 8.9–12.7)
POTASSIUM SERPL-SCNC: 3.4 MMOL/L (ref 3.5–5.3)
RBC # BLD AUTO: 4.18 MILLION/UL (ref 3.81–5.12)
SODIUM SERPL-SCNC: 138 MMOL/L (ref 135–147)
WBC # BLD AUTO: 9.19 THOUSAND/UL (ref 4.31–10.16)

## 2024-04-21 PROCEDURE — 92610 EVALUATE SWALLOWING FUNCTION: CPT

## 2024-04-21 PROCEDURE — 80048 BASIC METABOLIC PNL TOTAL CA: CPT | Performed by: PHYSICIAN ASSISTANT

## 2024-04-21 PROCEDURE — 83735 ASSAY OF MAGNESIUM: CPT | Performed by: PHYSICIAN ASSISTANT

## 2024-04-21 PROCEDURE — 82948 REAGENT STRIP/BLOOD GLUCOSE: CPT

## 2024-04-21 PROCEDURE — NC001 PR NO CHARGE: Performed by: STUDENT IN AN ORGANIZED HEALTH CARE EDUCATION/TRAINING PROGRAM

## 2024-04-21 PROCEDURE — 99232 SBSQ HOSP IP/OBS MODERATE 35: CPT | Performed by: PHYSICIAN ASSISTANT

## 2024-04-21 PROCEDURE — 85025 COMPLETE CBC W/AUTO DIFF WBC: CPT | Performed by: PHYSICIAN ASSISTANT

## 2024-04-21 RX ORDER — LANOLIN ALCOHOL/MO/W.PET/CERES
400 CREAM (GRAM) TOPICAL 2 TIMES DAILY
Status: COMPLETED | OUTPATIENT
Start: 2024-04-21 | End: 2024-04-21

## 2024-04-21 RX ORDER — POTASSIUM CHLORIDE 20MEQ/15ML
20 LIQUID (ML) ORAL ONCE
Status: COMPLETED | OUTPATIENT
Start: 2024-04-21 | End: 2024-04-21

## 2024-04-21 RX ADMIN — CARBIDOPA AND LEVODOPA 1 TABLET: 25; 100 TABLET ORAL at 08:46

## 2024-04-21 RX ADMIN — CARBIDOPA AND LEVODOPA 1 TABLET: 25; 100 TABLET ORAL at 12:22

## 2024-04-21 RX ADMIN — GLYCOPYRROLATE 1 MG: 1 TABLET ORAL at 08:47

## 2024-04-21 RX ADMIN — GLYCOPYRROLATE 1 MG: 1 TABLET ORAL at 17:19

## 2024-04-21 RX ADMIN — LEVOTHYROXINE SODIUM 75 MCG: 75 TABLET ORAL at 05:53

## 2024-04-21 RX ADMIN — INSULIN LISPRO 1 UNITS: 100 INJECTION, SOLUTION INTRAVENOUS; SUBCUTANEOUS at 12:22

## 2024-04-21 RX ADMIN — Medication 400 MG: at 17:18

## 2024-04-21 RX ADMIN — Medication 400 MG: at 08:46

## 2024-04-21 RX ADMIN — PRAVASTATIN SODIUM 40 MG: 40 TABLET ORAL at 17:18

## 2024-04-21 RX ADMIN — FAMOTIDINE 20 MG: 20 TABLET, FILM COATED ORAL at 08:46

## 2024-04-21 RX ADMIN — CILOSTAZOL 100 MG: 50 TABLET ORAL at 17:18

## 2024-04-21 RX ADMIN — CARBIDOPA AND LEVODOPA 0.5 TABLET: 25; 100 TABLET ORAL at 22:26

## 2024-04-21 RX ADMIN — CILOSTAZOL 100 MG: 50 TABLET ORAL at 05:53

## 2024-04-21 RX ADMIN — POTASSIUM CHLORIDE 20 MEQ: 1.5 SOLUTION ORAL at 08:46

## 2024-04-21 RX ADMIN — INSULIN LISPRO 1 UNITS: 100 INJECTION, SOLUTION INTRAVENOUS; SUBCUTANEOUS at 17:18

## 2024-04-21 RX ADMIN — CARBIDOPA AND LEVODOPA 1 TABLET: 25; 100 TABLET ORAL at 17:18

## 2024-04-21 RX ADMIN — CEFTRIAXONE 1000 MG: 1 INJECTION, SOLUTION INTRAVENOUS at 12:22

## 2024-04-21 RX ADMIN — ENOXAPARIN SODIUM 40 MG: 40 INJECTION SUBCUTANEOUS at 08:47

## 2024-04-21 NOTE — PLAN OF CARE
Problem: Potential for Falls  Goal: Patient will remain free of falls  Description: INTERVENTIONS:  - Educate patient/family on patient safety including physical limitations  - Instruct patient to call for assistance with activity   - Consult OT/PT to assist with strengthening/mobility   - Keep Call bell within reach  - Keep bed low and locked with side rails adjusted as appropriate  - Keep care items and personal belongings within reach  - Initiate and maintain comfort rounds  - Make Fall Risk Sign visible to staff  - Apply yellow socks and bracelet for high fall risk patients  - Consider moving patient to room near nurses station  Outcome: Progressing     Problem: PAIN - ADULT  Goal: Verbalizes/displays adequate comfort level or baseline comfort level  Description: Interventions:  - Encourage patient to monitor pain and request assistance  - Assess pain using appropriate pain scale  - Administer analgesics based on type and severity of pain and evaluate response  - Implement non-pharmacological measures as appropriate and evaluate response  - Consider cultural and social influences on pain and pain management  - Notify physician/advanced practitioner if interventions unsuccessful or patient reports new pain  Outcome: Progressing     Problem: INFECTION - ADULT  Goal: Absence or prevention of progression during hospitalization  Description: INTERVENTIONS:  - Assess and monitor for signs and symptoms of infection  - Monitor lab/diagnostic results  - Monitor all insertion sites, i.e. indwelling lines, tubes, and drains  - Monitor endotracheal if appropriate and nasal secretions for changes in amount and color  - [place] appropriate cooling/warming therapies per order  - Administer medications as ordered  - Instruct and encourage patient and family to use good hand hygiene technique  - Identify and instruct in appropriate isolation precautions for identified infection/condition  Outcome: Progressing  Goal: Absence  of fever/infection during neutropenic period  Description: INTERVENTIONS:  - Monitor WBC    Outcome: Progressing     Problem: SAFETY ADULT  Goal: Maintain or return to baseline ADL function  Description: INTERVENTIONS:  -  Assess patient's ability to carry out ADLs; assess patient's baseline for ADL function and identify physical deficits which impact ability to perform ADLs (bathing, care of mouth/teeth, toileting, grooming, dressing, etc.)  - Assess/evaluate cause of self-care deficits   - Assess range of motion  - Assess patient's mobility; develop plan if impaired  - Assess patient's need for assistive devices and provide as appropriate  - Encourage maximum independence but intervene and supervise when necessary  - Involve family in performance of ADLs  - Assess for home care needs following discharge   - Consider OT consult to assist with ADL evaluation and planning for discharge  - Provide patient education as appropriate  Outcome: Progressing  Goal: Maintains/Returns to pre admission functional level  Description: INTERVENTIONS:  - Perform AM-PAC 6 Click Basic Mobility/ Daily Activity assessment daily.  - Set and communicate daily mobility goal to care team and patient/family/caregiver.   - Collaborate with rehabilitation services on mobility goals if consulted  - Out of bed for toileting  - Record patient progress and toleration of activity level   Outcome: Progressing     Problem: DISCHARGE PLANNING  Goal: Discharge to home or other facility with appropriate resources  Description: INTERVENTIONS:  - Identify barriers to discharge w/patient and caregiver  - Arrange for needed discharge resources and transportation as appropriate  - Identify discharge learning needs (meds, wound care, etc.)  - Arrange for interpretive services to assist at discharge as needed  - Refer to Case Management Department for coordinating discharge planning if the patient needs post-hospital services based on physician/advanced  practitioner order or complex needs related to functional status, cognitive ability, or social support system  Outcome: Progressing     Problem: Knowledge Deficit  Goal: Patient/family/caregiver demonstrates understanding of disease process, treatment plan, medications, and discharge instructions  Description: Complete learning assessment and assess knowledge base.  Interventions:  - Provide teaching at level of understanding  - Provide teaching via preferred learning methods  Outcome: Progressing     Problem: Prexisting or High Potential for Compromised Skin Integrity  Goal: Skin integrity is maintained or improved  Description: INTERVENTIONS:  - Identify patients at risk for skin breakdown  - Assess and monitor skin integrity  - Assess and monitor nutrition and hydration status  - Monitor labs   - Assess for incontinence   - Turn and reposition patient  - Assist with mobility/ambulation  - Relieve pressure over bony prominences  - Avoid friction and shearing  - Provide appropriate hygiene as needed including keeping skin clean and dry  - Evaluate need for skin moisturizer/barrier cream  - Collaborate with interdisciplinary team   - Patient/family teaching  - Consider wound care consult   Outcome: Progressing

## 2024-04-21 NOTE — PROGRESS NOTES
Brief Neurology Progress Note    Seen in follow up this morning.  at bedside. We reviewed history and clinical course to date, and examined pt together. She was able to maintain eye opening for a longer period of time when asked. Similar examination compared to yesterday in the LUE/LLE. Able to provide some resistance in the RUE when testing strength but not providing much resistance in the RLE with strength exam (although still moving RLE to command). Did protrude tongue today when asked.  feels that pt still appears somewhat weaker than normal in the RUE/RLE but otherwise is at her baseline. Strength exam has varied, unclear if related to stroke or encephalopathy related to UTI. Non-verbal. In regard to MRI brain, medicine to see if able to have done here, but may not be able to. If not, would check CTA H/N for further evaluation. Pt has been normotensive, okay to continue BP goal normotension at this time. Follow up urine culture. Also revisited discussion regarding antiplatelet medication and reasoning. After discussion regarding switching antiplatelet, he asked to have pt remain on cilostazol for now while pending further workup. Okay with discontinuing aspirin unless needed for other reasons. He is open to switching to an alternate antiplatelet but would like more data (ie, imaging) first. Continue telemetry. Continue home Sinemet. Rest of care per primary.

## 2024-04-21 NOTE — ASSESSMENT & PLAN NOTE
Present for 3 weeks per    Off load pressure, q2 repo   Wound care consulted   Ordered specialty bed

## 2024-04-21 NOTE — SPEECH THERAPY NOTE
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    Acquired hypothyroidism    Mixed hyperlipidemia    Parkinson disease    Type 2 diabetes mellitus with microalbuminuria, without long-term current use of insulin ([facility])    Severe Lewy body dementia without behavioral disturbance, psychotic disturbance, mood disturbance, or anxiety ([facility])    S/P deep brain stimulator placement    Hemiparesis affecting left side as late effect of cerebrovascular accident (CVA) ([facility])    Sacral wound    Past Medical History  Past Medical History:   Diagnosis Date    Abdominal pain, suprapubic 07/03/2014    Resolved:  November 22, 2016    Abscess of skin and subcutaneous tissue 11/21/2016    Resolved:  February 28, 2017    Acute kidney failure, unspecified ([facility])     Anxiety     Burning with urination 12/14/2016    Resolved:  February 28, 2017    Conjunctivitis 06/10/2013    Depression     Depression     Diabetes mellitus ([facility]) 02/02/2020    Dysuria 06/06/2016    Resolved:  February 28 2017    Falling 10/18/2012    GERD (gastroesophageal reflux disease)     Groin pain 04/16/2015    Resolved:  February 28, 2017    Hyperlipidemia     Hypothyroid     Influenza 04/05/2013    Leg abrasion 09/22/2016    Resolved:  February 28, 2017    Parkinson's disease     Psychiatric illness     Rectal pain 10/17/2013    Resolved:  February 28, 2017    Squamous cell skin cancer 03/22/2022    Left lateral calf    Thyroid disease      Past Surgical History  Past Surgical History:   Procedure Laterality Date    APPENDECTOMY      Age 6 or 7    BACK SURGERY      BRAIN SURGERY      CLOSED REDUCTION NASAL FRACTURE      CYSTOSCOPY  01/28/2014    Ivan Siddiqui M.D.  (Diagnostic)    DEEP BRAIN STIMULATOR PLACEMENT      EYE SURGERY      FL  MYELOGRAM CERVICAL  3/4/2014    FRACTURE SURGERY      JOINT REPLACEMENT      LAMINECTOMY      Decompress, Facetectomy, Foraminotomy Lumbar Seg    ORIF HIP FRACTURE Left     About 3 years ago    SKIN BIOPSY      TONSILLECTOMY        Bedside Swallow Evaluation:    Summary:  Pt presented w/ lethargy. Positioned upright and pt would open eyes briefly with frequent verbal/tactile stimulation. Decreased command following. Spouse at bedside. Poor head control which spouse stated is baseline. Spouse assisted to position head upright. Fed by ST trials of  puree and thin via straw with single/successive sips. Bolus control and formation were WNL. Transfer and Swallow initiation appeared delayed overall. Laryngeal rise appeared adequate. No overt s/s of aspiration. Mod oral stage and suspect WNL vs mild pharyngeal stage. Spouse reported pt poor attendance to straw at lunch. Also stated he purees meats at home but would provide suspect mech soft consistency in which reported pt masticated. Spouse stated he typically will crush her medications w/ puree and good meal completions overall. Spouse stated pt will typically alert around 11-12 and briefly after 3 pm. Minimal intake as to ongoing pt lethargy.    Recommendations:  Diet: Dysphagia 1 pureed   Liquid:Thin   Meds:crushed with puree  Supervision:Full/Assist   Positioning:Upright  Strategies: slow rate, alternate, liquids with solids  Pt to take PO/Meds only when fully alert and upright.   Oral care  Aspiration precautions  Reflux precautions  Therapy Prognosis:fair   Prognosis considerations:age, medical status   Frequency:2-5 weekly as indicated       Consider consult w/:  Rehab  Neurology  Nutrition    Goal(s):  Dysphagia LTG  -Patient will demonstrate safe and effective oral intake (without overt s/s significant oral/pharyngeal dysphagia including s/s penetration or aspiration) for the highest appropriate diet level.     1.Pt will tolerate least restrictive diet w/out s/s  aspiration or oral/pharyngeal difficulties.   2.Pt will will effectively manipulate/masticate and transfer purees/solids w/out s/s dysphagia/aspiration.   3.Pt will tolerate thin liquids w/out s/s aspiration.   -If indicated, patient will comply with a Video/Modified Barium Swallow study for more complete assessment of swallowing anatomy/physiology/aspiration risk and to assess efficacy of treatment techniques so as to best guide treatment plan     H&P/Admit info/ pertinent provider notes: (PMH noted above)  Anjali Shipley is a 79 y.o. female with a PMH of advanced Parkinson's, Lewy body dementia, type II non-insulin-dependent diabetes, history of stroke who presents with weakness.  Patient's  is her caregiver.  She is nonverbal and has not spoken for about a year and a half.  She is primarily wheelchair and bedbound but does stand and pivot into wheelchair and to get dressed and for bathing.  He states yesterday afternoon she became profoundly weak on standing with specific right leg weakness and ended up sliding to the ground.  He had to get his [employer] lift to get her up.  He was able to bathe her yesterday but she continued to be very weak and difficult to stand.  This morning he was unable to get her out of bed due to weakness.  She does have chronic left-sided weakness from prior stroke.  He states that she does not do anything on her own and he is her primary caregiver.  She does eat primarily puréed foods with Synthroid fluids.  He denies any nausea vomiting diarrhea.  He states given the weakness he is unable to care for.  He goran confirm she is level 3 DNR/DNI. .     Special Studies:  CT head without contrast (04/20/2024) Impression   No acute intracranial abnormality.   XR chest 1 view portable (04/20/2024  Minimal atelectasis in the right midlung field.  Interval removal of the left-sided stimulator device with both of the leads extending up the neck now attached to the right-sided device.  There  appears to be a small hiatal hernia    Procalcitonin:    WBC: 11.30                          04/21/2024     Code Status : Level 3 DNR/DNI    Previous MBS:  Video Barium Swallow Study  1/24/20  Summary:  Oral stage moderate. No mastication of soft solid. Eventually cleared w/ multiple sips of liquids. Bolus formation and control were best w/ purees/honey thick liquids, mild w/ thin and nectar. Transfer time varied from prompt to moderately delayed. Overall pt was more alert and held her head up better for the study than she did earlier at the bedside, but still had periods where she seemed to doze off. Pharyngeal stage was mild/mod impaired. Initial swallows were prompt to mildly delayed. Secondary swallows (on retention) were delayed or absent. Epiglottic inversion was inconsistent. Pharyngeal constriction was mildly reduced, laryngeal rise was mild/mod reduced. Per gross esophageal screen: slower clearance, some retropulsion/? Reflux  observed at the end of the study.  Images are on PACS for review.   Recommendations:  Diet: puree (will trial soft solids if cognition/alertness improve)  Liquids: honey thick for now. ST to trial thin and nectar at the bedside (need consistent alertness and swallow function over consecutive sessions)  Meds:crush  Strategies: feed  Only when fully alert, tactile and verbal prompts as needed, gustatory stim as needed. Head at midline, alternate w/ liquids.   Upright position  F/u ST tx: yes  Therapy Prognosis: favorable. Seems to be improving.   Prognosis considerations: h/o progressive dz. Multiple meds.   Full Supervision. Feed as needed.   Aspiration Precautions  Reflux Precautions  Consider consult with: nutrition.   Results reviewed with: pt, PA  Aspiration precautions posted.    Patient's goal: pt nonverbal    Did the pt report pain? Nonverbal   If yes, was nursing notified/was it addressed? no    Reason for consult:  R/o aspiration  Determine safest and least restrictive  diet  h/o dysphagia     Precautions:  Fall    Food Allergies: No known    Current Diet: Dysphagia 1 pureed and thin liquids   Premorbid diet: Mix of [facility] soft/puree consistencies and thin liquids   O2 requirement: Room air    Social/Prior living Lives with spouse    Voice/Speech: Nonverbal    Follows commands: Unable    Cognitive status: Increased lethargy     Oral [facility] exam: Unable to fully assess due to mental status  Dentition: Natural, missing most   Lips (VII):WNL  Tongue (XII): DNT  Mandible (V):DNT  Face/oral sensation (V):symmetrical   Velum (X):DNT    Items administered:  Puree and thin liquids via straw with single/successive sips    Oral stage:  Lip closure: WNL  Mastication:DNT  Bolus formation:WNL  Bolus control:WNL  Transfer: mildly delayed    Pharyngeal stage:  Swallow promptness: appeared mildly delayed  Laryngeal rise:adequates:  No overt s/s aspiration    Esophageal stage:  No s/s reported  H/o GERD    Aspiration precautions posted    Results d/w:  Pt, nursing, family,

## 2024-04-21 NOTE — ASSESSMENT & PLAN NOTE
Residual left sided weakness from prior stroke   Continue statin, pletal   Neuro recommended stopping aspirin

## 2024-04-21 NOTE — PROGRESS NOTES
[facility]  Progress Note  Name: Anjali Shipley I  MRN: [id number]  Unit/Bed#: E4 -01 I Date of Admission: 4/20/2024   Date of Service: 4/21/2024 I Hospital Day: 1    Assessment/Plan   * Weakness  Assessment & Plan  Patient has progressive Parkinson's who presents with worsening weakness specifically right leg per  where she can no longer stand   She is nonverbal primarily bed/wheelchair bound but at baseline can stand pivot for dressing, toileting and bathing   CT head no acute intracranial abnormality   MRI brain ordered to rule out stroke   DBS place din 2013 at [facility] - noted reviewed states exception is MRI brain and only with specific precautions - will follow up with MRI department after discussion with [facility] if mri can be done, otherwise will order CTA H/N with and without contrast   Continue on pletal 100 mg bid alone per neurology, aspirin d/c   Appreciate neurology consult and recs   UA with trace leuks, innummerable bacteria and 20-30 WBC, lower suspicion for UTI - can continue empiric treatment until urine and blood cultures result , day 2 abx   Neuo checks   PT and OT   Purred diet, thin liquids, speech consulted       Parkinson disease  Assessment & Plan  Sinemet 1 tab tid and half tab at night   Robinul 1 mg bid   Neurology consulted     Sacral wound  Assessment & Plan  Present for 3 weeks per    Off load pressure, q2 repo   Wound care consulted   Ordered specialty bed    Hemiparesis affecting left side as late effect of cerebrovascular accident (CVA) ([facility])  Assessment & Plan  Residual left sided weakness from prior stroke   Continue statin, pletal   Neuro recommended stopping aspirin     S/P deep brain stimulator placement  Assessment & Plan  Deep brain stimulator in place since 2013 - placed at [facility]     Severe Lewy body dementia without behavioral disturbance, psychotic disturbance, mood disturbance, or anxiety ([facility])  Assessment & Plan  She is  nonverbal at baseline   Primarily wheelchair/bed bound but can sit to stand for dressing and bathing    is her caregiver       Type 2 diabetes mellitus with microalbuminuria, without long-term current use of insulin ([facility])  Assessment & Plan  Lab Results   Component Value Date    HGBA1C 5.5 03/26/2024       Recent Labs     04/20/24  1609 04/20/24  2133   POCGLU 139 179*       Blood Sugar Average: Last 72 hrs:  (P) 159  SSI, accuchecks   Hold metformin   Last A1c 3.26 well controlled     Mixed hyperlipidemia  Assessment & Plan  Continue statin     Acquired hypothyroidism  Assessment & Plan  Continue levothryoxine              VTE Pharmacologic Prophylaxis: VTE Score: 6 Moderate Risk (Score 3-4) - Pharmacological DVT Prophylaxis Ordered: enoxaparin (Lovenox).    Mobility:   Basic Mobility Inpatient Raw Score: 6  JH-HLM Goal: 2: Bed activities/Dependent transfer  JH-HLM Achieved: 2: Bed activities/Dependent transfer  JH-HLM Goal achieved. Continue to encourage appropriate mobility.    Patient Centered Rounds: I performed bedside rounds with nursing staff today.   Discussions with Specialists or Other Care Team Provider:     Education and Discussions with Family / Patient: number for  is not correct will touch base with him when he is here     Total Time Spent on Date of Encounter in care of patient:  mins. This time was spent on one or more of the following: performing physical exam; counseling and coordination of care; obtaining or reviewing history; documenting in the medical record; reviewing/ordering tests, medications or procedures; communicating with other healthcare professionals and discussing with patient's family/caregivers.    Current Length of Stay: 1 day(s)  Current Patient Status: Inpatient   Certification Statement: The patient will continue to require additional inpatient hospital stay due to weakness   Discharge Plan: Anticipate discharge in 48-72 hrs to discharge location to be determined  pending rehab evaluations.    Code Status: Level 3 - DNAR and DNI    Subjective:   Patient is nonverbal. Lying in bed.     Objective:     Vitals:   Temp (24hrs), Av.1 °F (36.7 °C), Min:97.5 °F (36.4 °C), Max:98.9 °F (37.2 °C)    Temp:  [97.5 °F (36.4 °C)-98.9 °F (37.2 °C)] 98.1 °F (36.7 °C)  HR:  [76-94] 94  Resp:  [18-34] 18  BP: (108-141)/(53-67) 129/58  SpO2:  [92 %-97 %] 92 %  Body mass index is 25.97 kg/m².     Input and Output Summary (last 24 hours):     Intake/Output Summary (Last 24 hours) at 2024 0812  Last data filed at 2024 1801  Gross per 24 hour   Intake 790 ml   Output --   Net 790 ml       Physical Exam:   Physical Exam  Constitutional:       Comments: Eyes closed    Cardiovascular:      Rate and Rhythm: Normal rate and regular rhythm.   Pulmonary:      Effort: Pulmonary effort is normal. No respiratory distress.      Breath sounds: Normal breath sounds. No wheezing or rales.   Abdominal:      General: Bowel sounds are normal.      Palpations: Abdomen is soft.   Musculoskeletal:      Right lower leg: No edema.      Left lower leg: No edema.      Comments: Foot drop bilaterally    Psychiatric:         Speech: She is noncommunicative.         Cognition and Memory: Cognition is impaired. Memory is impaired.          Additional Data:     Labs:  Results from last 7 days   Lab Units 24  0442   WBC Thousand/uL 9.19   HEMOGLOBIN g/dL 13.3   HEMATOCRIT % 39.3   PLATELETS Thousands/uL 292   SEGS PCT % 66   LYMPHO PCT % 20   MONO PCT % 12   EOS PCT % 1     Results from last 7 days   Lab Units 24  0442 24  0916   SODIUM mmol/L 138 136   POTASSIUM mmol/L 3.4* 4.1   CHLORIDE mmol/L 107 103   CO2 mmol/L 26 29   BUN mg/dL 14 14   CREATININE mg/dL 0.60 0.71   ANION GAP mmol/L 5 4   CALCIUM mg/dL 8.2* 9.9   ALBUMIN g/dL  --  3.8   TOTAL BILIRUBIN mg/dL  --  1.04*   ALK PHOS U/L  --  73   ALT U/L  --  27   AST U/L  --  44*   GLUCOSE RANDOM mg/dL 128 127     Results from last 7 days   Lab  Units 04/20/24  0916   INR  1.11     Results from last 7 days   Lab Units 04/20/24  2133 04/20/24  1609   POC GLUCOSE mg/dl 179* 139         Results from last 7 days   Lab Units 04/20/24  0916   LACTIC ACID mmol/L 1.2   PROCALCITONIN ng/ml 0.11       Lines/Drains:  Invasive Devices       Peripheral Intravenous Line  Duration             Peripheral IV 04/20/24 Right Antecubital <1 day                      Telemetry:  Telemetry Orders (From admission, onward)               24 Hour Telemetry Monitoring  Continuous x 24 Hours ([place])        Expiring   Question:  Reason for 24 Hour Telemetry  Answer:  TIA/Suspected CVA/ Confirmed CVA                     Telemetry Reviewed: Normal Sinus Rhythm  Indication for Continued Telemetry Use: No indication for continued use. Will discontinue.              Imaging: Reviewed radiology reports from this admission including: CT head    Recent Cultures (last 7 days):   Results from last 7 days   Lab Units 04/20/24  0927 04/20/24  0916   BLOOD CULTURE  Received in Microbiology Lab. Culture in Progress. Received in Microbiology Lab. Culture in Progress.       Last 24 Hours Medication List:   Current Facility-Administered Medications   Medication Dose Route Frequency Provider Last Rate    acetaminophen  650 mg Oral Q8H PRN Xena Lujan PA-C      carbidopa-levodopa  0.5 tablet Oral HS Xena Lujan PA-C      carbidopa-levodopa  1 tablet Oral TID Xena Lujan PA-C      cefTRIAXone  1,000 mg Intravenous Q24H Xena Lujan PA-C      cilostazol  100 mg Oral BID AC Xena Lujan PA-C      enoxaparin  40 mg Subcutaneous Daily Xena Lujan PA-C      famotidine  20 mg Oral Daily Xena Lujan PA-C      glycopyrrolate  1 mg Oral BID Xena Lujan PA-C      insulin lispro  1-5 Units Subcutaneous TID AC Xena Lujan PA-C      insulin lispro  1-5 Units Subcutaneous HS Xena Lujan PA-C      levothyroxine  75 mcg Oral Early Morning eXna Lujan PA-C      magnesium  Oxide  400 mg Oral BID Xena Lujan PA-C      potassium chloride  20 mEq Oral Once Xena Lujan PA-C      pravastatin  40 mg Oral Daily With Dinner Xena Lujan PA-C          Today, Patient Was Seen By: Xena Lujan PA-C    **Please Note: This note may have been constructed using a voice recognition system.**

## 2024-04-21 NOTE — PLAN OF CARE
Problem: Potential for Falls  Goal: Patient will remain free of falls  Description: INTERVENTIONS:  - Educate patient/family on patient safety including physical limitations  - Instruct patient to call for assistance with activity   - Consult OT/PT to assist with strengthening/mobility   - Keep Call bell within reach  - Keep bed low and locked with side rails adjusted as appropriate  - Keep care items and personal belongings within reach  - Initiate and maintain comfort rounds  - Make Fall Risk Sign visible to staff  - Apply yellow socks and bracelet for high fall risk patients  - Consider moving patient to room near nurses station  Outcome: Progressing     Problem: PAIN - ADULT  Goal: Verbalizes/displays adequate comfort level or baseline comfort level  Description: Interventions:  - Encourage patient to monitor pain and request assistance  - Assess pain using appropriate pain scale  - Administer analgesics based on type and severity of pain and evaluate response  - Implement non-pharmacological measures as appropriate and evaluate response  - Consider cultural and social influences on pain and pain management  - Notify physician/advanced practitioner if interventions unsuccessful or patient reports new pain  Outcome: Progressing     Problem: INFECTION - ADULT  Goal: Absence or prevention of progression during hospitalization  Description: INTERVENTIONS:  - Assess and monitor for signs and symptoms of infection  - Monitor lab/diagnostic results  - Monitor all insertion sites, i.e. indwelling lines, tubes, and drains  - Monitor endotracheal if appropriate and nasal secretions for changes in amount and color  - [place] appropriate cooling/warming therapies per order  - Administer medications as ordered  - Instruct and encourage patient and family to use good hand hygiene technique  - Identify and instruct in appropriate isolation precautions for identified infection/condition  Outcome: Progressing  Goal: Absence  of fever/infection during neutropenic period  Description: INTERVENTIONS:  - Monitor WBC    Outcome: Progressing     Problem: SAFETY ADULT  Goal: Maintain or return to baseline ADL function  Description: INTERVENTIONS:  -  Assess patient's ability to carry out ADLs; assess patient's baseline for ADL function and identify physical deficits which impact ability to perform ADLs (bathing, care of mouth/teeth, toileting, grooming, dressing, etc.)  - Assess/evaluate cause of self-care deficits   - Assess range of motion  - Assess patient's mobility; develop plan if impaired  - Assess patient's need for assistive devices and provide as appropriate  - Encourage maximum independence but intervene and supervise when necessary  - Involve family in performance of ADLs  - Assess for home care needs following discharge   - Consider OT consult to assist with ADL evaluation and planning for discharge  - Provide patient education as appropriate  Outcome: Progressing  Goal: Maintains/Returns to pre admission functional level  Description: INTERVENTIONS:  - Perform AM-PAC 6 Click Basic Mobility/ Daily Activity assessment daily.  - Set and communicate daily mobility goal to care team and patient/family/caregiver.   - Collaborate with rehabilitation services on mobility goals if consulted  - Out of bed for toileting  - Record patient progress and toleration of activity level   Outcome: Progressing     Problem: DISCHARGE PLANNING  Goal: Discharge to home or other facility with appropriate resources  Description: INTERVENTIONS:  - Identify barriers to discharge w/patient and caregiver  - Arrange for needed discharge resources and transportation as appropriate  - Identify discharge learning needs (meds, wound care, etc.)  - Arrange for interpretive services to assist at discharge as needed  - Refer to Case Management Department for coordinating discharge planning if the patient needs post-hospital services based on physician/advanced  practitioner order or complex needs related to functional status, cognitive ability, or social support system  Outcome: Progressing     Problem: Knowledge Deficit  Goal: Patient/family/caregiver demonstrates understanding of disease process, treatment plan, medications, and discharge instructions  Description: Complete learning assessment and assess knowledge base.  Interventions:  - Provide teaching at level of understanding  - Provide teaching via preferred learning methods  Outcome: Progressing     Problem: Prexisting or High Potential for Compromised Skin Integrity  Goal: Skin integrity is maintained or improved  Description: INTERVENTIONS:  - Identify patients at risk for skin breakdown  - Assess and monitor skin integrity  - Assess and monitor nutrition and hydration status  - Monitor labs   - Assess for incontinence   - Turn and reposition patient  - Assist with mobility/ambulation  - Relieve pressure over bony prominences  - Avoid friction and shearing  - Provide appropriate hygiene as needed including keeping skin clean and dry  - Evaluate need for skin moisturizer/barrier cream  - Collaborate with interdisciplinary team   - Patient/family teaching  - Consider wound care consult   Outcome: Progressing

## 2024-04-21 NOTE — ASSESSMENT & PLAN NOTE
Lab Results   Component Value Date    HGBA1C 5.5 03/26/2024       Recent Labs     04/20/24  1609 04/20/24  2133   POCGLU 139 179*       Blood Sugar Average: Last 72 hrs:  (P) 159  SSI, accuchecks   Hold metformin   Last A1c 3.26 well controlled

## 2024-04-21 NOTE — ASSESSMENT & PLAN NOTE
Patient has progressive Parkinson's who presents with worsening weakness specifically right leg per  where she can no longer stand   She is nonverbal primarily bed/wheelchair bound but at baseline can stand pivot for dressing, toileting and bathing   CT head no acute intracranial abnormality   MRI brain ordered to rule out stroke   DBS place din 2013 at [facility] - noted reviewed states exception is MRI brain and only with specific precautions - will follow up with MRI department after discussion with [employer] if mri can be done, otherwise will order CTA H/N with and without contrast   Continue on pletal 100 mg bid alone per neurology, aspirin d/c   Appreciate neurology consult and recs   UA with trace leuks, innummerable bacteria and 20-30 WBC, lower suspicion for UTI - can continue empiric treatment until urine and blood cultures result , day 2 abx   Neuo checks   PT and OT   Purred diet, thin liquids, speech consulted

## 2024-04-22 ENCOUNTER — APPOINTMENT (INPATIENT)
Dept: RADIOLOGY | Facility: HOSPITAL | Age: 79
DRG: 689 | End: 2024-04-22
Payer: MEDICARE

## 2024-04-22 ENCOUNTER — APPOINTMENT (INPATIENT)
Dept: CT IMAGING | Facility: HOSPITAL | Age: 79
DRG: 689 | End: 2024-04-22
Payer: MEDICARE

## 2024-04-22 LAB
ANION GAP SERPL CALCULATED.3IONS-SCNC: 7 MMOL/L (ref 4–13)
BASOPHILS # BLD AUTO: 0.08 THOUSANDS/ÂΜL (ref 0–0.1)
BASOPHILS NFR BLD AUTO: 1 % (ref 0–1)
BUN SERPL-MCNC: 16 MG/DL (ref 5–25)
CALCIUM SERPL-MCNC: 8.4 MG/DL (ref 8.4–10.2)
CHLORIDE SERPL-SCNC: 107 MMOL/L (ref 96–108)
CO2 SERPL-SCNC: 25 MMOL/L (ref 21–32)
CREAT SERPL-MCNC: 0.58 MG/DL (ref 0.6–1.3)
EOSINOPHIL # BLD AUTO: 0.12 THOUSAND/ÂΜL (ref 0–0.61)
EOSINOPHIL NFR BLD AUTO: 2 % (ref 0–6)
ERYTHROCYTE [DISTWIDTH] IN BLOOD BY AUTOMATED COUNT: 14.2 % (ref 11.6–15.1)
GFR SERPL CREATININE-BSD FRML MDRD: 86 ML/MIN/1.73SQ M
GLUCOSE SERPL-MCNC: 126 MG/DL (ref 65–140)
GLUCOSE SERPL-MCNC: 130 MG/DL (ref 65–140)
GLUCOSE SERPL-MCNC: 134 MG/DL (ref 65–140)
GLUCOSE SERPL-MCNC: 174 MG/DL (ref 65–140)
GLUCOSE SERPL-MCNC: 196 MG/DL (ref 65–140)
HCT VFR BLD AUTO: 39.6 % (ref 34.8–46.1)
HGB BLD-MCNC: 13.6 G/DL (ref 11.5–15.4)
IMM GRANULOCYTES # BLD AUTO: 0.02 THOUSAND/UL (ref 0–0.2)
IMM GRANULOCYTES NFR BLD AUTO: 0 % (ref 0–2)
LYMPHOCYTES # BLD AUTO: 1.81 THOUSANDS/ÂΜL (ref 0.6–4.47)
LYMPHOCYTES NFR BLD AUTO: 23 % (ref 14–44)
MCH RBC QN AUTO: 31.5 PG (ref 26.8–34.3)
MCHC RBC AUTO-ENTMCNC: 34.3 G/DL (ref 31.4–37.4)
MCV RBC AUTO: 92 FL (ref 82–98)
MONOCYTES # BLD AUTO: 0.88 THOUSAND/ÂΜL (ref 0.17–1.22)
MONOCYTES NFR BLD AUTO: 11 % (ref 4–12)
NEUTROPHILS # BLD AUTO: 5.13 THOUSANDS/ÂΜL (ref 1.85–7.62)
NEUTS SEG NFR BLD AUTO: 63 % (ref 43–75)
NRBC BLD AUTO-RTO: 0 /100 WBCS
PLATELET # BLD AUTO: 320 THOUSANDS/UL (ref 149–390)
PMV BLD AUTO: 9.6 FL (ref 8.9–12.7)
POTASSIUM SERPL-SCNC: 3.4 MMOL/L (ref 3.5–5.3)
RBC # BLD AUTO: 4.32 MILLION/UL (ref 3.81–5.12)
SODIUM SERPL-SCNC: 139 MMOL/L (ref 135–147)
WBC # BLD AUTO: 8.04 THOUSAND/UL (ref 4.31–10.16)

## 2024-04-22 PROCEDURE — 80048 BASIC METABOLIC PNL TOTAL CA: CPT | Performed by: PHYSICIAN ASSISTANT

## 2024-04-22 PROCEDURE — 85025 COMPLETE CBC W/AUTO DIFF WBC: CPT | Performed by: PHYSICIAN ASSISTANT

## 2024-04-22 PROCEDURE — 97167 OT EVAL HIGH COMPLEX 60 MIN: CPT

## 2024-04-22 PROCEDURE — 97163 PT EVAL HIGH COMPLEX 45 MIN: CPT

## 2024-04-22 PROCEDURE — 92526 ORAL FUNCTION THERAPY: CPT

## 2024-04-22 PROCEDURE — 99232 SBSQ HOSP IP/OBS MODERATE 35: CPT | Performed by: STUDENT IN AN ORGANIZED HEALTH CARE EDUCATION/TRAINING PROGRAM

## 2024-04-22 PROCEDURE — 82948 REAGENT STRIP/BLOOD GLUCOSE: CPT

## 2024-04-22 RX ADMIN — CARBIDOPA AND LEVODOPA 1 TABLET: 25; 100 TABLET ORAL at 12:41

## 2024-04-22 RX ADMIN — INSULIN LISPRO 1 UNITS: 100 INJECTION, SOLUTION INTRAVENOUS; SUBCUTANEOUS at 21:35

## 2024-04-22 RX ADMIN — GLYCOPYRROLATE 1 MG: 1 TABLET ORAL at 08:11

## 2024-04-22 RX ADMIN — CEFTRIAXONE 1000 MG: 1 INJECTION, SOLUTION INTRAVENOUS at 12:39

## 2024-04-22 RX ADMIN — CARBIDOPA AND LEVODOPA 0.5 TABLET: 25; 100 TABLET ORAL at 21:35

## 2024-04-22 RX ADMIN — FAMOTIDINE 20 MG: 20 TABLET, FILM COATED ORAL at 08:11

## 2024-04-22 RX ADMIN — GLYCOPYRROLATE 1 MG: 1 TABLET ORAL at 17:32

## 2024-04-22 RX ADMIN — CILOSTAZOL 100 MG: 50 TABLET ORAL at 17:33

## 2024-04-22 RX ADMIN — CILOSTAZOL 100 MG: 50 TABLET ORAL at 05:32

## 2024-04-22 RX ADMIN — PRAVASTATIN SODIUM 40 MG: 40 TABLET ORAL at 17:32

## 2024-04-22 RX ADMIN — INSULIN LISPRO 1 UNITS: 100 INJECTION, SOLUTION INTRAVENOUS; SUBCUTANEOUS at 17:32

## 2024-04-22 RX ADMIN — LEVOTHYROXINE SODIUM 75 MCG: 75 TABLET ORAL at 05:32

## 2024-04-22 RX ADMIN — ENOXAPARIN SODIUM 40 MG: 40 INJECTION SUBCUTANEOUS at 08:10

## 2024-04-22 RX ADMIN — CARBIDOPA AND LEVODOPA 1 TABLET: 25; 100 TABLET ORAL at 17:32

## 2024-04-22 RX ADMIN — CARBIDOPA AND LEVODOPA 1 TABLET: 25; 100 TABLET ORAL at 08:11

## 2024-04-22 NOTE — CASE MANAGEMENT
Case Management Discharge Planning Note    Patient name Anjali Shipley  Location [facility] /E4 -* MRN [id number]  : 1945 Date 2024       Current Admission Date: 2024  Current Admission Diagnosis:Weakness   Patient Active Problem List    Diagnosis Date Noted    Sacral wound 2024    Cognitive decline 04/10/2024    Hemiparesis affecting left side as late effect of cerebrovascular accident (CVA) ([facility]) 2022    Hypokalemia 2022    Metabolic encephalopathy 2021    PVC (premature ventricular contraction) 2021    Chorea 2020    Severe Lewy body dementia without behavioral disturbance, psychotic disturbance, mood disturbance, or anxiety ([facility]) 2020    S/P deep brain stimulator placement 2020    Type 2 diabetes mellitus with microalbuminuria, without long-term current use of insulin ([facility]) 2020    Weakness 2020    Chronic cough 2019    Decreased ambulation status 2019    Major depression with psychotic features ([facility]) 2019    Sialorrhea 2018    Allergic rhinitis 2017    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 2016    Acquired hypothyroidism 2016    Mixed hyperlipidemia 2016    Parkinson disease 2016    GERD (gastroesophageal reflux disease) 2016    DYANA (generalized anxiety disorder) 2015    Osteoporosis 2012      LOS (days): 2  Geometric Mean LOS (GMLOS) (days): 4  Days to GMLOS:1.7     OBJECTIVE:  Risk of Unplanned Readmission Score: 11.71         Current admission status: Inpatient   Preferred Pharmacy:   [facility]/pharmacy #[id number] - [place] - [address]  [address]  [place] [address]  Phone: [phone] Fax: [phone]    [facility] - [facility] [address]  [address] [address]  Phone: [phone] Fax: [phone]    Primary Care Provider: Satnam  DO Doyle    Primary Insurance: MEDICARE  Secondary Insurance: [employer]    DISCHARGE DETAILS:      Additional Comments: CM attempted initial assessment and discussion about preferred discharge plan. CM wa unable to meet w/ Spouse in person. Pt is not able to communicate effectively. CM tried to contact spouse via telephone, however the phone number angeles in Pts EMR under her 's name rings to [facility]. CM was informed by Nursing that Pt's spouse prefers Pt discharge to home w/ HHPT/OT/SN- rather than Pt discharging to STR, as recommended by the SLA therapy team. Referral sent for Home therapy and Nursing. CM following to complete initial assessment and complete set up for HHPT/OT/SN.

## 2024-04-22 NOTE — DISCHARGE INSTR - OTHER ORDERS
Wound Care Plan:   1-Apply Remedy silicone cream/Hydraguard to bilateral heels twice daily for skin protection/hydration.  2-Elevate heels off of bed/chair surface to offload pressure.  3-Offloading air cushion in chair when out of bed.  4-Apply lotion to body daily and as needed.  5-Turn/reposition every 2 hours while in bed and weight shift frequently while in chair for pressure re-distribution on skin.   6-Homeloce positioning system or P500 low air-loss mattress.    7-Buttocks and sacrum--cleanse with soap and water, pat dry.  WITH PUREWICK IN USE--Apply Xeroform to open and purple areas.  Cover with silicone bordered foam dressing for treatment.  Change dressings daily and as needed. IF INCONTINENCE CANNOT BE MANAGED--discontinue dressings and apply calazime paste three times daily and as needed with incontinence.    Follow-up at the [facility]--[phone].

## 2024-04-22 NOTE — ASSESSMENT & PLAN NOTE
Residual left-sided weakness from prior stroke.   Continue statin, pletal  Aspirin on hold for now per neurology not indicated

## 2024-04-22 NOTE — OCCUPATIONAL THERAPY NOTE
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    Acquired hypothyroidism    Mixed hyperlipidemia    Parkinson disease    Type 2 diabetes mellitus with microalbuminuria, without long-term current use of insulin ([facility])    Severe Lewy body dementia without behavioral disturbance, psychotic disturbance, mood disturbance, or anxiety ([facility])    S/P deep brain stimulator placement    Hemiparesis affecting left side as late effect of cerebrovascular accident (CVA) ([facility])    Sacral wound    Past Medical History  Past Medical History:   Diagnosis Date    Abdominal pain, suprapubic 07/03/2014    Resolved:  November 22, 2016    Abscess of skin and subcutaneous tissue 11/21/2016    Resolved:  February 28, 2017    Acute kidney failure, unspecified ([facility])     Anxiety     Burning with urination 12/14/2016    Resolved:  February 28, 2017    Conjunctivitis 06/10/2013    Depression     Depression     Diabetes mellitus ([facility]) 02/02/2020    Dysuria 06/06/2016    Resolved:  February 28 2017    Falling 10/18/2012    GERD (gastroesophageal reflux disease)     Groin pain 04/16/2015    Resolved:  February 28, 2017    Hyperlipidemia     Hypothyroid     Influenza 04/05/2013    Leg abrasion 09/22/2016    Resolved:  February 28, 2017    Parkinson's disease     Psychiatric illness     Rectal pain 10/17/2013    Resolved:  February 28, 2017    Squamous cell skin cancer 03/22/2022    Left lateral calf    Thyroid disease      Past Surgical History  Past Surgical History:   Procedure Laterality Date    APPENDECTOMY      Age 6 or 7    BACK SURGERY      BRAIN SURGERY      CLOSED REDUCTION NASAL FRACTURE      CYSTOSCOPY  01/28/2014    Ivan Siddiqui M.D.  (Diagnostic)    DEEP BRAIN STIMULATOR PLACEMENT      EYE SURGERY      FL MYELOGRAM CERVICAL  3/4/2014     FRACTURE SURGERY      JOINT REPLACEMENT      LAMINECTOMY      Decompress, Facetectomy, Foraminotomy Lumbar Seg    ORIF HIP FRACTURE Left     About 3 years ago    SKIN BIOPSY      TONSILLECTOMY          \   04/22/24 0911   OT Last Visit   OT Visit Date 04/22/24   Note Type   Note type Evaluation   Additional Comments greeted in supine.   Pain Assessment   Pain Assessment Tool FLACC   Pain Rating: FLACC (Rest) - Face 0   Pain Rating: FLACC (Rest) - Legs 0   Pain Rating: FLACC (Rest) - Activity 0   Pain Rating: FLACC (Rest) - Cry 0   Pain Rating: FLACC (Rest) - Consolability 0   Score: FLACC (Rest) 0   Pain Rating: FLACC (Activity) - Face 0   Pain Rating: FLACC (Activity) - Legs 0   Pain Rating: FLACC (Activity) - Activity 0   Pain Rating: FLACC (Activity) - Cry 0   Pain Rating: FLACC (Activity) - Consolability 0   Score: FLACC (Activity) 0   Restrictions/Precautions   Weight Bearing Precautions Per Order No   Other Precautions Cognitive;Bed Alarm;Chair Alarm;Multiple lines;Fall Risk  (hx of left kenroy, mostly nonverbal.)   Home Living   Type of Home House   Home Layout One level  (0 KARLA)   Bathroom Shower/Tub   (sponge bathes)   Bathroom Toilet Standard   Home Equipment Hospital bed;Wheelchair-manual   Prior Function   Level of [place] Needs assistance with ADLs;Needs assistance with IADLS;Needs assistance with functional mobility   Lives With Spouse   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   IADLs Family/Friend/Other provides transportation;Family/Friend/Other provides meals;Family/Friend/Other provides medication management   Comments Prior to admission, pt lives with  yola 1 level house with 0 KARLA. Primarily bed/ WC bound. Per chat  in caregiver. Ax1 for transfers to . Spends most of day in bed. Pt unable to provide hx - mostly nonverbal during session. Able to follow simple cues.   Lifestyle   Autonomy A for all self care tasks.   Reciprocal Relationships    ADL   Where Assessed Edge of bed   Eating  Assistance 1  Total Assistance   Grooming Assistance 1  Total Assistance   UB Bathing Assistance 2  Maximal Assistance   LB Bathing Assistance 1  Total Assistance   UB Dressing Assistance 2  Maximal Assistance   LB Dressing Assistance 1  Total Assistance   Toileting Assistance  1  Total Assistance   Bed Mobility   Rolling R 2  Maximal assistance   Additional items Assist x 1;Increased time required;Verbal cues   Rolling L 2  Maximal assistance   Additional items Assist x 1;Increased time required   Supine to Sit 2  Maximal assistance   Additional items Assist x 2;Increased time required;LE management;Verbal cues   Sit to Supine 2  Maximal assistance   Additional items Assist x 2;Increased time required;Verbal cues;LE management   Transfers   Sit to Stand 2  Maximal assistance   Additional items Assist x 2;Increased time required;Verbal cues   Stand to Sit 2  Maximal assistance   Additional items Assist x 2;Increased time required;Verbal cues   Additional Comments max cues for encouragement and participation.   Functional Mobility   Functional Mobility   (N/A)   Balance   Static Sitting Poor +   Dynamic Sitting Poor -   Static Standing Poor -   Dynamic Standing   (Zero)   Ambulatory Zero   Activity Tolerance   Activity Tolerance Patient limited by fatigue   Medical Staff Made Aware PT Reyes   Nurse Made Aware RN Pastora   RUDONNY Assessment   RUE Assessment   (PROM WFL, limitied initiation for AROM.)   LUE Assessment   LUE Assessment   (hx of left kenroy from CVA)   Hand Function   Gross Motor Coordination Impaired   Fine Motor Coordination Impaired   Vision-Basic Assessment   Current Vision Wears glasses all the time   Psychosocial   Psychosocial (WDL) X   Cognition   Overall Cognitive Status Impaired   Arousal/Participation Lethargic   Attention Difficulty attending to directions   Orientation Level Oriented to person   Memory Decreased long term memory;Decreased recall of biographical information;Decreased short term  memory;Decreased recall of recent events;Decreased recall of precautions   Following Commands Unable to follow one step commands   Comments limited engagement, mostly non verbal. responded to simple cues.   Assessment   Limitation Decreased ADL status;Decreased UE ROM;Decreased UE strength;Decreased Safe judgement during ADL;Decreased endurance;Decreased self-care trans   Prognosis Guarded   Assessment Anjali Shipley is a 79 y.o. female seen for OT evaluation s/p admit to [facility] on 4/20/2024 w/ Weakness. CT negative and MRI pending for r/o stroke. Comorbidities affecting pt's functional performance at time of assessment include: DM, dementia, and Parkinsons, sacral wound, kenroy paresis, deep brain stimulator . Pt with active OT orders and activity orders for Up and OOB as tolerated. Personal factors affecting pt at time of IE include:difficulty performing ADLs, difficulty performing IADLs, difficulty performing transfers/mobility, limited insight into deficits, fall risk , and functional decline . Prior to admission, pt lives with  yola 1 level house with 0 KARLA. Primarily bed/ WC bound. Per chat  in caregiver. Ax1 for transfers to WC. Spends most of day in bed. Pt unable to provide hx - mostly nonverbal during session. Able to follow simple cues. Upon evaluation: Pt currently requires maxA for UB ADLs, dependent for LB ADLs, dependent for toileting, maxAx1 for rolling, maxAx2 supine<> sit for bed mobility, maxAx2 for functional transfers, and N/A mobility 2* the following deficits impacting occupational performance:weakness, decreased strength , decreased balance, decreased activity tolerance, and limited functional reach. Pt to benefit from continued skilled OT tx while in the hospital to address deficits as defined above and maximize level of functional independence w ADL's and functional mobility. Occupational Performance areas to address include: eating, grooming, bathing/shower, toilet hygiene,  dressing, health maintenance, functional mobility, and clothing management. From OT standpoint, recommendation at time of d/c would be level 2 vs 3 resources pending family support and care at home. OT to follow pt on caseload 2-3x/wk. Wound benefit from mechanical lift at home.   Goals   Patient Goals none stated.   STG Time Frame 3-5   Short Term Goal #1 Pt will improve activity tolerance to G for min 30 min txment sessions for increase engagement in functional tasks   Short Term Goal #2 Pt will complete bed mobility at a MinAx1 level w/ G balance/safety demonstrated to decrease caregiver assistance required   LTG Time Frame 10-14   Long Term Goal #1 Pt will improve functional transfers to ModAx1 on/off all surfaces using DME as needed w/ G balance/safety, BED<> WC, WC<> TOILET.   Long Term Goal #2 Pt will demonstrate G carryover of pt/caregiver education and training as appropriate w/o cues w/ good tolerance to increase safety during functional tasks   Plan   Treatment Interventions ADL retraining;Functional transfer training;UE strengthening/ROM;Endurance training;Cognitive reorientation;Patient/family training;Equipment evaluation/education;Neuromuscular reeducation;Compensatory technique education;Energy conservation   Goal Expiration Date 05/06/24   OT Treatment Day 0   OT Frequency 2-3x/wk   Discharge Recommendation   Rehab Resource Intensity Level, OT II (Moderate Resource Intensity)  (vs level 3 pending support and assist at home.)   Additional Comments  The patient's raw score on the -PAC Daily Activity Inpatient Short Form is 7. A raw score of less than 19 suggests the patient may benefit from discharge to post-acute rehabilitation services. Please refer to the recommendation of the Occupational Therapist for safe discharge planning.   AM-PAC Daily Activity Inpatient   Lower Body Dressing 1   Bathing 1   Toileting 1   Upper Body Dressing 2   Grooming 1   Eating 1   Daily Activity Raw Score 7   Turning  Head Towards Sound 2   Follow Simple Instructions 2   Low Function Daily Activity Raw Score 11   Low Function Daily Activity Standardized Score  19.45   AM-PAC Applied Cognition Inpatient   Following a Speech/Presentation 2   Understanding Ordinary Conversation 2   Taking Medications 1   Remembering Where Things Are Placed or Put Away 1   Remembering List of 4-5 Errands 1   Taking Care of Complicated Tasks 1   Applied Cognition Raw Score 8   Applied Cognition Standardized Score 19.32   Ashley Gates, OT

## 2024-04-22 NOTE — ASSESSMENT & PLAN NOTE
79 year old female with progressive Parkinson's disease who presented with worsening weakness.    Baseline: Mainly in bed / wheelchair, but is able to stand, pivot for dressing toileting, and bathing  CT head without acute intracranial abormalities.  MRI brain pending  DBS place din 2013 at [facility] - noted reviewed states exception is MRI brain and only with specific precautions. If our Mri is unable to do it, CTA head and neck with and without contrast will be ordered.  Continue Pletal 100mg BID for now, aspirin held   UA / UC showing Gram negative enteric izabella like. Continue ceftriaxone for the time being due to suspicion for possible UTI.   Neuro checks  PT OT Recommending moderate resource intensity  Speech recommending Pureed diet, thin liquids. Continue Speech therapy.

## 2024-04-22 NOTE — PLAN OF CARE
Problem: PHYSICAL THERAPY ADULT  Goal: Performs mobility at highest level of function for planned discharge setting.  See evaluation for individualized goals.  Description: Treatment/Interventions: Functional transfer training, LE strengthening/ROM, Therapeutic exercise, Endurance training, Patient/family training, Bed mobility, Spoke to nursing, Spoke to case management, OT          See flowsheet documentation for full assessment, interventions and recommendations.  4/22/2024 1150 by Reyes Arriaza, PT  Note: Prognosis: Poor  Problem List: Decreased strength, Decreased range of motion, Decreased endurance, Impaired balance, Decreased mobility, Decreased cognition, Impaired judgement, Decreased safety awareness  Assessment: Pt. 79 y.o.female w/ known Parkinson's disease & CVA w/ L residual weakness, presented w/ R leg weakness. Pt admitted for Weakness w/ UTI (urinary tract infection) (N39.0)  & Sacral decubitus ulcer, stage II (HCC) (L89.152). CT showed no acute intracranial abnormality. MRI pending. Pt referred to PT for mobility assessment & D/C planning w/ orders of Up and OOB as tolerated . Please see above for information re: home set-up & PLOF as well as objective findings during PT assessment. Pt nonverbal at baseline. Per chart, pt require assistance w/ overall ADL's & bed/wheelchair bound but able to SPT w/ assistance from  at baseline. On eval, pt functioning below baseline hence will continue skilled PT to improve function & safety. Pt require maxAx2 for most functional mobility except rolling in bed require maxAx1 + cues for techniques & safety. (+) L ankle contracture. Pt able to sit at EOB w/ maxAx1 to maintain sitting balance w/ EOB.Pt w/ R trunk lean & falling to the R without support EOB. Pt able to stand w/ maxAx2 but w/ poor standing balance & tolerance. Pt not appropriate for bed<>chair transfers at this time. Will recommend [facility] staff to use igor lift for OOB. The patient's AM-PAC Basic  Mobility Inpatient Short Form Raw Score is 6. A Raw score of less than or equal to 16 suggests the patient may benefit from discharge to post-acute rehabilitation services. Please also refer to the recommendation of the Physical Therapist for safe discharge planning. From PT standpoint, will recommend Level II (moderate resource intensity) rehab services at D/C, pending progress & available support at home. No SOB & dizziness noted t/o session. Nsg staff most recent vital signs as follows: /61 (BP Location: Left arm)   Pulse 92   Temp 97.6 °F (36.4 °C) (Temporal)   Resp 18   Wt 66.5 kg (146 lb 9.7 oz)   SpO2 93%   BMI 25.97 kg/m² . At end of session, pt back in bed in stable condition, call bell & phone in reach, bed alarm activated. CM to follow. Nsg staff to continue to mobilized pt as appropriate to prevent further decline in function. Nsg notified. Co-eval was necessary to complete this PT eval for the pt's best interest given pt's medical acuity & complexity.        Rehab Resource Intensity Level, PT: II (Moderate Resource Intensity)    See flowsheet documentation for full assessment.

## 2024-04-22 NOTE — PHYSICAL THERAPY NOTE
PT EVALUATION    Pt. Name: Anjali Shipley  Pt. Age: 79 y.o.  MRN: [id number]  LENGTH OF STAY: 2      Admitting Diagnoses:   UTI (urinary tract infection) [N39.0]  Weakness [R53.1]  Sacral decubitus ulcer, stage II ([facility]) [L89.152]    Past Medical History:   Diagnosis Date    Abdominal pain, suprapubic 07/03/2014    Resolved:  November 22, 2016    Abscess of skin and subcutaneous tissue 11/21/2016    Resolved:  February 28, 2017    Acute kidney failure, unspecified ([facility])     Anxiety     Burning with urination 12/14/2016    Resolved:  February 28, 2017    Conjunctivitis 06/10/2013    Depression     Depression     Diabetes mellitus ([facility]) 02/02/2020    Dysuria 06/06/2016    Resolved:  February 28 2017    Falling 10/18/2012    GERD (gastroesophageal reflux disease)     Groin pain 04/16/2015    Resolved:  February 28, 2017    Hyperlipidemia     Hypothyroid     Influenza 04/05/2013    Leg abrasion 09/22/2016    Resolved:  February 28, 2017    Parkinson's disease     Psychiatric illness     Rectal pain 10/17/2013    Resolved:  February 28, 2017    Squamous cell skin cancer 03/22/2022    Left lateral calf    Thyroid disease        Past Surgical History:   Procedure Laterality Date    APPENDECTOMY      Age 6 or 7    BACK SURGERY      BRAIN SURGERY      CLOSED REDUCTION NASAL FRACTURE      CYSTOSCOPY  01/28/2014    Ivan Siddiqui M.D.  (Diagnostic)    DEEP BRAIN STIMULATOR PLACEMENT      EYE SURGERY      FL MYELOGRAM CERVICAL  3/4/2014    FRACTURE SURGERY      JOINT REPLACEMENT      LAMINECTOMY      Decompress, Facetectomy, Foraminotomy Lumbar Seg    ORIF HIP FRACTURE Left     About 3 years ago    SKIN BIOPSY      TONSILLECTOMY         Imaging Studies:  CT head without contrast   Final Result by Boni Harris MD (04/20 1019)      No acute intracranial abnormality.                  Resident: BONI LITTLEJOHN I, the attending radiologist, have reviewed the images and agree with the final report above.       Workstation performed: [id number]         XR chest 1 view portable   Final Result by Dany Salcedo MD (04/20 0914)      Minimal atelectasis in the right midlung field.      Interval removal of the left-sided stimulator device with both of the leads extending up the neck now attached to the right-sided device.      There appears to be a small hiatal hernia      Workstation performed: [id number]         MRI inpatient order    (Results Pending)         04/22/24 0920   PT Last Visit   PT Visit Date 04/22/24   Note Type   Note type Evaluation   Pain Assessment   Pain Assessment Tool FLACC   Pain Rating: FLACC (Rest) - Face 0   Pain Rating: FLACC (Rest) - Legs 0   Pain Rating: FLACC (Rest) - Activity 0   Pain Rating: FLACC (Rest) - Cry 0   Pain Rating: FLACC (Rest) - Consolability 0   Score: FLACC (Rest) 0   Pain Rating: FLACC (Activity) - Face 0   Pain Rating: FLACC (Activity) - Legs 0   Pain Rating: FLACC (Activity) - Activity 0   Pain Rating: FLACC (Activity) - Cry 0   Pain Rating: FLACC (Activity) - Consolability 0   Score: FLACC (Activity) 0   Restrictions/Precautions   Other Precautions Cognitive;Chair Alarm;Bed Alarm;Fall Risk  (nonverbal & bed/wheelchair bound at baseline)   Home Living   Type of Home House   Home Equipment Hospital bed;Other (Comment)  (igor lift)   Additional Comments pt poor historian; above information gathered from chart   Prior Function   Level of [place] Needs assistance with functional mobility   Lives With Spouse   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   Falls in the last 6 months   (unknown)   Vocational Retired   Comments Per chart, pt bed/wheelchair bound but able to SPT w/ assistance from  at baseline; total dependence for ADLs & IADLs at baseline   General   Additional Pertinent History h/o CVA w/ residual L sided weakness; Parkinson's   Family/Caregiver Present No   Cognition   Overall Cognitive Status Impaired   Arousal/Participation Alert   Orientation Level  Oriented to person;Disoriented to place;Disoriented to time;Disoriented to situation  (pt respond to name)   Following Commands Follows one step commands with increased time or repetition   Comments pt nonverbal at baseline   Subjective   Subjective RN cleared pt for PT/OT evals.   RUE Assessment   RUE Assessment   (refer to OT)   LUE Assessment   LUE Assessment   (refer to OT)   RLE Assessment   RLE Assessment X  (pt unable to follow commands for MMT; PROM WFL)   LLE Assessment   LLE Assessment X  (pt unable to follow commands for MMT; PROM WFL but (+) ankle contracture)   Coordination   Movements are Fluid and Coordinated 0   Coordination and Movement Description pt unable to follow commands   Bed Mobility   Rolling R 2  Maximal assistance   Additional items Assist x 1;Bedrails;Increased time required;Verbal cues;LE management   Rolling L 2  Maximal assistance   Additional items Assist x 1;Increased time required;Verbal cues;LE management;Bedrails   Supine to Sit 2  Maximal assistance   Additional items Assist x 2;HOB elevated;Bedrails;Increased time required;Verbal cues;LE management   Sit to Supine 2  Maximal assistance   Additional items Assist x 2;Increased time required;Verbal cues;LE management   Additional Comments pt able to sit at EOB w/ maxAx1 to maintain sitting balance w/ EOB; pt w/ R trunk lean & falling to the R without support EOB   Transfers   Sit to Stand 2  Maximal assistance   Additional items Assist x 2;Increased time required;Verbal cues;Other   Stand to Sit 2  Maximal assistance   Additional items Assist x 2;Increased time required;Verbal cues   Additional Comments poor standing tolerance & balance; not appropriate for bed<>chair transfers at this time; will recommend igor lift for OOB.   Ambulation/Elevation   Gait pattern Not appropriate   Ambulation/Elevation Additional Comments pt nonambulatory at baseline   Balance   Static Sitting Poor -   Dynamic Sitting   (zero)   Static Standing  Poor -   Dynamic Standing   (zero)   Ambulatory Zero   Endurance Deficit   Endurance Deficit Yes   Endurance Deficit Description weakness   Activity Tolerance   Activity Tolerance Treatment limited secondary to medical complications (Comment)   Medical Staff Made Aware OTR Mia   Nurse Made Aware yes, [place]   Assessment   Prognosis Poor   Problem List Decreased strength;Decreased range of motion;Decreased endurance;Impaired balance;Decreased mobility;Decreased cognition;Impaired judgement;Decreased safety awareness   Assessment Pt. 79 y.o.female w/ known Parkinson's disease & CVA w/ L residual weakness, presented w/ R leg weakness. Pt admitted for Weakness w/ UTI (urinary tract infection) (N39.0)  & Sacral decubitus ulcer, stage II ([facility]) (L89.152). CT showed no acute intracranial abnormality. MRI pending. Pt referred to PT for mobility assessment & D/C planning w/ orders of Up and OOB as tolerated . Please see above for information re: home set-up & PLOF as well as objective findings during PT assessment. Pt nonverbal at baseline. Per chart, pt require assistance w/ overall ADL's & bed/wheelchair bound but able to SPT w/ assistance from  at baseline. On eval, pt functioning below baseline hence will continue skilled PT to improve function & safety. Pt require maxAx2 for most functional mobility except rolling in bed require maxAx1 + cues for techniques & safety. (+) L ankle contracture. Pt able to sit at EOB w/ maxAx1 to maintain sitting balance w/ EOB.Pt w/ R trunk lean & falling to the R without support EOB. Pt able to stand w/ maxAx2 but w/ poor standing balance & tolerance. Pt not appropriate for bed<>chair transfers at this time. Will recommend [facility] staff to use igor lift for OOB. The patient's AM-PAC Basic Mobility Inpatient Short Form Raw Score is 6. A Raw score of less than or equal to 16 suggests the patient may benefit from discharge to post-acute rehabilitation services. Please also refer to the  recommendation of the Physical Therapist for safe discharge planning. From PT standpoint, will recommend Level II (moderate resource intensity) rehab services at D/C, pending progress & available support at home. No SOB & dizziness noted t/o session. Nsg staff most recent vital signs as follows: /61 (BP Location: Left arm)   Pulse 92   Temp 97.6 °F (36.4 °C) (Temporal)   Resp 18   Wt 66.5 kg (146 lb 9.7 oz)   SpO2 93%   BMI 25.97 kg/m² . At end of session, pt back in bed in stable condition, call bell & phone in reach, bed alarm activated. CM to follow. Nsg staff to continue to mobilized pt as appropriate to prevent further decline in function. Nsg notified. Co-eval was necessary to complete this PT eval for the pt's best interest given pt's medical acuity & complexity.   Goals   Patient Goals none stated 2* to impaired cognition & nonverbal at baseline   STG Expiration Date 05/06/24   Short Term Goal #1 Goals to be met in 14 days; pt will be able to: 1) inc strength & balance by 1/2 grade to improve overall functional mobility & dec fall risk; 2) inc bed mobility to modAx1 for pt to be able to get in/OOB safely w/ proper techniques 100% of the time, to dec caregiver burden & safely function at home; 3) inc transfers to modAx1 w/ appropriate AD for pt to transition safely from one surface to another w/o % of the time, to dec caregiver burden & safely function at home; 4) pt/caregiver ed   PT Treatment Day 0   Plan   Treatment/Interventions Functional transfer training;LE strengthening/ROM;Therapeutic exercise;Endurance training;Patient/family training;Bed mobility;Spoke to nursing;Spoke to case management;OT   PT Frequency 2-3x/wk   Discharge Recommendation   Rehab Resource Intensity Level, PT II (Moderate Resource Intensity)   Additional Comments igor lift for OOB   AM-PAC Basic Mobility Inpatient   Turning in Flat Bed Without Bedrails 1   Lying on Back to Sitting on Edge of Flat Bed Without  Bedrails 1   Moving Bed to Chair 1   Standing Up From Chair Using Arms 1   Walk in Room 1   Climb 3-5 Stairs With Railing 1   Basic Mobility Inpatient Raw Score 6   Turning Head Towards Sound 1   Follow Simple Instructions 2   Low Function Basic Mobility Raw Score  9   Low Function Basic Mobility Standardized Score  12.55   [facility] Highest Level Of Mobility   -[facility] Goal 2: Bed activities/Dependent transfer   -HL Achieved 3: Sit at edge of bed   End of Consult   Patient Position at End of Consult Supine;Bed/Chair alarm activated;All needs within reach   End of Consult Comments Pt in stable condition. All needs in reach. Bed alarm activated.   Hx/personal factors: co-morbidities, advanced age, dec cognition, fall risk, and assist w/ ADL's  Examination: dec mobility, dec balance, dec endurance, dec amb, risk for falls, dec cognition  Clinical: unpredictable (ongoing medical status, abnormal lab values, risk for falls, and imaging test/result pending)  Complexity: high    Reyes Arriaza

## 2024-04-22 NOTE — WOUND OSTOMY CARE
Consult Note - Wound   Anjali Shipley 79 y.o. female MRN: [id number]  Unit/Bed#: E4 -01 Encounter: [id number]      History and Present Illness:  79 year old female presented to the hospital with fatigue, increased weakness, and not acting like herself per her .  Patient's history significant for CVA, Parkinson's disease, non-verbal, DM.    Wound care team consulted for sacral wound.    Assessment Findings:   Patient is non-verbal during assessment.  /caregiver at bedside.  Patient is dependent for turning/repositioning.  On regular mattress--Placed on [place] turning system at this time.  Incontinent of bowel and bladder.  Bilateral heels intact with blanchable hyperpigmented scar tissue to left heel (see media photo)--preventative foam dressings applied.  Nutrition team following.    Present on admission evolving deep tissue pressure injury to buttocks/sacrum--scattered areas of purple/maroon, non-blanchable tissue and blistering epidermis all connected by blanchable erythema (measured together as one).  No drainage, fluctuance, or induration at this time.    Deep tissue pressure injuries can be stage 3, 4, or unstageable when fully evolved.    See flowsheet for wound details.  Discussed recommendation to try purewick for urinary incontinence management with primary RN so that a dressing could be applied to buttocks/sacrum.  Will attempt and apply dressing if working well.      Patient's  states patient sleeps in a regular bed with regular mattress.  He has a hospital bed, but he has not used it in years.  Explained that patient would benefit from specialty mattress at home due to decreased mobility and current wounds.   states he has worked through other pressure injuries in the past without an air mattress and is not interested in air mattress at this time.  He is agreeable to a specialty chair cushion for when patient is sitting up during the day--case management made aware.   Patient's  declining use of offloading heel boots at this time.  Prefers elevation off of bed with pillows.  He is agreeable to preventative foam dressings.      Education:  Provided education to patient's  on proper offloading and repositioning using only the positioning wedges or the Tortoise positioning system/Z-ines positioner.    Explained why offloading patient with donut cushion may cause increased pressure to areas surrounding the wound and cause new wounds.  Recommended gel or air cushion instead which will re-distribute the pressure more evenly.      Wound Care Plan:   1-Apply silicone bordered foam dressings to bilateral heels for prevention.  Yonatan with P.  Peel back for skin assessments at least daily and re-apply.  Change dressings every 3 days and as needed.  2-Elevate heels off of bed/chair surface to offload pressure.  3-Offloading air cushion in chair when out of bed.  4-Apply moisturizing skin cream to body daily and as needed.  5-Turn/reposition every 2 hours while in bed and weight shift frequently while in chair for pressure re-distribution on skin.   6-Tortoise positioning system.    7-Buttocks and sacrum--cleanse with soap and water, pat dry.  WITH PUREWICK IN USE--Apply Xeroform to open and purple areas.  Cover with silicone bordered foam dressing for treatment.  Change dressings daily and as needed. IF INCONTINENCE CANNOT BE MANAGED--discontinue dressings and apply calazime paste three times daily and as needed with incontinence.    Wound care team to follow.  Plan of care reviewed with primary RN.    Dr. Muhammad made aware of assessment findings and recommendations.      Patient should follow-up at the wound center on discharge if able, as wound care needs will likely change over the next few weeks as sacral wound evolves.      Wound 04/20/24 Pressure Injury Buttocks Left;Mid;Right (Active)   Wound Image   04/22/24 1217   Wound Description Pink;Tan;Light purple;Non-blanchable  erythema 04/22/24 1217   Pressure Injury Stage DTPI 04/22/24 1217   Kari-wound Assessment Erythema 04/22/24 1217   Wound Length (cm) 6 cm 04/22/24 1217   Wound Width (cm) 13.5 cm 04/22/24 1217   Wound Depth (cm) 0.1 cm 04/22/24 1217   Wound Surface Area (cm^2) 81 cm^2 04/22/24 1217   Wound Volume (cm^3) 8.1 cm^3 04/22/24 1217   Calculated Wound Volume (cm^3) 8.1 cm^3 04/22/24 1217   Drainage Amount None 04/22/24 1217   Treatments Cleansed 04/22/24 1217   Dressing Protective barrier 04/22/24 1217   Patient Tolerance Tolerated well 04/22/24 1217       Jayshree Cooper BSN, RN, CWON

## 2024-04-22 NOTE — PLAN OF CARE
Problem: OCCUPATIONAL THERAPY ADULT  Goal: Performs self-care activities at highest level of function for planned discharge setting.  See evaluation for individualized goals.  Description: Treatment Interventions: ADL retraining, Functional transfer training, UE strengthening/ROM, Endurance training, Cognitive reorientation, Patient/family training, Equipment evaluation/education, Neuromuscular reeducation, Compensatory technique education, Energy conservation          See flowsheet documentation for full assessment, interventions and recommendations.   4/22/2024 1144 by Ashley Gates OT  Note: Limitation: Decreased ADL status, Decreased UE ROM, Decreased UE strength, Decreased Safe judgement during ADL, Decreased endurance, Decreased self-care trans  Prognosis: Guarded  Assessment: Anjali Shipley is a 79 y.o. female seen for OT evaluation s/p admit to [facility]20[facility] w/ Weakness. CT negative and MRI pending for r/o stroke. Comorbidities affecting pt's functional performance at time of assessment include: DM, dementia, and Parkinsons, sacral wound, kenroy paresis, deep brain stimulator . Pt with active OT orders and activity orders for Up and OOB as tolerated. Personal factors affecting pt at time of IE include:difficulty performing ADLs, difficulty performing IADLs, difficulty performing transfers/mobility, limited insight into deficits, fall risk , and functional decline . Prior to admission, pt lives with  yola 1 level house with 0 KARLA. Primarily bed/ WC bound. Per chat  in caregiver. Ax1 for transfers to WC. Spends most of day in bed. Pt unable to provide hx - mostly nonverbal during session. Able to follow simple cues. Upon evaluation: Pt currently requires maxA for UB ADLs, dependent for LB ADLs, dependent for toileting, maxAx1 for rolling, maxAx2 supine<> sit for bed mobility, maxAx2 for functional transfers, and N/A mobility 2* the following deficits impacting occupational  performance:weakness, decreased strength , decreased balance, decreased activity tolerance, and limited functional reach. Pt to benefit from continued skilled OT tx while in the hospital to address deficits as defined above and maximize level of functional independence w ADL's and functional mobility. Occupational Performance areas to address include: eating, grooming, bathing/shower, toilet hygiene, dressing, health maintenance, functional mobility, and clothing management. From OT standpoint, recommendation at time of d/c would be level 2 vs 3 resources pending family support and care at home. OT to follow pt on caseload 2-3x/wk. Wound benefit from mechanical lift at home.     Rehab Resource Intensity Level, OT: II (Moderate Resource Intensity) (vs level 3 pending support and assist at home.)

## 2024-04-22 NOTE — SPEECH THERAPY NOTE
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    Acquired hypothyroidism    Mixed hyperlipidemia    Parkinson disease    Type 2 diabetes mellitus with microalbuminuria, without long-term current use of insulin ([facility])    Severe Lewy body dementia without behavioral disturbance, psychotic disturbance, mood disturbance, or anxiety ([facility])    S/P deep brain stimulator placement    Hemiparesis affecting left side as late effect of cerebrovascular accident (CVA) ([facility])    Sacral wound       Past Medical History  Past Medical History:   Diagnosis Date    Abdominal pain, suprapubic 07/03/2014    Resolved:  November 22, 2016    Abscess of skin and subcutaneous tissue 11/21/2016    Resolved:  February 28, 2017    Acute kidney failure, unspecified ([facility])     Anxiety     Burning with urination 12/14/2016    Resolved:  February 28, 2017    Conjunctivitis 06/10/2013    Depression     Depression     Diabetes mellitus ([facility]) 02/02/2020    Dysuria 06/06/2016    Resolved:  February 28 2017    Falling 10/18/2012    GERD (gastroesophageal reflux disease)     Groin pain 04/16/2015    Resolved:  February 28, 2017    Hyperlipidemia     Hypothyroid     Influenza 04/05/2013    Leg abrasion 09/22/2016    Resolved:  February 28, 2017    Parkinson's disease     Psychiatric illness     Rectal pain 10/17/2013    Resolved:  February 28, 2017    Squamous cell skin cancer 03/22/2022    Left lateral calf    Thyroid disease         Past Surgical History  Past Surgical History:   Procedure Laterality Date    APPENDECTOMY      Age 6 or 7    BACK SURGERY      BRAIN SURGERY      CLOSED REDUCTION NASAL FRACTURE      CYSTOSCOPY  01/28/2014    Ivan Siddiqui M.D.  (Diagnostic)    DEEP BRAIN STIMULATOR PLACEMENT      EYE SURGERY       FL MYELOGRAM CERVICAL  3/4/2014    FRACTURE SURGERY      JOINT REPLACEMENT      LAMINECTOMY      Decompress, Facetectomy, Foraminotomy Lumbar Seg    ORIF HIP FRACTURE Left     About 3 years ago    SKIN BIOPSY      TONSILLECTOMY           Subjective:  Pt alert, head hanging down towards R which is her baseline. On RA.  Objective:  Pt seen for f/u, being fed by PCA this am and was alert briefly for breakfast. Pt distracted by me on her left and was not attending to the straw. When I moved over to her right out of her line of vision she took straw sips w/out difficulty.  Had a small amount of the [place] toast custard w/ slow manipulation an transfer. Swallow initiation time appeared wfl/min delayed. No cough or wet vocal quality. Pt's bmi is 25.97.  Assessment:  Pt alert for a short time at breakfast (which is typically not her best time to be alert). Tolerated puree w/ thin.   Plan/Recommendations:  Puree w/ thin liquids. Must be fed. Crush meds. Feed only when alert., Maximize intake when alert. Limit distractions.

## 2024-04-22 NOTE — ASSESSMENT & PLAN NOTE
Non-verbal in baseline.  Wheelchair / bed bound, but could stand and pivot for dressing and bathing

## 2024-04-22 NOTE — PLAN OF CARE
Problem: Potential for Falls  Goal: Patient will remain free of falls  Description: INTERVENTIONS:  - Educate patient/family on patient safety including physical limitations  - Instruct patient to call for assistance with activity   - Consult OT/PT to assist with strengthening/mobility   - Keep Call bell within reach  - Keep bed low and locked with side rails adjusted as appropriate  - Keep care items and personal belongings within reach  - Initiate and maintain comfort rounds  - Make Fall Risk Sign visible to staff  - Apply yellow socks and bracelet for high fall risk patients  - Consider moving patient to room near nurses station  Outcome: Progressing     Problem: PAIN - ADULT  Goal: Verbalizes/displays adequate comfort level or baseline comfort level  Description: Interventions:  - Encourage patient to monitor pain and request assistance  - Assess pain using appropriate pain scale  - Administer analgesics based on type and severity of pain and evaluate response  - Implement non-pharmacological measures as appropriate and evaluate response  - Consider cultural and social influences on pain and pain management  - Notify physician/advanced practitioner if interventions unsuccessful or patient reports new pain  Outcome: Progressing     Problem: INFECTION - ADULT  Goal: Absence or prevention of progression during hospitalization  Description: INTERVENTIONS:  - Assess and monitor for signs and symptoms of infection  - Monitor lab/diagnostic results  - Monitor all insertion sites, i.e. indwelling lines, tubes, and drains  - Monitor endotracheal if appropriate and nasal secretions for changes in amount and color  - [place] appropriate cooling/warming therapies per order  - Administer medications as ordered  - Instruct and encourage patient and family to use good hand hygiene technique  - Identify and instruct in appropriate isolation precautions for identified infection/condition  Outcome: Progressing  Goal: Absence  of fever/infection during neutropenic period  Description: INTERVENTIONS:  - Monitor WBC    Outcome: Progressing     Problem: SAFETY ADULT  Goal: Maintain or return to baseline ADL function  Description: INTERVENTIONS:  -  Assess patient's ability to carry out ADLs; assess patient's baseline for ADL function and identify physical deficits which impact ability to perform ADLs (bathing, care of mouth/teeth, toileting, grooming, dressing, etc.)  - Assess/evaluate cause of self-care deficits   - Assess range of motion  - Assess patient's mobility; develop plan if impaired  - Assess patient's need for assistive devices and provide as appropriate  - Encourage maximum independence but intervene and supervise when necessary  - Involve family in performance of ADLs  - Assess for home care needs following discharge   - Consider OT consult to assist with ADL evaluation and planning for discharge  - Provide patient education as appropriate  Outcome: Progressing  Goal: Maintains/Returns to pre admission functional level  Description: INTERVENTIONS:  - Perform AM-PAC 6 Click Basic Mobility/ Daily Activity assessment daily.  - Set and communicate daily mobility goal to care team and patient/family/caregiver.   - Collaborate with rehabilitation services on mobility goals if consulted  - Out of bed for toileting  - Record patient progress and toleration of activity level   Outcome: Progressing     Problem: DISCHARGE PLANNING  Goal: Discharge to home or other facility with appropriate resources  Description: INTERVENTIONS:  - Identify barriers to discharge w/patient and caregiver  - Arrange for needed discharge resources and transportation as appropriate  - Identify discharge learning needs (meds, wound care, etc.)  - Arrange for interpretive services to assist at discharge as needed  - Refer to Case Management Department for coordinating discharge planning if the patient needs post-hospital services based on physician/advanced  practitioner order or complex needs related to functional status, cognitive ability, or social support system  Outcome: Progressing     Problem: Knowledge Deficit  Goal: Patient/family/caregiver demonstrates understanding of disease process, treatment plan, medications, and discharge instructions  Description: Complete learning assessment and assess knowledge base.  Interventions:  - Provide teaching at level of understanding  - Provide teaching via preferred learning methods  Outcome: Progressing     Problem: Prexisting or High Potential for Compromised Skin Integrity  Goal: Skin integrity is maintained or improved  Description: INTERVENTIONS:  - Identify patients at risk for skin breakdown  - Assess and monitor skin integrity  - Assess and monitor nutrition and hydration status  - Monitor labs   - Assess for incontinence   - Turn and reposition patient  - Assist with mobility/ambulation  - Relieve pressure over bony prominences  - Avoid friction and shearing  - Provide appropriate hygiene as needed including keeping skin clean and dry  - Evaluate need for skin moisturizer/barrier cream  - Collaborate with interdisciplinary team   - Patient/family teaching  - Consider wound care consult   Outcome: Progressing

## 2024-04-22 NOTE — PROGRESS NOTES
[facility]  Progress Note  Name: Anjali Shipley I  MRN: [id number]  Unit/Bed#: E4 -01 I Date of Admission: 4/20/2024   Date of Service: 4/22/2024 I Hospital Day: 2    Assessment/Plan   * Weakness  Assessment & Plan  79 year old female with progressive Parkinson's disease who presented with worsening weakness.    Baseline: Mainly in bed / wheelchair, but is able to stand, pivot for dressing toileting, and bathing  CT head without acute intracranial abormalities.  MRI brain pending  DBS place din 2013 at [facility] - noted reviewed states exception is MRI brain and only with specific precautions. If our Mri is unable to do it, CTA head and neck with and without contrast will be ordered.  Continue Pletal 100mg BID for now, aspirin held   UA / UC showing Gram negative enteric izabella like. Continue ceftriaxone for the time being due to suspicion for possible UTI.   Neuro checks  PT OT Recommending moderate resource intensity  Speech recommending Pureed diet, thin liquids. Continue Speech therapy.    Sacral wound  Assessment & Plan  POA  Wound care, specialty bed    Hemiparesis affecting left side as late effect of cerebrovascular accident (CVA) ([facility])  Assessment & Plan  Residual left-sided weakness from prior stroke.   Continue statin, pletal  Aspirin on hold for now per neurology not indicated    S/P deep brain stimulator placement  Assessment & Plan  DBS (Placed 2013 at [facility])    Severe Lewy body dementia without behavioral disturbance, psychotic disturbance, mood disturbance, or anxiety ([facility])  Assessment & Plan  Non-verbal in baseline.  Wheelchair / bed bound, but could stand and pivot for dressing and bathing    Type 2 diabetes mellitus with microalbuminuria, without long-term current use of insulin ([facility])  Assessment & Plan  Lab Results   Component Value Date    HGBA1C 5.5 03/26/2024       Recent Labs     04/21/24  1614 04/21/24 2029 04/22/24  0749 04/22/24  1134   POCGLU 153* 136 130 134          Blood Sugar Average: Last 72 hrs:  (P) 146.125    Continue sliding scale  Metformin on hold    Parkinson disease  Assessment & Plan  Continue home Sinemet of 1 tablet TID and half a tablet at bedtime.    Mixed hyperlipidemia  Assessment & Plan  Continue statin    Acquired hypothyroidism  Assessment & Plan  Continue Levothyroxine           VTE Pharmacologic Prophylaxis:   Pharmacologic: Enoxaparin (Lovenox)  Mechanical VTE Prophylaxis in Place: Yes    Discussions with Specialists or Other Care Team Provider: nursing    Education and Discussions with Family / Patient: spouse    Current Length of Stay: 2 day(s)    Current Patient Status: Inpatient   Certification Statement: The patient will continue to require additional inpatient hospital stay due to mri brain, neuro reeval , dispo planning    Discharge Plan: 24 to 48 hours    Code Status: Level 3 - DNAR and DNI      Subjective:   Patient seen and examined at bedside. Spoke to her  who showed how her once better side (right) is weaker than usual). He is uncertain, but reported that she did not show any evidence of UTI symptoms.    Objective:     Vitals:   Temp (24hrs), Av.7 °F (36.5 °C), Min:97.6 °F (36.4 °C), Max:97.8 °F (36.6 °C)    Temp:  [97.6 °F (36.4 °C)-97.8 °F (36.6 °C)] 97.6 °F (36.4 °C)  HR:  [82-92] 92  Resp:  [18] 18  BP: (107-150)/(57-61) 135/61  SpO2:  [91 %-93 %] 93 %  Body mass index is 25.97 kg/m².     Input and Output Summary (last 24 hours):       Intake/Output Summary (Last 24 hours) at 2024 1315  Last data filed at 2024 1239  Gross per 24 hour   Intake 240 ml   Output --   Net 240 ml       Physical Exam:     Physical Exam  Vitals reviewed.   Constitutional:       General: She is not in acute distress.  HENT:      Head: Normocephalic.      Nose: Nose normal.      Mouth/Throat:      Mouth: Mucous membranes are moist.   Eyes:      General: No scleral icterus.  Cardiovascular:      Rate and Rhythm: Normal rate.    Pulmonary:      Effort: Pulmonary effort is normal. No respiratory distress.   Abdominal:      General: There is no distension.      Palpations: Abdomen is soft.      Tenderness: There is no abdominal tenderness.   Skin:     General: Skin is warm.   Neurological:      Mental Status: She is alert.      Motor: Weakness present.      Comments: Cannot assess sensory and orientation   Psychiatric:         Mood and Affect: Mood normal.         Behavior: Behavior normal.       Additional Data:     Labs:    Results from last 7 days   Lab Units 04/22/24  0539   WBC Thousand/uL 8.04   HEMOGLOBIN g/dL 13.6   HEMATOCRIT % 39.6   PLATELETS Thousands/uL 320   SEGS PCT % 63   LYMPHO PCT % 23   MONO PCT % 11   EOS PCT % 2     Results from last 7 days   Lab Units 04/22/24  0539 04/21/24  0442 04/20/24  0916   SODIUM mmol/L 139   < > 136   POTASSIUM mmol/L 3.4*   < > 4.1   CHLORIDE mmol/L 107   < > 103   CO2 mmol/L 25   < > 29   BUN mg/dL 16   < > 14   CREATININE mg/dL 0.58*   < > 0.71   ANION GAP mmol/L 7   < > 4   CALCIUM mg/dL 8.4   < > 9.9   ALBUMIN g/dL  --   --  3.8   TOTAL BILIRUBIN mg/dL  --   --  1.04*   ALK PHOS U/L  --   --  73   ALT U/L  --   --  27   AST U/L  --   --  44*   GLUCOSE RANDOM mg/dL 126   < > 127    < > = values in this interval not displayed.     Results from last 7 days   Lab Units 04/20/24  0916   INR  1.11     Results from last 7 days   Lab Units 04/22/24  1134 04/22/24  0749 04/21/24  2029 04/21/24  1614 04/21/24  1144 04/21/24  0808 04/20/24  2133 04/20/24  1609   POC GLUCOSE mg/dl 134 130 136 153* 151* 147* 179* 139         Results from last 7 days   Lab Units 04/20/24  0916   LACTIC ACID mmol/L 1.2   PROCALCITONIN ng/ml 0.11           * I Have Reviewed All Lab Data Listed Above.  * Additional Pertinent Lab Tests Reviewed: All Labs For Current Hospital Admission Reviewed    Mobility:  Basic Mobility Inpatient Raw Score: 6  -[facility] Goal: 2: Bed activities/Dependent transfer  -[facility] Achieved: 3: Sit at  edge of bed    Lines:   Invasive Devices       Peripheral Intravenous Line  Duration             Peripheral IV 04/20/24 Right Antecubital 2 days                       Imaging:    Imaging Reports Reviewed Today Include: imaging since admission    Recent Cultures (last 7 days):     Results from last 7 days   Lab Units 04/20/24  1054 04/20/24  1053 04/20/24  0927 04/20/24  0916   BLOOD CULTURE   --   --  No Growth at 24 hrs. No Growth at 24 hrs.   URINE CULTURE  >100,000 cfu/ml Gram Negative John Enteric Like* >100,000 cfu/ml Escherichia coli*  --   --        Last 24 Hours Medication List:   Current Facility-Administered Medications   Medication Dose Route Frequency Provider Last Rate    acetaminophen  650 mg Oral Q8H PRN Xena Lujan PA-C      carbidopa-levodopa  0.5 tablet Oral HS Xena Lujan PA-C      carbidopa-levodopa  1 tablet Oral TID Xena Lujan PA-C      cilostazol  100 mg Oral BID AC Xena Lujan PA-C      enoxaparin  40 mg Subcutaneous Daily Xena Lujan PA-C      famotidine  20 mg Oral Daily Xena Lujan PA-C      glycopyrrolate  1 mg Oral BID Xena Lujan PA-C      insulin lispro  1-5 Units Subcutaneous TID AC Xena Lujan PA-C      insulin lispro  1-5 Units Subcutaneous HS Xena Lujan PA-C      levothyroxine  75 mcg Oral Early Morning Xena Lujan PA-C      pravastatin  40 mg Oral Daily With Dinner Xena Lujan PA-C          Today, Patient Was Seen By: Sukh uMhammad MD    ** Please Note: Dictation voice to text software may have been used in the creation of this document. **

## 2024-04-22 NOTE — PLAN OF CARE
Problem: Potential for Falls  Goal: Patient will remain free of falls  Description: INTERVENTIONS:  - Educate patient/family on patient safety including physical limitations  - Instruct patient to call for assistance with activity   - Consult OT/PT to assist with strengthening/mobility   - Keep Call bell within reach  - Keep bed low and locked with side rails adjusted as appropriate  - Keep care items and personal belongings within reach  - Initiate and maintain comfort rounds  - Make Fall Risk Sign visible to staff  - Apply yellow socks and bracelet for high fall risk patients  - Consider moving patient to room near nurses station  Outcome: Progressing     Problem: PAIN - ADULT  Goal: Verbalizes/displays adequate comfort level or baseline comfort level  Description: Interventions:  - Encourage patient to monitor pain and request assistance  - Assess pain using appropriate pain scale  - Administer analgesics based on type and severity of pain and evaluate response  - Implement non-pharmacological measures as appropriate and evaluate response  - Consider cultural and social influences on pain and pain management  - Notify physician/advanced practitioner if interventions unsuccessful or patient reports new pain  Outcome: Progressing     Problem: INFECTION - ADULT  Goal: Absence or prevention of progression during hospitalization  Description: INTERVENTIONS:  - Assess and monitor for signs and symptoms of infection  - Monitor lab/diagnostic results  - Monitor all insertion sites, i.e. indwelling lines, tubes, and drains  - Monitor endotracheal if appropriate and nasal secretions for changes in amount and color  - [place] appropriate cooling/warming therapies per order  - Administer medications as ordered  - Instruct and encourage patient and family to use good hand hygiene technique  - Identify and instruct in appropriate isolation precautions for identified infection/condition  Outcome: Progressing  Goal: Absence  of fever/infection during neutropenic period  Description: INTERVENTIONS:  - Monitor WBC    Outcome: Progressing     Problem: SAFETY ADULT  Goal: Maintain or return to baseline ADL function  Description: INTERVENTIONS:  -  Assess patient's ability to carry out ADLs; assess patient's baseline for ADL function and identify physical deficits which impact ability to perform ADLs (bathing, care of mouth/teeth, toileting, grooming, dressing, etc.)  - Assess/evaluate cause of self-care deficits   - Assess range of motion  - Assess patient's mobility; develop plan if impaired  - Assess patient's need for assistive devices and provide as appropriate  - Encourage maximum independence but intervene and supervise when necessary  - Involve family in performance of ADLs  - Assess for home care needs following discharge   - Consider OT consult to assist with ADL evaluation and planning for discharge  - Provide patient education as appropriate  Outcome: Progressing  Goal: Maintains/Returns to pre admission functional level  Description: INTERVENTIONS:  - Perform AM-PAC 6 Click Basic Mobility/ Daily Activity assessment daily.  - Set and communicate daily mobility goal to care team and patient/family/caregiver.   - Collaborate with rehabilitation services on mobility goals if consulted  - Out of bed for toileting  - Record patient progress and toleration of activity level   Outcome: Progressing     Problem: DISCHARGE PLANNING  Goal: Discharge to home or other facility with appropriate resources  Description: INTERVENTIONS:  - Identify barriers to discharge w/patient and caregiver  - Arrange for needed discharge resources and transportation as appropriate  - Identify discharge learning needs (meds, wound care, etc.)  - Arrange for interpretive services to assist at discharge as needed  - Refer to Case Management Department for coordinating discharge planning if the patient needs post-hospital services based on physician/advanced  practitioner order or complex needs related to functional status, cognitive ability, or social support system  Outcome: Progressing     Problem: Knowledge Deficit  Goal: Patient/family/caregiver demonstrates understanding of disease process, treatment plan, medications, and discharge instructions  Description: Complete learning assessment and assess knowledge base.  Interventions:  - Provide teaching at level of understanding  - Provide teaching via preferred learning methods  Outcome: Progressing     Problem: Prexisting or High Potential for Compromised Skin Integrity  Goal: Skin integrity is maintained or improved  Description: INTERVENTIONS:  - Identify patients at risk for skin breakdown  - Assess and monitor skin integrity  - Assess and monitor nutrition and hydration status  - Monitor labs   - Assess for incontinence   - Turn and reposition patient  - Assist with mobility/ambulation  - Relieve pressure over bony prominences  - Avoid friction and shearing  - Provide appropriate hygiene as needed including keeping skin clean and dry  - Evaluate need for skin moisturizer/barrier cream  - Collaborate with interdisciplinary team   - Patient/family teaching  - Consider wound care consult   Outcome: Progressing

## 2024-04-22 NOTE — ASSESSMENT & PLAN NOTE
Lab Results   Component Value Date    HGBA1C 5.5 03/26/2024       Recent Labs     04/21/24  1614 04/21/24 2029 04/22/24  0749 04/22/24  1134   POCGLU 153* 136 130 134         Blood Sugar Average: Last 72 hrs:  (P) 146.125    Continue sliding scale  Metformin on hold

## 2024-04-23 ENCOUNTER — APPOINTMENT (INPATIENT)
Dept: CT IMAGING | Facility: HOSPITAL | Age: 79
DRG: 689 | End: 2024-04-23
Payer: MEDICARE

## 2024-04-23 LAB
ANION GAP SERPL CALCULATED.3IONS-SCNC: 5 MMOL/L (ref 4–13)
BACTERIA UR CULT: ABNORMAL
BUN SERPL-MCNC: 15 MG/DL (ref 5–25)
CALCIUM SERPL-MCNC: 8.2 MG/DL (ref 8.4–10.2)
CHLORIDE SERPL-SCNC: 108 MMOL/L (ref 96–108)
CO2 SERPL-SCNC: 26 MMOL/L (ref 21–32)
CREAT SERPL-MCNC: 0.51 MG/DL (ref 0.6–1.3)
ERYTHROCYTE [DISTWIDTH] IN BLOOD BY AUTOMATED COUNT: 13.9 % (ref 11.6–15.1)
GFR SERPL CREATININE-BSD FRML MDRD: 90 ML/MIN/1.73SQ M
GLUCOSE SERPL-MCNC: 134 MG/DL (ref 65–140)
GLUCOSE SERPL-MCNC: 156 MG/DL (ref 65–140)
GLUCOSE SERPL-MCNC: 177 MG/DL (ref 65–140)
GLUCOSE SERPL-MCNC: 183 MG/DL (ref 65–140)
GLUCOSE SERPL-MCNC: 199 MG/DL (ref 65–140)
HCT VFR BLD AUTO: 39.3 % (ref 34.8–46.1)
HGB BLD-MCNC: 13.6 G/DL (ref 11.5–15.4)
MAGNESIUM SERPL-MCNC: 1.9 MG/DL (ref 1.9–2.7)
MCH RBC QN AUTO: 31.6 PG (ref 26.8–34.3)
MCHC RBC AUTO-ENTMCNC: 34.6 G/DL (ref 31.4–37.4)
MCV RBC AUTO: 91 FL (ref 82–98)
PLATELET # BLD AUTO: 336 THOUSANDS/UL (ref 149–390)
PMV BLD AUTO: 9.5 FL (ref 8.9–12.7)
POTASSIUM SERPL-SCNC: 3.4 MMOL/L (ref 3.5–5.3)
RBC # BLD AUTO: 4.3 MILLION/UL (ref 3.81–5.12)
SODIUM SERPL-SCNC: 139 MMOL/L (ref 135–147)
WBC # BLD AUTO: 7.06 THOUSAND/UL (ref 4.31–10.16)

## 2024-04-23 PROCEDURE — 99232 SBSQ HOSP IP/OBS MODERATE 35: CPT | Performed by: INTERNAL MEDICINE

## 2024-04-23 PROCEDURE — 80048 BASIC METABOLIC PNL TOTAL CA: CPT | Performed by: STUDENT IN AN ORGANIZED HEALTH CARE EDUCATION/TRAINING PROGRAM

## 2024-04-23 PROCEDURE — 85027 COMPLETE CBC AUTOMATED: CPT | Performed by: STUDENT IN AN ORGANIZED HEALTH CARE EDUCATION/TRAINING PROGRAM

## 2024-04-23 PROCEDURE — 83735 ASSAY OF MAGNESIUM: CPT | Performed by: STUDENT IN AN ORGANIZED HEALTH CARE EDUCATION/TRAINING PROGRAM

## 2024-04-23 PROCEDURE — 70498 CT ANGIOGRAPHY NECK: CPT

## 2024-04-23 PROCEDURE — 70496 CT ANGIOGRAPHY HEAD: CPT

## 2024-04-23 PROCEDURE — 82948 REAGENT STRIP/BLOOD GLUCOSE: CPT

## 2024-04-23 RX ORDER — POTASSIUM CHLORIDE 14.9 MG/ML
20 INJECTION INTRAVENOUS ONCE
Status: DISCONTINUED | OUTPATIENT
Start: 2024-04-23 | End: 2024-04-23

## 2024-04-23 RX ORDER — POTASSIUM CHLORIDE 20 MEQ/1
40 TABLET, EXTENDED RELEASE ORAL
Status: COMPLETED | OUTPATIENT
Start: 2024-04-23 | End: 2024-04-23

## 2024-04-23 RX ORDER — POTASSIUM CHLORIDE 29.8 MG/ML
40 INJECTION INTRAVENOUS ONCE
Status: DISCONTINUED | OUTPATIENT
Start: 2024-04-23 | End: 2024-04-23 | Stop reason: SDUPTHER

## 2024-04-23 RX ORDER — CEFTRIAXONE 1 G/50ML
1000 INJECTION, SOLUTION INTRAVENOUS EVERY 24 HOURS
Status: DISCONTINUED | OUTPATIENT
Start: 2024-04-23 | End: 2024-04-24

## 2024-04-23 RX ADMIN — INSULIN LISPRO 1 UNITS: 100 INJECTION, SOLUTION INTRAVENOUS; SUBCUTANEOUS at 17:23

## 2024-04-23 RX ADMIN — CARBIDOPA AND LEVODOPA 1 TABLET: 25; 100 TABLET ORAL at 17:23

## 2024-04-23 RX ADMIN — INSULIN LISPRO 1 UNITS: 100 INJECTION, SOLUTION INTRAVENOUS; SUBCUTANEOUS at 08:35

## 2024-04-23 RX ADMIN — CARBIDOPA AND LEVODOPA 1 TABLET: 25; 100 TABLET ORAL at 13:44

## 2024-04-23 RX ADMIN — LEVOTHYROXINE SODIUM 75 MCG: 75 TABLET ORAL at 06:02

## 2024-04-23 RX ADMIN — POTASSIUM CHLORIDE 40 MEQ: 1500 TABLET, EXTENDED RELEASE ORAL at 13:44

## 2024-04-23 RX ADMIN — ENOXAPARIN SODIUM 40 MG: 40 INJECTION SUBCUTANEOUS at 08:35

## 2024-04-23 RX ADMIN — CILOSTAZOL 100 MG: 50 TABLET ORAL at 06:02

## 2024-04-23 RX ADMIN — POTASSIUM CHLORIDE 40 MEQ: 1500 TABLET, EXTENDED RELEASE ORAL at 17:23

## 2024-04-23 RX ADMIN — CILOSTAZOL 100 MG: 50 TABLET ORAL at 17:23

## 2024-04-23 RX ADMIN — CEFTRIAXONE 1000 MG: 1 INJECTION, SOLUTION INTRAVENOUS at 17:23

## 2024-04-23 RX ADMIN — FAMOTIDINE 20 MG: 20 TABLET, FILM COATED ORAL at 08:35

## 2024-04-23 RX ADMIN — GLYCOPYRROLATE 1 MG: 1 TABLET ORAL at 08:35

## 2024-04-23 RX ADMIN — PRAVASTATIN SODIUM 40 MG: 40 TABLET ORAL at 17:23

## 2024-04-23 RX ADMIN — GLYCOPYRROLATE 1 MG: 1 TABLET ORAL at 17:23

## 2024-04-23 RX ADMIN — CARBIDOPA AND LEVODOPA 1 TABLET: 25; 100 TABLET ORAL at 08:34

## 2024-04-23 RX ADMIN — INSULIN LISPRO 1 UNITS: 100 INJECTION, SOLUTION INTRAVENOUS; SUBCUTANEOUS at 21:37

## 2024-04-23 RX ADMIN — IOHEXOL 85 ML: 350 INJECTION, SOLUTION INTRAVENOUS at 10:57

## 2024-04-23 RX ADMIN — INSULIN LISPRO 1 UNITS: 100 INJECTION, SOLUTION INTRAVENOUS; SUBCUTANEOUS at 13:44

## 2024-04-23 RX ADMIN — CARBIDOPA AND LEVODOPA 0.5 TABLET: 25; 100 TABLET ORAL at 21:37

## 2024-04-23 NOTE — PLAN OF CARE
Problem: Potential for Falls  Goal: Patient will remain free of falls  Description: INTERVENTIONS:  - Educate patient/family on patient safety including physical limitations  - Instruct patient to call for assistance with activity   - Consult OT/PT to assist with strengthening/mobility   - Keep Call bell within reach  - Keep bed low and locked with side rails adjusted as appropriate  - Keep care items and personal belongings within reach  - Initiate and maintain comfort rounds  - Make Fall Risk Sign visible to staff  - Apply yellow socks and bracelet for high fall risk patients  - Consider moving patient to room near nurses station  Outcome: Progressing     Problem: PAIN - ADULT  Goal: Verbalizes/displays adequate comfort level or baseline comfort level  Description: Interventions:  - Encourage patient to monitor pain and request assistance  - Assess pain using appropriate pain scale  - Administer analgesics based on type and severity of pain and evaluate response  - Implement non-pharmacological measures as appropriate and evaluate response  - Consider cultural and social influences on pain and pain management  - Notify physician/advanced practitioner if interventions unsuccessful or patient reports new pain  Outcome: Progressing     Problem: INFECTION - ADULT  Goal: Absence or prevention of progression during hospitalization  Description: INTERVENTIONS:  - Assess and monitor for signs and symptoms of infection  - Monitor lab/diagnostic results  - Monitor all insertion sites, i.e. indwelling lines, tubes, and drains  - Monitor endotracheal if appropriate and nasal secretions for changes in amount and color  - [place] appropriate cooling/warming therapies per order  - Administer medications as ordered  - Instruct and encourage patient and family to use good hand hygiene technique  - Identify and instruct in appropriate isolation precautions for identified infection/condition  Outcome: Progressing  Goal: Absence  of fever/infection during neutropenic period  Description: INTERVENTIONS:  - Monitor WBC    Outcome: Progressing     Problem: SAFETY ADULT  Goal: Maintain or return to baseline ADL function  Description: INTERVENTIONS:  -  Assess patient's ability to carry out ADLs; assess patient's baseline for ADL function and identify physical deficits which impact ability to perform ADLs (bathing, care of mouth/teeth, toileting, grooming, dressing, etc.)  - Assess/evaluate cause of self-care deficits   - Assess range of motion  - Assess patient's mobility; develop plan if impaired  - Assess patient's need for assistive devices and provide as appropriate  - Encourage maximum independence but intervene and supervise when necessary  - Involve family in performance of ADLs  - Assess for home care needs following discharge   - Consider OT consult to assist with ADL evaluation and planning for discharge  - Provide patient education as appropriate  Outcome: Progressing  Goal: Maintains/Returns to pre admission functional level  Description: INTERVENTIONS:  - Perform AM-PAC 6 Click Basic Mobility/ Daily Activity assessment daily.  - Set and communicate daily mobility goal to care team and patient/family/caregiver.   - Collaborate with rehabilitation services on mobility goals if consulted  - Out of bed for toileting  - Record patient progress and toleration of activity level   Outcome: Progressing     Problem: DISCHARGE PLANNING  Goal: Discharge to home or other facility with appropriate resources  Description: INTERVENTIONS:  - Identify barriers to discharge w/patient and caregiver  - Arrange for needed discharge resources and transportation as appropriate  - Identify discharge learning needs (meds, wound care, etc.)  - Arrange for interpretive services to assist at discharge as needed  - Refer to Case Management Department for coordinating discharge planning if the patient needs post-hospital services based on physician/advanced  practitioner order or complex needs related to functional status, cognitive ability, or social support system  Outcome: Progressing     Problem: Knowledge Deficit  Goal: Patient/family/caregiver demonstrates understanding of disease process, treatment plan, medications, and discharge instructions  Description: Complete learning assessment and assess knowledge base.  Interventions:  - Provide teaching at level of understanding  - Provide teaching via preferred learning methods  Outcome: Progressing     Problem: Prexisting or High Potential for Compromised Skin Integrity  Goal: Skin integrity is maintained or improved  Description: INTERVENTIONS:  - Identify patients at risk for skin breakdown  - Assess and monitor skin integrity  - Assess and monitor nutrition and hydration status  - Monitor labs   - Assess for incontinence   - Turn and reposition patient  - Assist with mobility/ambulation  - Relieve pressure over bony prominences  - Avoid friction and shearing  - Provide appropriate hygiene as needed including keeping skin clean and dry  - Evaluate need for skin moisturizer/barrier cream  - Collaborate with interdisciplinary team   - Patient/family teaching  - Consider wound care consult   Outcome: Progressing

## 2024-04-23 NOTE — QUICK NOTE
Chart reviewed.  Urine culture positive for Ecoli and Aerococcus urinae  CTA H/N:  No acute intracranial abnormality status post bilateral deep brain stimulator leads.  Unchanged chronic infarct in right frontoparietal lobe with mild chronic microangiopathy.  Negative CTA head and neck for large vessel occlusion, dissection, or aneurysm  Severe stenosis in stenosis in fetal-type right PCA P2 segment region, appears worse since 2/3/2020.  Unchanged severe stenosis in left PCA distal P2 segment.    Etiology of encephalopathy may be secondary to TME or UTI. Cannot entirely exclude stroke; however, repeat CTA without evidence of evolving infarct. Reasonable to remain on Pletal and okay to remain on home Pravastatin with LDL of 50. Continue home Sinemet.  No additional inpatient neurologic recommendations.    Patient should follow-up with her outpatient neurologist, Dr. Patric Caballero, within the next 4 to 6 weeks.  She/Family should call them following discharge.

## 2024-04-23 NOTE — PROGRESS NOTES
[facility]  Progress Note  Name: Anjali Shipley I  MRN: [id number]  Unit/Bed#: E4 -01 I Date of Admission: 4/20/2024   Date of Service: 4/23/2024 I Hospital Day: 3    Assessment/Plan   Sacral wound  Assessment & Plan  POA  Wound care, specialty bed    Hemiparesis affecting left side as late effect of cerebrovascular accident (CVA) ([facility])  Assessment & Plan  Residual left-sided weakness from prior stroke.   Continue statin, pletal  Aspirin on hold for now per neurology not indicated    S/P deep brain stimulator placement  Assessment & Plan  DBS (Pl[facility] 2013 at [facility])    Severe Lewy body dementia without behavioral disturbance, psychotic disturbance, mood disturbance, or anxiety ([facility])  Assessment & Plan  Non-verbal in baseline.  Wheelchair / bed bound, but could stand and pivot for dressing and bathing    Type 2 diabetes mellitus with microalbuminuria, without long-term current use of insulin ([facility])  Assessment & Plan  Lab Results   Component Value Date    HGBA1C 5.5 03/26/2024       Recent Labs     04/22/24  2128 04/23/24  0746 04/23/24  1146 04/23/24  1612   POCGLU 174* 156* 177* 183*         Blood Sugar Average: Last 72 hrs:  (P) 158.0810347732963437    Continue sliding scale  Metformin on hold    Parkinson disease  Assessment & Plan  Continue home Sinemet of 1 tablet TID and half a tablet at bedtime.    Mixed hyperlipidemia  Assessment & Plan  Continue statin    Acquired hypothyroidism  Assessment & Plan  Continue Levothyroxine    * Weakness  Assessment & Plan  79 year old female with progressive Parkinson's disease who presented with worsening weakness.    Baseline: Mainly in bed / wheelchair, but is able to stand, pivot for dressing toileting, and bathing  CT head without acute intracranial abormalities.  MRI brain canceled as patient with non-conditional DBS; thus CTA head neck was ordered  CTA head neck showed progressive stenosis of right PCA segment; neurology  recommendations appreciated  Continue Pletal 100mg BID for now, aspirin held   UA / UC showing E. coli and Aerococcus urinae; has received 3 doses of Rocephin thus far with substantial improvement, continue additional 2 days  PT OT Recommending moderate resource intensity  Speech recommending Pureed diet, thin liquids. Continue Speech therapy.    4/23-at this time, neurology signed off and likely weakness and associated strokelike symptoms secondary to UTI.  Patient's spouse at bedside reports improvement since initiation of antibiotics and patient is currently at baseline.  Labs and vitals otherwise stable, monitor on morning labs and consider discharge in the next 24 to 48 hours.               VTE Pharmacologic Prophylaxis: VTE Score: 6 High Risk (Score >/= 5) - Pharmacological DVT Prophylaxis Ordered: enoxaparin (Lovenox). Sequential Compression Devices Ordered.    Mobility:   Basic Mobility Inpatient Raw Score: 6  JH-HLM Goal: 2: Bed activities/Dependent transfer  JH-HLM Achieved: 2: Bed activities/Dependent transfer  JH-HLM Goal achieved. Continue to encourage appropriate mobility.    Patient Centered Rounds: I performed bedside rounds with nursing staff today.   Discussions with Specialists or Other Care Team Provider: Neurology    Education and Discussions with Family / Patient: Updated  () at bedside.    Total Time Spent on Date of Encounter in care of patient: 45 mins. This time was spent on one or more of the following: performing physical exam; counseling and coordination of care; obtaining or reviewing history; documenting in the medical record; reviewing/ordering tests, medications or procedures; communicating with other healthcare professionals and discussing with patient's family/caregivers.    Current Length of Stay: 3 day(s)  Current Patient Status: Inpatient   Certification Statement: The patient will continue to require additional inpatient hospital stay due to metabolic  encephalopathy/UTI  Discharge Plan: Anticipate discharge in 24-48 hrs to discharge location to be determined pending rehab evaluations.    Code Status: Level 3 - DNAR and DNI    Subjective:   Patient seen and examined at bedside, as per spouse at bedside, she is at or better than her baseline.  Appreciate neurology recommendations, not likely CVA at this time; more likely metabolic encephalopathy from either UTI or worsening sacral wound.  Patient has experienced substantial improvement on antibiotics and will continue for an additional 2 days.  MRI canceled secondary to nonconditional DBS; but repeat CTA head and neck showed some stenosis of right PCA; but unlikely cause of patient's transient weakness and AMS.  Labs and vitals otherwise stable; patient may benefit from short-term rehab; have discussed with patient's  at bedside he agrees.  Will discuss further with case management tomorrow and anticipate discharge to follow on rehab in the next 24 to 48 hours pending authorization.    Objective:     Vitals:   Temp (24hrs), Av.6 °F (36.4 °C), Min:97 °F (36.1 °C), Max:98.1 °F (36.7 °C)    Temp:  [97 °F (36.1 °C)-98.1 °F (36.7 °C)] 97.8 °F (36.6 °C)  HR:  [81-89] 89  Resp:  [18] 18  BP: (105-145)/(53-64) 105/53  SpO2:  [90 %-91 %] 91 %  Body mass index is 25.97 kg/m².     Input and Output Summary (last 24 hours):     Intake/Output Summary (Last 24 hours) at 2024 1713  Last data filed at 2024 0900  Gross per 24 hour   Intake 120 ml   Output 100 ml   Net 20 ml       Physical Exam:   Physical Exam  Vitals and nursing note reviewed.   Constitutional:       General: She is not in acute distress.     Appearance: She is well-developed.   HENT:      Head: Normocephalic and atraumatic.   Eyes:      Conjunctiva/sclera: Conjunctivae normal.   Cardiovascular:      Rate and Rhythm: Normal rate.   Pulmonary:      Effort: Pulmonary effort is normal. No respiratory distress.   Abdominal:      Palpations: Abdomen  "is soft.      Tenderness: There is no abdominal tenderness.   Musculoskeletal:         General: No swelling.      Cervical back: Neck supple.   Skin:     General: Skin is warm and dry.   Neurological:      Mental Status: She is alert. Mental status is at baseline.      Comments: AAO X0   Psychiatric:      Comments: Generally nonverbal; did say \"okay\" when asked how she felt            Additional Data:     Labs:  Results from last 7 days   Lab Units 04/23/24  0615 04/22/24  0539   WBC Thousand/uL 7.06 8.04   HEMOGLOBIN g/dL 13.6 13.6   HEMATOCRIT % 39.3 39.6   PLATELETS Thousands/uL 336 320   SEGS PCT %  --  63   LYMPHO PCT %  --  23   MONO PCT %  --  11   EOS PCT %  --  2     Results from last 7 days   Lab Units 04/23/24  0615 04/21/24  0442 04/20/24  0916   SODIUM mmol/L 139   < > 136   POTASSIUM mmol/L 3.4*   < > 4.1   CHLORIDE mmol/L 108   < > 103   CO2 mmol/L 26   < > 29   BUN mg/dL 15   < > 14   CREATININE mg/dL 0.51*   < > 0.71   ANION GAP mmol/L 5   < > 4   CALCIUM mg/dL 8.2*   < > 9.9   ALBUMIN g/dL  --   --  3.8   TOTAL BILIRUBIN mg/dL  --   --  1.04*   ALK PHOS U/L  --   --  73   ALT U/L  --   --  27   AST U/L  --   --  44*   GLUCOSE RANDOM mg/dL 134   < > 127    < > = values in this interval not displayed.     Results from last 7 days   Lab Units 04/20/24  0916   INR  1.11     Results from last 7 days   Lab Units 04/23/24  1612 04/23/24  1146 04/23/24  0746 04/22/24  2128 04/22/24  1553 04/22/24  1134 04/22/24  0749 04/21/24  2029 04/21/24  1614 04/21/24  1144 04/21/24  0808 04/20/24  2133   POC GLUCOSE mg/dl 183* 177* 156* 174* 196* 134 130 136 153* 151* 147* 179*         Results from last 7 days   Lab Units 04/20/24  0916   LACTIC ACID mmol/L 1.2   PROCALCITONIN ng/ml 0.11       Lines/Drains:  Invasive Devices       Peripheral Intravenous Line  Duration             Peripheral IV 04/20/24 Right Antecubital 3 days              Drain  Duration             External Urinary Catheter 1 day              "             Imaging: Reviewed radiology reports from this admission including: CT head    Recent Cultures (last 7 days):   Results from last 7 days   Lab Units 04/20/24  1054 04/20/24  1053 04/20/24  0927 04/20/24  0916   BLOOD CULTURE   --   --  No Growth at 72 hrs. No Growth at 72 hrs.   URINE CULTURE  >100,000 cfu/ml Escherichia coli*  >100,000 cfu/ml Aerococcus urinae* >100,000 cfu/ml Escherichia coli*  60,000-69,000 cfu/ml Aerococcus urinae*  --   --        Last 24 Hours Medication List:   Current Facility-Administered Medications   Medication Dose Route Frequency Provider Last Rate    acetaminophen  650 mg Oral Q8H PRN Xena Lujan PA-C      carbidopa-levodopa  0.5 tablet Oral HS Xena Lujan PA-C      carbidopa-levodopa  1 tablet Oral TID Xena Lujan PA-C      cefTRIAXone  1,000 mg Intravenous Q24H Andre Painting MD      cilostazol  100 mg Oral BID AC Xena Lujan PA-C      enoxaparin  40 mg Subcutaneous Daily Xena Lujan PA-C      famotidine  20 mg Oral Daily Xena Lujan PA-C      glycopyrrolate  1 mg Oral BID Xena Lujan PA-C      insulin lispro  1-5 Units Subcutaneous TID AC Xena Luajn PA-C      insulin lispro  1-5 Units Subcutaneous HS Xena Lujan PA-C      levothyroxine  75 mcg Oral Early Morning Xena Lujan PA-C      potassium chloride  40 mEq Oral Q2H Andre Painting MD      pravastatin  40 mg Oral Daily With Dinner Xena Lujan PA-C          Today, Patient Was Seen By: Andre Painting MD    **Please Note: This note may have been constructed using a voice recognition system.**

## 2024-04-23 NOTE — ASSESSMENT & PLAN NOTE
79 year old female with progressive Parkinson's disease who presented with worsening weakness.    Baseline: Mainly in bed / wheelchair, but is able to stand, pivot for dressing toileting, and bathing  CT head without acute intracranial abormalities.  MRI brain canceled as patient with non-conditional DBS; thus CTA head neck was ordered  CTA head neck showed progressive stenosis of right PCA segment; neurology recommendations appreciated  Continue Pletal 100mg BID for now, aspirin held   UA / UC showing E. coli and Aerococcus urinae; has received 3 doses of Rocephin thus far with substantial improvement, continue additional 2 days  PT OT Recommending moderate resource intensity  Speech recommending Pureed diet, thin liquids. Continue Speech therapy.    4/23-at this time, neurology signed off and likely weakness and associated strokelike symptoms secondary to UTI.  Patient's spouse at bedside reports improvement since initiation of antibiotics and patient is currently at baseline.  Labs and vitals otherwise stable, monitor on morning labs and consider discharge in the next 24 to 48 hours.

## 2024-04-23 NOTE — PLAN OF CARE
Problem: Potential for Falls  Goal: Patient will remain free of falls  Description: INTERVENTIONS:  - Educate patient/family on patient safety including physical limitations  - Instruct patient to call for assistance with activity   - Consult OT/PT to assist with strengthening/mobility   - Keep Call bell within reach  - Keep bed low and locked with side rails adjusted as appropriate  - Keep care items and personal belongings within reach  - Initiate and maintain comfort rounds  - Make Fall Risk Sign visible to staff  - Apply yellow socks and bracelet for high fall risk patients  - Consider moving patient to room near nurses station  Outcome: Progressing     Problem: PAIN - ADULT  Goal: Verbalizes/displays adequate comfort level or baseline comfort level  Description: Interventions:  - Encourage patient to monitor pain and request assistance  - Assess pain using appropriate pain scale  - Administer analgesics based on type and severity of pain and evaluate response  - Implement non-pharmacological measures as appropriate and evaluate response  - Consider cultural and social influences on pain and pain management  - Notify physician/advanced practitioner if interventions unsuccessful or patient reports new pain  Outcome: Progressing     Problem: INFECTION - ADULT  Goal: Absence or prevention of progression during hospitalization  Description: INTERVENTIONS:  - Assess and monitor for signs and symptoms of infection  - Monitor lab/diagnostic results  - Monitor all insertion sites, i.e. indwelling lines, tubes, and drains  - Monitor endotracheal if appropriate and nasal secretions for changes in amount and color  - [place] appropriate cooling/warming therapies per order  - Administer medications as ordered  - Instruct and encourage patient and family to use good hand hygiene technique  - Identify and instruct in appropriate isolation precautions for identified infection/condition  Outcome: Progressing  Goal: Absence  Patient asking for 3 months refill of the following for cost reduction:    levothyroxine (SYNTHROID, LEVOTHROID) 75 MCG tablet  Last refill: 5/17/19  Last quantity: 90 tab + 1 refill    Pharmacy: [id number]    Last visit: 12/04/19  Next visit: 12/04/20   of fever/infection during neutropenic period  Description: INTERVENTIONS:  - Monitor WBC    Outcome: Progressing     Problem: SAFETY ADULT  Goal: Maintain or return to baseline ADL function  Description: INTERVENTIONS:  -  Assess patient's ability to carry out ADLs; assess patient's baseline for ADL function and identify physical deficits which impact ability to perform ADLs (bathing, care of mouth/teeth, toileting, grooming, dressing, etc.)  - Assess/evaluate cause of self-care deficits   - Assess range of motion  - Assess patient's mobility; develop plan if impaired  - Assess patient's need for assistive devices and provide as appropriate  - Encourage maximum independence but intervene and supervise when necessary  - Involve family in performance of ADLs  - Assess for home care needs following discharge   - Consider OT consult to assist with ADL evaluation and planning for discharge  - Provide patient education as appropriate  Outcome: Progressing  Goal: Maintains/Returns to pre admission functional level  Description: INTERVENTIONS:  - Perform AM-PAC 6 Click Basic Mobility/ Daily Activity assessment daily.  - Set and communicate daily mobility goal to care team and patient/family/caregiver.   - Collaborate with rehabilitation services on mobility goals if consulted  - Out of bed for toileting  - Record patient progress and toleration of activity level   Outcome: Progressing     Problem: DISCHARGE PLANNING  Goal: Discharge to home or other facility with appropriate resources  Description: INTERVENTIONS:  - Identify barriers to discharge w/patient and caregiver  - Arrange for needed discharge resources and transportation as appropriate  - Identify discharge learning needs (meds, wound care, etc.)  - Arrange for interpretive services to assist at discharge as needed  - Refer to Case Management Department for coordinating discharge planning if the patient needs post-hospital services based on physician/advanced  practitioner order or complex needs related to functional status, cognitive ability, or social support system  Outcome: Progressing     Problem: Knowledge Deficit  Goal: Patient/family/caregiver demonstrates understanding of disease process, treatment plan, medications, and discharge instructions  Description: Complete learning assessment and assess knowledge base.  Interventions:  - Provide teaching at level of understanding  - Provide teaching via preferred learning methods  Outcome: Progressing     Problem: Prexisting or High Potential for Compromised Skin Integrity  Goal: Skin integrity is maintained or improved  Description: INTERVENTIONS:  - Identify patients at risk for skin breakdown  - Assess and monitor skin integrity  - Assess and monitor nutrition and hydration status  - Monitor labs   - Assess for incontinence   - Turn and reposition patient  - Assist with mobility/ambulation  - Relieve pressure over bony prominences  - Avoid friction and shearing  - Provide appropriate hygiene as needed including keeping skin clean and dry  - Evaluate need for skin moisturizer/barrier cream  - Collaborate with interdisciplinary team   - Patient/family teaching  - Consider wound care consult   Outcome: Progressing

## 2024-04-23 NOTE — ASSESSMENT & PLAN NOTE
Lab Results   Component Value Date    HGBA1C 5.5 03/26/2024       Recent Labs     04/22/24  2128 04/23/24  0746 04/23/24  1146 04/23/24  1612   POCGLU 174* 156* 177* 183*         Blood Sugar Average: Last 72 hrs:  (P) 158.7184778547666254    Continue sliding scale  Metformin on hold

## 2024-04-23 NOTE — CASE MANAGEMENT
Case Management Assessment & Discharge Planning Note    Patient name Anjali Shipley  Location [facility] 4 /E4 -* MRN [id number]  : 1945 Date 2024       Current Admission Date: 2024  Current Admission Diagnosis:Weakness   Patient Active Problem List    Diagnosis Date Noted    Sacral wound 2024    Cognitive decline 04/10/2024    Hemiparesis affecting left side as late effect of cerebrovascular accident (CVA) ([facility]) 2022    Hypokalemia 2022    Metabolic encephalopathy 2021    PVC (premature ventricular contraction) 2021    Chorea 2020    Severe Lewy body dementia without behavioral disturbance, psychotic disturbance, mood disturbance, or anxiety ([facility]) 2020    S/P deep brain stimulator placement 2020    Type 2 diabetes mellitus with microalbuminuria, without long-term current use of insulin ([facility]) 2020    Weakness 2020    Chronic cough 2019    Decreased ambulation status 2019    Major depression with psychotic features ([facility]) 2019    Sialorrhea 2018    Allergic rhinitis 2017    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    [facility] - [facility] [address]  [address] [address]  Phone: [phone] Fax: [phone]    Primary Care  Provider: Satnam Kwon DO    Primary Insurance: MEDICARE  Secondary Insurance: SE    ASSESSMENT:  Active Health Care Proxies       Shane Shipley Health Care Representative - Spouse   Primary Phone: [phone] (Mobile)  Home Phone: [phone]                                          Current Home Health Care  Type of Current Home Care Services: Home PT, Home OT    Patient Information Continued  Income Source: Pension/care home  Does patient have prescription coverage?: Yes  Does patient receive dialysis treatments?: No  Does patient have a history of substance abuse?: No  Does patient have a history of Mental Health Diagnosis?: No         Means of Transportation  Means of Transport to Appts:: Family transport      Social Determinants of Health (SDOH)      Flowsheet Row Most Recent Value   Housing Stability    In the last 12 months, was there a time when you were not able to pay the mortgage or rent on time? N   In the last 12 months, how many places have you lived? 1   In the last 12 months, was there a time when you did not have a steady place to sleep or slept in a shelter (including now)? N   Transportation Needs    In the past 12 months, has lack of transportation kept you from medical appointments or from getting medications? no   In the past 12 months, has lack of transportation kept you from meetings, work, or from getting things needed for daily living? No   Food Insecurity    Within the past 12 months, you worried that your food would run out before you got the money to buy more. Never true   Within the past 12 months, the food you bought just didn't last and you didn't have money to get more. Never true   Utilities    In the past 12 months has the electric, gas, oil, or water company threatened to shut off services in your home? No            DISCHARGE DETAILS:    Discharge planning discussed with:: Patient's  Shane  Freedom of Choice: Yes  Comments - Freedom of Choice: STR-  blanket referrals placed via Aidin  CM contacted family/caregiver?: Yes  Were Treatment Team discharge recommendations reviewed with patient/caregiver?: Yes  Did patient/caregiver verbalize understanding of patient care needs?: Yes  Were patient/caregiver advised of the risks associated with not following Treatment Team discharge recommendations?: Yes    Contacts  Patient Contacts: Shane  Relationship to Patient:: Family  Contact Method: In Person  Reason/Outcome: Continuity of Care, Emergency Contact, Discharge Planning    Requested Home Health Care         Is the patient interested in HHC at discharge?: No    DME Referral Provided  Referral made for DME?: No    Other Referral/Resources/Interventions Provided:  Interventions: Short Term Rehab  Referral Comments: STR- blanket referrals via Aidin    Would you like to participate in our [facility] service program?  : No - Declined    Treatment Team Recommendation: Short Term Rehab  Discharge Destination Plan:: Short Term Rehab              CM met with patient and patient's  at bedside.  Patient's  reports that patient requires assistance with all ADL's and IADL's.  Patient's  is the primary caregiver.  Patient utilzies a WC, igor lift, and sit to stand machine at home. Patient's  reports increasing difficulty caring for the patient due to her increased weakness.  Patient's  would like for her to go to a STR facility upon DC, he is agreeable to blanket referrals being sent. Patient was at [facility] on [address] in [place], patient's  would prefer a referral being sent to them as well.  Patient's  reports that patient is current with [facility], he would prefer [facility] if patient is unable to go to STR facility.     CM sent blanket referrals in Aidin for STR facilities, CM included [facility].  Assigned CM on 04.22.2024 also sent referral via Aidin for [facility].  CM will continue  to follow for discharge coordination.

## 2024-04-24 ENCOUNTER — TELEPHONE (OUTPATIENT)
Dept: FAMILY MEDICINE CLINIC | Facility: CLINIC | Age: 79
End: 2024-04-24

## 2024-04-24 ENCOUNTER — TELEPHONE (OUTPATIENT)
Age: 79
End: 2024-04-24

## 2024-04-24 VITALS
HEART RATE: 89 BPM | SYSTOLIC BLOOD PRESSURE: 152 MMHG | BODY MASS INDEX: 25.98 KG/M2 | HEIGHT: 63 IN | WEIGHT: 146.61 LBS | RESPIRATION RATE: 18 BRPM | OXYGEN SATURATION: 91 % | TEMPERATURE: 97.4 F | DIASTOLIC BLOOD PRESSURE: 69 MMHG

## 2024-04-24 LAB
ALBUMIN SERPL BCP-MCNC: 2.9 G/DL (ref 3.5–5)
ALP SERPL-CCNC: 71 U/L (ref 34–104)
ALT SERPL W P-5'-P-CCNC: 21 U/L (ref 7–52)
ANION GAP SERPL CALCULATED.3IONS-SCNC: 5 MMOL/L (ref 4–13)
AST SERPL W P-5'-P-CCNC: 38 U/L (ref 13–39)
BASOPHILS # BLD AUTO: 0.08 THOUSANDS/ÂΜL (ref 0–0.1)
BASOPHILS NFR BLD AUTO: 1 % (ref 0–1)
BILIRUB SERPL-MCNC: 0.5 MG/DL (ref 0.2–1)
BUN SERPL-MCNC: 15 MG/DL (ref 5–25)
CALCIUM ALBUM COR SERPL-MCNC: 9.4 MG/DL (ref 8.3–10.1)
CALCIUM SERPL-MCNC: 8.5 MG/DL (ref 8.4–10.2)
CHLORIDE SERPL-SCNC: 112 MMOL/L (ref 96–108)
CO2 SERPL-SCNC: 26 MMOL/L (ref 21–32)
CREAT SERPL-MCNC: 0.57 MG/DL (ref 0.6–1.3)
EOSINOPHIL # BLD AUTO: 0.24 THOUSAND/ÂΜL (ref 0–0.61)
EOSINOPHIL NFR BLD AUTO: 3 % (ref 0–6)
ERYTHROCYTE [DISTWIDTH] IN BLOOD BY AUTOMATED COUNT: 13.9 % (ref 11.6–15.1)
GFR SERPL CREATININE-BSD FRML MDRD: 87 ML/MIN/1.73SQ M
GLUCOSE SERPL-MCNC: 143 MG/DL (ref 65–140)
GLUCOSE SERPL-MCNC: 150 MG/DL (ref 65–140)
GLUCOSE SERPL-MCNC: 165 MG/DL (ref 65–140)
GLUCOSE SERPL-MCNC: 218 MG/DL (ref 65–140)
HCT VFR BLD AUTO: 40.5 % (ref 34.8–46.1)
HGB BLD-MCNC: 13.7 G/DL (ref 11.5–15.4)
IMM GRANULOCYTES # BLD AUTO: 0.03 THOUSAND/UL (ref 0–0.2)
IMM GRANULOCYTES NFR BLD AUTO: 0 % (ref 0–2)
LYMPHOCYTES # BLD AUTO: 1.72 THOUSANDS/ÂΜL (ref 0.6–4.47)
LYMPHOCYTES NFR BLD AUTO: 25 % (ref 14–44)
MAGNESIUM SERPL-MCNC: 1.9 MG/DL (ref 1.9–2.7)
MCH RBC QN AUTO: 31.2 PG (ref 26.8–34.3)
MCHC RBC AUTO-ENTMCNC: 33.8 G/DL (ref 31.4–37.4)
MCV RBC AUTO: 92 FL (ref 82–98)
MONOCYTES # BLD AUTO: 0.61 THOUSAND/ÂΜL (ref 0.17–1.22)
MONOCYTES NFR BLD AUTO: 9 % (ref 4–12)
NEUTROPHILS # BLD AUTO: 4.35 THOUSANDS/ÂΜL (ref 1.85–7.62)
NEUTS SEG NFR BLD AUTO: 62 % (ref 43–75)
NRBC BLD AUTO-RTO: 0 /100 WBCS
PHOSPHATE SERPL-MCNC: 2.6 MG/DL (ref 2.3–4.1)
PLATELET # BLD AUTO: 372 THOUSANDS/UL (ref 149–390)
PMV BLD AUTO: 9.2 FL (ref 8.9–12.7)
POTASSIUM SERPL-SCNC: 3.7 MMOL/L (ref 3.5–5.3)
PROT SERPL-MCNC: 5.6 G/DL (ref 6.4–8.4)
RBC # BLD AUTO: 4.39 MILLION/UL (ref 3.81–5.12)
SARS-COV-2 RNA RESP QL NAA+PROBE: NEGATIVE
SODIUM SERPL-SCNC: 143 MMOL/L (ref 135–147)
WBC # BLD AUTO: 7.03 THOUSAND/UL (ref 4.31–10.16)

## 2024-04-24 PROCEDURE — 97530 THERAPEUTIC ACTIVITIES: CPT

## 2024-04-24 PROCEDURE — 87635 SARS-COV-2 COVID-19 AMP PRB: CPT | Performed by: INTERNAL MEDICINE

## 2024-04-24 PROCEDURE — 85025 COMPLETE CBC W/AUTO DIFF WBC: CPT | Performed by: INTERNAL MEDICINE

## 2024-04-24 PROCEDURE — 99239 HOSP IP/OBS DSCHRG MGMT >30: CPT | Performed by: INTERNAL MEDICINE

## 2024-04-24 PROCEDURE — 84100 ASSAY OF PHOSPHORUS: CPT | Performed by: INTERNAL MEDICINE

## 2024-04-24 PROCEDURE — 82948 REAGENT STRIP/BLOOD GLUCOSE: CPT

## 2024-04-24 PROCEDURE — 83735 ASSAY OF MAGNESIUM: CPT | Performed by: INTERNAL MEDICINE

## 2024-04-24 PROCEDURE — 80053 COMPREHEN METABOLIC PANEL: CPT | Performed by: INTERNAL MEDICINE

## 2024-04-24 RX ORDER — CEFTRIAXONE 1 G/50ML
1000 INJECTION, SOLUTION INTRAVENOUS EVERY 24 HOURS
Status: COMPLETED | OUTPATIENT
Start: 2024-04-24 | End: 2024-04-24

## 2024-04-24 RX ADMIN — CARBIDOPA AND LEVODOPA 1 TABLET: 25; 100 TABLET ORAL at 11:43

## 2024-04-24 RX ADMIN — INSULIN LISPRO 1 UNITS: 100 INJECTION, SOLUTION INTRAVENOUS; SUBCUTANEOUS at 09:39

## 2024-04-24 RX ADMIN — INSULIN LISPRO 1 UNITS: 100 INJECTION, SOLUTION INTRAVENOUS; SUBCUTANEOUS at 11:44

## 2024-04-24 RX ADMIN — CEFTRIAXONE 1000 MG: 1 INJECTION, SOLUTION INTRAVENOUS at 14:57

## 2024-04-24 RX ADMIN — FAMOTIDINE 20 MG: 20 TABLET, FILM COATED ORAL at 09:38

## 2024-04-24 RX ADMIN — CILOSTAZOL 100 MG: 50 TABLET ORAL at 06:15

## 2024-04-24 RX ADMIN — CILOSTAZOL 100 MG: 50 TABLET ORAL at 17:02

## 2024-04-24 RX ADMIN — GLYCOPYRROLATE 1 MG: 1 TABLET ORAL at 09:38

## 2024-04-24 RX ADMIN — CARBIDOPA AND LEVODOPA 1 TABLET: 25; 100 TABLET ORAL at 09:38

## 2024-04-24 RX ADMIN — CARBIDOPA AND LEVODOPA 1 TABLET: 25; 100 TABLET ORAL at 17:02

## 2024-04-24 RX ADMIN — PRAVASTATIN SODIUM 40 MG: 40 TABLET ORAL at 17:02

## 2024-04-24 RX ADMIN — ENOXAPARIN SODIUM 40 MG: 40 INJECTION SUBCUTANEOUS at 09:40

## 2024-04-24 RX ADMIN — LEVOTHYROXINE SODIUM 75 MCG: 75 TABLET ORAL at 06:15

## 2024-04-24 RX ADMIN — GLYCOPYRROLATE 1 MG: 1 TABLET ORAL at 17:02

## 2024-04-24 NOTE — PROGRESS NOTES
Patient:    MRN:  [id number]    Aidin Request ID:  [id number]    Level of care reserved:  Skilled Nursing Facility    Partner Reserved:  [facility], [place] [address] [phone]    Clinical needs requested:    Geography searched:  20 miles around [id number]    Start of Service:    Request sent:  10:53am EDT on 4/23/2024 by Amparo Villa    Partner reserved:  9:43am EDT on 4/24/2024 by Amparo Villa    Choice list shared:  9:39am EDT on 4/24/2024 by Amparo Villa

## 2024-04-24 NOTE — ASSESSMENT & PLAN NOTE
79 year old female with progressive Parkinson's disease who presented with worsening weakness.    Baseline: Mainly in bed / wheelchair, but is able to stand, pivot for dressing toileting, and bathing  CT head without acute intracranial abormalities.  MRI brain canceled as patient with non-conditional DBS; thus CTA head neck was ordered  CTA head neck showed progressive stenosis of right PCA segment; neurology recommendations appreciated  Continue Pletal 100mg BID for now, aspirin held   UA / UC showing E. coli and Aerococcus urinae; has received 3 doses of Rocephin thus far with substantial improvement, continue additional 2 days  PT OT Recommending moderate resource intensity  Speech recommending Pureed diet, thin liquids. Continue Speech therapy.    4/23-at this time, neurology signed off and likely weakness and associated strokelike symptoms secondary to UTI.  Patient's spouse at bedside reports improvement since initiation of antibiotics and patient is currently at baseline.  Labs and vitals otherwise stable, monitor on morning labs and consider discharge in the next 24 to 48 hours.    4/24-patient will complete 5 days of IV antibiotics prior to discharge.  Stable for discharge and is currently at baseline mental status; qualifies for inpatient postacute rehab.  Will be discharged to complete care at [place] as per family request.

## 2024-04-24 NOTE — PLAN OF CARE
Problem: PHYSICAL THERAPY ADULT  Goal: Performs mobility at highest level of function for planned discharge setting.  See evaluation for individualized goals.  Description: Treatment/Interventions: Functional transfer training, LE strengthening/ROM, Therapeutic exercise, Endurance training, Patient/family training, Bed mobility, Spoke to nursing, Spoke to case management, OT          See flowsheet documentation for full assessment, interventions and recommendations.  Outcome: Not Progressing  Note: Prognosis: Poor  Problem List: Decreased strength, Decreased endurance, Impaired balance, Decreased mobility, Decreased cognition, Impaired judgement, Decreased safety awareness, Decreased range of motion  Assessment: Pt seen for PT treatment session this date with interventions consisting of bed mobility, transfer training, and HEP,sitting balance and tolerance activities eob, static standing balance tolerance activities and education provided as needed for safety and direction to improve functional mobility, safety awareness, and activity tolerance. Pt agreeable to PT treatment session upon arrival, pt found supine in bed . At end of session, pt left  supine in bed with all needs in reach. In comparison to previous session, pt with no improvement activity tolerance, endurance, static sitting balance,  dynamic sitting balance, standing balance, AM- pac score, and functional mobility. Pt is requiring an significant amount of assistance for all functional mobility due to impairments in strength, balance, cognition, ROM, ability to consistently follow commands, activity tolerance and endurance.  Pt  requires mod- max assist x1 for trunk support to maintain static sitting at EOB. L sided lean noted, pt unable to correct to midline, and requires max assist to right to mild line with verbal cues.  Pt initially able to maintain static sitting with min assist x1 for brief moments then requiring mod- max assist to maintain  static sitting. Sitting tolerance 5-7 mintues EOB.  Pt performs sit to stand transfer trials x 3 with max assist x2, verbal cues for techniques, upright posture, b/l knee extension required.  Standing tolerance 5- 15 seconds.  Dependant assist for sit to supine,  rolling R and left performed with  dependant assist to the R and max assist x2 to the L.  Pt  unable to perform lb/l e arom exercises while seated at EOB. Continue to recommend level II moderate rehab resource intensity at time of d/c in order to maximize pt's functional independence and safety w/ mobility. Pt continues to be functioning below baseline level. PT will continue to see pt while here in order to address the deficits listed above and provide interventions consistent w/ POC in effort to achieve STGs.    The patient's AM-PAC Basic Mobility Inpatient Short Form Raw Score is 6. A raw score less than 16 suggests the patient may benefit from discharge to post-acute rehabilitation services. Please also refer to the recommendation of the Physical Therapist for safe discharge planning.        Rehab Resource Intensity Level, PT: II (Moderate Resource Intensity)    See flowsheet documentation for full assessment.

## 2024-04-24 NOTE — PHYSICAL THERAPY NOTE
PHYSICAL THERAPY NOTE          Patient Name: Anjali Shipley  Today's Date: 4/24/2024  treatment time  9175-0071   04/24/24 1550   Note Type   Note Type Treatment   Pain Assessment   Pain Assessment Tool FLACC   Pain Rating: FLACC (Rest) - Face 0   Pain Rating: FLACC (Rest) - Legs 0   Pain Rating: FLACC (Rest) - Activity 0   Pain Rating: FLACC (Rest) - Cry 0   Pain Rating: FLACC (Rest) - Consolability 0   Score: FLACC (Rest) 0   Pain Rating: FLACC (Activity) - Face 0   Pain Rating: FLACC (Activity) - Legs 0   Pain Rating: FLACC (Activity) - Activity 0   Pain Rating: FLACC (Activity) - Cry 0   Pain Rating: FLACC (Activity) - Consolability 0   Score: FLACC (Activity) 0   Cognition   Overall Cognitive Status Impaired   Arousal/Participation Lethargic   Attention Difficulty attending to directions   Orientation Level Oriented to person  (oriented to self)   Memory Decreased long term memory;Decreased recall of biographical information;Decreased short term memory;Decreased recall of recent events;Decreased recall of precautions   Following Commands Follows one step commands with increased time or repetition   Subjective   Subjective Pt cleared for PT treatment session.  pt  agreeable to PT.   Bed Mobility   Rolling R 1  Dependent   Additional items Assist x 2;Bedrails;Increased time required;Verbal cues;LE management   Rolling L 2  Maximal assistance   Additional items Assist x 2;Bedrails;Increased time required;Verbal cues;LE management   Supine to Sit 2  Maximal assistance   Additional items Assist x 2;Increased time required;Verbal cues;LE management   Sit to Supine 1  Dependent   Additional items Assist x 2;Increased time required;Verbal cues;LE management   Transfers   Sit to Stand 2  Maximal assistance   Additional items Assist x 2;Increased time required;Verbal cues  (use of bedside chair forb/l ue support and blocking of knees)    Stand to Sit 2  Maximal assistance   Additional items Assist x 2;Increased time required;Verbal cues   Additional Comments pt  performed sit to stand transfers x 3 with max assist x2  standing tolerance 15 seconds x2 and 5 seconds x1 verbal cues to extend b/l knees, lift head to midline, b.l feel blocked to prevent sliding   Ambulation/Elevation   Gait pattern Not appropriate   Exercises   Balance training  static sitting balance. tolerance activites at EOB with  mod to max assist for trunk support, L sided lean, pt unable to correct or right to midline, max assist to correct, pt  unable to sustain midline.  sitting tolerance 5-7 minutes EOB.   Assessment   Prognosis Poor   Problem List Decreased strength;Decreased endurance;Impaired balance;Decreased mobility;Decreased cognition;Impaired judgement;Decreased safety awareness;Decreased range of motion   Assessment Pt seen for PT treatment session this date with interventions consisting of bed mobility, transfer training, and HEP,sitting balance and tolerance activities eob, static standing balance tolerance activities and education provided as needed for safety and direction to improve functional mobility, safety awareness, and activity tolerance. Pt agreeable to PT treatment session upon arrival, pt found supine in bed . At end of session, pt left  supine in bed with all needs in reach. In comparison to previous session, pt with no improvement activity tolerance, endurance, static sitting balance,  dynamic sitting balance, standing balance, AM- pac score, and functional mobility. Pt is requiring an significant amount of assistance for all functional mobility due to impairments in strength, balance, cognition, ROM, ability to consistently follow commands, activity tolerance and endurance.  Pt  requires mod- max assist x1 for trunk support to maintain static sitting at EOB. L sided lean noted, pt unable to correct to midline, and requires max assist to right to mild  line with verbal cues.  Pt initially able to maintain static sitting with min assist x1 for brief moments then requiring mod- max assist to maintain static sitting. Sitting tolerance 5-7 mintues EOB.  Pt performs sit to stand transfer trials x 3 with max assist x2, verbal cues for techniques, upright posture, b/l knee extension required.  Standing tolerance 5- 15 seconds.  Dependant assist for sit to supine,  rolling R and left performed with  dependant assist to the R and max assist x2 to the L.  Pt  unable to perform lb/l e arom exercises while seated at EOB. Continue to recommend level II moderate rehab resource intensity at time of d/c in order to maximize pt's functional independence and safety w/ mobility. Pt continues to be functioning below baseline level. PT will continue to see pt while here in order to address the deficits listed above and provide interventions consistent w/ POC in effort to achieve STGs.    The patient's [facility] Basic Mobility Inpatient Short Form Raw Score is 6. A raw score less than 16 suggests the patient may benefit from discharge to post-acute rehabilitation services. Please also refer to the recommendation of the Physical Therapist for safe discharge planning.   Goals   Patient Goals none stated by pt due to pt being essentially non- verbal  with poor cognition,  pt's  stated wanting hid wife to be able to stand and hold onto grab bar inroder to pull her underwear up and down and perform personal hygiene.   STG Expiration Date 05/06/24   PT Treatment Day 1   Plan   Treatment/Interventions Functional transfer training;Therapeutic exercise;Endurance training;Patient/family training;Equipment eval/education;Bed mobility;Spoke to nursing;OT;Family   Progress Slow progress, decreased activity tolerance   PT Frequency 2-3x/wk   Discharge Recommendation   Rehab Resource Intensity Level, PT II (Moderate Resource Intensity)   AM-PAC Basic Mobility Inpatient   Turning in Flat Bed  Without Bedrails 1   Lying on Back to Sitting on Edge of Flat Bed Without Bedrails 1   Moving Bed to Chair 1   Standing Up From Chair Using Arms 1   Walk in Room 1   Climb 3-5 Stairs With Railing 1   Basic Mobility Inpatient Raw Score 6   Turning Head Towards Sound 2   Follow Simple Instructions 2   Low Function Basic Mobility Raw Score  10   Low Function Basic Mobility Standardized Score  14.65   [facility] Highest Level Of Mobility   -[facility] Goal 2: Bed activities/Dependent transfer   -[facility] Achieved 3: Sit at edge of bed   Education   Education Provided Mobility training;Home exercise program   Patient Reinforcement needed   End of Consult   Patient Position at End of Consult Bed/Chair alarm activated;Supine;All needs within reach   Lamar Chand, PTA

## 2024-04-24 NOTE — TELEPHONE ENCOUNTER
----- Message from Andre Painting MD sent at 4/24/2024  2:41 PM EDT -----  Thank you for allowing us to participate in the care of your patient, Anjali Shipley, who was hospitalized from 4/20/2024 through 4/24/2024 with the admitting diagnosis of weakness and confusion; was started on stroke pathway in the emergency room.  Subsequent workup negative for stroke; but patient noted to have UTI.  Treated with 5 days of Rocephin and patient appears to be back to baseline.  Qualifies for inpatient rehab and will be discharged to complete care at [place].    Please see discharge summary for further details.    If you have any additional questions or would like to discuss further, please feel free to contact me.    Andre Painting MD  [facility] Internal Medicine, Hospitalist  [phone]

## 2024-04-24 NOTE — OCCUPATIONAL THERAPY NOTE
Occupational Therapy Progress Note     Patient Name: Anjali Shipley  Today's Date: 4/24/2024  Problem List  Principal Problem:    Weakness  Active Problems:    Acquired hypothyroidism    Mixed hyperlipidemia    Parkinson disease    Type 2 diabetes mellitus with microalbuminuria, without long-term current use of insulin ([facility])    Severe Lewy body dementia without behavioral disturbance, psychotic disturbance, mood disturbance, or anxiety ([facility])    S/P deep brain stimulator placement    Hemiparesis affecting left side as late effect of cerebrovascular accident (CVA) ([facility])    Sacral wound            04/24/24 1424   OT Last Visit   OT Visit Date 04/24/24   Note Type   Note Type Treatment   Pain Assessment   Pain Assessment Tool FLACC   Pain Rating: FLACC (Rest) - Face 0   Pain Rating: FLACC (Rest) - Legs 0   Pain Rating: FLACC (Rest) - Activity 0   Pain Rating: FLACC (Rest) - Cry 0   Pain Rating: FLACC (Rest) - Consolability 0   Score: FLACC (Rest) 0   Pain Rating: FLACC (Activity) - Face 0   Pain Rating: FLACC (Activity) - Legs 0   Pain Rating: FLACC (Activity) - Activity 0   Pain Rating: FLACC (Activity) - Cry 0   Pain Rating: FLACC (Activity) - Consolability 0   Score: FLACC (Activity) 0   Restrictions/Precautions   Weight Bearing Precautions Per Order No   Other Precautions Cognitive;Chair Alarm;Bed Alarm;Multiple lines;Fall Risk  (left kenroy, non verbal)   Bed Mobility   Rolling R 1  Dependent   Additional items Assist x 2;Increased time required;Verbal cues;LE management   Rolling L 2  Maximal assistance   Additional items Assist x 2;Increased time required;Verbal cues;LE management;Bedrails   Supine to Sit 2  Maximal assistance   Additional items Assist x 2;Increased time required;Verbal cues;LE management   Sit to Supine 1  Dependent   Additional items Assist x 2;Increased time required;Verbal cues;LE management   Additional Comments Pt demonstrated poor sitting balance during functional tasks, sat EOB x 5 min  with min - maxA   Transfers   Sit to Stand 2  Maximal assistance   Additional items Assist x 2;Increased time required;Verbal cues   Stand to Sit 2  Maximal assistance   Additional items Assist x 2;Increased time required;Verbal cues   Cognition   Overall Cognitive Status Impaired   Arousal/Participation Lethargic   Attention Difficulty attending to directions   Orientation Level Unable to assess   Memory Decreased long term memory;Decreased recall of biographical information;Decreased short term memory;Decreased recall of recent events;Decreased recall of precautions   Following Commands Follows one step commands with increased time or repetition   Activity Tolerance   Activity Tolerance Patient limited by fatigue;Treatment limited secondary to medical complications (Comment)   Medical Staff Made Aware RNASHLYN   Assessment   Assessment Pt seen for skilled OT tx this date. Tx focused on improving strength, activity tolerance and balance, safety awareness to increase independence with self care tasks. Pt tolerated session fair. Pt was limited by weakness. Pt performed bed mobility rolling side<> side dep x2, supine<> sit maxAx2, transfers maxAx2, tolerated standing x 10 -15 sec with maxAx2. Pt demonstrated poor sitting balance during functional tasks, sat EOB x 5 min with min - maxA. Pt required max verbal cuing during session to safely complete tasks. Current OT DC recommendations for pt is level 2 resources.   Plan   Treatment Interventions ADL retraining;Functional transfer training;UE strengthening/ROM;Endurance training;Cognitive reorientation;Patient/family training;Equipment evaluation/education;Neuromuscular reeducation;Compensatory technique education;Energy conservation   Goal Expiration Date 05/06/24   OT Treatment Day 1   OT Frequency 2-3x/wk   Discharge Recommendation   Rehab Resource Intensity Level, OT II (Moderate Resource Intensity)   Additional Comments  The patient's raw score on the AM-PAC  Daily Activity Inpatient Short Form is 6. A raw score of less than 19 suggests the patient may benefit from discharge to post-acute rehabilitation services. Please refer to the recommendation of the Occupational Therapist for safe discharge planning.   AM-PAC Daily Activity Inpatient   Lower Body Dressing 1   Bathing 1   Toileting 1   Upper Body Dressing 1   Grooming 1   Eating 1   Daily Activity Raw Score 6   Turning Head Towards Sound 2   Follow Simple Instructions 2   Low Function Daily Activity Raw Score 10   Low Function Daily Activity Standardized Score  17.31   AM-PAC Applied Cognition Inpatient   Following a Speech/Presentation 2   Understanding Ordinary Conversation 2   Taking Medications 1   Remembering Where Things Are Placed or Put Away 1   Remembering List of 4-5 Errands 1   Taking Care of Complicated Tasks 1   Applied Cognition Raw Score 8   Applied Cognition Standardized Score 19.32   Ashley Gates, OT

## 2024-04-24 NOTE — NURSING NOTE
IV removed. AVS given to transport team to send with patient to receiving facility. Report called and given to nurse at receiving facility. Questions addressed. Patient discharged.    Galina Navarrete BSN, RN

## 2024-04-24 NOTE — CASE MANAGEMENT
Case Management Discharge Planning Note    Patient name Anjali Shipley  [facility] -* MRN [id number]  : 1945 Date 2024       Current Admission Date: 2024  Current Admission Diagnosis:Weakness   Patient Active Problem List    Diagnosis Date Noted    Sacral wound 2024    Cognitive decline 04/10/2024    Hemiparesis affecting left side as late effect of cerebrovascular accident (CVA) ([facility]) 2022    Hypokalemia 2022    Metabolic encephalopathy 2021    PVC (premature ventricular contraction) 2021    Chorea 2020    Severe Lewy body dementia without behavioral disturbance, psychotic disturbance, mood disturbance, or anxiety ([facility]) 2020    S/P deep brain stimulator placement 2020    Type 2 diabetes mellitus with microalbuminuria, without long-term current use of insulin ([facility]) 2020    Weakness 2020    Chronic cough 2019    Decreased ambulation status 2019    Major depression with psychotic features ([facility]) 2019    Sialorrhea 2018    Allergic rhinitis 2017    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 2016    Acquired hypothyroidism 2016    Mixed hyperlipidemia 2016    Parkinson disease 2016    GERD (gastroesophageal reflux disease) 2016    DYANA (generalized anxiety disorder) 2015    Osteoporosis 2012      LOS (days): 4  Geometric Mean LOS (GMLOS) (days): 3.9  Days to GMLOS:-0.2     OBJECTIVE:  Risk of Unplanned Readmission Score: 13.24         Current admission status: Inpatient   Preferred Pharmacy:   [id number] - [place] - [address] [facility] [facility]  [address] [place] [address]  [place] [address]  Phone: [phone] Fax: [phone]    [facility] - [facility] [address] at the [facility]  [address] [facility] [address]  Phone: [phone] Fax: [phone]    Primary Care Provider: Satnam  DO Doyle    Primary Insurance: MEDICARE  Secondary Insurance: SE    DISCHARGE DETAILS:                                                                Number/Name of Dispatcher: Contacted via aidin  Transported by (Company and Unit #): [facility]  ETA of Transport (Date): 04/24/24  ETA of Transport (Time): 1700       IMM reviewed with patient's caregiver, patient's caregiver agrees with discharge determination.    CM spoke with patient's  to review options for STR, CM provided  with choice list. Patient's  reported that he spoke with [facility] and would like for his wife to go there as she was there in the past.      CM reserved [facility] via [facility] and messaged them with anticipated DC.  They need patient to have COVID swab, CM will notify MARLEN.  CM completed and uploaded PASSR.     CM notified during rounds that patient is medically stable for DC to STR today.  MARLEN ordered COVID swap for patient.  CM notified [facility] via Aidin that patient is medically stable for DC, facility is able to take her today 4.24.24 with negative swab.  CM spoke with patient's  at bedside, he is agreeable to patient going to [facility] today.  CM will arrange BLS transport to facility for this afternoon and will inform him of confirmed time  .   CM arranged BLS transport via [employer] for 4/24/24 at 5:00pm to [facility].  CM informed patient's , MARLEN, and RN of transportation time.

## 2024-04-24 NOTE — PROGRESS NOTES
Patient:    MRN:  [id number]    Aidin Request ID:  [id number]    Level of care reserved:  Skilled Nursing Facility    Partner Reserved:  [facility] [place], [place] [address] [phone]    Clinical needs requested:    Geography searched:  20 miles around [id number]    Start of Service:    Request sent:  10:53am EDT on 4/23/2024 by Amparo Villa    Partner reserved:  9:43am EDT on 4/24/2024 by Amparo Villa    Choice list shared:  9:39am EDT on 4/24/2024 by Amparo Villa

## 2024-04-24 NOTE — CASE MANAGEMENT
Case Management Discharge Planning Note    Patient name Anjali Shipley  Location [facility] 4 /E4 -* MRN [id number]  : 1945 Date 2024       Current Admission Date: 2024  Current Admission Diagnosis:Weakness   Patient Active Problem List    Diagnosis Date Noted    Sacral wound 2024    Cognitive decline 04/10/2024    Hemiparesis affecting left side as late effect of cerebrovascular accident (CVA) (Formerly [facility]) 2022    Hypokalemia 2022    Metabolic encephalopathy 2021    PVC (premature ventricular contraction) 2021    Chorea 2020    Severe Lewy body dementia without behavioral disturbance, psychotic disturbance, mood disturbance, or anxiety (Formerly [facility]) 2020    S/P deep brain stimulator placement 2020    Type 2 diabetes mellitus with microalbuminuria, without long-term current use of insulin ([facility]) 2020    Weakness 2020    Chronic cough 2019    Decreased ambulation status 2019    Major depression with psychotic features ([facility]) 2019    Sialorrhea 2018    Allergic rhinitis 2017    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 2016    Acquired hypothyroidism 2016    Mixed hyperlipidemia 2016    Parkinson disease 2016    GERD (gastroesophageal reflux disease) 2016    DYANA (generalized anxiety disorder) 2015    Osteoporosis 2012      LOS (days): 4  Geometric Mean LOS (GMLOS) (days): 3.9  Days to GMLOS:-0.2     OBJECTIVE:  Risk of Unplanned Readmission Score: 13.24         Current admission status: Inpatient   Preferred Pharmacy:   [facility]/pharmacy #[id number] - [place] - [address]  [address] [place] [address]  [place] [id number]  Phone: [phone] Fax: [phone]    [facility] - [facility] [address] [address]  [address] [address]  Phone: [phone] Fax: [phone]    Primary Care Provider: Satnam  DO Doyle    Primary Insurance: MEDICARE  Secondary Insurance: [employer] [place]    DISCHARGE DETAILS:    Discharge planning discussed with:: Patients   Freedom of Choice: Yes  Comments - Freedom of Choice: [facility]  CM contacted family/caregiver?: Yes  Were Treatment Team discharge recommendations reviewed with patient/caregiver?: Yes  Did patient/caregiver verbalize understanding of patient care needs?: N/A- going to facility  Were patient/caregiver advised of the risks associated with not following Treatment Team discharge recommendations?: Yes    Contacts  Patient Contacts: Shane  Relationship to Patient:: Family  Contact Method: In Person  Phone Number: [phone]  Reason/Outcome: Continuity of Care, Emergency Contact, Discharge Planning    Requested Home Health Care         Is the patient interested in HHC at discharge?: No    DME Referral Provided  Referral made for DME?: No    Other Referral/Resources/Interventions Provided:  Interventions: Short Term Rehab, Transportation  Referral Comments: [facility]    Would you like to participate in our [facility] service program?  : No - Declined    Treatment Team Recommendation: Short Term Rehab  Discharge Destination Plan:: Short Term Rehab  Transport at Discharge : BLS Ambulance     Number/Name of Dispatcher: Contacted via aidin  Transported by (Company and Unit #): [facility]  ETA of Transport (Date): 04/24/24  ETA of Transport (Time): 1700              IMM Given (Date):: 04/24/24  IMM Given to:: Patient  Family notified::  Shane  IMM reviewed with patient's caregiver, patient's caregiver agrees with discharge determination.       Accepting Facility Name, City & State : [facility]  Receiving Facility/Agency Phone Number: [phone]  Facility/Agency Fax Number: [phone]         CM spoke with patient's  to review options for STR, CM provided  with  choice list. Patient's  reported that he spoke with [facility] and would like for his wife to go there as she was there in the past.      CM reserved [facility] via [employer] and messaged them with anticipated DC.  They need patient to have COVID swab, CM will notify MARLEN.  CM completed and uploaded PASSR.     CM notified during rounds that patient is medically stable for DC to STR today.  MARLEN ordered COVID swap for patient.  CM notified [facility] via [employer] that patient is medically stable for DC, facility is able to take her today 4.24.24 with negative swab.  CM spoke with patient's  at bedside, he is agreeable to patient going to [facility] today.  CM will arrange BLS transport to facility for this afternoon and will inform him of confirmed time  .   CM arranged BLS transport via [employer] for 4/24/24 at 5:00pm to [facility].  CM informed patient's , MARLEN, and RN of transportation time.

## 2024-04-24 NOTE — DISCHARGE INSTR - AVS FIRST PAGE
Please remember to take all medications as directed on your medication list and follow-up with your primary care provider in 1-2 weeks.    You are encouraged to keep your med list on your person (in your wallet or purse) and please take your medication list provided in this After Visit Summary to your PCP visit following discharge.    Please ask your nurse to show you the medication list and explain when to take your medications.    Additionally, we encourage all patient's to take their actual medications with them to their primary care visit! This is to verify you have the proper medications and the proper dosages.  Remember, while medications are often listed in your computer record; that may not always be right as mistakes can occur at the pharmacy or in the computer and sometimes old medication bottles can be mistaken for newer medications.    (Note to nursing: please place patient's medication list on top of the AVS.)  ______________________________________________________________________________    Anjali has completed antibiotics for urinary tract infection that was causing confusion and weakness.    She should continue taking medications as listed below and follow-up with primary care and neurology in the outpatient setting.    Continue puréed diet with thin liquids.    Substantial decubitus ulcers noted this admission in the coccyx and right buttock area.  Please see attached wound care instructions and seek additional help if they become inflamed, erythematous, or have a purulent discharge.

## 2024-04-24 NOTE — PLAN OF CARE
Problem: OCCUPATIONAL THERAPY ADULT  Goal: Performs self-care activities at highest level of function for planned discharge setting.  See evaluation for individualized goals.  Description: Treatment Interventions: ADL retraining, Functional transfer training, UE strengthening/ROM, Endurance training, Cognitive reorientation, Patient/family training, Equipment evaluation/education, Neuromuscular reeducation, Compensatory technique education, Energy conservation          See flowsheet documentation for full assessment, interventions and recommendations.   Outcome: Not Progressing  Note: Limitation: Decreased ADL status, Decreased UE ROM, Decreased UE strength, Decreased Safe judgement during ADL, Decreased endurance, Decreased self-care trans  Prognosis: Guarded  Assessment: Pt seen for skilled OT tx this date. Tx focused on improving strength, activity tolerance and balance, safety awareness to increase independence with self care tasks. Pt tolerated session fair. Pt was limited by weakness. Pt performed bed mobility rolling side<> side dep x2, supine<> sit maxAx2, transfers maxAx2, tolerated standing x 10 -15 sec with maxAx2. Pt demonstrated poor sitting balance during functional tasks, sat EOB x 5 min with min - maxA. Pt required max verbal cuing during session to safely complete tasks. Current OT DC recommendations for pt is level 2 resources.     Rehab Resource Intensity Level, OT: II (Moderate Resource Intensity)

## 2024-04-24 NOTE — ASSESSMENT & PLAN NOTE
Lab Results   Component Value Date    HGBA1C 5.5 03/26/2024       Recent Labs     04/23/24  1612 04/23/24 2052 04/24/24  0723 04/24/24  1123   POCGLU 183* 199* 150* 165*         Blood Sugar Average: Last 72 hrs:  (P) [id number]    Continue sliding scale  Metformin on hold

## 2024-04-24 NOTE — TELEPHONE ENCOUNTER
Ally from [facility] requesting last office note regarding patient buttock wound care..    Patient las seen in office on 9/21/2023.  A couple message received regarding bed sores early April 2024.    Patient is currently admitted into hospital 4/20/2024 [facility].

## 2024-04-24 NOTE — PLAN OF CARE
Problem: Potential for Falls  Goal: Patient will remain free of falls  Description: INTERVENTIONS:  - Educate patient/family on patient safety including physical limitations  - Instruct patient to call for assistance with activity   - Consult OT/PT to assist with strengthening/mobility   - Keep Call bell within reach  - Keep bed low and locked with side rails adjusted as appropriate  - Keep care items and personal belongings within reach  - Initiate and maintain comfort rounds  - Make Fall Risk Sign visible to staff  - Apply yellow socks and bracelet for high fall risk patients  - Consider moving patient to room near nurses station  Outcome: Progressing     Problem: PAIN - ADULT  Goal: Verbalizes/displays adequate comfort level or baseline comfort level  Description: Interventions:  - Encourage patient to monitor pain and request assistance  - Assess pain using appropriate pain scale  - Administer analgesics based on type and severity of pain and evaluate response  - Implement non-pharmacological measures as appropriate and evaluate response  - Consider cultural and social influences on pain and pain management  - Notify physician/advanced practitioner if interventions unsuccessful or patient reports new pain  Outcome: Progressing     Problem: INFECTION - ADULT  Goal: Absence or prevention of progression during hospitalization  Description: INTERVENTIONS:  - Assess and monitor for signs and symptoms of infection  - Monitor lab/diagnostic results  - Monitor all insertion sites, i.e. indwelling lines, tubes, and drains  - [place] appropriate cooling/warming therapies per order  - Administer medications as ordered  - Instruct and encourage patient and family to use good hand hygiene technique  - Identify and instruct in appropriate isolation precautions for identified infection/condition  Outcome: Progressing     Problem: SAFETY ADULT  Goal: Maintain or return to baseline ADL function  Description:  INTERVENTIONS:  -  Assess patient's ability to carry out ADLs; assess patient's baseline for ADL function and identify physical deficits which impact ability to perform ADLs (bathing, care of mouth/teeth, toileting, grooming, dressing, etc.)  - Assess/evaluate cause of self-care deficits   - Assess range of motion  - Assess patient's mobility; develop plan if impaired  - Assess patient's need for assistive devices and provide as appropriate  - Encourage maximum independence but intervene and supervise when necessary  - Involve family in performance of ADLs  - Assess for home care needs following discharge   - Consider OT consult to assist with ADL evaluation and planning for discharge  - Provide patient education as appropriate  Outcome: Progressing  Goal: Maintains/Returns to pre admission functional level  Description: INTERVENTIONS:  - Perform AM-PAC 6 Click Basic Mobility/ Daily Activity assessment daily.  - Set and communicate daily mobility goal to care team and patient/family/caregiver.   - Collaborate with rehabilitation services on mobility goals if consulted  - Out of bed for toileting  - Record patient progress and toleration of activity level   Outcome: Progressing     Problem: DISCHARGE PLANNING  Goal: Discharge to home or other facility with appropriate resources  Description: INTERVENTIONS:  - Identify barriers to discharge w/patient and caregiver  - Arrange for needed discharge resources and transportation as appropriate  - Identify discharge learning needs (meds, wound care, etc.)  -  Refer to Case Management Department for coordinating discharge planning if the patient needs post-hospital services based on physician/advanced practitioner order or complex needs related to functional status, cognitive ability, or social support system  Outcome: Progressing     Problem: Knowledge Deficit  Goal: Patient/family/caregiver demonstrates understanding of disease process, treatment plan, medications, and  discharge instructions  Description: Complete learning assessment and assess knowledge base.  Interventions:  - Provide teaching at level of understanding  - Provide teaching via preferred learning methods  Outcome: Progressing     Problem: Prexisting or High Potential for Compromised Skin Integrity  Goal: Skin integrity is maintained or improved  Description: INTERVENTIONS:  - Identify patients at risk for skin breakdown  - Assess and monitor skin integrity  - Assess and monitor nutrition and hydration status  - Monitor labs   - Assess for incontinence   - Turn and reposition patient  - Assist with mobility/ambulation  - Relieve pressure over bony prominences  - Avoid friction and shearing  - Provide appropriate hygiene as needed including keeping skin clean and dry  - Evaluate need for skin moisturizer/barrier cream  - Collaborate with interdisciplinary team   - Patient/family teaching  - Consider wound care consult   Outcome: Progressing     Problem: Neurological Deficit  Goal: Neurological status is stable or improving  Description: Interventions:  - Monitor and assess patient's level of consciousness, motor function, sensory function, and level of assistance needed for ADLs.   - Monitor and report changes from baseline. Collaborate with interdisciplinary team to initiate plan and implement interventions as ordered.   - Provide and maintain a safe environment.  - Consider seizure precautions.  - Consider fall precautions.  - Consider aspiration precautions.  - Consider bleeding precautions.  Outcome: Progressing     Problem: Activity Intolerance/Impaired Mobility  Goal: Mobility/activity is maintained at optimum level for patient  Description: Interventions:  - Assess and monitor patient  barriers to mobility and need for assistive/adaptive devices.  - Assess patient's emotional response to limitations.  - Collaborate with interdisciplinary team and initiate plans and interventions as ordered.  - Encourage  independent activity per ability.  - Maintain proper body alignment.  - Perform active/passive rom as tolerated/ordered.  - Plan activities to conserve energy.  - Turn patient as appropriate  Outcome: Progressing     Problem: Potential for Aspiration  Goal: Non-ventilated patient's risk of aspiration is minimized  Description: Assess and monitor vital signs, respiratory status, and labs (WBC).  Monitor for signs of aspiration (tachypnea, cough, rales, wheezing, cyanosis, fever).    - Assess and monitor patient's ability to swallow.  - Place patient up in chair to eat if possible.  - HOB up at 90 degrees to eat if unable to get patient up into chair.  - Supervise patient during oral intake.   - Instruct patient/ family to take small bites.  - Instruct patient/ family to take small single sips when taking liquids.  - Follow patient-specific strategies generated by speech pathologist.  Outcome: Progressing     Problem: GENITOURINARY - ADULT  Goal: Maintains or returns to baseline urinary function  Description: INTERVENTIONS:  - Assess urinary function  - Encourage oral fluids to ensure adequate hydration if ordered  - Administer IV fluids as ordered to ensure adequate hydration  - Administer ordered medications as needed  - Offer frequent toileting  - Follow urinary retention protocol if ordered  Outcome: Progressing  Goal: Absence of urinary retention  Description: INTERVENTIONS:  - Assess patient's ability to void and empty bladder  - Monitor I/O  - Bladder scan as needed  - Discuss with physician/AP medications to alleviate retention as needed  - Discuss catheterization for long term situations as appropriate  Outcome: Progressing     Problem: METABOLIC, FLUID AND ELECTROLYTES - ADULT  Goal: Glucose maintained within target range  Description: INTERVENTIONS:  - Monitor Blood Glucose as ordered  - Assess for signs and symptoms of hyperglycemia and hypoglycemia  - Administer ordered medications to maintain glucose  within target range  - Assess nutritional intake and initiate nutrition service referral as needed  Outcome: Progressing     Problem: SKIN/TISSUE INTEGRITY - ADULT  Goal: Pressure injury heals and does not worsen  Description: Interventions:  - Implement low air loss mattress or specialty surface (Criteria met)  - Apply silicone foam dressing  - Use special pressure reducing interventions such as cushion when in chair   - Apply fecal or urinary incontinence containment device   - Perform passive or active ROM every day  - Turn and reposition patient & offload bony prominences every 2 hours   - Utilize friction reducing device or surface for transfers   -- Consider nutrition services referral as needed  Outcome: Progressing

## 2024-04-24 NOTE — PLAN OF CARE
Problem: Potential for Falls  Goal: Patient will remain free of falls  Description: INTERVENTIONS:  - Educate patient/family on patient safety including physical limitations  - Instruct patient to call for assistance with activity   - Consult OT/PT to assist with strengthening/mobility   - Keep Call bell within reach  - Keep bed low and locked with side rails adjusted as appropriate  - Keep care items and personal belongings within reach  - Initiate and maintain comfort rounds  - Make Fall Risk Sign visible to staff  - Apply yellow socks and bracelet for high fall risk patients  - Consider moving patient to room near nurses station  Outcome: Progressing     Problem: PAIN - ADULT  Goal: Verbalizes/displays adequate comfort level or baseline comfort level  Description: Interventions:  - Encourage patient to monitor pain and request assistance  - Assess pain using appropriate pain scale  - Administer analgesics based on type and severity of pain and evaluate response  - Implement non-pharmacological measures as appropriate and evaluate response  - Consider cultural and social influences on pain and pain management  - Notify physician/advanced practitioner if interventions unsuccessful or patient reports new pain  Outcome: Progressing     Problem: INFECTION - ADULT  Goal: Absence or prevention of progression during hospitalization  Description: INTERVENTIONS:  - Assess and monitor for signs and symptoms of infection  - Monitor lab/diagnostic results  - Monitor all insertion sites, i.e. indwelling lines, tubes, and drains  - [place] appropriate cooling/warming therapies per order  - Administer medications as ordered  - Instruct and encourage patient and family to use good hand hygiene technique  - Identify and instruct in appropriate isolation precautions for identified infection/condition  Outcome: Progressing     Problem: SAFETY ADULT  Goal: Maintain or return to baseline ADL function  Description:  INTERVENTIONS:  -  Assess patient's ability to carry out ADLs; assess patient's baseline for ADL function and identify physical deficits which impact ability to perform ADLs (bathing, care of mouth/teeth, toileting, grooming, dressing, etc.)  - Assess/evaluate cause of self-care deficits   - Assess range of motion  - Assess patient's mobility; develop plan if impaired  - Assess patient's need for assistive devices and provide as appropriate  - Encourage maximum independence but intervene and supervise when necessary  - Involve family in performance of ADLs  - Assess for home care needs following discharge   - Consider OT consult to assist with ADL evaluation and planning for discharge  - Provide patient education as appropriate  Outcome: Progressing  Goal: Maintains/Returns to pre admission functional level  Description: INTERVENTIONS:  - Perform AM-PAC 6 Click Basic Mobility/ Daily Activity assessment daily.  - Set and communicate daily mobility goal to care team and patient/family/caregiver.   - Collaborate with rehabilitation services on mobility goals if consulted  - Out of bed for toileting  - Record patient progress and toleration of activity level   Outcome: Progressing     Problem: DISCHARGE PLANNING  Goal: Discharge to home or other facility with appropriate resources  Description: INTERVENTIONS:  - Identify barriers to discharge w/patient and caregiver  - Arrange for needed discharge resources and transportation as appropriate  - Identify discharge learning needs (meds, wound care, etc.)  -  Refer to Case Management Department for coordinating discharge planning if the patient needs post-hospital services based on physician/advanced practitioner order or complex needs related to functional status, cognitive ability, or social support system  Outcome: Progressing     Problem: Knowledge Deficit  Goal: Patient/family/caregiver demonstrates understanding of disease process, treatment plan, medications, and  discharge instructions  Description: Complete learning assessment and assess knowledge base.  Interventions:  - Provide teaching at level of understanding  - Provide teaching via preferred learning methods  Outcome: Progressing     Problem: Prexisting or High Potential for Compromised Skin Integrity  Goal: Skin integrity is maintained or improved  Description: INTERVENTIONS:  - Identify patients at risk for skin breakdown  - Assess and monitor skin integrity  - Assess and monitor nutrition and hydration status  - Monitor labs   - Assess for incontinence   - Turn and reposition patient  - Assist with mobility/ambulation  - Relieve pressure over bony prominences  - Avoid friction and shearing  - Provide appropriate hygiene as needed including keeping skin clean and dry  - Evaluate need for skin moisturizer/barrier cream  - Collaborate with interdisciplinary team   - Patient/family teaching  - Consider wound care consult   Outcome: Progressing     Problem: Neurological Deficit  Goal: Neurological status is stable or improving  Description: Interventions:  - Monitor and assess patient's level of consciousness, motor function, sensory function, and level of assistance needed for ADLs.   - Monitor and report changes from baseline. Collaborate with interdisciplinary team to initiate plan and implement interventions as ordered.   - Provide and maintain a safe environment.  - Consider seizure precautions.  - Consider fall precautions.  - Consider aspiration precautions.  - Consider bleeding precautions.  Outcome: Progressing     Problem: Activity Intolerance/Impaired Mobility  Goal: Mobility/activity is maintained at optimum level for patient  Description: Interventions:  - Assess and monitor patient  barriers to mobility and need for assistive/adaptive devices.  - Assess patient's emotional response to limitations.  - Collaborate with interdisciplinary team and initiate plans and interventions as ordered.  - Encourage  independent activity per ability.  - Maintain proper body alignment.  - Perform active/passive rom as tolerated/ordered.  - Plan activities to conserve energy.  - Turn patient as appropriate  Outcome: Progressing     Problem: Potential for Aspiration  Goal: Non-ventilated patient's risk of aspiration is minimized  Description: Assess and monitor vital signs, respiratory status, and labs (WBC).  Monitor for signs of aspiration (tachypnea, cough, rales, wheezing, cyanosis, fever).    - Assess and monitor patient's ability to swallow.  - Place patient up in chair to eat if possible.  - HOB up at 90 degrees to eat if unable to get patient up into chair.  - Supervise patient during oral intake.   - Instruct patient/ family to take small bites.  - Instruct patient/ family to take small single sips when taking liquids.  - Follow patient-specific strategies generated by speech pathologist.  Outcome: Progressing     Problem: GENITOURINARY - ADULT  Goal: Maintains or returns to baseline urinary function  Description: INTERVENTIONS:  - Assess urinary function  - Encourage oral fluids to ensure adequate hydration if ordered  - Administer IV fluids as ordered to ensure adequate hydration  - Administer ordered medications as needed  - Offer frequent toileting  - Follow urinary retention protocol if ordered  Outcome: Progressing  Goal: Absence of urinary retention  Description: INTERVENTIONS:  - Assess patient's ability to void and empty bladder  - Monitor I/O  - Bladder scan as needed  - Discuss with physician/AP medications to alleviate retention as needed  - Discuss catheterization for long term situations as appropriate  Outcome: Progressing     Problem: METABOLIC, FLUID AND ELECTROLYTES - ADULT  Goal: Glucose maintained within target range  Description: INTERVENTIONS:  - Monitor Blood Glucose as ordered  - Assess for signs and symptoms of hyperglycemia and hypoglycemia  - Administer ordered medications to maintain glucose  within target range  - Assess nutritional intake and initiate nutrition service referral as needed  Outcome: Progressing     Problem: SKIN/TISSUE INTEGRITY - ADULT  Goal: Pressure injury heals and does not worsen  Description: Interventions:  - Implement low air loss mattress or specialty surface (Criteria met)  - Apply silicone foam dressing  - Use special pressure reducing interventions such as cushion when in chair   - Apply fecal or urinary incontinence containment device   - Perform passive or active ROM every day  - Turn and reposition patient & offload bony prominences every 2 hours   - Utilize friction reducing device or surface for transfers   -- Consider nutrition services referral as needed  Outcome: Progressing

## 2024-04-24 NOTE — CASE MANAGEMENT
Case Management Discharge Planning Note    Patient name Anjali Shipley  Location [facility] /E4 -* MRN [id number]  : 1945 Date 2024       Current Admission Date: 2024  Current Admission Diagnosis:Weakness   Patient Active Problem List    Diagnosis Date Noted    Sacral wound 2024    Cognitive decline 04/10/2024    Hemiparesis affecting left side as late effect of cerebrovascular accident (CVA) ([facility]) 2022    Hypokalemia 2022    Metabolic encephalopathy 2021    PVC (premature ventricular contraction) 2021    Chorea 2020    Severe Lewy body dementia without behavioral disturbance, psychotic disturbance, mood disturbance, or anxiety ([facility]) 2020    S/P deep brain stimulator placement 2020    Type 2 diabetes mellitus with microalbuminuria, without long-term current use of insulin ([facility]) 2020    Weakness 2020    Chronic cough 2019    Decreased ambulation status 2019    Major depression with psychotic features ([facility]) 2019    Sialorrhea 2018    Allergic rhinitis 2017    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 2016    Acquired hypothyroidism 2016    Mixed hyperlipidemia 2016    Parkinson disease 2016    GERD (gastroesophageal reflux disease) 2016    DYANA (generalized anxiety disorder) 2015    Osteoporosis 2012      LOS (days): 4  Geometric Mean LOS (GMLOS) (days): 3.9  Days to GMLOS:0     OBJECTIVE:  Risk of Unplanned Readmission Score: 13.35         Current admission status: Inpatient   Preferred Pharmacy:   [facility] #[id number] - [place] - [address]  [address]  [place] [address]  Phone: [phone] Fax: [phone]    [facility] - [facility] [address]  [address] [address]  Phone: [phone] Fax: [phone]    Primary Care Provider: Satnam  DO Doyle    Primary Insurance: MEDICARE  Secondary Insurance: [employer] [place]    DISCHARGE DETAILS:    Discharge planning discussed with:: Patient's  Shane  Freedom of Choice: Yes  Comments - Freedom of Choice: [facility] reserved in [facility]  CM contacted family/caregiver?: Yes  Were Treatment Team discharge recommendations reviewed with patient/caregiver?: Yes  Did patient/caregiver verbalize understanding of patient care needs?: N/A- going to facility  Were patient/caregiver advised of the risks associated with not following Treatment Team discharge recommendations?: Yes    Contacts  Patient Contacts: Shane  Relationship to Patient:: Family  Contact Method: In Person  Phone Number: [phone]  Reason/Outcome: Continuity of Care, Emergency Contact, Discharge Planning    Requested Home Health Care         Is the patient interested in HHC at discharge?: No    DME Referral Provided  Referral made for DME?: No    Other Referral/Resources/Interventions Provided:  Interventions: Short Term Rehab  Referral Comments: [facility]    Would you like to participate in our [facility] service program?  : No - Declined    Treatment Team Recommendation: Short Term Rehab  Discharge Destination Plan:: Short Term Rehab  Transport at Discharge : BLS Ambulance            Accepting Facility Name, City & State : [facility]  Receiving Facility/Agency Phone Number: [phone]  Facility/Agency Fax Number: [phone]     CM spoke with patient's  to review options for STR, CM provided  with choice list. Patient's  reported that he spoke with [facility] and would like for his wife to go there as she was there in the past.      CM reserved [facility] via [facility] and messaged them with anticipated DC.  They need patient to have a negative COVID swab, CM will notify [facility].  CM completed and uploaded PASSR.

## 2024-04-24 NOTE — DISCHARGE SUMMARY
Critical access hospital  Discharge- Anjali Shipley 1945, 79 y.o. female MRN: [id number]  Unit/Bed#: E4 -01 Encounter: [id number]  Primary Care Provider: Satnam Kwon DO   Date and time admitted to hospital: 4/20/2024  8:20 AM    Sacral wound  Assessment & Plan  POA  Wound care, specialty bed  Wound care instructions located in the AVS    Hemiparesis affecting left side as late effect of cerebrovascular accident (CVA) ([facility])  Assessment & Plan  Residual left-sided weakness from prior stroke.   Continue statin, pletal  Aspirin on hold for now per neurology not indicated    S/P deep brain stimulator placement  Assessment & Plan  DBS (Placed [facility])    Severe Lewy body dementia without behavioral disturbance, psychotic disturbance, mood disturbance, or anxiety ([facility])  Assessment & Plan  Non-verbal in baseline.  Wheelchair / bed bound, but could stand and pivot for dressing and bathing    Type 2 diabetes mellitus with microalbuminuria, without long-term current use of insulin ([facility])  Assessment & Plan  Lab Results   Component Value Date    HGBA1C 5.5 03/26/2024       Recent Labs     04/23/24  1612 04/23/24  2052 04/24/24  0723 04/24/24  1123   POCGLU 183* 199* 150* 165*         Blood Sugar Average: Last 72 hrs:  (P) [id number]    Continue sliding scale  Metformin on hold    Parkinson disease  Assessment & Plan  Continue home Sinemet of 1 tablet TID and half a tablet at bedtime.    Mixed hyperlipidemia  Assessment & Plan  Continue statin    Acquired hypothyroidism  Assessment & Plan  Continue Levothyroxine at current dosage  Recent TSH levels-3.239    * Weakness  Assessment & Plan  79 year old female with progressive Parkinson's disease who presented with worsening weakness.    Baseline: Mainly in bed / wheelchair, but is able to stand, pivot for dressing toileting, and bathing  CT head without acute intracranial abormalities.  MRI brain canceled as patient with  "non-conditional DBS; thus CTA head neck was ordered  CTA head neck showed progressive stenosis of right PCA segment; neurology recommendations appreciated  Continue Pletal 100mg BID for now, aspirin held   UA / UC showing E. coli and Aerococcus urinae; has received 3 doses of Rocephin thus far with substantial improvement, continue additional 2 days  PT OT Recommending moderate resource intensity  Speech recommending Pureed diet, thin liquids. Continue Speech therapy.    4/23-at this time, neurology signed off and likely weakness and associated strokelike symptoms secondary to UTI.  Patient's spouse at bedside reports improvement since initiation of antibiotics and patient is currently at baseline.  Labs and vitals otherwise stable, monitor on morning labs and consider discharge in the next 24 to 48 hours.    4/24-patient will complete 5 days of IV antibiotics prior to discharge.  Stable for discharge and is currently at baseline mental status; qualifies for inpatient postacute rehab.  Will be discharged to [facility] [place] as per family request.              Medical Problems       Resolved Problems  Date Reviewed: 4/23/2024   None         Admission Date:   Admission Orders (From admission, onward)       Ordered        04/20/24 1230  INPATIENT ADMISSION  Once                            Admitting Diagnosis: UTI (urinary tract infection) [N39.0]  Weakness [R53.1]  Sacral decubitus ulcer, stage II ([facility]) [L89.152]    HPI: \"Anjali Shipley is a 79 y.o. female with a PMH of advanced Parkinson's, Lewy body dementia, type II non-insulin-dependent diabetes, history of stroke who presents with weakness.  Patient's  is her caregiver.  She is nonverbal and has not spoken for about a year and a half.  She is primarily wheelchair and bedbound but does stand and pivot into wheelchair and to get dressed and for bathing.  He states yesterday afternoon she became profoundly weak on standing with specific right leg " "weakness and ended up sliding to the ground.  He had to get his [employer] lift to get her up.  He was able to bathe her yesterday but she continued to be very weak and difficult to stand.  This morning he was unable to get her out of bed due to weakness.  She does have chronic left-sided weakness from prior stroke.  He states that she does not do anything on her own and he is her primary caregiver.  She does eat primarily puréed foods with Synthroid fluids.  He denies any nausea vomiting diarrhea.  He states given the weakness he is unable to care for.  He goran confirm she is level 3 DNR/DNI.\"    Procedures Performed: No orders of the defined types were placed in this encounter.      Summary of Hospital Course: Patient presented with weakness and altered mental status; in the ED, stroke pathway was initiated.  Imaging and subsequent stroke workup was largely negative.  Patient also noted to have UTI as well as worsening decubitus ulcer.  Inspection of decubitus ulcer found that there was no discharge or bright erythematous lesions consistent with cellulitis.  Wound care consulted and patient given specialty bed.  In the discharge instructions, wound care recommendations for treatment of ulcers and acute rehab and at home.    Altered mental status and weakness likely secondary to underlying UTI.  Patient found to have E. coli and Aerococcus urinae susceptible to Rocephin.  Completed 5 days of IV antibiotics and appears to be back to baseline mentation.  As per PT/OT, patient would benefit from postacute care rehab.  /caregiver is agreeable and has selected [facility] [place].  Patient stable for discharge to rehab today.  Med list scrubbed and extraneous medications removed.  Patient should follow-up with PCP and neurologist in 1 and 4 weeks, respectively.    See above for further details.    Significant Findings, Care, Treatment and Services Provided:   UTI  Sacral ulcers    CT head  CTA head neck  Chest " x-ray    Complications: None    Condition at Discharge: stable         Discharge instructions/Information to patient and family:   See after visit summary for information provided to patient and family.      Provisions for Follow-Up Care:  See after visit summary for information related to follow-up care and any pertinent home health orders.      PCP: Satnam Kwon DO    Disposition: Short-term rehab at [facility]    Planned Readmission: No    Discharge Statement   I spent 45 minutes discharging the patient. This time was spent on the day of discharge. I had direct contact with the patient on the day of discharge. Additional documentation is required if more than 30 minutes were spent on discharge.     Discharge Medications:  See after visit summary for reconciled discharge medications provided to patient and family.

## 2024-04-25 LAB
BACTERIA BLD CULT: NORMAL
BACTERIA BLD CULT: NORMAL

## 2024-05-06 LAB
BACTERIA UR CULT: ABNORMAL
BACTERIA UR CULT: ABNORMAL

## 2024-05-08 ENCOUNTER — TELEPHONE (OUTPATIENT)
Dept: NEUROLOGY | Facility: CLINIC | Age: 79
End: 2024-05-08

## 2024-05-08 NOTE — TELEPHONE ENCOUNTER
Called [facility], spoke to  whot transferred me to Jolie and advised of the below. She verbalized understanding. She will call pt's PCP   (4) excellent

## 2024-05-08 NOTE — TELEPHONE ENCOUNTER
5/3/24, 3:41    Hi, my name is Jolie. I am calling from [facility] regarding patient Anjali Shipley, YOB: 1945. And I'm just calling in regards to the aspirin that she has ordered. Our provider wants to know if she should still be on that or not. So if you could just give us a phone call back [phone]. Thank you.    Chart reviewed  We have not seen this pt. No upcoming appt scheduled

## 2024-06-20 ENCOUNTER — TELEPHONE (OUTPATIENT)
Dept: FAMILY MEDICINE CLINIC | Facility: CLINIC | Age: 79
End: 2024-06-20

## 2025-03-06 NOTE — APP STUDENT NOTE
PEPE STUDENT  Inpatient Progress Note for TRAINING ONLY  Not Part of Legal Medical Record       Progress Note - Astrid Bhatia 64 y o  female MRN: [id number]    Unit/Bed#: E5 -01 Encounter: [id number]    Assessment/Plan:  1  Ambulatory dysfunction   Presents with ambulatory dysfunction in the setting of suspected urinary tract infection  Will place PT OT consults for further disposition management  Patient is wheelchair-bound, but over the past 3 days has developed significant generalized weakness to the point  cannot care for her with felt assistant   Previous history of right hemispheric 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   She is nonverbal per      2  Recurrent UTI  Patient  reports that he tested her urine with a home test kit  Leukocyte positive, received 1 day of Macrobid prescribed by patient's primary care provider  Continue IV ceftriaxone for now and follow blood and urine cultures      3  Parkinson disease  Continue carbidopa-levodopa, continue Robinul 1 mg bid   Spoke with , she is nonverbal for many years and wheelchair bound   States she can usually stand for a few seconds   PT/OT consulted     4  Hx of stroke  History of stroke in 2/2020   Maintained on pletal, plavix, aspirin     5  S/p deep brain stimulator placement  History of Parkinson's dementia with deep brain stimulator -due for change in April 6  DM II with hyperglycemia  - diet controlled at home  - place on sliding scale and basal bolus protocol     VTE Pharmacologic Prophylaxis: VTE Score: 4 Moderate Risk (Score 3-4) - Pharmacological DVT Prophylaxis Ordered: heparin  Patient Centered Rounds: I performed bedside rounds with nursing staff today  Discussions with Specialists or Other Care Team Provider:     Education and Discussions with Family / Patient: Updated  () via phone  Time Spent for Care: 20 minutes   More than 50% of total time spent on counseling and Pt called in to r/s 3/7 to 3/13 at 2 pm VV    coordination of care as described above  Current Length of Stay: 1 day(s)  Current Patient Status: Inpatient   Certification Statement: The patient will continue to require additional inpatient hospital stay due to ambulatory dysfuncion  Discharge Plan: Anticipate discharge in 24-48 hrs to rehab facility  Code Status: Level 1 - Full Code    Subjective:   Pt is lying in bed  She is nonverbal, but is able to respond by nodding/shaking her head  Denies pain  No difficulties eating and drinking  Objective:     Vitals:   Temp (24hrs), Av 2 °F (36 8 °C), Min:97 8 °F (36 6 °C), Max:98 6 °F (37 °C)    Temp:  [97 8 °F (36 6 °C)-98 6 °F (37 °C)] 98 2 °F (36 8 °C)  HR:  [] 93  Resp:  [16-18] 18  BP: (125-134)/(70-80) 125/75  SpO2:  [89 %-92 %] 89 %  Body mass index is 31 86 kg/m²  Input and Output Summary (last 24 hours):   No intake or output data in the 24 hours ending 22 1037    Physical Exam:   Physical Exam  Constitutional:       General: She is not in acute distress  Appearance: She is ill-appearing (chronically)  She is not toxic-appearing  Comments: Appears sleepy and not able to keep eyes open   HENT:      Head: Normocephalic and atraumatic  Eyes:      Extraocular Movements: Extraocular movements intact  Conjunctiva/sclera: Conjunctivae normal       Pupils: Pupils are equal, round, and reactive to light  Cardiovascular:      Rate and Rhythm: Normal rate and regular rhythm  Pulses: Normal pulses  Heart sounds: Normal heart sounds  No murmur heard  No gallop  Pulmonary:      Effort: Pulmonary effort is normal       Breath sounds: Normal breath sounds  Abdominal:      General: Bowel sounds are normal  There is no distension  Palpations: Abdomen is soft  Tenderness: There is no abdominal tenderness  Skin:     General: Skin is warm  Capillary Refill: Capillary refill takes less than 2 seconds     Psychiatric:      Comments: Noncommunicative Additional Data:     Labs:  Results from last 7 days   Lab Units 01/25/22  0500   WBC Thousand/uL 8 82   HEMOGLOBIN g/dL 14 7   HEMATOCRIT % 43 4   PLATELETS Thousands/uL 275   NEUTROS PCT % 65   LYMPHS PCT % 23   MONOS PCT % 9   EOS PCT % 1     Results from last 7 days   Lab Units 01/24/22  0557 01/23/22  0930 01/23/22  0930   SODIUM mmol/L 141   < > 144   POTASSIUM mmol/L 3 3*   < > 3 6   CHLORIDE mmol/L 109*   < > 106   CO2 mmol/L 23   < > 28   BUN mg/dL 17   < > 15   CREATININE mg/dL 0 79   < > 0 91   ANION GAP mmol/L 9   < > 10   CALCIUM mg/dL 8 7   < > 9 7   ALBUMIN g/dL  --   --  3 5   TOTAL BILIRUBIN mg/dL  --   --  0 92   ALK PHOS U/L  --   --  83   ALT U/L  --   --  22   AST U/L  --   --  19   GLUCOSE RANDOM mg/dL 117   < > 171*    < > = values in this interval not displayed  Results from last 7 days   Lab Units 01/23/22  0930   INR  1 11     Results from last 7 days   Lab Units 01/25/22  0757 01/24/22  2118 01/24/22  1523 01/24/22  1150 01/24/22  0722 01/23/22  1538   POC GLUCOSE mg/dl 141* 179* 120 178* 111 136         Results from last 7 days   Lab Units 01/24/22  0557 01/23/22  0930   LACTIC ACID mmol/L  --  1 4   PROCALCITONIN ng/ml 0 14 0 16       Lines/Drains:  Invasive Devices  Report    Peripheral Intravenous Line            Peripheral IV 01/23/22 Left Forearm 2 days    Peripheral IV 01/23/22 Left Hand 2 days          Drain            External Urinary Catheter 2 days                Imaging: No pertinent imaging reviewed  Recent Cultures (last 7 days):   Results from last 7 days   Lab Units 01/23/22  1000 01/23/22  0943 01/23/22  0930   BLOOD CULTURE   --  No Growth at 24 hrs  No Growth at 24 hrs     URINE CULTURE  No Growth <1000 cfu/mL  --   --        Last 24 Hours Medication List:   Current Facility-Administered Medications   Medication Dose Route Frequency Provider Last Rate    acetaminophen  650 mg Oral Q6H PRN Dash Hernadez DO      aluminum-magnesium hydroxide-simethicone  30 mL Oral Q6H PRN Merced Au, DO      aspirin  81 mg Oral Once per day on Mon Wed Fri Dash Hernadez, DO      calcium carbonate  1,000 mg Oral Daily PRN Merced Au, DO      carbidopa-levodopa  1 tablet Oral TID Dash Hernadez, DO      cefTRIAXone  1,000 mg Intravenous Q24H Zeenat Avila PA-C      cilostazol  100 mg Oral BID AC Merced Carmel, DO      clopidogrel  75 mg Oral Daily Dash KENDRICK Hernadez, DO      docusate sodium  100 mg Oral BID Shaun D Gill, DO      donepezil  10 mg Oral HS Shaun D Gill, DO      famotidine  20 mg Oral BID Shaun D Gill, DO      glycopyrrolate  1 mg Oral BID Shaun D Gill, DO      heparin (porcine)  5,000 Units Subcutaneous Q8H [address] Dash [facility], DO      insulin lispro  1-5 Units Subcutaneous TID AC Merced Au, DO      levothyroxine  75 mcg Oral Daily Dash KENDRICK Hernadez, DO      ondansetron  4 mg Intravenous Q6H PRN Merced Carmel, DO      pravastatin  40 mg Oral Daily With Dinner Dash Hernadez, DO      saccharomyces boulardii  250 mg Oral BID Dash KENDRICK Hernadez, DO      simethicone  80 mg Oral 4x Daily PRN Merced Carmel, DO      sodium chloride (PF)  3 mL Intravenous Q1H PRN Kat Lara DO          Today, Patient Was Seen By: Aldo HARRY

## 2025-03-28 NOTE — ASSESSMENT & PLAN NOTE
Patient/parent/guardian has consented to treatment and for use of patient information for treatment and billing purposes.  Subjective:   Alba Guzman  is a 56 y.o. female who presents for Cough (X 2 days ago /Patient was sick last Thursday Friday and Saturday /Was headache, bilateral ear fullness, body aches )       URI   This is a new problem. The current episode started 1 to 4 weeks ago. The problem has been waxing and waning. There has been no fever. The fever has been present for 1 to 2 days. Associated symptoms include congestion, coughing, headaches, a plugged ear sensation, rhinorrhea and sinus pain. Pertinent negatives include no abdominal pain, rash, sore throat, vomiting or wheezing. She has tried decongestant for the symptoms. The treatment provided mild relief.   Symptoms initially began last week, and then somewhat resolved and then 2 days ago returned.  Coworker recently diagnosed with RSV.    Review of Systems   HENT:  Positive for congestion, rhinorrhea and sinus pain. Negative for sore throat.    Respiratory:  Positive for cough. Negative for wheezing.    Gastrointestinal:  Negative for abdominal pain and vomiting.   Skin:  Negative for rash.   Neurological:  Positive for headaches.         CURRENT MEDICATIONS:  lidocaine Soln  progesterone  VITAMIN D PO  Allergies:     Allergies   Allergen Reactions    Nkda [No Known Drug Allergy]      Current Problems: Alba Guzman does not have a problem list on file.  Past Surgical Hx:    Past Surgical History:   Procedure Laterality Date    KNEE ARTHROSCOPY  6/15/2010    Performed by MAGY CARTER at SURGERY [facility] ORS    MENISCECTOMY  6/15/2010    Performed by MAGY CARTER at [facility] ORS    DENTAL EXTRACTION(S)  1986    wisdom teeth      Past Social Hx:  reports that she has never smoked. She has never used smokeless tobacco. She reports that she does not drink alcohol and does not use drugs.    Objective:   /80 (BP Location: Left  Presents with ambulatory dysfunction in the setting of suspected urinary tract infection  Will place PT OT consults for further disposition management  Patient is wheelchair-bound, but over the past 3 days has developed significant generalized weakness to the point  cannot care for her with felt assistant   Previous history of right hemispheric 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   She is nonverbal per  "arm, Patient Position: Sitting, BP Cuff Size: Adult)   Pulse 90   Temp 36.3 °C (97.4 °F) (Temporal)   Resp 18   Ht 1.702 m (5' 7\")   Wt 103 kg (227 lb 1.6 oz)   SpO2 95%   BMI 35.57 kg/m²   Physical Exam  Vitals and nursing note reviewed.   Constitutional:       General: She is not in acute distress.     Appearance: Normal appearance. She is normal weight. She is ill-appearing.   HENT:      Head: Normocephalic and atraumatic.      Right Ear: External ear normal. Tenderness present. No swelling. A middle ear effusion is present. Tympanic membrane is bulging. Tympanic membrane is not erythematous.      Left Ear: External ear normal. Tenderness present. No swelling. A middle ear effusion is present. Tympanic membrane is bulging. Tympanic membrane is not erythematous.      Nose: Mucosal edema, congestion and rhinorrhea present. Rhinorrhea is clear.      Right Sinus: No maxillary sinus tenderness or frontal sinus tenderness.      Left Sinus: No maxillary sinus tenderness or frontal sinus tenderness.      Mouth/Throat:      Mouth: Mucous membranes are moist.      Pharynx: Postnasal drip present. No oropharyngeal exudate, posterior oropharyngeal erythema or uvula swelling.   Eyes:      Extraocular Movements: Extraocular movements intact.      Conjunctiva/sclera: Conjunctivae normal.      Pupils: Pupils are equal, round, and reactive to light.   Cardiovascular:      Rate and Rhythm: Normal rate and regular rhythm.      Pulses: Normal pulses.      Heart sounds: Normal heart sounds.   Pulmonary:      Effort: Pulmonary effort is normal.      Breath sounds: Normal breath sounds. No decreased breath sounds, wheezing, rhonchi or rales.      Comments: Deep inspiration triggers cough  Abdominal:      Palpations: Abdomen is soft.      Tenderness: There is no abdominal tenderness.   Musculoskeletal:         General: Normal range of motion.      Cervical back: Normal range of motion and neck supple.   Skin:     General: Skin is " warm and dry.      Findings: No rash.   Neurological:      General: No focal deficit present.      Mental Status: She is alert and oriented to person, place, and time.   Psychiatric:         Mood and Affect: Mood normal.         Behavior: Behavior normal.       Assessment/Plan:   1. Viral upper respiratory tract infection  - benzonatate (TESSALON) 100 MG Cap; Take 2 Capsules by mouth 3 times a day as needed for Cough for up to 15 days.  Dispense: 45 Capsule; Refill: 0  - cetirizine (ZYRTEC ALLERGY) 10 MG Tab; Take 1 Tablet by mouth every day.  Dispense: 30 Tablet; Refill: 0  - albuterol 108 (90 Base) MCG/ACT Aero Soln inhalation aerosol; Inhale 2 Puffs every 6 hours as needed for Shortness of Breath.  Dispense: 8.5 g; Refill: 0  - predniSONE (DELTASONE) 10 MG Tab; Take 2 Tablets by mouth every day for 5 days.  Dispense: 10 Tablet; Refill: 0    2. Dysfunction of both eustachian tubes  - benzonatate (TESSALON) 100 MG Cap; Take 2 Capsules by mouth 3 times a day as needed for Cough for up to 15 days.  Dispense: 45 Capsule; Refill: 0  - cetirizine (ZYRTEC ALLERGY) 10 MG Tab; Take 1 Tablet by mouth every day.  Dispense: 30 Tablet; Refill: 0  - albuterol 108 (90 Base) MCG/ACT Aero Soln inhalation aerosol; Inhale 2 Puffs every 6 hours as needed for Shortness of Breath.  Dispense: 8.5 g; Refill: 0  - predniSONE (DELTASONE) 10 MG Tab; Take 2 Tablets by mouth every day for 5 days.  Dispense: 10 Tablet; Refill: 0    Other orders  - progesterone (PROMETRIUM) 200 MG capsule; TAKE 1 CAPSULE BY MOUTH ONCE DAILY AS DIRECTED    Point-of-care testing deferred due to length of time she has had symptoms. Reassurance with anticipatory guidance provided regarding length of time and expected symptoms discussed. Symptom management medication sent to pharmacy with instructions for use.  Recommend adequate hydration, rest, deep breathing and coughing, ambulation as tolerated, OTC medications. Delsym OTC cough medication lasts 12 hours, might  help you sleep better.  Consider Ponaris, or Nose Better nasal emollients, and saline nasal spray for nasal congestion or to prevent nasal passage dryness or allergies.   If you have a history of hypertension recommend Coricidin to prevent blood pressure elevation. Be sure to change your toothbrush!  Red flags, RTC and ER precautions discussed, with patient confirming their understanding of  need for immediate follow-up.  Discussed medication management options, risks and benefits, and alternatives to treatment plan agreed upon. Instructed to continue  medications without changes as ordered by primary care unless aforementioned above.  Patient expresses understanding and agrees to plan of care. All questions or concerns answered. For my MDM, I have personally reviewed previous notes, and test results as pertinent to today's visit.    Please note that this dictation was created using voice recognition software. I have made every reasonable attempt to correct obvious errors,  but there may be grammar errors, and possibly content that I did not discover before finalizing the note.   This note was electronically signed by ADELINA James